# Patient Record
Sex: FEMALE | Race: WHITE | NOT HISPANIC OR LATINO | Employment: OTHER | URBAN - METROPOLITAN AREA
[De-identification: names, ages, dates, MRNs, and addresses within clinical notes are randomized per-mention and may not be internally consistent; named-entity substitution may affect disease eponyms.]

---

## 2017-01-09 ENCOUNTER — OFFICE VISIT (OUTPATIENT)
Dept: FAMILY MEDICINE | Facility: CLINIC | Age: 77
End: 2017-01-09
Payer: MEDICARE

## 2017-01-09 VITALS
RESPIRATION RATE: 16 BRPM | DIASTOLIC BLOOD PRESSURE: 60 MMHG | SYSTOLIC BLOOD PRESSURE: 118 MMHG | HEART RATE: 64 BPM | BODY MASS INDEX: 20.48 KG/M2 | WEIGHT: 112 LBS

## 2017-01-09 DIAGNOSIS — I10 ESSENTIAL HYPERTENSION: Primary | ICD-10-CM

## 2017-01-09 DIAGNOSIS — J43.9 PULMONARY EMPHYSEMA, UNSPECIFIED EMPHYSEMA TYPE: ICD-10-CM

## 2017-01-09 DIAGNOSIS — K59.1 DIARRHEA, FUNCTIONAL: ICD-10-CM

## 2017-01-09 DIAGNOSIS — F51.01 PRIMARY INSOMNIA: ICD-10-CM

## 2017-01-09 DIAGNOSIS — F41.1 GAD (GENERALIZED ANXIETY DISORDER): ICD-10-CM

## 2017-01-09 DIAGNOSIS — E78.5 HYPERLIPIDEMIA, UNSPECIFIED HYPERLIPIDEMIA TYPE: ICD-10-CM

## 2017-01-09 PROCEDURE — 99212 OFFICE O/P EST SF 10 MIN: CPT | Mod: PBBFAC | Performed by: FAMILY MEDICINE

## 2017-01-09 PROCEDURE — 99213 OFFICE O/P EST LOW 20 MIN: CPT | Mod: S$PBB,,, | Performed by: FAMILY MEDICINE

## 2017-01-09 PROCEDURE — 99999 PR PBB SHADOW E&M-EST. PATIENT-LVL II: CPT | Mod: PBBFAC,,, | Performed by: FAMILY MEDICINE

## 2017-01-09 RX ORDER — DIPHENOXYLATE HYDROCHLORIDE AND ATROPINE SULFATE 2.5; .025 MG/1; MG/1
1 TABLET ORAL 4 TIMES DAILY PRN
Qty: 90 TABLET | Refills: 2 | Status: SHIPPED | OUTPATIENT
Start: 2017-01-09 | End: 2017-01-19

## 2017-01-09 RX ORDER — ALPRAZOLAM 0.25 MG/1
TABLET ORAL
Qty: 90 TABLET | Refills: 1 | Status: SHIPPED | OUTPATIENT
Start: 2017-01-09 | End: 2017-01-09 | Stop reason: SDUPTHER

## 2017-01-09 RX ORDER — ALPRAZOLAM 0.25 MG/1
TABLET ORAL
Qty: 90 TABLET | Refills: 1 | Status: SHIPPED | OUTPATIENT
Start: 2017-01-09 | End: 2017-03-16 | Stop reason: SDUPTHER

## 2017-01-09 NOTE — MR AVS SNAPSHOT
[facility]  [address]  [facility] [address]  Phone: [phone]  Fax: [phone]                  Marva Perez   2017 12:45 PM   Office Visit    Description:  Female : 1940   Provider:  Kevin Clements MD   Department:  [facility]           Reason for Visit     Follow-up           Diagnoses this Visit        Comments    Essential hypertension    -  Primary     Primary insomnia         Hyperlipidemia, unspecified hyperlipidemia type         Pulmonary emphysema, unspecified emphysema type         Diarrhea, functional         KATHIE (generalized anxiety disorder)                To Do List           Future Appointments        Provider Department Dept Phone    3/9/2017 10:15 AM Kevin Clements MD [facility] [phone]      Goals (5 Years of Data)     None      Follow-Up and Disposition     Return in about 2 months (around 3/9/2017).    Follow-up and Disposition History       These Medications        Disp Refills Start End    diphenoxylate-atropine 2.5-0.025 mg (LOMOTIL) 2.5-0.025 mg per tablet 90 tablet 2 2017    Take 1 tablet by mouth 4 (four) times daily as needed for Diarrhea. - Oral    Pharmacy: [facility]/pharmacy #[id number] - [address] Ph #: [phone]       alprazolam (XANAX) 0.25 MG tablet 90 tablet 1 2017     1 po q hs    Pharmacy: [facility] - [address] Ph #: [phone]         [facility] On Call     [facility] On Call Nurse Care Line -  Assistance  Registered nurses in the [facility] On Call Center provide clinical advisement, health education, appointment booking, and other advisory services.  Call for this free service at [phone].             Medications           Message regarding Medications     Verify the changes and/or additions to your medication regime listed below are the same as discussed with your clinician today.  If any of these  changes or additions are incorrect, please notify your healthcare provider.        START taking these NEW medications        Refills    diphenoxylate-atropine 2.5-0.025 mg (LOMOTIL) 2.5-0.025 mg per tablet 2    Sig: Take 1 tablet by mouth 4 (four) times daily as needed for Diarrhea.    Class: Normal    Route: Oral           Verify that the below list of medications is an accurate representation of the medications you are currently taking.  If none reported, the list may be blank. If incorrect, please contact your healthcare provider. Carry this list with you in case of emergency.           Current Medications     alprazolam (XANAX) 0.25 MG tablet 1 po q hs    amlodipine (NORVASC) 2.5 MG tablet Take 1 tablet (2.5 mg total) by mouth once daily.    aspirin (ECOTRIN) 81 MG EC tablet Take 81 mg by mouth once daily.    BENICAR 40 mg tablet TAKE 1 TABLET DAILY    colesevelam (WELCHOL) 625 mg tablet TAKE 3 TABLETS (1,875 MG) TWICE DAILY WITH MEALS    ergocalciferol (VITAMIN D2) 50,000 unit Cap TAKE 1 CAPSULE EVERY 7 DAYS    escitalopram oxalate (LEXAPRO) 10 MG tablet Take 1 tablet (10 mg total) by mouth once daily.    fluticasone-salmeterol 250-50 mcg/dose (ADVAIR DISKUS) 250-50 mcg/dose diskus inhaler USE 1 INHALATION TWICE A DAY    levothyroxine 13 mcg Cap 1 Po q am ---No Generic    methscopolamine (PAMINE) 2.5 mg Tab Take 1 tablet (2.5 mg total) by mouth 3 (three) times daily.    nebivolol (BYSTOLIC) 10 MG Tab Take 1 tablet (10 mg total) by mouth once daily.    rifAXIMin (XIFAXAN) 550 mg Tab Take 1 tablet (550 mg total) by mouth 3 (three) times daily.    SPIRIVA WITH HANDIHALER 18 mcg inhalation capsule INHALE THE CONTENTS OF 1 CAPSULE ONCE DAILY    diphenoxylate-atropine 2.5-0.025 mg (LOMOTIL) 2.5-0.025 mg per tablet Take 1 tablet by mouth 4 (four) times daily as needed for Diarrhea.           Clinical Reference Information           Vital Signs - Last Recorded  Most recent update: 1/9/2017 12:56 PM by Ravindra MATSON  THANH Headley    BP Pulse Resp Wt BMI    118/60 (BP Location: Left arm, Patient Position: Sitting, BP Method: Manual) 64 16 50.8 kg (111 lb 15.9 oz) 20.48 kg/m2      Blood Pressure          Most Recent Value    BP  118/60      Allergies as of 1/9/2017     Pcn [Penicillins]    Ilosone    Iodine And Iodide Containing Products    Sulfa (Sulfonamide Antibiotics)      Immunizations Administered on Date of Encounter - 1/9/2017     None

## 2017-01-09 NOTE — PROGRESS NOTES
Subjective:       Patient ID: Marva Perez is a 76 y.o. female.    Chief Complaint: Follow-up    HPI Comments: Pt is a 76 y.o. female who presents for check up for Essential hypertension  (primary encounter diagnosis)  Primary insomnia is up to date.    Review of Systems   Constitutional: Negative for appetite change, chills and fever.   HENT: Negative for rhinorrhea, sinus pressure, sore throat and trouble swallowing.    Respiratory: Negative for cough, chest tightness, shortness of breath and wheezing.    Cardiovascular: Negative for chest pain and palpitations.   Gastrointestinal: Negative for abdominal pain, blood in stool, diarrhea, nausea and vomiting.   Genitourinary: Negative for dysuria, flank pain, hematuria, pelvic pain, urgency, vaginal bleeding, vaginal discharge and vaginal pain.   Musculoskeletal: Negative for back pain, joint swelling and neck stiffness.   Skin: Negative for rash.   Neurological: Negative for dizziness, weakness, light-headedness, numbness and headaches.   Hematological: Does not bruise/bleed easily.   Psychiatric/Behavioral: Negative for agitation. The patient is not nervous/anxious.        Objective:      Physical Exam   Constitutional: She is oriented to person, place, and time. She appears well-developed and well-nourished.   HENT:   Head: Normocephalic.   Eyes: Pupils are equal, round, and reactive to light.   Neck: Normal range of motion. Neck supple. No thyromegaly present.   Cardiovascular: Normal rate and regular rhythm.    Pulmonary/Chest: No respiratory distress. She has no wheezes. She has no rales. She exhibits no tenderness.   Abdominal: She exhibits no distension. There is no tenderness. There is no rebound and no guarding.   Musculoskeletal: Normal range of motion. She exhibits no edema or tenderness.   Lymphadenopathy:     She has no cervical adenopathy.   Neurological: She is alert and oriented to person, place, and time. She has normal reflexes. She displays  normal reflexes. No cranial nerve deficit. She exhibits normal muscle tone. Coordination normal.   Skin: Skin is warm and dry. No rash noted. No pallor.   Psychiatric: She has a normal mood and affect. Judgment and thought content normal.       Assessment:       1. Essential hypertension    2. Primary insomnia    3. Hyperlipidemia, unspecified hyperlipidemia type    4. Pulmonary emphysema, unspecified emphysema type    5. Diarrhea, functional        Plan:   Marva was seen today for follow-up.    Diagnoses and all orders for this visit:    Essential hypertension    Primary insomnia    Hyperlipidemia, unspecified hyperlipidemia type    Pulmonary emphysema, unspecified emphysema type    Diarrhea, functional    Other orders  -     diphenoxylate-atropine 2.5-0.025 mg (LOMOTIL) 2.5-0.025 mg per tablet; Take 1 tablet by mouth 4 (four) times daily as needed for Diarrhea.    Lomotil tid for functional diarrhea

## 2017-01-18 ENCOUNTER — TELEPHONE (OUTPATIENT)
Dept: FAMILY MEDICINE | Facility: CLINIC | Age: 77
End: 2017-01-18

## 2017-01-18 NOTE — TELEPHONE ENCOUNTER
----- Message from Lennox Son sent at 2017 12:28 PM CST -----  Contact: Patient  Marva Peerz  MRN: [id number]  : 1940  PCP: Kvein Clements  Home Phone      [phone]  Work Phone      Not on file.  Mobile          [phone]      MESSAGE: told to inform Dr Clements if medication did not work -- she states it is not helping & would like to speak with Dr Clements    Call [phone]    PCP: Tyree

## 2017-01-30 RX ORDER — PANTOPRAZOLE SODIUM 40 MG/1
TABLET, DELAYED RELEASE ORAL
Qty: 90 TABLET | Refills: 0 | Status: SHIPPED | OUTPATIENT
Start: 2017-01-30 | End: 2017-04-28 | Stop reason: SDUPTHER

## 2017-02-01 ENCOUNTER — HOSPITAL ENCOUNTER (OUTPATIENT)
Dept: RADIOLOGY | Facility: HOSPITAL | Age: 77
Discharge: HOME | End: 2017-02-01
Attending: OBSTETRICS & GYNECOLOGY
Payer: MEDICARE

## 2017-02-01 ENCOUNTER — TELEPHONE (OUTPATIENT)
Dept: OBSTETRICS AND GYNECOLOGY | Facility: CLINIC | Age: 77
End: 2017-02-01

## 2017-02-01 DIAGNOSIS — Z12.31 ENCOUNTER FOR SCREENING MAMMOGRAM FOR BREAST CANCER: ICD-10-CM

## 2017-02-01 DIAGNOSIS — Z12.31 ENCOUNTER FOR SCREENING MAMMOGRAM FOR BREAST CANCER: Primary | ICD-10-CM

## 2017-02-01 PROCEDURE — 77063 BREAST TOMOSYNTHESIS BI: CPT | Mod: 26,,, | Performed by: RADIOLOGY

## 2017-02-01 PROCEDURE — 77067 SCR MAMMO BI INCL CAD: CPT | Mod: 26,,, | Performed by: RADIOLOGY

## 2017-02-01 PROCEDURE — 77067 SCR MAMMO BI INCL CAD: CPT | Mod: TC

## 2017-02-01 NOTE — TELEPHONE ENCOUNTER
----- Message from Christine Bauer MA sent at 2017 10:36 AM CST -----  Contact: Self  Marva Perez  MRN: [id number]  : 1940  PCP: Kevin Clements  Home Phone      [phone]  Work Phone      Not on file.  Mobile          [phone]      MESSAGE:   Please link order to mammogram appointment today @ 210  Thanks

## 2017-03-05 RX ORDER — ERGOCALCIFEROL 1.25 MG/1
CAPSULE ORAL
Qty: 24 CAPSULE | Refills: 0 | Status: SHIPPED | OUTPATIENT
Start: 2017-03-05 | End: 2017-06-01 | Stop reason: SDUPTHER

## 2017-03-16 ENCOUNTER — OFFICE VISIT (OUTPATIENT)
Dept: FAMILY MEDICINE | Facility: CLINIC | Age: 77
End: 2017-03-16
Payer: MEDICARE

## 2017-03-16 VITALS
BODY MASS INDEX: 20.29 KG/M2 | WEIGHT: 110.25 LBS | SYSTOLIC BLOOD PRESSURE: 116 MMHG | HEIGHT: 62 IN | RESPIRATION RATE: 16 BRPM | DIASTOLIC BLOOD PRESSURE: 74 MMHG | HEART RATE: 68 BPM

## 2017-03-16 DIAGNOSIS — Z23 IMMUNIZATION DUE: ICD-10-CM

## 2017-03-16 DIAGNOSIS — I25.10 ASCVD (ARTERIOSCLEROTIC CARDIOVASCULAR DISEASE): ICD-10-CM

## 2017-03-16 DIAGNOSIS — F41.1 GAD (GENERALIZED ANXIETY DISORDER): ICD-10-CM

## 2017-03-16 DIAGNOSIS — R19.7 DIARRHEA, UNSPECIFIED TYPE: ICD-10-CM

## 2017-03-16 DIAGNOSIS — I10 ESSENTIAL HYPERTENSION: ICD-10-CM

## 2017-03-16 DIAGNOSIS — J43.9 PULMONARY EMPHYSEMA, UNSPECIFIED EMPHYSEMA TYPE: Primary | ICD-10-CM

## 2017-03-16 DIAGNOSIS — E78.5 HYPERLIPIDEMIA, UNSPECIFIED HYPERLIPIDEMIA TYPE: ICD-10-CM

## 2017-03-16 LAB
ALBUMIN SERPL BCP-MCNC: 3.9 G/DL
ALP SERPL-CCNC: 85 U/L
ALT SERPL W/O P-5'-P-CCNC: 16 U/L
ANION GAP SERPL CALC-SCNC: 8 MMOL/L
AST SERPL-CCNC: 18 U/L
BASOPHILS # BLD AUTO: 0.06 K/UL
BASOPHILS NFR BLD: 0.6 %
BILIRUB SERPL-MCNC: 1.1 MG/DL
BUN SERPL-MCNC: 23 MG/DL
CALCIUM SERPL-MCNC: 9.6 MG/DL
CHLORIDE SERPL-SCNC: 107 MMOL/L
CHOLEST/HDLC SERPL: 4.5 {RATIO}
CK SERPL-CCNC: 66 U/L
CO2 SERPL-SCNC: 28 MMOL/L
CREAT SERPL-MCNC: 1 MG/DL
DIFFERENTIAL METHOD: NORMAL
EOSINOPHIL # BLD AUTO: 0.2 K/UL
EOSINOPHIL NFR BLD: 1.8 %
ERYTHROCYTE [DISTWIDTH] IN BLOOD BY AUTOMATED COUNT: 13.3 %
EST. GFR  (AFRICAN AMERICAN): >60 ML/MIN/1.73 M^2
EST. GFR  (NON AFRICAN AMERICAN): 55 ML/MIN/1.73 M^2
GLUCOSE SERPL-MCNC: 96 MG/DL
HCT VFR BLD AUTO: 44.4 %
HDL/CHOLESTEROL RATIO: 22.4 %
HDLC SERPL-MCNC: 237 MG/DL
HDLC SERPL-MCNC: 53 MG/DL
HGB BLD-MCNC: 14.4 G/DL
LDLC SERPL CALC-MCNC: 155.8 MG/DL
LYMPHOCYTES # BLD AUTO: 2.2 K/UL
LYMPHOCYTES NFR BLD: 22.8 %
MCH RBC QN AUTO: 29.4 PG
MCHC RBC AUTO-ENTMCNC: 32.4 %
MCV RBC AUTO: 91 FL
MONOCYTES # BLD AUTO: 1 K/UL
MONOCYTES NFR BLD: 10.3 %
NEUTROPHILS # BLD AUTO: 6.1 K/UL
NEUTROPHILS NFR BLD: 64.5 %
NONHDLC SERPL-MCNC: 184 MG/DL
PLATELET # BLD AUTO: 195 K/UL
PMV BLD AUTO: 10.7 FL
POTASSIUM SERPL-SCNC: 5.3 MMOL/L
PROT SERPL-MCNC: 7.3 G/DL
RBC # BLD AUTO: 4.9 M/UL
SODIUM SERPL-SCNC: 143 MMOL/L
TRIGL SERPL-MCNC: 141 MG/DL
TSH SERPL DL<=0.005 MIU/L-ACNC: 2.35 UIU/ML
WBC # BLD AUTO: 9.51 K/UL

## 2017-03-16 PROCEDURE — 1159F MED LIST DOCD IN RCRD: CPT | Performed by: FAMILY MEDICINE

## 2017-03-16 PROCEDURE — 90670 PCV13 VACCINE IM: CPT | Mod: PBBFAC | Performed by: FAMILY MEDICINE

## 2017-03-16 PROCEDURE — 1126F AMNT PAIN NOTED NONE PRSNT: CPT | Performed by: FAMILY MEDICINE

## 2017-03-16 PROCEDURE — 82550 ASSAY OF CK (CPK): CPT

## 2017-03-16 PROCEDURE — 85025 COMPLETE CBC W/AUTO DIFF WBC: CPT

## 2017-03-16 PROCEDURE — 3074F SYST BP LT 130 MM HG: CPT | Performed by: FAMILY MEDICINE

## 2017-03-16 PROCEDURE — 80061 LIPID PANEL: CPT

## 2017-03-16 PROCEDURE — 99999 PR PBB SHADOW E&M-EST. PATIENT-LVL III: CPT | Mod: PBBFAC,,, | Performed by: FAMILY MEDICINE

## 2017-03-16 PROCEDURE — 1157F ADVNC CARE PLAN IN RCRD: CPT | Performed by: FAMILY MEDICINE

## 2017-03-16 PROCEDURE — 99214 OFFICE O/P EST MOD 30 MIN: CPT | Mod: S$PBB | Performed by: FAMILY MEDICINE

## 2017-03-16 PROCEDURE — 3078F DIAST BP <80 MM HG: CPT | Performed by: FAMILY MEDICINE

## 2017-03-16 PROCEDURE — 84443 ASSAY THYROID STIM HORMONE: CPT

## 2017-03-16 PROCEDURE — 1160F RVW MEDS BY RX/DR IN RCRD: CPT | Performed by: FAMILY MEDICINE

## 2017-03-16 PROCEDURE — 36415 COLL VENOUS BLD VENIPUNCTURE: CPT | Mod: PBBFAC

## 2017-03-16 PROCEDURE — 80053 COMPREHEN METABOLIC PANEL: CPT

## 2017-03-16 PROCEDURE — 99213 OFFICE O/P EST LOW 20 MIN: CPT | Mod: PBBFAC | Performed by: FAMILY MEDICINE

## 2017-03-16 RX ORDER — DIPHENOXYLATE HYDROCHLORIDE AND ATROPINE SULFATE 2.5; .025 MG/1; MG/1
1 TABLET ORAL 2 TIMES DAILY PRN
Qty: 180 TABLET | Refills: 1 | Status: SHIPPED | OUTPATIENT
Start: 2017-03-16 | End: 2017-06-08

## 2017-03-16 RX ORDER — ATORVASTATIN CALCIUM 10 MG/1
10 TABLET, FILM COATED ORAL DAILY
COMMUNITY
End: 2017-06-08

## 2017-03-16 RX ORDER — LOPERAMIDE HYDROCHLORIDE 2 MG/1
2 CAPSULE ORAL DAILY
COMMUNITY
End: 2017-06-08

## 2017-03-16 RX ORDER — DIPHENOXYLATE HYDROCHLORIDE AND ATROPINE SULFATE 2.5; .025 MG/1; MG/1
1 TABLET ORAL 4 TIMES DAILY PRN
COMMUNITY
End: 2017-03-16 | Stop reason: SDUPTHER

## 2017-03-16 RX ORDER — ALPRAZOLAM 0.25 MG/1
TABLET ORAL
Qty: 90 TABLET | Refills: 1 | Status: SHIPPED | OUTPATIENT
Start: 2017-03-16 | End: 2017-06-08 | Stop reason: SDUPTHER

## 2017-03-16 RX ORDER — METHSCOPOLAMINE BROMIDE 2.5 MG/1
2.5 TABLET ORAL 2 TIMES DAILY
Qty: 180 TABLET | Refills: 3 | Status: SHIPPED | OUTPATIENT
Start: 2017-03-16 | End: 2017-07-21 | Stop reason: ALTCHOICE

## 2017-03-16 RX ORDER — LEVOTHYROXINE SODIUM 50 UG/1
13 TABLET ORAL DAILY
COMMUNITY
End: 2017-08-24

## 2017-03-16 NOTE — MR AVS SNAPSHOT
[place] [address] [facility] [address]  Phone: [phone]  Fax: [phone]                  Marva Perez   3/16/2017 8:45 AM   Office Visit    Description:  Female : 1940   Provider:  Kevin Clements MD   Department:  [facility]           Reason for Visit     Med Checkup           Diagnoses this Visit        Comments    Pulmonary emphysema, unspecified emphysema type    -  Primary     Hyperlipidemia, unspecified hyperlipidemia type         ASCVD (arteriosclerotic cardiovascular disease)         Essential hypertension         Diarrhea, unspecified type         KATHIE (generalized anxiety disorder)         Immunization due                To Do List           Future Appointments        Provider Department Dept Phone    9/15/2017 8:30 AM Kevin Clements MD [facility] [phone]      Goals (5 Years of Data)     None      Follow-Up and Disposition     Return in about 6 months (around 2017).       These Medications        Disp Refills Start End    methscopolamine (PAMINE) 2.5 mg Tab 180 tablet 3 3/16/2017     Take 1 tablet (2.5 mg total) by mouth 2 (two) times daily. - Oral    Pharmacy: [facility] - [address] Ph #: [phone]       diphenoxylate-atropine 2.5-0.025 mg (LOMOTIL) 2.5-0.025 mg per tablet 180 tablet 1 3/16/2017     Take 1 tablet by mouth 2 (two) times daily as needed for Diarrhea. - Oral    Pharmacy: [facility] - [address] Ph #: [phone]       alprazolam (XANAX) 0.25 MG tablet 90 tablet 1 3/16/2017     1 po q hs    Pharmacy: [facility] - [address] Ph #: [phone]         [facility] On Call     [facility] On Call Nurse Care Line -  Assistance  Registered nurses in the [facility] On Call Center provide clinical advisement, health education, appointment booking, and other  advisory services.  Call for this free service at [phone].             Medications           Message regarding Medications     Verify the changes and/or additions to your medication regime listed below are the same as discussed with your clinician today.  If any of these changes or additions are incorrect, please notify your healthcare provider.        START taking these NEW medications        Refills    diphenoxylate-atropine 2.5-0.025 mg (LOMOTIL) 2.5-0.025 mg per tablet 1    Sig: Take 1 tablet by mouth 2 (two) times daily as needed for Diarrhea.    Class: Normal    Route: Oral      CHANGE how you are taking these medications     Start Taking Instead of    methscopolamine (PAMINE) 2.5 mg Tab methscopolamine (PAMINE) 2.5 mg Tab    Dosage:  Take 1 tablet (2.5 mg total) by mouth 2 (two) times daily. Dosage:  Take 1 tablet (2.5 mg total) by mouth 3 (three) times daily.    Reason for Change:  Reorder       STOP taking these medications     levothyroxine 13 mcg Cap 1 Po q am ---No Generic    rifAXIMin (XIFAXAN) 550 mg Tab Take 1 tablet (550 mg total) by mouth 3 (three) times daily.           Verify that the below list of medications is an accurate representation of the medications you are currently taking.  If none reported, the list may be blank. If incorrect, please contact your healthcare provider. Carry this list with you in case of emergency.           Current Medications     alprazolam (XANAX) 0.25 MG tablet 1 po q hs    amlodipine (NORVASC) 2.5 MG tablet Take 1 tablet (2.5 mg total) by mouth once daily.    aspirin (ECOTRIN) 81 MG EC tablet Take 81 mg by mouth once daily.    atorvastatin (LIPITOR) 10 MG tablet Take 10 mg by mouth once daily.    BENICAR 40 mg tablet TAKE 1 TABLET DAILY    coenzyme Q10 100 mg Chew Take 1 tablet by mouth once daily.    colesevelam (WELCHOL) 625 mg tablet TAKE 3 TABLETS (1,875 MG) TWICE DAILY WITH MEALS    diphenoxylate-atropine 2.5-0.025 mg (LOMOTIL) 2.5-0.025 mg per tablet  "Take 1 tablet by mouth 2 (two) times daily as needed for Diarrhea.    escitalopram oxalate (LEXAPRO) 10 MG tablet Take 1 tablet (10 mg total) by mouth once daily.    fluticasone-salmeterol 250-50 mcg/dose (ADVAIR DISKUS) 250-50 mcg/dose diskus inhaler USE 1 INHALATION TWICE A DAY    levothyroxine (SYNTHROID) 50 MCG tablet Take 50 mcg by mouth once daily.    loperamide (IMODIUM) 2 mg capsule Take 2 mg by mouth once daily.    nebivolol (BYSTOLIC) 10 MG Tab Take 1 tablet (10 mg total) by mouth once daily.    pantoprazole (PROTONIX) 40 MG tablet TAKE 1 TABLET DAILY    SPIRIVA WITH HANDIHALER 18 mcg inhalation capsule INHALE THE CONTENTS OF 1 CAPSULE ONCE DAILY    VITAMIN D2 50,000 unit capsule TAKE 1 CAPSULE EVERY 7 DAYS    methscopolamine (PAMINE) 2.5 mg Tab Take 1 tablet (2.5 mg total) by mouth 2 (two) times daily.           Clinical Reference Information           Your Vitals Were     BP Pulse Resp Height Weight BMI    116/74 (BP Location: Right arm, Patient Position: Sitting, BP Method: Manual) 68 16 5' 2" (1.575 m) 50 kg (110 lb 3.7 oz) 20.16 kg/m2      Blood Pressure          Most Recent Value    BP  116/74      Allergies as of 3/16/2017     Pcn [Penicillins]    Bactrim [Sulfamethoxazole-trimethoprim]    Ilosone    Iodine And Iodide Containing Products    Sulfa (Sulfonamide Antibiotics)      Immunizations Administered on Date of Encounter - 3/16/2017     Name Date Dose VIS Date Route    Pneumococcal Conjugate - 13 Valent  Incomplete 0.5 mL 11/5/2015 Intramuscular      Orders Placed During Today's Visit      Normal Orders This Visit    Pneumococcal Conjugate Vaccine (13 Valent) (IM)     Future Labs/Procedures Expected by Expires    CBC auto differential  3/16/2017 5/15/2018    CK  3/16/2017 5/15/2018    Comprehensive metabolic panel  3/16/2017 3/16/2018    Lipid panel  3/16/2017 5/15/2018    TSH  3/16/2017 3/16/2018      Language Assistance Services     ATTENTION: Language assistance services are available, free of " charge. Please call [phone].      ATENCIÓN: Si habla español, tiene a kim disposición servicios gratuitos de asistencia lingüística. Llame al [phone].     CHÚ Ý: N?u b?n nói Ti?ng Vi?t, có các d?ch v? h? tr? ngôn ng? mi?n phí dành cho b?n. G?i s? [phone].         [facility] complies with applicable Federal civil rights laws and does not discriminate on the basis of race, color, national origin, age, disability, or sex.

## 2017-03-16 NOTE — PROGRESS NOTES
Subjective:       Patient ID: Marva Perez is a 76 y.o. female.    Chief Complaint: Med Checkup    HPI Comments: Pt is a 76 y.o. female who presents for check up for Pulmonary emphysema, unspecified emphysema type  (primary encounter diagnosis)  Hyperlipidemia, unspecified hyperlipidemia type  Ascvd (arteriosclerotic cardiovascular disease)  Essential hypertension. Doing well on current meds. Denies any side effects. Prevention is up to date.    Review of Systems   Constitutional: Negative for appetite change, chills and fever.   HENT: Negative for rhinorrhea, sinus pressure, sore throat and trouble swallowing.    Respiratory: Negative for cough, chest tightness, shortness of breath and wheezing.    Cardiovascular: Negative for chest pain and palpitations.   Gastrointestinal: Negative for abdominal pain, blood in stool, diarrhea, nausea and vomiting.   Genitourinary: Negative for dysuria, flank pain, hematuria, pelvic pain, urgency, vaginal bleeding, vaginal discharge and vaginal pain.   Musculoskeletal: Negative for back pain, joint swelling and neck stiffness.   Skin: Negative for rash.   Neurological: Negative for dizziness, weakness, light-headedness, numbness and headaches.   Hematological: Does not bruise/bleed easily.   Psychiatric/Behavioral: Negative for agitation. The patient is not nervous/anxious.        Objective:      Physical Exam   Constitutional: She is oriented to person, place, and time. She appears well-developed and well-nourished.   HENT:   Head: Normocephalic.   Eyes: Pupils are equal, round, and reactive to light.   Neck: Normal range of motion. Neck supple. No thyromegaly present.   Cardiovascular: Normal rate and regular rhythm.    Pulmonary/Chest: No respiratory distress. She has no wheezes. She has no rales. She exhibits no tenderness.   Abdominal: She exhibits no distension. There is no tenderness. There is no rebound and no guarding.   Musculoskeletal: Normal range of motion. She  exhibits no edema or tenderness.   Lymphadenopathy:     She has no cervical adenopathy.   Neurological: She is alert and oriented to person, place, and time. She has normal reflexes. She displays normal reflexes. No cranial nerve deficit. She exhibits normal muscle tone. Coordination normal.   Skin: Skin is warm and dry. No rash noted. No pallor.   Psychiatric: She has a normal mood and affect. Judgment and thought content normal.       Assessment:       1. Pulmonary emphysema, unspecified emphysema type    2. Hyperlipidemia, unspecified hyperlipidemia type    3. ASCVD (arteriosclerotic cardiovascular disease)    4. Essential hypertension    5. Diarrhea, unspecified type    6. KATHIE (generalized anxiety disorder)    7. Immunization due        Plan:   Marva was seen today for med checkup.    Diagnoses and all orders for this visit:    Pulmonary emphysema, unspecified emphysema type    Hyperlipidemia, unspecified hyperlipidemia type  -     CBC auto differential; Future  -     Comprehensive metabolic panel; Future  -     Lipid panel; Future  -     TSH; Future  -     CK; Future    ASCVD (arteriosclerotic cardiovascular disease)    Essential hypertension    Diarrhea, unspecified type  -     methscopolamine (PAMINE) 2.5 mg Tab; Take 1 tablet (2.5 mg total) by mouth 2 (two) times daily.  -     diphenoxylate-atropine 2.5-0.025 mg (LOMOTIL) 2.5-0.025 mg per tablet; Take 1 tablet by mouth 2 (two) times daily as needed for Diarrhea.    KATHIE (generalized anxiety disorder)  -     alprazolam (XANAX) 0.25 MG tablet; 1 po q hs    Immunization due

## 2017-03-20 ENCOUNTER — OFFICE VISIT (OUTPATIENT)
Dept: FAMILY MEDICINE | Facility: CLINIC | Age: 77
End: 2017-03-20
Payer: MEDICARE

## 2017-03-20 VITALS
WEIGHT: 111.75 LBS | DIASTOLIC BLOOD PRESSURE: 60 MMHG | HEART RATE: 80 BPM | BODY MASS INDEX: 20.56 KG/M2 | HEIGHT: 62 IN | SYSTOLIC BLOOD PRESSURE: 104 MMHG | RESPIRATION RATE: 18 BRPM

## 2017-03-20 DIAGNOSIS — M62.830 BACK SPASM: Primary | ICD-10-CM
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
## 2017-03-20 PROCEDURE — 1157F ADVNC CARE PLAN IN RCRD: CPT | Performed by: FAMILY MEDICINE

## 2017-03-20 PROCEDURE — 3074F SYST BP LT 130 MM HG: CPT | Performed by: FAMILY MEDICINE

## 2017-03-20 PROCEDURE — 99999 PR PBB SHADOW E&M-EST. PATIENT-LVL II: CPT | Mod: PBBFAC,,, | Performed by: FAMILY MEDICINE

## 2017-03-20 PROCEDURE — 99212 OFFICE O/P EST SF 10 MIN: CPT | Mod: PBBFAC | Performed by: FAMILY MEDICINE

## 2017-03-20 PROCEDURE — 1160F RVW MEDS BY RX/DR IN RCRD: CPT | Performed by: FAMILY MEDICINE

## 2017-03-20 PROCEDURE — 1125F AMNT PAIN NOTED PAIN PRSNT: CPT | Performed by: FAMILY MEDICINE

## 2017-03-20 PROCEDURE — 3078F DIAST BP <80 MM HG: CPT | Performed by: FAMILY MEDICINE

## 2017-03-20 PROCEDURE — 1159F MED LIST DOCD IN RCRD: CPT | Performed by: FAMILY MEDICINE

## 2017-03-20 PROCEDURE — 99214 OFFICE O/P EST MOD 30 MIN: CPT | Mod: S$PBB | Performed by: FAMILY MEDICINE

## 2017-03-20 RX ORDER — NAPROXEN 375 MG/1
375 TABLET ORAL 2 TIMES DAILY WITH MEALS
Qty: 60 TABLET | Refills: 3 | Status: SHIPPED | OUTPATIENT
Start: 2017-03-20 | End: 2017-04-20

## 2017-03-20 RX ORDER — CYCLOBENZAPRINE HCL 5 MG
5 TABLET ORAL 2 TIMES DAILY
Qty: 60 TABLET | Refills: 2 | Status: SHIPPED | OUTPATIENT
Start: 2017-03-20 | End: 2017-03-30
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
## 2017-04-06 ENCOUNTER — OFFICE VISIT (OUTPATIENT)
Dept: FAMILY MEDICINE | Facility: CLINIC | Age: 77
End: 2017-04-06
Payer: MEDICARE

## 2017-04-06 VITALS
WEIGHT: 110.88 LBS | DIASTOLIC BLOOD PRESSURE: 70 MMHG | SYSTOLIC BLOOD PRESSURE: 128 MMHG | TEMPERATURE: 97 F | HEIGHT: 62 IN | BODY MASS INDEX: 20.4 KG/M2 | RESPIRATION RATE: 18 BRPM | HEART RATE: 72 BPM

## 2017-04-06 DIAGNOSIS — I10 ESSENTIAL HYPERTENSION: ICD-10-CM

## 2017-04-06 DIAGNOSIS — I25.10 ASCVD (ARTERIOSCLEROTIC CARDIOVASCULAR DISEASE): ICD-10-CM

## 2017-04-06 DIAGNOSIS — R10.9 ABDOMINAL PAIN, UNSPECIFIED LOCATION: ICD-10-CM

## 2017-04-06 DIAGNOSIS — F41.1 GAD (GENERALIZED ANXIETY DISORDER): ICD-10-CM

## 2017-04-06 DIAGNOSIS — R19.7 DIARRHEA OF PRESUMED INFECTIOUS ORIGIN: Primary | ICD-10-CM

## 2017-04-06 DIAGNOSIS — J43.9 PULMONARY EMPHYSEMA, UNSPECIFIED EMPHYSEMA TYPE: ICD-10-CM
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
## 2017-04-06 DIAGNOSIS — E78.5 HYPERLIPIDEMIA, UNSPECIFIED HYPERLIPIDEMIA TYPE: ICD-10-CM

## 2017-04-06 PROCEDURE — 1157F ADVNC CARE PLAN IN RCRD: CPT | Performed by: FAMILY MEDICINE

## 2017-04-06 PROCEDURE — 3078F DIAST BP <80 MM HG: CPT | Performed by: FAMILY MEDICINE

## 2017-04-06 PROCEDURE — 99999 PR PBB SHADOW E&M-EST. PATIENT-LVL III: CPT | Mod: PBBFAC,,, | Performed by: FAMILY MEDICINE

## 2017-04-06 PROCEDURE — 1160F RVW MEDS BY RX/DR IN RCRD: CPT | Performed by: FAMILY MEDICINE

## 2017-04-06 PROCEDURE — 99213 OFFICE O/P EST LOW 20 MIN: CPT | Mod: PBBFAC | Performed by: FAMILY MEDICINE

## 2017-04-06 PROCEDURE — 1125F AMNT PAIN NOTED PAIN PRSNT: CPT | Performed by: FAMILY MEDICINE

## 2017-04-06 PROCEDURE — 3074F SYST BP LT 130 MM HG: CPT | Performed by: FAMILY MEDICINE

## 2017-04-06 PROCEDURE — 99214 OFFICE O/P EST MOD 30 MIN: CPT | Mod: S$PBB | Performed by: FAMILY MEDICINE

## 2017-04-06 PROCEDURE — 1159F MED LIST DOCD IN RCRD: CPT | Performed by: FAMILY MEDICINE
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
## 2017-04-07 ENCOUNTER — LAB VISIT (OUTPATIENT)
Dept: LAB | Facility: HOSPITAL | Age: 77
End: 2017-04-07
Attending: FAMILY MEDICINE
Payer: MEDICARE

## 2017-04-07 DIAGNOSIS — R19.7 DIARRHEA OF PRESUMED INFECTIOUS ORIGIN: ICD-10-CM

## 2017-04-07 PROCEDURE — 87046 STOOL CULTR AEROBIC BACT EA: CPT | Mod: 59

## 2017-04-07 PROCEDURE — 87045 FECES CULTURE AEROBIC BACT: CPT

## 2017-04-07 PROCEDURE — 87427 SHIGA-LIKE TOXIN AG IA: CPT

## 2017-04-07 PROCEDURE — 87449 NOS EACH ORGANISM AG IA: CPT

## 2017-04-08 LAB
C DIFF GDH STL QL: NEGATIVE
C DIFF TOX A+B STL QL IA: NEGATIVE

## 2017-04-09 LAB
E COLI SXT1 STL QL IA: NEGATIVE
E COLI SXT2 STL QL IA: NEGATIVE

## 2017-04-10 ENCOUNTER — TELEPHONE (OUTPATIENT)
Dept: FAMILY MEDICINE | Facility: CLINIC | Age: 77
End: 2017-04-10

## 2017-04-10 LAB — BACTERIA STL CULT: NORMAL
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
## 2017-04-10 NOTE — TELEPHONE ENCOUNTER
Explained to pt that she does not have to prep before CT only fast 4 hours prior to CT. Verbalized understanding

## 2017-04-11 ENCOUNTER — HOSPITAL ENCOUNTER (OUTPATIENT)
Dept: RADIOLOGY | Facility: HOSPITAL | Age: 77
Discharge: HOME | End: 2017-04-11
Attending: FAMILY MEDICINE
Payer: MEDICARE

## 2017-04-11 DIAGNOSIS — R19.7 DIARRHEA OF PRESUMED INFECTIOUS ORIGIN: ICD-10-CM

## 2017-04-11 DIAGNOSIS — R10.9 ABDOMINAL PAIN, UNSPECIFIED LOCATION: ICD-10-CM

## 2017-04-11 PROCEDURE — 74176 CT ABD & PELVIS W/O CONTRAST: CPT | Mod: 26,,, | Performed by: RADIOLOGY

## 2017-04-11 PROCEDURE — 74176 CT ABD & PELVIS W/O CONTRAST: CPT | Mod: TC

## 2017-04-11 NOTE — PROGRESS NOTES
Please inform patient that the blood work labs were normal range.Ms Durham: All unremarkable, other then COPD of the lungs and  other issue was mild colon wall thickening of the L colon area(non-specific)

## 2017-04-12 ENCOUNTER — TELEPHONE (OUTPATIENT)
Dept: FAMILY MEDICINE | Facility: CLINIC | Age: 77
End: 2017-04-12

## 2017-04-12 NOTE — TELEPHONE ENCOUNTER
----- Message from Kaelyn Aguero sent at 2017  9:35 AM CDT -----  Contact: self  Marva Perez  MRN: [id number]  : 1940  PCP: Kevin Clements  Home Phone      [phone]  Work Phone      Not on file.  Mobile          [phone]      MESSAGE:   Would like to know if Dr Clements can prescribe her Librax.  Stated Dr Clements had given her that in the past.  Patient advised Dr Clements out of office today.    Phone:  [phone]  Pharmacy:  [facility]

## 2017-04-12 NOTE — TELEPHONE ENCOUNTER
Spoke with pt, states spoke with Dr Clements about prescribing Librax, is requesting rx, thank you

## 2017-04-19 DIAGNOSIS — F41.1 GAD (GENERALIZED ANXIETY DISORDER): ICD-10-CM

## 2017-04-20 ENCOUNTER — OFFICE VISIT (OUTPATIENT)
Dept: FAMILY MEDICINE | Facility: CLINIC | Age: 77
End: 2017-04-20
Payer: MEDICARE

## 2017-04-20 VITALS
SYSTOLIC BLOOD PRESSURE: 104 MMHG | HEART RATE: 80 BPM | WEIGHT: 109.81 LBS | HEIGHT: 62 IN | BODY MASS INDEX: 20.21 KG/M2 | RESPIRATION RATE: 18 BRPM | DIASTOLIC BLOOD PRESSURE: 60 MMHG

## 2017-04-20 DIAGNOSIS — F51.01 PRIMARY INSOMNIA: Primary | ICD-10-CM

## 2017-04-20 DIAGNOSIS — K58.9 IRRITABLE BOWEL SYNDROME, UNSPECIFIED TYPE: ICD-10-CM

## 2017-04-20 PROCEDURE — 99213 OFFICE O/P EST LOW 20 MIN: CPT | Mod: S$PBB | Performed by: FAMILY MEDICINE

## 2017-04-20 PROCEDURE — 1160F RVW MEDS BY RX/DR IN RCRD: CPT | Performed by: FAMILY MEDICINE

## 2017-04-20 PROCEDURE — 1159F MED LIST DOCD IN RCRD: CPT | Performed by: FAMILY MEDICINE

## 2017-04-20 PROCEDURE — 1126F AMNT PAIN NOTED NONE PRSNT: CPT | Performed by: FAMILY MEDICINE

## 2017-04-20 PROCEDURE — 99999 PR PBB SHADOW E&M-EST. PATIENT-LVL II: CPT | Mod: PBBFAC,,, | Performed by: FAMILY MEDICINE

## 2017-04-20 PROCEDURE — 3078F DIAST BP <80 MM HG: CPT | Performed by: FAMILY MEDICINE

## 2017-04-20 PROCEDURE — 1157F ADVNC CARE PLAN IN RCRD: CPT | Mod: 8P | Performed by: FAMILY MEDICINE

## 2017-04-20 PROCEDURE — 3074F SYST BP LT 130 MM HG: CPT | Performed by: FAMILY MEDICINE

## 2017-04-20 PROCEDURE — 99212 OFFICE O/P EST SF 10 MIN: CPT | Mod: PBBFAC | Performed by: FAMILY MEDICINE

## 2017-04-20 RX ORDER — ESCITALOPRAM OXALATE 10 MG/1
TABLET ORAL
Qty: 90 TABLET | Refills: 0 | OUTPATIENT
Start: 2017-04-20

## 2017-04-20 NOTE — MR AVS SNAPSHOT
[facility]  [address] [address]  Phone: [phone]  Fax: [phone]                  Marva Perez   2017 10:45 AM   Office Visit    Description:  Female : 1940   Provider:  Kevin Clements MD   Department:  [facility]           Reason for Visit     Follow-up           Diagnoses this Visit        Comments    Primary insomnia    -  Primary     Irritable bowel syndrome, unspecified type                To Do List           Future Appointments        Provider Department Dept Phone    2017 9:45 AM Kevin Clements MD [facility] [phone]    9/15/2017 8:30 AM Kevin Clements MD [facility] [phone]      Goals (5 Years of Data)     None      Follow-Up and Disposition     Return in about 6 weeks (around 2017).    Follow-up and Disposition History       These Medications        Disp Refills Start End    eluxadoline (VIBERZI) 75 mg Tab 60 tablet 2 2017     Take 75 mg by mouth 2 (two) times daily. - Oral    Pharmacy: [facility]/pharmacy #[id number] - [place] - [address]  #: [phone]         [facility] On Call     [facility] On Call Nurse Care Line -  Assistance  Unless otherwise directed by your provider, please contact [facility], our nurse care line that is available for  assistance.     Registered nurses in the [facility] provide: appointment scheduling, clinical advisement, health education, and other advisory services.  Call: [phone] (toll free)               Medications           Message regarding Medications     Verify the changes and/or additions to your medication regime listed below are the same as discussed with your clinician today.  If any of these changes or additions are incorrect, please notify your healthcare provider.        START taking these NEW medications        Refills    eluxadoline (VIBERZI) 75 mg Tab 2    Sig: Take 75 mg by mouth 2  (two) times daily.    Class: Normal    Route: Oral           Verify that the below list of medications is an accurate representation of the medications you are currently taking.  If none reported, the list may be blank. If incorrect, please contact your healthcare provider. Carry this list with you in case of emergency.           Current Medications     alprazolam (XANAX) 0.25 MG tablet 1 po q hs    amlodipine (NORVASC) 2.5 MG tablet Take 1 tablet (2.5 mg total) by mouth once daily.    aspirin (ECOTRIN) 81 MG EC tablet Take 81 mg by mouth once daily.    atorvastatin (LIPITOR) 10 MG tablet Take 10 mg by mouth once daily.    BENICAR 40 mg tablet TAKE 1 TABLET DAILY    coenzyme Q10 100 mg Chew Take 1 tablet by mouth once daily.    colesevelam (WELCHOL) 625 mg tablet TAKE 3 TABLETS (1,875 MG) TWICE DAILY WITH MEALS    diphenoxylate-atropine 2.5-0.025 mg (LOMOTIL) 2.5-0.025 mg per tablet Take 1 tablet by mouth 2 (two) times daily as needed for Diarrhea.    escitalopram oxalate (LEXAPRO) 10 MG tablet Take 1 tablet (10 mg total) by mouth once daily.    fluticasone-salmeterol 250-50 mcg/dose (ADVAIR DISKUS) 250-50 mcg/dose diskus inhaler USE 1 INHALATION TWICE A DAY    levothyroxine (SYNTHROID) 50 MCG tablet Take 50 mcg by mouth once daily.    loperamide (IMODIUM) 2 mg capsule Take 2 mg by mouth once daily.    naproxen (NAPROSYN) 375 MG tablet Take 1 tablet (375 mg total) by mouth 2 (two) times daily with meals.    nebivolol (BYSTOLIC) 10 MG Tab Take 1 tablet (10 mg total) by mouth once daily.    pantoprazole (PROTONIX) 40 MG tablet TAKE 1 TABLET DAILY    SPIRIVA WITH HANDIHALER 18 mcg inhalation capsule INHALE THE CONTENTS OF 1 CAPSULE ONCE DAILY    VITAMIN D2 50,000 unit capsule TAKE 1 CAPSULE EVERY 7 DAYS    eluxadoline (VIBERZI) 75 mg Tab Take 75 mg by mouth 2 (two) times daily.    methscopolamine (PAMINE) 2.5 mg Tab Take 1 tablet (2.5 mg total) by mouth 2 (two) times daily.           Clinical Reference Information     "       Your Vitals Were     BP Pulse Resp Height Weight BMI    104/60 (BP Location: Left arm, Patient Position: Sitting, BP Method: Manual) 80 18 5' 2" (1.575 m) 49.8 kg (109 lb 12.8 oz) 20.08 kg/m2      Blood Pressure          Most Recent Value    BP  104/60      Allergies as of 4/20/2017     Pcn [Penicillins]    Bactrim [Sulfamethoxazole-trimethoprim]    Ilosone    Iodine And Iodide Containing Products    Sulfa (Sulfonamide Antibiotics)      Immunizations Administered on Date of Encounter - 4/20/2017     None      Language Assistance Services     ATTENTION: Language assistance services are available, free of charge. Please call [phone].      ATENCIÓN: Si kennala alyx, tiene a kim disposición servicios gratuitos de asistencia lingüística. Llame al [phone].     YANI Ý: N?u b?n nói Ti?ng Vi?t, có các d?ch v? h? tr? ngôn ng? mi?n phí dành cho b?n. G?i s? [phone].         [facility] complies with applicable Federal civil rights laws and does not discriminate on the basis of race, color, national origin, age, disability, or sex.        "

## 2017-04-20 NOTE — PROGRESS NOTES
Subjective:       Patient ID: Marva Perez is a 76 y.o. female.    Chief Complaint: Follow-up    HPI Comments: Pt is a 76 y.o. female who presents for check up for IBS w diarrhea. Doing well on current meds. Denies any side effects.    Review of Systems   Constitutional: Negative for appetite change, chills and fever.   HENT: Negative for rhinorrhea, sinus pressure, sore throat and trouble swallowing.    Respiratory: Negative for cough, chest tightness, shortness of breath and wheezing.    Cardiovascular: Negative for chest pain and palpitations.   Gastrointestinal: Positive for diarrhea. Negative for abdominal pain, blood in stool, nausea and vomiting.        Sometimes she has no warning of the diarrhea   Genitourinary: Negative for dysuria, flank pain, hematuria, pelvic pain, urgency, vaginal bleeding, vaginal discharge and vaginal pain.   Musculoskeletal: Negative for back pain, joint swelling and neck stiffness.   Skin: Negative for rash.   Neurological: Negative for dizziness, weakness, light-headedness, numbness and headaches.   Hematological: Does not bruise/bleed easily.   Psychiatric/Behavioral: Negative for agitation. The patient is not nervous/anxious.        Objective:      Physical Exam   Constitutional: She is oriented to person, place, and time. She appears well-developed and well-nourished.   HENT:   Head: Normocephalic.   Eyes: Pupils are equal, round, and reactive to light.   Neck: Normal range of motion. Neck supple. No thyromegaly present.   Cardiovascular: Normal rate and regular rhythm.    Pulmonary/Chest: No respiratory distress. She has no wheezes. She has no rales. She exhibits no tenderness.   Abdominal: She exhibits no distension. There is no tenderness. There is no rebound and no guarding.   Musculoskeletal: Normal range of motion. She exhibits no edema or tenderness.   Lymphadenopathy:     She has no cervical adenopathy.   Neurological: She is alert and oriented to person, place, and  time. She has normal reflexes. She displays normal reflexes. No cranial nerve deficit. She exhibits normal muscle tone. Coordination normal.   Skin: Skin is warm and dry. No rash noted. No pallor.   Psychiatric: She has a normal mood and affect. Judgment and thought content normal.       Assessment:       No diagnosis found.    Plan:   There are no diagnoses linked to this encounter.

## 2017-04-30 RX ORDER — PANTOPRAZOLE SODIUM 40 MG/1
TABLET, DELAYED RELEASE ORAL
Qty: 90 TABLET | Refills: 3 | Status: SHIPPED | OUTPATIENT
Start: 2017-04-30 | End: 2018-12-06 | Stop reason: SDUPTHER

## 2017-05-18 ENCOUNTER — PATIENT OUTREACH (OUTPATIENT)
Dept: ADMINISTRATIVE | Facility: HOSPITAL | Age: 77
End: 2017-05-18

## 2017-05-18 NOTE — LETTER
May 18, 2017    Marva Perez  [address] [facility] [address]             [facility]  [address]  [facility] [address]  Phone: [phone] Dear Ms. Perez:    [facility] is committed to your overall health.  To help you get the most out of each of your visits, we will review your information to make sure you are up to date on all of your recommended tests and/or procedures.      Dr. Clements has found that you may be due for a pap smear and a DEXA scan to check for osteoporosis.     If you have had any of the above done at another facility, please bring the records or information with you so that your record at [facility] will be complete.  If you would like to schedule any of these, please contact me.    If you are currently taking medication, please bring it with you to your appointment for review.    Also, if you have any type of Advanced Directives, please bring them with you to your office visit so we may scan them into your chart.        If you have any questions or concerns, please don't hesitate to call.    Sincerely,        Sabiha Gonzalez LPN Clinical Care Coordinator  [facility]  [phone]

## 2017-06-01 RX ORDER — ERGOCALCIFEROL 1.25 MG/1
CAPSULE ORAL
Qty: 24 CAPSULE | Refills: 0 | Status: SHIPPED | OUTPATIENT
Start: 2017-06-01 | End: 2017-06-08 | Stop reason: SDUPTHER

## 2017-06-07 ENCOUNTER — TELEPHONE (OUTPATIENT)
Dept: FAMILY MEDICINE | Facility: CLINIC | Age: 77
End: 2017-06-07

## 2017-06-07 NOTE — TELEPHONE ENCOUNTER
----- Message from Nolan Lacey sent at 2017 10:04 AM CDT -----  Contact: SELF  Marva Perez  MRN: [id number]  : 1940  PCP: Kevin Josue  Home Phone      [phone]  Work Phone      Not on file.  Mobile          [phone]      MESSAGE: COMING SEE DR JOSUE TOMORROW FOR 6 WK CHECKUP AND ALSO WANTS TO HAVE THYROID LEVELS AND POTASSIUM CHECKED. NEEDS ORDERS

## 2017-06-08 ENCOUNTER — OFFICE VISIT (OUTPATIENT)
Dept: FAMILY MEDICINE | Facility: CLINIC | Age: 77
End: 2017-06-08
Payer: MEDICARE

## 2017-06-08 VITALS
HEIGHT: 62 IN | WEIGHT: 111.31 LBS | RESPIRATION RATE: 16 BRPM | DIASTOLIC BLOOD PRESSURE: 74 MMHG | BODY MASS INDEX: 20.48 KG/M2 | SYSTOLIC BLOOD PRESSURE: 122 MMHG | HEART RATE: 66 BPM

## 2017-06-08 DIAGNOSIS — E78.5 HYPERLIPIDEMIA, UNSPECIFIED HYPERLIPIDEMIA TYPE: ICD-10-CM

## 2017-06-08 DIAGNOSIS — F41.1 GAD (GENERALIZED ANXIETY DISORDER): ICD-10-CM

## 2017-06-08 DIAGNOSIS — E03.9 HYPOTHYROIDISM, UNSPECIFIED TYPE: ICD-10-CM

## 2017-06-08 DIAGNOSIS — I10 ESSENTIAL HYPERTENSION: ICD-10-CM

## 2017-06-08 DIAGNOSIS — K58.9 IRRITABLE BOWEL SYNDROME, UNSPECIFIED TYPE: ICD-10-CM

## 2017-06-08 DIAGNOSIS — L02.11 ABSCESS OF SKIN OF NECK: ICD-10-CM

## 2017-06-08 DIAGNOSIS — I25.10 ASCVD (ARTERIOSCLEROTIC CARDIOVASCULAR DISEASE): Primary | ICD-10-CM

## 2017-06-08 PROCEDURE — 1126F AMNT PAIN NOTED NONE PRSNT: CPT | Performed by: FAMILY MEDICINE

## 2017-06-08 PROCEDURE — 1159F MED LIST DOCD IN RCRD: CPT | Performed by: FAMILY MEDICINE

## 2017-06-08 PROCEDURE — 99213 OFFICE O/P EST LOW 20 MIN: CPT | Mod: PBBFAC | Performed by: FAMILY MEDICINE

## 2017-06-08 PROCEDURE — 99213 OFFICE O/P EST LOW 20 MIN: CPT | Mod: S$PBB | Performed by: FAMILY MEDICINE

## 2017-06-08 PROCEDURE — 99999 PR PBB SHADOW E&M-EST. PATIENT-LVL III: CPT | Mod: PBBFAC,,, | Performed by: FAMILY MEDICINE

## 2017-06-08 RX ORDER — ALPRAZOLAM 0.25 MG/1
TABLET ORAL
Qty: 90 TABLET | Refills: 1 | Status: SHIPPED | OUTPATIENT
Start: 2017-06-08 | End: 2017-12-11 | Stop reason: SDUPTHER

## 2017-06-08 RX ORDER — MUPIROCIN 20 MG/G
OINTMENT TOPICAL 2 TIMES DAILY
Qty: 22 G | Refills: 2 | Status: SHIPPED | OUTPATIENT
Start: 2017-06-08 | End: 2017-06-18

## 2017-06-08 RX ORDER — COLESEVELAM 180 1/1
TABLET ORAL
Qty: 540 TABLET | Refills: 3 | Status: SHIPPED | OUTPATIENT
Start: 2017-06-08 | End: 2017-09-07

## 2017-06-08 RX ORDER — NEBIVOLOL 10 MG/1
10 TABLET ORAL DAILY
Qty: 90 TABLET | Refills: 3 | Status: SHIPPED | OUTPATIENT
Start: 2017-06-08 | End: 2018-07-09 | Stop reason: SDUPTHER

## 2017-06-08 RX ORDER — ERGOCALCIFEROL 1.25 MG/1
CAPSULE ORAL
Qty: 24 CAPSULE | Refills: 0 | Status: SHIPPED | OUTPATIENT
Start: 2017-06-08 | End: 2017-08-30 | Stop reason: SDUPTHER

## 2017-06-08 RX ORDER — AMLODIPINE BESYLATE 2.5 MG/1
2.5 TABLET ORAL DAILY
Qty: 90 TABLET | Refills: 3 | Status: SHIPPED | OUTPATIENT
Start: 2017-06-08 | End: 2017-11-15 | Stop reason: SDUPTHER

## 2017-06-08 NOTE — PROGRESS NOTES
Subjective:       Patient ID: Marva Perez is a 76 y.o. female.    Chief Complaint: Follow-up ( 6 month )    Pt is a 76 y.o. female who presents for check up for Gabriele (generalized anxiety disorder)  Essential hypertension  Hyperlipidemia, unspecified hyperlipidemia type  Irritable bowel syndrome, unspecified type. Doing well on current meds. Denies any side effects. Prevention is up to date.      Review of Systems   Constitutional: Negative for appetite change, chills and fever.   HENT: Negative for rhinorrhea, sinus pressure, sore throat and trouble swallowing.    Respiratory: Negative for cough, chest tightness, shortness of breath and wheezing.    Cardiovascular: Negative for chest pain and palpitations.   Gastrointestinal: Negative for abdominal pain, blood in stool, diarrhea, nausea and vomiting.        Having a formed, controlled BM once/day   Genitourinary: Negative for dysuria, flank pain, hematuria, pelvic pain, urgency, vaginal bleeding, vaginal discharge and vaginal pain.   Musculoskeletal: Negative for back pain, joint swelling and neck stiffness.   Skin: Negative for rash.   Neurological: Negative for dizziness, weakness, light-headedness, numbness and headaches.   Hematological: Does not bruise/bleed easily.   Psychiatric/Behavioral: Negative for agitation. The patient is not nervous/anxious.        Objective:      Physical Exam   Constitutional: She is oriented to person, place, and time. She appears well-developed and well-nourished.   HENT:   Head: Normocephalic.   Eyes: Pupils are equal, round, and reactive to light.   Neck: Normal range of motion. Neck supple. No thyromegaly present.   Cardiovascular: Normal rate and regular rhythm.    Pulmonary/Chest: No respiratory distress. She has no wheezes. She has no rales. She exhibits no tenderness.   Abdominal: She exhibits no distension. There is no tenderness. There is no rebound and no guarding.   Musculoskeletal: Normal range of motion. She  exhibits no edema or tenderness.   Lymphadenopathy:     She has no cervical adenopathy.   Neurological: She is alert and oriented to person, place, and time. She has normal reflexes. She displays normal reflexes. No cranial nerve deficit. She exhibits normal muscle tone. Coordination normal.   Skin: Skin is warm and dry. No rash noted. No pallor.   Psychiatric: She has a normal mood and affect. Judgment and thought content normal.       Assessment:       1. KATHIE (generalized anxiety disorder)    2. Essential hypertension    3. Hyperlipidemia, unspecified hyperlipidemia type    4. Irritable bowel syndrome, unspecified type        Plan:   Marva was seen today for follow-up.    Diagnoses and all orders for this visit:    KATHIE (generalized anxiety disorder)  -     alprazolam (XANAX) 0.25 MG tablet; 1 po q hs    Essential hypertension  -     amlodipine (NORVASC) 2.5 MG tablet; Take 1 tablet (2.5 mg total) by mouth once daily.  -     nebivolol (BYSTOLIC) 10 MG Tab; Take 1 tablet (10 mg total) by mouth once daily.    Hyperlipidemia, unspecified hyperlipidemia type  -     colesevelam (WELCHOL) 625 mg tablet; TAKE 3 TABLETS (1,875 MG) TWICE DAILY WITH MEALS    Irritable bowel syndrome, unspecified type  -     eluxadoline (VIBERZI) 75 mg Tab; Take 75 mg by mouth 2 (two) times daily.    Other orders  -     ergocalciferol (VITAMIN D2) 50,000 unit Cap;

## 2017-06-13 ENCOUNTER — CLINICAL SUPPORT (OUTPATIENT)
Dept: FAMILY MEDICINE | Facility: CLINIC | Age: 77
End: 2017-06-13
Payer: MEDICARE

## 2017-06-13 DIAGNOSIS — E78.5 HYPERLIPIDEMIA, UNSPECIFIED HYPERLIPIDEMIA TYPE: Primary | ICD-10-CM

## 2017-06-13 DIAGNOSIS — F41.1 GAD (GENERALIZED ANXIETY DISORDER): ICD-10-CM

## 2017-06-13 LAB
ANION GAP SERPL CALC-SCNC: 6 MMOL/L
BUN SERPL-MCNC: 17 MG/DL
CALCIUM SERPL-MCNC: 9.2 MG/DL
CHLORIDE SERPL-SCNC: 108 MMOL/L
CHOLEST/HDLC SERPL: 5.4 {RATIO}
CO2 SERPL-SCNC: 30 MMOL/L
CREAT SERPL-MCNC: 1 MG/DL
EST. GFR  (AFRICAN AMERICAN): >60 ML/MIN/1.73 M^2
EST. GFR  (NON AFRICAN AMERICAN): 55 ML/MIN/1.73 M^2
GLUCOSE SERPL-MCNC: 98 MG/DL
HDL/CHOLESTEROL RATIO: 18.7 %
HDLC SERPL-MCNC: 225 MG/DL
HDLC SERPL-MCNC: 42 MG/DL
LDLC SERPL CALC-MCNC: 160.8 MG/DL
NONHDLC SERPL-MCNC: 183 MG/DL
POTASSIUM SERPL-SCNC: 4.8 MMOL/L
SODIUM SERPL-SCNC: 144 MMOL/L
TRIGL SERPL-MCNC: 111 MG/DL
TSH SERPL DL<=0.005 MIU/L-ACNC: 0.5 UIU/ML

## 2017-06-13 PROCEDURE — 80048 BASIC METABOLIC PNL TOTAL CA: CPT

## 2017-06-13 PROCEDURE — 99024 POSTOP FOLLOW-UP VISIT: CPT | Performed by: FAMILY MEDICINE

## 2017-06-13 PROCEDURE — 80061 LIPID PANEL: CPT

## 2017-06-13 PROCEDURE — 84443 ASSAY THYROID STIM HORMONE: CPT

## 2017-06-14 ENCOUNTER — TELEPHONE (OUTPATIENT)
Dept: FAMILY MEDICINE | Facility: CLINIC | Age: 77
End: 2017-06-14

## 2017-06-14 DIAGNOSIS — E78.5 HYPERLIPIDEMIA, UNSPECIFIED HYPERLIPIDEMIA TYPE: Primary | ICD-10-CM

## 2017-06-14 RX ORDER — ROSUVASTATIN CALCIUM 5 MG/1
5 TABLET, COATED ORAL DAILY
Qty: 30 TABLET | Refills: 2 | Status: SHIPPED | OUTPATIENT
Start: 2017-06-14 | End: 2017-08-24

## 2017-06-14 NOTE — TELEPHONE ENCOUNTER
Doctor Tyree, Ms Bailon states she will try statin-but only send 1 mth supply to [facility]. Thanks

## 2017-06-14 NOTE — TELEPHONE ENCOUNTER
----- Message from Kevin Clements MD sent at 6/13/2017  7:11 PM CDT -----  Lipids elevated & maybe a statin med required at this time; thyroid is good and kidney function  Is much improved Ms Rios   Limit the cholesterol in your diet to less than 300 mg per day.   Fats should contribute no more than 20 to 35% of your daily calories.   Less than 7 to 10% of your calories should come from saturated fat.   Avoid saturated fat products e.g., Butter, some oils, meat, and poultry fat contain a lot of saturated fat.   Check food labels for fat and cholesterol content. Choose the foods with less fat per serving.  Limit the amount of butter and margarine you eat.   Use salad dressings and margarine made with polyunsaturated and monounsaturated fats.   Use egg whites or egg substitutes rather than whole eggs.   Replace whole-milk dairy products with nonfat or low-fat milk, cheese, spreads, and yogurt.  Eat skinless chicken, turkey, fish, and meatless entrees more often than red meat.   Choose lean cuts of meat and trim off all visible fat. Keep portion sizes moderate.   Avoid fatty desserts such as ice cream, cream-filled cakes, and cheesecakes.   Choose fresh fruits, nonfat frozen yogurt, Popsicles, etc.   Reduce the amount of fried foods, vending machine food, and fast food you eat.  Eat fruits and vegetables (especially fresh fruits and leafy vegetables), beans, and whole grains daily. The fiber in these foods helps lower cholesterol.   Look for low-fat or nonfat varieties of the foods you like to eat, or look for substitutes.   You may need to exercise 60 minutes a day to prevent weight gain and 90 minutes a day to lose weight.

## 2017-06-15 NOTE — PROGRESS NOTES
Subjective:       Patient ID: Marva Perez is a 76 y.o. female.    Chief Complaint: No chief complaint on file.    Pt is a 76 y.o. female who presents for labs      Review of Systems   Constitutional:        ..       Objective:      Physical Exam   Constitutional:   ..       Assessment:       1. Hyperlipidemia, unspecified hyperlipidemia type    2. KATHIE (generalized anxiety disorder)        Plan:   Diagnoses and all orders for this visit:    Hyperlipidemia, unspecified hyperlipidemia type  -     Lipid panel; Future  -     Lipid panel    KATHIE (generalized anxiety disorder)  -     Basic metabolic panel  -     TSH

## 2017-06-26 RX ORDER — OLMESARTAN MEDOXOMIL 40 MG/1
TABLET ORAL
Qty: 90 TABLET | Refills: 2 | Status: SHIPPED | OUTPATIENT
Start: 2017-06-26 | End: 2017-08-24 | Stop reason: ALTCHOICE

## 2017-07-21 ENCOUNTER — TELEPHONE (OUTPATIENT)
Dept: FAMILY MEDICINE | Facility: CLINIC | Age: 77
End: 2017-07-21

## 2017-07-21 DIAGNOSIS — K58.0 IRRITABLE BOWEL SYNDROME WITH DIARRHEA: Primary | ICD-10-CM

## 2017-07-21 RX ORDER — METHSCOPOLAMINE BROMIDE 5 MG/1
5 TABLET ORAL 2 TIMES DAILY
Qty: 60 TABLET | Refills: 5 | Status: SHIPPED | OUTPATIENT
Start: 2017-07-21 | End: 2017-08-24

## 2017-07-21 NOTE — TELEPHONE ENCOUNTER
----- Message from Elsy Plunkett sent at 2017 11:38 AM CDT -----  Contact: SELF  Marva Perez  MRN: [id number]  : 1940  PCP: Kevin Clements  Home Phone      [phone]  Work Phone      Not on file.  Mobile          [phone]      MESSAGE:    RECENT MEDICATION FOR IBS STATES YOU SHOULD NOT TAKE IF YOU DO NOT HAVE A GALL BLADDER AND SHE DOESN'T WANTS TO MAKE SURE DR CLEMENTS IS AWARE OF THIS    PHONE:  [phone]

## 2017-07-21 NOTE — TELEPHONE ENCOUNTER
New medications for IBS (Viberzi) states do not use if had gallbladder removed. Is pt ok to take?

## 2017-08-16 ENCOUNTER — TELEPHONE (OUTPATIENT)
Dept: FAMILY MEDICINE | Facility: CLINIC | Age: 77
End: 2017-08-16

## 2017-08-16 NOTE — TELEPHONE ENCOUNTER
Has been having diarrhea ,wieght loss in the last 3 years Says Benicar is causing all her problems Wants to stop taking it and put on a different BP meds  She called the company they told her stop and check with her PCP.They told her to change to Diovan for 30 days . Is taking Welchol from Dr Wilde

## 2017-08-24 ENCOUNTER — OFFICE VISIT (OUTPATIENT)
Dept: FAMILY MEDICINE | Facility: CLINIC | Age: 77
End: 2017-08-24
Payer: MEDICARE

## 2017-08-24 VITALS
RESPIRATION RATE: 16 BRPM | BODY MASS INDEX: 20.24 KG/M2 | WEIGHT: 110 LBS | SYSTOLIC BLOOD PRESSURE: 126 MMHG | HEIGHT: 62 IN | DIASTOLIC BLOOD PRESSURE: 64 MMHG | HEART RATE: 76 BPM

## 2017-08-24 DIAGNOSIS — I10 ESSENTIAL HYPERTENSION: ICD-10-CM

## 2017-08-24 DIAGNOSIS — F41.1 GAD (GENERALIZED ANXIETY DISORDER): Primary | ICD-10-CM

## 2017-08-24 DIAGNOSIS — J44.1 CHRONIC OBSTRUCTIVE PULMONARY DISEASE WITH ACUTE EXACERBATION: ICD-10-CM

## 2017-08-24 DIAGNOSIS — I25.10 ASCVD (ARTERIOSCLEROTIC CARDIOVASCULAR DISEASE): ICD-10-CM

## 2017-08-24 DIAGNOSIS — F51.01 PRIMARY INSOMNIA: ICD-10-CM

## 2017-08-24 DIAGNOSIS — J43.9 PULMONARY EMPHYSEMA, UNSPECIFIED EMPHYSEMA TYPE: ICD-10-CM

## 2017-08-24 PROCEDURE — 96372 THER/PROPH/DIAG INJ SC/IM: CPT | Mod: PBBFAC

## 2017-08-24 PROCEDURE — 99999 PR PBB SHADOW E&M-EST. PATIENT-LVL III: CPT | Mod: PBBFAC,,, | Performed by: FAMILY MEDICINE

## 2017-08-24 PROCEDURE — 99999 PR STA SHADOW: CPT | Mod: PBBFAC,,, | Performed by: FAMILY MEDICINE

## 2017-08-24 PROCEDURE — 99213 OFFICE O/P EST LOW 20 MIN: CPT | Mod: S$PBB | Performed by: FAMILY MEDICINE

## 2017-08-24 PROCEDURE — 99213 OFFICE O/P EST LOW 20 MIN: CPT | Mod: PBBFAC | Performed by: FAMILY MEDICINE

## 2017-08-24 PROCEDURE — 99999 PR STA SHADOW: CPT | Mod: PBBFAC,,,

## 2017-08-24 RX ORDER — AZITHROMYCIN 250 MG/1
TABLET, FILM COATED ORAL
Qty: 6 TABLET | Refills: 0 | Status: SHIPPED | OUTPATIENT
Start: 2017-08-24 | End: 2017-08-31 | Stop reason: ALTCHOICE

## 2017-08-24 RX ORDER — TIOTROPIUM BROMIDE 18 UG/1
CAPSULE ORAL; RESPIRATORY (INHALATION)
Qty: 90 CAPSULE | Refills: 3 | Status: SHIPPED | OUTPATIENT
Start: 2017-08-24 | End: 2018-09-19 | Stop reason: SDUPTHER

## 2017-08-24 RX ORDER — METHYLPREDNISOLONE ACETATE 40 MG/ML
40 INJECTION, SUSPENSION INTRA-ARTICULAR; INTRALESIONAL; INTRAMUSCULAR; SOFT TISSUE
Status: COMPLETED | OUTPATIENT
Start: 2017-08-24 | End: 2017-08-24

## 2017-08-24 RX ORDER — LINCOMYCIN HYDROCHLORIDE 300 MG/ML
600 INJECTION, SOLUTION INTRAMUSCULAR; INTRAVENOUS; SUBCONJUNCTIVAL
Status: COMPLETED | OUTPATIENT
Start: 2017-08-24 | End: 2017-08-24

## 2017-08-24 RX ORDER — TIOTROPIUM BROMIDE 18 UG/1
CAPSULE ORAL; RESPIRATORY (INHALATION)
Qty: 30 CAPSULE | Refills: 11 | Status: SHIPPED | OUTPATIENT
Start: 2017-08-24 | End: 2017-08-24 | Stop reason: SDUPTHER

## 2017-08-24 RX ORDER — VALSARTAN 160 MG/1
160 TABLET ORAL DAILY
Qty: 30 TABLET | Refills: 11 | Status: SHIPPED | OUTPATIENT
Start: 2017-08-24 | End: 2018-04-17

## 2017-08-24 RX ORDER — PREDNISONE 5 MG/1
TABLET ORAL
Qty: 35 TABLET | Refills: 0 | Status: SHIPPED | OUTPATIENT
Start: 2017-08-24 | End: 2017-11-21

## 2017-08-24 RX ADMIN — LINCOMYCIN HYDROCHLORIDE 600 MG: 300 INJECTION, SOLUTION INTRAMUSCULAR; INTRAVENOUS; SUBCONJUNCTIVAL at 11:08

## 2017-08-24 RX ADMIN — METHYLPREDNISOLONE ACETATE 40 MG: 40 INJECTION, SUSPENSION INTRA-ARTICULAR; INTRALESIONAL; INTRAMUSCULAR; SOFT TISSUE at 11:08

## 2017-08-24 NOTE — PROGRESS NOTES
Subjective:       Patient ID: Marva Perez is a 76 y.o. female.    Chief Complaint: Nasal Congestion    Pt is a 76 y.o. female who presents for check up for Gabriele & HTN & dairrhea(generalized anxiety disorder)  (primary encounter diagnosis)  Primary insomnia  Ascvd (arteriosclerotic cardiovascular disease)  Pulmonary emphysema, unspecified emphysema type. Doing well on current meds. Denies any side effects. Prevention is up to date.      Review of Systems   Constitutional: Negative for appetite change, chills and fever.   HENT: Negative for rhinorrhea, sinus pressure, sore throat and trouble swallowing.    Respiratory: Negative for cough, chest tightness, shortness of breath and wheezing.    Cardiovascular: Negative for chest pain and palpitations.   Gastrointestinal: Positive for diarrhea. Negative for abdominal pain, blood in stool, nausea and vomiting.        Stomach is much calmer with WelChol   Genitourinary: Positive for menstrual problem. Negative for dysuria, flank pain, hematuria, pelvic pain, urgency, vaginal bleeding, vaginal discharge and vaginal pain.   Musculoskeletal: Negative for back pain, joint swelling and neck stiffness.   Skin: Negative for rash.   Neurological: Negative for dizziness, weakness, light-headedness, numbness and headaches.   Hematological: Does not bruise/bleed easily.   Psychiatric/Behavioral: Negative for agitation. The patient is not nervous/anxious.        Objective:      Physical Exam   Constitutional: She is oriented to person, place, and time. She appears well-developed and well-nourished.   HENT:   Head: Normocephalic.   Eyes: Pupils are equal, round, and reactive to light.   Neck: Normal range of motion. Neck supple. No thyromegaly present.   Cardiovascular: Normal rate and regular rhythm.    Pulmonary/Chest: No respiratory distress. She has no wheezes. She has no rales. She exhibits no tenderness.   Abdominal: She exhibits no distension. There is no tenderness. There is  no rebound and no guarding.   Musculoskeletal: Normal range of motion. She exhibits no edema or tenderness.   Lymphadenopathy:     She has no cervical adenopathy.   Neurological: She is alert and oriented to person, place, and time. She has normal reflexes. She displays normal reflexes. No cranial nerve deficit. She exhibits normal muscle tone. Coordination normal.   Skin: Skin is warm and dry. No rash noted. No pallor.   Psychiatric: She has a normal mood and affect. Judgment and thought content normal.       Assessment:       1. KATHIE (generalized anxiety disorder)    2. Primary insomnia    3. ASCVD (arteriosclerotic cardiovascular disease)    4. Pulmonary emphysema, unspecified emphysema type        Plan:   Marva was seen today for nasal congestion.    Diagnoses and all orders for this visit:    KATHIE (generalized anxiety disorder)    Primary insomnia    ASCVD (arteriosclerotic cardiovascular disease)    Pulmonary emphysema, unspecified emphysema type

## 2017-08-31 ENCOUNTER — OFFICE VISIT (OUTPATIENT)
Dept: FAMILY MEDICINE | Facility: CLINIC | Age: 77
End: 2017-08-31
Payer: MEDICARE

## 2017-08-31 VITALS
HEART RATE: 76 BPM | HEIGHT: 62 IN | WEIGHT: 112 LBS | DIASTOLIC BLOOD PRESSURE: 72 MMHG | BODY MASS INDEX: 20.61 KG/M2 | RESPIRATION RATE: 18 BRPM | SYSTOLIC BLOOD PRESSURE: 132 MMHG

## 2017-08-31 DIAGNOSIS — J40 BRONCHITIS: Primary | ICD-10-CM

## 2017-08-31 PROCEDURE — 99212 OFFICE O/P EST SF 10 MIN: CPT | Mod: PBBFAC,25 | Performed by: FAMILY MEDICINE

## 2017-08-31 PROCEDURE — 99999 PR STA SHADOW: CPT | Mod: PBBFAC,,,

## 2017-08-31 PROCEDURE — 96372 THER/PROPH/DIAG INJ SC/IM: CPT | Mod: PBBFAC

## 2017-08-31 PROCEDURE — 99999 PR PBB SHADOW E&M-EST. PATIENT-LVL II: CPT | Mod: PBBFAC,,, | Performed by: FAMILY MEDICINE

## 2017-08-31 PROCEDURE — 99999 PR STA SHADOW: CPT | Mod: PBBFAC,,, | Performed by: FAMILY MEDICINE

## 2017-08-31 PROCEDURE — 99213 OFFICE O/P EST LOW 20 MIN: CPT | Mod: S$PBB | Performed by: FAMILY MEDICINE

## 2017-08-31 RX ORDER — ERGOCALCIFEROL 1.25 MG/1
CAPSULE ORAL
Qty: 24 CAPSULE | Refills: 0 | Status: SHIPPED | OUTPATIENT
Start: 2017-08-31 | End: 2017-11-29 | Stop reason: SDUPTHER

## 2017-08-31 RX ORDER — DOXYCYCLINE HYCLATE 100 MG
100 TABLET ORAL 2 TIMES DAILY
Qty: 20 TABLET | Refills: 0 | Status: SHIPPED | OUTPATIENT
Start: 2017-08-31 | End: 2017-09-10

## 2017-08-31 RX ORDER — GUAIFENESIN AND DEXTROMETHORPHAN HYDROBROMIDE 60; 1200 MG/1; MG/1
1 TABLET, EXTENDED RELEASE ORAL 2 TIMES DAILY
Qty: 30 TABLET | Refills: 5 | Status: SHIPPED | OUTPATIENT
Start: 2017-08-31 | End: 2017-09-10

## 2017-08-31 RX ORDER — LINCOMYCIN HYDROCHLORIDE 300 MG/ML
600 INJECTION, SOLUTION INTRAMUSCULAR; INTRAVENOUS; SUBCONJUNCTIVAL
Status: COMPLETED | OUTPATIENT
Start: 2017-08-31 | End: 2017-08-31

## 2017-08-31 RX ADMIN — LINCOMYCIN HYDROCHLORIDE 600 MG: 300 INJECTION, SOLUTION INTRAMUSCULAR; INTRAVENOUS; SUBCONJUNCTIVAL at 02:08

## 2017-08-31 NOTE — PROGRESS NOTES
Subjective:       Patient ID: Marva Perez is a 76 y.o. female.    Chief Complaint: Follow-up    Pt is a 76 y.o. female who presents for check up for pulmonary congestion . Doing well on current meds. Denies any side effects. Prevention is up to date.      Review of Systems   Constitutional: Negative for appetite change, chills and fever.   HENT: Negative for rhinorrhea, sinus pressure, sore throat and trouble swallowing.    Respiratory: Negative for cough, chest tightness, shortness of breath and wheezing.    Cardiovascular: Negative for chest pain and palpitations.   Gastrointestinal: Positive for diarrhea. Negative for abdominal pain, blood in stool, nausea and vomiting.   Genitourinary: Negative for dysuria, flank pain, hematuria, pelvic pain, urgency, vaginal bleeding, vaginal discharge and vaginal pain.   Musculoskeletal: Negative for back pain, joint swelling and neck stiffness.   Skin: Negative for rash.   Neurological: Negative for dizziness, weakness, light-headedness, numbness and headaches.   Hematological: Does not bruise/bleed easily.   Psychiatric/Behavioral: Negative for agitation. The patient is not nervous/anxious.        Objective:      Physical Exam   Constitutional: She is oriented to person, place, and time. She appears well-developed and well-nourished.   HENT:   Head: Normocephalic.   Eyes: Pupils are equal, round, and reactive to light.   Neck: Normal range of motion. Neck supple. No thyromegaly present.   Cardiovascular: Normal rate and regular rhythm.    Pulmonary/Chest: She is in respiratory distress. She has wheezes. She has no rales. She exhibits no tenderness.   Abdominal: She exhibits no distension. There is no tenderness. There is no rebound and no guarding.   Musculoskeletal: Normal range of motion. She exhibits no edema or tenderness.   Lymphadenopathy:     She has no cervical adenopathy.   Neurological: She is alert and oriented to person, place, and time. She has normal  reflexes. She displays normal reflexes. No cranial nerve deficit. She exhibits normal muscle tone. Coordination normal.   Skin: Skin is warm and dry. No rash noted. No pallor.   Psychiatric: She has a normal mood and affect. Judgment and thought content normal.       Assessment:       No diagnosis found.    Plan:   There are no diagnoses linked to this encounter.

## 2017-09-07 ENCOUNTER — HOSPITAL ENCOUNTER (EMERGENCY)
Facility: HOSPITAL | Age: 77
Discharge: HOME | End: 2017-09-08
Attending: FAMILY MEDICINE
Payer: MEDICARE

## 2017-09-07 DIAGNOSIS — K21.9 GASTROESOPHAGEAL REFLUX DISEASE, ESOPHAGITIS PRESENCE NOT SPECIFIED: Primary | ICD-10-CM

## 2017-09-07 DIAGNOSIS — R07.9 CHEST PAIN: ICD-10-CM

## 2017-09-07 LAB
ALBUMIN SERPL BCP-MCNC: 3.2 G/DL
ALP SERPL-CCNC: 135 U/L
ALT SERPL W/O P-5'-P-CCNC: 17 U/L
ANION GAP SERPL CALC-SCNC: 8 MMOL/L
AST SERPL-CCNC: 11 U/L
BASOPHILS # BLD AUTO: 0.05 K/UL
BASOPHILS NFR BLD: 0.4 %
BILIRUB SERPL-MCNC: 0.7 MG/DL
BNP SERPL-MCNC: 67 PG/ML
BUN SERPL-MCNC: 31 MG/DL
CALCIUM SERPL-MCNC: 9.3 MG/DL
CHLORIDE SERPL-SCNC: 108 MMOL/L
CO2 SERPL-SCNC: 26 MMOL/L
CREAT SERPL-MCNC: 0.9 MG/DL
DIFFERENTIAL METHOD: ABNORMAL
EOSINOPHIL # BLD AUTO: 0.2 K/UL
EOSINOPHIL NFR BLD: 1.2 %
ERYTHROCYTE [DISTWIDTH] IN BLOOD BY AUTOMATED COUNT: 14.4 %
EST. GFR  (AFRICAN AMERICAN): >60 ML/MIN/1.73 M^2
EST. GFR  (NON AFRICAN AMERICAN): >60 ML/MIN/1.73 M^2
GLUCOSE SERPL-MCNC: 120 MG/DL
HCT VFR BLD AUTO: 42.1 %
HGB BLD-MCNC: 13.5 G/DL
LYMPHOCYTES # BLD AUTO: 2.7 K/UL
LYMPHOCYTES NFR BLD: 19.1 %
MCH RBC QN AUTO: 28.7 PG
MCHC RBC AUTO-ENTMCNC: 32.1 G/DL
MCV RBC AUTO: 90 FL
MONOCYTES # BLD AUTO: 1.6 K/UL
MONOCYTES NFR BLD: 10.9 %
NEUTROPHILS # BLD AUTO: 9.8 K/UL
NEUTROPHILS NFR BLD: 68.4 %
PLATELET # BLD AUTO: 211 K/UL
PMV BLD AUTO: 9.7 FL
POTASSIUM SERPL-SCNC: 4.3 MMOL/L
PROT SERPL-MCNC: 6.6 G/DL
RBC # BLD AUTO: 4.7 M/UL
SODIUM SERPL-SCNC: 142 MMOL/L
TROPONIN I SERPL DL<=0.01 NG/ML-MCNC: 0.01 NG/ML
WBC # BLD AUTO: 14.26 K/UL

## 2017-09-07 PROCEDURE — 80053 COMPREHEN METABOLIC PANEL: CPT

## 2017-09-07 PROCEDURE — 83880 ASSAY OF NATRIURETIC PEPTIDE: CPT

## 2017-09-07 PROCEDURE — 93005 ELECTROCARDIOGRAM TRACING: CPT

## 2017-09-07 PROCEDURE — 99284 EMERGENCY DEPT VISIT MOD MDM: CPT

## 2017-09-07 PROCEDURE — 25000003 PHARM REV CODE 250: Performed by: FAMILY MEDICINE

## 2017-09-07 PROCEDURE — 36415 COLL VENOUS BLD VENIPUNCTURE: CPT

## 2017-09-07 PROCEDURE — 85025 COMPLETE CBC W/AUTO DIFF WBC: CPT

## 2017-09-07 PROCEDURE — 84484 ASSAY OF TROPONIN QUANT: CPT

## 2017-09-07 RX ORDER — MAG HYDROX/ALUMINUM HYD/SIMETH 200-200-20
30 SUSPENSION, ORAL (FINAL DOSE FORM) ORAL
Status: COMPLETED | OUTPATIENT
Start: 2017-09-07 | End: 2017-09-07

## 2017-09-07 RX ADMIN — ALUMINUM HYDROXIDE, MAGNESIUM HYDROXIDE, AND SIMETHICONE 30 ML: 200; 200; 20 SUSPENSION ORAL at 10:09

## 2017-09-08 VITALS
HEIGHT: 62 IN | HEART RATE: 78 BPM | DIASTOLIC BLOOD PRESSURE: 74 MMHG | BODY MASS INDEX: 20.43 KG/M2 | SYSTOLIC BLOOD PRESSURE: 156 MMHG | WEIGHT: 111 LBS | RESPIRATION RATE: 16 BRPM | TEMPERATURE: 98 F

## 2017-09-08 PROCEDURE — 25000003 PHARM REV CODE 250: Performed by: FAMILY MEDICINE

## 2017-09-08 RX ORDER — DICYCLOMINE HYDROCHLORIDE 10 MG/1
20 CAPSULE ORAL
Status: COMPLETED | OUTPATIENT
Start: 2017-09-08 | End: 2017-09-08

## 2017-09-08 RX ORDER — DICYCLOMINE HYDROCHLORIDE 20 MG/1
20 TABLET ORAL 4 TIMES DAILY PRN
Qty: 15 TABLET | Refills: 0 | Status: SHIPPED | OUTPATIENT
Start: 2017-09-08 | End: 2017-10-08

## 2017-09-08 RX ADMIN — DICYCLOMINE HYDROCHLORIDE 20 MG: 10 CAPSULE ORAL at 12:09

## 2017-09-08 NOTE — PROVIDER PROGRESS NOTES - EMERGENCY DEPT.
Encounter Date: 9/7/2017    ED Physician Progress Notes           Lab results discussed with the patient.  Also a chest x-ray and EKG.  She continues to have some belching and seems anxious.

## 2017-09-08 NOTE — ED NOTES
Care assumed of pt, agree with assessment of previous nurse. Continuous visual contact continues as before. Pt remains calm and cooperative. Instructed to call for needs and voiced understanding. Denies any pain. Does not need to use the bathroom at this time.

## 2017-09-08 NOTE — ED PROVIDER NOTES
Encounter Date: 9/7/2017       History     Chief Complaint   Patient presents with    Abdominal Pain     on abx and steroids for a URI and now has reflux, sour taste in her mouth     The history is provided by the patient. No  was used.   Abdominal Pain   The current episode started several hours ago. The onset of the illness was gradual. The problem has not changed since onset.The abdominal pain is located in the epigastric region. The abdominal pain does not radiate. The abdominal pain is relieved by nothing. The other symptoms of the illness do not include fever, fatigue, jaundice, melena, nausea, vomiting, diarrhea, dysuria, hematemesis or hematochezia.   The patient has not had a change in bowel habit. Additional symptoms associated with the illness include heartburn. Symptoms associated with the illness do not include chills, anorexia, diaphoresis, constipation, urgency, hematuria, frequency or back pain.     Patient had onset this evening with a burning epigastric discomfort.  She also had some hot and cold feelings.  She simply felt funny.  She has been belching more today.  She's just finished a ten-day course of steroids.  She was also finishing antibiotics.  No nausea vomiting or diarrhea.  No prior similar difficulty.  She noted some increase in symptoms after eating 6 chocolate covered doughnuts with milk.  She does not smoke cigarettes.  She does have irritable bowel syndrome.  Patient has a history of colon rupture with colonoscopy and surgical repair.    Review of patient's allergies indicates:   Allergen Reactions    Pcn [penicillins] Hives and Itching    Bactrim [sulfamethoxazole-trimethoprim] Rash    Ilosone Rash    Iodine and iodide containing products Rash    Sulfa (sulfonamide antibiotics) Hives and Rash     Past Medical History:   Diagnosis Date    Abnormal Pap smear 7/17/13    LGSIL    COPD (chronic obstructive pulmonary disease)     Depression     GERD  (gastroesophageal reflux disease)     Hyperlipidemia     Hypertension      Past Surgical History:   Procedure Laterality Date    APPENDECTOMY      CERVICAL BIOPSY  W/ LOOP ELECTRODE EXCISION      CHOLECYSTECTOMY      COLLATERAL LIGAMENT REPAIR, KNEE      left knee    COLONOSCOPY      DILATION AND CURETTAGE OF UTERUS      SINUS SURGERY       Family History   Problem Relation Age of Onset    Breast cancer Mother     Colon cancer Neg Hx     Ovarian cancer Neg Hx      Social History   Substance Use Topics    Smoking status: Former Smoker     Packs/day: 1.00     Years: 30.00     Types: Cigarettes     Quit date: 10/30/2006    Smokeless tobacco: Never Used    Alcohol use No      Comment: No     Review of Systems   Constitutional: Negative for chills, diaphoresis, fatigue and fever.   Gastrointestinal: Positive for abdominal pain and heartburn. Negative for anorexia, constipation, diarrhea, hematemesis, hematochezia, jaundice, melena, nausea and vomiting.   Genitourinary: Negative for dysuria, frequency, hematuria and urgency.   Musculoskeletal: Negative for back pain.       Physical Exam     Initial Vitals [09/07/17 2218]   BP Pulse Resp Temp SpO2   (!) 179/81 81 -- 96.6 °F (35.9 °C) --      MAP       113.67         Physical Exam    Nursing note and vitals reviewed.  Constitutional: She appears well-developed and well-nourished.   Very anxious appearing female in no acute distress.   HENT:   Head: Normocephalic and atraumatic.   Right Ear: External ear normal.   Left Ear: External ear normal.   Nose: Nose normal.   Mouth/Throat: Oropharynx is clear and moist.   Eyes: Conjunctivae and EOM are normal. Pupils are equal, round, and reactive to light.   Neck: Normal range of motion. Neck supple.   Cardiovascular: Normal rate, regular rhythm and normal heart sounds.   Pulmonary/Chest: Breath sounds normal.   Abdominal: Soft. Bowel sounds are normal. She exhibits no distension. There is no tenderness. There is no  rebound and no guarding.   Musculoskeletal: Normal range of motion.   Neurological: She is alert and oriented to person, place, and time. She has normal strength.   Skin: Skin is warm and dry.   Psychiatric: She has a normal mood and affect.         ED Course   Procedures  Labs Reviewed   CBC W/ AUTO DIFFERENTIAL   COMPREHENSIVE METABOLIC PANEL   TROPONIN I   B-TYPE NATRIURETIC PEPTIDE     EKG Readings: (Independently Interpreted)   Rhythm: Normal Sinus Rhythm. Ectopy: No Ectopy. Conduction: Normal. ST Segments: Normal ST Segments. T Waves: Normal. Clinical Impression: Normal Sinus Rhythm       X-Rays:   Independently Interpreted Readings:   Chest X-Ray: Normal heart size.  No infiltrates.  No acute abnormalities.                       ED Course      Clinical Impression:   The primary encounter diagnosis was Gastroesophageal reflux disease, esophagitis presence not specified. A diagnosis of Chest pain was also pertinent to this visit.                           Mychal Lombardo MD  09/08/17 0005       Mychal Lombardo MD  09/08/17 0006

## 2017-09-11 ENCOUNTER — TELEPHONE (OUTPATIENT)
Dept: FAMILY MEDICINE | Facility: CLINIC | Age: 77
End: 2017-09-11

## 2017-09-11 NOTE — TELEPHONE ENCOUNTER
NEEDS TO DISCUSS RECENT ER VISIT WITH DR JOSUE. NEEDS TO DISCUSS ANTIBIOTICS MESSING UP HER STOMACH AND IF SHE NEEDS TO COME IN FOR 2 WK F/U. Would you please give her a call--has many questions that I can't answer, thanks

## 2017-09-11 NOTE — TELEPHONE ENCOUNTER
----- Message from Nolan Lacey sent at 2017  2:52 PM CDT -----  Contact: SELF  Marva Perez  MRN: [id number]  : 1940  PCP: Kevin Josue  Home Phone      [phone]  Work Phone      Not on file.  Mobile          [phone]      MESSAGE: NEEDS TO DISCUSS RECENT ER VISIT WITH DR JOSUE. NEEDS TO DISCUSS ANTIBIOTICS MESSING UP HER STOMACH AND IF SHE NEEDS TO COME IN FOR 2 WK F/U. (NEEDS TO BE RESCHEDULED, IF SO.) PLEASE CALL     PHONE: [phone]

## 2017-11-15 DIAGNOSIS — I10 ESSENTIAL HYPERTENSION: ICD-10-CM

## 2017-11-15 RX ORDER — AMLODIPINE BESYLATE 2.5 MG/1
TABLET ORAL
Qty: 90 TABLET | Refills: 3 | Status: SHIPPED | OUTPATIENT
Start: 2017-11-15 | End: 2018-11-05 | Stop reason: SDUPTHER

## 2017-11-21 ENCOUNTER — OFFICE VISIT (OUTPATIENT)
Dept: FAMILY MEDICINE | Facility: CLINIC | Age: 77
End: 2017-11-21
Payer: MEDICARE

## 2017-11-21 ENCOUNTER — TELEPHONE (OUTPATIENT)
Dept: FAMILY MEDICINE | Facility: CLINIC | Age: 77
End: 2017-11-21

## 2017-11-21 VITALS
WEIGHT: 112.63 LBS | BODY MASS INDEX: 20.6 KG/M2 | RESPIRATION RATE: 12 BRPM | HEART RATE: 72 BPM | SYSTOLIC BLOOD PRESSURE: 124 MMHG | DIASTOLIC BLOOD PRESSURE: 62 MMHG

## 2017-11-21 DIAGNOSIS — I10 ESSENTIAL HYPERTENSION: ICD-10-CM

## 2017-11-21 DIAGNOSIS — K59.1 DIARRHEA, FUNCTIONAL: ICD-10-CM

## 2017-11-21 DIAGNOSIS — F51.01 PRIMARY INSOMNIA: Primary | ICD-10-CM

## 2017-11-21 DIAGNOSIS — H61.23 EXCESSIVE EAR WAX, BILATERAL: ICD-10-CM

## 2017-11-21 PROCEDURE — 99999 PR STA SHADOW: CPT | Mod: PBBFAC,,, | Performed by: FAMILY MEDICINE

## 2017-11-21 PROCEDURE — 99999 PR PBB SHADOW E&M-EST. PATIENT-LVL III: CPT | Mod: PBBFAC,,, | Performed by: FAMILY MEDICINE

## 2017-11-21 PROCEDURE — 99213 OFFICE O/P EST LOW 20 MIN: CPT | Mod: PBBFAC,25 | Performed by: FAMILY MEDICINE

## 2017-11-21 PROCEDURE — 99999 PR STA SHADOW: CPT | Mod: PBBFAC,,,

## 2017-11-21 PROCEDURE — 69210 REMOVE IMPACTED EAR WAX UNI: CPT | Mod: PBBFAC | Performed by: FAMILY MEDICINE

## 2017-11-21 PROCEDURE — 99213 OFFICE O/P EST LOW 20 MIN: CPT | Mod: S$PBB | Performed by: FAMILY MEDICINE

## 2017-11-21 PROCEDURE — 96372 THER/PROPH/DIAG INJ SC/IM: CPT | Mod: PBBFAC

## 2017-11-21 RX ORDER — VALSARTAN 160 MG/1
TABLET ORAL
Qty: 30 TABLET | Refills: 5 | Status: SHIPPED | OUTPATIENT
Start: 2017-11-21 | End: 2018-04-17

## 2017-11-21 RX ORDER — AZITHROMYCIN 250 MG/1
TABLET, FILM COATED ORAL
Qty: 6 TABLET | Refills: 0 | Status: SHIPPED | OUTPATIENT
Start: 2017-11-21 | End: 2018-04-17

## 2017-11-21 RX ORDER — LINCOMYCIN HYDROCHLORIDE 300 MG/ML
600 INJECTION, SOLUTION INTRAMUSCULAR; INTRAVENOUS; SUBCONJUNCTIVAL
Status: COMPLETED | OUTPATIENT
Start: 2017-11-21 | End: 2017-11-21

## 2017-11-21 RX ORDER — METHYLPREDNISOLONE ACETATE 40 MG/ML
40 INJECTION, SUSPENSION INTRA-ARTICULAR; INTRALESIONAL; INTRAMUSCULAR; SOFT TISSUE
Status: COMPLETED | OUTPATIENT
Start: 2017-11-21 | End: 2017-11-21

## 2017-11-21 RX ADMIN — METHYLPREDNISOLONE ACETATE 40 MG: 40 INJECTION, SUSPENSION INTRA-ARTICULAR; INTRALESIONAL; INTRAMUSCULAR; SOFT TISSUE at 04:11

## 2017-11-21 RX ADMIN — LINCOMYCIN HYDROCHLORIDE 600 MG: 300 INJECTION, SOLUTION INTRAMUSCULAR; INTRAVENOUS; SUBCONJUNCTIVAL at 04:11

## 2017-11-21 NOTE — PROGRESS NOTES
Subjective:       Patient ID: Marva Perez is a 77 y.o. female.    Chief Complaint: Cough (approx 2 days); Diarrhea; and Nasal Congestion    Pt is a 77 y.o. female who presents for check up for cough. Doing well on current meds. Denies any side effects. Prevention is up to date.      Review of Systems   Constitutional: Positive for fatigue. Negative for appetite change, chills and fever.   HENT: Positive for congestion and postnasal drip. Negative for ear pain, rhinorrhea, sinus pressure, sore throat and trouble swallowing.    Eyes: Negative for pain, discharge, itching and visual disturbance.   Respiratory: Positive for cough and shortness of breath. Negative for choking, chest tightness and wheezing.    Cardiovascular: Negative for chest pain, palpitations and leg swelling.   Gastrointestinal: Negative for abdominal pain, blood in stool, diarrhea, nausea and vomiting.   Genitourinary: Negative for dysuria, flank pain, hematuria, pelvic pain, urgency, vaginal bleeding, vaginal discharge and vaginal pain.   Musculoskeletal: Negative for arthralgias, back pain, joint swelling, myalgias and neck stiffness.   Skin: Negative for rash and wound.   Neurological: Negative for dizziness, weakness, light-headedness, numbness and headaches.   Hematological: Does not bruise/bleed easily.   Psychiatric/Behavioral: Negative for agitation. The patient is not nervous/anxious.        Objective:      Physical Exam   Constitutional: She is oriented to person, place, and time. She appears well-developed and well-nourished.   HENT:   Head: Normocephalic.   R TM non viz with excess wax and L TM partially reomved   Eyes: Pupils are equal, round, and reactive to light.   Neck: Normal range of motion. Neck supple. No thyromegaly present.   Cardiovascular: Normal rate and regular rhythm.    Pulmonary/Chest: No respiratory distress. She has no wheezes. She has no rales. She exhibits no tenderness.   Abdominal: She exhibits no distension.  There is no tenderness. There is no rebound and no guarding.   Musculoskeletal: Normal range of motion. She exhibits no edema or tenderness.   Lymphadenopathy:     She has no cervical adenopathy.   Neurological: She is alert and oriented to person, place, and time. She has normal reflexes. She displays normal reflexes. No cranial nerve deficit. She exhibits normal muscle tone. Coordination normal.   Skin: Skin is warm and dry. No rash noted. No pallor.   Psychiatric: She has a normal mood and affect. Judgment and thought content normal.       Assessment:       1. Primary insomnia    2. Essential hypertension    3. Diarrhea, functional        Plan:   Marva was seen today for cough, diarrhea and nasal congestion.    Diagnoses and all orders for this visit:    Primary insomnia    Essential hypertension    Diarrhea, functional    Other orders  -     methylPREDNISolone acetate injection 40 mg; Inject 1 mL (40 mg total) into the muscle one time.  -     lincomycin injection 600 mg; Inject 2 mLs (600 mg total) into the muscle one time.  -     azithromycin (Z-SOFIA) 250 MG tablet; Take 2 orally day #1, then 1 po qd for  5 days total  -     valsartan (DIOVAN) 160 MG tablet; No generic

## 2017-11-21 NOTE — TELEPHONE ENCOUNTER
----- Message from Donna Chris sent at 2017  8:38 AM CST -----  Contact: Self  Marva Perez  MRN: [id number]  : 1940  PCP: Kevin Clements  Home Phone      [phone]  Work Phone      Not on file.  Mobile          [phone]    MESSAGE:   Requesting an appointment for a cough that started a few days ago.  Would also like to speak to doctor about medications at she is taking.  Please call.    Phone:  [phone]

## 2017-11-30 RX ORDER — ERGOCALCIFEROL 1.25 MG/1
CAPSULE ORAL
Qty: 24 CAPSULE | Refills: 0 | Status: SHIPPED | OUTPATIENT
Start: 2017-11-30 | End: 2018-02-26 | Stop reason: SDUPTHER

## 2017-12-11 DIAGNOSIS — F41.1 GAD (GENERALIZED ANXIETY DISORDER): ICD-10-CM

## 2017-12-11 RX ORDER — ALPRAZOLAM 0.25 MG/1
TABLET ORAL
Qty: 90 TABLET | Refills: 1 | Status: SHIPPED | OUTPATIENT
Start: 2017-12-11 | End: 2018-02-14 | Stop reason: SDUPTHER

## 2017-12-11 NOTE — TELEPHONE ENCOUNTER
----- Message from Lennox Son sent at 2017  9:36 AM CST -----  Contact: Patient  Marva Perez  MRN: [id number]  : 1940  PCP: Kevin Clements  Home Phone      [phone]  Work Phone      Not on file.  Mobile          [phone]      MESSAGE: requesting refill on Xanax -- send to [facility]     Call [phone]    PCP: Tyree

## 2017-12-26 ENCOUNTER — TELEPHONE (OUTPATIENT)
Dept: FAMILY MEDICINE | Facility: CLINIC | Age: 77
End: 2017-12-26

## 2017-12-26 NOTE — TELEPHONE ENCOUNTER
----- Message from Donna Chris sent at 2017  2:11 PM CST -----  Contact: Self  Marva Perez  MRN: [id number]  : 1940  PCP: Kevin Clements  Home Phone      [phone]  Work Phone      Not on file.  Mobile          [phone]    MESSAGE:   Requesting appointment today for twisted or broken ankle.  This happened a few days ago. Please call if we can fit her into schedule.    Phone:  [phone]

## 2017-12-26 NOTE — TELEPHONE ENCOUNTER
Contacted pt states that she twisted her ankle Dianne garcia. Pt can not put weight on it, very painful. Slight edema. Has a knot on the top of foot. Advised pt to go to the ER because we have no xray until next week. Pt verbalized understanding. Will be going to the Er or Urgent Care shortly.

## 2018-01-17 ENCOUNTER — TELEPHONE (OUTPATIENT)
Dept: ADMINISTRATIVE | Facility: HOSPITAL | Age: 78
End: 2018-01-17

## 2018-01-18 ENCOUNTER — TELEPHONE (OUTPATIENT)
Dept: FAMILY MEDICINE | Facility: CLINIC | Age: 78
End: 2018-01-18

## 2018-01-18 RX ORDER — ONDANSETRON HYDROCHLORIDE 8 MG/1
8 TABLET, FILM COATED ORAL EVERY 8 HOURS PRN
Qty: 10 TABLET | Refills: 2 | Status: SHIPPED | OUTPATIENT
Start: 2018-01-18 | End: 2018-04-17

## 2018-01-18 RX ORDER — DIPHENOXYLATE HYDROCHLORIDE AND ATROPINE SULFATE 2.5; .025 MG/1; MG/1
1 TABLET ORAL 4 TIMES DAILY PRN
Qty: 30 TABLET | Refills: 0 | Status: SHIPPED | OUTPATIENT
Start: 2018-01-18 | End: 2018-01-28

## 2018-01-18 NOTE — TELEPHONE ENCOUNTER
----- Message from Nora Mac sent at 2018  1:26 PM CST -----  Contact: Daughter - Mckenna Perez  MRN: [id number]  : 1940  PCP: eKvin Clements  Home Phone      [phone]  Work Phone      Not on file.  Mobile          [phone]      MESSAGE: Mom has had nonstop vomiting and diarrhea - daughter states that she can't even get in to the clinic for a swab or other testing due to the non stop vomiting and diarrhea - requesting medication to be called in to [facility].  [phone] (daughter's cell)

## 2018-02-14 ENCOUNTER — OFFICE VISIT (OUTPATIENT)
Dept: FAMILY MEDICINE | Facility: CLINIC | Age: 78
End: 2018-02-14
Payer: MEDICARE

## 2018-02-14 VITALS
DIASTOLIC BLOOD PRESSURE: 72 MMHG | SYSTOLIC BLOOD PRESSURE: 118 MMHG | TEMPERATURE: 96 F | HEART RATE: 84 BPM | BODY MASS INDEX: 20.58 KG/M2 | WEIGHT: 111.81 LBS | HEIGHT: 62 IN

## 2018-02-14 DIAGNOSIS — G47.00 INSOMNIA, UNSPECIFIED TYPE: ICD-10-CM

## 2018-02-14 DIAGNOSIS — F41.1 GAD (GENERALIZED ANXIETY DISORDER): ICD-10-CM

## 2018-02-14 DIAGNOSIS — J44.9 CHRONIC OBSTRUCTIVE PULMONARY DISEASE, UNSPECIFIED COPD TYPE: ICD-10-CM

## 2018-02-14 DIAGNOSIS — R05.9 COUGH: ICD-10-CM

## 2018-02-14 DIAGNOSIS — J32.9 SINUSITIS, UNSPECIFIED CHRONICITY, UNSPECIFIED LOCATION: Primary | ICD-10-CM

## 2018-02-14 DIAGNOSIS — R50.9 FEVER, UNSPECIFIED FEVER CAUSE: ICD-10-CM

## 2018-02-14 LAB
CTP QC/QA: YES
FLUAV AG NPH QL: NEGATIVE
FLUBV AG NPH QL: NEGATIVE

## 2018-02-14 PROCEDURE — 96372 THER/PROPH/DIAG INJ SC/IM: CPT | Mod: PBBFAC

## 2018-02-14 PROCEDURE — 99214 OFFICE O/P EST MOD 30 MIN: CPT | Mod: S$PBB | Performed by: NURSE PRACTITIONER

## 2018-02-14 PROCEDURE — 87804 INFLUENZA ASSAY W/OPTIC: CPT | Mod: PBBFAC | Performed by: NURSE PRACTITIONER

## 2018-02-14 PROCEDURE — 99999 POCT INFLUENZA A/B: CPT | Mod: PBBFAC,,, | Performed by: NURSE PRACTITIONER

## 2018-02-14 PROCEDURE — 99213 OFFICE O/P EST LOW 20 MIN: CPT | Mod: PBBFAC | Performed by: NURSE PRACTITIONER

## 2018-02-14 PROCEDURE — 99999 PR STA SHADOW: CPT | Mod: PBBFAC,,,

## 2018-02-14 PROCEDURE — 99999 PR PBB SHADOW E&M-EST. PATIENT-LVL III: CPT | Mod: PBBFAC,,, | Performed by: NURSE PRACTITIONER

## 2018-02-14 PROCEDURE — 99999 PR STA SHADOW: CPT | Mod: PBBFAC,,, | Performed by: NURSE PRACTITIONER

## 2018-02-14 RX ORDER — LOSARTAN POTASSIUM 50 MG/1
TABLET ORAL
Refills: 6 | COMMUNITY
Start: 2017-12-22 | End: 2018-09-07

## 2018-02-14 RX ORDER — LINCOMYCIN HYDROCHLORIDE 300 MG/ML
600 INJECTION, SOLUTION INTRAMUSCULAR; INTRAVENOUS; SUBCONJUNCTIVAL
Status: COMPLETED | OUTPATIENT
Start: 2018-02-14 | End: 2018-02-14

## 2018-02-14 RX ORDER — METHSCOPOLAMINE BROMIDE 2.5 MG/1
TABLET ORAL
COMMUNITY
Start: 2018-02-05 | End: 2018-04-17

## 2018-02-14 RX ORDER — ALPRAZOLAM 0.25 MG/1
0.5 TABLET ORAL NIGHTLY PRN
Qty: 180 TABLET | Refills: 1 | Status: SHIPPED | OUTPATIENT
Start: 2018-02-14 | End: 2018-02-16 | Stop reason: SDUPTHER

## 2018-02-14 RX ORDER — ROSUVASTATIN CALCIUM 10 MG/1
TABLET, COATED ORAL
COMMUNITY
Start: 2017-12-19 | End: 2018-04-17

## 2018-02-14 RX ORDER — METHYLPREDNISOLONE ACETATE 40 MG/ML
40 INJECTION, SUSPENSION INTRA-ARTICULAR; INTRALESIONAL; INTRAMUSCULAR; SOFT TISSUE
Status: COMPLETED | OUTPATIENT
Start: 2018-02-14 | End: 2018-02-14

## 2018-02-14 RX ORDER — AZITHROMYCIN 250 MG/1
TABLET, FILM COATED ORAL
Qty: 6 TABLET | Refills: 0 | Status: SHIPPED | OUTPATIENT
Start: 2018-02-14 | End: 2018-02-19

## 2018-02-14 RX ORDER — PROMETHAZINE HYDROCHLORIDE AND CODEINE PHOSPHATE 6.25; 1 MG/5ML; MG/5ML
5 SOLUTION ORAL EVERY 4 HOURS PRN
Qty: 180 ML | Refills: 0 | Status: SHIPPED | OUTPATIENT
Start: 2018-02-14 | End: 2018-02-24

## 2018-02-14 RX ADMIN — METHYLPREDNISOLONE ACETATE 40 MG: 40 INJECTION, SUSPENSION INTRA-ARTICULAR; INTRALESIONAL; INTRAMUSCULAR; SOFT TISSUE at 04:02

## 2018-02-14 RX ADMIN — LINCOMYCIN HYDROCHLORIDE 600 MG: 300 INJECTION, SOLUTION INTRAMUSCULAR; INTRAVENOUS; SUBCONJUNCTIVAL at 04:02

## 2018-02-14 NOTE — PROGRESS NOTES
Subjective:       Patient ID: Marva Perez is a 77 y.o. female.    Chief Complaint: Cough and Nasal Congestion    Cough   The current episode started yesterday. Associated symptoms include chills, a fever, headaches (frontal), myalgias, nasal congestion, postnasal drip, rhinorrhea and a sore throat. Pertinent negatives include no ear pain, shortness of breath or wheezing. Associated symptoms comments: Leg cramps.     Review of Systems   Constitutional: Positive for chills and fever. Negative for appetite change and fatigue.   HENT: Positive for congestion, postnasal drip, rhinorrhea and sore throat. Negative for ear pain.    Eyes: Negative.  Negative for visual disturbance.   Respiratory: Positive for cough. Negative for shortness of breath and wheezing.    Cardiovascular: Negative.    Gastrointestinal: Negative.  Negative for abdominal pain, diarrhea, nausea and vomiting.   Endocrine: Negative.    Genitourinary: Negative.  Negative for difficulty urinating and urgency.   Musculoskeletal: Positive for myalgias. Negative for arthralgias.   Skin: Negative.  Negative for color change.   Allergic/Immunologic: Negative.    Neurological: Positive for headaches (frontal). Negative for dizziness.   Hematological: Negative.    Psychiatric/Behavioral: Negative.  Negative for sleep disturbance.   All other systems reviewed and are negative.      Objective:      Physical Exam   Constitutional: She appears well-developed and well-nourished.   HENT:   Head: Normocephalic and atraumatic.   Right Ear: Tympanic membrane and external ear normal.   Left Ear: Tympanic membrane and external ear normal.   Nose: Mucosal edema and rhinorrhea present.   Mouth/Throat: Uvula is midline.   Red anterior pharynx   Eyes: Conjunctivae are normal. Pupils are equal, round, and reactive to light.   Neck: Trachea normal and normal range of motion. Neck supple. No thyromegaly present.   Cardiovascular: Normal rate, regular rhythm, S1 normal, S2  normal and normal heart sounds.    No murmur heard.  Pulmonary/Chest: Effort normal and breath sounds normal. No respiratory distress.   Harsh cough in office   Abdominal: Soft. Normal appearance.   Musculoskeletal: Normal range of motion.   Lymphadenopathy:     She has no cervical adenopathy.   Neurological: She is alert.   Skin: Skin is warm, dry and intact.   Psychiatric: She has a normal mood and affect. Her speech is normal.   Nursing note and vitals reviewed.      Assessment:       1. Sinusitis, unspecified chronicity, unspecified location    2. KATHIE (generalized anxiety disorder)    3. Fever, unspecified fever cause    4. Insomnia, unspecified type    5. Chronic obstructive pulmonary disease, unspecified COPD type    6. Cough        Plan:   Marva was seen today for cough and nasal congestion.    Diagnoses and all orders for this visit:    Sinusitis, unspecified chronicity, unspecified location  -     lincomycin injection 600 mg; Inject 2 mLs (600 mg total) into the muscle one time.  -     methylPREDNISolone acetate injection 40 mg; Inject 1 mL (40 mg total) into the muscle one time.  -     azithromycin (Z-SOFIA) 250 MG tablet; Take 2 tablets by mouth on day 1; Take 1 tablet by mouth on days 2-5    KATHIE (generalized anxiety disorder)  -     ALPRAZolam (XANAX) 0.25 MG tablet; Take 2 tablets (0.5 mg total) by mouth nightly as needed for Insomnia or Anxiety. 1 po q hs    Fever, unspecified fever cause  -     POCT Influenza A/B - negative    Insomnia, unspecified type  -     ALPRAZolam (XANAX) 0.25 MG tablet; Take 2 tablets (0.5 mg total) by mouth nightly as needed for Insomnia or Anxiety. 1 po q hs    Chronic obstructive pulmonary disease, unspecified COPD type  -     lincomycin injection 600 mg; Inject 2 mLs (600 mg total) into the muscle one time.  -     methylPREDNISolone acetate injection 40 mg; Inject 1 mL (40 mg total) into the muscle one time.  -     azithromycin (Z-SOFIA) 250 MG tablet; Take 2 tablets by  mouth on day 1; Take 1 tablet by mouth on days 2-5    Cough  -     promethazine-codeine 6.25-10 mg/5 ml (PHENERGAN WITH CODEINE) 6.25-10 mg/5 mL syrup; Take 5 mLs by mouth every 4 (four) hours as needed for Cough.    RTC PRN

## 2018-02-16 ENCOUNTER — TELEPHONE (OUTPATIENT)
Dept: FAMILY MEDICINE | Facility: CLINIC | Age: 78
End: 2018-02-16

## 2018-02-16 DIAGNOSIS — G47.00 INSOMNIA, UNSPECIFIED TYPE: ICD-10-CM

## 2018-02-16 DIAGNOSIS — F41.1 GAD (GENERALIZED ANXIETY DISORDER): ICD-10-CM

## 2018-02-16 RX ORDER — ALPRAZOLAM 0.25 MG/1
TABLET ORAL
Qty: 180 TABLET | Refills: 1 | Status: SHIPPED | OUTPATIENT
Start: 2018-02-16 | End: 2018-07-24 | Stop reason: SDUPTHER

## 2018-02-21 ENCOUNTER — TELEPHONE (OUTPATIENT)
Dept: FAMILY MEDICINE | Facility: CLINIC | Age: 78
End: 2018-02-21

## 2018-02-21 NOTE — TELEPHONE ENCOUNTER
----- Message from Pema Mac MA sent at 2018 10:10 AM CST -----  Contact: Brock Perez  MRN: [id number]  : 1940  PCP: Kevin Clements  Home Phone      [phone]  Work Phone      Not on file.  Mobile          [phone]      MESSAGE: [facility] called patient and her that they need to speak with Ms Pastrana's office before they will fill her Xanax. Please call a [facility] because is only has four pills left.

## 2018-02-21 NOTE — TELEPHONE ENCOUNTER
Spoke with pharmacy and it was stated that Rx is being filled due to the clarification of the RX sent on 2/16/18 and that the Rx sent on the 14 th of February has been cancelled.

## 2018-02-27 RX ORDER — ERGOCALCIFEROL 1.25 MG/1
CAPSULE ORAL
Qty: 24 CAPSULE | Refills: 0 | Status: SHIPPED | OUTPATIENT
Start: 2018-02-27 | End: 2018-05-27 | Stop reason: SDUPTHER

## 2018-04-17 ENCOUNTER — TELEPHONE (OUTPATIENT)
Dept: FAMILY MEDICINE | Facility: CLINIC | Age: 78
End: 2018-04-17

## 2018-04-17 ENCOUNTER — OFFICE VISIT (OUTPATIENT)
Dept: FAMILY MEDICINE | Facility: CLINIC | Age: 78
End: 2018-04-17
Payer: MEDICARE

## 2018-04-17 VITALS
WEIGHT: 112.38 LBS | OXYGEN SATURATION: 96 % | HEIGHT: 62 IN | RESPIRATION RATE: 16 BRPM | BODY MASS INDEX: 20.68 KG/M2 | SYSTOLIC BLOOD PRESSURE: 100 MMHG | HEART RATE: 89 BPM | TEMPERATURE: 98 F | DIASTOLIC BLOOD PRESSURE: 60 MMHG

## 2018-04-17 DIAGNOSIS — J06.9 UPPER RESPIRATORY TRACT INFECTION, UNSPECIFIED TYPE: ICD-10-CM

## 2018-04-17 DIAGNOSIS — F43.23 ADJUSTMENT DISORDER WITH MIXED ANXIETY AND DEPRESSED MOOD: Primary | ICD-10-CM

## 2018-04-17 DIAGNOSIS — R50.9 FEVER, UNSPECIFIED FEVER CAUSE: ICD-10-CM

## 2018-04-17 LAB
CTP QC/QA: YES
FLUAV AG NPH QL: NEGATIVE
FLUBV AG NPH QL: NEGATIVE

## 2018-04-17 PROCEDURE — 99999 POCT INFLUENZA A/B: CPT | Mod: PBBFAC,,, | Performed by: FAMILY MEDICINE

## 2018-04-17 PROCEDURE — 99999 PR STA SHADOW: CPT | Mod: PBBFAC,,,

## 2018-04-17 PROCEDURE — 99214 OFFICE O/P EST MOD 30 MIN: CPT | Mod: S$PBB | Performed by: FAMILY MEDICINE

## 2018-04-17 PROCEDURE — 99999 PR PBB SHADOW E&M-EST. PATIENT-LVL III: CPT | Mod: PBBFAC,,, | Performed by: FAMILY MEDICINE

## 2018-04-17 PROCEDURE — 99999 PR STA SHADOW: CPT | Mod: PBBFAC,,, | Performed by: FAMILY MEDICINE

## 2018-04-17 PROCEDURE — 87804 INFLUENZA ASSAY W/OPTIC: CPT | Mod: 59,PBBFAC | Performed by: FAMILY MEDICINE

## 2018-04-17 PROCEDURE — 96372 THER/PROPH/DIAG INJ SC/IM: CPT | Mod: PBBFAC

## 2018-04-17 PROCEDURE — 99213 OFFICE O/P EST LOW 20 MIN: CPT | Mod: PBBFAC,25 | Performed by: FAMILY MEDICINE

## 2018-04-17 RX ORDER — DOXYCYCLINE HYCLATE 100 MG
100 TABLET ORAL 2 TIMES DAILY
Qty: 20 TABLET | Refills: 0 | Status: SHIPPED | OUTPATIENT
Start: 2018-04-17 | End: 2018-04-27

## 2018-04-17 RX ORDER — ESCITALOPRAM OXALATE 5 MG/1
5 TABLET ORAL DAILY
Qty: 30 TABLET | Refills: 0 | Status: SHIPPED | OUTPATIENT
Start: 2018-04-17 | End: 2018-06-09 | Stop reason: SDUPTHER

## 2018-04-17 RX ORDER — METHYLPREDNISOLONE ACETATE 40 MG/ML
40 INJECTION, SUSPENSION INTRA-ARTICULAR; INTRALESIONAL; INTRAMUSCULAR; SOFT TISSUE
Status: COMPLETED | OUTPATIENT
Start: 2018-04-17 | End: 2018-04-17

## 2018-04-17 RX ORDER — FLUOXETINE 10 MG/1
10 TABLET ORAL DAILY
Qty: 30 TABLET | Refills: 0 | Status: SHIPPED | OUTPATIENT
Start: 2018-04-17 | End: 2018-04-17

## 2018-04-17 RX ADMIN — METHYLPREDNISOLONE ACETATE 40 MG: 40 INJECTION, SUSPENSION INTRA-ARTICULAR; INTRALESIONAL; INTRAMUSCULAR; SOFT TISSUE at 02:04

## 2018-04-17 NOTE — TELEPHONE ENCOUNTER
----- Message from Pema Mac MA sent at 2018  5:10 PM CDT -----  Contact: Self  Marva Perez  MRN: [id number]  : 1940  PCP: Kevin Clements  Home Phone      [phone]  Work Phone      Not on file.  Mobile          [phone]      MESSAGE: Patient picked up the Prozac and she is wondering if she can get it switched to Lexapro. She has taken the Lexapro in the past and it worked well for her. Can you please call and let her know what Dr Adams decides.  Phone: [phone]  Pharmacy: [facility]

## 2018-04-17 NOTE — PROGRESS NOTES
Subjective:       Patient ID: Marva Perez is a 77 y.o. female.    Chief Complaint: Fever (Fever 100.5 started yesterday) and Orders (Mammogram and pap)    HPI  77 year odl jey comes in with c/o chills. She says that after lunch she had a temp up to 100.5. She has been weak and achy. She had a nuclear stress test yuesterday which was normal. Blood work still pending. She has had congestion, sore throat and ear aches. She had a celestone shot, zpack a month ago at the urgent care. A month prior, she had the same.    PMH, PSH, ALLERGIES, SH, FH reviewed in nurse's notes above  Medications reconciled in the nurse's notes      Review of Systems   Constitutional: Positive for fatigue. Negative for chills and fever.   HENT: Positive for congestion and sore throat. Negative for ear pain, postnasal drip, rhinorrhea and trouble swallowing.    Eyes: Negative for redness and itching.   Respiratory: Negative for cough, shortness of breath and wheezing.    Cardiovascular: Negative for chest pain and palpitations.   Gastrointestinal: Negative for abdominal pain, diarrhea, nausea and vomiting.   Genitourinary: Negative for dysuria and frequency.   Skin: Negative for rash.   Neurological: Positive for weakness. Negative for headaches.       Objective:      Physical Exam   Constitutional: She is oriented to person, place, and time. She appears well-developed. No distress.   HENT:   Head: Normocephalic and atraumatic.   Bilateral cerumen   Eyes: Conjunctivae are normal. Pupils are equal, round, and reactive to light.   Neck: Normal range of motion. Neck supple. No thyromegaly present.   Cardiovascular: Normal rate, regular rhythm, normal heart sounds and intact distal pulses.    Pulmonary/Chest: Effort normal. No respiratory distress. She has wheezes.   Abdominal: Soft. Bowel sounds are normal. There is no tenderness.   Musculoskeletal: Normal range of motion. She exhibits no edema.   Lymphadenopathy:     She has no cervical  adenopathy.   Neurological: She is alert and oriented to person, place, and time.   Skin: Skin is warm and dry. No rash noted.   Psychiatric: She has a normal mood and affect. Her behavior is normal.   Nursing note and vitals reviewed.       Assessment/Plan:       Problem List Items Addressed This Visit     None      Visit Diagnoses     Adjustment disorder with mixed anxiety and depressed mood    -  Primary    Relevant Medications    FLUoxetine 10 MG Tab    Fever, unspecified fever cause        Relevant Orders    POCT Influenza A/B (Completed)    Upper respiratory tract infection, unspecified type        Relevant Medications    methylPREDNISolone acetate injection 40 mg (Start on 4/17/2018  2:00 PM)      Doxy script PRINTED. Will not start unless fever persists, cough worsens or wheezing does not stop after 3 days.    RTC if condition acutely worsens or any other concerns, otherwise RTC as scheduled

## 2018-04-17 NOTE — TELEPHONE ENCOUNTER
Contacted pt states that she read that Prozac has a lot of side affects. States that she is not currently having anything, but is scared that she will. Please advise, thank you.

## 2018-04-17 NOTE — TELEPHONE ENCOUNTER
----- Message from Arianne Malone sent at 2018  3:38 PM CDT -----  Contact: Self  Marva Perez  MRN: [id number]  : 1940  PCP: Kevin Clements  Home Phone      [phone]  Work Phone      Not on file.  Mobile          [phone]      MESSAGE:   Patient would like to discuss medication alternatives. Please advis.    Marva [phone]

## 2018-04-18 RX ORDER — AZITHROMYCIN 500 MG/1
500 TABLET, FILM COATED ORAL DAILY
Qty: 3 TABLET | Refills: 0 | Status: SHIPPED | OUTPATIENT
Start: 2018-04-18 | End: 2018-04-28

## 2018-04-18 NOTE — TELEPHONE ENCOUNTER
----- Message from Lennox Son sent at 2018  9:06 AM CDT -----  Contact: Patient  Marva Perez  MRN: [id number]  : 1940  PCP: Kevin Clements  Home Phone      [phone]  Work Phone      Not on file.  Mobile          [phone]      MESSAGE: saw Dr Adams yesterday -- leg & back pain, unable to sleep last night -- requesting to come in today for urinalysis -- Please advise    Call [phone]    PCP: Angie

## 2018-04-18 NOTE — TELEPHONE ENCOUNTER
----- Message from Pema Mac MA sent at 2018 10:54 AM CDT -----  Contact: Self  Marva Perez  MRN: [id number]  : 1940  PCP: Kevin Clements  Home Phone      [phone]  Work Phone      Not on file.  Mobile          [phone]      MESSAGE:   Patient would like to get medical advice.  Symptoms (please be specific): fever and congestion  How long has patient had these symptoms:  Several days  [facility] and location[facility]  She is unable to take a certain medicine because of her kris and is causes diarrhea. Please call and advise. She is still very sick and wants an antibiotic.         Phone: [phone]

## 2018-04-18 NOTE — TELEPHONE ENCOUNTER
Contacted pt states she is congested and has a low grade temp. Has leg and back pain, states she can only take tylenol.    States she is afraid to take certain medication because of diarrhea. Wants an alternative for tetracycline. Please advise, thank you.    Pharmacy: [facility]

## 2018-04-19 ENCOUNTER — TELEPHONE (OUTPATIENT)
Dept: FAMILY MEDICINE | Facility: CLINIC | Age: 78
End: 2018-04-19

## 2018-04-19 DIAGNOSIS — R19.7 DIARRHEA, UNSPECIFIED TYPE: Primary | ICD-10-CM

## 2018-04-19 NOTE — TELEPHONE ENCOUNTER
----- Message from Lennox Son sent at 2018  4:08 PM CDT -----  Contact: Patient  Marva Perez  MRN: [id number]  : 1940  PCP: Kevin Clements  Home Phone      [phone]  Work Phone      Not on file.  Mobile          [phone]      MESSAGE: seen on 18 -- still with low grade fever - congestion -- requesting Rx for over all antibiotic -- states the one she got has not helped -- [facility] [place]    Call [phone]    PCP: Tyree

## 2018-04-26 NOTE — TELEPHONE ENCOUNTER
Spoke to pt, requesting referral to see a GI doctor. States she feels she needs to see a specialist to figure out a solution. Still havings problems with diarrhea, comes and goes. Preferred an [facility] physician or one in [place]. Please advise, thank you.

## 2018-04-26 NOTE — TELEPHONE ENCOUNTER
Referral and clinical notes faxed to [phone] for Dr. Avelino Tejada. Appt made for Monday, 4/30/18 at 9:15am. Pt notified, verbalized understanding.

## 2018-05-14 ENCOUNTER — TELEPHONE (OUTPATIENT)
Dept: FAMILY MEDICINE | Facility: CLINIC | Age: 78
End: 2018-05-14

## 2018-05-14 DIAGNOSIS — Z12.31 ENCOUNTER FOR SCREENING MAMMOGRAM FOR HIGH-RISK PATIENT: Primary | ICD-10-CM

## 2018-05-14 NOTE — TELEPHONE ENCOUNTER
----- Message from Donna Chris sent at 2018  2:36 PM CDT -----  Contact: Self  Marva Perez  MRN: [id number]  : 1940  PCP: Kevin Clements  Home Phone      [phone]  Work Phone      Not on file.  Mobile          [phone]    MESSAGE:   Would like to schedule her mammogram as soon as possible, any day but a Friday.  Please call with appt time.    Phone: [phone]

## 2018-05-16 ENCOUNTER — HOSPITAL ENCOUNTER (OUTPATIENT)
Dept: RADIOLOGY | Facility: HOSPITAL | Age: 78
Discharge: HOME | End: 2018-05-16
Attending: FAMILY MEDICINE
Payer: MEDICARE

## 2018-05-16 VITALS — WEIGHT: 112 LBS | HEIGHT: 62 IN | BODY MASS INDEX: 20.61 KG/M2

## 2018-05-16 DIAGNOSIS — Z12.31 ENCOUNTER FOR SCREENING MAMMOGRAM FOR HIGH-RISK PATIENT: ICD-10-CM

## 2018-05-16 PROCEDURE — 77067 SCR MAMMO BI INCL CAD: CPT | Mod: 26,,, | Performed by: RADIOLOGY

## 2018-05-16 PROCEDURE — 77067 SCR MAMMO BI INCL CAD: CPT | Mod: TC

## 2018-05-16 PROCEDURE — 77063 BREAST TOMOSYNTHESIS BI: CPT | Mod: 26,,, | Performed by: RADIOLOGY

## 2018-05-28 RX ORDER — ERGOCALCIFEROL 1.25 MG/1
CAPSULE ORAL
Qty: 24 CAPSULE | Refills: 0 | Status: SHIPPED | OUTPATIENT
Start: 2018-05-28 | End: 2018-08-26 | Stop reason: SDUPTHER

## 2018-06-09 DIAGNOSIS — F43.23 ADJUSTMENT DISORDER WITH MIXED ANXIETY AND DEPRESSED MOOD: ICD-10-CM

## 2018-06-14 RX ORDER — ESCITALOPRAM OXALATE 5 MG/1
5 TABLET ORAL DAILY
Qty: 30 TABLET | Refills: 0 | Status: SHIPPED | OUTPATIENT
Start: 2018-06-14 | End: 2018-09-07

## 2018-06-14 RX ORDER — FLUOXETINE 10 MG/1
10 TABLET ORAL DAILY
Qty: 30 TABLET | Refills: 0 | Status: SHIPPED | OUTPATIENT
Start: 2018-06-14 | End: 2019-03-21

## 2018-07-09 DIAGNOSIS — I10 ESSENTIAL HYPERTENSION: ICD-10-CM

## 2018-07-10 RX ORDER — NEBIVOLOL HYDROCHLORIDE 10 MG/1
TABLET ORAL
Qty: 90 TABLET | Refills: 2 | Status: SHIPPED | OUTPATIENT
Start: 2018-07-10 | End: 2019-03-08 | Stop reason: SDUPTHER

## 2018-07-24 DIAGNOSIS — F41.1 GAD (GENERALIZED ANXIETY DISORDER): ICD-10-CM

## 2018-07-24 DIAGNOSIS — G47.00 INSOMNIA, UNSPECIFIED TYPE: ICD-10-CM

## 2018-07-24 RX ORDER — ALPRAZOLAM 0.25 MG/1
TABLET ORAL
Qty: 180 TABLET | Refills: 1 | Status: SHIPPED | OUTPATIENT
Start: 2018-07-24 | End: 2019-01-07 | Stop reason: SDUPTHER

## 2018-07-24 NOTE — TELEPHONE ENCOUNTER
----- Message from Arianne Malone sent at 2018  8:08 AM CDT -----  Contact: Self  Marva Perez  MRN: [id number]  : 1940  PCP: Kevin Clements  Home Phone      [phone]  Work Phone      Not on file.  Mobile          [phone]      MESSAGE:   Pt requesting refill or new Rx.   Is this a refill or new RX:  refill  RX name and strength: ALPRAZolam (XANAX) 0.25 MG tablet  Last office visit:   Is this a 30-day or 90-day RX:    Pharmacy name and location:  [facility]  Comments:      Phone:  [phone]

## 2018-08-23 ENCOUNTER — PES CALL (OUTPATIENT)
Dept: ADMINISTRATIVE | Facility: CLINIC | Age: 78
End: 2018-08-23

## 2018-08-27 ENCOUNTER — OFFICE VISIT (OUTPATIENT)
Dept: FAMILY MEDICINE | Facility: CLINIC | Age: 78
End: 2018-08-27
Payer: MEDICARE

## 2018-08-27 ENCOUNTER — TELEPHONE (OUTPATIENT)
Dept: FAMILY MEDICINE | Facility: CLINIC | Age: 78
End: 2018-08-27

## 2018-08-27 VITALS
BODY MASS INDEX: 20.5 KG/M2 | DIASTOLIC BLOOD PRESSURE: 64 MMHG | RESPIRATION RATE: 20 BRPM | WEIGHT: 111.38 LBS | SYSTOLIC BLOOD PRESSURE: 120 MMHG | HEIGHT: 62 IN | HEART RATE: 80 BPM

## 2018-08-27 DIAGNOSIS — F41.1 GAD (GENERALIZED ANXIETY DISORDER): ICD-10-CM

## 2018-08-27 DIAGNOSIS — J43.9 PULMONARY EMPHYSEMA, UNSPECIFIED EMPHYSEMA TYPE: ICD-10-CM

## 2018-08-27 DIAGNOSIS — H61.21 IMPACTED CERUMEN OF RIGHT EAR: ICD-10-CM

## 2018-08-27 DIAGNOSIS — J02.9 SORE THROAT: Primary | ICD-10-CM

## 2018-08-27 DIAGNOSIS — L57.0 ACTINIC KERATOSIS: ICD-10-CM

## 2018-08-27 DIAGNOSIS — I25.10 ASCVD (ARTERIOSCLEROTIC CARDIOVASCULAR DISEASE): ICD-10-CM

## 2018-08-27 DIAGNOSIS — I10 ESSENTIAL HYPERTENSION: ICD-10-CM

## 2018-08-27 LAB
CTP QC/QA: YES
S PYO RRNA THROAT QL PROBE: NEGATIVE

## 2018-08-27 PROCEDURE — 99999 POCT RAPID STREP A: CPT | Mod: PBBFAC,,, | Performed by: FAMILY MEDICINE

## 2018-08-27 PROCEDURE — 99999 PR PBB SHADOW E&M-EST. PATIENT-LVL III: CPT | Mod: PBBFAC,,, | Performed by: FAMILY MEDICINE

## 2018-08-27 PROCEDURE — 96372 THER/PROPH/DIAG INJ SC/IM: CPT | Mod: PBBFAC

## 2018-08-27 PROCEDURE — 87880 STREP A ASSAY W/OPTIC: CPT | Mod: PBBFAC | Performed by: FAMILY MEDICINE

## 2018-08-27 PROCEDURE — 99999 PR STA SHADOW: CPT | Mod: PBBFAC,,,

## 2018-08-27 PROCEDURE — 99213 OFFICE O/P EST LOW 20 MIN: CPT | Mod: S$PBB | Performed by: FAMILY MEDICINE

## 2018-08-27 PROCEDURE — 99213 OFFICE O/P EST LOW 20 MIN: CPT | Mod: PBBFAC,25 | Performed by: FAMILY MEDICINE

## 2018-08-27 PROCEDURE — 69210 REMOVE IMPACTED EAR WAX UNI: CPT | Mod: PBBFAC | Performed by: FAMILY MEDICINE

## 2018-08-27 PROCEDURE — 99999 PR STA SHADOW: CPT | Mod: PBBFAC,,, | Performed by: FAMILY MEDICINE

## 2018-08-27 RX ORDER — METHYLPREDNISOLONE ACETATE 40 MG/ML
20 INJECTION, SUSPENSION INTRA-ARTICULAR; INTRALESIONAL; INTRAMUSCULAR; SOFT TISSUE
Status: DISCONTINUED | OUTPATIENT
Start: 2018-08-27 | End: 2018-08-27

## 2018-08-27 RX ORDER — ERGOCALCIFEROL 1.25 MG/1
CAPSULE ORAL
Qty: 24 CAPSULE | Refills: 0 | Status: SHIPPED | OUTPATIENT
Start: 2018-08-27 | End: 2018-11-23 | Stop reason: SDUPTHER

## 2018-08-27 RX ORDER — TRETINOIN 0.5 MG/G
CREAM TOPICAL NIGHTLY
Qty: 45 G | Refills: 2 | Status: ON HOLD | OUTPATIENT
Start: 2018-08-27 | End: 2019-08-05 | Stop reason: HOSPADM

## 2018-08-27 RX ADMIN — METHYLPREDNISOLONE SODIUM SUCCINATE 20 MG: 40 INJECTION, POWDER, FOR SOLUTION INTRAMUSCULAR; INTRAVENOUS at 02:08

## 2018-08-27 NOTE — PROGRESS NOTES
Subjective:       Patient ID: Marva Perez is a 77 y.o. female.    Chief Complaint: Sore Throat    Pt is a 77 y.o. female who presents for check up for Essential hypertension  (primary encounter diagnosis)  Pulmonary emphysema, unspecified emphysema type  Gabriele (generalized anxiety disorder)  Ascvd (arteriosclerotic cardiovascular disease)  Sore throat  Actinic keratosis. Doing well on current meds. Denies any side effects. Prevention is up to date.      Review of Systems   Constitutional: Negative for appetite change, chills and fever.   HENT: Positive for sore throat. Negative for rhinorrhea, sinus pressure and trouble swallowing.    Respiratory: Negative for cough, chest tightness, shortness of breath and wheezing.    Cardiovascular: Negative for chest pain and palpitations.   Gastrointestinal: Negative for abdominal pain, blood in stool, diarrhea, nausea and vomiting.   Genitourinary: Negative for dysuria, flank pain, hematuria, pelvic pain, urgency, vaginal bleeding, vaginal discharge and vaginal pain.   Musculoskeletal: Negative for back pain, joint swelling and neck stiffness.   Skin: Negative for rash.   Neurological: Negative for dizziness, weakness, light-headedness, numbness and headaches.   Hematological: Does not bruise/bleed easily.   Psychiatric/Behavioral: Negative for agitation. The patient is not nervous/anxious.        Objective:      Physical Exam   Constitutional: She is oriented to person, place, and time. She appears well-developed and well-nourished.   HENT:   Head: Normocephalic.   Red phx   Eyes: Pupils are equal, round, and reactive to light.   Neck: Normal range of motion. Neck supple. No thyromegaly present.   Cardiovascular: Normal rate and regular rhythm.   Pulmonary/Chest: No respiratory distress. She has no wheezes. She has no rales. She exhibits no tenderness.   Abdominal: She exhibits no distension. There is no tenderness. There is no rebound and no guarding.   Musculoskeletal:  Normal range of motion. She exhibits no edema or tenderness.   Lymphadenopathy:     She has no cervical adenopathy.   Neurological: She is alert and oriented to person, place, and time. She has normal reflexes. She displays normal reflexes. No cranial nerve deficit. She exhibits normal muscle tone. Coordination normal.   Skin: Skin is warm and dry. No rash noted. No pallor.   Psychiatric: She has a normal mood and affect. Judgment and thought content normal.       Assessment:       1. Essential hypertension    2. Pulmonary emphysema, unspecified emphysema type    3. KATHIE (generalized anxiety disorder)    4. ASCVD (arteriosclerotic cardiovascular disease)    5. Sore throat    6. Actinic keratosis    7. Impacted cerumen of right ear        Plan:   Marva was seen today for sore throat.    Diagnoses and all orders for this visit:    Essential hypertension    Pulmonary emphysema, unspecified emphysema type    KATHIE (generalized anxiety disorder)    ASCVD (arteriosclerotic cardiovascular disease)    Sore throat  -     POCT rapid strep A    Actinic keratosis  -     tretinoin (RETIN-A) 0.05 % cream; Apply topically every evening.    Impacted cerumen of right ear    R ear lavage

## 2018-08-27 NOTE — TELEPHONE ENCOUNTER
----- Message from Ayleen Horn sent at 2018  9:33 AM CDT -----  Contact: Self  Marva Perez  MRN: [id number]  : 1940  PCP: Kevin Clements  Home Phone      [phone]  Work Phone      Not on file.  Mobile          [phone]      MESSAGE:   Pt requests a sooner appointment than the  can schedule.  Does patient feel like they need to be seen today:  Yes  What is the nature of the appointment:  Sore throat  What visit type:  est  Did you check other providers/department schedules for availability:   Yes  Comments:   Patient states that she is COPD and would like to be seen today.    Phone:  [phone]

## 2018-08-28 ENCOUNTER — TELEPHONE (OUTPATIENT)
Dept: FAMILY MEDICINE | Facility: CLINIC | Age: 78
End: 2018-08-28

## 2018-08-28 NOTE — TELEPHONE ENCOUNTER
Received determination of approval for Tretinoin thru 08/28/2019 .Pharmacy notified and will notify patient.

## 2018-08-28 NOTE — TELEPHONE ENCOUNTER
PA for Tretinoin submitted to insurance company via [employer] web site. Key:[id number]  Awaiting insurance company response/ decision.

## 2018-08-30 ENCOUNTER — TELEPHONE (OUTPATIENT)
Dept: FAMILY MEDICINE | Facility: CLINIC | Age: 78
End: 2018-08-30

## 2018-08-30 RX ORDER — DOXYCYCLINE HYCLATE 100 MG
100 TABLET ORAL 2 TIMES DAILY
Qty: 20 TABLET | Refills: 0 | Status: SHIPPED | OUTPATIENT
Start: 2018-08-30 | End: 2018-09-04 | Stop reason: ALTCHOICE

## 2018-08-30 NOTE — TELEPHONE ENCOUNTER
----- Message from Arianne Malone sent at 2018  2:13 PM CDT -----  Contact: Self  Marva Perez  MRN: [id number]  : 1940  PCP: Kevin Clements  Home Phone      [phone]  Work Phone      Not on file.  Mobile          [phone]      MESSAGE:   Patient is requesting a call from Dr. Clements. She is requesting an antibiotic. Please advise.    Marav  [phone]

## 2018-08-31 NOTE — TELEPHONE ENCOUNTER
Attempted to call pt. Left detailed message informing pt that pcp sent in script [facility] Instructed to call clinic with any questions.

## 2018-09-04 ENCOUNTER — TELEPHONE (OUTPATIENT)
Dept: FAMILY MEDICINE | Facility: CLINIC | Age: 78
End: 2018-09-04

## 2018-09-04 ENCOUNTER — OFFICE VISIT (OUTPATIENT)
Dept: FAMILY MEDICINE | Facility: CLINIC | Age: 78
End: 2018-09-04
Payer: MEDICARE

## 2018-09-04 VITALS
HEART RATE: 86 BPM | TEMPERATURE: 96 F | WEIGHT: 109 LBS | HEIGHT: 62 IN | DIASTOLIC BLOOD PRESSURE: 60 MMHG | SYSTOLIC BLOOD PRESSURE: 110 MMHG | BODY MASS INDEX: 20.06 KG/M2 | RESPIRATION RATE: 18 BRPM

## 2018-09-04 DIAGNOSIS — J98.4 PNEUMONITIS: ICD-10-CM

## 2018-09-04 DIAGNOSIS — I10 ESSENTIAL HYPERTENSION: Primary | ICD-10-CM

## 2018-09-04 DIAGNOSIS — F41.1 GAD (GENERALIZED ANXIETY DISORDER): ICD-10-CM

## 2018-09-04 DIAGNOSIS — J43.9 PULMONARY EMPHYSEMA, UNSPECIFIED EMPHYSEMA TYPE: ICD-10-CM

## 2018-09-04 PROCEDURE — 96372 THER/PROPH/DIAG INJ SC/IM: CPT | Mod: PBBFAC

## 2018-09-04 PROCEDURE — 99999 PR PBB SHADOW E&M-EST. PATIENT-LVL III: CPT | Mod: PBBFAC,,, | Performed by: FAMILY MEDICINE

## 2018-09-04 PROCEDURE — 99999 PR STA SHADOW: CPT | Mod: PBBFAC,,,

## 2018-09-04 PROCEDURE — 99999 PR STA SHADOW: CPT | Mod: PBBFAC,,, | Performed by: FAMILY MEDICINE

## 2018-09-04 PROCEDURE — 99213 OFFICE O/P EST LOW 20 MIN: CPT | Mod: PBBFAC | Performed by: FAMILY MEDICINE

## 2018-09-04 PROCEDURE — 99213 OFFICE O/P EST LOW 20 MIN: CPT | Mod: S$PBB | Performed by: FAMILY MEDICINE

## 2018-09-04 RX ORDER — PREDNISONE 5 MG/1
TABLET ORAL
Qty: 35 TABLET | Refills: 0 | Status: SHIPPED | OUTPATIENT
Start: 2018-09-04 | End: 2019-04-30

## 2018-09-04 RX ORDER — LINCOMYCIN HYDROCHLORIDE 300 MG/ML
600 INJECTION, SOLUTION INTRAMUSCULAR; INTRAVENOUS; SUBCONJUNCTIVAL
Status: COMPLETED | OUTPATIENT
Start: 2018-09-04 | End: 2018-09-04

## 2018-09-04 RX ORDER — LEVOFLOXACIN 500 MG/1
500 TABLET, FILM COATED ORAL DAILY
Qty: 10 TABLET | Refills: 0 | Status: SHIPPED | OUTPATIENT
Start: 2018-09-04 | End: 2018-09-14

## 2018-09-04 RX ORDER — IPRATROPIUM BROMIDE 0.5 MG/2.5ML
500 SOLUTION RESPIRATORY (INHALATION) 4 TIMES DAILY
Qty: 30 VIAL | Refills: 5 | Status: SHIPPED | OUTPATIENT
Start: 2018-09-04 | End: 2018-09-07 | Stop reason: SDUPTHER

## 2018-09-04 RX ADMIN — LINCOMYCIN HYDROCHLORIDE 600 MG: 300 INJECTION, SOLUTION INTRAMUSCULAR; INTRAVENOUS; SUBCONJUNCTIVAL at 01:09

## 2018-09-04 NOTE — PROGRESS NOTES
Subjective:       Patient ID: Marva Perez is a 77 y.o. female.    Chief Complaint: Cough    Pt is a 77 y.o. female who presents for check up for pneumonitis, weak and poor appetite. Doing well on current meds. Denies any side effects. Prevention is up to date.      Review of Systems   Constitutional: Positive for fatigue. Negative for appetite change, chills and fever.   HENT: Negative for congestion, ear discharge, ear pain, rhinorrhea, sinus pressure, sore throat and trouble swallowing.    Respiratory: Positive for cough and shortness of breath. Negative for choking, chest tightness and wheezing.         Productive cough   Cardiovascular: Negative for chest pain and palpitations.   Gastrointestinal: Negative for abdominal pain, blood in stool, constipation, diarrhea, nausea and vomiting.   Genitourinary: Negative for dysuria, flank pain, hematuria, pelvic pain, urgency, vaginal bleeding, vaginal discharge and vaginal pain.   Musculoskeletal: Negative for back pain, joint swelling, myalgias and neck stiffness.   Skin: Negative for rash and wound.   Neurological: Negative for dizziness, syncope, weakness, light-headedness, numbness and headaches.   Hematological: Does not bruise/bleed easily.   Psychiatric/Behavioral: Negative for agitation. The patient is not nervous/anxious.        Objective:      Physical Exam   Constitutional: She is oriented to person, place, and time. She appears well-developed and well-nourished.   HENT:   Head: Normocephalic and atraumatic.   Left Ear: External ear normal.   Nose: Nose normal.   Mouth/Throat: Oropharynx is clear and moist.   Eyes: Pupils are equal, round, and reactive to light.   Neck: Normal range of motion. Neck supple. No thyromegaly present.   Cardiovascular: Normal rate, regular rhythm and normal heart sounds.   Pulmonary/Chest: Effort normal. No respiratory distress. She has no wheezes. She has rales. She exhibits no tenderness.   Coarse cough   Abdominal: Soft.  Bowel sounds are normal. She exhibits no distension. There is no tenderness. There is no rebound and no guarding.   Musculoskeletal: Normal range of motion. She exhibits no edema or tenderness.   Lymphadenopathy:     She has no cervical adenopathy.   Neurological: She is alert and oriented to person, place, and time. She has normal reflexes. She displays normal reflexes. No cranial nerve deficit. She exhibits normal muscle tone. Coordination normal.   Skin: Skin is warm and dry. No rash noted. No pallor.   Psychiatric: She has a normal mood and affect. Judgment and thought content normal.   Nursing note and vitals reviewed.      Assessment:       1. Essential hypertension    2. KATHIE (generalized anxiety disorder)    3. Pulmonary emphysema, unspecified emphysema type    4. Pneumonitis        Plan:   Marva was seen today for cough.    Diagnoses and all orders for this visit:    Essential hypertension    KATHIE (generalized anxiety disorder)    Pulmonary emphysema, unspecified emphysema type    Pneumonitis  -     levoFLOXacin (LEVAQUIN) 500 MG tablet; Take 1 tablet (500 mg total) by mouth once daily. for 10 days  -     ipratropium (ATROVENT) 0.02 % nebulizer solution; Take 2.5 mLs (500 mcg total) by nebulization 4 (four) times daily.

## 2018-09-04 NOTE — TELEPHONE ENCOUNTER
----- Message from Lennox Son sent at 2018  8:50 AM CDT -----  Contact: Patient  Marva Perez  MRN: [id number]  : 1940  PCP: Kevin Clements  Home Phone      [phone]  Work Phone      Not on file.  Mobile          [phone]      MESSAGE: Via Voicemail:   Has COPD -- Received shot @ visit last week & Rx for Doxycycline -- she is unable to take medication -- would like to speak with nurse Re: Mucinex & a prescription for something else    Call [phone]    PCP: Tyree

## 2018-09-07 ENCOUNTER — OFFICE VISIT (OUTPATIENT)
Dept: FAMILY MEDICINE | Facility: CLINIC | Age: 78
End: 2018-09-07
Payer: MEDICARE

## 2018-09-07 VITALS
SYSTOLIC BLOOD PRESSURE: 108 MMHG | BODY MASS INDEX: 20.43 KG/M2 | DIASTOLIC BLOOD PRESSURE: 72 MMHG | WEIGHT: 111 LBS | HEIGHT: 62 IN | HEART RATE: 80 BPM

## 2018-09-07 DIAGNOSIS — J98.4 PNEUMONITIS: ICD-10-CM

## 2018-09-07 PROCEDURE — 99999 PR STA SHADOW: CPT | Mod: PBBFAC,,, | Performed by: FAMILY MEDICINE

## 2018-09-07 PROCEDURE — 99213 OFFICE O/P EST LOW 20 MIN: CPT | Mod: S$PBB | Performed by: FAMILY MEDICINE

## 2018-09-07 PROCEDURE — 99212 OFFICE O/P EST SF 10 MIN: CPT | Mod: PBBFAC | Performed by: FAMILY MEDICINE

## 2018-09-07 PROCEDURE — 99999 PR PBB SHADOW E&M-EST. PATIENT-LVL II: CPT | Mod: PBBFAC,,, | Performed by: FAMILY MEDICINE

## 2018-09-07 RX ORDER — IPRATROPIUM BROMIDE 0.5 MG/2.5ML
500 SOLUTION RESPIRATORY (INHALATION) 2 TIMES DAILY PRN
Qty: 60 VIAL | Refills: 5 | Status: SHIPPED | OUTPATIENT
Start: 2018-09-07 | End: 2018-12-13 | Stop reason: SDUPTHER

## 2018-09-07 NOTE — PROGRESS NOTES
Subjective:       Patient ID: Marva Perez is a 77 y.o. female.    Chief Complaint: Follow-up (Cough)    Pt is a 77 y.o. female who presents for check up for Pneumonitis. Doing well on current meds. Denies any side effects. Prevention is up to date.      Review of Systems   Constitutional: Negative for appetite change, chills and fever.   HENT: Negative for rhinorrhea, sinus pressure, sore throat and trouble swallowing.    Respiratory: Positive for cough. Negative for chest tightness, shortness of breath and wheezing.         Thick mucous plug   Cardiovascular: Negative for chest pain and palpitations.   Gastrointestinal: Negative for abdominal pain, blood in stool, diarrhea, nausea and vomiting.   Genitourinary: Negative for dysuria, flank pain, hematuria, pelvic pain, urgency, vaginal bleeding, vaginal discharge and vaginal pain.   Musculoskeletal: Negative for back pain, joint swelling and neck stiffness.   Skin: Negative for rash.   Neurological: Negative for dizziness, weakness, light-headedness, numbness and headaches.   Hematological: Does not bruise/bleed easily.   Psychiatric/Behavioral: Negative for agitation. The patient is not nervous/anxious.        Objective:      Physical Exam   Constitutional: She is oriented to person, place, and time. She appears well-developed and well-nourished.   HENT:   Head: Normocephalic.   Eyes: Pupils are equal, round, and reactive to light.   Neck: Normal range of motion. Neck supple. No thyromegaly present.   Cardiovascular: Normal rate and regular rhythm.   Pulmonary/Chest: She is in respiratory distress. She has no wheezes. She has no rales. She exhibits no tenderness.   Doing better   Abdominal: She exhibits no distension. There is no tenderness. There is no rebound and no guarding.   Musculoskeletal: Normal range of motion. She exhibits no edema or tenderness.   Lymphadenopathy:     She has no cervical adenopathy.   Neurological: She is alert and oriented to  person, place, and time. She has normal reflexes. She displays normal reflexes. No cranial nerve deficit. She exhibits normal muscle tone. Coordination normal.   Skin: Skin is warm and dry. No rash noted. No pallor.   Psychiatric: She has a normal mood and affect. Judgment and thought content normal.       Assessment:       1. Pneumonitis        Plan:   Marva was seen today for follow-up.    Diagnoses and all orders for this visit:    Pneumonitis  -     ipratropium (ATROVENT) 0.02 % nebulizer solution; Take 2.5 mLs (500 mcg total) by nebulization 2 (two) times daily as needed for Wheezing.

## 2018-09-20 RX ORDER — TIOTROPIUM BROMIDE 18 UG/1
CAPSULE ORAL; RESPIRATORY (INHALATION)
Qty: 3 BOX | Refills: 3 | Status: SHIPPED | OUTPATIENT
Start: 2018-09-20 | End: 2019-11-29

## 2018-11-05 DIAGNOSIS — I10 ESSENTIAL HYPERTENSION: ICD-10-CM

## 2018-11-06 RX ORDER — AMLODIPINE BESYLATE 2.5 MG/1
TABLET ORAL
Qty: 90 TABLET | Refills: 0 | Status: SHIPPED | OUTPATIENT
Start: 2018-11-06 | End: 2019-02-04 | Stop reason: SDUPTHER

## 2018-11-23 ENCOUNTER — HOSPITAL ENCOUNTER (EMERGENCY)
Facility: HOSPITAL | Age: 78
Discharge: HOME | End: 2018-11-23
Attending: SURGERY
Payer: MEDICARE

## 2018-11-23 VITALS
SYSTOLIC BLOOD PRESSURE: 132 MMHG | DIASTOLIC BLOOD PRESSURE: 73 MMHG | OXYGEN SATURATION: 98 % | HEART RATE: 74 BPM | TEMPERATURE: 97 F | RESPIRATION RATE: 16 BRPM

## 2018-11-23 DIAGNOSIS — F41.9 ANXIETY: Primary | ICD-10-CM

## 2018-11-23 DIAGNOSIS — R11.10 EMESIS: ICD-10-CM

## 2018-11-23 DIAGNOSIS — K52.9 GASTROENTERITIS: ICD-10-CM

## 2018-11-23 LAB
ALBUMIN SERPL BCP-MCNC: 4.3 G/DL
ALP SERPL-CCNC: 116 U/L
ALT SERPL W/O P-5'-P-CCNC: 15 U/L
ANION GAP SERPL CALC-SCNC: 13 MMOL/L
AST SERPL-CCNC: 19 U/L
BASOPHILS # BLD AUTO: 0.02 K/UL
BASOPHILS NFR BLD: 0.2 %
BILIRUB SERPL-MCNC: 1.4 MG/DL
BUN SERPL-MCNC: 19 MG/DL
CALCIUM SERPL-MCNC: 9.4 MG/DL
CHLORIDE SERPL-SCNC: 109 MMOL/L
CK MB SERPL-MCNC: 2 NG/ML
CK MB SERPL-RTO: 2.6 %
CK SERPL-CCNC: 77 U/L
CK SERPL-CCNC: 77 U/L
CO2 SERPL-SCNC: 22 MMOL/L
CREAT SERPL-MCNC: 1 MG/DL
DIFFERENTIAL METHOD: ABNORMAL
EOSINOPHIL # BLD AUTO: 0.1 K/UL
EOSINOPHIL NFR BLD: 1 %
ERYTHROCYTE [DISTWIDTH] IN BLOOD BY AUTOMATED COUNT: 13.5 %
EST. GFR  (AFRICAN AMERICAN): >60 ML/MIN/1.73 M^2
EST. GFR  (NON AFRICAN AMERICAN): 54 ML/MIN/1.73 M^2
GLUCOSE SERPL-MCNC: 98 MG/DL
HCT VFR BLD AUTO: 45.6 %
HGB BLD-MCNC: 15 G/DL
LIPASE SERPL-CCNC: 16 U/L
LYMPHOCYTES # BLD AUTO: 2 K/UL
LYMPHOCYTES NFR BLD: 15.9 %
MCH RBC QN AUTO: 29.2 PG
MCHC RBC AUTO-ENTMCNC: 32.9 G/DL
MCV RBC AUTO: 89 FL
MONOCYTES # BLD AUTO: 0.8 K/UL
MONOCYTES NFR BLD: 6.3 %
NEUTROPHILS # BLD AUTO: 9.7 K/UL
NEUTROPHILS NFR BLD: 76.6 %
PLATELET # BLD AUTO: 226 K/UL
PMV BLD AUTO: 10.3 FL
POTASSIUM SERPL-SCNC: 3.9 MMOL/L
PROT SERPL-MCNC: 7.4 G/DL
RBC # BLD AUTO: 5.14 M/UL
SODIUM SERPL-SCNC: 144 MMOL/L
TROPONIN I SERPL DL<=0.01 NG/ML-MCNC: <0.006 NG/ML
WBC # BLD AUTO: 12.64 K/UL

## 2018-11-23 PROCEDURE — 96375 TX/PRO/DX INJ NEW DRUG ADDON: CPT | Mod: 59

## 2018-11-23 PROCEDURE — 63600175 PHARM REV CODE 636 W HCPCS: Performed by: SURGERY

## 2018-11-23 PROCEDURE — 96365 THER/PROPH/DIAG IV INF INIT: CPT

## 2018-11-23 PROCEDURE — 84484 ASSAY OF TROPONIN QUANT: CPT

## 2018-11-23 PROCEDURE — 85025 COMPLETE CBC W/AUTO DIFF WBC: CPT

## 2018-11-23 PROCEDURE — 80053 COMPREHEN METABOLIC PANEL: CPT

## 2018-11-23 PROCEDURE — 99285 EMERGENCY DEPT VISIT HI MDM: CPT | Mod: 25

## 2018-11-23 PROCEDURE — 93005 ELECTROCARDIOGRAM TRACING: CPT

## 2018-11-23 PROCEDURE — 82553 CREATINE MB FRACTION: CPT

## 2018-11-23 PROCEDURE — 83690 ASSAY OF LIPASE: CPT

## 2018-11-23 PROCEDURE — 36415 COLL VENOUS BLD VENIPUNCTURE: CPT

## 2018-11-23 PROCEDURE — 93010 ELECTROCARDIOGRAM REPORT: CPT | Mod: ,,, | Performed by: INTERNAL MEDICINE

## 2018-11-23 PROCEDURE — 25000003 PHARM REV CODE 250: Performed by: SURGERY

## 2018-11-23 PROCEDURE — 82550 ASSAY OF CK (CPK): CPT

## 2018-11-23 PROCEDURE — 96361 HYDRATE IV INFUSION ADD-ON: CPT

## 2018-11-23 RX ORDER — LORAZEPAM 2 MG/ML
0.5 INJECTION INTRAMUSCULAR
Status: COMPLETED | OUTPATIENT
Start: 2018-11-23 | End: 2018-11-23

## 2018-11-23 RX ORDER — SODIUM CHLORIDE 9 MG/ML
1000 INJECTION, SOLUTION INTRAVENOUS
Status: COMPLETED | OUTPATIENT
Start: 2018-11-23 | End: 2018-11-23

## 2018-11-23 RX ORDER — ONDANSETRON 2 MG/ML
4 INJECTION INTRAMUSCULAR; INTRAVENOUS
Status: COMPLETED | OUTPATIENT
Start: 2018-11-23 | End: 2018-11-23

## 2018-11-23 RX ADMIN — SODIUM CHLORIDE 1000 ML: 0.9 INJECTION, SOLUTION INTRAVENOUS at 07:11

## 2018-11-23 RX ADMIN — SODIUM CHLORIDE 1000 ML: 0.9 INJECTION, SOLUTION INTRAVENOUS at 05:11

## 2018-11-23 RX ADMIN — ONDANSETRON 4 MG: 2 INJECTION INTRAMUSCULAR; INTRAVENOUS at 06:11

## 2018-11-23 RX ADMIN — LORAZEPAM 0.5 MG: 2 INJECTION INTRAMUSCULAR; INTRAVENOUS at 06:11

## 2018-11-23 RX ADMIN — PROMETHAZINE HYDROCHLORIDE 25 MG: 25 INJECTION INTRAMUSCULAR; INTRAVENOUS at 07:11

## 2018-11-24 NOTE — ED PROVIDER NOTES
[facility] Emergency Room                                                 Chief Complaint  78 y.o. female with Diarrhea and Nausea    History of Present Illness  Marva Perez presents to the emergency room with nausea vomiting diarrhea  Patient was having anxiety issues today, panic attack prior to arrival per her son  Patient states that her son told her to take his Xanax, severe anxiety at home PTA  Patient has chronic diarrhea only made worse with her anxiety issues almost daily  Patient has a soft abdominal exam with no signs of peritonitis or rebound noted  Patient has no fever or weight loss, no blood in diarrhea, no Clostridium risk noted  Patient feels much better after IV Ativan, afebrile VSS on ER triage this afternoon    The history is provided by the patient   device was not used during this ER visit    Past Medical History   -- Abnormal Pap smear    -- COPD (chronic obstructive pulmonary disease)    -- Depression    -- GERD (gastroesophageal reflux disease)    -- Hyperlipidemia    -- Hypertension      Past Surgical History   -- APPENDECTOMY     -- CERVICAL BIOPSY  W/ LOOP ELECTRODE EXCISION     -- CHOLECYSTECTOMY     -- COLLATERAL LIGAMENT REPAIR, KNEE     -- COLONOSCOPY     -- DILATION AND CURETTAGE OF UTERUS     -- SINUS SURGERY        Review of patient's allergies   -- Pcn [penicillins]    -- Tetracyclines    -- Bactrim [sulfamethoxazole-trimethoprim]    -- Ilosone    -- Iodine and iodide containing products    -- Sulfa (sulfonamide antibiotics)       Review of Systems and Physical Exam      Review of Systems  -- Constitution - no fever, denies fatigue, no weakness, no chills  -- Eyes - no tearing or redness, no visual disturbance  -- Ear, Nose - no tinnitus or earache, no nasal congestion or discharge  -- Mouth,Throat - no sore throat, no toothache, normal voice, normal swallowing  -- Respiratory - denies cough and congestion, no shortness of breath, no TAVARES  --  Cardiovascular - denies chest pain, no palpitations, denies claudication  -- Gastrointestinal - abdominal cramps with nausea, vomiting, & diarrhea   -- Genitourinary - no dysuria, denies flank pain, no hematuria, no STD risk  -- Musculoskeletal - denies back pain, negative for myalgias and arthralgias   -- Neurological - no headache, denies weakness or seizure; no LOC  -- Skin - denies pallor, rash, or changes in skin. no hives or welts noted  -- Psychiatric - Denies SI or HI, no psychosis or fractured thought noted     Vital Signs  Temperature is 97 °F (36.1 °C).   Her blood pressure is 138/77 and her pulse is 88.   Her respiration is 18.     Physical Exam  -- Nursing note and vitals reviewed  -- Constitutional: Appears well-developed and well-nourished  -- Head: Atraumatic. Normocephalic. No obvious abnormality  -- Eyes: Pupils are equal and reactive to light. Normal conjunctiva and lids  -- Nose: Nose normal in appearance, nares grossly normal. No discharge  -- Throat: Mucous membranes moist, pharynx normal, normal tonsils. No lesions   -- Ears: External ears and TM normal bilaterally. Normal hearing and no drainage  -- Neck: Normal range of motion. Neck supple. No masses, trachea midline  -- Cardiac: Normal rate, regular rhythm and normal heart sounds  -- Pulmonary: Normal respiratory effort, breath sounds clear to auscultation  -- Abdominal: Soft, no tenderness. Normal bowel sounds. Normal liver edge  -- Musculoskeletal: Normal range of motion, no effusions. Joints stable   -- Neurological: No focal deficits. Showed good interaction with staff  -- Vascular: Posterior tibial, dorsalis pedis and radial pulses 2+ bilaterally      Emergency Room Course      Lab Results     K 3.9      CO2 22 (L)   BUN 19   CREATININE 1.0   GLU 98   ALKPHOS 116   AST 19   ALT 15   BILITOT 1.4 (H)   ALBUMIN 4.3   PROT 7.4   WBC 12.64   HGB 15.0   HCT 45.6      CPK 77   CPK 77   CPKMB 2.0   TROPONINI <0.006      EKG  -- The EKG findings today were without concerning findings from baseline    Radiology  -- Flat and erect abdominal x-ray performed in the ER today was within normal limits    Medications Given  0.9%  NaCl infusion (not administered)   0.9%  NaCl infusion (1,000 mLs Intravenous New Bag 11/23/18 1752)   lorazepam injection 0.5 mg (0.5 mg Intravenous Given 11/23/18 1829)   ondansetron injection 4 mg (4 mg Intravenous Given 11/23/18 1829)     Diagnosis  -- The primary encounter diagnosis was Anxiety.   -- Diagnoses of Emesis and Gastroenteritis were also pertinent to this visit.    Disposition and Plan  -- Disposition: home  -- Condition: stable  -- Follow-up: Patient to follow up with Kevin Clements MD in 1-2 days.  -- I advised the patient that we have found no life threatening condition today  -- At this time, I believe the patient is clinically stable for discharge.   -- The patient acknowledges that close follow up with a MD is required   -- Patient agrees to comply with all instruction and direction given in the ER    This note is dictated on M*Modal word recognition program.  There are word recognition mistakes that are occasionally missed on review.         Jerson Padgett MD  11/23/18 7221

## 2018-11-25 RX ORDER — ERGOCALCIFEROL 1.25 MG/1
CAPSULE ORAL
Qty: 24 CAPSULE | Refills: 0 | Status: SHIPPED | OUTPATIENT
Start: 2018-11-25 | End: 2019-02-22 | Stop reason: SDUPTHER

## 2018-12-06 RX ORDER — PANTOPRAZOLE SODIUM 40 MG/1
40 TABLET, DELAYED RELEASE ORAL DAILY
Qty: 90 TABLET | Refills: 3 | Status: SHIPPED | OUTPATIENT
Start: 2018-12-06 | End: 2019-02-26 | Stop reason: SDUPTHER

## 2018-12-06 NOTE — TELEPHONE ENCOUNTER
----- Message from Ayleen Horn sent at 2018  2:29 PM CST -----  Contact: Self  Marva Perez  MRN: [id number]  : 1940  PCP: Kevin Clements  Home Phone      [phone]  Work Phone      Not on file.  Mobile          [phone]      MESSAGE:   Pt requesting refill or new Rx.   Is this a refill or new RX:  Refil  RX name and strength: pantoprazole (PROTONIX) 40 MG tablet  Last office visit: 2018  Is this a 30-day or 90-day RX:  90-Day  Pharmacy name and location: [facility] - [address]  Comments:      Phone:  [phone]

## 2018-12-13 ENCOUNTER — TELEPHONE (OUTPATIENT)
Dept: FAMILY MEDICINE | Facility: CLINIC | Age: 78
End: 2018-12-13

## 2018-12-13 DIAGNOSIS — J44.9 CHRONIC OBSTRUCTIVE PULMONARY DISEASE, UNSPECIFIED COPD TYPE: ICD-10-CM

## 2018-12-13 DIAGNOSIS — J98.4 PNEUMONITIS: ICD-10-CM

## 2018-12-13 RX ORDER — IPRATROPIUM BROMIDE 0.5 MG/2.5ML
500 SOLUTION RESPIRATORY (INHALATION) 2 TIMES DAILY PRN
Qty: 60 VIAL | Refills: 5 | Status: SHIPPED | OUTPATIENT
Start: 2018-12-13 | End: 2020-03-04

## 2018-12-13 NOTE — TELEPHONE ENCOUNTER
----- Message from Pema Mac MA sent at 2018  4:10 PM CST -----  Contact: Brock Perez  MRN: [id number]  : 1940  PCP: Kevin Clements  Home Phone      [phone]  Work Phone      Not on file.  Mobile          [phone]      MESSAGE:   Pt requesting refill or new Rx.   Is this a refill or new RX:  refill  RX name and strength: Albuterol inhaler  Last office visit: 18  Is this a 30-day or 90-day RX:  90  Pharmacy name and location: [facility] - [place], [place] - [address] [place] [address]  Comments:      Phone:  [phone]

## 2019-01-07 DIAGNOSIS — F41.1 GAD (GENERALIZED ANXIETY DISORDER): ICD-10-CM

## 2019-01-07 DIAGNOSIS — G47.00 INSOMNIA, UNSPECIFIED TYPE: ICD-10-CM

## 2019-01-07 RX ORDER — ALPRAZOLAM 0.25 MG/1
TABLET ORAL
Qty: 180 TABLET | Refills: 1 | Status: SHIPPED | OUTPATIENT
Start: 2019-01-07 | End: 2019-01-23 | Stop reason: SDUPTHER

## 2019-01-07 NOTE — TELEPHONE ENCOUNTER
----- Message from Arianne Malone sent at 2019  4:03 PM CST -----  Contact: Self  Marva Perez  MRN: [id number]  : 1940  PCP: Kevin Clements  Home Phone      [phone]  Work Phone      Not on file.  Mobile          [phone]      MESSAGE:   Pt requesting refill or new Rx.   Is this a refill or new RX:  refill  RX name and strength: xanax  Last office visit: est  Is this a 30-day or 90-day RX:    Pharmacy name and location:  [facility]  Comments:      Phone:  [phone]

## 2019-01-23 DIAGNOSIS — F41.1 GAD (GENERALIZED ANXIETY DISORDER): ICD-10-CM

## 2019-01-23 DIAGNOSIS — R19.7 DIARRHEA, UNSPECIFIED TYPE: ICD-10-CM

## 2019-01-23 DIAGNOSIS — G47.00 INSOMNIA, UNSPECIFIED TYPE: ICD-10-CM

## 2019-01-23 NOTE — TELEPHONE ENCOUNTER
----- Message from Arianne Malone sent at 2019  3:24 PM CST -----  Contact: Self  Marva Perez  MRN: [id number]  : 1940  PCP: Kevin Clements  Home Phone      [phone]  Work Phone      Not on file.  Mobile          [phone]      MESSAGE:   Pt requesting refill or new Rx.   Is this a refill or new RX:  refill  RX name and strength: Xanax  Last office visit:   Is this a 30-day or 90-day RX:  90  Pharmacy name and location:  [facility] in [place]  Comments:  Patient requested 3 month supply from [facility] on . She hasn't received. Patient only has a few pills left. She is wondering if a 1 month supply [facility] into [facility] and then her regular 3 month supply be sent to [facility].    Phone:  [phone]

## 2019-01-23 NOTE — TELEPHONE ENCOUNTER
Patient requested 3 month supply of ALPRAZolam (XANAX) 0.25 MG tablet from [facility] on 1/7. She still hasn't received rx. Patient only has a few pills left. She is requesting a 1 month supply [facility] [place] in the meantime. Please advise.

## 2019-01-23 NOTE — TELEPHONE ENCOUNTER
Rx was sent to [facility] on 01/07/19, but cancelled due to pts insurance no longer using them as a pharmacy please send to [facility].

## 2019-01-24 RX ORDER — METHSCOPOLAMINE BROMIDE 2.5 MG/1
2.5 TABLET ORAL 2 TIMES DAILY
Qty: 180 TABLET | Refills: 3 | Status: SHIPPED | OUTPATIENT
Start: 2019-01-24 | End: 2019-04-30

## 2019-01-24 RX ORDER — ALPRAZOLAM 0.25 MG/1
TABLET ORAL
Qty: 180 TABLET | Refills: 1 | Status: SHIPPED | OUTPATIENT
Start: 2019-01-24 | End: 2019-04-30 | Stop reason: SDUPTHER

## 2019-02-04 DIAGNOSIS — I10 ESSENTIAL HYPERTENSION: ICD-10-CM

## 2019-02-04 RX ORDER — AMLODIPINE BESYLATE 2.5 MG/1
TABLET ORAL
Qty: 90 TABLET | Refills: 0 | Status: SHIPPED | OUTPATIENT
Start: 2019-02-04 | End: 2019-02-26 | Stop reason: SDUPTHER

## 2019-02-23 RX ORDER — ERGOCALCIFEROL 1.25 MG/1
CAPSULE ORAL
Qty: 24 CAPSULE | Refills: 0 | Status: SHIPPED | OUTPATIENT
Start: 2019-02-23 | End: 2019-03-12 | Stop reason: SDUPTHER

## 2019-02-26 ENCOUNTER — TELEPHONE (OUTPATIENT)
Dept: INTERNAL MEDICINE | Facility: CLINIC | Age: 79
End: 2019-02-26

## 2019-02-26 DIAGNOSIS — I10 ESSENTIAL HYPERTENSION: ICD-10-CM

## 2019-02-26 RX ORDER — AMLODIPINE BESYLATE 2.5 MG/1
2.5 TABLET ORAL DAILY
Qty: 90 TABLET | Refills: 0 | Status: SHIPPED | OUTPATIENT
Start: 2019-02-26 | End: 2019-03-04 | Stop reason: SDUPTHER

## 2019-02-26 RX ORDER — PANTOPRAZOLE SODIUM 40 MG/1
40 TABLET, DELAYED RELEASE ORAL DAILY
Qty: 90 TABLET | Refills: 3 | Status: SHIPPED | OUTPATIENT
Start: 2019-02-26 | End: 2020-03-17

## 2019-02-26 NOTE — TELEPHONE ENCOUNTER
----- Message from Ayleen Horn sent at 2019  2:27 PM CST -----  Contact: Self  Marva Perez  MRN: [id number]  : 1940  PCP: Kevin Clements  Home Phone      [phone]  Work Phone      Not on file.  Mobile          [phone]      MESSAGE:   Pt requesting refill or new Rx.   Is this a refill or new RX:  refill  RX name and strength:   amLODIPine (NORVASC) 2.5 MG tablet  pantoprazole (PROTONIX) 40 MG tablet  BYSTOLIC 10 mg Tab  Last office visit: 2019  Is this a 30-day or 90-day RX:  90-Day  [facility] [place]   Comments:  Patient would like to have 3 month supply of each    Phone:  [phone]

## 2019-03-04 DIAGNOSIS — I10 ESSENTIAL HYPERTENSION: ICD-10-CM

## 2019-03-08 DIAGNOSIS — I10 ESSENTIAL HYPERTENSION: ICD-10-CM

## 2019-03-08 RX ORDER — AMLODIPINE BESYLATE 2.5 MG/1
TABLET ORAL
Qty: 90 TABLET | Refills: 0 | Status: SHIPPED | OUTPATIENT
Start: 2019-03-08 | End: 2019-04-30

## 2019-03-08 NOTE — TELEPHONE ENCOUNTER
----- Message from Lennox Son sent at 3/8/2019  1:04 PM CST -----  Contact: Patient  Marva Perez  MRN: [id number]  : 1940  PCP: Kevin Clements  Home Phone      [phone]  Work Phone      Not on file.  [employer]          [phone]      MESSAGE: requesting refill on Bystolic 10 mg -- [facility] [place]    Call [phone]    PCP: Tyree

## 2019-03-11 RX ORDER — NEBIVOLOL 10 MG/1
10 TABLET ORAL DAILY
Qty: 90 TABLET | Refills: 0 | Status: SHIPPED | OUTPATIENT
Start: 2019-03-11 | End: 2019-05-30 | Stop reason: SDUPTHER

## 2019-03-12 RX ORDER — ERGOCALCIFEROL 1.25 MG/1
50000 CAPSULE ORAL
Qty: 12 CAPSULE | Refills: 1 | Status: SHIPPED | OUTPATIENT
Start: 2019-03-12 | End: 2019-06-12 | Stop reason: SDUPTHER

## 2019-03-12 NOTE — TELEPHONE ENCOUNTER
----- Message from Arianne Malone sent at 3/12/2019 10:30 AM CDT -----  Contact: self  Marva Perez  MRN: [id number]  : 1940  PCP: Kevin Clements  Home Phone      [phone]  Work Phone      Not on file.  Mobile          [phone]      MESSAGE:   Pt requesting refill or new Rx.   Is this a refill or new RX:  refill  RX name and strength: VITAMIN D2 50,000 unit capsule  Last office visit:   Is this a 30-day or 90-day RX:  90  [facility] [place]  Comments:      Phone:  [phone]

## 2019-03-20 ENCOUNTER — TELEPHONE (OUTPATIENT)
Dept: FAMILY MEDICINE | Facility: CLINIC | Age: 79
End: 2019-03-20

## 2019-03-20 NOTE — TELEPHONE ENCOUNTER
----- Message from Ayleen Horn sent at 3/20/2019  3:35 PM CDT -----  Contact: Self  Marva Perez  MRN: [id number]  : 1940  PCP: Kevin Clements  Home Phone      [phone]  Work Phone      Not on file.  Mobile          [phone]      MESSAGE:   Patient requesting orders  Name of test (labs, mammo, x-ray, etc.):  Labs  Where is test to be performed: [facility]  Date of test (if scheduled): No  Reason (be specific):  Cold hands and feet all of the time, patient states something isnt right  Current Insurance: medicare and aetne  Comments:      Phone: [phone]

## 2019-03-20 NOTE — TELEPHONE ENCOUNTER
----- Message from Arianne Malone sent at 3/20/2019  4:53 PM CDT -----  Contact: Self  Marva Perez  MRN: [id number]  : 1940  PCP: Kevin Clements  Home Phone      [phone]  Work Phone      Not on file.  Mobile          [phone]      MESSAGE:   Patient is returning a phone call.  Who left a message for the patient:  christine  Does patient know what this is regarding:  Returning a call  Comments:      Phone:  [phone]

## 2019-03-21 ENCOUNTER — OFFICE VISIT (OUTPATIENT)
Dept: FAMILY MEDICINE | Facility: CLINIC | Age: 79
End: 2019-03-21
Payer: MEDICARE

## 2019-03-21 VITALS
OXYGEN SATURATION: 94 % | BODY MASS INDEX: 20.06 KG/M2 | HEART RATE: 79 BPM | SYSTOLIC BLOOD PRESSURE: 140 MMHG | WEIGHT: 109 LBS | HEIGHT: 62 IN | DIASTOLIC BLOOD PRESSURE: 70 MMHG

## 2019-03-21 DIAGNOSIS — Z00.00 WELLNESS EXAMINATION: ICD-10-CM

## 2019-03-21 DIAGNOSIS — R53.83 FATIGUE, UNSPECIFIED TYPE: ICD-10-CM

## 2019-03-21 DIAGNOSIS — E55.9 VITAMIN D INSUFFICIENCY: ICD-10-CM

## 2019-03-21 DIAGNOSIS — E78.5 HYPERLIPIDEMIA, UNSPECIFIED HYPERLIPIDEMIA TYPE: Primary | ICD-10-CM

## 2019-03-21 DIAGNOSIS — F50.01 ANOREXIA NERVOSA, RESTRICTING TYPE: ICD-10-CM

## 2019-03-21 LAB
25(OH)D3+25(OH)D2 SERPL-MCNC: 63 NG/ML
ALBUMIN SERPL BCP-MCNC: 4 G/DL
ALP SERPL-CCNC: 115 U/L
ALT SERPL W/O P-5'-P-CCNC: 15 U/L
ANION GAP SERPL CALC-SCNC: 6 MMOL/L
AST SERPL-CCNC: 17 U/L
BASOPHILS # BLD AUTO: 0.03 K/UL
BASOPHILS NFR BLD: 0.4 %
BILIRUB SERPL-MCNC: 1.4 MG/DL
BUN SERPL-MCNC: 14 MG/DL
CALCIUM SERPL-MCNC: 9.8 MG/DL
CHLORIDE SERPL-SCNC: 106 MMOL/L
CO2 SERPL-SCNC: 29 MMOL/L
CREAT SERPL-MCNC: 0.8 MG/DL
DIFFERENTIAL METHOD: ABNORMAL
EOSINOPHIL # BLD AUTO: 0.3 K/UL
EOSINOPHIL NFR BLD: 3.2 %
ERYTHROCYTE [DISTWIDTH] IN BLOOD BY AUTOMATED COUNT: 13.4 %
EST. GFR  (AFRICAN AMERICAN): >60 ML/MIN/1.73 M^2
EST. GFR  (NON AFRICAN AMERICAN): >60 ML/MIN/1.73 M^2
GLUCOSE SERPL-MCNC: 99 MG/DL
HCT VFR BLD AUTO: 44.6 %
HGB BLD-MCNC: 14.1 G/DL
LYMPHOCYTES # BLD AUTO: 1.7 K/UL
LYMPHOCYTES NFR BLD: 21.6 %
MCH RBC QN AUTO: 28.2 PG
MCHC RBC AUTO-ENTMCNC: 31.6 G/DL
MCV RBC AUTO: 89 FL
MONOCYTES # BLD AUTO: 0.7 K/UL
MONOCYTES NFR BLD: 9.5 %
NEUTROPHILS # BLD AUTO: 5 K/UL
NEUTROPHILS NFR BLD: 65.3 %
PLATELET # BLD AUTO: 184 K/UL
PMV BLD AUTO: 10.8 FL
POTASSIUM SERPL-SCNC: 4.4 MMOL/L
PROT SERPL-MCNC: 7 G/DL
RBC # BLD AUTO: 5 M/UL
SODIUM SERPL-SCNC: 141 MMOL/L
T3FREE SERPL-MCNC: 2.9 PG/ML
T4 FREE SERPL-MCNC: 1.1 NG/DL
TSH SERPL DL<=0.005 MIU/L-ACNC: 2.77 UIU/ML
WBC # BLD AUTO: 7.72 K/UL

## 2019-03-21 PROCEDURE — 99214 OFFICE O/P EST MOD 30 MIN: CPT | Mod: S$PBB | Performed by: FAMILY MEDICINE

## 2019-03-21 PROCEDURE — 99999 PR PBB SHADOW E&M-EST. PATIENT-LVL III: ICD-10-PCS | Mod: PBBFAC,,, | Performed by: FAMILY MEDICINE

## 2019-03-21 PROCEDURE — 80061 LIPID PANEL: CPT

## 2019-03-21 PROCEDURE — 99999 PR STA SHADOW: CPT | Mod: PBBFAC,,, | Performed by: FAMILY MEDICINE

## 2019-03-21 PROCEDURE — 84443 ASSAY THYROID STIM HORMONE: CPT

## 2019-03-21 PROCEDURE — 80053 COMPREHEN METABOLIC PANEL: CPT

## 2019-03-21 PROCEDURE — 99999 PR PBB SHADOW E&M-EST. PATIENT-LVL III: CPT | Mod: PBBFAC,,, | Performed by: FAMILY MEDICINE

## 2019-03-21 PROCEDURE — 99213 OFFICE O/P EST LOW 20 MIN: CPT | Mod: PBBFAC | Performed by: FAMILY MEDICINE

## 2019-03-21 PROCEDURE — 85025 COMPLETE CBC W/AUTO DIFF WBC: CPT

## 2019-03-21 PROCEDURE — 36415 COLL VENOUS BLD VENIPUNCTURE: CPT | Mod: PBBFAC

## 2019-03-21 PROCEDURE — 82306 VITAMIN D 25 HYDROXY: CPT

## 2019-03-21 PROCEDURE — 84439 ASSAY OF FREE THYROXINE: CPT

## 2019-03-21 PROCEDURE — 84481 FREE ASSAY (FT-3): CPT

## 2019-03-21 RX ORDER — DRONABINOL 5 MG/1
5 CAPSULE ORAL
Qty: 60 CAPSULE | Refills: 5 | Status: SHIPPED | OUTPATIENT
Start: 2019-03-21 | End: 2019-04-30

## 2019-03-21 RX ORDER — ALBUTEROL SULFATE 90 UG/1
2 AEROSOL, METERED RESPIRATORY (INHALATION) EVERY 6 HOURS PRN
Qty: 1 INHALER | Refills: 11 | Status: SHIPPED | OUTPATIENT
Start: 2019-03-21

## 2019-03-21 RX ORDER — CYPROHEPTADINE HYDROCHLORIDE 4 MG/1
4 TABLET ORAL 3 TIMES DAILY PRN
Qty: 60 TABLET | Refills: 5 | Status: SHIPPED | OUTPATIENT
Start: 2019-03-21 | End: 2019-04-30

## 2019-03-21 NOTE — PROGRESS NOTES
Subjective:       Patient ID: Marva Perez is a 78 y.o. female.    Chief Complaint: Follow-up (cold feet, always cold)    Pt is a 78 y.o. female who presents for check up for fatigues and feels cold. Doing well on current meds. Denies any side effects. Prevention is up to date.      Review of Systems   Constitutional: Positive for fatigue and unexpected weight change. Negative for appetite change, chills and fever.        Feet are cold   HENT: Negative for rhinorrhea, sinus pressure, sore throat and trouble swallowing.    Respiratory: Negative for cough, chest tightness, shortness of breath and wheezing.    Cardiovascular: Negative for chest pain and palpitations.   Gastrointestinal: Negative for abdominal pain, blood in stool, diarrhea, nausea and vomiting.   Genitourinary: Negative for dysuria, flank pain, hematuria, pelvic pain, urgency, vaginal bleeding, vaginal discharge and vaginal pain.   Musculoskeletal: Negative for back pain, joint swelling and neck stiffness.   Skin: Negative for rash.   Neurological: Negative for dizziness, weakness, light-headedness, numbness and headaches.   Hematological: Does not bruise/bleed easily.   Psychiatric/Behavioral: Negative for agitation. The patient is nervous/anxious.        Objective:      Physical Exam   Constitutional: She is oriented to person, place, and time. She appears well-developed and well-nourished.   Very lean 77 y/o W F   HENT:   Head: Normocephalic.   Eyes: Pupils are equal, round, and reactive to light.   Neck: Normal range of motion. Neck supple. No thyromegaly present.   Cardiovascular: Normal rate and regular rhythm.   Pulmonary/Chest: No respiratory distress. She has no wheezes. She has no rales. She exhibits no tenderness.   Abdominal: She exhibits no distension. There is no tenderness. There is no rebound and no guarding.   Musculoskeletal: Normal range of motion. She exhibits no edema or tenderness.   Lymphadenopathy:     She has no cervical  adenopathy.   Neurological: She is alert and oriented to person, place, and time. She has normal reflexes. She displays normal reflexes. No cranial nerve deficit. She exhibits normal muscle tone. Coordination normal.   Skin: Skin is warm and dry. No rash noted. No pallor.   Psychiatric: She has a normal mood and affect. Judgment and thought content normal.       Assessment:       1. Fatigue, unspecified type    2. Vitamin D insufficiency    3. Anorexia nervosa, restricting type        Plan:   Marva was seen today for follow-up.    Diagnoses and all orders for this visit:    Fatigue, unspecified type  -     TSH; Future  -     T3, free; Future  -     T4, free; Future  -     CBC auto differential; Future  -     Comprehensive metabolic panel; Future    Vitamin D insufficiency  -     Vitamin D; Future    Anorexia nervosa, restricting type    Other orders  -     albuterol (PROVENTIL/VENTOLIN HFA) 90 mcg/actuation inhaler; Inhale 2 puffs into the lungs every 6 (six) hours as needed for Wheezing.

## 2019-03-22 LAB
CHOLEST SERPL-MCNC: 246 MG/DL
CHOLEST/HDLC SERPL: 4.9 {RATIO}
HDLC SERPL-MCNC: 50 MG/DL
HDLC SERPL: 20.3 %
LDLC SERPL CALC-MCNC: 170.2 MG/DL
NONHDLC SERPL-MCNC: 196 MG/DL
TRIGL SERPL-MCNC: 129 MG/DL

## 2019-03-26 ENCOUNTER — TELEPHONE (OUTPATIENT)
Dept: FAMILY MEDICINE | Facility: CLINIC | Age: 79
End: 2019-03-26

## 2019-03-26 NOTE — TELEPHONE ENCOUNTER
----- Message from Pema Carrasco sent at 3/26/2019 10:58 AM CDT -----  Contact: Dakotah nina  Marva Perez  MRN: [id number]  : 1940  PCP: Kevin Clements  Home Phone      [phone]  Work Phone      Not on file.  Mobile          [phone]      MESSAGE:     dronabinol (MARINOL) 5 MG capsule was called in today but the pt's insurance will not cover this medication.       [phone]

## 2019-03-28 RX ORDER — CYPROHEPTADINE HYDROCHLORIDE 4 MG/1
4 TABLET ORAL 3 TIMES DAILY PRN
Qty: 60 TABLET | Refills: 5 | Status: SHIPPED | OUTPATIENT
Start: 2019-03-28 | End: 2019-04-30

## 2019-04-08 ENCOUNTER — TELEPHONE (OUTPATIENT)
Dept: FAMILY MEDICINE | Facility: CLINIC | Age: 79
End: 2019-04-08

## 2019-04-08 NOTE — TELEPHONE ENCOUNTER
Key [id number]    Received mail from pt insurance stating they will no longer cover pts medication. PA done for a tier exception. Pending

## 2019-04-15 ENCOUNTER — TELEPHONE (OUTPATIENT)
Dept: FAMILY MEDICINE | Facility: CLINIC | Age: 79
End: 2019-04-15

## 2019-04-30 ENCOUNTER — OFFICE VISIT (OUTPATIENT)
Dept: FAMILY MEDICINE | Facility: CLINIC | Age: 79
End: 2019-04-30
Payer: MEDICARE

## 2019-04-30 VITALS
WEIGHT: 108.69 LBS | BODY MASS INDEX: 19.88 KG/M2 | DIASTOLIC BLOOD PRESSURE: 70 MMHG | SYSTOLIC BLOOD PRESSURE: 138 MMHG | RESPIRATION RATE: 16 BRPM | HEART RATE: 68 BPM

## 2019-04-30 DIAGNOSIS — F50.00 ANOREXIA NERVOSA: Primary | ICD-10-CM

## 2019-04-30 DIAGNOSIS — G47.00 INSOMNIA, UNSPECIFIED TYPE: ICD-10-CM

## 2019-04-30 DIAGNOSIS — F41.1 GAD (GENERALIZED ANXIETY DISORDER): ICD-10-CM

## 2019-04-30 DIAGNOSIS — I73.9 PVD (PERIPHERAL VASCULAR DISEASE) WITH CLAUDICATION: ICD-10-CM

## 2019-04-30 PROCEDURE — 99999 PR STA SHADOW: CPT | Mod: PBBFAC,,, | Performed by: FAMILY MEDICINE

## 2019-04-30 PROCEDURE — 99999 PR STA SHADOW: ICD-10-PCS | Mod: PBBFAC,,, | Performed by: FAMILY MEDICINE

## 2019-04-30 PROCEDURE — 99999 PR PBB SHADOW E&M-EST. PATIENT-LVL II: CPT | Mod: PBBFAC,,, | Performed by: FAMILY MEDICINE

## 2019-04-30 PROCEDURE — 99212 OFFICE O/P EST SF 10 MIN: CPT | Mod: PBBFAC | Performed by: FAMILY MEDICINE

## 2019-04-30 PROCEDURE — 99214 OFFICE O/P EST MOD 30 MIN: CPT | Mod: S$PBB | Performed by: FAMILY MEDICINE

## 2019-04-30 RX ORDER — DRONABINOL 5 MG/1
5 CAPSULE ORAL
Qty: 60 CAPSULE | Refills: 5 | Status: ON HOLD | OUTPATIENT
Start: 2019-04-30 | End: 2019-08-05 | Stop reason: HOSPADM

## 2019-04-30 RX ORDER — ALPRAZOLAM 0.25 MG/1
TABLET ORAL
Qty: 180 TABLET | Refills: 1 | Status: SHIPPED | OUTPATIENT
Start: 2019-04-30 | End: 2019-10-02 | Stop reason: SDUPTHER

## 2019-04-30 NOTE — PROGRESS NOTES
Subjective:       Patient ID: Marva Perez is a 78 y.o. female.    Chief Complaint: No chief complaint on file.    Pt is a 78 y.o. female who presents for check up for KATHIE. Doing well on current meds. Denies any side effects. Prevention is up to date.    Review of Systems   Constitutional: Negative for appetite change, chills and fever.   HENT: Negative for rhinorrhea, sinus pressure, sore throat and trouble swallowing.    Respiratory: Negative for cough, chest tightness, shortness of breath and wheezing.    Cardiovascular: Negative for chest pain and palpitations.   Gastrointestinal: Negative for abdominal pain, blood in stool, diarrhea, nausea and vomiting.        Poor appetite and can't gain weight   Genitourinary: Negative for dysuria, flank pain, hematuria, pelvic pain, urgency, vaginal bleeding, vaginal discharge and vaginal pain.   Musculoskeletal: Negative for back pain, joint swelling and neck stiffness.   Skin: Negative for rash.   Neurological: Negative for dizziness, weakness, light-headedness, numbness and headaches.   Hematological: Does not bruise/bleed easily.   Psychiatric/Behavioral: Negative for agitation. The patient is nervous/anxious.         Has had 2 close relatives in the last 7 months       Objective:      Physical Exam   Constitutional: She is oriented to person, place, and time. She appears well-developed and well-nourished.   HENT:   Head: Normocephalic.   Eyes: Pupils are equal, round, and reactive to light.   Neck: Normal range of motion. Neck supple. No thyromegaly present.   Cardiovascular: Normal rate and regular rhythm.   Pulmonary/Chest: No respiratory distress. She has no wheezes. She has no rales. She exhibits no tenderness.   Abdominal: She exhibits no distension. There is no tenderness. There is no rebound and no guarding.   Musculoskeletal: Normal range of motion. She exhibits no edema or tenderness.   Lymphadenopathy:     She has no cervical adenopathy.   Neurological:  She is alert and oriented to person, place, and time. She has normal reflexes. She displays normal reflexes. No cranial nerve deficit. She exhibits normal muscle tone. Coordination normal.   Skin: Skin is warm and dry. No rash noted. No pallor.   Cool feet and discolored toes   Psychiatric: She has a normal mood and affect. Judgment and thought content normal.       Assessment:       1. Anorexia nervosa    2. KATHIE (generalized anxiety disorder)    3. Insomnia, unspecified type    4. PVD (peripheral vascular disease) with claudication        Plan:   Diagnoses and all orders for this visit:    Anorexia nervosa  -     dronabinol (MARINOL) 5 MG capsule; Take 1 capsule (5 mg total) by mouth 2 (two) times daily before meals.    KATHIE (generalized anxiety disorder)  -     ALPRAZolam (XANAX) 0.25 MG tablet; Take 1 tablet during the day for anxiety as needed and 2 at night for sleep    Insomnia, unspecified type  -     ALPRAZolam (XANAX) 0.25 MG tablet; Take 1 tablet during the day for anxiety as needed and 2 at night for sleep    PVD (peripheral vascular disease) with claudication  -     VAS US Arterial Legs Bilateral; Future

## 2019-05-02 ENCOUNTER — TELEPHONE (OUTPATIENT)
Dept: FAMILY MEDICINE | Facility: CLINIC | Age: 79
End: 2019-05-02

## 2019-05-02 ENCOUNTER — HOSPITAL ENCOUNTER (OUTPATIENT)
Dept: RADIOLOGY | Facility: HOSPITAL | Age: 79
Discharge: HOME | End: 2019-05-02
Attending: FAMILY MEDICINE
Payer: MEDICARE

## 2019-05-02 DIAGNOSIS — I73.9 PVD (PERIPHERAL VASCULAR DISEASE) WITH CLAUDICATION: ICD-10-CM

## 2019-05-02 PROCEDURE — 93925 LOWER EXTREMITY STUDY: CPT | Mod: 26,,, | Performed by: RADIOLOGY

## 2019-05-02 PROCEDURE — 93925 LOWER EXTREMITY STUDY: CPT | Mod: TC

## 2019-05-02 PROCEDURE — 93925 US LOWER EXTREMITY ARTERIES BILATERAL: ICD-10-PCS | Mod: 26,,, | Performed by: RADIOLOGY

## 2019-05-02 NOTE — TELEPHONE ENCOUNTER
We received a PA request from [facility] in [place] for Ms. Perez's Dronabinol Rx.    KEY: [id number]    This was submitted to Maimai for review. We should receive a determination between 1 and 5 business days.  [facility] P: [phone]

## 2019-05-02 NOTE — TELEPHONE ENCOUNTER
----- Message from Pema Carrasco sent at 2019  1:10 PM CDT -----  Contact: pham Perez  MRN: [id number]  : 1940  PCP: Kevin Clements  Home Phone      [phone]  Work Phone      Not on file.  Mobile          [phone]      MESSAGE:     Asked to speak to a nurse about a PA & diagnosis for dronabinol (MARINOL) 5 MG capsule    [phone]  Ref # [id number]

## 2019-05-03 ENCOUNTER — TELEPHONE (OUTPATIENT)
Dept: FAMILY MEDICINE | Facility: CLINIC | Age: 79
End: 2019-05-03

## 2019-05-03 NOTE — PROGRESS NOTES
The following lab results were abnormal. The following recommendations were made:IO called her and am referring her to [facility] for a 64 slice

## 2019-05-03 NOTE — TELEPHONE ENCOUNTER
----- Message from Lennox Son sent at 5/3/2019 12:24 PM CDT -----  Contact: Patient  Marva Perez  MRN: [id number]  : 1940  PCP: Kevin Clements  Home Phone      [phone]  Work Phone      Not on file.  Mobile          [phone]      MESSAGE: would like results of ultrasound done yesterday    Call [phone]    PCP: Tyree

## 2019-05-06 ENCOUNTER — TELEPHONE (OUTPATIENT)
Dept: FAMILY MEDICINE | Facility: CLINIC | Age: 79
End: 2019-05-06

## 2019-05-06 DIAGNOSIS — I73.9 PVD (PERIPHERAL VASCULAR DISEASE): Primary | ICD-10-CM

## 2019-05-06 NOTE — TELEPHONE ENCOUNTER
Spoke with Sherri at Dr. Dotson office, and she stated they are just waiting on Dr. Wilde to order the CT. Once ordered they will contact pt.    Pt notified

## 2019-05-06 NOTE — TELEPHONE ENCOUNTER
----- Message from Pema Carrasco sent at 2019  2:56 PM CDT -----  Contact: pt  Marva Perez  MRN: [id number]  : 1940  PCP: Kevin Clements  Home Phone      [phone]  Work Phone      Not on file.  Mobile          [phone]      MESSAGE:     Pt stated that  he would like to do another test on her (unsure of what the name of it is). She has some concerns about it, would like to talk to  about this    [phone]

## 2019-05-06 NOTE — TELEPHONE ENCOUNTER
Spoke with Dr. Wilde's office and they stated the pt had an MARY ANN done today. She has an appt scheduled for tomorrow with Dr. Wilde so he can determine the next step pending her results from the test done today. Pt was advised to keep scheduled appt

## 2019-05-06 NOTE — TELEPHONE ENCOUNTER
----- Message from Lennox Son sent at 2019  9:17 AM CDT -----  Contact: Patient  Marva Perez  MRN: [id number]  : 1940  PCP: Kevin Clements  Home Phone      [phone]  Work Phone      Not on file.  Mobile          [phone]      MESSAGE: received call last week from Dr Clements stating she would need CT -- he was to speak with Dr Wilde -- please advise of status -- states Urgent    Call [phone]    PCP: Tyree

## 2019-05-08 NOTE — TELEPHONE ENCOUNTER
PA denied    Insurance will only cover medications if pt has the following conditions:    Aids with anorexia with weight loss  Nausea & Vomiting associated with cancer chemo  Post op nausea and vomiting  Cancer related loss of appetite/cachexia

## 2019-05-10 ENCOUNTER — HOSPITAL ENCOUNTER (OUTPATIENT)
Dept: RADIOLOGY | Facility: HOSPITAL | Age: 79
Discharge: HOME | End: 2019-05-10
Attending: INTERNAL MEDICINE
Payer: MEDICARE

## 2019-05-10 DIAGNOSIS — I70.213 ATHEROSCLER OF NATIVE ARTERY OF BOTH LEGS WITH INTERMIT CLAUDICATION: ICD-10-CM

## 2019-05-10 PROCEDURE — 25500020 PHARM REV CODE 255: Performed by: INTERNAL MEDICINE

## 2019-05-10 PROCEDURE — 75635 CT ANGIO ABDOMINAL ARTERIES: CPT | Mod: TC

## 2019-05-10 RX ADMIN — IOHEXOL 100 ML: 350 INJECTION, SOLUTION INTRAVENOUS at 01:05

## 2019-05-30 DIAGNOSIS — I10 ESSENTIAL HYPERTENSION: ICD-10-CM

## 2019-05-30 DIAGNOSIS — R19.7 DIARRHEA, UNSPECIFIED TYPE: ICD-10-CM

## 2019-05-30 RX ORDER — METHSCOPOLAMINE BROMIDE 2.5 MG/1
2.5 TABLET ORAL 2 TIMES DAILY
Qty: 180 TABLET | Refills: 3 | Status: SHIPPED | OUTPATIENT
Start: 2019-05-30 | End: 2019-11-29

## 2019-05-30 NOTE — TELEPHONE ENCOUNTER
----- Message from Arianne Malone sent at 5/30/2019  4:00 PM CDT -----  Contact: Pharmacy  Pharmacy requesting prescription for patient.    Is this a refill or new RX:  New  RX name and strength: methscopolamine brom 2.5 mg tab  Last office visit: 4/30/19  Last fill date:   Is this a 30-day or 90-day RX:  30  Pharmacy name and location:  [facility]

## 2019-05-31 RX ORDER — NEBIVOLOL HYDROCHLORIDE 10 MG/1
TABLET ORAL
Qty: 90 TABLET | Refills: 0 | Status: ON HOLD | OUTPATIENT
Start: 2019-05-31 | End: 2019-08-05 | Stop reason: HOSPADM

## 2019-06-12 NOTE — TELEPHONE ENCOUNTER
----- Message from Kalani Stevens sent at 2019  9:22 AM CDT -----  Contact: self  Marva Perez  MRN: [id number]  : 1940  PCP: Kevin Clements  Home Phone      [phone]  Work Phone      Not on file.  Mobile          [phone]      MESSAGE:   Pt requesting refill or new Rx.   Is this a refill or new RX:  refill  RX name and strength: ergocalciferol (VITAMIN D2) 50,000 unit Cap  Last office visit:   Is this a 30-day or 90-day RX:  30  Pharmacy name and location:  [facility] [place]  Comments:      Phone:  [phone]

## 2019-06-13 RX ORDER — ERGOCALCIFEROL 1.25 MG/1
50000 CAPSULE ORAL
Qty: 12 CAPSULE | Refills: 1 | Status: ON HOLD | OUTPATIENT
Start: 2019-06-13 | End: 2019-07-30

## 2019-07-07 ENCOUNTER — HOSPITAL ENCOUNTER (EMERGENCY)
Facility: HOSPITAL | Age: 79
Discharge: HOME | End: 2019-07-07
Attending: SURGERY
Payer: MEDICARE

## 2019-07-07 VITALS
BODY MASS INDEX: 19.75 KG/M2 | OXYGEN SATURATION: 97 % | HEART RATE: 75 BPM | TEMPERATURE: 98 F | WEIGHT: 108 LBS | RESPIRATION RATE: 18 BRPM | DIASTOLIC BLOOD PRESSURE: 72 MMHG | SYSTOLIC BLOOD PRESSURE: 164 MMHG

## 2019-07-07 DIAGNOSIS — R06.02 SOB (SHORTNESS OF BREATH): ICD-10-CM

## 2019-07-07 DIAGNOSIS — J44.9 CHRONIC OBSTRUCTIVE PULMONARY DISEASE, UNSPECIFIED COPD TYPE: Primary | ICD-10-CM

## 2019-07-07 DIAGNOSIS — F41.9 ANXIETY: ICD-10-CM

## 2019-07-07 LAB
ALBUMIN SERPL BCP-MCNC: 4 G/DL (ref 3.5–5.2)
ALP SERPL-CCNC: 116 U/L (ref 55–135)
ALT SERPL W/O P-5'-P-CCNC: 17 U/L (ref 10–44)
ANION GAP SERPL CALC-SCNC: 11 MMOL/L (ref 8–16)
APTT BLDCRRT: 27 SEC (ref 21–32)
AST SERPL-CCNC: 16 U/L (ref 10–40)
BASOPHILS # BLD AUTO: 0.07 K/UL (ref 0–0.2)
BASOPHILS NFR BLD: 0.8 % (ref 0–1.9)
BILIRUB SERPL-MCNC: 0.7 MG/DL (ref 0.1–1)
BNP SERPL-MCNC: 158 PG/ML (ref 0–99)
BUN SERPL-MCNC: 15 MG/DL (ref 8–23)
CALCIUM SERPL-MCNC: 9.2 MG/DL (ref 8.7–10.5)
CHLORIDE SERPL-SCNC: 108 MMOL/L (ref 95–110)
CK MB SERPL-MCNC: 2.2 NG/ML (ref 0.1–6.5)
CK MB SERPL-RTO: 3.3 % (ref 0–5)
CK SERPL-CCNC: 67 U/L (ref 20–180)
CK SERPL-CCNC: 67 U/L (ref 20–180)
CO2 SERPL-SCNC: 26 MMOL/L (ref 23–29)
CREAT SERPL-MCNC: 1.1 MG/DL (ref 0.5–1.4)
D DIMER PPP IA.FEU-MCNC: 0.38 MG/L FEU
DIFFERENTIAL METHOD: NORMAL
EOSINOPHIL # BLD AUTO: 0.5 K/UL (ref 0–0.5)
EOSINOPHIL NFR BLD: 5.9 % (ref 0–8)
ERYTHROCYTE [DISTWIDTH] IN BLOOD BY AUTOMATED COUNT: 12.9 % (ref 11.5–14.5)
EST. GFR  (AFRICAN AMERICAN): 56 ML/MIN/1.73 M^2
EST. GFR  (NON AFRICAN AMERICAN): 49 ML/MIN/1.73 M^2
GLUCOSE SERPL-MCNC: 106 MG/DL (ref 70–110)
HCT VFR BLD AUTO: 42.7 % (ref 37–48.5)
HGB BLD-MCNC: 13.9 G/DL (ref 12–16)
IMM GRANULOCYTES # BLD AUTO: 0.03 K/UL (ref 0–0.04)
IMM GRANULOCYTES NFR BLD AUTO: 0.3 % (ref 0–0.5)
INR PPP: 1 (ref 0.8–1.2)
LYMPHOCYTES # BLD AUTO: 2.5 K/UL (ref 1–4.8)
LYMPHOCYTES NFR BLD: 28.3 % (ref 18–48)
MCH RBC QN AUTO: 27.8 PG (ref 27–31)
MCHC RBC AUTO-ENTMCNC: 32.6 G/DL (ref 32–36)
MCV RBC AUTO: 85 FL (ref 82–98)
MONOCYTES # BLD AUTO: 0.8 K/UL (ref 0.3–1)
MONOCYTES NFR BLD: 9.2 % (ref 4–15)
NEUTROPHILS # BLD AUTO: 4.9 K/UL (ref 1.8–7.7)
NEUTROPHILS NFR BLD: 55.5 % (ref 38–73)
NRBC BLD-RTO: 0 /100 WBC
PLATELET # BLD AUTO: 212 K/UL (ref 150–350)
PMV BLD AUTO: 10.2 FL (ref 9.2–12.9)
POTASSIUM SERPL-SCNC: 4.2 MMOL/L (ref 3.5–5.1)
PROT SERPL-MCNC: 7.1 G/DL (ref 6–8.4)
PROTHROMBIN TIME: 10.3 SEC (ref 9–12.5)
RBC # BLD AUTO: 5 M/UL (ref 4–5.4)
SODIUM SERPL-SCNC: 145 MMOL/L (ref 136–145)
TROPONIN I SERPL DL<=0.01 NG/ML-MCNC: <0.006 NG/ML (ref 0–0.03)
WBC # BLD AUTO: 8.79 K/UL (ref 3.9–12.7)

## 2019-07-07 PROCEDURE — 99285 EMERGENCY DEPT VISIT HI MDM: CPT | Mod: 25

## 2019-07-07 PROCEDURE — 25000003 PHARM REV CODE 250: Performed by: SURGERY

## 2019-07-07 PROCEDURE — 96365 THER/PROPH/DIAG IV INF INIT: CPT

## 2019-07-07 PROCEDURE — 94640 AIRWAY INHALATION TREATMENT: CPT

## 2019-07-07 PROCEDURE — 99900031 HC PATIENT EDUCATION (STAT)

## 2019-07-07 PROCEDURE — 36415 COLL VENOUS BLD VENIPUNCTURE: CPT

## 2019-07-07 PROCEDURE — 96375 TX/PRO/DX INJ NEW DRUG ADDON: CPT

## 2019-07-07 PROCEDURE — 85730 THROMBOPLASTIN TIME PARTIAL: CPT

## 2019-07-07 PROCEDURE — 83880 ASSAY OF NATRIURETIC PEPTIDE: CPT

## 2019-07-07 PROCEDURE — 82550 ASSAY OF CK (CPK): CPT

## 2019-07-07 PROCEDURE — 93010 ELECTROCARDIOGRAM REPORT: CPT | Mod: ,,, | Performed by: INTERNAL MEDICINE

## 2019-07-07 PROCEDURE — 85610 PROTHROMBIN TIME: CPT

## 2019-07-07 PROCEDURE — 93010 EKG 12-LEAD: ICD-10-PCS | Mod: ,,, | Performed by: INTERNAL MEDICINE

## 2019-07-07 PROCEDURE — 25000242 PHARM REV CODE 250 ALT 637 W/ HCPCS: Performed by: SURGERY

## 2019-07-07 PROCEDURE — 82553 CREATINE MB FRACTION: CPT

## 2019-07-07 PROCEDURE — 93005 ELECTROCARDIOGRAM TRACING: CPT

## 2019-07-07 PROCEDURE — 27000221 HC OXYGEN, UP TO 24 HOURS

## 2019-07-07 PROCEDURE — 63600175 PHARM REV CODE 636 W HCPCS: Performed by: SURGERY

## 2019-07-07 PROCEDURE — 85379 FIBRIN DEGRADATION QUANT: CPT

## 2019-07-07 PROCEDURE — 84484 ASSAY OF TROPONIN QUANT: CPT

## 2019-07-07 PROCEDURE — 85025 COMPLETE CBC W/AUTO DIFF WBC: CPT

## 2019-07-07 PROCEDURE — 80053 COMPREHEN METABOLIC PANEL: CPT

## 2019-07-07 RX ORDER — ALBUTEROL SULFATE 0.63 MG/3ML
0.63 SOLUTION RESPIRATORY (INHALATION) EVERY 6 HOURS PRN
Qty: 20 VIAL | Refills: 0 | Status: SHIPPED | OUTPATIENT
Start: 2019-07-07 | End: 2019-11-29

## 2019-07-07 RX ORDER — LEVOFLOXACIN 500 MG/1
500 TABLET, FILM COATED ORAL DAILY
Qty: 7 TABLET | Refills: 0 | Status: SHIPPED | OUTPATIENT
Start: 2019-07-07 | End: 2019-07-14

## 2019-07-07 RX ORDER — LEVOFLOXACIN 5 MG/ML
500 INJECTION, SOLUTION INTRAVENOUS
Status: COMPLETED | OUTPATIENT
Start: 2019-07-07 | End: 2019-07-07

## 2019-07-07 RX ORDER — ASPIRIN 325 MG
325 TABLET ORAL
Status: COMPLETED | OUTPATIENT
Start: 2019-07-07 | End: 2019-07-07

## 2019-07-07 RX ORDER — METHYLPREDNISOLONE SOD SUCC 125 MG
125 VIAL (EA) INJECTION EVERY 8 HOURS
Status: DISCONTINUED | OUTPATIENT
Start: 2019-07-07 | End: 2019-07-08 | Stop reason: HOSPADM

## 2019-07-07 RX ORDER — METHYLPREDNISOLONE 4 MG/1
TABLET ORAL
Qty: 1 PACKAGE | Refills: 0 | Status: ON HOLD | OUTPATIENT
Start: 2019-07-07 | End: 2019-07-30

## 2019-07-07 RX ORDER — IPRATROPIUM BROMIDE AND ALBUTEROL SULFATE 2.5; .5 MG/3ML; MG/3ML
3 SOLUTION RESPIRATORY (INHALATION)
Status: COMPLETED | OUTPATIENT
Start: 2019-07-07 | End: 2019-07-07

## 2019-07-07 RX ADMIN — LEVOFLOXACIN 500 MG: 500 INJECTION, SOLUTION INTRAVENOUS at 09:07

## 2019-07-07 RX ADMIN — ASPIRIN 325 MG ORAL TABLET 325 MG: 325 PILL ORAL at 08:07

## 2019-07-07 RX ADMIN — LIDOCAINE HYDROCHLORIDE 50 ML: 20 SOLUTION ORAL; TOPICAL at 09:07

## 2019-07-07 RX ADMIN — IPRATROPIUM BROMIDE AND ALBUTEROL SULFATE 3 ML: .5; 3 SOLUTION RESPIRATORY (INHALATION) at 08:07

## 2019-07-07 RX ADMIN — METHYLPREDNISOLONE SODIUM SUCCINATE 125 MG: 125 INJECTION, POWDER, FOR SOLUTION INTRAMUSCULAR; INTRAVENOUS at 08:07

## 2019-07-08 ENCOUNTER — TELEPHONE (OUTPATIENT)
Dept: FAMILY MEDICINE | Facility: CLINIC | Age: 79
End: 2019-07-08

## 2019-07-08 NOTE — ED PROVIDER NOTES
[facility]                                                 Chief Complaint  78 y.o. female with Shortness of Breath    History of Present Illness  Marva Perez presents to the emergency room with shortness of breath tonight  Patient has shortness of breath and coughing episode prior to ER arrival tonight  Patient has longstanding issues with COPD, no active wheezing on arrival tonight  Patient states she is having anxiety attack, making the COPD issues much worse  Patient has a 97% oxygenation, clear lung sounds in all fields on ER evaluation now    The history is provided by the patient   device was not used during this ER visit     Past Medical History   -- Abnormal Pap smear     -- COPD (chronic obstructive pulmonary disease)     -- Depression     -- GERD (gastroesophageal reflux disease)     -- Hyperlipidemia     -- Hypertension        Past Surgical History   -- APPENDECTOMY       -- CERVICAL BIOPSY  W/ LOOP ELECTRODE EXCISION       -- CHOLECYSTECTOMY       -- COLLATERAL LIGAMENT REPAIR, KNEE       -- COLONOSCOPY       -- DILATION AND CURETTAGE OF UTERUS       -- SINUS SURGERY          Review of patient's allergies   -- Pcn [penicillins]     -- Tetracyclines     -- Bactrim [sulfamethoxazole-trimethoprim]     -- Ilosone     -- Iodine and iodide containing products     -- Sulfa (sulfonamide antibiotics)      I have reviewed all of this patient's past medical, surgical, family, and social   histories as well as active allergies and medications documented in the  electronic medical record    Review of Systems and Physical Exam      Review of Systems  -- Constitution - no fever, denies fatigue, no weakness, no chills  -- Eyes - no tearing or redness, no visual disturbance  -- Ear, Nose - no tinnitus or earache, no nasal congestion or discharge  -- Mouth,Throat - no sore throat, no toothache, normal voice, normal swallowing  -- Respiratory - cough and congestion, shortness  of breath, no TAVARES  -- Cardiovascular - denies chest pain, no palpitations, denies claudication  -- Gastrointestinal - denies abdominal pain, nausea, vomiting, or diarrhea  -- Genitourinary - no dysuria, denies flank pain, no hematuria, no STD risk  -- Musculoskeletal - denies back pain, negative for trauma or injury  -- Neurological - no headache, denies weakness or seizure; no LOC  -- Skin - denies pallor, rash, or changes in skin. no hives or welts noted  -- Psychiatric - anxiety, denies SI or HI, no psychosis or fractured thought noted     Vital Signs  Her tympanic temperature is 97.5 °F (36.4 °C).   Her blood pressure is 183/74 and her pulse is 80.   Her respiration is 31 and oxygen saturation is 95%.     Physical Exam  -- Nursing note and vitals reviewed  -- Constitutional: Appears well-developed and well-nourished  -- Head: Atraumatic. Normocephalic. No obvious abnormality  -- Eyes: Pupils are equal and reactive to light. Normal conjunctiva and lids  -- Cardiac: Normal rate, regular rhythm and normal heart sounds  -- Pulmonary: Normal respiratory effort, breath sounds clear to auscultation  -- Abdominal: Soft, no tenderness. Normal bowel sounds. Normal liver edge  -- Musculoskeletal: Normal range of motion, no effusions. Joints stable   -- Neurological: No focal deficits. Showed good interaction with staff  -- Vascular: Posterior tibial, dorsalis pedis and radial pulses 2+ bilaterally      Emergency Room Course      Lab Results     K 4.2      CO2 26   BUN 15   CREATININE 1.1      ALKPHOS 116   AST 16   ALT 17   BILITOT 0.7   ALBUMIN 4.0   PROT 7.1   WBC 8.79   HGB 13.9   HCT 42.7      CPK 67   CPK 67   CPKMB 2.2   TROPONINI <0.006   INR 1.0    (H)   DDIMER 0.38   TSH 2.773     EKG  -- The EKG findings today were without concerning findings from baseline     Radiology  -- Chest x-ray showed no infiltrate and showed no acute pathology     Medications Given  methylPREDNISolone  sodium succinate injection 125 mg (125 mg Intravenous Given 7/7/19 2045)   levoFLOXacin 500 mg/100 mL IVPB 500 mg (500 mg Intravenous New Bag 7/7/19 2148)   aspirin tablet 325 mg (325 mg Oral Given 7/7/19 2045)   albuterol-ipratropium 2.5 mg-0.5 mg/3 mL nebulizer solution 3 mL   GI cocktail (mylanta 30 mL, lidocaine 2 % viscous 10 mL, dicyclomine 10 mL) 50 mL     ED Physician Management  -- Diagnosis management comments: 78 y.o. female with anxiety attack tonight  -- patient had an anxiety attack, thought she was having COPD exacerbation  -- patient given steroids and breathing treatment, 99% saturation reassessment  -- patient feels much better and patient is not wheezing, patient is now asymptomatic  -- patient has been having anxiety issues for last year, worse the last couple months  -- patient now states he feels much better and wants breathing treatments for home use  -- patient counseled to return to the ER with any concerning signs or symptoms    Diagnosis  -- Chronic obstructive pulmonary disease  -- SOB (shortness of breath) and Anxiety were also pertinent to this visit.    Disposition and Plan  -- Disposition: home  -- Condition: stable  -- Follow-up: Patient to follow up with Kevin Clements MD in 1-2 days.  -- I advised the patient that we have found no life threatening condition today  -- At this time, I believe the patient is clinically stable for discharge.   -- The patient acknowledges that close follow up with a MD is required   -- Patient agrees to comply with all instruction and direction given in the ER    This note is dictated on M*Modal word recognition program.  There are word recognition mistakes that are occasionally missed on review.         Jerson Padgett MD  07/07/19 2240

## 2019-07-08 NOTE — TELEPHONE ENCOUNTER
----- Message from Pema Carrasco sent at 2019 11:02 AM CDT -----  Contact: pt  Marva Perez  MRN: [id number]  : 1940  PCP: Kevin Clements  Home Phone      [phone]  Work Phone      Not on file.  Mobile          [phone]      MESSAGE:   Pt requests a sooner appointment than the  can schedule.  Does patient feel like they need to be seen today:  Today or tomorrow   What is the nature of the appointment:  ER f/u, weak  What visit type:  ep  Did you check other providers/department schedules for availability:   Yes, pt stated she was told by the hospital to see  so she would like to see him.   Comments:     Phone:  [phone]

## 2019-07-08 NOTE — ED TRIAGE NOTES
Patient reports she is short of breath and is very anxious at this time. She reports history of COPD.

## 2019-07-09 ENCOUNTER — OFFICE VISIT (OUTPATIENT)
Dept: FAMILY MEDICINE | Facility: CLINIC | Age: 79
End: 2019-07-09
Payer: MEDICARE

## 2019-07-09 VITALS
SYSTOLIC BLOOD PRESSURE: 122 MMHG | HEIGHT: 62 IN | DIASTOLIC BLOOD PRESSURE: 72 MMHG | OXYGEN SATURATION: 98 % | WEIGHT: 108.19 LBS | BODY MASS INDEX: 19.91 KG/M2 | HEART RATE: 70 BPM | RESPIRATION RATE: 18 BRPM

## 2019-07-09 DIAGNOSIS — L98.8 ALLERGIC DERMATOSIS: ICD-10-CM

## 2019-07-09 DIAGNOSIS — I10 ESSENTIAL HYPERTENSION: ICD-10-CM

## 2019-07-09 DIAGNOSIS — L57.0 SUN-INDUCED SKIN CHANGES, KERATOSIS: ICD-10-CM

## 2019-07-09 DIAGNOSIS — J43.9 PULMONARY EMPHYSEMA, UNSPECIFIED EMPHYSEMA TYPE: Primary | ICD-10-CM

## 2019-07-09 DIAGNOSIS — F41.1 GAD (GENERALIZED ANXIETY DISORDER): ICD-10-CM

## 2019-07-09 PROCEDURE — 99213 PR OFFICE/OUTPT VISIT, EST, LEVL III, 20-29 MIN: ICD-10-PCS | Mod: S$GLB,,, | Performed by: FAMILY MEDICINE

## 2019-07-09 PROCEDURE — 99999 PR PBB SHADOW E&M-EST. PATIENT-LVL IV: ICD-10-PCS | Mod: PBBFAC,,, | Performed by: FAMILY MEDICINE

## 2019-07-09 PROCEDURE — 99999 PR PBB SHADOW E&M-EST. PATIENT-LVL IV: CPT | Mod: PBBFAC,,, | Performed by: FAMILY MEDICINE

## 2019-07-09 PROCEDURE — 99213 OFFICE O/P EST LOW 20 MIN: CPT | Mod: S$GLB,,, | Performed by: FAMILY MEDICINE

## 2019-07-09 RX ORDER — CILOSTAZOL 50 MG/1
TABLET ORAL
COMMUNITY
Start: 2019-07-03 | End: 2019-11-27

## 2019-07-09 RX ORDER — METHYLPREDNISOLONE 32 MG/1
TABLET ORAL
Refills: 0 | Status: ON HOLD | COMMUNITY
Start: 2019-05-09 | End: 2019-07-30

## 2019-07-09 RX ORDER — CLOBETASOL PROPIONATE 0.5 MG/G
CREAM TOPICAL 2 TIMES DAILY
Qty: 60 G | Refills: 1 | Status: SHIPPED | OUTPATIENT
Start: 2019-07-09 | End: 2019-12-09

## 2019-07-09 RX ORDER — HYDROXYZINE HYDROCHLORIDE 10 MG/1
25 TABLET, FILM COATED ORAL 3 TIMES DAILY PRN
Qty: 40 TABLET | Refills: 1 | Status: ON HOLD | OUTPATIENT
Start: 2019-07-09 | End: 2019-07-30

## 2019-07-09 RX ORDER — DIPHENHYDRAMINE HCL 50 MG/1
CAPSULE ORAL
Refills: 0 | Status: ON HOLD | COMMUNITY
Start: 2019-05-09 | End: 2019-07-30

## 2019-07-09 RX ORDER — TRETINOIN 1 MG/G
CREAM TOPICAL NIGHTLY
Qty: 45 G | Refills: 2 | Status: ON HOLD | OUTPATIENT
Start: 2019-07-09 | End: 2019-08-05 | Stop reason: HOSPADM

## 2019-07-09 RX ORDER — CIMETIDINE 300 MG/1
TABLET, FILM COATED ORAL
Refills: 0 | Status: ON HOLD | COMMUNITY
Start: 2019-05-09 | End: 2019-07-30

## 2019-07-09 NOTE — PROGRESS NOTES
Subjective:       Patient ID: Marva Perez is a 78 y.o. female.    Chief Complaint: Follow-up (ER check up) and Ear Fullness (Pressure/numbness x5 days)    Pt is a 78 y.o. female who presents for check up for COPD w AEB. Doing well on current meds. Denies any side effects. Prevention is up to date.    Review of Systems   Constitutional: Negative for appetite change, chills and fever.   HENT: Negative for rhinorrhea, sinus pressure, sore throat and trouble swallowing.    Respiratory: Positive for shortness of breath. Negative for cough, chest tightness and wheezing.         Hypoxia over the weekend   Cardiovascular: Negative for chest pain and palpitations.   Gastrointestinal: Negative for abdominal pain, blood in stool, diarrhea, nausea and vomiting.   Genitourinary: Negative for dysuria, flank pain, hematuria, pelvic pain, urgency, vaginal bleeding, vaginal discharge and vaginal pain.   Musculoskeletal: Negative for back pain, joint swelling and neck stiffness.   Skin: Negative for rash.   Neurological: Negative for dizziness, weakness, light-headedness, numbness and headaches.   Hematological: Does not bruise/bleed easily.   Psychiatric/Behavioral: Negative for agitation. The patient is not nervous/anxious.        Objective:      Physical Exam   Constitutional: She is oriented to person, place, and time. She appears well-developed and well-nourished.   HENT:   Head: Normocephalic.   Eyes: Pupils are equal, round, and reactive to light.   Neck: Normal range of motion. Neck supple. No thyromegaly present.   Cardiovascular: Normal rate and regular rhythm.   Pulmonary/Chest: No respiratory distress. She has no wheezes. She has no rales. She exhibits no tenderness.   Abdominal: She exhibits no distension. There is no tenderness. There is no rebound and no guarding.   Musculoskeletal: Normal range of motion. She exhibits no edema or tenderness.   Lymphadenopathy:     She has no cervical adenopathy.   Neurological:  She is alert and oriented to person, place, and time. She has normal reflexes. She displays normal reflexes. No cranial nerve deficit. She exhibits normal muscle tone. Coordination normal.   Skin: Skin is warm and dry. No rash noted. No pallor.   Upper back with excoriations   Psychiatric: She has a normal mood and affect. Judgment and thought content normal.       Assessment:       1. Pulmonary emphysema, unspecified emphysema type    2. KATHIE (generalized anxiety disorder)    3. Essential hypertension        Plan:   Marva was seen today for follow-up and ear fullness.    Diagnoses and all orders for this visit:    Pulmonary emphysema, unspecified emphysema type    KATHIE (generalized anxiety disorder)    Essential hypertension

## 2019-07-11 ENCOUNTER — TELEPHONE (OUTPATIENT)
Dept: FAMILY MEDICINE | Facility: CLINIC | Age: 79
End: 2019-07-11

## 2019-07-11 NOTE — TELEPHONE ENCOUNTER
Prior Authorization request received from [facility] pharmacy in [place] requesting PA on Tretinoin 0.1% cream. PA initiated via [employer]. KEY: [id number]. Awaiting insurance decision.

## 2019-07-16 ENCOUNTER — TELEPHONE (OUTPATIENT)
Dept: FAMILY MEDICINE | Facility: CLINIC | Age: 79
End: 2019-07-16

## 2019-07-16 ENCOUNTER — OFFICE VISIT (OUTPATIENT)
Dept: FAMILY MEDICINE | Facility: CLINIC | Age: 79
End: 2019-07-16
Payer: MEDICARE

## 2019-07-16 VITALS
WEIGHT: 106.19 LBS | SYSTOLIC BLOOD PRESSURE: 120 MMHG | BODY MASS INDEX: 19.54 KG/M2 | HEIGHT: 62 IN | HEART RATE: 78 BPM | DIASTOLIC BLOOD PRESSURE: 72 MMHG | RESPIRATION RATE: 16 BRPM

## 2019-07-16 DIAGNOSIS — F41.1 GAD (GENERALIZED ANXIETY DISORDER): ICD-10-CM

## 2019-07-16 DIAGNOSIS — J43.9 PULMONARY EMPHYSEMA, UNSPECIFIED EMPHYSEMA TYPE: Primary | ICD-10-CM

## 2019-07-16 DIAGNOSIS — I25.10 ASCVD (ARTERIOSCLEROTIC CARDIOVASCULAR DISEASE): ICD-10-CM

## 2019-07-16 PROCEDURE — 99999 PR PBB SHADOW E&M-EST. PATIENT-LVL III: CPT | Mod: PBBFAC,,, | Performed by: FAMILY MEDICINE

## 2019-07-16 PROCEDURE — 99213 PR OFFICE/OUTPT VISIT, EST, LEVL III, 20-29 MIN: ICD-10-PCS | Mod: S$GLB,,, | Performed by: FAMILY MEDICINE

## 2019-07-16 PROCEDURE — 99213 OFFICE O/P EST LOW 20 MIN: CPT | Mod: S$GLB,,, | Performed by: FAMILY MEDICINE

## 2019-07-16 PROCEDURE — 99999 PR PBB SHADOW E&M-EST. PATIENT-LVL III: ICD-10-PCS | Mod: PBBFAC,,, | Performed by: FAMILY MEDICINE

## 2019-07-16 RX ORDER — MIRTAZAPINE 15 MG/1
TABLET, FILM COATED ORAL
Qty: 30 TABLET | Refills: 1 | Status: SHIPPED | OUTPATIENT
Start: 2019-07-16 | End: 2019-11-29 | Stop reason: SDUPTHER

## 2019-07-16 RX ORDER — DULOXETIN HYDROCHLORIDE 30 MG/1
CAPSULE, DELAYED RELEASE ORAL
Qty: 30 CAPSULE | Refills: 1 | Status: SHIPPED | OUTPATIENT
Start: 2019-07-16 | End: 2019-11-29

## 2019-07-16 NOTE — PROGRESS NOTES
Subjective:       Patient ID: Marva Perez is a 78 y.o. female.    Chief Complaint: Follow-up (Check up (SOB) Not improving)    Pt is a 78 y.o. female who presents for check up for SOB and no coughing. Doing well on current meds. Denies any side effects. Prevention is up to date.    Review of Systems   Constitutional: Negative for appetite change, chills and fever.   HENT: Negative for rhinorrhea, sinus pressure, sore throat and trouble swallowing.    Respiratory: Positive for cough. Negative for chest tightness, shortness of breath and wheezing.         TAVARES after one block   Cardiovascular: Negative for chest pain and palpitations.   Gastrointestinal: Negative for abdominal pain, blood in stool, diarrhea, nausea and vomiting.   Genitourinary: Negative for dysuria, flank pain, hematuria, pelvic pain, urgency, vaginal bleeding, vaginal discharge and vaginal pain.   Musculoskeletal: Negative for back pain, joint swelling and neck stiffness.   Skin: Negative for rash.   Neurological: Negative for dizziness, weakness, light-headedness, numbness and headaches.   Hematological: Does not bruise/bleed easily.   Psychiatric/Behavioral: Negative for agitation. The patient is not nervous/anxious.        Objective:      Physical Exam   Constitutional: She is oriented to person, place, and time. She appears well-developed and well-nourished.   HENT:   Head: Normocephalic.   Eyes: Pupils are equal, round, and reactive to light.   Neck: Normal range of motion. Neck supple. No thyromegaly present.   Cardiovascular: Normal rate and regular rhythm.   Pulmonary/Chest: No respiratory distress. She has no wheezes. She has no rales. She exhibits no tenderness.   Abdominal: She exhibits no distension. There is no tenderness. There is no rebound and no guarding.   Musculoskeletal: Normal range of motion. She exhibits no edema or tenderness.   Lymphadenopathy:     She has no cervical adenopathy.   Neurological: She is alert and oriented  to person, place, and time. She has normal reflexes. She displays normal reflexes. No cranial nerve deficit. She exhibits normal muscle tone. Coordination normal.   Skin: Skin is warm and dry. No rash noted. No pallor.   Psychiatric: She has a normal mood and affect. Judgment and thought content normal.       Assessment:       1. Pulmonary emphysema, unspecified emphysema type    2. ASCVD (arteriosclerotic cardiovascular disease)    3. KATHIE (generalized anxiety disorder)        Plan:   Marva was seen today for follow-up.    Diagnoses and all orders for this visit:    Pulmonary emphysema, unspecified emphysema type    ASCVD (arteriosclerotic cardiovascular disease)    KATHIE (generalized anxiety disorder)    Other orders  -     DULoxetine (CYMBALTA) 30 MG capsule; One po q am for depression  -     mirtazapine (REMERON) 15 MG tablet; One po q hs for rest and depression

## 2019-07-16 NOTE — TELEPHONE ENCOUNTER
----- Message from Kalani Stevens sent at 2019  8:15 AM CDT -----  Contact: luis Perez  MRN: [id number]  : 1940  PCP: Kevin Clements  Home Phone      [phone]  Work Phone      Not on file.  Mobile          [phone]      MESSAGE:   Pt requests a sooner appointment than the  can schedule.  Does patient feel like they need to be seen today:  yes  What is the nature of the appointment:  Finished with antibiotic, weak when walking  What visit type:  est  Did you check other providers/department schedules for availability:   yes  Comments:     Phone:  [phone]

## 2019-07-22 DIAGNOSIS — J44.89 OBSTRUCTIVE CHRONIC BRONCHITIS WITHOUT EXACERBATION: Primary | ICD-10-CM

## 2019-07-30 ENCOUNTER — TELEPHONE (OUTPATIENT)
Dept: FAMILY MEDICINE | Facility: CLINIC | Age: 79
End: 2019-07-30

## 2019-07-30 ENCOUNTER — HOSPITAL ENCOUNTER (INPATIENT)
Facility: HOSPITAL | Age: 79
LOS: 5 days | Discharge: HOME | DRG: 190 | End: 2019-08-05
Attending: SURGERY | Admitting: INTERNAL MEDICINE
Payer: MEDICARE

## 2019-07-30 DIAGNOSIS — R07.9 CHEST PAIN: ICD-10-CM

## 2019-07-30 DIAGNOSIS — J44.1 COPD EXACERBATION: ICD-10-CM

## 2019-07-30 DIAGNOSIS — I26.99 PULMONARY EMBOLISM: ICD-10-CM

## 2019-07-30 DIAGNOSIS — R07.9 ACUTE CHEST PAIN: ICD-10-CM

## 2019-07-30 DIAGNOSIS — J43.2 CENTRILOBULAR EMPHYSEMA: ICD-10-CM

## 2019-07-30 DIAGNOSIS — R79.89 POSITIVE D-DIMER: ICD-10-CM

## 2019-07-30 DIAGNOSIS — J96.21 ACUTE ON CHRONIC RESPIRATORY FAILURE WITH HYPOXIA: ICD-10-CM

## 2019-07-30 DIAGNOSIS — R06.02 SOB (SHORTNESS OF BREATH): Primary | ICD-10-CM

## 2019-07-30 DIAGNOSIS — R06.9 RESPIRATION ABNORMAL: ICD-10-CM

## 2019-07-30 DIAGNOSIS — I25.10 CARDIOVASCULAR DISEASE: ICD-10-CM

## 2019-07-30 LAB
ALBUMIN SERPL BCP-MCNC: 3.7 G/DL (ref 3.5–5.2)
ALP SERPL-CCNC: 114 U/L (ref 55–135)
ALT SERPL W/O P-5'-P-CCNC: 12 U/L (ref 10–44)
ANION GAP SERPL CALC-SCNC: 11 MMOL/L (ref 8–16)
APTT BLDCRRT: 27.1 SEC (ref 21–32)
AST SERPL-CCNC: 14 U/L (ref 10–40)
BASOPHILS # BLD AUTO: 0.05 K/UL (ref 0–0.2)
BASOPHILS NFR BLD: 0.6 % (ref 0–1.9)
BILIRUB SERPL-MCNC: 0.7 MG/DL (ref 0.1–1)
BNP SERPL-MCNC: 128 PG/ML (ref 0–99)
BUN SERPL-MCNC: 15 MG/DL (ref 8–23)
CALCIUM SERPL-MCNC: 9.5 MG/DL (ref 8.7–10.5)
CHLORIDE SERPL-SCNC: 104 MMOL/L (ref 95–110)
CK MB SERPL-MCNC: 1.7 NG/ML (ref 0.1–6.5)
CK MB SERPL-RTO: 2.9 % (ref 0–5)
CK SERPL-CCNC: 58 U/L (ref 20–180)
CK SERPL-CCNC: 58 U/L (ref 20–180)
CO2 SERPL-SCNC: 30 MMOL/L (ref 23–29)
CREAT SERPL-MCNC: 1.1 MG/DL (ref 0.5–1.4)
D DIMER PPP IA.FEU-MCNC: 0.42 MG/L FEU
DIFFERENTIAL METHOD: ABNORMAL
EOSINOPHIL # BLD AUTO: 0.6 K/UL (ref 0–0.5)
EOSINOPHIL NFR BLD: 7.2 % (ref 0–8)
ERYTHROCYTE [DISTWIDTH] IN BLOOD BY AUTOMATED COUNT: 12.6 % (ref 11.5–14.5)
EST. GFR  (AFRICAN AMERICAN): 56 ML/MIN/1.73 M^2
EST. GFR  (NON AFRICAN AMERICAN): 49 ML/MIN/1.73 M^2
GLUCOSE SERPL-MCNC: 137 MG/DL (ref 70–110)
HCT VFR BLD AUTO: 42.1 % (ref 37–48.5)
HGB BLD-MCNC: 13.8 G/DL (ref 12–16)
IMM GRANULOCYTES # BLD AUTO: 0.02 K/UL (ref 0–0.04)
IMM GRANULOCYTES NFR BLD AUTO: 0.3 % (ref 0–0.5)
INR PPP: 1 (ref 0.8–1.2)
LYMPHOCYTES # BLD AUTO: 1.7 K/UL (ref 1–4.8)
LYMPHOCYTES NFR BLD: 21.9 % (ref 18–48)
MCH RBC QN AUTO: 28 PG (ref 27–31)
MCHC RBC AUTO-ENTMCNC: 32.8 G/DL (ref 32–36)
MCV RBC AUTO: 85 FL (ref 82–98)
MONOCYTES # BLD AUTO: 0.5 K/UL (ref 0.3–1)
MONOCYTES NFR BLD: 6.8 % (ref 4–15)
NEUTROPHILS # BLD AUTO: 4.9 K/UL (ref 1.8–7.7)
NEUTROPHILS NFR BLD: 63.2 % (ref 38–73)
NRBC BLD-RTO: 0 /100 WBC
PLATELET # BLD AUTO: 256 K/UL (ref 150–350)
PMV BLD AUTO: 9.6 FL (ref 9.2–12.9)
POTASSIUM SERPL-SCNC: 3.6 MMOL/L (ref 3.5–5.1)
PROT SERPL-MCNC: 7.1 G/DL (ref 6–8.4)
PROTHROMBIN TIME: 10.4 SEC (ref 9–12.5)
RBC # BLD AUTO: 4.93 M/UL (ref 4–5.4)
SODIUM SERPL-SCNC: 145 MMOL/L (ref 136–145)
TROPONIN I SERPL DL<=0.01 NG/ML-MCNC: <0.006 NG/ML (ref 0–0.03)
TROPONIN I SERPL DL<=0.01 NG/ML-MCNC: <0.006 NG/ML (ref 0–0.03)
WBC # BLD AUTO: 7.76 K/UL (ref 3.9–12.7)

## 2019-07-30 PROCEDURE — 93010 ELECTROCARDIOGRAM REPORT: CPT | Mod: ,,, | Performed by: INTERNAL MEDICINE

## 2019-07-30 PROCEDURE — 63600175 PHARM REV CODE 636 W HCPCS: Performed by: SURGERY

## 2019-07-30 PROCEDURE — 84484 ASSAY OF TROPONIN QUANT: CPT

## 2019-07-30 PROCEDURE — 85730 THROMBOPLASTIN TIME PARTIAL: CPT

## 2019-07-30 PROCEDURE — 96374 THER/PROPH/DIAG INJ IV PUSH: CPT

## 2019-07-30 PROCEDURE — 85610 PROTHROMBIN TIME: CPT

## 2019-07-30 PROCEDURE — 94640 AIRWAY INHALATION TREATMENT: CPT

## 2019-07-30 PROCEDURE — 25000242 PHARM REV CODE 250 ALT 637 W/ HCPCS: Performed by: SURGERY

## 2019-07-30 PROCEDURE — 25000003 PHARM REV CODE 250: Performed by: INTERNAL MEDICINE

## 2019-07-30 PROCEDURE — 36415 COLL VENOUS BLD VENIPUNCTURE: CPT

## 2019-07-30 PROCEDURE — 11000001 HC ACUTE MED/SURG PRIVATE ROOM

## 2019-07-30 PROCEDURE — 82553 CREATINE MB FRACTION: CPT

## 2019-07-30 PROCEDURE — 82550 ASSAY OF CK (CPK): CPT

## 2019-07-30 PROCEDURE — 93005 ELECTROCARDIOGRAM TRACING: CPT

## 2019-07-30 PROCEDURE — 93010 EKG 12-LEAD: ICD-10-PCS | Mod: ,,, | Performed by: INTERNAL MEDICINE

## 2019-07-30 PROCEDURE — 94760 N-INVAS EAR/PLS OXIMETRY 1: CPT

## 2019-07-30 PROCEDURE — 80053 COMPREHEN METABOLIC PANEL: CPT

## 2019-07-30 PROCEDURE — 85025 COMPLETE CBC W/AUTO DIFF WBC: CPT

## 2019-07-30 PROCEDURE — 83880 ASSAY OF NATRIURETIC PEPTIDE: CPT

## 2019-07-30 PROCEDURE — 85379 FIBRIN DEGRADATION QUANT: CPT

## 2019-07-30 PROCEDURE — 25000003 PHARM REV CODE 250: Performed by: SURGERY

## 2019-07-30 PROCEDURE — 94761 N-INVAS EAR/PLS OXIMETRY MLT: CPT

## 2019-07-30 PROCEDURE — 99285 EMERGENCY DEPT VISIT HI MDM: CPT | Mod: 25

## 2019-07-30 RX ORDER — ACETAMINOPHEN 325 MG/1
650 TABLET ORAL EVERY 8 HOURS PRN
Status: DISCONTINUED | OUTPATIENT
Start: 2019-07-30 | End: 2019-08-05 | Stop reason: HOSPADM

## 2019-07-30 RX ORDER — NEBIVOLOL 5 MG/1
10 TABLET ORAL DAILY
Status: DISCONTINUED | OUTPATIENT
Start: 2019-07-31 | End: 2019-08-03

## 2019-07-30 RX ORDER — METHYLPREDNISOLONE SOD SUCC 125 MG
80 VIAL (EA) INJECTION EVERY 8 HOURS
Status: DISCONTINUED | OUTPATIENT
Start: 2019-07-30 | End: 2019-08-03

## 2019-07-30 RX ORDER — SODIUM CHLORIDE 0.9 % (FLUSH) 0.9 %
10 SYRINGE (ML) INJECTION
Status: DISCONTINUED | OUTPATIENT
Start: 2019-07-30 | End: 2019-08-05 | Stop reason: HOSPADM

## 2019-07-30 RX ORDER — ALPRAZOLAM 0.5 MG/1
0.5 TABLET ORAL NIGHTLY PRN
Status: DISCONTINUED | OUTPATIENT
Start: 2019-07-30 | End: 2019-07-31

## 2019-07-30 RX ORDER — LOSARTAN POTASSIUM 50 MG/1
25 TABLET ORAL DAILY
COMMUNITY
End: 2019-11-27 | Stop reason: SDUPTHER

## 2019-07-30 RX ORDER — CLONIDINE HYDROCHLORIDE 0.1 MG/1
0.1 TABLET ORAL EVERY 6 HOURS PRN
Status: DISCONTINUED | OUTPATIENT
Start: 2019-07-30 | End: 2019-08-05 | Stop reason: HOSPADM

## 2019-07-30 RX ORDER — ASPIRIN 325 MG
325 TABLET ORAL
Status: COMPLETED | OUTPATIENT
Start: 2019-07-30 | End: 2019-07-30

## 2019-07-30 RX ORDER — ONDANSETRON 2 MG/ML
4 INJECTION INTRAMUSCULAR; INTRAVENOUS EVERY 8 HOURS PRN
Status: DISCONTINUED | OUTPATIENT
Start: 2019-07-30 | End: 2019-08-05 | Stop reason: HOSPADM

## 2019-07-30 RX ORDER — PANTOPRAZOLE SODIUM 40 MG/1
40 TABLET, DELAYED RELEASE ORAL DAILY
Status: DISCONTINUED | OUTPATIENT
Start: 2019-07-31 | End: 2019-08-05 | Stop reason: HOSPADM

## 2019-07-30 RX ORDER — IPRATROPIUM BROMIDE AND ALBUTEROL SULFATE 2.5; .5 MG/3ML; MG/3ML
3 SOLUTION RESPIRATORY (INHALATION)
Status: COMPLETED | OUTPATIENT
Start: 2019-07-30 | End: 2019-07-30

## 2019-07-30 RX ORDER — LOSARTAN POTASSIUM 50 MG/1
50 TABLET ORAL DAILY
Status: DISCONTINUED | OUTPATIENT
Start: 2019-07-31 | End: 2019-08-05 | Stop reason: HOSPADM

## 2019-07-30 RX ADMIN — ALPRAZOLAM 0.5 MG: 0.5 TABLET ORAL at 08:07

## 2019-07-30 RX ADMIN — ASPIRIN 325 MG ORAL TABLET 325 MG: 325 PILL ORAL at 06:07

## 2019-07-30 RX ADMIN — METHYLPREDNISOLONE SODIUM SUCCINATE 80 MG: 125 INJECTION, POWDER, FOR SOLUTION INTRAMUSCULAR; INTRAVENOUS at 06:07

## 2019-07-30 RX ADMIN — IPRATROPIUM BROMIDE AND ALBUTEROL SULFATE 3 ML: .5; 3 SOLUTION RESPIRATORY (INHALATION) at 06:07

## 2019-07-30 NOTE — ED PROVIDER NOTES
[facility]                                                 Chief Complaint  78 y.o. female with Shortness of Breath    History of Present Illness  Marva Perez presents to the emergency room with shortness of breath today  Patient has severe shortness of breath, patient has severe anxiety this afternoon  Pt went to the local [facility], sent to the ER immediately via EMS  Patient had obvious shortness of breath at that time, mildly diaphoretic on arrival  Patient having severe family issues with anxiety, also has severe COPD as well  Patient is not on oxygen at home, denies chest pain but does look uncomfortable    The history is provided by the patient   device was not used during this ER visit    Past Medical History   -- Abnormal Pap smear     -- COPD (chronic obstructive pulmonary disease)     -- Depression     -- GERD (gastroesophageal reflux disease)     -- Hyperlipidemia     -- Hypertension        Past Surgical History   -- APPENDECTOMY       -- CERVICAL BIOPSY  W/ LOOP ELECTRODE EXCISION       -- CHOLECYSTECTOMY       -- COLLATERAL LIGAMENT REPAIR, KNEE       -- COLONOSCOPY       -- DILATION AND CURETTAGE OF UTERUS       -- SINUS SURGERY          Review of patient's allergies   -- Pcn [penicillins]     -- Tetracyclines     -- Bactrim [sulfamethoxazole-trimethoprim]     -- Ilosone     -- Iodine and iodide containing products     -- Sulfa (sulfonamide antibiotics)        I have reviewed all of this patient's past medical, surgical, family, and social   histories as well as active allergies and medications documented in the  electronic medical record    Review of Systems and Physical Exam      Review of Systems  -- Constitution - no fever, denies fatigue, no weakness, no chills  -- Eyes - no tearing or redness, no visual disturbance  -- Ear, Nose - no tinnitus or earache, no nasal congestion or discharge  -- Mouth,Throat - no sore throat, no toothache,  normal voice, normal swallowing  -- Respiratory - shortness of breath, no TAVARES, no cough or congestion  -- Cardiovascular - denies chest pain, no palpitations, denies claudication  -- Gastrointestinal - denies abdominal pain, nausea, vomiting, or diarrhea  -- Genitourinary - no dysuria, denies flank pain, no hematuria, no STD risk  -- Musculoskeletal - denies back pain, negative for trauma or injury  -- Neurological - no headache, denies weakness or seizure; no LOC  -- Skin - denies pallor, rash, or changes in skin. no hives or welts noted  -- Psychiatric - anxiety, denies SI or HI, no psychosis or fractured thought noted     Vital Signs  Her oral temperature is 96.9 °F (36.1 °C).   Her blood pressure is 188/93 and her pulse is 88.   Her respiration is 20 and oxygen saturation is 96%.     Physical Exam  -- Nursing note and vitals reviewed  -- Constitutional: Appears well-developed and well-nourished  -- Head: Atraumatic. Normocephalic. No obvious abnormality  -- Eyes: Pupils are equal and reactive to light. Normal conjunctiva and lids  -- Cardiac: Normal rate, regular rhythm and normal heart sounds  -- Pulmonary: Normal respiratory effort, breath sounds clear to auscultation  -- Abdominal: Soft, no tenderness. Normal bowel sounds. Normal liver edge  -- Musculoskeletal: Normal range of motion, no effusions. Joints stable   -- Neurological: No focal deficits. Showed good interaction with staff  -- Vascular: Posterior tibial, dorsalis pedis and radial pulses 2+ bilaterally    Emergency Room Course      Lab Results     K 3.6      CO2 30 (H)   BUN 15   CREATININE 1.1    (H)   ALKPHOS 114   AST 14   ALT 12   BILITOT 0.7   ALBUMIN 3.7   PROT 7.1   WBC 7.76   HGB 13.8   HCT 42.1      CPK 58   CPK 58   CPKMB 1.7   TROPONINI <0.006   INR 1.0    (H)   DDIMER 0.42   TSH 2.773     EKG   -- The EKG findings today were without concerning findings from baseline     Chest x-ray  -- severe  emphysematous changes    Medications Given  aspirin tablet 325 mg (has no administration in time range)   albuterol-ipratropium 2.5 mg-0.5 mg/3 mL nebulizer solution 3 mL      MDM  Number of Diagnoses or Management Options  SOB (shortness of breath): new, needed workup     Amount and/or Complexity of Data Reviewed  Clinical lab tests: reviewed and ordered  Tests in the radiology section of CPT®: ordered and reviewed  Tests in the medicine section of CPT®: reviewed and ordered  Discuss the patient with other providers: yes    Risk of Complications, Morbidity, and/or Mortality  Presenting problems: high  Diagnostic procedures: high  Management options: high       ED Physician Management  -- Diagnosis management comments: 78 y.o. female with shortness of breath  -- patient with anxiety and severe COPD history, anxious on arrival today  -- patient has had issues in the past with anxiety and also COPD  -- patient given IV steroids and breathing treatments in the ER  -- patient will be observed overnight with serial cardiac enzymes as well  -- patient will get a pulmonology consult for COPD, cardiology consult also  -- patient denies any chest pain, showed up to local clinic anxious/SOB    Diagnosis  -- The encounter diagnosis was SOB (shortness of breath).    Disposition and Plan  -- Disposition: admit  -- Condition: stable    This note is dictated on M*Modal word recognition program.  There are word recognition mistakes that are occasionally missed on review.         Jerson Padgett MD  07/30/19 3042

## 2019-07-30 NOTE — ED TRIAGE NOTES
78 y.o. female presents to ER ED 05/ED 05   Chief Complaint   Patient presents with    Shortness of Breath   Pt brought to ER by EMS for low 02 sat at the [facility]. Pt reports SOB for 4-5 days. No acute distress noted.

## 2019-07-30 NOTE — TELEPHONE ENCOUNTER
----- Message from Pema Danilo sent at 2019  5:39 PM CDT -----  Contact: BRANDON message  Marva Perez  MRN: [id number]  : 1940  PCP: Kevin Clements  Home Phone      [phone]  Work Phone      Not on file.  Mobile          [phone]      MESSAGE:     Patient came into office (she did not have an appointment scheduled), seemed very anxious. I left her come through the door to the abdalla way, she mumbled that she could not breath. I immediately got her a chair to sit down & alerted  & Sandeep DOLL (Nurse) that the patient was having trouble breathing.  came to the patient while Sandeep got a pulse Ox & oxygen tank. I called 911 (per ) @ 5:10,ambulance arrived at the clinic at 5:15pm.     This is just for documentation.

## 2019-07-30 NOTE — TELEPHONE ENCOUNTER
Staff came in contact with the patient and  [facility] ambulance came and brung her to ER.    O2 was 73 when arrived and left on 2L of oxygen and O2 went up to 84.

## 2019-07-31 PROBLEM — J96.10 CHRONIC RESPIRATORY FAILURE: Status: ACTIVE | Noted: 2019-07-31

## 2019-07-31 PROBLEM — J44.1 COPD EXACERBATION: Status: ACTIVE | Noted: 2019-07-31

## 2019-07-31 PROBLEM — J96.01 ACUTE HYPOXEMIC RESPIRATORY FAILURE: Status: ACTIVE | Noted: 2019-07-31

## 2019-07-31 PROBLEM — R79.89 POSITIVE D DIMER: Status: ACTIVE | Noted: 2019-07-31

## 2019-07-31 LAB
ALBUMIN SERPL BCP-MCNC: 3.4 G/DL (ref 3.5–5.2)
ALP SERPL-CCNC: 106 U/L (ref 55–135)
ALT SERPL W/O P-5'-P-CCNC: 12 U/L (ref 10–44)
ANION GAP SERPL CALC-SCNC: 11 MMOL/L (ref 8–16)
AORTIC ROOT ANNULUS: 2.58 CM
AST SERPL-CCNC: 13 U/L (ref 10–40)
AV INDEX (PROSTH): 0.84
AV MEAN GRADIENT: 3 MMHG
AV PEAK GRADIENT: 6 MMHG
AV VALVE AREA: 2.1 CM2
AV VELOCITY RATIO: 0.8
BASOPHILS # BLD AUTO: 0.01 K/UL (ref 0–0.2)
BASOPHILS NFR BLD: 0.2 % (ref 0–1.9)
BILIRUB SERPL-MCNC: 0.7 MG/DL (ref 0.1–1)
BILIRUB UR QL STRIP: NEGATIVE
BSA FOR ECHO PROCEDURE: 1.46 M2
BUN SERPL-MCNC: 19 MG/DL (ref 8–23)
CALCIUM SERPL-MCNC: 9.2 MG/DL (ref 8.7–10.5)
CHLORIDE SERPL-SCNC: 105 MMOL/L (ref 95–110)
CLARITY UR: CLEAR
CO2 SERPL-SCNC: 24 MMOL/L (ref 23–29)
COLOR UR: YELLOW
CREAT SERPL-MCNC: 0.9 MG/DL (ref 0.5–1.4)
CV ECHO LV RWT: 0.44 CM
DIFFERENTIAL METHOD: ABNORMAL
DOP CALC AO PEAK VEL: 1.2 M/S
DOP CALC AO VTI: 27.61 CM
DOP CALC LVOT AREA: 2.5 CM2
DOP CALC LVOT DIAMETER: 1.79 CM
DOP CALC LVOT PEAK VEL: 0.96 M/S
DOP CALC LVOT STROKE VOLUME: 58 CM3
DOP CALCLVOT PEAK VEL VTI: 23.06 CM
E WAVE DECELERATION TIME: 255.79 MSEC
E/A RATIO: 0.73
ECHO LV POSTERIOR WALL: 0.84 CM (ref 0.6–1.1)
EOSINOPHIL # BLD AUTO: 0.1 K/UL (ref 0–0.5)
EOSINOPHIL NFR BLD: 1.8 % (ref 0–8)
ERYTHROCYTE [DISTWIDTH] IN BLOOD BY AUTOMATED COUNT: 12.5 % (ref 11.5–14.5)
EST. GFR  (AFRICAN AMERICAN): >60 ML/MIN/1.73 M^2
EST. GFR  (NON AFRICAN AMERICAN): >60 ML/MIN/1.73 M^2
FRACTIONAL SHORTENING: 39 % (ref 28–44)
GLUCOSE SERPL-MCNC: 136 MG/DL (ref 70–110)
GLUCOSE UR QL STRIP: NEGATIVE
HCT VFR BLD AUTO: 40.8 % (ref 37–48.5)
HGB BLD-MCNC: 13.4 G/DL (ref 12–16)
HGB UR QL STRIP: NEGATIVE
IMM GRANULOCYTES # BLD AUTO: 0.02 K/UL (ref 0–0.04)
IMM GRANULOCYTES NFR BLD AUTO: 0.4 % (ref 0–0.5)
INTERVENTRICULAR SEPTUM: 1.02 CM (ref 0.6–1.1)
IVRT: 0.13 MSEC
KETONES UR QL STRIP: NEGATIVE
LA MAJOR: 3.46 CM
LA MINOR: 4.2 CM
LA WIDTH: 2.74 CM
LEFT ATRIUM SIZE: 2.75 CM
LEFT ATRIUM VOLUME INDEX: 16.6 ML/M2
LEFT ATRIUM VOLUME: 24.3 CM3
LEFT INTERNAL DIMENSION IN SYSTOLE: 2.32 CM (ref 2.1–4)
LEFT VENTRICLE DIASTOLIC VOLUME INDEX: 42.67 ML/M2
LEFT VENTRICLE DIASTOLIC VOLUME: 62.53 ML
LEFT VENTRICLE MASS INDEX: 73 G/M2
LEFT VENTRICLE SYSTOLIC VOLUME INDEX: 12.6 ML/M2
LEFT VENTRICLE SYSTOLIC VOLUME: 18.5 ML
LEFT VENTRICULAR INTERNAL DIMENSION IN DIASTOLE: 3.81 CM (ref 3.5–6)
LEFT VENTRICULAR MASS: 106.25 G
LEUKOCYTE ESTERASE UR QL STRIP: NEGATIVE
LYMPHOCYTES # BLD AUTO: 0.6 K/UL (ref 1–4.8)
LYMPHOCYTES NFR BLD: 10.6 % (ref 18–48)
MCH RBC QN AUTO: 28.3 PG (ref 27–31)
MCHC RBC AUTO-ENTMCNC: 32.8 G/DL (ref 32–36)
MCV RBC AUTO: 86 FL (ref 82–98)
MONOCYTES # BLD AUTO: 0.1 K/UL (ref 0.3–1)
MONOCYTES NFR BLD: 0.9 % (ref 4–15)
MV PEAK A VEL: 0.96 M/S
MV PEAK E VEL: 0.7 M/S
NEUTROPHILS # BLD AUTO: 4.7 K/UL (ref 1.8–7.7)
NEUTROPHILS NFR BLD: 86.1 % (ref 38–73)
NITRITE UR QL STRIP: NEGATIVE
NRBC BLD-RTO: 0 /100 WBC
PH UR STRIP: 7 [PH] (ref 5–8)
PISA TR MAX VEL: 2.5 M/S
PLATELET # BLD AUTO: 254 K/UL (ref 150–350)
PMV BLD AUTO: 10.1 FL (ref 9.2–12.9)
POTASSIUM SERPL-SCNC: 4.7 MMOL/L (ref 3.5–5.1)
PROT SERPL-MCNC: 6.7 G/DL (ref 6–8.4)
PROT UR QL STRIP: NEGATIVE
PV PEAK VELOCITY: 0.87 CM/S
RA MAJOR: 3.35 CM
RBC # BLD AUTO: 4.73 M/UL (ref 4–5.4)
RIGHT VENTRICULAR END-DIASTOLIC DIMENSION: 2.43 CM
SODIUM SERPL-SCNC: 140 MMOL/L (ref 136–145)
SP GR UR STRIP: 1.01 (ref 1–1.03)
STJ: 2.65 CM
TR MAX PG: 25 MMHG
TROPONIN I SERPL DL<=0.01 NG/ML-MCNC: 0.02 NG/ML (ref 0–0.03)
TROPONIN I SERPL DL<=0.01 NG/ML-MCNC: <0.006 NG/ML (ref 0–0.03)
URN SPEC COLLECT METH UR: NORMAL
UROBILINOGEN UR STRIP-ACNC: NEGATIVE EU/DL
WBC # BLD AUTO: 5.48 K/UL (ref 3.9–12.7)

## 2019-07-31 PROCEDURE — 63600175 PHARM REV CODE 636 W HCPCS: Performed by: SURGERY

## 2019-07-31 PROCEDURE — 25000003 PHARM REV CODE 250: Performed by: FAMILY MEDICINE

## 2019-07-31 PROCEDURE — 99220 PR INITIAL OBSERVATION CARE,LEVL III: ICD-10-PCS | Mod: ,,, | Performed by: FAMILY MEDICINE

## 2019-07-31 PROCEDURE — 84484 ASSAY OF TROPONIN QUANT: CPT | Mod: 91

## 2019-07-31 PROCEDURE — G0378 HOSPITAL OBSERVATION PER HR: HCPCS

## 2019-07-31 PROCEDURE — 96376 TX/PRO/DX INJ SAME DRUG ADON: CPT

## 2019-07-31 PROCEDURE — 27000221 HC OXYGEN, UP TO 24 HOURS

## 2019-07-31 PROCEDURE — 80053 COMPREHEN METABOLIC PANEL: CPT

## 2019-07-31 PROCEDURE — 81003 URINALYSIS AUTO W/O SCOPE: CPT

## 2019-07-31 PROCEDURE — 25000003 PHARM REV CODE 250: Performed by: SURGERY

## 2019-07-31 PROCEDURE — 93010 EKG 12-LEAD: ICD-10-PCS | Mod: ,,, | Performed by: INTERNAL MEDICINE

## 2019-07-31 PROCEDURE — 93005 ELECTROCARDIOGRAM TRACING: CPT

## 2019-07-31 PROCEDURE — 93010 ELECTROCARDIOGRAM REPORT: CPT | Mod: ,,, | Performed by: INTERNAL MEDICINE

## 2019-07-31 PROCEDURE — 99900035 HC TECH TIME PER 15 MIN (STAT)

## 2019-07-31 PROCEDURE — 85025 COMPLETE CBC W/AUTO DIFF WBC: CPT

## 2019-07-31 PROCEDURE — 94761 N-INVAS EAR/PLS OXIMETRY MLT: CPT

## 2019-07-31 PROCEDURE — 84484 ASSAY OF TROPONIN QUANT: CPT

## 2019-07-31 PROCEDURE — 25000003 PHARM REV CODE 250: Performed by: INTERNAL MEDICINE

## 2019-07-31 PROCEDURE — 36415 COLL VENOUS BLD VENIPUNCTURE: CPT

## 2019-07-31 PROCEDURE — 63600175 PHARM REV CODE 636 W HCPCS: Performed by: INTERNAL MEDICINE

## 2019-07-31 PROCEDURE — 99220 PR INITIAL OBSERVATION CARE,LEVL III: CPT | Mod: ,,, | Performed by: FAMILY MEDICINE

## 2019-07-31 PROCEDURE — 25000003 PHARM REV CODE 250: Performed by: NURSE PRACTITIONER

## 2019-07-31 PROCEDURE — 96372 THER/PROPH/DIAG INJ SC/IM: CPT | Mod: 59

## 2019-07-31 RX ORDER — METHSCOPOLAMINE BROMIDE 2.5 MG/1
2.5 TABLET ORAL 2 TIMES DAILY
Status: DISCONTINUED | OUTPATIENT
Start: 2019-07-31 | End: 2019-08-05 | Stop reason: HOSPADM

## 2019-07-31 RX ORDER — LORAZEPAM 0.5 MG/1
0.5 TABLET ORAL ONCE
Status: COMPLETED | OUTPATIENT
Start: 2019-07-31 | End: 2019-07-31

## 2019-07-31 RX ORDER — DULOXETIN HYDROCHLORIDE 30 MG/1
30 CAPSULE, DELAYED RELEASE ORAL DAILY
Status: DISCONTINUED | OUTPATIENT
Start: 2019-07-31 | End: 2019-08-05 | Stop reason: HOSPADM

## 2019-07-31 RX ORDER — ENOXAPARIN SODIUM 100 MG/ML
50 INJECTION SUBCUTANEOUS EVERY 12 HOURS
Status: DISCONTINUED | OUTPATIENT
Start: 2019-07-31 | End: 2019-08-01

## 2019-07-31 RX ORDER — MIRTAZAPINE 7.5 MG/1
15 TABLET, FILM COATED ORAL NIGHTLY
Status: DISCONTINUED | OUTPATIENT
Start: 2019-07-31 | End: 2019-08-05 | Stop reason: HOSPADM

## 2019-07-31 RX ORDER — ALPRAZOLAM 0.25 MG/1
0.25 TABLET ORAL NIGHTLY PRN
Status: DISCONTINUED | OUTPATIENT
Start: 2019-07-31 | End: 2019-08-02

## 2019-07-31 RX ORDER — DULOXETIN HYDROCHLORIDE 30 MG/1
30 CAPSULE, DELAYED RELEASE ORAL DAILY
Status: DISCONTINUED | OUTPATIENT
Start: 2019-07-31 | End: 2019-07-31

## 2019-07-31 RX ADMIN — ENOXAPARIN SODIUM 50 MG: 100 INJECTION SUBCUTANEOUS at 06:07

## 2019-07-31 RX ADMIN — LORAZEPAM 0.5 MG: 0.5 TABLET ORAL at 04:07

## 2019-07-31 RX ADMIN — METHYLPREDNISOLONE SODIUM SUCCINATE 80 MG: 125 INJECTION, POWDER, FOR SOLUTION INTRAMUSCULAR; INTRAVENOUS at 10:07

## 2019-07-31 RX ADMIN — METHYLPREDNISOLONE SODIUM SUCCINATE 80 MG: 125 INJECTION, POWDER, FOR SOLUTION INTRAMUSCULAR; INTRAVENOUS at 02:07

## 2019-07-31 RX ADMIN — LOSARTAN POTASSIUM 50 MG: 50 TABLET, FILM COATED ORAL at 09:07

## 2019-07-31 RX ADMIN — METHSCOPOLAMINE BROMIDE 2.5 MG: 2.5 TABLET ORAL at 08:07

## 2019-07-31 RX ADMIN — MIRTAZAPINE 15 MG: 7.5 TABLET ORAL at 08:07

## 2019-07-31 RX ADMIN — DULOXETINE 30 MG: 30 CAPSULE, DELAYED RELEASE ORAL at 12:07

## 2019-07-31 RX ADMIN — ALPRAZOLAM 0.25 MG: 0.25 TABLET ORAL at 08:07

## 2019-07-31 RX ADMIN — PANTOPRAZOLE SODIUM 40 MG: 40 TABLET, DELAYED RELEASE ORAL at 09:07

## 2019-07-31 RX ADMIN — CLONIDINE HYDROCHLORIDE 0.1 MG: 0.1 TABLET ORAL at 05:07

## 2019-07-31 RX ADMIN — NEBIVOLOL HYDROCHLORIDE 10 MG: 5 TABLET ORAL at 09:07

## 2019-07-31 RX ADMIN — CLONIDINE HYDROCHLORIDE 0.1 MG: 0.1 TABLET ORAL at 06:07

## 2019-07-31 RX ADMIN — METHYLPREDNISOLONE SODIUM SUCCINATE 80 MG: 125 INJECTION, POWDER, FOR SOLUTION INTRAMUSCULAR; INTRAVENOUS at 05:07

## 2019-07-31 NOTE — ASSESSMENT & PLAN NOTE
"Severe  mutliple family issues  Prescribed Remeron 15mg q Pm and Cybalta 30mg po daily  Pt states she never started bc she was "scared to take anti- depressants  Takes 0.5mg xanax nightly- discussed risks and encouraged to wean  Taper xanax- decrease to 0.25mg nightly prn  Will start cymbalta and remeron here     "

## 2019-07-31 NOTE — HPI
Marva Perez is a 78 y.o. female  With PMHX of HTN, HLD, GERD, Depression, anxiety, and COPD/Emphysema  presented to ER yesterday with shortness of breath today with associated anxiety. She walked into family clinic with c/o acute onset of SOB and O2 sat registered at 78%; sent to the ER immediately via EMS  Patient is not on oxygen at home, denied chest pain on admit  Lab Results   Component Value Date    DDIMER 0.42 07/30/2019   + iodine allergy - VQ scan this am pending. Dr. Matson following.   Patient having severe family issues with anxiety, also has severe COPD as well

## 2019-07-31 NOTE — HPI
78 y.o. year old female with history of PAF, HTN, COPD, chronic respiratory failure, dyslipidemia, anxiety, presents with a chief complaint of SOB and Wheezing for 14 days.Patient had called yesterday sob I had ordered ACTA Chest . Pt went to regular Md where sat was 78%.Admitted through ER. Elevated D-dimer noted

## 2019-07-31 NOTE — HOSPITAL COURSE
"7/31/19 H&P continued O2 sats 92% on R/A this am    8/1/19 VQ scan showing intermediate probability of pulmonary embolism due to the patient's extensive underlying emphysematous change. She has had prior CT abd with runoff in May 2019; will pre-medicate for CT scan to rule out PE ; refused benadryl - pepcid ordered   O2 sat 98-99% on 2LNC. Afebrile, continue steroids. Monitor with overnight pulse ox tonight with oxygen. Ordered per Dr. Matson   /70 , HR 61  Started on Cymbalta and remeron yesterday; pt notes she slept really well last PM   Had 6 minute walk yesterday - o2 sat 87% with activity. Pt requesting small portable tanks for home- concentrator  PE study neg.  8/2/19 O2 sat stable ovenight. Refused BIPAP. Do not think she will tolerate BIPAP with her severe anxiety.   Coughed up some blood this am and panicked. O2 2LNC,  O2 sat 98%, Notes breathing is better.   Ct negative for PE Severe emphysematous changes within the lung fields bilaterally greater on the right than left.  Prior granulomatous disease.  No acute infiltrate or effusion.  States she is feeling "very nervous" this morning, Xanax was being weaned.     8/3/19 overnight patient developed a-fib with RVR. She had episode of a-fib after her colon surgery 4 years ago. Did follow with Dr. Wilde at that time and tells me was "weaned off all medications" She was not on blood thinners, h/o PUD. No issues with active bleeding for several years per patient. Her breathing is much better, no wheezing.     8/4: feeling great today. Had brief episode of SVT overnight with elevated HR, resolved without intervention. She reports breathing back to baseline, wearing her O2. Dr. Matson wants to bronch tomorrow as having scant hemoptysis.     8/5 had bronch this am and had bleeding noted on PRATIBHA. Eliquis d/kathryn. Discussed with cards as well for her aprox a fib. Will be d/kathryn on asa. Has home O2 set up. Breathing is at baseline on home O2, ready for d/c today with pulm " and cards follow up outpatient.

## 2019-07-31 NOTE — PLAN OF CARE
07/31/19 1313   Discharge Assessment   Assessment Type Discharge Planning Reassessment     Ms Perez will remain here today for COPD care. She qualifies for home oxygen and CM will secure for her before she goes home. CM contact information and discharge brochure given. Brochure checklist reviewed with patient and all questions answered. Discharge information sheet for COPD given including signs and symptoms indicating return to ED or call to PCP. Compliance and understanding voiced.

## 2019-07-31 NOTE — PLAN OF CARE
Problem: Adult Inpatient Plan of Care  Goal: Plan of Care Review  Outcome: Ongoing (interventions implemented as appropriate)  Patient very anxious throughout shift. Some complaints of SOB on exertion. CXR shows worsening emphysema. Patient may also benefit from speaking with psych while admitted. Family is concerned of patients wellbeing at this time and would like to explore all avenues of appropriate care. Patient has had several negative impact events over the past several years and has had increasing anxiety. Some HTN noted and sats fluctuating on exertion, otherwise VS stable. A&Ox4. Plan of care reviewed and agreed upon with patient and family.

## 2019-07-31 NOTE — ASSESSMENT & PLAN NOTE
Known severe emphysema  Dr. Matson following   Solumedrol 80 q 8  She should qualify for home O2. Will get overnight pulse ox AND walk her in the am to see if we can get her some portable O2   She really doesn't have much wheezing at all. Just has a lot of trapped air and poor air exchange. Needs home O2

## 2019-07-31 NOTE — PLAN OF CARE
07/31/19 1305   Discharge Assessment   Assessment Type Discharge Planning Assessment   Confirmed/corrected address and phone number on facesheet? Yes   Assessment information obtained from? Patient   Prior to hospitilization cognitive status: Alert/Oriented   Prior to hospitalization functional status: Independent   Current cognitive status: Alert/Oriented   Current Functional Status: Independent   Facility Arrived From: [facility]   Lives With alone   Able to Return to Prior Arrangements yes   Is patient able to care for self after discharge? Yes   Who are your caregiver(s) and their phone number(s)? Mckenna Larson (Daughter) [phone]   Patient's perception of discharge disposition home or selfcare   Readmission Within the Last 30 Days no previous admission in last 30 days   Patient currently being followed by outpatient case management? No   Patient currently receives any other outside agency services? No   Equipment Currently Used at Home none   Do you have any problems affording any of your prescribed medications? No   Does the patient have transportation home? Yes   Transportation Anticipated family or friend will provide   Discharge Plan A Home   Discharge Plan B Home with family   Patient/Family in Agreement with Plan yes       Patient may be in need of oxygen for at home use. SW to continue to monitor needs throughout hospital stay.     Shaneka Carrasco, LUIS

## 2019-07-31 NOTE — H&P
[facility] Medicine  History & Physical    Patient Name: Marva Perez  MRN: [id number]  Admission Date: 7/30/2019  Attending Physician: Carissa Green MD   Primary Care Provider: Kevin Clements MD         Patient information was obtained from patient and ER records.     Subjective:     Principal Problem:COPD exacerbation    Chief Complaint:   Chief Complaint   Patient presents with    Shortness of Breath        HPI:   Marva Perez is a 78 y.o. female  With PMHX of HTN, HLD, GERD, Depression, anxiety, and COPD/Emphysema  presented to ER yesterday with shortness of breath today with associated anxiety. [facility] with c/o acute onset of SOB and O2 sat registered at 78%; sent to the ER immediately via EMS  Patient is not on oxygen at home, denied chest pain on admit  Lab Results   Component Value Date    DDIMER 0.42 07/30/2019   + iodine allergy - VQ scan this am pending. Dr. Matson following.   Patient having severe family issues with anxiety, also has severe COPD as well      Past Medical History:   Diagnosis Date    Abnormal Pap smear 7/17/13    LGSIL    COPD (chronic obstructive pulmonary disease)     Depression     GERD (gastroesophageal reflux disease)     Hyperlipidemia     Hypertension        Past Surgical History:   Procedure Laterality Date    APPENDECTOMY      CERVICAL BIOPSY  W/ LOOP ELECTRODE EXCISION      CHOLECYSTECTOMY      COLLATERAL LIGAMENT REPAIR, KNEE      left knee    COLONOSCOPY      DILATION AND CURETTAGE OF UTERUS      SINUS SURGERY         Review of patient's allergies indicates:   Allergen Reactions    Pcn [penicillins] Hives and Itching    Tetracyclines     Bactrim [sulfamethoxazole-trimethoprim] Rash    Ilosone Rash    Iodine and iodide containing products Rash    Sulfa (sulfonamide antibiotics) Hives and Rash       No current facility-administered medications on file prior to encounter.      Current Outpatient  Medications on File Prior to Encounter   Medication Sig    albuterol (ACCUNEB) 0.63 mg/3 mL Nebu Take 3 mLs (0.63 mg total) by nebulization every 6 (six) hours as needed.    ALPRAZolam (XANAX) 0.25 MG tablet Take 1 tablet during the day for anxiety as needed and 2 at night for sleep    BYSTOLIC 10 mg Tab TAKE 1 TABLET BY MOUTH EVERY DAY    fluticasone-salmeterol 250-50 mcg/dose (ADVAIR DISKUS) 250-50 mcg/dose diskus inhaler USE 1 INHALATION TWICE A DAY    ipratropium (ATROVENT) 0.02 % nebulizer solution Take 2.5 mLs (500 mcg total) by nebulization 2 (two) times daily as needed for Wheezing.    losartan (COZAAR) 50 MG tablet Take 50 mg by mouth once daily.    methscopolamine (PAMINE) 2.5 mg Tab Take 1 tablet (2.5 mg total) by mouth 2 (two) times daily.    tiotropium (SPIRIVA WITH HANDIHALER) 18 mcg inhalation capsule INHALE THE CONTENTS OF 1 CAPSULE DAILY    albuterol (PROVENTIL/VENTOLIN HFA) 90 mcg/actuation inhaler Inhale 2 puffs into the lungs every 6 (six) hours as needed for Wheezing.    cilostazol (PLETAL) 50 MG Tab     clobetasol (TEMOVATE) 0.05 % cream Apply topically 2 (two) times daily. for 10 days    dronabinol (MARINOL) 5 MG capsule Take 1 capsule (5 mg total) by mouth 2 (two) times daily before meals.    DULoxetine (CYMBALTA) 30 MG capsule One po q am for depression    mirtazapine (REMERON) 15 MG tablet One po q hs for rest and depression    pantoprazole (PROTONIX) 40 MG tablet Take 1 tablet (40 mg total) by mouth once daily.    tretinoin (RETIN-A) 0.05 % cream Apply topically every evening.    tretinoin (RETIN-A) 0.1 % cream Apply topically nightly.     Family History     Problem Relation (Age of Onset)    Breast cancer Mother        Tobacco Use    Smoking status: Former Smoker     Years: 30.00     Types: Cigarettes     Last attempt to quit: 10/30/2006     Years since quittin.7    Smokeless tobacco: Never Used   Substance and Sexual Activity    Alcohol use: No     Comment: No     Drug use: No    Sexual activity: Not Currently     Birth control/protection: Post-menopausal     Comment:      Review of Systems   Constitutional: Negative for chills, fatigue and fever.   HENT: Negative for congestion, ear pain, postnasal drip, sinus pressure, sneezing and sore throat.    Eyes: Negative for discharge.   Respiratory: Positive for shortness of breath ( better this am  but notes TAVARES ). Negative for cough and chest tightness.    Cardiovascular: Negative.    Gastrointestinal: Negative for abdominal pain, constipation, diarrhea, nausea and vomiting.   Genitourinary: Negative for difficulty urinating, flank pain and hematuria.   Musculoskeletal: Negative.  Negative for arthralgias and joint swelling.   Skin: Negative.    Neurological: Negative for dizziness and headaches.   Psychiatric/Behavioral: Negative for behavioral problems and confusion. The patient is nervous/anxious.      Objective:     Vital Signs (Most Recent):  Temp: 96.2 °F (35.7 °C) (07/31/19 0755)  Pulse: 77 (07/31/19 0808)  Resp: 17 (07/31/19 0755)  BP: 136/80 (07/31/19 0755)  SpO2: 95 % (07/31/19 0755) Vital Signs (24h Range):  Temp:  [96 °F (35.6 °C)-96.9 °F (36.1 °C)] 96.2 °F (35.7 °C)  Pulse:  [71-92] 77  Resp:  [16-29] 17  SpO2:  [90 %-96 %] 95 %  BP: (136-195)/(68-93) 136/80     Weight: 48.5 kg (107 lb)  Body mass index is 19.57 kg/m².    Physical Exam   Constitutional: She is oriented to person, place, and time. She appears well-developed and well-nourished.   HENT:   Head: Normocephalic and atraumatic.   Right Ear: External ear normal.   Left Ear: External ear normal.   Nose: Nose normal.   Mouth/Throat: Oropharynx is clear and moist.   Eyes: Pupils are equal, round, and reactive to light. EOM are normal.   Neck: Normal range of motion. Neck supple. No JVD present. No tracheal deviation present. No thyromegaly present.   Cardiovascular: Normal rate, normal heart sounds and intact distal pulses.   No murmur  heard.  Pulmonary/Chest: Effort normal and breath sounds normal. No respiratory distress. She has no wheezes. She has no rales. She exhibits no tenderness.   Increased AP diameter of chest. Very poor air exchange    Abdominal: Soft. Bowel sounds are normal. She exhibits no distension and no mass. There is no tenderness. There is no rebound and no guarding.   Musculoskeletal: Normal range of motion. She exhibits no edema or tenderness.   Lymphadenopathy:     She has no cervical adenopathy.   Neurological: She is alert and oriented to person, place, and time. She has normal reflexes. She displays normal reflexes. No cranial nerve deficit. She exhibits normal muscle tone. Coordination normal.   Skin: Skin is warm and dry. No rash noted. No erythema. No pallor.   Psychiatric: Her behavior is normal. Judgment and thought content normal.   Tearful  Anxious          CRANIAL NERVES     CN III, IV, VI   Pupils are equal, round, and reactive to light.  Extraocular motions are normal.        Significant Labs:   A1C: No results for input(s): HGBA1C in the last 4320 hours.  Blood Culture: No results for input(s): LABBLOO in the last 48 hours.  CBC:   Recent Labs   Lab 07/30/19 1742 07/31/19  0716   WBC 7.76 5.48   HGB 13.8 13.4   HCT 42.1 40.8    254     CMP:   Recent Labs   Lab 07/30/19 1742 07/31/19  0716    140   K 3.6 4.7    105   CO2 30* 24   * 136*   BUN 15 19   CREATININE 1.1 0.9   CALCIUM 9.5 9.2   PROT 7.1 6.7   ALBUMIN 3.7 3.4*   BILITOT 0.7 0.7   ALKPHOS 114 106   AST 14 13   ALT 12 12   ANIONGAP 11 11   EGFRNONAA 49* >60     Cardiac Markers:   Recent Labs   Lab 07/30/19  1742   *     Lactic Acid: No results for input(s): LACTATE in the last 48 hours.  Prealbumin: No results for input(s): PREALBUMIN in the last 48 hours.  Troponin:   Recent Labs   Lab 07/30/19  1910 07/31/19  0052 07/31/19  0716   TROPONINI <0.006 0.017 <0.006     TSH:   Recent Labs   Lab 03/21/19  1049   TSH 2.773  "    Urine Culture: No results for input(s): LABURIN in the last 48 hours.  Urine Studies:   Recent Labs   Lab 07/31/19  0732   COLORU Yellow   APPEARANCEUA Clear   PHUR 7.0   SPECGRAV 1.015   PROTEINUA Negative   GLUCUA Negative   KETONESU Negative   BILIRUBINUA Negative   OCCULTUA Negative   NITRITE Negative   UROBILINOGEN Negative   LEUKOCYTESUR Negative       Significant Imaging: I have reviewed and interpreted all pertinent imaging results/findings within the past 24 hours.     7/30/19 CXR- No acute process seen.  Severe emphysematous changes.    Assessment/Plan:     * COPD exacerbation  Supplemental O2 via nasal canula; titrate O2 saturation to >92%.   Continue  Solumedrol  IV q 8 hrs.   Pulmonary consultation.   Continue beta 2 agonist bronchodilator treatments.   Continue IV antibiotics.  Check sputum GS and Cx.   Continue routine medications as before.             Positive D dimer  + iodine allergy-   Will get VQ scan     Chronic respiratory failure        Acute hypoxemic respiratory failure  o2 sat 77% on arrival  Better this am  Check 6 minute walk       Centrilobular emphysema        Emphysema/COPD  Known severe emphysema  Dr. Matson following   Solumedrol 80 q 8  She should qualify for home O2. Will get overnight pulse ox AND walk her in the am to see if we can get her some portable O2   She really doesn't have much wheezing at all. Just has a lot of trapped air and poor air exchange. Needs home O2       Hyperlipidemia    Not on statin at home    HTN (hypertension)  Continue losartan, nebivolol       KATHIE (generalized anxiety disorder)  Severe  mutliple family issues  Prescribed Remeron 15mg q Pm and Cybalta 30mg po daily  Pt states she never started bc she was "scared to take anti- depressants  Takes 0.5mg xanax nightly- discussed risks and encouraged to wean  Taper xanax- decrease to 0.25mg nightly prn  Will start cymbalta and remeron here         VTE Risk Mitigation (From admission, onward)        " Ordered     IP VTE HIGH RISK PATIENT  Once      07/30/19 1858     Place sequential compression device  Until discontinued      07/30/19 1858             Arik Burleson MD  Department of Hospital Medicine   [facility]

## 2019-07-31 NOTE — PLAN OF CARE
07/31/19 1332   BURTON Message   Medicare Outpatient and Observation Notification regarding financial responsibility Given to patient/caregiver;Explained to patient/caregiver;Signed/date by patient/caregiver   Date BURTON was signed 07/31/19   Time BURTON was signed [id number]

## 2019-07-31 NOTE — PROGRESS NOTES
Home Oxygen Evaluation    Date Performed: 2019    1) Patient's O2 Sat on room air, while at rest: 92%        If O2 sats on room air at rest are 88% or below, patient qualifies. No additional testing needed. Document N/A in steps 2 and 3. If 89% or above, complete steps 2.      2) Patient's O2 Sat on room air while exercisin%        If O2 sats on room air while exercising remain 89% or above patient does not qualify, no further testing needed Document N/A in step 3. If O2 sats on room air while exercising are 88% or below, continue to step 3.      3) Patient's O2 Sat while exercising on O2: 93% at 2 LPM         (Must show improvement from #2 for patients to qualify)    If O2 sats improve on oxygen, patient qualifies for portable oxygen. If not, the patient does not qualify.

## 2019-07-31 NOTE — PROGRESS NOTES
Staff Handoff  Bedside report received from VIVIAN Cerna. VS stable. Afebrile. No distress noted. Assessment complete per flowsheet. Call bell in reach. Instructed to call for any needs. Will continue to monitor for any change in status.    Resident Handoff

## 2019-07-31 NOTE — ASSESSMENT & PLAN NOTE
Supplemental O2 via nasal canula; titrate O2 saturation to >92%.   Continue  Solumedrol  IV q 8 hrs.   Pulmonary consultation.   Continue beta 2 agonist bronchodilator treatments.   Continue IV antibiotics.  Check sputum GS and Cx.   Continue routine medications as before.

## 2019-07-31 NOTE — PLAN OF CARE
Problem: Adult Inpatient Plan of Care  Goal: Plan of Care Review  Outcome: Ongoing (interventions implemented as appropriate)  Patient aware of plan of care. IV steroids continued. Six minute walk with respiratory done today, pt qualifies for oxygen at home. Reece foster applied. lovenox and U/S BLE ordered per Dr. Matson. 1x dose ativan given with good relief. Free from falls/injuries. No questions or concerns at this time. Agrees with plan of care.

## 2019-07-31 NOTE — SUBJECTIVE & OBJECTIVE
Past Medical History:   Diagnosis Date    Abnormal Pap smear 7/17/13    LGSIL    COPD (chronic obstructive pulmonary disease)     Depression     GERD (gastroesophageal reflux disease)     Hyperlipidemia     Hypertension        Past Surgical History:   Procedure Laterality Date    APPENDECTOMY      CERVICAL BIOPSY  W/ LOOP ELECTRODE EXCISION      CHOLECYSTECTOMY      COLLATERAL LIGAMENT REPAIR, KNEE      left knee    COLONOSCOPY      DILATION AND CURETTAGE OF UTERUS      SINUS SURGERY         Review of patient's allergies indicates:   Allergen Reactions    Pcn [penicillins] Hives and Itching    Tetracyclines     Bactrim [sulfamethoxazole-trimethoprim] Rash    Ilosone Rash    Iodine and iodide containing products Rash    Sulfa (sulfonamide antibiotics) Hives and Rash       No current facility-administered medications on file prior to encounter.      Current Outpatient Medications on File Prior to Encounter   Medication Sig    albuterol (ACCUNEB) 0.63 mg/3 mL Nebu Take 3 mLs (0.63 mg total) by nebulization every 6 (six) hours as needed.    ALPRAZolam (XANAX) 0.25 MG tablet Take 1 tablet during the day for anxiety as needed and 2 at night for sleep    BYSTOLIC 10 mg Tab TAKE 1 TABLET BY MOUTH EVERY DAY    fluticasone-salmeterol 250-50 mcg/dose (ADVAIR DISKUS) 250-50 mcg/dose diskus inhaler USE 1 INHALATION TWICE A DAY    ipratropium (ATROVENT) 0.02 % nebulizer solution Take 2.5 mLs (500 mcg total) by nebulization 2 (two) times daily as needed for Wheezing.    losartan (COZAAR) 50 MG tablet Take 50 mg by mouth once daily.    methscopolamine (PAMINE) 2.5 mg Tab Take 1 tablet (2.5 mg total) by mouth 2 (two) times daily.    tiotropium (SPIRIVA WITH HANDIHALER) 18 mcg inhalation capsule INHALE THE CONTENTS OF 1 CAPSULE DAILY    albuterol (PROVENTIL/VENTOLIN HFA) 90 mcg/actuation inhaler Inhale 2 puffs into the lungs every 6 (six) hours as needed for Wheezing.    cilostazol (PLETAL) 50 MG Tab      clobetasol (TEMOVATE) 0.05 % cream Apply topically 2 (two) times daily. for 10 days    dronabinol (MARINOL) 5 MG capsule Take 1 capsule (5 mg total) by mouth 2 (two) times daily before meals.    DULoxetine (CYMBALTA) 30 MG capsule One po q am for depression    mirtazapine (REMERON) 15 MG tablet One po q hs for rest and depression    pantoprazole (PROTONIX) 40 MG tablet Take 1 tablet (40 mg total) by mouth once daily.    tretinoin (RETIN-A) 0.05 % cream Apply topically every evening.    tretinoin (RETIN-A) 0.1 % cream Apply topically nightly.     Family History     Problem Relation (Age of Onset)    Breast cancer Mother        Tobacco Use    Smoking status: Former Smoker     Years: 30.00     Types: Cigarettes     Last attempt to quit: 10/30/2006     Years since quittin.7    Smokeless tobacco: Never Used   Substance and Sexual Activity    Alcohol use: No     Comment: No    Drug use: No    Sexual activity: Not Currently     Birth control/protection: Post-menopausal     Comment:      Review of Systems   Constitution: Negative.   HENT: Negative.    Eyes: Negative.    Cardiovascular: Positive for leg swelling and paroxysmal nocturnal dyspnea. Negative for chest pain, claudication, cyanosis, dyspnea on exertion, irregular heartbeat, near-syncope, orthopnea and palpitations.   Respiratory: Positive for cough, shortness of breath, sleep disturbances due to breathing, sputum production and wheezing. Negative for hemoptysis and snoring.    Endocrine: Negative.    Hematologic/Lymphatic: Negative.    Skin: Negative.    Musculoskeletal: Positive for myalgias.   Gastrointestinal: Negative.    Genitourinary: Negative.    Neurological: Negative.    Psychiatric/Behavioral: The patient has insomnia.    Allergic/Immunologic: Negative.      Objective:     Vital Signs (Most Recent):  Temp: 96.2 °F (35.7 °C) (19)  Pulse: 80 (19)  Resp: 17 (19)  BP: 136/80 (19)  SpO2: 95 %  (07/31/19 0755) Vital Signs (24h Range):  Temp:  [96 °F (35.6 °C)-96.9 °F (36.1 °C)] 96.2 °F (35.7 °C)  Pulse:  [71-92] 80  Resp:  [16-29] 17  SpO2:  [90 %-96 %] 95 %  BP: (136-195)/(68-93) 136/80     Weight: 48.5 kg (107 lb)  Body mass index is 19.57 kg/m².    SpO2: 95 %  O2 Device (Oxygen Therapy): room air    No intake or output data in the 24 hours ending 07/31/19 0844    Lines/Drains/Airways     Peripheral Intravenous Line                 Peripheral IV - Single Lumen 07/30/19 1750 20 G Right Forearm less than 1 day                Physical Exam   Constitutional: She is oriented to person, place, and time. She appears well-developed and well-nourished. No distress.   HENT:   Head: Normocephalic and atraumatic.   Neck: Normal range of motion. Neck supple. No JVD present. No tracheal deviation present. No thyromegaly present.   Cardiovascular: Normal rate, regular rhythm and intact distal pulses. Exam reveals no gallop and no friction rub.   Murmur (2/6 ANTONELLA) heard.  Pulmonary/Chest: No stridor. No respiratory distress. She has wheezes. She has rales. She exhibits no tenderness.   Abdominal: Soft. Bowel sounds are normal. She exhibits no distension and no mass. There is no tenderness. There is no rebound and no guarding.   Musculoskeletal: Normal range of motion. She exhibits edema. She exhibits no tenderness or deformity.   Lymphadenopathy:     She has no cervical adenopathy.   Neurological: She is alert and oriented to person, place, and time.   Skin: Skin is warm and dry. No rash noted. She is not diaphoretic. No erythema. No pallor.   Psychiatric: Her behavior is normal. Judgment and thought content normal.       Significant Labs:   ABG: No results for input(s): PH, PCO2, HCO3, POCSATURATED, BE in the last 48 hours., Blood Culture: No results for input(s): LABBLOO in the last 48 hours., BMP:   Recent Labs   Lab 07/30/19  1742 07/31/19  0716   * 136*    140   K 3.6 4.7    105   CO2 30* 24   BUN  15 19   CREATININE 1.1 0.9   CALCIUM 9.5 9.2   , CMP   Recent Labs   Lab 07/30/19  1742 07/31/19 0716    140   K 3.6 4.7    105   CO2 30* 24   * 136*   BUN 15 19   CREATININE 1.1 0.9   CALCIUM 9.5 9.2   PROT 7.1 6.7   ALBUMIN 3.7 3.4*   BILITOT 0.7 0.7   ALKPHOS 114 106   AST 14 13   ALT 12 12   ANIONGAP 11 11   ESTGFRAFRICA 56* >60   EGFRNONAA 49* >60   , CBC   Recent Labs   Lab 07/30/19 1742 07/31/19 0716   WBC 7.76 5.48   HGB 13.8 13.4   HCT 42.1 40.8    254   , INR   Recent Labs   Lab 07/30/19 1742   INR 1.0   , Lipid Panel No results for input(s): CHOL, HDL, LDLCALC, TRIG, CHOLHDL in the last 48 hours.,   Pathology Results  (Last 10 years)    None      , Troponin   Recent Labs   Lab 07/30/19 1910 07/31/19 0052 07/31/19 0716   TROPONINI <0.006 0.017 <0.006   , All pertinent lab results from the last 24 hours have been reviewed. and   Recent Lab Results       07/31/19  0732   07/31/19  0716   07/31/19 0052   07/30/19 1910 07/30/19 1742        Albumin   3.4     3.7     Alkaline Phosphatase   106     114     ALT   12     12     Anion Gap   11     11     Appearance, UA Clear             aPTT         27.1  Comment:  aPTT therapeutic range = 39-69 seconds     AST   13     14     Baso #   0.01     0.05     Basophil%   0.2     0.6     Bilirubin (UA) Negative             BILIRUBIN TOTAL   0.7  Comment:  For infants and newborns, interpretation of results should be based  on gestational age, weight and in agreement with clinical  observations.  Premature Infant recommended reference ranges:  Up to 24 hours.............<8.0 mg/dL  Up to 48 hours............<12.0 mg/dL  3-5 days..................<15.0 mg/dL  6-29 days.................<15.0 mg/dL       0.7  Comment:  For infants and newborns, interpretation of results should be based  on gestational age, weight and in agreement with clinical  observations.  Premature Infant recommended reference ranges:  Up to 24 hours.............<8.0  mg/dL  Up to 48 hours............<12.0 mg/dL  3-5 days..................<15.0 mg/dL  6-29 days.................<15.0 mg/dL       BNP         128  Comment:  Values of less than 100 pg/ml are consistent with non-CHF populations.     BUN, Bld   19     15     Calcium   9.2     9.5     Chloride   105     104     CO2   24     30     Color, UA Yellow             CPK         58              58     CPK MB         1.7     Creatinine   0.9     1.1     D-Dimer         0.42  Comment:  The quantitative D-dimer assay should be used as an aid in   the diagnosis of deep vein thrombosis and pulmonary embolism  in patients with the appropriate presentation and clinical  history. The upper limit of the reference interval and the clinical   cut off   point are identical. Causes of a positive (>0.50 mg/L FEU) D-Dimer   test  include, but are not limited to: DVT, PE, DIC, thrombolytic   therapy, anticoagulant therapy, recent surgery, trauma, or   pregnancy, disseminated malignancy, aortic aneurysm, cirrhosis,  and severe infection. False negative results may occur in   patients with distal DVT.       Differential Method   Automated     Automated     eGFR if    >60     56     eGFR if non    >60  Comment:  Calculation used to obtain the estimated glomerular filtration  rate (eGFR) is the CKD-EPI equation.        49  Comment:  Calculation used to obtain the estimated glomerular filtration  rate (eGFR) is the CKD-EPI equation.        Eos #   0.1     0.6     Eosinophil%   1.8     7.2     Glucose   136     137     Glucose, UA Negative             Gran # (ANC)   4.7     4.9     Gran%   86.1     63.2     Hematocrit   40.8     42.1     Hemoglobin   13.4     13.8     Immature Grans (Abs)   0.02  Comment:  Mild elevation in immature granulocytes is non specific and   can be seen in a variety of conditions including stress response,   acute inflammation, trauma and pregnancy. Correlation with other   laboratory and  "clinical findings is essential.       0.02  Comment:  Mild elevation in immature granulocytes is non specific and   can be seen in a variety of conditions including stress response,   acute inflammation, trauma and pregnancy. Correlation with other   laboratory and clinical findings is essential.       Immature Granulocytes   0.4     0.3     Coumadin Monitoring INR         1.0  Comment:  Coumadin Therapy:  2.0 - 3.0 for INR for all indicators except mechanical heart valves  and antiphospholipid syndromes which should use 2.5 - 3.5.       Ketones, UA Negative             Leukocytes, UA Negative             Lymph #   0.6     1.7     Lymph%   10.6     21.9     MB%         2.9  Comment:  To be positive, the MB% must be greater than 5% AND the CK-MB  greater than 6.5 ng/mL. Values not in the reference interval,   but not qualifying as positive, should be considered "trace".       MCH   28.3     28.0     MCHC   32.8     32.8     MCV   86     85     Mono #   0.1     0.5     Mono%   0.9     6.8     MPV   10.1     9.6     NITRITE UA Negative             nRBC   0     0     Occult Blood UA Negative             pH, UA 7.0             Platelets   254     256     Potassium   4.7     3.6     PROTEIN TOTAL   6.7     7.1     Protein, UA Negative  Comment:  Recommend a 24 hour urine protein or a urine   protein/creatinine ratio if globulin induced proteinuria is  clinically suspected.               Protime         10.4     RBC   4.73     4.93     RDW   12.5     12.6     Sodium   140     145     Specific [place], UA 1.015             Specimen UA Urine, Clean Catch             Troponin I   <0.006  Comment:  The reference interval for Troponin I represents the 99th percentile   cutoff   for our facility and is consistent with 3rd generation assay   performance.   0.017  Comment:  The reference interval for Troponin I represents the 99th percentile   cutoff   for our facility and is consistent with 3rd generation assay   performance.   " <0.006  Comment:  The reference interval for Troponin I represents the 99th percentile   cutoff   for our facility and is consistent with 3rd generation assay   performance.   <0.006  Comment:  The reference interval for Troponin I represents the 99th percentile   cutoff   for our facility and is consistent with 3rd generation assay   performance.       UROBILINOGEN UA Negative             WBC   5.48     7.76                          Significant Imaging: CT scan: CT ABDOMEN PELVIS WITH CONTRAST: No results found for this visit on 07/30/19. and CT ABDOMEN PELVIS WITHOUT CONTRAST: No results found for this visit on 07/30/19., Echocardiogram: 2D echo with color flow doppler: No results found for this or any previous visit. and Transthoracic echo (TTE) complete (Cupid Only): No results found for this or any previous visit. and X-Ray: CXR: X-Ray Chest 1 View (CXR):   Results for orders placed or performed during the hospital encounter of 07/30/19   X-Ray Chest 1 View    Narrative    EXAMINATION:  XR CHEST 1 VIEW    CLINICAL HISTORY:  CP;    FINDINGS:  Single view of the chest.  Aorta demonstrates atherosclerotic disease.    Cardiac silhouette is normal.  Severe emphysematous changes are seen throughout the right lung..  No evidence of pleural effusion or pneumothorax.  Bones appear intact.      Impression    No acute process seen.  Severe emphysematous changes.      Electronically signed by: Chevy Farrell MD  Date:    07/30/2019  Time:    18:09    and X-Ray Chest PA and Lateral (CXR): No results found for this visit on 07/30/19. and KUB: X-Ray Abdomen AP 1 View (KUB): No results found for this visit on 07/30/19.

## 2019-07-31 NOTE — CONSULTS
[facility]  Pulmonology  Consult Note    Patient Name: Marva Perez  MRN: [id number]  Admission Date: 7/30/2019  Hospital Length of Stay: 1 days  Code Status: Full Code  Attending Physician: Carissa Green MD  Primary Care Provider: Kevin Clements MD   Principal Problem: <principal problem not specified>    Consults  Subjective:     HPI:  Marva Perez is a 78 y.o. year old female that's presents with a chief complaint of SOB and Wheezing for 14 days.Patient had called yesterday sob I had ordered ACTA Chest . Pt went to regular Md where sat was 78%.Admitted through ER. Consulted to evaluate Respiratory status.    Past Medical History:   Diagnosis Date    Abnormal Pap smear 7/17/13    LGSIL    COPD (chronic obstructive pulmonary disease)     Depression     GERD (gastroesophageal reflux disease)     Hyperlipidemia     Hypertension         Past Surgical History:   Procedure Laterality Date    APPENDECTOMY      CERVICAL BIOPSY  W/ LOOP ELECTRODE EXCISION      CHOLECYSTECTOMY      COLLATERAL LIGAMENT REPAIR, KNEE      left knee    COLONOSCOPY      DILATION AND CURETTAGE OF UTERUS      SINUS SURGERY         Prior to Admission medications    Medication Sig Start Date End Date Taking? Authorizing Provider   albuterol (ACCUNEB) 0.63 mg/3 mL Nebu Take 3 mLs (0.63 mg total) by nebulization every 6 (six) hours as needed. 7/7/19 7/6/20 Yes Jerson Padgett MD   ALPRAZolam (XANAX) 0.25 MG tablet Take 1 tablet during the day for anxiety as needed and 2 at night for sleep 4/30/19  Yes Kevin Clements MD   BYSTOLIC 10 mg Tab TAKE 1 TABLET BY MOUTH EVERY DAY 5/31/19  Yes Stewart Lucia MD   fluticasone-salmeterol 250-50 mcg/dose (ADVAIR DISKUS) 250-50 mcg/dose diskus inhaler USE 1 INHALATION TWICE A DAY 10/2/15  Yes Donna Harris NP   ipratropium (ATROVENT) 0.02 % nebulizer solution Take 2.5 mLs (500 mcg total) by nebulization 2 (two) times daily as needed for Wheezing.  12/13/18 12/13/19 Yes Kevin Clements MD   losartan (COZAAR) 50 MG tablet Take 50 mg by mouth once daily.   Yes Historical Provider, MD   methscopolamine (PAMINE) 2.5 mg Tab Take 1 tablet (2.5 mg total) by mouth 2 (two) times daily. 5/30/19  Yes Kevin Clements MD   tiotropium (SPIRIVA WITH HANDIHALER) 18 mcg inhalation capsule INHALE THE CONTENTS OF 1 CAPSULE DAILY 9/20/18  Yes Kevin Clements MD   albuterol (PROVENTIL/VENTOLIN HFA) 90 mcg/actuation inhaler Inhale 2 puffs into the lungs every 6 (six) hours as needed for Wheezing. 3/21/19   Kevin Clements MD   cilostazol (PLETAL) 50 MG Tab  7/3/19   Historical Provider, MD   clobetasol (TEMOVATE) 0.05 % cream Apply topically 2 (two) times daily. for 10 days 7/9/19 7/19/19  Kevin Clements MD   dronabinol (MARINOL) 5 MG capsule Take 1 capsule (5 mg total) by mouth 2 (two) times daily before meals. 4/30/19   Kevin Clements MD   DULoxetine (CYMBALTA) 30 MG capsule One po q am for depression 7/16/19   Kevin Clements MD   mirtazapine (REMERON) 15 MG tablet One po q hs for rest and depression 7/16/19   Kevin Clements MD   pantoprazole (PROTONIX) 40 MG tablet Take 1 tablet (40 mg total) by mouth once daily. 2/26/19   Kevin Clements MD   tretinoin (RETIN-A) 0.05 % cream Apply topically every evening. 8/27/18   Kevin Clements MD   tretinoin (RETIN-A) 0.1 % cream Apply topically nightly. 7/9/19   Kevin Clements MD       Social History     Socioeconomic History    Marital status:      Spouse name: Not on file    Number of children: Not on file    Years of education: Not on file    Highest education level: Not on file   Occupational History    Not on file   Social Needs    Financial resource strain: Not on file    Food insecurity:     Worry: Not on file     Inability: Not on file    Transportation needs:     Medical: Not on file     Non-medical: Not on file   Tobacco Use    Smoking status: Former  Smoker     Years: 30.00     Types: Cigarettes     Last attempt to quit: 10/30/2006     Years since quittin.7    Smokeless tobacco: Never Used   Substance and Sexual Activity    Alcohol use: No     Comment: No    Drug use: No    Sexual activity: Not Currently     Birth control/protection: Post-menopausal     Comment:    Lifestyle    Physical activity:     Days per week: Not on file     Minutes per session: Not on file    Stress: Not on file   Relationships    Social connections:     Talks on phone: Not on file     Gets together: Not on file     Attends Scientologist service: Not on file     Active member of club or organization: Not on file     Attends meetings of clubs or organizations: Not on file     Relationship status: Not on file   Other Topics Concern    Not on file   Social History Narrative    Not on file       Family History   Problem Relation Age of Onset    Breast cancer Mother     Colon cancer Neg Hx     Ovarian cancer Neg Hx        Review of patient's allergies indicates:   Allergen Reactions    Pcn [penicillins] Hives and Itching    Tetracyclines     Bactrim [sulfamethoxazole-trimethoprim] Rash    Ilosone Rash    Iodine and iodide containing products Rash    Sulfa (sulfonamide antibiotics) Hives and Rash    Allergies have been reviewed.     ROS: Review of Systems   Constitutional: Negative for chills, fever and weight loss.   HENT: Negative for congestion and sore throat.    Eyes: Negative for double vision and photophobia.   Respiratory: Positive for cough, sputum production and shortness of breath. Negative for hemoptysis.    Cardiovascular: Positive for leg swelling (mild) and PND (occasional). Negative for palpitations.   Gastrointestinal: Negative for abdominal pain and diarrhea.   Genitourinary: Negative for dysuria and frequency.   Musculoskeletal: Positive for myalgias (generalized). Negative for back pain and neck pain.   Skin: Negative.    Neurological: Negative for  dizziness, weakness and headaches.   Endo/Heme/Allergies: Does not bruise/bleed easily.   Psychiatric/Behavioral: Negative for memory loss. The patient has insomnia (intermittant ).        PE:   Vitals:    07/31/19 0600 07/31/19 0644 07/31/19 0750 07/31/19 0755   BP:  (!) 152/77  136/80   BP Location:    Right arm   Patient Position:    Sitting   Pulse: 83 75  80   Resp:    17   Temp:    96.2 °F (35.7 °C)   TempSrc:    Oral   SpO2:   95% 95%   Weight:       Height:        Physical Exam    Alert and orientated X 3   HEENT: Head: Normocephalic no trauma                Ears : Normal Pinna No Drainage no Battles sign                Eyes: Vision Unchanged, No conjunctivitis,No drainage                Neck: Supple, No JVD,No Abnormal Carotid Pulsations                Throat: No Erythema, No pus,No Swelling,Mallampati score= 3    Chest: course BS poor air entry +wheezing and rhonchi  Cardiac: RRR S1+ S2 with a -S3: +M = 2/6, No R/H/G  Abdomen: Bowel Sounds are Normal.Soft Abdomen. No organomegaly of Liver,Spleen,or Kidneys   CNS: Non focal and intact. Cranial nerves 2, 346,8,9,10 and 12 are normal.Norrmal gait.Normal posture.  Extremities: No Clubbing,No Cyanosis with oxygen,Positive mild edema of lower extremities Bilateral  Skin: No Rash, No Ulcerative sores,and No cellulitis of the IV site.    Lab Results   Component Value Date    WBC 5.48 07/31/2019    HGB 13.4 07/31/2019    HCT 40.8 07/31/2019     07/31/2019    CHOL 246 (H) 03/21/2019    TRIG 129 03/21/2019    HDL 50 03/21/2019    ALT 12 07/30/2019    AST 14 07/30/2019     07/30/2019    K 3.6 07/30/2019     07/30/2019    CREATININE 1.1 07/30/2019    BUN 15 07/30/2019    CO2 30 (H) 07/30/2019    TSH 2.773 03/21/2019    INR 1.0 07/30/2019               Assessment/Plan:     Chronic respiratory failure  Over the last 2 weeks sob elevated Ddimer may have PE    SOB (shortness of breath)  anxiety    Centrilobular emphysema  78% sat Copd secondary to  smoking     Emphysema/COPD  Severe copd ?if this is sole reason of desat          Thank you for your consult. I will follow-up with patient. Please contact us if you have any additional questions.     Howard Matson MD  Pulmonology  [facility]

## 2019-07-31 NOTE — CONSULTS
[facility]  Cardiology  Consult Note    Patient Name: Marva Perez  MRN: [id number]  Admission Date: 7/30/2019  Hospital Length of Stay: 1 days  Code Status: Full Code   Attending Provider: Carissa Green MD   Consulting Provider: Newton London NP  Primary Care Physician: Kevin Clements MD  Principal Problem:<principal problem not specified>    Patient information was obtained from patient, past medical records and ER records.     Consults  Subjective:     Chief Complaint:  SOB    HPI:   78 y.o. year old female  with history of PAF, HTN, COPD, chronic respiratory failure, dyslipidemia, anxiety, presents with a chief complaint of SOB and Wheezing for 14 days.Patient had called yesterday sob I had ordered ACTA Chest . Pt went to regular Md where sat was 78%.Admitted through ER. Elevated D-dimer noted       Past Medical History:   Diagnosis Date    Abnormal Pap smear 7/17/13    LGSIL    COPD (chronic obstructive pulmonary disease)     Depression     GERD (gastroesophageal reflux disease)     Hyperlipidemia     Hypertension        Past Surgical History:   Procedure Laterality Date    APPENDECTOMY      CERVICAL BIOPSY  W/ LOOP ELECTRODE EXCISION      CHOLECYSTECTOMY      COLLATERAL LIGAMENT REPAIR, KNEE      left knee    COLONOSCOPY      DILATION AND CURETTAGE OF UTERUS      SINUS SURGERY         Review of patient's allergies indicates:   Allergen Reactions    Pcn [penicillins] Hives and Itching    Tetracyclines     Bactrim [sulfamethoxazole-trimethoprim] Rash    Ilosone Rash    Iodine and iodide containing products Rash    Sulfa (sulfonamide antibiotics) Hives and Rash       No current facility-administered medications on file prior to encounter.      Current Outpatient Medications on File Prior to Encounter   Medication Sig    albuterol (ACCUNEB) 0.63 mg/3 mL Nebu Take 3 mLs (0.63 mg total) by nebulization every 6 (six) hours as needed.    ALPRAZolam (XANAX) 0.25 MG  tablet Take 1 tablet during the day for anxiety as needed and 2 at night for sleep    BYSTOLIC 10 mg Tab TAKE 1 TABLET BY MOUTH EVERY DAY    fluticasone-salmeterol 250-50 mcg/dose (ADVAIR DISKUS) 250-50 mcg/dose diskus inhaler USE 1 INHALATION TWICE A DAY    ipratropium (ATROVENT) 0.02 % nebulizer solution Take 2.5 mLs (500 mcg total) by nebulization 2 (two) times daily as needed for Wheezing.    losartan (COZAAR) 50 MG tablet Take 50 mg by mouth once daily.    methscopolamine (PAMINE) 2.5 mg Tab Take 1 tablet (2.5 mg total) by mouth 2 (two) times daily.    tiotropium (SPIRIVA WITH HANDIHALER) 18 mcg inhalation capsule INHALE THE CONTENTS OF 1 CAPSULE DAILY    albuterol (PROVENTIL/VENTOLIN HFA) 90 mcg/actuation inhaler Inhale 2 puffs into the lungs every 6 (six) hours as needed for Wheezing.    cilostazol (PLETAL) 50 MG Tab     clobetasol (TEMOVATE) 0.05 % cream Apply topically 2 (two) times daily. for 10 days    dronabinol (MARINOL) 5 MG capsule Take 1 capsule (5 mg total) by mouth 2 (two) times daily before meals.    DULoxetine (CYMBALTA) 30 MG capsule One po q am for depression    mirtazapine (REMERON) 15 MG tablet One po q hs for rest and depression    pantoprazole (PROTONIX) 40 MG tablet Take 1 tablet (40 mg total) by mouth once daily.    tretinoin (RETIN-A) 0.05 % cream Apply topically every evening.    tretinoin (RETIN-A) 0.1 % cream Apply topically nightly.     Family History     Problem Relation (Age of Onset)    Breast cancer Mother        Tobacco Use    Smoking status: Former Smoker     Years: 30.00     Types: Cigarettes     Last attempt to quit: 10/30/2006     Years since quittin.7    Smokeless tobacco: Never Used   Substance and Sexual Activity    Alcohol use: No     Comment: No    Drug use: No    Sexual activity: Not Currently     Birth control/protection: Post-menopausal     Comment:      Review of Systems   Constitution: Negative.   HENT: Negative.    Eyes: Negative.     Cardiovascular: Positive for leg swelling and paroxysmal nocturnal dyspnea. Negative for chest pain, claudication, cyanosis, dyspnea on exertion, irregular heartbeat, near-syncope, orthopnea and palpitations.   Respiratory: Positive for cough, shortness of breath, sleep disturbances due to breathing, sputum production and wheezing. Negative for hemoptysis and snoring.    Endocrine: Negative.    Hematologic/Lymphatic: Negative.    Skin: Negative.    Musculoskeletal: Positive for myalgias.   Gastrointestinal: Negative.    Genitourinary: Negative.    Neurological: Negative.    Psychiatric/Behavioral: The patient has insomnia.    Allergic/Immunologic: Negative.      Objective:     Vital Signs (Most Recent):  Temp: 96.2 °F (35.7 °C) (07/31/19 0755)  Pulse: 80 (07/31/19 0755)  Resp: 17 (07/31/19 0755)  BP: 136/80 (07/31/19 0755)  SpO2: 95 % (07/31/19 0755) Vital Signs (24h Range):  Temp:  [96 °F (35.6 °C)-96.9 °F (36.1 °C)] 96.2 °F (35.7 °C)  Pulse:  [71-92] 80  Resp:  [16-29] 17  SpO2:  [90 %-96 %] 95 %  BP: (136-195)/(68-93) 136/80     Weight: 48.5 kg (107 lb)  Body mass index is 19.57 kg/m².    SpO2: 95 %  O2 Device (Oxygen Therapy): room air    No intake or output data in the 24 hours ending 07/31/19 0844    Lines/Drains/Airways     Peripheral Intravenous Line                 Peripheral IV - Single Lumen 07/30/19 1750 20 G Right Forearm less than 1 day                Physical Exam   Constitutional: She is oriented to person, place, and time. She appears well-developed and well-nourished. No distress.   HENT:   Head: Normocephalic and atraumatic.   Neck: Normal range of motion. Neck supple. No JVD present. No tracheal deviation present. No thyromegaly present.   Cardiovascular: Normal rate, regular rhythm and intact distal pulses. Exam reveals no gallop and no friction rub.   Murmur (2/6 ANTONELLA) heard.  Pulmonary/Chest: No stridor. No respiratory distress. She has wheezes. She has rales. She exhibits no tenderness.    Abdominal: Soft. Bowel sounds are normal. She exhibits no distension and no mass. There is no tenderness. There is no rebound and no guarding.   Musculoskeletal: Normal range of motion. She exhibits edema. She exhibits no tenderness or deformity.   Lymphadenopathy:     She has no cervical adenopathy.   Neurological: She is alert and oriented to person, place, and time.   Skin: Skin is warm and dry. No rash noted. She is not diaphoretic. No erythema. No pallor.   Psychiatric: Her behavior is normal. Judgment and thought content normal.       Significant Labs:   ABG: No results for input(s): PH, PCO2, HCO3, POCSATURATED, BE in the last 48 hours., Blood Culture: No results for input(s): LABBLOO in the last 48 hours., BMP:   Recent Labs   Lab 07/30/19 1742 07/31/19 0716   * 136*    140   K 3.6 4.7    105   CO2 30* 24   BUN 15 19   CREATININE 1.1 0.9   CALCIUM 9.5 9.2   , CMP   Recent Labs   Lab 07/30/19 1742 07/31/19  0716    140   K 3.6 4.7    105   CO2 30* 24   * 136*   BUN 15 19   CREATININE 1.1 0.9   CALCIUM 9.5 9.2   PROT 7.1 6.7   ALBUMIN 3.7 3.4*   BILITOT 0.7 0.7   ALKPHOS 114 106   AST 14 13   ALT 12 12   ANIONGAP 11 11   ESTGFRAFRICA 56* >60   EGFRNONAA 49* >60   , CBC   Recent Labs   Lab 07/30/19 1742 07/31/19 0716   WBC 7.76 5.48   HGB 13.8 13.4   HCT 42.1 40.8    254   , INR   Recent Labs   Lab 07/30/19 1742   INR 1.0   , Lipid Panel No results for input(s): CHOL, HDL, LDLCALC, TRIG, CHOLHDL in the last 48 hours.,   Pathology Results  (Last 10 years)    None      , Troponin   Recent Labs   Lab 07/30/19  1910 07/31/19  0052 07/31/19  0716   TROPONINI <0.006 0.017 <0.006   , All pertinent lab results from the last 24 hours have been reviewed. and   Recent Lab Results       07/31/19  0732   07/31/19  0716   07/31/19  0052   07/30/19  1910   07/30/19  1742        Albumin   3.4     3.7     Alkaline Phosphatase   106     114     ALT   12     12     Anion Gap    11     11     Appearance, UA Clear             aPTT         27.1  Comment:  aPTT therapeutic range = 39-69 seconds     AST   13     14     Baso #   0.01     0.05     Basophil%   0.2     0.6     Bilirubin (UA) Negative             BILIRUBIN TOTAL   0.7  Comment:  For infants and newborns, interpretation of results should be based  on gestational age, weight and in agreement with clinical  observations.  Premature Infant recommended reference ranges:  Up to 24 hours.............<8.0 mg/dL  Up to 48 hours............<12.0 mg/dL  3-5 days..................<15.0 mg/dL  6-29 days.................<15.0 mg/dL       0.7  Comment:  For infants and newborns, interpretation of results should be based  on gestational age, weight and in agreement with clinical  observations.  Premature Infant recommended reference ranges:  Up to 24 hours.............<8.0 mg/dL  Up to 48 hours............<12.0 mg/dL  3-5 days..................<15.0 mg/dL  6-29 days.................<15.0 mg/dL       BNP         128  Comment:  Values of less than 100 pg/ml are consistent with non-CHF populations.     BUN, Bld   19     15     Calcium   9.2     9.5     Chloride   105     104     CO2   24     30     Color, UA Yellow             CPK         58              58     CPK MB         1.7     Creatinine   0.9     1.1     D-Dimer         0.42  Comment:  The quantitative D-dimer assay should be used as an aid in   the diagnosis of deep vein thrombosis and pulmonary embolism  in patients with the appropriate presentation and clinical  history. The upper limit of the reference interval and the clinical   cut off   point are identical. Causes of a positive (>0.50 mg/L FEU) D-Dimer   test  include, but are not limited to: DVT, PE, DIC, thrombolytic   therapy, anticoagulant therapy, recent surgery, trauma, or   pregnancy, disseminated malignancy, aortic aneurysm, cirrhosis,  and severe infection. False negative results may occur in   patients with distal DVT.        "Differential Method   Automated     Automated     eGFR if    >60     56     eGFR if non    >60  Comment:  Calculation used to obtain the estimated glomerular filtration  rate (eGFR) is the CKD-EPI equation.        49  Comment:  Calculation used to obtain the estimated glomerular filtration  rate (eGFR) is the CKD-EPI equation.        Eos #   0.1     0.6     Eosinophil%   1.8     7.2     Glucose   136     137     Glucose, UA Negative             Gran # (ANC)   4.7     4.9     Gran%   86.1     63.2     Hematocrit   40.8     42.1     Hemoglobin   13.4     13.8     Immature Grans (Abs)   0.02  Comment:  Mild elevation in immature granulocytes is non specific and   can be seen in a variety of conditions including stress response,   acute inflammation, trauma and pregnancy. Correlation with other   laboratory and clinical findings is essential.       0.02  Comment:  Mild elevation in immature granulocytes is non specific and   can be seen in a variety of conditions including stress response,   acute inflammation, trauma and pregnancy. Correlation with other   laboratory and clinical findings is essential.       Immature Granulocytes   0.4     0.3     Coumadin Monitoring INR         1.0  Comment:  Coumadin Therapy:  2.0 - 3.0 for INR for all indicators except mechanical heart valves  and antiphospholipid syndromes which should use 2.5 - 3.5.       Ketones, UA Negative             Leukocytes, UA Negative             Lymph #   0.6     1.7     Lymph%   10.6     21.9     MB%         2.9  Comment:  To be positive, the MB% must be greater than 5% AND the CK-MB  greater than 6.5 ng/mL. Values not in the reference interval,   but not qualifying as positive, should be considered "trace".       MCH   28.3     28.0     MCHC   32.8     32.8     MCV   86     85     Mono #   0.1     0.5     Mono%   0.9     6.8     MPV   10.1     9.6     NITRITE UA Negative             nRBC   0     0     Occult Blood UA " Negative             pH, UA 7.0             Platelets   254     256     Potassium   4.7     3.6     PROTEIN TOTAL   6.7     7.1     Protein, UA Negative  Comment:  Recommend a 24 hour urine protein or a urine   protein/creatinine ratio if globulin induced proteinuria is  clinically suspected.               Protime         10.4     RBC   4.73     4.93     RDW   12.5     12.6     Sodium   140     145     Specific [place], UA 1.015             Specimen UA Urine, Clean Catch             Troponin I   <0.006  Comment:  The reference interval for Troponin I represents the 99th percentile   cutoff   for our facility and is consistent with 3rd generation assay   performance.   0.017  Comment:  The reference interval for Troponin I represents the 99th percentile   cutoff   for our facility and is consistent with 3rd generation assay   performance.   <0.006  Comment:  The reference interval for Troponin I represents the 99th percentile   cutoff   for our facility and is consistent with 3rd generation assay   performance.   <0.006  Comment:  The reference interval for Troponin I represents the 99th percentile   cutoff   for our facility and is consistent with 3rd generation assay   performance.       UROBILINOGEN UA Negative             WBC   5.48     7.76                          Significant Imaging: CT scan: CT ABDOMEN PELVIS WITH CONTRAST: No results found for this visit on 07/30/19. and CT ABDOMEN PELVIS WITHOUT CONTRAST: No results found for this visit on 07/30/19., Echocardiogram: 2D echo with color flow doppler: No results found for this or any previous visit. and Transthoracic echo (TTE) complete (Cupid Only): No results found for this or any previous visit. and X-Ray: CXR: X-Ray Chest 1 View (CXR):   Results for orders placed or performed during the hospital encounter of 07/30/19   X-Ray Chest 1 View    Narrative    EXAMINATION:  XR CHEST 1 VIEW    CLINICAL HISTORY:  CP;    FINDINGS:  Single view of the chest.  Aorta  demonstrates atherosclerotic disease.    Cardiac silhouette is normal.  Severe emphysematous changes are seen throughout the right lung..  No evidence of pleural effusion or pneumothorax.  Bones appear intact.      Impression    No acute process seen.  Severe emphysematous changes.      Electronically signed by: Chevy Farrell MD  Date:    07/30/2019  Time:    18:09    and X-Ray Chest PA and Lateral (CXR): No results found for this visit on 07/30/19. and KUB: X-Ray Abdomen AP 1 View (KUB): No results found for this visit on 07/30/19.    Assessment and Plan:     No notes have been filed under this hospital service.  Service: Cardiology      VTE Risk Mitigation (From admission, onward)        Ordered     IP VTE HIGH RISK PATIENT  Once      07/30/19 1858     Place sequential compression device  Until discontinued      07/30/19 1858        Current Facility-Administered Medications   Medication    acetaminophen tablet 650 mg    ALPRAZolam tablet 0.5 mg    cloNIDine tablet 0.1 mg    losartan tablet 50 mg    methylPREDNISolone sodium succinate injection 80 mg    nebivolol tablet 10 mg    ondansetron injection 4 mg    pantoprazole EC tablet 40 mg    promethazine (PHENERGAN) 12.5 mg in dextrose 5 % 50 mL IVPB    sodium chloride 0.9% flush 10 mL     Echo 4/2019: LVEF 60%, LV diastolic function normal, mild MA, PA systolic pressure 7 mmHg    MPI 4/2018: equivocal with no evidence of ischemia. Small fixed perfusion abnormality of moderate intensity in the mid anteroseptal segment. Small fixed perfusion abnormality of moderate intensity in the mid inferoseptal segment.    Carotid US 10/2018: Less than 40% stenosis in the left and right internal carotid arteries. Antegrade left and right vertebral artery flow.    DX: SOB, very anxious since losing son in law few months ago  Elevated D-dimer  Chronic respiratory failure  PAF  HTN  COPD  Dyslipidemia  Carotid artery disease  No CP; equivocal stress test with no ischemia  4/18  LVEF 60% 4/19      Plan: CT of chest rule out PE    Thank you for your consult. I will follow-up with patient. Please contact us if you have any additional questions.      Transcribed by  Newton London NP for Dr GEMA Wilde  Cardiology   [facility]  I attest that I have personally seen and examined this patient. I have reviewed and discussed the management in detail as outlined above.

## 2019-07-31 NOTE — SUBJECTIVE & OBJECTIVE
Past Medical History:   Diagnosis Date    Abnormal Pap smear 7/17/13    LGSIL    COPD (chronic obstructive pulmonary disease)     Depression     GERD (gastroesophageal reflux disease)     Hyperlipidemia     Hypertension        Past Surgical History:   Procedure Laterality Date    APPENDECTOMY      CERVICAL BIOPSY  W/ LOOP ELECTRODE EXCISION      CHOLECYSTECTOMY      COLLATERAL LIGAMENT REPAIR, KNEE      left knee    COLONOSCOPY      DILATION AND CURETTAGE OF UTERUS      SINUS SURGERY         Review of patient's allergies indicates:   Allergen Reactions    Pcn [penicillins] Hives and Itching    Tetracyclines     Bactrim [sulfamethoxazole-trimethoprim] Rash    Ilosone Rash    Iodine and iodide containing products Rash    Sulfa (sulfonamide antibiotics) Hives and Rash       No current facility-administered medications on file prior to encounter.      Current Outpatient Medications on File Prior to Encounter   Medication Sig    albuterol (ACCUNEB) 0.63 mg/3 mL Nebu Take 3 mLs (0.63 mg total) by nebulization every 6 (six) hours as needed.    ALPRAZolam (XANAX) 0.25 MG tablet Take 1 tablet during the day for anxiety as needed and 2 at night for sleep    BYSTOLIC 10 mg Tab TAKE 1 TABLET BY MOUTH EVERY DAY    fluticasone-salmeterol 250-50 mcg/dose (ADVAIR DISKUS) 250-50 mcg/dose diskus inhaler USE 1 INHALATION TWICE A DAY    ipratropium (ATROVENT) 0.02 % nebulizer solution Take 2.5 mLs (500 mcg total) by nebulization 2 (two) times daily as needed for Wheezing.    losartan (COZAAR) 50 MG tablet Take 50 mg by mouth once daily.    methscopolamine (PAMINE) 2.5 mg Tab Take 1 tablet (2.5 mg total) by mouth 2 (two) times daily.    tiotropium (SPIRIVA WITH HANDIHALER) 18 mcg inhalation capsule INHALE THE CONTENTS OF 1 CAPSULE DAILY    albuterol (PROVENTIL/VENTOLIN HFA) 90 mcg/actuation inhaler Inhale 2 puffs into the lungs every 6 (six) hours as needed for Wheezing.    cilostazol (PLETAL) 50 MG Tab      clobetasol (TEMOVATE) 0.05 % cream Apply topically 2 (two) times daily. for 10 days    dronabinol (MARINOL) 5 MG capsule Take 1 capsule (5 mg total) by mouth 2 (two) times daily before meals.    DULoxetine (CYMBALTA) 30 MG capsule One po q am for depression    mirtazapine (REMERON) 15 MG tablet One po q hs for rest and depression    pantoprazole (PROTONIX) 40 MG tablet Take 1 tablet (40 mg total) by mouth once daily.    tretinoin (RETIN-A) 0.05 % cream Apply topically every evening.    tretinoin (RETIN-A) 0.1 % cream Apply topically nightly.     Family History     Problem Relation (Age of Onset)    Breast cancer Mother        Tobacco Use    Smoking status: Former Smoker     Years: 30.00     Types: Cigarettes     Last attempt to quit: 10/30/2006     Years since quittin.7    Smokeless tobacco: Never Used   Substance and Sexual Activity    Alcohol use: No     Comment: No    Drug use: No    Sexual activity: Not Currently     Birth control/protection: Post-menopausal     Comment:      Review of Systems   Constitutional: Negative for chills, fatigue and fever.   HENT: Negative for congestion, ear pain, postnasal drip, sinus pressure, sneezing and sore throat.    Eyes: Negative for discharge.   Respiratory: Positive for shortness of breath ( better this am  but notes TAVARES ). Negative for cough and chest tightness.    Cardiovascular: Negative.    Gastrointestinal: Negative for abdominal pain, constipation, diarrhea, nausea and vomiting.   Genitourinary: Negative for difficulty urinating, flank pain and hematuria.   Musculoskeletal: Negative.  Negative for arthralgias and joint swelling.   Skin: Negative.    Neurological: Negative for dizziness and headaches.   Psychiatric/Behavioral: Negative for behavioral problems and confusion. The patient is nervous/anxious.      Objective:     Vital Signs (Most Recent):  Temp: 96.2 °F (35.7 °C) (19 0755)  Pulse: 77 (19 0808)  Resp: 17 (19  0755)  BP: 136/80 (07/31/19 0755)  SpO2: 95 % (07/31/19 0755) Vital Signs (24h Range):  Temp:  [96 °F (35.6 °C)-96.9 °F (36.1 °C)] 96.2 °F (35.7 °C)  Pulse:  [71-92] 77  Resp:  [16-29] 17  SpO2:  [90 %-96 %] 95 %  BP: (136-195)/(68-93) 136/80     Weight: 48.5 kg (107 lb)  Body mass index is 19.57 kg/m².    Physical Exam   Constitutional: She is oriented to person, place, and time. She appears well-developed and well-nourished.   HENT:   Head: Normocephalic and atraumatic.   Right Ear: External ear normal.   Left Ear: External ear normal.   Nose: Nose normal.   Mouth/Throat: Oropharynx is clear and moist.   Eyes: Pupils are equal, round, and reactive to light. EOM are normal.   Neck: Normal range of motion. Neck supple. No JVD present. No tracheal deviation present. No thyromegaly present.   Cardiovascular: Normal rate, normal heart sounds and intact distal pulses.   No murmur heard.  Pulmonary/Chest: Effort normal and breath sounds normal. No respiratory distress. She has no wheezes. She has no rales. She exhibits no tenderness.   Increased AP diameter of chest. Very poor air exchange    Abdominal: Soft. Bowel sounds are normal. She exhibits no distension and no mass. There is no tenderness. There is no rebound and no guarding.   Musculoskeletal: Normal range of motion. She exhibits no edema or tenderness.   Lymphadenopathy:     She has no cervical adenopathy.   Neurological: She is alert and oriented to person, place, and time. She has normal reflexes. She displays normal reflexes. No cranial nerve deficit. She exhibits normal muscle tone. Coordination normal.   Skin: Skin is warm and dry. No rash noted. No erythema. No pallor.   Psychiatric: Her behavior is normal. Judgment and thought content normal.   Tearful  Anxious          CRANIAL NERVES     CN III, IV, VI   Pupils are equal, round, and reactive to light.  Extraocular motions are normal.        Significant Labs:   A1C: No results for input(s): HGBA1C in the  last 4320 hours.  Blood Culture: No results for input(s): LABBLOO in the last 48 hours.  CBC:   Recent Labs   Lab 07/30/19  1742 07/31/19  0716   WBC 7.76 5.48   HGB 13.8 13.4   HCT 42.1 40.8    254     CMP:   Recent Labs   Lab 07/30/19  1742 07/31/19  0716    140   K 3.6 4.7    105   CO2 30* 24   * 136*   BUN 15 19   CREATININE 1.1 0.9   CALCIUM 9.5 9.2   PROT 7.1 6.7   ALBUMIN 3.7 3.4*   BILITOT 0.7 0.7   ALKPHOS 114 106   AST 14 13   ALT 12 12   ANIONGAP 11 11   EGFRNONAA 49* >60     Cardiac Markers:   Recent Labs   Lab 07/30/19  1742   *     Lactic Acid: No results for input(s): LACTATE in the last 48 hours.  Prealbumin: No results for input(s): PREALBUMIN in the last 48 hours.  Troponin:   Recent Labs   Lab 07/30/19  1910 07/31/19  0052 07/31/19  0716   TROPONINI <0.006 0.017 <0.006     TSH:   Recent Labs   Lab 03/21/19  1049   TSH 2.773     Urine Culture: No results for input(s): LABURIN in the last 48 hours.  Urine Studies:   Recent Labs   Lab 07/31/19  0732   COLORU Yellow   APPEARANCEUA Clear   PHUR 7.0   SPECGRAV 1.015   PROTEINUA Negative   GLUCUA Negative   KETONESU Negative   BILIRUBINUA Negative   OCCULTUA Negative   NITRITE Negative   UROBILINOGEN Negative   LEUKOCYTESUR Negative       Significant Imaging: I have reviewed and interpreted all pertinent imaging results/findings within the past 24 hours.     7/30/19 CXR- No acute process seen.  Severe emphysematous changes.

## 2019-08-01 PROBLEM — I26.09 PULMONARY EMBOLISM WITH ACUTE COR PULMONALE: Status: RESOLVED | Noted: 2019-08-01 | Resolved: 2019-08-01

## 2019-08-01 PROBLEM — I26.09 PULMONARY EMBOLISM WITH ACUTE COR PULMONALE: Status: ACTIVE | Noted: 2019-08-01

## 2019-08-01 PROCEDURE — 63600175 PHARM REV CODE 636 W HCPCS: Performed by: SURGERY

## 2019-08-01 PROCEDURE — 96375 TX/PRO/DX INJ NEW DRUG ADDON: CPT

## 2019-08-01 PROCEDURE — 63600175 PHARM REV CODE 636 W HCPCS: Performed by: INTERNAL MEDICINE

## 2019-08-01 PROCEDURE — 25000003 PHARM REV CODE 250: Performed by: INTERNAL MEDICINE

## 2019-08-01 PROCEDURE — 25000003 PHARM REV CODE 250: Performed by: NURSE PRACTITIONER

## 2019-08-01 PROCEDURE — 96376 TX/PRO/DX INJ SAME DRUG ADON: CPT

## 2019-08-01 PROCEDURE — 94761 N-INVAS EAR/PLS OXIMETRY MLT: CPT

## 2019-08-01 PROCEDURE — S0028 INJECTION, FAMOTIDINE, 20 MG: HCPCS | Performed by: NURSE PRACTITIONER

## 2019-08-01 PROCEDURE — 27000221 HC OXYGEN, UP TO 24 HOURS

## 2019-08-01 PROCEDURE — 11000001 HC ACUTE MED/SURG PRIVATE ROOM

## 2019-08-01 PROCEDURE — 99232 PR SUBSEQUENT HOSPITAL CARE,LEVL II: ICD-10-PCS | Mod: ,,, | Performed by: FAMILY MEDICINE

## 2019-08-01 PROCEDURE — 25500020 PHARM REV CODE 255: Performed by: INTERNAL MEDICINE

## 2019-08-01 PROCEDURE — 99232 SBSQ HOSP IP/OBS MODERATE 35: CPT | Mod: ,,, | Performed by: FAMILY MEDICINE

## 2019-08-01 PROCEDURE — 96372 THER/PROPH/DIAG INJ SC/IM: CPT | Mod: 59

## 2019-08-01 PROCEDURE — 99900035 HC TECH TIME PER 15 MIN (STAT)

## 2019-08-01 PROCEDURE — 25000003 PHARM REV CODE 250: Performed by: SURGERY

## 2019-08-01 RX ORDER — DIPHENHYDRAMINE HYDROCHLORIDE 50 MG/ML
25 INJECTION INTRAMUSCULAR; INTRAVENOUS ONCE
Status: DISCONTINUED | OUTPATIENT
Start: 2019-08-01 | End: 2019-08-01

## 2019-08-01 RX ORDER — ENOXAPARIN SODIUM 100 MG/ML
40 INJECTION SUBCUTANEOUS
Status: DISCONTINUED | OUTPATIENT
Start: 2019-08-02 | End: 2019-08-03

## 2019-08-01 RX ORDER — ACETAMINOPHEN 325 MG/1
650 TABLET ORAL ONCE
Status: COMPLETED | OUTPATIENT
Start: 2019-08-01 | End: 2019-08-01

## 2019-08-01 RX ORDER — FAMOTIDINE 10 MG/ML
20 INJECTION INTRAVENOUS ONCE
Status: COMPLETED | OUTPATIENT
Start: 2019-08-01 | End: 2019-08-01

## 2019-08-01 RX ADMIN — LOSARTAN POTASSIUM 50 MG: 50 TABLET, FILM COATED ORAL at 08:08

## 2019-08-01 RX ADMIN — DULOXETINE 30 MG: 30 CAPSULE, DELAYED RELEASE ORAL at 08:08

## 2019-08-01 RX ADMIN — ACETAMINOPHEN 650 MG: 325 TABLET ORAL at 10:08

## 2019-08-01 RX ADMIN — FAMOTIDINE 20 MG: 10 INJECTION, SOLUTION INTRAVENOUS at 10:08

## 2019-08-01 RX ADMIN — ENOXAPARIN SODIUM 50 MG: 100 INJECTION SUBCUTANEOUS at 05:08

## 2019-08-01 RX ADMIN — CLONIDINE HYDROCHLORIDE 0.1 MG: 0.1 TABLET ORAL at 03:08

## 2019-08-01 RX ADMIN — METHYLPREDNISOLONE SODIUM SUCCINATE 80 MG: 125 INJECTION, POWDER, FOR SOLUTION INTRAMUSCULAR; INTRAVENOUS at 01:08

## 2019-08-01 RX ADMIN — METHSCOPOLAMINE BROMIDE 2.5 MG: 2.5 TABLET ORAL at 08:08

## 2019-08-01 RX ADMIN — METHYLPREDNISOLONE SODIUM SUCCINATE 80 MG: 125 INJECTION, POWDER, FOR SOLUTION INTRAMUSCULAR; INTRAVENOUS at 05:08

## 2019-08-01 RX ADMIN — NEBIVOLOL HYDROCHLORIDE 10 MG: 5 TABLET ORAL at 08:08

## 2019-08-01 RX ADMIN — PANTOPRAZOLE SODIUM 40 MG: 40 TABLET, DELAYED RELEASE ORAL at 08:08

## 2019-08-01 RX ADMIN — ALPRAZOLAM 0.25 MG: 0.25 TABLET ORAL at 08:08

## 2019-08-01 RX ADMIN — MIRTAZAPINE 15 MG: 7.5 TABLET ORAL at 08:08

## 2019-08-01 RX ADMIN — METHYLPREDNISOLONE SODIUM SUCCINATE 80 MG: 125 INJECTION, POWDER, FOR SOLUTION INTRAMUSCULAR; INTRAVENOUS at 10:08

## 2019-08-01 RX ADMIN — IOHEXOL 75 ML: 350 INJECTION, SOLUTION INTRAVENOUS at 10:08

## 2019-08-01 NOTE — ASSESSMENT & PLAN NOTE
Known severe emphysema  Dr. Matson following   Solumedrol 80 q 8  She should qualify for home O2. Will get overnight pulse ox AND walk her in the am to see if we can get her some portable O2   She really doesn't have much wheezing at all but does have terrible air flow. Just has a lot of trapped air and poor air exchange. Needs home O2   O2 ordered. CT negative.  Consider theophylline vs daliresp (she politely declined this 'if it would cause diarrhea)

## 2019-08-01 NOTE — PROGRESS NOTES
[facility] Medicine  Progress Note    Patient Name: Marva Perez  MRN: [id number]  Patient Class: OP- Observation   Admission Date: 7/30/2019  Length of Stay: 1 days  Attending Physician: Carissa Green MD  Primary Care Provider: Kevin Clements MD        Subjective:     Principal Problem:COPD exacerbation      HPI:  Marva Perez is a 78 y.o. female  With PMHX of HTN, HLD, GERD, Depression, anxiety, and COPD/Emphysema  presented to ER yesterday with shortness of breath today with associated anxiety. [facility] c/o acute onset of SOB and O2 sat registered at 78%; sent to the ER immediately via EMS  Patient is not on oxygen at home, denied chest pain on admit  Lab Results   Component Value Date    DDIMER 0.42 07/30/2019   + iodine allergy - VQ scan this am pending. Dr. Matson following.   Patient having severe family issues with anxiety, also has severe COPD as well      Overview/Hospital Course:  7/31/19 H&P continued O2 sats 92% on R/A this am    8/1/19 VQ scan showing intermediate probability of pulmonary embolism due to the patient's extensive underlying emphysematous change. She has had prior CT abd with runoff in May 2019; will pre-medicate for CT scan to rule out PE ; refused benadryl - pepcid ordered   O2 sat 98-99% on 2LNC. Afebrile, continue steroids. Monitor with overnight pulse ox tonight with oxygen. Ordered per Dr. Matson   /70 , HR 61  Started on Cymbalta and remeron yesterday; pt notes she slept really well last PM   Had 6 minute walk yesterday - o2 sat 87% with activity. Pt requesting small portable tanks for home- concentrator  PE study neg.    Review of Systems   Constitutional: Negative for chills, fatigue and fever.   HENT: Negative for congestion, ear pain, postnasal drip, sinus pressure, sneezing and sore throat.    Eyes: Negative for discharge.   Respiratory: Positive for shortness of breath ( better this am  but notes TAVARES ).  Negative for cough and chest tightness.    Cardiovascular: Negative.    Gastrointestinal: Negative for abdominal pain, constipation, diarrhea, nausea and vomiting.   Genitourinary: Negative for difficulty urinating, flank pain and hematuria.   Musculoskeletal: Negative.  Negative for arthralgias and joint swelling.   Skin: Negative.    Neurological: Negative for dizziness and headaches.   Psychiatric/Behavioral: Negative for behavioral problems and confusion. The patient is nervous/anxious.      Objective:     Vital Signs (Most Recent):  Temp: 97.2 °F (36.2 °C) (08/01/19 0718)  Pulse: 61 (08/01/19 1021)  Resp: 16 (08/01/19 0718)  BP: (!) 151/70 (08/01/19 0718)  SpO2: 99 % (08/01/19 0720) Vital Signs (24h Range):  Temp:  [96.3 °F (35.7 °C)-97.2 °F (36.2 °C)] 97.2 °F (36.2 °C)  Pulse:  [60-90] 61  Resp:  [16-20] 16  SpO2:  [92 %-99 %] 99 %  BP: (130-187)/(63-83) 151/70     Weight: 48.5 kg (107 lb)  Body mass index is 19.57 kg/m².    Physical Exam   Constitutional: She is oriented to person, place, and time. She appears well-developed. No distress.   HENT:   Head: Normocephalic and atraumatic.   Eyes: Pupils are equal, round, and reactive to light. Conjunctivae are normal.   Neck: Normal range of motion. Neck supple. No thyromegaly present.   Cardiovascular: Normal rate, regular rhythm, normal heart sounds and intact distal pulses.   Pulmonary/Chest: Effort normal. No respiratory distress.   Some increased WOB with conversation.  2 L NC in place.    Very poor airflow. Some wheeze at apices but bases with poor tidal volume   Abdominal: Soft. Bowel sounds are normal. There is no tenderness.   Musculoskeletal: Normal range of motion. She exhibits no edema.   Lymphadenopathy:     She has no cervical adenopathy.   Neurological: She is alert and oriented to person, place, and time.   Skin: Skin is warm and dry. No rash noted.   Psychiatric: She has a normal mood and affect. Her behavior is normal.   Nursing note and vitals  reviewed.        CRANIAL NERVES     CN III, IV, VI   Pupils are equal, round, and reactive to light.       Significant Labs:   A1C: No results for input(s): HGBA1C in the last 4320 hours.  Blood Culture: No results for input(s): LABBLOO in the last 48 hours.  CBC:   Recent Labs   Lab 07/30/19  1742 07/31/19  0716   WBC 7.76 5.48   HGB 13.8 13.4   HCT 42.1 40.8    254     CMP:   Recent Labs   Lab 07/30/19  1742 07/31/19  0716    140   K 3.6 4.7    105   CO2 30* 24   * 136*   BUN 15 19   CREATININE 1.1 0.9   CALCIUM 9.5 9.2   PROT 7.1 6.7   ALBUMIN 3.7 3.4*   BILITOT 0.7 0.7   ALKPHOS 114 106   AST 14 13   ALT 12 12   ANIONGAP 11 11   EGFRNONAA 49* >60     Cardiac Markers:   Recent Labs   Lab 07/30/19  1742   *     Lactic Acid: No results for input(s): LACTATE in the last 48 hours.  Prealbumin: No results for input(s): PREALBUMIN in the last 48 hours.  Troponin:   Recent Labs   Lab 07/31/19  0716 07/31/19  1259 07/31/19  1905   TROPONINI <0.006 <0.006 <0.006     TSH:   Recent Labs   Lab 03/21/19  1049   TSH 2.773     Urine Culture: No results for input(s): LABURIN in the last 48 hours.  Urine Studies:   Recent Labs   Lab 07/31/19  0732   COLORU Yellow   APPEARANCEUA Clear   PHUR 7.0   SPECGRAV 1.015   PROTEINUA Negative   GLUCUA Negative   KETONESU Negative   BILIRUBINUA Negative   OCCULTUA Negative   NITRITE Negative   UROBILINOGEN Negative   LEUKOCYTESUR Negative       Significant Imaging: I have reviewed and interpreted all pertinent imaging results/findings within the past 24 hours.     7/30/19 CXR- No acute process seen.  Severe emphysematous changes.    7/31  1. No CT evidence of pulmonary embolus.  2. Severe emphysematous changes within the lung fields bilaterally greater on the right than left.  Prior granulomatous disease.  No acute infiltrate or effusion.      Assessment/Plan:      * COPD exacerbation  Supplemental O2 via nasal canula; titrate O2 saturation to >92%.   Wean  "solumedrol  Pulmonary consultation.   Continue beta 2 agonist bronchodilator treatments.   Continue IV antibiotics.  Check sputum GS and Cx.   Continue routine medications as before.             Positive D dimer  + iodine allergy-   Will get VQ scan - indeterminate probability   Iodine allergy - rash- did have CT in May - will pre- medicate     CT normal.    Chronic respiratory failure    o2 sat 87% on RA     Acute hypoxemic respiratory failure  o2 sat 77% on arrival  Better this am  Check 6 minute walk -  Patient's O2 Sat on room air while exercisin%      SOB (shortness of breath)        Centrilobular emphysema  Root cause of her dyspnea      Emphysema/COPD  Known severe emphysema  Dr. Matson following   Solumedrol 80 q 8  She should qualify for home O2. Will get overnight pulse ox AND walk her in the am to see if we can get her some portable O2   She really doesn't have much wheezing at all but does have terrible air flow. Just has a lot of trapped air and poor air exchange. Needs home O2   O2 ordered. CT negative.  Consider theophylline vs daliresp (she politely declined this 'if it would cause diarrhea)      Hyperlipidemia    Not on statin at home    HTN (hypertension)  Continue losartan, nebivolol       KATHIE (generalized anxiety disorder)  Severe  mutliple family issues  Prescribed Remeron 15mg q Pm and Cybalta 30mg po daily  Pt states she never started bc she was "scared to take anti- depressants  Takes 0.5mg xanax nightly- discussed risks and encouraged to wean  Taper xanax- decrease to 0.25mg nightly prn  Will start cymbalta and remeron here         VTE Risk Mitigation (From admission, onward)        Ordered     enoxaparin injection 50 mg  Every 12 hours      19 1711     Place CARLYN hose  Until discontinued      19 1711     IP VTE HIGH RISK PATIENT  Once      19 1858     Place sequential compression device  Until discontinued      19 185                Pema Adams MD  Department " [facility] Medicine   [facility]

## 2019-08-01 NOTE — ASSESSMENT & PLAN NOTE
+ iodine allergy-   Will get VQ scan - indeterminate probability   Iodine allergy - rash- did have CT in May - will pre- medicate     CT normal.

## 2019-08-01 NOTE — ASSESSMENT & PLAN NOTE
Supplemental O2 via nasal canula; titrate O2 saturation to >92%.   Wean solumedrol  Pulmonary consultation.   Continue beta 2 agonist bronchodilator treatments.   Continue IV antibiotics.  Check sputum GS and Cx.   Continue routine medications as before.

## 2019-08-01 NOTE — PLAN OF CARE
Problem: Adult Inpatient Plan of Care  Goal: Plan of Care Review  Outcome: Ongoing (interventions implemented as appropriate)  Patient with no complaints at this time. Much calmer this shift than previous. Still with many questions at times though. B/P high at times. Was given PRN clonidine during day shift which seemed to help. VS stable otherwise. A&O. No acute changes noted. Sinus on tele. Plan of care reviewed and agreed upon with patient.

## 2019-08-01 NOTE — SUBJECTIVE & OBJECTIVE
Interval History: Looks better very anxious on o2 2 Liters     Objective:     Vital Signs (Most Recent):  Temp: 97.2 °F (36.2 °C) (08/01/19 0718)  Pulse: 67 (08/01/19 0718)  Resp: 16 (08/01/19 0718)  BP: (!) 151/70 (08/01/19 0718)  SpO2: 99 % (08/01/19 0720) Vital Signs (24h Range):  Temp:  [96.3 °F (35.7 °C)-97.2 °F (36.2 °C)] 97.2 °F (36.2 °C)  Pulse:  [60-90] 67  Resp:  [16-20] 16  SpO2:  [92 %-99 %] 99 %  BP: (130-187)/(63-83) 151/70     Weight: 48.5 kg (107 lb)  Body mass index is 19.57 kg/m².      Intake/Output Summary (Last 24 hours) at 8/1/2019 0904  Last data filed at 7/31/2019 1233  Gross per 24 hour   Intake 120 ml   Output --   Net 120 ml       Physical Exam   Constitutional: She is oriented to person, place, and time. She appears well-developed and well-nourished. She is cooperative.  Non-toxic appearance. She does not appear ill. No distress.   HENT:   Head: Normocephalic and atraumatic.   Right Ear: Hearing, tympanic membrane, external ear and ear canal normal.   Left Ear: Hearing, tympanic membrane, external ear and ear canal normal.   Nose: Nose normal. No mucosal edema, rhinorrhea or nasal deformity. No epistaxis. Right sinus exhibits no maxillary sinus tenderness and no frontal sinus tenderness. Left sinus exhibits no maxillary sinus tenderness and no frontal sinus tenderness.   Mouth/Throat: Uvula is midline, oropharynx is clear and moist and mucous membranes are normal. No trismus in the jaw. Normal dentition. No uvula swelling. No posterior oropharyngeal erythema.   Eyes: Conjunctivae and lids are normal. No scleral icterus.   Sclera clear bilat   Neck: Trachea normal, full passive range of motion without pain and phonation normal. Neck supple.   Cardiovascular: Normal rate, regular rhythm, normal heart sounds, intact distal pulses and normal pulses.   Pulmonary/Chest: She is in respiratory distress (mild to moderate). She has decreased breath sounds in the right middle field, the right lower  UTR x1 message left.   field, the left middle field and the left lower field. She has wheezes in the right lower field and the left lower field. She has rhonchi in the right lower field and the left lower field.   Abdominal: Soft. Normal appearance and bowel sounds are normal. She exhibits no distension. There is no tenderness.   Musculoskeletal: Normal range of motion. She exhibits no edema or deformity.   Neurological: She is alert and oriented to person, place, and time. She exhibits normal muscle tone. Coordination normal.   Skin: Skin is warm, dry and intact. She is not diaphoretic. No pallor.   Psychiatric: She has a normal mood and affect. Her speech is normal and behavior is normal. Judgment and thought content normal. Cognition and memory are normal.   Nursing note and vitals reviewed.      Vents:       Lines/Drains/Airways     Peripheral Intravenous Line                 Peripheral IV - Single Lumen 07/30/19 1750 20 G Right Forearm 1 day                Significant Labs:    CBC/Anemia Profile:  Recent Labs   Lab 07/30/19  1742 07/31/19  0716   WBC 7.76 5.48   HGB 13.8 13.4   HCT 42.1 40.8    254   MCV 85 86   RDW 12.6 12.5        Chemistries:  Recent Labs   Lab 07/30/19  1742 07/31/19  0716    140   K 3.6 4.7    105   CO2 30* 24   BUN 15 19   CREATININE 1.1 0.9   CALCIUM 9.5 9.2   ALBUMIN 3.7 3.4*   PROT 7.1 6.7   BILITOT 0.7 0.7   ALKPHOS 114 106   ALT 12 12   AST 14 13       All pertinent labs within the past 24 hours have been reviewed.    Significant Imaging:  I have reviewed all pertinent imaging results/findings within the past 24 hours.

## 2019-08-01 NOTE — PLAN OF CARE
08/01/19 1235   Discharge Assessment   Assessment Type Discharge Planning Reassessment     Ms Perez will remain here today for acute care of her COPD. She will require home oxygen when discharged.

## 2019-08-01 NOTE — PROGRESS NOTES
[facility]  Cardiology  Progress Note    Patient Name: Marva Perez  MRN: [id number]  Admission Date: 7/30/2019  Hospital Length of Stay: 1 days  Code Status: Full Code   Attending Physician: Carissa Green MD   Primary Care Physician: Kevin Clements MD  Expected Discharge Date:   Principal Problem:COPD exacerbation    Subjective:     Chief Complaint:  SOB     HPI:   78 y.o. year old female  with history of PAF, HTN, COPD, chronic respiratory failure, dyslipidemia, anxiety, presents with a chief complaint of SOB and Wheezing for 14 days.Patient had called yesterday sob I had ordered ACTA Chest . Pt went to regular Md where sat was 78%.Admitted through ER. Elevated D-dimer noted     ROS   Constitution: Negative.   HENT: Negative.    Eyes: Negative.    Cardiovascular: Positive for leg swelling and paroxysmal nocturnal dyspnea. Negative for chest pain, claudication, cyanosis, dyspnea on exertion, irregular heartbeat, near-syncope, orthopnea and palpitations.   Respiratory: Positive for cough, shortness of breath, sleep disturbances due to breathing, sputum production and wheezing. Negative for hemoptysis and snoring.    Endocrine: Negative.    Hematologic/Lymphatic: Negative.    Skin: Negative.    Musculoskeletal: Positive for myalgias.   Gastrointestinal: Negative.    Genitourinary: Negative.    Neurological: Negative.    Psychiatric/Behavioral: The patient has insomnia.    Allergic/Immunologic: Negative.     Objective:     Vital Signs (Most Recent):  Temp: 97.2 °F (36.2 °C) (08/01/19 0718)  Pulse: 67 (08/01/19 0718)  Resp: 16 (08/01/19 0718)  BP: (!) 151/70 (08/01/19 0718)  SpO2: 99 % (08/01/19 0720) Vital Signs (24h Range):  Temp:  [96.3 °F (35.7 °C)-97.2 °F (36.2 °C)] 97.2 °F (36.2 °C)  Pulse:  [60-90] 67  Resp:  [16-20] 16  SpO2:  [92 %-99 %] 99 %  BP: (130-187)/(63-83) 151/70     Weight: 48.5 kg (107 lb)  Body mass index is 19.57 kg/m².    SpO2: 99 %  O2 Device (Oxygen Therapy): nasal  cannula      Intake/Output Summary (Last 24 hours) at 8/1/2019 0820  Last data filed at 7/31/2019 1233  Gross per 24 hour   Intake 240 ml   Output --   Net 240 ml       Lines/Drains/Airways     Peripheral Intravenous Line                 Peripheral IV - Single Lumen 07/30/19 1750 20 G Right Forearm 1 day                Physical Exam     Constitutional: She is oriented to person, place, and time. She appears well-developed and well-nourished. No distress.   HENT:   Head: Normocephalic and atraumatic.   Neck: Normal range of motion. Neck supple. No JVD present. No tracheal deviation present. No thyromegaly present.   Cardiovascular: Normal rate, regular rhythm and intact distal pulses. Exam reveals no gallop and no friction rub.   Murmur (2/6 ANTONELLA) heard.  Pulmonary/Chest: No stridor. No respiratory distress. She has wheezes. She has rales. She exhibits no tenderness.   Abdominal: Soft. Bowel sounds are normal. She exhibits no distension and no mass. There is no tenderness. There is no rebound and no guarding.   Musculoskeletal: Normal range of motion. She exhibits edema. She exhibits no tenderness or deformity.   Lymphadenopathy:     She has no cervical adenopathy.   Neurological: She is alert and oriented to person, place, and time.   Skin: Skin is warm and dry. No rash noted. She is not diaphoretic. No erythema. No pallor.   Psychiatric: Her behavior is normal. Judgment and thought content normal.         Significant Labs:   ABG: No results for input(s): PH, PCO2, HCO3, POCSATURATED, BE in the last 48 hours., Blood Culture: No results for input(s): LABBLOO in the last 48 hours., BMP:   Recent Labs   Lab 07/30/19  1742 07/31/19  0716   * 136*    140   K 3.6 4.7    105   CO2 30* 24   BUN 15 19   CREATININE 1.1 0.9   CALCIUM 9.5 9.2   , CMP   Recent Labs   Lab 07/30/19  1742 07/31/19  0716    140   K 3.6 4.7    105   CO2 30* 24   * 136*   BUN 15 19   CREATININE 1.1 0.9   CALCIUM  9.5 9.2   PROT 7.1 6.7   ALBUMIN 3.7 3.4*   BILITOT 0.7 0.7   ALKPHOS 114 106   AST 14 13   ALT 12 12   ANIONGAP 11 11   ESTGFRAFRICA 56* >60   EGFRNONAA 49* >60   , CBC   Recent Labs   Lab 07/30/19  1742 07/31/19  0716   WBC 7.76 5.48   HGB 13.8 13.4   HCT 42.1 40.8    254   , INR   Recent Labs   Lab 07/30/19  1742   INR 1.0   , Lipid Panel No results for input(s): CHOL, HDL, LDLCALC, TRIG, CHOLHDL in the last 48 hours.,   Pathology Results  (Last 10 years)    None      , Troponin   Recent Labs   Lab 07/31/19  0716 07/31/19  1259 07/31/19  1905   TROPONINI <0.006 <0.006 <0.006   , All pertinent lab results from the last 24 hours have been reviewed. and   Recent Lab Results       07/31/19  1905   07/31/19  1259   07/31/19  0912        Ao root annulus     2.58     Ao peak vini     1.20     Ao VTI     27.61     AV valve area     2.10     AV mean gradient     3     AV index (prosthetic)     0.84     AV peak gradient     6     AV Velocity Ratio     0.80     BSA     1.46     Left Ventricle Relative Wall Thickness     0.44     E/A ratio     0.73     E wave decelartion time     255.79     FS     39     IVRT     0.13     IVS     1.02     LA WIDTH     2.74     Left Atrium Major Axis     3.46     Left Atrium Minor Axis     4.20     LA size     2.75     LA volume     24.30     LA Volume Index     16.6     LVOT area     2.5     LV Diastolic Volume     62.53     LV Diastolic Volume Index     42.67     LVIDD     3.81     LVIDS     2.32     LV mass     106.25     LV Mass Index     73     LVOT diameter     1.79     LVOT peak vini     0.96     LVOT stroke volume     58.00     LVOT peak VTI     23.06     LV Systolic Volume     18.50     LV Systolic Volume Index     12.6     MV Peak A Vini     0.96     MV Peak E Vini     0.70     PV PEAK VELOCITY     0.87     PW     0.84     RA Major Axis     3.35     RVDD     2.43     STJ     2.65     Triscuspid Valve Regurgitation Peak Gradient     25     TR Max Vini     2.50     Troponin I  <0.006  Comment:  The reference interval for Troponin I represents the 99th percentile   cutoff   for our facility and is consistent with 3rd generation assay   performance.   <0.006  Comment:  The reference interval for Troponin I represents the 99th percentile   cutoff   for our facility and is consistent with 3rd generation assay   performance.               Significant Imaging: CT scan: CT ABDOMEN PELVIS WITH CONTRAST: No results found for this visit on 07/30/19. and CT ABDOMEN PELVIS WITHOUT CONTRAST: No results found for this visit on 07/30/19., Echocardiogram:   2D echo with color flow doppler: No results found for this or any previous visit. and Transthoracic echo (TTE) complete (Cupid Only):   Results for orders placed or performed during the hospital encounter of 07/30/19   Transthoracic echo (TTE) 2D with Color Flow   Result Value Ref Range    BSA 1.46 m2    LA WIDTH 2.74 cm    PV PEAK VELOCITY 0.87 cm/s    LVIDD 3.81 3.5 - 6.0 cm    IVS 1.02 0.6 - 1.1 cm    PW 0.84 0.6 - 1.1 cm    Ao root annulus 2.58 cm    LVIDS 2.32 2.1 - 4.0 cm    FS 39 28 - 44 %    LA volume 24.30 cm3    STJ 2.65 cm    LV mass 106.25 g    LA size 2.75 cm    RVDD 2.43 cm    Left Ventricle Relative Wall Thickness 0.44 cm    AV mean gradient 3 mmHg    AV valve area 2.10 cm2    AV Velocity Ratio 0.80     AV index (prosthetic) 0.84     E/A ratio 0.73     E wave decelartion time 255.79 msec    IVRT 0.13 msec    LVOT diameter 1.79 cm    LVOT area 2.5 cm2    LVOT peak vini 0.96 m/s    LVOT peak VTI 23.06 cm    Ao peak ivni 1.20 m/s    Ao VTI 27.61 cm    LVOT stroke volume 58.00 cm3    AV peak gradient 6 mmHg    MV Peak E Vini 0.70 m/s    TR Max Vini 2.50 m/s    MV Peak A Vini 0.96 m/s    LV Systolic Volume 18.50 mL    LV Systolic Volume Index 12.6 mL/m2    LV Diastolic Volume 62.53 mL    LV Diastolic Volume Index 42.67 mL/m2    LA Volume Index 16.6 mL/m2    LV Mass Index 73 g/m2    RA Major Axis 3.35 cm    Left Atrium Minor Axis 4.20 cm    Left  Atrium Major Axis 3.46 cm    Triscuspid Valve Regurgitation Peak Gradient 25 mmHg    Narrative    · Normal left ventricular systolic function. The estimated ejection   fraction is 65%  · Normal LV diastolic function.  · Normal right ventricular systolic function.  · No pulmonary hypertension present.       and X-Ray: CXR: X-Ray Chest 1 View (CXR):   Results for orders placed or performed during the hospital encounter of 07/30/19   X-Ray Chest 1 View    Narrative    EXAMINATION:  XR CHEST 1 VIEW    CLINICAL HISTORY:  CP;    FINDINGS:  Single view of the chest.  Aorta demonstrates atherosclerotic disease.    Cardiac silhouette is normal.  Severe emphysematous changes are seen throughout the right lung..  No evidence of pleural effusion or pneumothorax.  Bones appear intact.      Impression    No acute process seen.  Severe emphysematous changes.      Electronically signed by: Chevy Farrell MD  Date:    07/30/2019  Time:    18:09    and X-Ray Chest PA and Lateral (CXR): No results found for this visit on 07/30/19. and KUB: X-Ray Abdomen AP 1 View (KUB): No results found for this visit on 07/30/19.  Assessment and Plan:         Active Diagnoses:    Diagnosis Date Noted POA    PRINCIPAL PROBLEM:  COPD exacerbation [J44.1] 07/31/2019 Yes    Acute hypoxemic respiratory failure [J96.01] 07/31/2019 Yes    Chronic respiratory failure [J96.10] 07/31/2019 Yes    Positive D dimer [R79.89] 07/31/2019 Yes    SOB (shortness of breath) [R06.02] 07/30/2019 Yes    Centrilobular emphysema [J43.2] 06/06/2016 Yes    Emphysema/COPD [J43.9] 12/26/2013 Yes    HTN (hypertension) [I10] 10/30/2012 Yes    Hyperlipidemia [E78.5] 10/30/2012 Yes    KATHIE (generalized anxiety disorder) [F41.1] 10/30/2012 Yes      Problems Resolved During this Admission:       VTE Risk Mitigation (From admission, onward)        Ordered     enoxaparin injection 50 mg  Every 12 hours      07/31/19 1711     Place CARLYN hose  Until discontinued      07/31/19 1711      IP VTE HIGH RISK PATIENT  Once      07/30/19 1858     Place sequential compression device  Until discontinued      07/30/19 1858        Current Facility-Administered Medications   Medication    acetaminophen tablet 650 mg    acetaminophen tablet 650 mg    ALPRAZolam tablet 0.25 mg    cloNIDine tablet 0.1 mg    diphenhydrAMINE injection 25 mg    DULoxetine DR capsule 30 mg    enoxaparin injection 50 mg    losartan tablet 50 mg    methscopolamine tablet 2.5 mg    methylPREDNISolone sodium succinate injection 80 mg    mirtazapine tablet 15 mg    nebivolol tablet 10 mg    ondansetron injection 4 mg    pantoprazole EC tablet 40 mg    promethazine (PHENERGAN) 12.5 mg in dextrose 5 % 50 mL IVPB    sodium chloride 0.9% flush 10 mL     Echo 4/2019: LVEF 60%, LV diastolic function normal, mild CA, PA systolic pressure 7 mmHg     MPI 4/2018: equivocal with no evidence of ischemia. Small fixed perfusion abnormality of moderate intensity in the mid anteroseptal segment. Small fixed perfusion abnormality of moderate intensity in the mid inferoseptal segment.     Carotid US 10/2018: Less than 40% stenosis in the left and right internal carotid arteries. Antegrade left and right vertebral artery flow.     DX: SOB, very anxious since losing son in law few months ago  Elevated D-dimer  Chronic respiratory failure  PAF  HTN  COPD  Dyslipidemia  Carotid artery disease  No CP; equivocal stress test with no ischemia 4/18  LVEF 60% 4/19 remains same 7/19        Plan: CT of chest rule out PE  Premedicate for dye allergy(pt had a CTA 5/19 without any problem)  If CT OK; may DC home    Transcribed by  Newton London NP for Dr GEMA Wilde  Cardiology  [facility]  I attest that I have personally seen and examined this patient. I have reviewed and discussed the management in detail as outlined above.

## 2019-08-01 NOTE — PROGRESS NOTES
Staff Handoff  Bedside report received from VIVIAN Cerna. VS stable. Afebrile. No distress noted. Assessment complete per flowsheet. 2L O2 per NC, tolerating well. Call bell in reach. Instructed to call for any needs. Will continue to monitor for any change in status.    Resident Handoff

## 2019-08-01 NOTE — ASSESSMENT & PLAN NOTE
o2 sat 77% on arrival  Better this am  Check 6 minute walk -  Patient's O2 Sat on room air while exercisin%

## 2019-08-01 NOTE — PLAN OF CARE
Problem: Adult Inpatient Plan of Care  Goal: Plan of Care Review  Outcome: Ongoing (interventions implemented as appropriate)  Patient aware of plan of care. IV steroids continued. CTA chest done this am, patient notified of result. U/S BLE completed this afternoon, awaiting results. 2L O2 per NC in use, tolerating well. Free from falls/injuries. No questions or concerns at this time. Agrees with plan of care.

## 2019-08-01 NOTE — ASSESSMENT & PLAN NOTE
Over the last 3 months sob, and was depressed over family events and not as active  elevated Ddimer may have PE

## 2019-08-01 NOTE — PROGRESS NOTES
[facility]  Pulmonology  Progress Note    Patient Name: Marva Perez  MRN: [id number]  Admission Date: 7/30/2019  Hospital Length of Stay: 1 days  Code Status: Full Code  Attending Provider: Carissa Green MD  Primary Care Provider: Kevin Clements MD   Principal Problem: COPD exacerbation    Subjective:     Interval History: Looks better very anxious on o2 2 Liters     Objective:     Vital Signs (Most Recent):  Temp: 97.2 °F (36.2 °C) (08/01/19 0718)  Pulse: 67 (08/01/19 0718)  Resp: 16 (08/01/19 0718)  BP: (!) 151/70 (08/01/19 0718)  SpO2: 99 % (08/01/19 0720) Vital Signs (24h Range):  Temp:  [96.3 °F (35.7 °C)-97.2 °F (36.2 °C)] 97.2 °F (36.2 °C)  Pulse:  [60-90] 67  Resp:  [16-20] 16  SpO2:  [92 %-99 %] 99 %  BP: (130-187)/(63-83) 151/70     Weight: 48.5 kg (107 lb)  Body mass index is 19.57 kg/m².      Intake/Output Summary (Last 24 hours) at 8/1/2019 0904  Last data filed at 7/31/2019 1233  Gross per 24 hour   Intake 120 ml   Output --   Net 120 ml       Physical Exam   Constitutional: She is oriented to person, place, and time. She appears well-developed and well-nourished. She is cooperative.  Non-toxic appearance. She does not appear ill. No distress.   HENT:   Head: Normocephalic and atraumatic.   Right Ear: Hearing, tympanic membrane, external ear and ear canal normal.   Left Ear: Hearing, tympanic membrane, external ear and ear canal normal.   Nose: Nose normal. No mucosal edema, rhinorrhea or nasal deformity. No epistaxis. Right sinus exhibits no maxillary sinus tenderness and no frontal sinus tenderness. Left sinus exhibits no maxillary sinus tenderness and no frontal sinus tenderness.   Mouth/Throat: Uvula is midline, oropharynx is clear and moist and mucous membranes are normal. No trismus in the jaw. Normal dentition. No uvula swelling. No posterior oropharyngeal erythema.   Eyes: Conjunctivae and lids are normal. No scleral icterus.   Sclera clear bilat   Neck: Trachea  normal, full passive range of motion without pain and phonation normal. Neck supple.   Cardiovascular: Normal rate, regular rhythm, normal heart sounds, intact distal pulses and normal pulses.   Pulmonary/Chest: She is in respiratory distress (mild to moderate). She has decreased breath sounds in the right middle field, the right lower field, the left middle field and the left lower field. She has wheezes in the right lower field and the left lower field. She has rhonchi in the right lower field and the left lower field.   Abdominal: Soft. Normal appearance and bowel sounds are normal. She exhibits no distension. There is no tenderness.   Musculoskeletal: Normal range of motion. She exhibits no edema or deformity.   Neurological: She is alert and oriented to person, place, and time. She exhibits normal muscle tone. Coordination normal.   Skin: Skin is warm, dry and intact. She is not diaphoretic. No pallor.   Psychiatric: She has a normal mood and affect. Her speech is normal and behavior is normal. Judgment and thought content normal. Cognition and memory are normal.   Nursing note and vitals reviewed.      Vents:       Lines/Drains/Airways     Peripheral Intravenous Line                 Peripheral IV - Single Lumen 07/30/19 1750 20 G Right Forearm 1 day                Significant Labs:    CBC/Anemia Profile:  Recent Labs   Lab 07/30/19  1742 07/31/19  0716   WBC 7.76 5.48   HGB 13.8 13.4   HCT 42.1 40.8    254   MCV 85 86   RDW 12.6 12.5        Chemistries:  Recent Labs   Lab 07/30/19  1742 07/31/19  0716    140   K 3.6 4.7    105   CO2 30* 24   BUN 15 19   CREATININE 1.1 0.9   CALCIUM 9.5 9.2   ALBUMIN 3.7 3.4*   PROT 7.1 6.7   BILITOT 0.7 0.7   ALKPHOS 114 106   ALT 12 12   AST 14 13       All pertinent labs within the past 24 hours have been reviewed.    Significant Imaging:  I have reviewed all pertinent imaging results/findings within the past 24 hours.    Assessment/Plan:     * COPD  exacerbation  Severe copd    Pulmonary embolism with acute cor pulmonale  pe likely by wells score     Positive D dimer  ++ d dimer     Chronic respiratory failure  Over the last 3 months sob, and was depressed over family events and not as active  elevated Ddimer may have PE    Acute hypoxemic respiratory failure  Probable pulmonary heart disease/cor pulmonale due to nocturnal and daytime hypoxia     SOB (shortness of breath)  anxiety    Centrilobular emphysema  78% sat Copd secondary to smoking     Emphysema/COPD  Severe copd ?if this is sole reason of desat           Howard Matson MD  Pulmonology  [facility]

## 2019-08-01 NOTE — SUBJECTIVE & OBJECTIVE
Review of Systems   Constitutional: Negative for chills, fatigue and fever.   HENT: Negative for congestion, ear pain, postnasal drip, sinus pressure, sneezing and sore throat.    Eyes: Negative for discharge.   Respiratory: Positive for shortness of breath ( better this am  but notes TAVARES ). Negative for cough and chest tightness.    Cardiovascular: Negative.    Gastrointestinal: Negative for abdominal pain, constipation, diarrhea, nausea and vomiting.   Genitourinary: Negative for difficulty urinating, flank pain and hematuria.   Musculoskeletal: Negative.  Negative for arthralgias and joint swelling.   Skin: Negative.    Neurological: Negative for dizziness and headaches.   Psychiatric/Behavioral: Negative for behavioral problems and confusion. The patient is nervous/anxious.      Objective:     Vital Signs (Most Recent):  Temp: 97.2 °F (36.2 °C) (08/01/19 0718)  Pulse: 61 (08/01/19 1021)  Resp: 16 (08/01/19 0718)  BP: (!) 151/70 (08/01/19 0718)  SpO2: 99 % (08/01/19 0720) Vital Signs (24h Range):  Temp:  [96.3 °F (35.7 °C)-97.2 °F (36.2 °C)] 97.2 °F (36.2 °C)  Pulse:  [60-90] 61  Resp:  [16-20] 16  SpO2:  [92 %-99 %] 99 %  BP: (130-187)/(63-83) 151/70     Weight: 48.5 kg (107 lb)  Body mass index is 19.57 kg/m².    Physical Exam   Constitutional: She is oriented to person, place, and time. She appears well-developed. No distress.   HENT:   Head: Normocephalic and atraumatic.   Eyes: Pupils are equal, round, and reactive to light. Conjunctivae are normal.   Neck: Normal range of motion. Neck supple. No thyromegaly present.   Cardiovascular: Normal rate, regular rhythm, normal heart sounds and intact distal pulses.   Pulmonary/Chest: Effort normal. No respiratory distress.   Some increased WOB with conversation.  2 L NC in place.    Very poor airflow. Some wheeze at apices but bases with poor tidal volume   Abdominal: Soft. Bowel sounds are normal. There is no tenderness.   Musculoskeletal: Normal range of motion.  She exhibits no edema.   Lymphadenopathy:     She has no cervical adenopathy.   Neurological: She is alert and oriented to person, place, and time.   Skin: Skin is warm and dry. No rash noted.   Psychiatric: She has a normal mood and affect. Her behavior is normal.   Nursing note and vitals reviewed.        CRANIAL NERVES     CN III, IV, VI   Pupils are equal, round, and reactive to light.       Significant Labs:   A1C: No results for input(s): HGBA1C in the last 4320 hours.  Blood Culture: No results for input(s): LABBLOO in the last 48 hours.  CBC:   Recent Labs   Lab 07/30/19 1742 07/31/19  0716   WBC 7.76 5.48   HGB 13.8 13.4   HCT 42.1 40.8    254     CMP:   Recent Labs   Lab 07/30/19 1742 07/31/19  0716    140   K 3.6 4.7    105   CO2 30* 24   * 136*   BUN 15 19   CREATININE 1.1 0.9   CALCIUM 9.5 9.2   PROT 7.1 6.7   ALBUMIN 3.7 3.4*   BILITOT 0.7 0.7   ALKPHOS 114 106   AST 14 13   ALT 12 12   ANIONGAP 11 11   EGFRNONAA 49* >60     Cardiac Markers:   Recent Labs   Lab 07/30/19  1742   *     Lactic Acid: No results for input(s): LACTATE in the last 48 hours.  Prealbumin: No results for input(s): PREALBUMIN in the last 48 hours.  Troponin:   Recent Labs   Lab 07/31/19  0716 07/31/19  1259 07/31/19  1905   TROPONINI <0.006 <0.006 <0.006     TSH:   Recent Labs   Lab 03/21/19  1049   TSH 2.773     Urine Culture: No results for input(s): LABURIN in the last 48 hours.  Urine Studies:   Recent Labs   Lab 07/31/19  0732   COLORU Yellow   APPEARANCEUA Clear   PHUR 7.0   SPECGRAV 1.015   PROTEINUA Negative   GLUCUA Negative   KETONESU Negative   BILIRUBINUA Negative   OCCULTUA Negative   NITRITE Negative   UROBILINOGEN Negative   LEUKOCYTESUR Negative       Significant Imaging: I have reviewed and interpreted all pertinent imaging results/findings within the past 24 hours.     7/30/19 CXR- No acute process seen.  Severe emphysematous changes.    7/31  1. No CT evidence of pulmonary  embolus.  2. Severe emphysematous changes within the lung fields bilaterally greater on the right than left.  Prior granulomatous disease.  No acute infiltrate or effusion.

## 2019-08-02 PROBLEM — J96.21 ACUTE ON CHRONIC RESPIRATORY FAILURE WITH HYPOXIA: Status: ACTIVE | Noted: 2019-07-31

## 2019-08-02 LAB
ALBUMIN SERPL BCP-MCNC: 3.5 G/DL (ref 3.5–5.2)
ALP SERPL-CCNC: 95 U/L (ref 55–135)
ALT SERPL W/O P-5'-P-CCNC: 66 U/L (ref 10–44)
ANION GAP SERPL CALC-SCNC: 9 MMOL/L (ref 8–16)
AST SERPL-CCNC: 31 U/L (ref 10–40)
BASOPHILS # BLD AUTO: 0.01 K/UL (ref 0–0.2)
BASOPHILS NFR BLD: 0.1 % (ref 0–1.9)
BILIRUB SERPL-MCNC: 0.5 MG/DL (ref 0.1–1)
BUN SERPL-MCNC: 25 MG/DL (ref 8–23)
CALCIUM SERPL-MCNC: 9 MG/DL (ref 8.7–10.5)
CHLORIDE SERPL-SCNC: 104 MMOL/L (ref 95–110)
CO2 SERPL-SCNC: 28 MMOL/L (ref 23–29)
CREAT SERPL-MCNC: 1 MG/DL (ref 0.5–1.4)
DIFFERENTIAL METHOD: ABNORMAL
EOSINOPHIL # BLD AUTO: 0 K/UL (ref 0–0.5)
EOSINOPHIL NFR BLD: 0 % (ref 0–8)
ERYTHROCYTE [DISTWIDTH] IN BLOOD BY AUTOMATED COUNT: 12.7 % (ref 11.5–14.5)
EST. GFR  (AFRICAN AMERICAN): >60 ML/MIN/1.73 M^2
EST. GFR  (NON AFRICAN AMERICAN): 54 ML/MIN/1.73 M^2
GLUCOSE SERPL-MCNC: 202 MG/DL (ref 70–110)
HCT VFR BLD AUTO: 42.1 % (ref 37–48.5)
HGB BLD-MCNC: 13.1 G/DL (ref 12–16)
IMM GRANULOCYTES # BLD AUTO: 0.19 K/UL (ref 0–0.04)
IMM GRANULOCYTES NFR BLD AUTO: 1.7 % (ref 0–0.5)
LYMPHOCYTES # BLD AUTO: 0.7 K/UL (ref 1–4.8)
LYMPHOCYTES NFR BLD: 6.5 % (ref 18–48)
MCH RBC QN AUTO: 28 PG (ref 27–31)
MCHC RBC AUTO-ENTMCNC: 31.1 G/DL (ref 32–36)
MCV RBC AUTO: 90 FL (ref 82–98)
MONOCYTES # BLD AUTO: 0.1 K/UL (ref 0.3–1)
MONOCYTES NFR BLD: 1.1 % (ref 4–15)
NEUTROPHILS # BLD AUTO: 9.9 K/UL (ref 1.8–7.7)
NEUTROPHILS NFR BLD: 90.6 % (ref 38–73)
NRBC BLD-RTO: 0 /100 WBC
PLATELET # BLD AUTO: 250 K/UL (ref 150–350)
PLATELET BLD QL SMEAR: ABNORMAL
PMV BLD AUTO: 10.4 FL (ref 9.2–12.9)
POTASSIUM SERPL-SCNC: 5.2 MMOL/L (ref 3.5–5.1)
PROT SERPL-MCNC: 6.6 G/DL (ref 6–8.4)
RBC # BLD AUTO: 4.68 M/UL (ref 4–5.4)
SODIUM SERPL-SCNC: 141 MMOL/L (ref 136–145)
WBC # BLD AUTO: 10.96 K/UL (ref 3.9–12.7)

## 2019-08-02 PROCEDURE — 94640 AIRWAY INHALATION TREATMENT: CPT

## 2019-08-02 PROCEDURE — 25000003 PHARM REV CODE 250: Performed by: INTERNAL MEDICINE

## 2019-08-02 PROCEDURE — 99232 PR SUBSEQUENT HOSPITAL CARE,LEVL II: ICD-10-PCS | Mod: ,,, | Performed by: INTERNAL MEDICINE

## 2019-08-02 PROCEDURE — A4216 STERILE WATER/SALINE, 10 ML: HCPCS | Performed by: SURGERY

## 2019-08-02 PROCEDURE — 94761 N-INVAS EAR/PLS OXIMETRY MLT: CPT

## 2019-08-02 PROCEDURE — 36415 COLL VENOUS BLD VENIPUNCTURE: CPT

## 2019-08-02 PROCEDURE — 11000001 HC ACUTE MED/SURG PRIVATE ROOM

## 2019-08-02 PROCEDURE — 25000003 PHARM REV CODE 250: Performed by: SURGERY

## 2019-08-02 PROCEDURE — 25000003 PHARM REV CODE 250: Performed by: NURSE PRACTITIONER

## 2019-08-02 PROCEDURE — 63600175 PHARM REV CODE 636 W HCPCS: Performed by: SURGERY

## 2019-08-02 PROCEDURE — 80053 COMPREHEN METABOLIC PANEL: CPT

## 2019-08-02 PROCEDURE — 25000242 PHARM REV CODE 250 ALT 637 W/ HCPCS: Performed by: NURSE PRACTITIONER

## 2019-08-02 PROCEDURE — 85025 COMPLETE CBC W/AUTO DIFF WBC: CPT

## 2019-08-02 PROCEDURE — 27000221 HC OXYGEN, UP TO 24 HOURS

## 2019-08-02 PROCEDURE — 99232 SBSQ HOSP IP/OBS MODERATE 35: CPT | Mod: ,,, | Performed by: INTERNAL MEDICINE

## 2019-08-02 PROCEDURE — 99900035 HC TECH TIME PER 15 MIN (STAT)

## 2019-08-02 RX ORDER — ALPRAZOLAM 0.25 MG/1
0.25 TABLET ORAL 2 TIMES DAILY PRN
Status: DISCONTINUED | OUTPATIENT
Start: 2019-08-02 | End: 2019-08-05 | Stop reason: HOSPADM

## 2019-08-02 RX ORDER — IPRATROPIUM BROMIDE AND ALBUTEROL SULFATE 2.5; .5 MG/3ML; MG/3ML
3 SOLUTION RESPIRATORY (INHALATION) EVERY 6 HOURS
Status: DISCONTINUED | OUTPATIENT
Start: 2019-08-02 | End: 2019-08-03

## 2019-08-02 RX ADMIN — IPRATROPIUM BROMIDE AND ALBUTEROL SULFATE 3 ML: .5; 3 SOLUTION RESPIRATORY (INHALATION) at 07:08

## 2019-08-02 RX ADMIN — METHYLPREDNISOLONE SODIUM SUCCINATE 80 MG: 125 INJECTION, POWDER, FOR SOLUTION INTRAMUSCULAR; INTRAVENOUS at 10:08

## 2019-08-02 RX ADMIN — PANTOPRAZOLE SODIUM 40 MG: 40 TABLET, DELAYED RELEASE ORAL at 08:08

## 2019-08-02 RX ADMIN — ALPRAZOLAM 0.25 MG: 0.25 TABLET ORAL at 08:08

## 2019-08-02 RX ADMIN — LOSARTAN POTASSIUM 50 MG: 50 TABLET, FILM COATED ORAL at 08:08

## 2019-08-02 RX ADMIN — MIRTAZAPINE 15 MG: 7.5 TABLET ORAL at 08:08

## 2019-08-02 RX ADMIN — METHYLPREDNISOLONE SODIUM SUCCINATE 80 MG: 125 INJECTION, POWDER, FOR SOLUTION INTRAMUSCULAR; INTRAVENOUS at 03:08

## 2019-08-02 RX ADMIN — IPRATROPIUM BROMIDE AND ALBUTEROL SULFATE 3 ML: .5; 3 SOLUTION RESPIRATORY (INHALATION) at 01:08

## 2019-08-02 RX ADMIN — METHYLPREDNISOLONE SODIUM SUCCINATE 80 MG: 125 INJECTION, POWDER, FOR SOLUTION INTRAMUSCULAR; INTRAVENOUS at 06:08

## 2019-08-02 RX ADMIN — DULOXETINE 30 MG: 30 CAPSULE, DELAYED RELEASE ORAL at 08:08

## 2019-08-02 RX ADMIN — METHSCOPOLAMINE BROMIDE 2.5 MG: 2.5 TABLET ORAL at 08:08

## 2019-08-02 RX ADMIN — Medication 10 ML: at 10:08

## 2019-08-02 RX ADMIN — NEBIVOLOL HYDROCHLORIDE 10 MG: 5 TABLET ORAL at 08:08

## 2019-08-02 NOTE — ASSESSMENT & PLAN NOTE
"Known severe emphysema  Dr. Matson following   Solumedrol 80 q 8  She should qualify for home O2. Will get overnight pulse ox AND walk her in the am to see if we can get her some portable O2   She really doesn't have much wheezing at all but does have terrible air flow. Just has a lot of trapped air and poor air exchange. Needs home O2   O2 ordered. CT negative.  Consider theophylline vs daliresp (she politely declined this 'if it would cause diarrhea); Notes she has chronic diarrhea since prior GI sx    8/2: qualifies for home O2. She is breathing much better. Ambulate today. She feels she need "one more day". Does still having some tightness on exam but no more wheezing. Hopefully home in AM  She did have some blood in sputum today. Scant. Most likely upper airway. CT without PE. No masses on imaging. Will monitor today as well for worsening        "

## 2019-08-02 NOTE — PLAN OF CARE
Home Oxygen Evaluation    Date Performed: 8/2/2019    1) Patient's Home O2 Sat on room air, while at rest: 87%        If O2 sats on room air at rest are 88% or below, patient qualifies. No additional testing needed. Document N/A in steps 2 and 3. If 89% or above, complete steps 2.      2) Patient's O2 Sat on room air while exercising: n/a        If O2 sats on room air while exercising remain 89% or above patient does not qualify, no further testing needed Document N/A in step 3. If O2 sats on room air while exercising are 88% or below, continue to step 3.      3) Patient's O2 Sat while exercising on O2: n/a at n/a LPM         (Must show improvement from #2 for patients to qualify)    If O2 sats improve on oxygen, patient qualifies for portable oxygen. If not, the patient does not qualify.

## 2019-08-02 NOTE — CONSULTS
October 02, 2019    Howard Matson M.D.  [address] [address]    RE:  SELVIN PEREZ  [facility] No:  [id number]    Dear Dr. Matson:    I had the pleasure of seeing Selvin Perez earlier today.  As you know, she is   a 78-year-old female who has recently had some pulmonary problems for which she   was admitted to the hospital.  Since being in the hospital, she has had several   occasions where she has spit up, which she described as relatively large   volumes of bright red blood.  She has not had any bleeding from her nose or any   oral problems.  Selvin has chronic obstructive pulmonary disease, but has not   smoked in many years.  Your request was for me to determine if her bleeding   might be coming from the nasopharynx or hypopharyngeal regions.  The patient's   nose was shrunken with oxymetazoline and after waiting for the shrinkage to take   place, the nose was carefully examined.  The patient's nose was free of any   discernible pathology.  There was no blood in the nose.  The septum was   relatively straight and the nasal passages were patent.  The patient's oral   cavity was then examined and found to be normal in appearance.  A mirror was   then used to visualize her hypopharynx and larynx.  All of her hypopharyngeal   structures were normal in appearance and her vocal cords were sharp, freely   movable and without any surface changes that would normally be seen with heavy   smoking.  An attempt was made to use a mirror to visualize the nasopharynx,   which was only partially successful.  However, there was no blood noted on any   of these areas.    It is my impression that the bleeding is probably coming from a subglottic   source.  It could conceivably be coming from the esophageal area, but that is   probably not very likely.    As always, Howard, it was a pleasure to see your patients.    Sincerely yours,      /IN  dd: 08/02/2019 13:01:42 (CDT)  td: 08/02/2019 16:48:17 (CDT)  Doc ID    #[id number]  Job ID #[id number]    CC:

## 2019-08-02 NOTE — SUBJECTIVE & OBJECTIVE
"Review of Systems   Constitutional: Negative for chills, fatigue and fever.   HENT: Negative for congestion, ear pain, postnasal drip, sinus pressure, sneezing and sore throat.    Eyes: Negative for discharge.   Respiratory: Positive for shortness of breath ( better this am  but feeling anxious and "I need one more night"). Negative for cough and chest tightness.         Bloody sputum this morning   Cardiovascular: Negative.    Gastrointestinal: Negative for abdominal pain, constipation, diarrhea, nausea and vomiting.   Genitourinary: Negative for difficulty urinating, flank pain and hematuria.   Musculoskeletal: Negative.  Negative for arthralgias and joint swelling.   Skin: Negative.    Neurological: Negative for dizziness and headaches.   Psychiatric/Behavioral: Negative for behavioral problems and confusion. The patient is nervous/anxious.      Objective:     Vital Signs (Most Recent):  Temp: 97.4 °F (36.3 °C) (08/02/19 0720)  Pulse: 65 (08/02/19 1000)  Resp: 16 (08/02/19 0736)  BP: (!) 146/65 (08/02/19 0720)  SpO2: 96 % (08/02/19 0736) Vital Signs (24h Range):  Temp:  [96.3 °F (35.7 °C)-98 °F (36.7 °C)] 97.4 °F (36.3 °C)  Pulse:  [57-84] 65  Resp:  [15-24] 16  SpO2:  [96 %-100 %] 96 %  BP: (133-173)/(63-74) 146/65     Weight: 48.5 kg (107 lb)  Body mass index is 19.57 kg/m².    Physical Exam   Constitutional: She is oriented to person, place, and time. She appears well-developed. No distress.   HENT:   Head: Normocephalic and atraumatic.   Right Ear: External ear normal.   Left Ear: External ear normal.   Eyes: Pupils are equal, round, and reactive to light. Conjunctivae and EOM are normal.   Neck: Normal range of motion. Neck supple. No tracheal deviation present.   Cardiovascular: Normal rate, regular rhythm and normal heart sounds.   Pulmonary/Chest: Effort normal. No respiratory distress. She has no wheezes. She has no rales.   No wheezing today  Moving more air   Abdominal: Soft. Bowel sounds are normal. " There is no tenderness.   Musculoskeletal: Normal range of motion. She exhibits no edema.   Neurological: She is alert and oriented to person, place, and time.   Skin: Skin is warm and dry. No rash noted.   Psychiatric: She has a normal mood and affect. Her behavior is normal.   Nursing note and vitals reviewed.        CRANIAL NERVES     CN III, IV, VI   Pupils are equal, round, and reactive to light.  Extraocular motions are normal.        Significant Labs:   A1C: No results for input(s): HGBA1C in the last 4320 hours.  Blood Culture: No results for input(s): LABBLOO in the last 48 hours.  CBC:   Recent Labs   Lab 08/02/19  0855   WBC 10.96   HGB 13.1   HCT 42.1        CMP:   Recent Labs   Lab 08/02/19  0855      K 5.2*      CO2 28   *   BUN 25*   CREATININE 1.0   CALCIUM 9.0   PROT 6.6   ALBUMIN 3.5   BILITOT 0.5   ALKPHOS 95   AST 31   ALT 66*   ANIONGAP 9   EGFRNONAA 54*     Cardiac Markers:   No results for input(s): CKMB, MYOGLOBIN, BNP, TROPISTAT in the last 48 hours.  Lactic Acid: No results for input(s): LACTATE in the last 48 hours.  Prealbumin: No results for input(s): PREALBUMIN in the last 48 hours.  Troponin:   Recent Labs   Lab 07/31/19  1259 07/31/19  1905   TROPONINI <0.006 <0.006     TSH:   Recent Labs   Lab 03/21/19  1049   TSH 2.773     Urine Culture: No results for input(s): LABURIN in the last 48 hours.  Urine Studies:   No results for input(s): COLORU, APPEARANCEUA, PHUR, SPECGRAV, PROTEINUA, GLUCUA, KETONESU, BILIRUBINUA, OCCULTUA, NITRITE, UROBILINOGEN, LEUKOCYTESUR, RBCUA, WBCUA, BACTERIA, SQUAMEPITHEL, HYALINECASTS in the last 48 hours.    Invalid input(s): WRIGHTSUR    Significant Imaging: I have reviewed and interpreted all pertinent imaging results/findings within the past 24 hours.     7/30/19 CXR- No acute process seen.  Severe emphysematous changes.    7/31  1. No CT evidence of pulmonary embolus.  2. Severe emphysematous changes within the lung fields  bilaterally greater on the right than left.  Prior granulomatous disease.  No acute infiltrate or effusion.

## 2019-08-02 NOTE — PLAN OF CARE
08/02/19 1446   Medicare Message   Important Message from Medicare regarding Discharge Appeal Rights Given to patient/caregiver;Explained to patient/caregiver;Signed/date by patient/caregiver   Date IMM was signed 08/02/19   Time IMM was signed 1440     Signed for discharge.

## 2019-08-02 NOTE — PLAN OF CARE
08/02/19 1150   Discharge Assessment   Assessment Type Discharge Planning Reassessment     Ms Perez will remain here today. She does require home oxygen at discharge.

## 2019-08-02 NOTE — ASSESSMENT & PLAN NOTE
+ iodine allergy-   Will get VQ scan - indeterminate probability   Iodine allergy - rash- did have CT in May - will pre- medicate     CT normal./LE normal

## 2019-08-02 NOTE — SUBJECTIVE & OBJECTIVE
Interval History: o2 sat study reveals no desats on oxygen will need to be followed as an outpatient   Nose bleed on Lovenox will follow will get ENT to see     Objective:     Vital Signs (Most Recent):  Temp: 97.4 °F (36.3 °C) (08/02/19 0720)  Pulse: 65 (08/02/19 1000)  Resp: 16 (08/02/19 0736)  BP: (!) 146/65 (08/02/19 0720)  SpO2: 96 % (08/02/19 0736) Vital Signs (24h Range):  Temp:  [96.3 °F (35.7 °C)-98 °F (36.7 °C)] 97.4 °F (36.3 °C)  Pulse:  [57-84] 65  Resp:  [15-24] 16  SpO2:  [96 %-100 %] 96 %  BP: (133-173)/(63-74) 146/65     Weight: 48.5 kg (107 lb)  Body mass index is 19.57 kg/m².      Intake/Output Summary (Last 24 hours) at 8/2/2019 1054  Last data filed at 8/2/2019 0830  Gross per 24 hour   Intake 480 ml   Output --   Net 480 ml       Physical Exam   Constitutional: She is oriented to person, place, and time. She appears well-developed and well-nourished. She is cooperative.  Non-toxic appearance. She does not appear ill. No distress.   HENT:   Head: Normocephalic and atraumatic.   Right Ear: Hearing, tympanic membrane, external ear and ear canal normal.   Left Ear: Hearing, tympanic membrane, external ear and ear canal normal.   Nose: No mucosal edema, rhinorrhea or nasal deformity. Epistaxis (??? blood mid nasal from front on the right ) is observed. Right sinus exhibits frontal sinus tenderness. Right sinus exhibits no maxillary sinus tenderness. Left sinus exhibits no maxillary sinus tenderness and no frontal sinus tenderness.   Mouth/Throat: Uvula is midline, oropharynx is clear and moist and mucous membranes are normal. No trismus in the jaw. Normal dentition. No uvula swelling. No posterior oropharyngeal erythema.   Eyes: Conjunctivae and lids are normal. No scleral icterus.   Sclera clear bilat   Neck: Trachea normal, full passive range of motion without pain and phonation normal. Neck supple.   Cardiovascular: Normal rate, regular rhythm, normal heart sounds, intact distal pulses and normal  pulses.   Pulmonary/Chest: She is in respiratory distress (mild to moderate). She has decreased breath sounds in the right upper field, the right middle field, the right lower field, the left upper field, the left middle field and the left lower field. She has wheezes in the right lower field and the left lower field. She has rhonchi in the right middle field, the right lower field, the left middle field and the left lower field.   Abdominal: Soft. Normal appearance and bowel sounds are normal. She exhibits no distension. There is no tenderness.   Musculoskeletal: Normal range of motion. She exhibits no edema or deformity.   Neurological: She is alert and oriented to person, place, and time. She exhibits normal muscle tone. Coordination normal.   Skin: Skin is warm, dry and intact. Capillary refill takes less than 2 seconds. She is not diaphoretic. No pallor.   Psychiatric: She has a normal mood and affect. Her speech is normal and behavior is normal. Judgment and thought content normal. Cognition and memory are normal.   Nursing note and vitals reviewed.      Vents:  Oxygen Concentration (%): 28 (08/01/19 1924)    Lines/Drains/Airways     Peripheral Intravenous Line                 Peripheral IV - Single Lumen 07/30/19 1750 20 G Right Forearm 2 days                Significant Labs:    CBC/Anemia Profile:  Recent Labs   Lab 08/02/19  0855   WBC 10.96   HGB 13.1   HCT 42.1      MCV 90   RDW 12.7        Chemistries:  Recent Labs   Lab 08/02/19  0855      K 5.2*      CO2 28   BUN 25*   CREATININE 1.0   CALCIUM 9.0   ALBUMIN 3.5   PROT 6.6   BILITOT 0.5   ALKPHOS 95   ALT 66*   AST 31       All pertinent labs within the past 24 hours have been reviewed.    Significant Imaging:  I have reviewed all pertinent imaging results/findings within the past 24 hours.

## 2019-08-02 NOTE — PLAN OF CARE
08/02/19 1532   Post-Acute Status   Post-Acute Authorization E   E Status Set-up Complete     Patient will receive home O2 with [facility]. Arash Jiang contacted and will coordinate O2 delivery for patient and knows that patient will be discharged tomorrow. VIVIAN Leyva informed for the weekend.     Shaneka Carrasco LMSW

## 2019-08-02 NOTE — PLAN OF CARE
Problem: Adult Inpatient Plan of Care  Goal: Plan of Care Review  Outcome: Ongoing (interventions implemented as appropriate)  Patient aware of plan of care. IV steroids continued. CTA chest done this am   patient notified of result. U/S BLE completed this afternoon, awaiting results. 2L O2 per NC in use, tolerating well. Free from falls/injuries. No questions or concerns at this time. Agrees with plan of care.

## 2019-08-02 NOTE — PLAN OF CARE
Problem: Adult Inpatient Plan of Care  Goal: Plan of Care Review  Outcome: Ongoing (interventions implemented as appropriate)  Pt admitted with COPD exacerbation. sats mid to upper 90s on 2L via nasal cannula. TAVARES remains. Scheduled nebs every 6 hours. Solumedrol every 8 hours. Pulmonology consulted. Occasional productive cough.  Ambulating with stand by assist. Vitals stable. Sinus rhythm on telemetry. Call bell within reach. Bed in low, locked position. Reviewed plan of care with pt; states agreement.

## 2019-08-02 NOTE — PROGRESS NOTES
[facility] Medicine  Progress Note    Patient Name: Marva Perez  MRN: [id number]  Patient Class: IP- Inpatient   Admission Date: 7/30/2019  Length of Stay: 2 days  Attending Physician: Carissa Green MD  Primary Care Provider: Kevin Clements MD        Subjective:     Principal Problem:COPD exacerbation      HPI:  Marva Perez is a 78 y.o. female  With PMHX of HTN, HLD, GERD, Depression, anxiety, and COPD/Emphysema  presented to ER yesterday with shortness of breath today with associated anxiety. [facility] c/o acute onset of SOB and O2 sat registered at 78%; sent to the ER immediately via EMS  Patient is not on oxygen at home, denied chest pain on admit  Lab Results   Component Value Date    DDIMER 0.42 07/30/2019   + iodine allergy - VQ scan this am pending. Dr. Matson following.   Patient having severe family issues with anxiety, also has severe COPD as well      Overview/Hospital Course:  7/31/19 H&P continued O2 sats 92% on R/A this am    8/1/19 VQ scan showing intermediate probability of pulmonary embolism due to the patient's extensive underlying emphysematous change. She has had prior CT abd with runoff in May 2019; will pre-medicate for CT scan to rule out PE ; refused benadryl - pepcid ordered   O2 sat 98-99% on 2LNC. Afebrile, continue steroids. Monitor with overnight pulse ox tonight with oxygen. Ordered per Dr. Matson   /70 , HR 61  Started on Cymbalta and remeron yesterday; pt notes she slept really well last PM   Had 6 minute walk yesterday - o2 sat 87% with activity. Pt requesting small portable tanks for home- concentrator  PE study neg.  8/2/19 O2 sat stable ovenight. Refused BIPAP. Do not think she will tolerate BIPAP with her severe anxiety.   Coughed up some blood this am and panicked. O2 2LNC,  O2 sat 98%, Notes breathing is better.   Ct negative for PE Severe emphysematous changes within the lung fields bilaterally greater on  "the right than left.  Prior granulomatous disease.  No acute infiltrate or effusion.  States she is feeling "very nervous" this morning, Xanax was being weaned.     Review of Systems   Constitutional: Negative for chills, fatigue and fever.   HENT: Negative for congestion, ear pain, postnasal drip, sinus pressure, sneezing and sore throat.    Eyes: Negative for discharge.   Respiratory: Positive for shortness of breath ( better this am  but feeling anxious and "I need one more night"). Negative for cough and chest tightness.         Bloody sputum this morning   Cardiovascular: Negative.    Gastrointestinal: Negative for abdominal pain, constipation, diarrhea, nausea and vomiting.   Genitourinary: Negative for difficulty urinating, flank pain and hematuria.   Musculoskeletal: Negative.  Negative for arthralgias and joint swelling.   Skin: Negative.    Neurological: Negative for dizziness and headaches.   Psychiatric/Behavioral: Negative for behavioral problems and confusion. The patient is nervous/anxious.      Objective:     Vital Signs (Most Recent):  Temp: 97.4 °F (36.3 °C) (08/02/19 0720)  Pulse: 65 (08/02/19 1000)  Resp: 16 (08/02/19 0736)  BP: (!) 146/65 (08/02/19 0720)  SpO2: 96 % (08/02/19 0736) Vital Signs (24h Range):  Temp:  [96.3 °F (35.7 °C)-98 °F (36.7 °C)] 97.4 °F (36.3 °C)  Pulse:  [57-84] 65  Resp:  [15-24] 16  SpO2:  [96 %-100 %] 96 %  BP: (133-173)/(63-74) 146/65     Weight: 48.5 kg (107 lb)  Body mass index is 19.57 kg/m².    Physical Exam   Constitutional: She is oriented to person, place, and time. She appears well-developed. No distress.   HENT:   Head: Normocephalic and atraumatic.   Right Ear: External ear normal.   Left Ear: External ear normal.   Eyes: Pupils are equal, round, and reactive to light. Conjunctivae and EOM are normal.   Neck: Normal range of motion. Neck supple. No tracheal deviation present.   Cardiovascular: Normal rate, regular rhythm and normal heart sounds. "   Pulmonary/Chest: Effort normal. No respiratory distress. She has no wheezes. She has no rales.   No wheezing today  Moving more air   Abdominal: Soft. Bowel sounds are normal. There is no tenderness.   Musculoskeletal: Normal range of motion. She exhibits no edema.   Neurological: She is alert and oriented to person, place, and time.   Skin: Skin is warm and dry. No rash noted.   Psychiatric: She has a normal mood and affect. Her behavior is normal.   Nursing note and vitals reviewed.        CRANIAL NERVES     CN III, IV, VI   Pupils are equal, round, and reactive to light.  Extraocular motions are normal.        Significant Labs:   A1C: No results for input(s): HGBA1C in the last 4320 hours.  Blood Culture: No results for input(s): LABBLOO in the last 48 hours.  CBC:   Recent Labs   Lab 08/02/19  0855   WBC 10.96   HGB 13.1   HCT 42.1        CMP:   Recent Labs   Lab 08/02/19  0855      K 5.2*      CO2 28   *   BUN 25*   CREATININE 1.0   CALCIUM 9.0   PROT 6.6   ALBUMIN 3.5   BILITOT 0.5   ALKPHOS 95   AST 31   ALT 66*   ANIONGAP 9   EGFRNONAA 54*     Cardiac Markers:   No results for input(s): CKMB, MYOGLOBIN, BNP, TROPISTAT in the last 48 hours.  Lactic Acid: No results for input(s): LACTATE in the last 48 hours.  Prealbumin: No results for input(s): PREALBUMIN in the last 48 hours.  Troponin:   Recent Labs   Lab 07/31/19  1259 07/31/19  1905   TROPONINI <0.006 <0.006     TSH:   Recent Labs   Lab 03/21/19  1049   TSH 2.773     Urine Culture: No results for input(s): LABURIN in the last 48 hours.  Urine Studies:   No results for input(s): COLORU, APPEARANCEUA, PHUR, SPECGRAV, PROTEINUA, GLUCUA, KETONESU, BILIRUBINUA, OCCULTUA, NITRITE, UROBILINOGEN, LEUKOCYTESUR, RBCUA, WBCUA, BACTERIA, SQUAMEPITHEL, HYALINECASTS in the last 48 hours.    Invalid input(s): WRIGHTSUR    Significant Imaging: I have reviewed and interpreted all pertinent imaging results/findings within the past 24 hours.  "    19 CXR- No acute process seen.  Severe emphysematous changes.      1. No CT evidence of pulmonary embolus.  2. Severe emphysematous changes within the lung fields bilaterally greater on the right than left.  Prior granulomatous disease.  No acute infiltrate or effusion.      Assessment/Plan:      * COPD exacerbation  Known severe emphysema  Dr. Matson following   Solumedrol 80 q 8  She should qualify for home O2. Will get overnight pulse ox AND walk her in the am to see if we can get her some portable O2   She really doesn't have much wheezing at all but does have terrible air flow. Just has a lot of trapped air and poor air exchange. Needs home O2   O2 ordered. CT negative.  Consider theophylline vs daliresp (she politely declined this 'if it would cause diarrhea); Notes she has chronic diarrhea since prior GI sx    : qualifies for home O2. She is breathing much better. Ambulate today. She feels she need "one more day". Does still having some tightness on exam but no more wheezing. Hopefully home in AM  She did have some blood in sputum today. Scant. Most likely upper airway. CT without PE. No masses on imaging. Will monitor today as well for worsening          Positive D dimer  + iodine allergy-   Will get VQ scan - indeterminate probability   Iodine allergy - rash- did have CT in May - will pre- medicate     CT normal./LE normal     Chronic respiratory failure    o2 sat 87% on RA     Acute on chronic respiratory failure with hypoxia  o2 sat 77% on arrival  Better this am  Check 6 minute walk -  Patient's O2 Sat on room air while exercisin%--home with O2      SOB (shortness of breath)  Stable with oxygen  + anxiety component       Centrilobular emphysema  Root cause of her dyspnea      Emphysema/COPD  Known severe emphysema  Dr. Matson following   Solumedrol 80 q 8  She should qualify for home O2. Will get overnight pulse ox AND walk her in the am to see if we can get her some portable O2   She " "really doesn't have much wheezing at all but does have terrible air flow. Just has a lot of trapped air and poor air exchange. Needs home O2   O2 ordered. CT negative.  Consider theophylline vs daliresp (she politely declined this 'if it would cause diarrhea); Notes she has chronic diarrhea since prior GI sx    8/2: qualifies for home O2. She is breathing much better. Ambulate today. She feels she need "one more day". Does still having some tightness on exam but no more wheezing. Hopefully home in AM  She did have some blood in sputum today. Scant. Most likely upper airway. CT without PE. No masses on imaging. Will monitor today as well for worsening      Hyperlipidemia    Not on statin at home    HTN (hypertension)  Continue losartan, nebivolol       KATHIE (generalized anxiety disorder)  Severe  mutliple family issues  Prescribed Remeron 15mg q Pm and Cybalta 30mg po daily  Pt states she never started bc she was "scared to take anti- depressants  Takes 0.5mg xanax nightly- discussed risks and encouraged to wean  Taper xanax- decrease to 0.25mg nightly prn  Will start cymbalta and remeron here   8/2/19 day 3 of Cymbalta and remeron, slept well but having severe anxiety this morning after coughing up blood; reassured pt that CBC and CT are stable; will continue xanax bid prn at this time and can try again to taper as OP per PCP after on cymbalta and remeron for some time         VTE Risk Mitigation (From admission, onward)        Ordered     enoxaparin injection 40 mg  Every 24 hours (non-standard times)      08/01/19 1326     Place CARLYN hose  Until discontinued      07/31/19 1711     IP VTE HIGH RISK PATIENT  Once      07/30/19 1858     Place sequential compression device  Until discontinued      07/30/19 1858                Maria Del Carmen Taylor MD  Department of Hospital Medicine   [facility]  "

## 2019-08-02 NOTE — PROGRESS NOTES
[facility]  Pulmonology  Progress Note    Patient Name: Marva Perez  MRN: [id number]  Admission Date: 7/30/2019  Hospital Length of Stay: 2 days  Code Status: Full Code  Attending Provider: Carissa Green MD  Primary Care Provider: Kevin Clements MD   Principal Problem: COPD exacerbation    Subjective:     Interval History: o2 sat study reveals no desats on oxygen will need to be followed as an outpatient   Nose bleed on Lovenox will follow will get ENT to see     Objective:     Vital Signs (Most Recent):  Temp: 97.4 °F (36.3 °C) (08/02/19 0720)  Pulse: 65 (08/02/19 1000)  Resp: 16 (08/02/19 0736)  BP: (!) 146/65 (08/02/19 0720)  SpO2: 96 % (08/02/19 0736) Vital Signs (24h Range):  Temp:  [96.3 °F (35.7 °C)-98 °F (36.7 °C)] 97.4 °F (36.3 °C)  Pulse:  [57-84] 65  Resp:  [15-24] 16  SpO2:  [96 %-100 %] 96 %  BP: (133-173)/(63-74) 146/65     Weight: 48.5 kg (107 lb)  Body mass index is 19.57 kg/m².      Intake/Output Summary (Last 24 hours) at 8/2/2019 1054  Last data filed at 8/2/2019 0830  Gross per 24 hour   Intake 480 ml   Output --   Net 480 ml       Physical Exam   Constitutional: She is oriented to person, place, and time. She appears well-developed and well-nourished. She is cooperative.  Non-toxic appearance. She does not appear ill. No distress.   HENT:   Head: Normocephalic and atraumatic.   Right Ear: Hearing, tympanic membrane, external ear and ear canal normal.   Left Ear: Hearing, tympanic membrane, external ear and ear canal normal.   Nose: No mucosal edema, rhinorrhea or nasal deformity. Epistaxis (??? blood mid nasal from front on the right ) is observed. Right sinus exhibits frontal sinus tenderness. Right sinus exhibits no maxillary sinus tenderness. Left sinus exhibits no maxillary sinus tenderness and no frontal sinus tenderness.   Mouth/Throat: Uvula is midline, oropharynx is clear and moist and mucous membranes are normal. No trismus in the jaw. Normal dentition. No  uvula swelling. No posterior oropharyngeal erythema.   Eyes: Conjunctivae and lids are normal. No scleral icterus.   Sclera clear bilat   Neck: Trachea normal, full passive range of motion without pain and phonation normal. Neck supple.   Cardiovascular: Normal rate, regular rhythm, normal heart sounds, intact distal pulses and normal pulses.   Pulmonary/Chest: She is in respiratory distress (mild to moderate). She has decreased breath sounds in the right upper field, the right middle field, the right lower field, the left upper field, the left middle field and the left lower field. She has wheezes in the right lower field and the left lower field. She has rhonchi in the right middle field, the right lower field, the left middle field and the left lower field.   Abdominal: Soft. Normal appearance and bowel sounds are normal. She exhibits no distension. There is no tenderness.   Musculoskeletal: Normal range of motion. She exhibits no edema or deformity.   Neurological: She is alert and oriented to person, place, and time. She exhibits normal muscle tone. Coordination normal.   Skin: Skin is warm, dry and intact. Capillary refill takes less than 2 seconds. She is not diaphoretic. No pallor.   Psychiatric: She has a normal mood and affect. Her speech is normal and behavior is normal. Judgment and thought content normal. Cognition and memory are normal.   Nursing note and vitals reviewed.      Vents:  Oxygen Concentration (%): 28 (08/01/19 1924)    Lines/Drains/Airways     Peripheral Intravenous Line                 Peripheral IV - Single Lumen 07/30/19 1750 20 G Right Forearm 2 days                Significant Labs:    CBC/Anemia Profile:  Recent Labs   Lab 08/02/19  0855   WBC 10.96   HGB 13.1   HCT 42.1      MCV 90   RDW 12.7        Chemistries:  Recent Labs   Lab 08/02/19  0855      K 5.2*      CO2 28   BUN 25*   CREATININE 1.0   CALCIUM 9.0   ALBUMIN 3.5   PROT 6.6   BILITOT 0.5   ALKPHOS 95    ALT 66*   AST 31       All pertinent labs within the past 24 hours have been reviewed.    Significant Imaging:  I have reviewed all pertinent imaging results/findings within the past 24 hours.    Assessment/Plan:     * COPD exacerbation  Severe copd    Positive D dimer  ++ d dimer     Chronic respiratory failure  Over the last 3 months sob, and was depressed over family events and not as active  elevated Ddimer may have PE    Acute hypoxemic respiratory failure  Probable pulmonary heart disease/cor pulmonale due to nocturnal and daytime hypoxia     SOB (shortness of breath)  Intermediate CTA with an elevated D-Dimer   anxiety    Centrilobular emphysema  o2 sat study on 2 liters did not show desat  No home ventilator is necessary   78% sat Copd secondary to smoking     Emphysema/COPD  Severe copd ?if this is sole reason of desat    Pulmonary embolism with acute cor pulmonale-resolved as of 8/1/2019  pe likely by wells score            Howard Matson MD  Pulmonology  [facility]

## 2019-08-02 NOTE — ASSESSMENT & PLAN NOTE
o2 sat 77% on arrival  Better this am  Check 6 minute walk -  Patient's O2 Sat on room air while exercisin%--home with O2

## 2019-08-02 NOTE — ASSESSMENT & PLAN NOTE
o2 sat study on 2 liters did not show desat  No home ventilator is necessary   78% sat Copd secondary to smoking

## 2019-08-02 NOTE — ASSESSMENT & PLAN NOTE
"Severe  mutliple family issues  Prescribed Remeron 15mg q Pm and Cybalta 30mg po daily  Pt states she never started bc she was "scared to take anti- depressants  Takes 0.5mg xanax nightly- discussed risks and encouraged to wean  Taper xanax- decrease to 0.25mg nightly prn  Will start cymbalta and remeron here   8/2/19 day 3 of Cymbalta and remeron, slept well but having severe anxiety this morning after coughing up blood; reassured pt that CBC and CT are stable; will continue xanax bid prn at this time and can try again to taper as OP per PCP after on cymbalta and remeron for some time     "

## 2019-08-03 PROBLEM — I48.91 ATRIAL FIBRILLATION WITH RVR: Status: ACTIVE | Noted: 2019-08-03

## 2019-08-03 LAB
ANION GAP SERPL CALC-SCNC: 7 MMOL/L (ref 8–16)
BASOPHILS # BLD AUTO: 0 K/UL (ref 0–0.2)
BASOPHILS NFR BLD: 0 % (ref 0–1.9)
BUN SERPL-MCNC: 23 MG/DL (ref 8–23)
CALCIUM SERPL-MCNC: 8.7 MG/DL (ref 8.7–10.5)
CHLORIDE SERPL-SCNC: 106 MMOL/L (ref 95–110)
CO2 SERPL-SCNC: 30 MMOL/L (ref 23–29)
CREAT SERPL-MCNC: 0.8 MG/DL (ref 0.5–1.4)
DIFFERENTIAL METHOD: ABNORMAL
EOSINOPHIL # BLD AUTO: 0 K/UL (ref 0–0.5)
EOSINOPHIL NFR BLD: 0 % (ref 0–8)
ERYTHROCYTE [DISTWIDTH] IN BLOOD BY AUTOMATED COUNT: 12.9 % (ref 11.5–14.5)
EST. GFR  (AFRICAN AMERICAN): >60 ML/MIN/1.73 M^2
EST. GFR  (NON AFRICAN AMERICAN): >60 ML/MIN/1.73 M^2
GLUCOSE SERPL-MCNC: 128 MG/DL (ref 70–110)
HCT VFR BLD AUTO: 41.1 % (ref 37–48.5)
HGB BLD-MCNC: 12.9 G/DL (ref 12–16)
IMM GRANULOCYTES # BLD AUTO: 0.11 K/UL (ref 0–0.04)
IMM GRANULOCYTES NFR BLD AUTO: 1.7 % (ref 0–0.5)
LYMPHOCYTES # BLD AUTO: 0.4 K/UL (ref 1–4.8)
LYMPHOCYTES NFR BLD: 6.7 % (ref 18–48)
MCH RBC QN AUTO: 27.9 PG (ref 27–31)
MCHC RBC AUTO-ENTMCNC: 31.4 G/DL (ref 32–36)
MCV RBC AUTO: 89 FL (ref 82–98)
MONOCYTES # BLD AUTO: 0.2 K/UL (ref 0.3–1)
MONOCYTES NFR BLD: 3.7 % (ref 4–15)
NEUTROPHILS # BLD AUTO: 5.6 K/UL (ref 1.8–7.7)
NEUTROPHILS NFR BLD: 87.9 % (ref 38–73)
NRBC BLD-RTO: 0 /100 WBC
PLATELET # BLD AUTO: 236 K/UL (ref 150–350)
PMV BLD AUTO: 10.5 FL (ref 9.2–12.9)
POTASSIUM SERPL-SCNC: 4.6 MMOL/L (ref 3.5–5.1)
RBC # BLD AUTO: 4.62 M/UL (ref 4–5.4)
SODIUM SERPL-SCNC: 143 MMOL/L (ref 136–145)
TROPONIN I SERPL DL<=0.01 NG/ML-MCNC: <0.006 NG/ML (ref 0–0.03)
WBC # BLD AUTO: 6.42 K/UL (ref 3.9–12.7)

## 2019-08-03 PROCEDURE — 25000003 PHARM REV CODE 250: Performed by: INTERNAL MEDICINE

## 2019-08-03 PROCEDURE — 87205 SMEAR GRAM STAIN: CPT

## 2019-08-03 PROCEDURE — 25000003 PHARM REV CODE 250

## 2019-08-03 PROCEDURE — 99900035 HC TECH TIME PER 15 MIN (STAT)

## 2019-08-03 PROCEDURE — 87070 CULTURE OTHR SPECIMN AEROBIC: CPT

## 2019-08-03 PROCEDURE — 99233 PR SUBSEQUENT HOSPITAL CARE,LEVL III: ICD-10-PCS | Mod: ,,, | Performed by: INTERNAL MEDICINE

## 2019-08-03 PROCEDURE — 36415 COLL VENOUS BLD VENIPUNCTURE: CPT

## 2019-08-03 PROCEDURE — 27000221 HC OXYGEN, UP TO 24 HOURS

## 2019-08-03 PROCEDURE — 25000003 PHARM REV CODE 250: Performed by: NURSE PRACTITIONER

## 2019-08-03 PROCEDURE — 93005 ELECTROCARDIOGRAM TRACING: CPT

## 2019-08-03 PROCEDURE — 93010 ELECTROCARDIOGRAM REPORT: CPT | Mod: ,,, | Performed by: INTERNAL MEDICINE

## 2019-08-03 PROCEDURE — 25000242 PHARM REV CODE 250 ALT 637 W/ HCPCS: Performed by: NURSE PRACTITIONER

## 2019-08-03 PROCEDURE — 99233 SBSQ HOSP IP/OBS HIGH 50: CPT | Mod: ,,, | Performed by: INTERNAL MEDICINE

## 2019-08-03 PROCEDURE — 80048 BASIC METABOLIC PNL TOTAL CA: CPT

## 2019-08-03 PROCEDURE — 94640 AIRWAY INHALATION TREATMENT: CPT

## 2019-08-03 PROCEDURE — 63600175 PHARM REV CODE 636 W HCPCS: Performed by: SURGERY

## 2019-08-03 PROCEDURE — 93010 EKG 12-LEAD: ICD-10-PCS | Mod: ,,, | Performed by: INTERNAL MEDICINE

## 2019-08-03 PROCEDURE — 25000242 PHARM REV CODE 250 ALT 637 W/ HCPCS: Performed by: INTERNAL MEDICINE

## 2019-08-03 PROCEDURE — 84484 ASSAY OF TROPONIN QUANT: CPT

## 2019-08-03 PROCEDURE — 63600175 PHARM REV CODE 636 W HCPCS: Performed by: INTERNAL MEDICINE

## 2019-08-03 PROCEDURE — 11000001 HC ACUTE MED/SURG PRIVATE ROOM

## 2019-08-03 PROCEDURE — 85025 COMPLETE CBC W/AUTO DIFF WBC: CPT

## 2019-08-03 PROCEDURE — 25000003 PHARM REV CODE 250: Performed by: SURGERY

## 2019-08-03 PROCEDURE — 94761 N-INVAS EAR/PLS OXIMETRY MLT: CPT

## 2019-08-03 RX ORDER — LEVALBUTEROL 1.25 MG/.5ML
1.25 SOLUTION, CONCENTRATE RESPIRATORY (INHALATION) EVERY 6 HOURS
Status: DISCONTINUED | OUTPATIENT
Start: 2019-08-03 | End: 2019-08-05 | Stop reason: HOSPADM

## 2019-08-03 RX ORDER — METOPROLOL TARTRATE 1 MG/ML
INJECTION, SOLUTION INTRAVENOUS
Status: COMPLETED
Start: 2019-08-03 | End: 2019-08-03

## 2019-08-03 RX ORDER — METOPROLOL TARTRATE 25 MG/1
25 TABLET, FILM COATED ORAL 2 TIMES DAILY
Status: DISCONTINUED | OUTPATIENT
Start: 2019-08-03 | End: 2019-08-05 | Stop reason: HOSPADM

## 2019-08-03 RX ORDER — ALPRAZOLAM 0.25 MG/1
0.25 TABLET ORAL ONCE
Status: COMPLETED | OUTPATIENT
Start: 2019-08-03 | End: 2019-08-03

## 2019-08-03 RX ORDER — METOPROLOL TARTRATE 25 MG/1
25 TABLET, FILM COATED ORAL ONCE
Status: COMPLETED | OUTPATIENT
Start: 2019-08-03 | End: 2019-08-03

## 2019-08-03 RX ORDER — LEVALBUTEROL 1.25 MG/.5ML
1.25 SOLUTION, CONCENTRATE RESPIRATORY (INHALATION) EVERY 6 HOURS
Status: DISCONTINUED | OUTPATIENT
Start: 2019-08-03 | End: 2019-08-03

## 2019-08-03 RX ORDER — METOPROLOL TARTRATE 1 MG/ML
5 INJECTION, SOLUTION INTRAVENOUS ONCE
Status: COMPLETED | OUTPATIENT
Start: 2019-08-03 | End: 2019-08-03

## 2019-08-03 RX ORDER — METHYLPREDNISOLONE SOD SUCC 125 MG
40 VIAL (EA) INJECTION EVERY 12 HOURS
Status: DISCONTINUED | OUTPATIENT
Start: 2019-08-03 | End: 2019-08-05 | Stop reason: HOSPADM

## 2019-08-03 RX ADMIN — DULOXETINE 30 MG: 30 CAPSULE, DELAYED RELEASE ORAL at 08:08

## 2019-08-03 RX ADMIN — METOPROLOL TARTRATE 25 MG: 25 TABLET ORAL at 12:08

## 2019-08-03 RX ADMIN — LEVALBUTEROL 1.25 MG: 1.25 SOLUTION, CONCENTRATE RESPIRATORY (INHALATION) at 07:08

## 2019-08-03 RX ADMIN — IPRATROPIUM BROMIDE AND ALBUTEROL SULFATE 3 ML: .5; 3 SOLUTION RESPIRATORY (INHALATION) at 07:08

## 2019-08-03 RX ADMIN — PANTOPRAZOLE SODIUM 40 MG: 40 TABLET, DELAYED RELEASE ORAL at 08:08

## 2019-08-03 RX ADMIN — APIXABAN 5 MG: 5 TABLET, FILM COATED ORAL at 11:08

## 2019-08-03 RX ADMIN — METOPROLOL TARTRATE 25 MG: 25 TABLET ORAL at 08:08

## 2019-08-03 RX ADMIN — METOPROLOL TARTRATE 5 MG: 5 INJECTION, SOLUTION INTRAVENOUS at 12:08

## 2019-08-03 RX ADMIN — LOSARTAN POTASSIUM 50 MG: 50 TABLET, FILM COATED ORAL at 08:08

## 2019-08-03 RX ADMIN — METOPROLOL TARTRATE 5 MG: 1 INJECTION, SOLUTION INTRAVENOUS at 12:08

## 2019-08-03 RX ADMIN — ALPRAZOLAM 0.25 MG: 0.25 TABLET ORAL at 12:08

## 2019-08-03 RX ADMIN — NEBIVOLOL HYDROCHLORIDE 10 MG: 5 TABLET ORAL at 08:08

## 2019-08-03 RX ADMIN — ALPRAZOLAM 0.25 MG: 0.25 TABLET ORAL at 08:08

## 2019-08-03 RX ADMIN — APIXABAN 5 MG: 5 TABLET, FILM COATED ORAL at 08:08

## 2019-08-03 RX ADMIN — METHYLPREDNISOLONE SODIUM SUCCINATE 80 MG: 125 INJECTION, POWDER, FOR SOLUTION INTRAMUSCULAR; INTRAVENOUS at 06:08

## 2019-08-03 RX ADMIN — METHYLPREDNISOLONE SODIUM SUCCINATE 40 MG: 125 INJECTION, POWDER, FOR SOLUTION INTRAMUSCULAR; INTRAVENOUS at 10:08

## 2019-08-03 RX ADMIN — METHSCOPOLAMINE BROMIDE 2.5 MG: 2.5 TABLET ORAL at 08:08

## 2019-08-03 RX ADMIN — MIRTAZAPINE 15 MG: 7.5 TABLET ORAL at 08:08

## 2019-08-03 RX ADMIN — CLONIDINE HYDROCHLORIDE 0.1 MG: 0.1 TABLET ORAL at 01:08

## 2019-08-03 RX ADMIN — METOPROLOL TARTRATE 25 MG: 25 TABLET ORAL at 11:08

## 2019-08-03 RX ADMIN — IPRATROPIUM BROMIDE AND ALBUTEROL SULFATE 3 ML: .5; 3 SOLUTION RESPIRATORY (INHALATION) at 12:08

## 2019-08-03 RX ADMIN — LEVALBUTEROL 1.25 MG: 1.25 SOLUTION, CONCENTRATE RESPIRATORY (INHALATION) at 01:08

## 2019-08-03 NOTE — PROGRESS NOTES
[facility]  Pulmonology  Progress Note    Patient Name: Marva Perez  MRN: [id number]  Admission Date: 7/30/2019  Hospital Length of Stay: 3 days  Code Status: Full Code  Attending Provider: Carissa Green MD  Primary Care Provider: Kevin Clements MD   Principal Problem: Pulmonary embolism with acute cor pulmonale    Subjective:     Interval History: Feeling better today stamina is returning . Will start eliquis and dc lovanox . No hemoptysis    Objective:     Vital Signs (Most Recent):  Temp: 97.9 °F (36.6 °C) (08/03/19 0759)  Pulse: 69 (08/03/19 1000)  Resp: 18 (08/03/19 0759)  BP: (!) 174/74 (08/03/19 0759)  SpO2: 96 % (08/03/19 0759) Vital Signs (24h Range):  Temp:  [96.6 °F (35.9 °C)-97.9 °F (36.6 °C)] 97.9 °F (36.6 °C)  Pulse:  [] 69  Resp:  [16-22] 18  SpO2:  [87 %-99 %] 96 %  BP: (130-190)/(61-92) 174/74     Weight: 48.5 kg (107 lb)  Body mass index is 19.57 kg/m².      Intake/Output Summary (Last 24 hours) at 8/3/2019 1040  Last data filed at 8/2/2019 2250  Gross per 24 hour   Intake 550 ml   Output 1 ml   Net 549 ml       Physical Exam   Constitutional: She is oriented to person, place, and time. She appears well-developed and well-nourished. She is cooperative.  Non-toxic appearance. She does not appear ill. No distress.   HENT:   Head: Normocephalic and atraumatic.   Right Ear: Hearing, tympanic membrane, external ear and ear canal normal.   Left Ear: Hearing, tympanic membrane, external ear and ear canal normal.   Nose: Nose normal. No mucosal edema, rhinorrhea or nasal deformity. No epistaxis. Right sinus exhibits no maxillary sinus tenderness and no frontal sinus tenderness. Left sinus exhibits no maxillary sinus tenderness and no frontal sinus tenderness.   Mouth/Throat: Uvula is midline, oropharynx is clear and moist and mucous membranes are normal. No trismus in the jaw. Normal dentition. No uvula swelling. No posterior oropharyngeal erythema.   Eyes: Conjunctivae  and lids are normal. No scleral icterus.   Sclera clear bilat   Neck: Trachea normal, full passive range of motion without pain and phonation normal. Neck supple.   Cardiovascular: Normal rate, regular rhythm, normal heart sounds, intact distal pulses and normal pulses.   Pulmonary/Chest: She is in respiratory distress (mild to moderate). She has decreased breath sounds in the right middle field, the right lower field, the left middle field and the left lower field. She has no wheezes. She has no rhonchi.   Abdominal: Soft. Normal appearance and bowel sounds are normal. She exhibits no distension. There is no tenderness.   Musculoskeletal: Normal range of motion. She exhibits no edema or deformity.   Neurological: She is alert and oriented to person, place, and time. She exhibits normal muscle tone. Coordination normal.   Skin: Skin is warm, dry and intact. She is not diaphoretic. No pallor.   Psychiatric: She has a normal mood and affect. Her speech is normal and behavior is normal. Judgment and thought content normal. Cognition and memory are normal.   Nursing note and vitals reviewed.      Vents:  Oxygen Concentration (%): 28 (08/03/19 0006)    Lines/Drains/Airways     Peripheral Intravenous Line                 Peripheral IV - Single Lumen 07/30/19 20 G Right Antecubital 4 days                Significant Labs:    CBC/Anemia Profile:  Recent Labs   Lab 08/02/19  0855 08/03/19  0608   WBC 10.96 6.42   HGB 13.1 12.9   HCT 42.1 41.1    236   MCV 90 89   RDW 12.7 12.9        Chemistries:  Recent Labs   Lab 08/02/19  0855 08/03/19  0608    143   K 5.2* 4.6    106   CO2 28 30*   BUN 25* 23   CREATININE 1.0 0.8   CALCIUM 9.0 8.7   ALBUMIN 3.5  --    PROT 6.6  --    BILITOT 0.5  --    ALKPHOS 95  --    ALT 66*  --    AST 31  --        All pertinent labs within the past 24 hours have been reviewed.    Significant Imaging:  I have reviewed all pertinent imaging results/findings within the past 24  hours.    Assessment/Plan:     * Pulmonary embolism with acute cor pulmonale-resolved as of 8/1/2019  Pe likely by wells score     COPD exacerbation  Severe copd still with decreased air entry     Positive D dimer  ++ d dimer with intermediate CTA and Clinical history equals PE     Acute on chronic respiratory failure with hypoxia  Secondary to PE   Probable pulmonary heart disease/cor pulmonale due to nocturnal and daytime hypoxia     SOB (shortness of breath)  Secondary to PE  Intermediate CTA with an elevated D-Dimer   anxiety           Howard Matson MD  Pulmonology  [facility]

## 2019-08-03 NOTE — ASSESSMENT & PLAN NOTE
Secondary to PE   Probable pulmonary heart disease/cor pulmonale due to nocturnal and daytime hypoxia

## 2019-08-03 NOTE — SUBJECTIVE & OBJECTIVE
Review of Systems   Constitutional: Negative for chills, fatigue and fever.   HENT: Negative for congestion, ear pain, postnasal drip, sinus pressure, sneezing and sore throat.    Eyes: Negative for discharge.   Respiratory: Positive for shortness of breath (much improved). Negative for cough and chest tightness.    Cardiovascular: Negative.    Gastrointestinal: Negative for abdominal pain, constipation, diarrhea, nausea and vomiting.   Genitourinary: Negative for difficulty urinating, flank pain and hematuria.   Musculoskeletal: Negative.  Negative for arthralgias and joint swelling.   Skin: Negative.    Neurological: Negative for dizziness and headaches.   Psychiatric/Behavioral: Negative for behavioral problems and confusion. The patient is nervous/anxious.      Objective:     Vital Signs (Most Recent):  Temp: 97.9 °F (36.6 °C) (08/03/19 0759)  Pulse: 69 (08/03/19 1000)  Resp: 18 (08/03/19 0759)  BP: (!) 174/74 (08/03/19 0759)  SpO2: 96 % (08/03/19 0759) Vital Signs (24h Range):  Temp:  [96.6 °F (35.9 °C)-97.9 °F (36.6 °C)] 97.9 °F (36.6 °C)  Pulse:  [] 69  Resp:  [16-22] 18  SpO2:  [87 %-99 %] 96 %  BP: (130-190)/(61-92) 174/74     Weight: 48.5 kg (107 lb)  Body mass index is 19.57 kg/m².    Physical Exam   Constitutional: She is oriented to person, place, and time. She appears well-developed. No distress.   HENT:   Head: Normocephalic and atraumatic.   Right Ear: External ear normal.   Left Ear: External ear normal.   Eyes: Pupils are equal, round, and reactive to light. Conjunctivae and EOM are normal.   Neck: Normal range of motion. Neck supple. No tracheal deviation present.   Cardiovascular: Normal heart sounds.   Tachycardic, ~100 bpm  irregular   Pulmonary/Chest: Effort normal. No respiratory distress. She has no wheezes. She has no rales.   No wheezing today  Moving more air   Abdominal: Soft. Bowel sounds are normal. There is no tenderness.   Musculoskeletal: Normal range of motion. She exhibits  no edema.   Neurological: She is alert and oriented to person, place, and time.   Skin: Skin is warm and dry. No rash noted.   Psychiatric: She has a normal mood and affect. Her behavior is normal.   Nursing note and vitals reviewed.        CRANIAL NERVES     CN III, IV, VI   Pupils are equal, round, and reactive to light.  Extraocular motions are normal.        Significant Labs:   A1C: No results for input(s): HGBA1C in the last 4320 hours.  Blood Culture: No results for input(s): LABBLOO in the last 48 hours.  CBC:   Recent Labs   Lab 08/02/19  0855 08/03/19  0608   WBC 10.96 6.42   HGB 13.1 12.9   HCT 42.1 41.1    236     CMP:   Recent Labs   Lab 08/02/19  0855 08/03/19  0608    143   K 5.2* 4.6    106   CO2 28 30*   * 128*   BUN 25* 23   CREATININE 1.0 0.8   CALCIUM 9.0 8.7   PROT 6.6  --    ALBUMIN 3.5  --    BILITOT 0.5  --    ALKPHOS 95  --    AST 31  --    ALT 66*  --    ANIONGAP 9 7*   EGFRNONAA 54* >60     Cardiac Markers:   No results for input(s): CKMB, MYOGLOBIN, BNP, TROPISTAT in the last 48 hours.  Lactic Acid: No results for input(s): LACTATE in the last 48 hours.  Prealbumin: No results for input(s): PREALBUMIN in the last 48 hours.  Troponin:   Recent Labs   Lab 08/03/19  0051   TROPONINI <0.006     TSH:   Recent Labs   Lab 03/21/19  1049   TSH 2.773     Urine Culture: No results for input(s): LABURIN in the last 48 hours.  Urine Studies:   No results for input(s): COLORU, APPEARANCEUA, PHUR, SPECGRAV, PROTEINUA, GLUCUA, KETONESU, BILIRUBINUA, OCCULTUA, NITRITE, UROBILINOGEN, LEUKOCYTESUR, RBCUA, WBCUA, BACTERIA, SQUAMEPITHEL, HYALINECASTS in the last 48 hours.    Invalid input(s): WRIGHTSUR    Significant Imaging: I have reviewed and interpreted all pertinent imaging results/findings within the past 24 hours.     7/30/19 CXR- No acute process seen.  Severe emphysematous changes.    7/31  1. No CT evidence of pulmonary embolus.  2. Severe emphysematous changes within the  lung fields bilaterally greater on the right than left.  Prior granulomatous disease.  No acute infiltrate or effusion.

## 2019-08-03 NOTE — PLAN OF CARE
Problem: Adult Inpatient Plan of Care  Goal: Plan of Care Review  Outcome: Ongoing (interventions implemented as appropriate)     08/03/19 0555   Plan of Care Review   Plan of Care Reviewed With patient;son   Progress no change   Outcome Summary Received IV solumedrol, Resp tx, and medications as ordered overnight. Episode of Afib with RVR treated with Lopressor and patient now NSR.     Goal: Patient-Specific Goal (Individualization)  Outcome: Ongoing (interventions implemented as appropriate)     08/03/19 0555   OTHER   Anxieties, Fears or Concerns Anxious throughout the night when not asleep.   Individualized Care Needs Medications provided to assist with anxiety and patient checked on constantly.   Patient-Specific Goal (Individualization)   Patient-Specific Goals (Include Timeframe) Hourly rounds and sometime rounding more often to help keep patient calm

## 2019-08-03 NOTE — ASSESSMENT & PLAN NOTE
Remote h/o a-fib  Developed a-fib with RVR overnight  Rate better controlled this AM with BB but still ~100 bpm  Start metoprolol 25mg BID and titrate as needed for HR control  Start eliquis--h/o PUD but no bleeding several years per patient. Watch closely  Stop albuterol, start xopenex. Avoid albuterol in future  Reduce steroids    This is now what will keep patient in hospital. COPD still much better but need rate control before discharge

## 2019-08-03 NOTE — SUBJECTIVE & OBJECTIVE
Interval History: Feeling better today stamina is returning . Will start eliquis and dc lovanox . No hemoptysis    Objective:     Vital Signs (Most Recent):  Temp: 97.9 °F (36.6 °C) (08/03/19 0759)  Pulse: 69 (08/03/19 1000)  Resp: 18 (08/03/19 0759)  BP: (!) 174/74 (08/03/19 0759)  SpO2: 96 % (08/03/19 0759) Vital Signs (24h Range):  Temp:  [96.6 °F (35.9 °C)-97.9 °F (36.6 °C)] 97.9 °F (36.6 °C)  Pulse:  [] 69  Resp:  [16-22] 18  SpO2:  [87 %-99 %] 96 %  BP: (130-190)/(61-92) 174/74     Weight: 48.5 kg (107 lb)  Body mass index is 19.57 kg/m².      Intake/Output Summary (Last 24 hours) at 8/3/2019 1040  Last data filed at 8/2/2019 2250  Gross per 24 hour   Intake 550 ml   Output 1 ml   Net 549 ml       Physical Exam   Constitutional: She is oriented to person, place, and time. She appears well-developed and well-nourished. She is cooperative.  Non-toxic appearance. She does not appear ill. No distress.   HENT:   Head: Normocephalic and atraumatic.   Right Ear: Hearing, tympanic membrane, external ear and ear canal normal.   Left Ear: Hearing, tympanic membrane, external ear and ear canal normal.   Nose: Nose normal. No mucosal edema, rhinorrhea or nasal deformity. No epistaxis. Right sinus exhibits no maxillary sinus tenderness and no frontal sinus tenderness. Left sinus exhibits no maxillary sinus tenderness and no frontal sinus tenderness.   Mouth/Throat: Uvula is midline, oropharynx is clear and moist and mucous membranes are normal. No trismus in the jaw. Normal dentition. No uvula swelling. No posterior oropharyngeal erythema.   Eyes: Conjunctivae and lids are normal. No scleral icterus.   Sclera clear bilat   Neck: Trachea normal, full passive range of motion without pain and phonation normal. Neck supple.   Cardiovascular: Normal rate, regular rhythm, normal heart sounds, intact distal pulses and normal pulses.   Pulmonary/Chest: She is in respiratory distress (mild to moderate). She has decreased  breath sounds in the right middle field, the right lower field, the left middle field and the left lower field. She has no wheezes. She has no rhonchi.   Abdominal: Soft. Normal appearance and bowel sounds are normal. She exhibits no distension. There is no tenderness.   Musculoskeletal: Normal range of motion. She exhibits no edema or deformity.   Neurological: She is alert and oriented to person, place, and time. She exhibits normal muscle tone. Coordination normal.   Skin: Skin is warm, dry and intact. She is not diaphoretic. No pallor.   Psychiatric: She has a normal mood and affect. Her speech is normal and behavior is normal. Judgment and thought content normal. Cognition and memory are normal.   Nursing note and vitals reviewed.      Vents:  Oxygen Concentration (%): 28 (08/03/19 0006)    Lines/Drains/Airways     Peripheral Intravenous Line                 Peripheral IV - Single Lumen 07/30/19 20 G Right Antecubital 4 days                Significant Labs:    CBC/Anemia Profile:  Recent Labs   Lab 08/02/19  0855 08/03/19  0608   WBC 10.96 6.42   HGB 13.1 12.9   HCT 42.1 41.1    236   MCV 90 89   RDW 12.7 12.9        Chemistries:  Recent Labs   Lab 08/02/19  0855 08/03/19  0608    143   K 5.2* 4.6    106   CO2 28 30*   BUN 25* 23   CREATININE 1.0 0.8   CALCIUM 9.0 8.7   ALBUMIN 3.5  --    PROT 6.6  --    BILITOT 0.5  --    ALKPHOS 95  --    ALT 66*  --    AST 31  --        All pertinent labs within the past 24 hours have been reviewed.    Significant Imaging:  I have reviewed all pertinent imaging results/findings within the past 24 hours.

## 2019-08-03 NOTE — PROGRESS NOTES
[facility] Medicine  Progress Note    Patient Name: Marva Perez  MRN: [id number]  Patient Class: IP- Inpatient   Admission Date: 7/30/2019  Length of Stay: 3 days  Attending Physician: Carissa Green MD  Primary Care Provider: Kevin Clements MD        Subjective:     Principal Problem:Pulmonary embolism with acute cor pulmonale      HPI:  Marva Perez is a 78 y.o. female  With PMHX of HTN, HLD, GERD, Depression, anxiety, and COPD/Emphysema  presented to ER yesterday with shortness of breath today with associated anxiety. [facility] c/o acute onset of SOB and O2 sat registered at 78%; sent to the ER immediately via EMS  Patient is not on oxygen at home, denied chest pain on admit  Lab Results   Component Value Date    DDIMER 0.42 07/30/2019   + iodine allergy - VQ scan this am pending. Dr. Matson following.   Patient having severe family issues with anxiety, also has severe COPD as well      Overview/Hospital Course:  7/31/19 H&P continued O2 sats 92% on R/A this am    8/1/19 VQ scan showing intermediate probability of pulmonary embolism due to the patient's extensive underlying emphysematous change. She has had prior CT abd with runoff in May 2019; will pre-medicate for CT scan to rule out PE ; refused benadryl - pepcid ordered   O2 sat 98-99% on 2LNC. Afebrile, continue steroids. Monitor with overnight pulse ox tonight with oxygen. Ordered per Dr. Matson   /70 , HR 61  Started on Cymbalta and remeron yesterday; pt notes she slept really well last PM   Had 6 minute walk yesterday - o2 sat 87% with activity. Pt requesting small portable tanks for home- concentrator  PE study neg.  8/2/19 O2 sat stable ovenight. Refused BIPAP. Do not think she will tolerate BIPAP with her severe anxiety.   Coughed up some blood this am and panicked. O2 2LNC,  O2 sat 98%, Notes breathing is better.   Ct negative for PE Severe emphysematous changes within the lung  "fields bilaterally greater on the right than left.  Prior granulomatous disease.  No acute infiltrate or effusion.  States she is feeling "very nervous" this morning, Xanax was being weaned.     8/3/19 overnight patient developed a-fib with RVR. She had episode of a-fib after her colon surgery 4 years ago. Did follow with Dr. Wilde at that time and tells me was "weaned off all medications" She was not on blood thinners, h/o PUD. No issues with active bleeding for several years per patient. Her breathing is much better, no wheezing.     Review of Systems   Constitutional: Negative for chills, fatigue and fever.   HENT: Negative for congestion, ear pain, postnasal drip, sinus pressure, sneezing and sore throat.    Eyes: Negative for discharge.   Respiratory: Positive for shortness of breath (much improved). Negative for cough and chest tightness.    Cardiovascular: Negative.    Gastrointestinal: Negative for abdominal pain, constipation, diarrhea, nausea and vomiting.   Genitourinary: Negative for difficulty urinating, flank pain and hematuria.   Musculoskeletal: Negative.  Negative for arthralgias and joint swelling.   Skin: Negative.    Neurological: Negative for dizziness and headaches.   Psychiatric/Behavioral: Negative for behavioral problems and confusion. The patient is nervous/anxious.      Objective:     Vital Signs (Most Recent):  Temp: 97.9 °F (36.6 °C) (08/03/19 0759)  Pulse: 69 (08/03/19 1000)  Resp: 18 (08/03/19 0759)  BP: (!) 174/74 (08/03/19 0759)  SpO2: 96 % (08/03/19 0759) Vital Signs (24h Range):  Temp:  [96.6 °F (35.9 °C)-97.9 °F (36.6 °C)] 97.9 °F (36.6 °C)  Pulse:  [] 69  Resp:  [16-22] 18  SpO2:  [87 %-99 %] 96 %  BP: (130-190)/(61-92) 174/74     Weight: 48.5 kg (107 lb)  Body mass index is 19.57 kg/m².    Physical Exam   Constitutional: She is oriented to person, place, and time. She appears well-developed. No distress.   HENT:   Head: Normocephalic and atraumatic.   Right Ear: External " ear normal.   Left Ear: External ear normal.   Eyes: Pupils are equal, round, and reactive to light. Conjunctivae and EOM are normal.   Neck: Normal range of motion. Neck supple. No tracheal deviation present.   Cardiovascular: Normal heart sounds.   Tachycardic, ~100 bpm  irregular   Pulmonary/Chest: Effort normal. No respiratory distress. She has no wheezes. She has no rales.   No wheezing today  Moving more air   Abdominal: Soft. Bowel sounds are normal. There is no tenderness.   Musculoskeletal: Normal range of motion. She exhibits no edema.   Neurological: She is alert and oriented to person, place, and time.   Skin: Skin is warm and dry. No rash noted.   Psychiatric: She has a normal mood and affect. Her behavior is normal.   Nursing note and vitals reviewed.        CRANIAL NERVES     CN III, IV, VI   Pupils are equal, round, and reactive to light.  Extraocular motions are normal.        Significant Labs:   A1C: No results for input(s): HGBA1C in the last 4320 hours.  Blood Culture: No results for input(s): LABBLOO in the last 48 hours.  CBC:   Recent Labs   Lab 08/02/19  0855 08/03/19  0608   WBC 10.96 6.42   HGB 13.1 12.9   HCT 42.1 41.1    236     CMP:   Recent Labs   Lab 08/02/19  0855 08/03/19  0608    143   K 5.2* 4.6    106   CO2 28 30*   * 128*   BUN 25* 23   CREATININE 1.0 0.8   CALCIUM 9.0 8.7   PROT 6.6  --    ALBUMIN 3.5  --    BILITOT 0.5  --    ALKPHOS 95  --    AST 31  --    ALT 66*  --    ANIONGAP 9 7*   EGFRNONAA 54* >60     Cardiac Markers:   No results for input(s): CKMB, MYOGLOBIN, BNP, TROPISTAT in the last 48 hours.  Lactic Acid: No results for input(s): LACTATE in the last 48 hours.  Prealbumin: No results for input(s): PREALBUMIN in the last 48 hours.  Troponin:   Recent Labs   Lab 08/03/19  0051   TROPONINI <0.006     TSH:   Recent Labs   Lab 03/21/19  1049   TSH 2.773     Urine Culture: No results for input(s): LABURIN in the last 48 hours.  Urine Studies:    No results for input(s): COLORU, APPEARANCEUA, PHUR, SPECGRAV, PROTEINUA, GLUCUA, KETONESU, BILIRUBINUA, OCCULTUA, NITRITE, UROBILINOGEN, LEUKOCYTESUR, RBCUA, WBCUA, BACTERIA, SQUAMEPITHEL, HYALINECASTS in the last 48 hours.    Invalid input(s): WRIGHTSUR    Significant Imaging: I have reviewed and interpreted all pertinent imaging results/findings within the past 24 hours.     7/30/19 CXR- No acute process seen.  Severe emphysematous changes.    7/31  1. No CT evidence of pulmonary embolus.  2. Severe emphysematous changes within the lung fields bilaterally greater on the right than left.  Prior granulomatous disease.  No acute infiltrate or effusion.      Assessment/Plan:      Atrial fibrillation with RVR  Remote h/o a-fib  Developed a-fib with RVR overnight  Rate better controlled this AM with BB but still ~100 bpm  Start metoprolol 25mg BID and titrate as needed for HR control  Start eliquis--h/o PUD but no bleeding several years per patient. Watch closely  Stop albuterol, start xopenex. Avoid albuterol in future  Reduce steroids    This is now what will keep patient in hospital. COPD still much better but need rate control before discharge      COPD exacerbation  Known severe emphysema  Dr. Matson following   Solumedrol 80 q 8  She should qualify for home O2. Will get overnight pulse ox AND walk her in the am to see if we can get her some portable O2   She really doesn't have much wheezing at all but does have terrible air flow. Just has a lot of trapped air and poor air exchange. Needs home O2   O2 ordered. CT negative.  Consider theophylline vs daliresp (she politely declined this 'if it would cause diarrhea); Notes she has chronic diarrhea since prior GI sx    8/3: qualifies for home O2. She is breathing much better. Ambulate today. No further hemoptysis. Ready for discharge from COPD standpoint but now with a-fib/RVR        Positive D dimer  + iodine allergy-   Will get VQ scan - indeterminate probability  "  Iodine allergy - rash- did have CT in May - will pre- medicate     CT normal./LE normal     Chronic respiratory failure    o2 sat 87% on RA     Acute on chronic respiratory failure with hypoxia  o2 sat 77% on arrival  Better this am  Check 6 minute walk -  Patient's O2 Sat on room air while exercisin%--home with O2      SOB (shortness of breath)  Stable with oxygen  + anxiety component       Centrilobular emphysema  Root cause of her dyspnea      Emphysema/COPD  Known severe emphysema  Dr. Matson following   Solumedrol 80 q 8  She should qualify for home O2. Will get overnight pulse ox AND walk her in the am to see if we can get her some portable O2   She really doesn't have much wheezing at all but does have terrible air flow. Just has a lot of trapped air and poor air exchange. Needs home O2   O2 ordered. CT negative.  Consider theophylline vs daliresp (she politely declined this 'if it would cause diarrhea); Notes she has chronic diarrhea since prior GI sx    : qualifies for home O2. She is breathing much better. Ambulate today. She feels she need "one more day". Does still having some tightness on exam but no more wheezing. Hopefully home in AM  She did have some blood in sputum today. Scant. Most likely upper airway. CT without PE. No masses on imaging. Will monitor today as well for worsening      Hyperlipidemia    Not on statin at home    HTN (hypertension)  Continue losartan  Change nebivolol to metorpolol BID and titreate up to keep HR controlled      KATHIE (generalized anxiety disorder)  Severe  mutliple family issues  Prescribed Remeron 15mg q Pm and Cybalta 30mg po daily  Pt states she never started bc she was "scared to take anti- depressants  Takes 0.5mg xanax nightly- discussed risks and encouraged to wean  Taper xanax- decrease to 0.25mg nightly prn  Will start cymbalta and remeron here   19 day 3 of Cymbalta and remeron, slept well but having severe anxiety this morning after coughing up " blood; reassured pt that CBC and CT are stable; will continue xanax bid prn at this time and can try again to taper as OP per PCP after on cymbalta and remeron for some time   8/3: will continue the same until discharge      VTE Risk Mitigation (From admission, onward)        Ordered     apixaban tablet 5 mg  2 times daily      08/03/19 1040     Place CARLYN hose  Until discontinued      07/31/19 1711     IP VTE HIGH RISK PATIENT  Once      07/30/19 1858     Place sequential compression device  Until discontinued      07/30/19 1858                Maria Del Carmen Taylor MD  Department of Hospital Medicine   [facility]

## 2019-08-03 NOTE — ASSESSMENT & PLAN NOTE
"Severe  mutliple family issues  Prescribed Remeron 15mg q Pm and Cybalta 30mg po daily  Pt states she never started bc she was "scared to take anti- depressants  Takes 0.5mg xanax nightly- discussed risks and encouraged to wean  Taper xanax- decrease to 0.25mg nightly prn  Will start cymbalta and remeron here   8/2/19 day 3 of Cymbalta and remeron, slept well but having severe anxiety this morning after coughing up blood; reassured pt that CBC and CT are stable; will continue xanax bid prn at this time and can try again to taper as OP per PCP after on cymbalta and remeron for some time   8/3: will continue the same until discharge  "

## 2019-08-03 NOTE — ASSESSMENT & PLAN NOTE
Known severe emphysema  Dr. Matson following   Solumedrol 80 q 8  She should qualify for home O2. Will get overnight pulse ox AND walk her in the am to see if we can get her some portable O2   She really doesn't have much wheezing at all but does have terrible air flow. Just has a lot of trapped air and poor air exchange. Needs home O2   O2 ordered. CT negative.  Consider theophylline vs daliresp (she politely declined this 'if it would cause diarrhea); Notes she has chronic diarrhea since prior GI sx    8/3: qualifies for home O2. She is breathing much better. Ambulate today. No further hemoptysis. Ready for discharge from COPD standpoint but now with a-fib/RVR

## 2019-08-03 NOTE — PLAN OF CARE
Problem: Adult Inpatient Plan of Care  Goal: Plan of Care Review  Outcome: Ongoing (interventions implemented as appropriate)  Vitals remained stable, afebrile. Anxiety better today. Home O2 delivered. Metoprolol and eliquis added for afib rvr episode last night. HR responding well through day to metoprolol, HR trended down through day, maintaining 80s instead of low 100s. On room air at 93%. Ambulated well in room. Will revisit d/c if pt HR stays controled over night. Discussed plan of care with pt and son, stated understanding

## 2019-08-03 NOTE — PLAN OF CARE
Afib with RVR - Notified by monitor tech Otis of rhythm change on telemetry. Upon entering patient's room, she was asleep. When awake, informed her of elevated heart rate. Obtained vital signs, called Dr. Taylor. Orders given. Will monitor.

## 2019-08-03 NOTE — PROGRESS NOTES
Staff Handoff  Bedside handoff report received from Autumn RN. POC discussed and patient's input welcomed. RT in the room to provide respiratory treatment. No c/o distress at current time. Will monitor.      Resident Handoff     2

## 2019-08-03 NOTE — PLAN OF CARE
Pt had tachycardia with rhythm changes on monitor approximately 20 minutes after breathing treatment. Dr. Taylor was called by RN and aware of this. EKG done. Respiratory medication may need to be changed or frequency decreased. Will address with physician before next breathing treatment is due.

## 2019-08-03 NOTE — PLAN OF CARE
NSR - After receiving Lopressor, patient converted. PO clonidine given for elevated b/p. Will monitor.

## 2019-08-04 PROBLEM — R04.2 HEMOPTYSIS: Status: ACTIVE | Noted: 2019-08-04

## 2019-08-04 LAB
ANION GAP SERPL CALC-SCNC: 8 MMOL/L (ref 8–16)
BACTERIA SPEC AEROBE CULT: NORMAL
BASOPHILS # BLD AUTO: 0.01 K/UL (ref 0–0.2)
BASOPHILS NFR BLD: 0.1 % (ref 0–1.9)
BUN SERPL-MCNC: 26 MG/DL (ref 8–23)
CALCIUM SERPL-MCNC: 8.3 MG/DL (ref 8.7–10.5)
CHLORIDE SERPL-SCNC: 104 MMOL/L (ref 95–110)
CO2 SERPL-SCNC: 31 MMOL/L (ref 23–29)
CREAT SERPL-MCNC: 0.9 MG/DL (ref 0.5–1.4)
DIFFERENTIAL METHOD: ABNORMAL
EOSINOPHIL # BLD AUTO: 0 K/UL (ref 0–0.5)
EOSINOPHIL NFR BLD: 0 % (ref 0–8)
ERYTHROCYTE [DISTWIDTH] IN BLOOD BY AUTOMATED COUNT: 12.9 % (ref 11.5–14.5)
EST. GFR  (AFRICAN AMERICAN): >60 ML/MIN/1.73 M^2
EST. GFR  (NON AFRICAN AMERICAN): >60 ML/MIN/1.73 M^2
GLUCOSE SERPL-MCNC: 114 MG/DL (ref 70–110)
GRAM STN SPEC: NORMAL
GRAM STN SPEC: NORMAL
HCT VFR BLD AUTO: 40.3 % (ref 37–48.5)
HGB BLD-MCNC: 12.8 G/DL (ref 12–16)
IMM GRANULOCYTES # BLD AUTO: 0.1 K/UL (ref 0–0.04)
IMM GRANULOCYTES NFR BLD AUTO: 1.3 % (ref 0–0.5)
LYMPHOCYTES # BLD AUTO: 0.7 K/UL (ref 1–4.8)
LYMPHOCYTES NFR BLD: 8.8 % (ref 18–48)
MCH RBC QN AUTO: 28.3 PG (ref 27–31)
MCHC RBC AUTO-ENTMCNC: 31.8 G/DL (ref 32–36)
MCV RBC AUTO: 89 FL (ref 82–98)
MONOCYTES # BLD AUTO: 0.5 K/UL (ref 0.3–1)
MONOCYTES NFR BLD: 6.3 % (ref 4–15)
NEUTROPHILS # BLD AUTO: 6.7 K/UL (ref 1.8–7.7)
NEUTROPHILS NFR BLD: 83.5 % (ref 38–73)
NRBC BLD-RTO: 0 /100 WBC
PLATELET # BLD AUTO: 242 K/UL (ref 150–350)
PMV BLD AUTO: 10.2 FL (ref 9.2–12.9)
POTASSIUM SERPL-SCNC: 4.7 MMOL/L (ref 3.5–5.1)
RBC # BLD AUTO: 4.53 M/UL (ref 4–5.4)
SODIUM SERPL-SCNC: 143 MMOL/L (ref 136–145)
WBC # BLD AUTO: 8 K/UL (ref 3.9–12.7)

## 2019-08-04 PROCEDURE — 99900035 HC TECH TIME PER 15 MIN (STAT)

## 2019-08-04 PROCEDURE — 94640 AIRWAY INHALATION TREATMENT: CPT

## 2019-08-04 PROCEDURE — 27000221 HC OXYGEN, UP TO 24 HOURS

## 2019-08-04 PROCEDURE — 25000003 PHARM REV CODE 250: Performed by: INTERNAL MEDICINE

## 2019-08-04 PROCEDURE — 63600175 PHARM REV CODE 636 W HCPCS: Performed by: INTERNAL MEDICINE

## 2019-08-04 PROCEDURE — 25000003 PHARM REV CODE 250: Performed by: SURGERY

## 2019-08-04 PROCEDURE — 85025 COMPLETE CBC W/AUTO DIFF WBC: CPT

## 2019-08-04 PROCEDURE — 94761 N-INVAS EAR/PLS OXIMETRY MLT: CPT

## 2019-08-04 PROCEDURE — 80048 BASIC METABOLIC PNL TOTAL CA: CPT

## 2019-08-04 PROCEDURE — 99233 PR SUBSEQUENT HOSPITAL CARE,LEVL III: ICD-10-PCS | Mod: ,,, | Performed by: INTERNAL MEDICINE

## 2019-08-04 PROCEDURE — 25000242 PHARM REV CODE 250 ALT 637 W/ HCPCS: Performed by: INTERNAL MEDICINE

## 2019-08-04 PROCEDURE — 36415 COLL VENOUS BLD VENIPUNCTURE: CPT

## 2019-08-04 PROCEDURE — 11000001 HC ACUTE MED/SURG PRIVATE ROOM

## 2019-08-04 PROCEDURE — 99233 SBSQ HOSP IP/OBS HIGH 50: CPT | Mod: ,,, | Performed by: INTERNAL MEDICINE

## 2019-08-04 PROCEDURE — 25000003 PHARM REV CODE 250: Performed by: NURSE PRACTITIONER

## 2019-08-04 RX ORDER — METOPROLOL TARTRATE 1 MG/ML
5 INJECTION, SOLUTION INTRAVENOUS ONCE
Status: DISCONTINUED | OUTPATIENT
Start: 2019-08-04 | End: 2019-08-05 | Stop reason: HOSPADM

## 2019-08-04 RX ORDER — METOPROLOL TARTRATE 1 MG/ML
INJECTION, SOLUTION INTRAVENOUS
Status: DISPENSED
Start: 2019-08-04 | End: 2019-08-04

## 2019-08-04 RX ADMIN — METHYLPREDNISOLONE SODIUM SUCCINATE 40 MG: 125 INJECTION, POWDER, FOR SOLUTION INTRAMUSCULAR; INTRAVENOUS at 08:08

## 2019-08-04 RX ADMIN — METHSCOPOLAMINE BROMIDE 2.5 MG: 2.5 TABLET ORAL at 08:08

## 2019-08-04 RX ADMIN — LEVALBUTEROL 1.25 MG: 1.25 SOLUTION, CONCENTRATE RESPIRATORY (INHALATION) at 12:08

## 2019-08-04 RX ADMIN — ALPRAZOLAM 0.25 MG: 0.25 TABLET ORAL at 08:08

## 2019-08-04 RX ADMIN — LOSARTAN POTASSIUM 50 MG: 50 TABLET, FILM COATED ORAL at 08:08

## 2019-08-04 RX ADMIN — PANTOPRAZOLE SODIUM 40 MG: 40 TABLET, DELAYED RELEASE ORAL at 08:08

## 2019-08-04 RX ADMIN — APIXABAN 5 MG: 5 TABLET, FILM COATED ORAL at 08:08

## 2019-08-04 RX ADMIN — LEVALBUTEROL 1.25 MG: 1.25 SOLUTION, CONCENTRATE RESPIRATORY (INHALATION) at 07:08

## 2019-08-04 RX ADMIN — CLONIDINE HYDROCHLORIDE 0.1 MG: 0.1 TABLET ORAL at 12:08

## 2019-08-04 RX ADMIN — MIRTAZAPINE 15 MG: 7.5 TABLET ORAL at 08:08

## 2019-08-04 RX ADMIN — METOPROLOL TARTRATE 25 MG: 25 TABLET ORAL at 08:08

## 2019-08-04 RX ADMIN — DULOXETINE 30 MG: 30 CAPSULE, DELAYED RELEASE ORAL at 08:08

## 2019-08-04 NOTE — PLAN OF CARE
Patient's BP trended downward throughout the night after clonidine administration. Patient remained in Normal sinus rhythm for the rest of the shift. Afebrile. Patient 97-98% O2sats on 2 liters nasal cannula. Patient stated that she is not anxious and she feels fine. No signs of distress noted. Call light in reach. Bed locked and low. Plan of care reviewed with patient. Patient verbalized understanding.

## 2019-08-04 NOTE — SUBJECTIVE & OBJECTIVE
Review of Systems   Constitutional: Negative for chills, fatigue and fever.   HENT: Negative for congestion, ear pain, postnasal drip, sinus pressure, sneezing and sore throat.    Eyes: Negative for discharge.   Respiratory: Negative for cough, chest tightness and shortness of breath.         Trace blood in sputum   Cardiovascular: Negative.    Gastrointestinal: Negative for abdominal pain, constipation, diarrhea, nausea and vomiting.   Genitourinary: Negative for difficulty urinating, flank pain and hematuria.   Musculoskeletal: Negative.  Negative for arthralgias and joint swelling.   Skin: Negative.    Neurological: Negative for dizziness and headaches.   Psychiatric/Behavioral: Negative for behavioral problems and confusion. The patient is nervous/anxious.      Objective:     Vital Signs (Most Recent):  Temp: 97.1 °F (36.2 °C) (08/04/19 1129)  Pulse: 65 (08/04/19 1129)  Resp: 18 (08/04/19 1129)  BP: (!) 174/72 (08/04/19 1129)  SpO2: (!) 92 % (08/04/19 1129) Vital Signs (24h Range):  Temp:  [96.6 °F (35.9 °C)-98 °F (36.7 °C)] 97.1 °F (36.2 °C)  Pulse:  [] 65  Resp:  [16-20] 18  SpO2:  [91 %-98 %] 92 %  BP: (122-184)/(57-80) 174/72     Weight: 48.5 kg (107 lb)  Body mass index is 19.57 kg/m².    Physical Exam   Constitutional: She is oriented to person, place, and time. She appears well-developed. No distress.   HENT:   Head: Normocephalic and atraumatic.   Right Ear: External ear normal.   Left Ear: External ear normal.   Eyes: Pupils are equal, round, and reactive to light. Conjunctivae and EOM are normal.   Neck: Normal range of motion. Neck supple. No tracheal deviation present.   Cardiovascular: Normal heart sounds.   Tachycardic, ~100 bpm  irregular   Pulmonary/Chest: Effort normal. No respiratory distress. She has no wheezes. She has no rales.   No wheezing today  Moving more air   Abdominal: Soft. Bowel sounds are normal. There is no tenderness.   Musculoskeletal: Normal range of motion. She exhibits  no edema.   Neurological: She is alert and oriented to person, place, and time.   Skin: Skin is warm and dry. No rash noted.   Psychiatric: She has a normal mood and affect. Her behavior is normal.   Nursing note and vitals reviewed.        CRANIAL NERVES     CN III, IV, VI   Pupils are equal, round, and reactive to light.  Extraocular motions are normal.        Significant Labs:   A1C: No results for input(s): HGBA1C in the last 4320 hours.  Blood Culture: No results for input(s): LABBLOO in the last 48 hours.  CBC:   Recent Labs   Lab 08/03/19 0608 08/04/19 0627   WBC 6.42 8.00   HGB 12.9 12.8   HCT 41.1 40.3    242     CMP:   Recent Labs   Lab 08/03/19  0608 08/04/19 0627    143   K 4.6 4.7    104   CO2 30* 31*   * 114*   BUN 23 26*   CREATININE 0.8 0.9   CALCIUM 8.7 8.3*   ANIONGAP 7* 8   EGFRNONAA >60 >60     Cardiac Markers:   No results for input(s): CKMB, MYOGLOBIN, BNP, TROPISTAT in the last 48 hours.  Lactic Acid: No results for input(s): LACTATE in the last 48 hours.  Prealbumin: No results for input(s): PREALBUMIN in the last 48 hours.  Troponin:   Recent Labs   Lab 08/03/19  0051   TROPONINI <0.006     TSH:   Recent Labs   Lab 03/21/19  1049   TSH 2.773     Urine Culture: No results for input(s): LABURIN in the last 48 hours.  Urine Studies:   No results for input(s): COLORU, APPEARANCEUA, PHUR, SPECGRAV, PROTEINUA, GLUCUA, KETONESU, BILIRUBINUA, OCCULTUA, NITRITE, UROBILINOGEN, LEUKOCYTESUR, RBCUA, WBCUA, BACTERIA, SQUAMEPITHEL, HYALINECASTS in the last 48 hours.    Invalid input(s): WRIGHTSUR    Significant Imaging: I have reviewed and interpreted all pertinent imaging results/findings within the past 24 hours.     7/30/19 CXR- No acute process seen.  Severe emphysematous changes.    7/31  1. No CT evidence of pulmonary embolus.  2. Severe emphysematous changes within the lung fields bilaterally greater on the right than left.  Prior granulomatous disease.  No acute  infiltrate or effusion.

## 2019-08-04 NOTE — SUBJECTIVE & OBJECTIVE
Interval History: coughing snorting up blood will scope in am 7 am or 1 pm     Objective:     Vital Signs (Most Recent):  Temp: 97 °F (36.1 °C) (08/04/19 0718)  Pulse: 72 (08/04/19 0800)  Resp: 20 (08/04/19 0718)  BP: (!) 184/77 (08/04/19 0718)  SpO2: 98 % (08/04/19 0718) Vital Signs (24h Range):  Temp:  [96.6 °F (35.9 °C)-98.9 °F (37.2 °C)] 97 °F (36.1 °C)  Pulse:  [] 72  Resp:  [16-20] 20  SpO2:  [91 %-98 %] 98 %  BP: (122-184)/(57-80) 184/77     Weight: 48.5 kg (107 lb)  Body mass index is 19.57 kg/m².      Intake/Output Summary (Last 24 hours) at 8/4/2019 0925  Last data filed at 8/3/2019 1700  Gross per 24 hour   Intake 240 ml   Output --   Net 240 ml       Physical Exam   HENT:   Right Ear: Tympanic membrane is not injected and not erythematous.   Left Ear: Tympanic membrane is not injected and not erythematous.   Cardiovascular: S1 normal, S2 normal and normal heart sounds. PMI is not displaced. Exam reveals no S3 and no S4.   No murmur heard.  Pulmonary/Chest: No accessory muscle usage. No respiratory distress. She has decreased breath sounds in the right lower field and the left lower field. She has wheezes in the right lower field and the left lower field. She has rhonchi in the right lower field and the left lower field. She has no rales.   Abdominal: Soft. Bowel sounds are normal. There is no hepatosplenomegaly. There is no rigidity, no rebound, no guarding, no CVA tenderness, no tenderness at McBurney's point and negative Duckworth's sign.       Vents:  Oxygen Concentration (%): 28 (08/03/19 1942)    Lines/Drains/Airways     Peripheral Intravenous Line                 Peripheral IV - Single Lumen 07/30/19 20 G Right Antecubital 5 days                Significant Labs:    CBC/Anemia Profile:  Recent Labs   Lab 08/03/19  0608 08/04/19  0627   WBC 6.42 8.00   HGB 12.9 12.8   HCT 41.1 40.3    242   MCV 89 89   RDW 12.9 12.9        Chemistries:  Recent Labs   Lab 08/03/19  0608 08/04/19  0627   NA  143 143   K 4.6 4.7    104   CO2 30* 31*   BUN 23 26*   CREATININE 0.8 0.9   CALCIUM 8.7 8.3*       All pertinent labs within the past 24 hours have been reviewed.    Significant Imaging:  I have reviewed all pertinent imaging results/findings within the past 24 hours.

## 2019-08-04 NOTE — PROGRESS NOTES
[facility]  Pulmonology  Progress Note    Patient Name: Marva Perez  MRN: [id number]  Admission Date: 7/30/2019  Hospital Length of Stay: 4 days  Code Status: Full Code  Attending Provider: Carissa Green MD  Primary Care Provider: Kevin Clements MD   Principal Problem: <principal problem not specified>    Subjective:     Interval History: coughing snorting up blood will scope in am 7 am or 1 pm     Objective:     Vital Signs (Most Recent):  Temp: 97 °F (36.1 °C) (08/04/19 0718)  Pulse: 72 (08/04/19 0800)  Resp: 20 (08/04/19 0718)  BP: (!) 184/77 (08/04/19 0718)  SpO2: 98 % (08/04/19 0718) Vital Signs (24h Range):  Temp:  [96.6 °F (35.9 °C)-98.9 °F (37.2 °C)] 97 °F (36.1 °C)  Pulse:  [] 72  Resp:  [16-20] 20  SpO2:  [91 %-98 %] 98 %  BP: (122-184)/(57-80) 184/77     Weight: 48.5 kg (107 lb)  Body mass index is 19.57 kg/m².      Intake/Output Summary (Last 24 hours) at 8/4/2019 0925  Last data filed at 8/3/2019 1700  Gross per 24 hour   Intake 240 ml   Output --   Net 240 ml       Physical Exam   HENT:   Right Ear: Tympanic membrane is not injected and not erythematous.   Left Ear: Tympanic membrane is not injected and not erythematous.   Cardiovascular: S1 normal, S2 normal and normal heart sounds. PMI is not displaced. Exam reveals no S3 and no S4.   No murmur heard.  Pulmonary/Chest: No accessory muscle usage. No respiratory distress. She has decreased breath sounds in the right lower field and the left lower field. She has wheezes in the right lower field and the left lower field. She has rhonchi in the right lower field and the left lower field. She has no rales.   Abdominal: Soft. Bowel sounds are normal. There is no hepatosplenomegaly. There is no rigidity, no rebound, no guarding, no CVA tenderness, no tenderness at McBurney's point and negative Duckworth's sign.       Vents:  Oxygen Concentration (%): 28 (08/03/19 1942)    Lines/Drains/Airways     Peripheral Intravenous Line                  Peripheral IV - Single Lumen 07/30/19 20 G Right Antecubital 5 days                Significant Labs:    CBC/Anemia Profile:  Recent Labs   Lab 08/03/19  0608 08/04/19  0627   WBC 6.42 8.00   HGB 12.9 12.8   HCT 41.1 40.3    242   MCV 89 89   RDW 12.9 12.9        Chemistries:  Recent Labs   Lab 08/03/19  0608 08/04/19  0627    143   K 4.6 4.7    104   CO2 30* 31*   BUN 23 26*   CREATININE 0.8 0.9   CALCIUM 8.7 8.3*       All pertinent labs within the past 24 hours have been reviewed.    Significant Imaging:  I have reviewed all pertinent imaging results/findings within the past 24 hours.    Assessment/Plan:     Hemoptysis  Only specs of blood will do bronchoscopy in am to ensure bleeding is not below     Atrial fibrillation with RVR  Yesterday no episodes now and NSR    Pulmonary embolism with acute cor pulmonale  Started Eliquise 5mg q12 will bronch in am   Pe likely by wells score     COPD exacerbation  Severe copd still with decreased air entry     Acute on chronic respiratory failure with hypoxia  Secondary to PE   Probable pulmonary heart disease/cor pulmonale due to nocturnal and daytime hypoxia       Bronch in am      Howard Matson MD  Pulmonology  [facility]

## 2019-08-04 NOTE — NURSING
Patient's HR in the 70-80s and then suddenly became tachycardic between 120 to low 150s with PACs just before 12am. Manual BP was 180/68. Dr. Taylor notified. She ordered metoprolol 5mg/5ml injection to be given once. If heart rate is still above 110 after 10 minutes, administer dose once again. If Heart rate dose NOT go down after the second dose, notify her again. I went to patient's room to keep her company. She stated that she did NOT feel bad or anxious, but her legs were restless and she wanted something for that. O2 sats at 96-97% on 2 liters nasal cannula. I sat and visited with patient for a few minutes and House Supervisor Matilde came in and stated that the metoprolol injection is no longer needed at this time, because patient's heart rate is now back down in the 70s. However, her BP was still elevated when Marv SOTO came to check her 12am vitals. Clonidine 0.1mg given at 1234am. Patient went to sleep. Will check BP again at 145am.

## 2019-08-04 NOTE — ASSESSMENT & PLAN NOTE
Known severe emphysema  Dr. Matson following   Solumedrol 80 q 8  She should qualify for home O2. Will get overnight pulse ox AND walk her in the am to see if we can get her some portable O2   She really doesn't have much wheezing at all but does have terrible air flow. Just has a lot of trapped air and poor air exchange. Needs home O2   O2 ordered. CT negative.  Consider theophylline vs daliresp (she politely declined this 'if it would cause diarrhea); Notes she has chronic diarrhea since prior GI sx    8/4: qualifies for home O2. She is breathing much better. Ambulate today.. Ready for discharge from COPD standpoint but now Dr. Matson wants to bronch in the AM

## 2019-08-04 NOTE — PLAN OF CARE
Problem: Adult Inpatient Plan of Care  Goal: Plan of Care Review  Outcome: Ongoing (interventions implemented as appropriate)  Vitals remained stable, afebrile. No complaints of pain. Eating well. In and out of afib. HR controled. Dr herbert will broch pt tomorrow at 7AM so pt needs to be NPO after midnight. Will hold night anticoagulant. Ambulating well. O2 L sats wnl. Discussed plan of care, stated understanding

## 2019-08-04 NOTE — ASSESSMENT & PLAN NOTE
Continue losartan  Change nebivolol to metorpolol BID and titreate up to keep HR controlled. Doing great on 25mg BID

## 2019-08-04 NOTE — ASSESSMENT & PLAN NOTE
Remote h/o a-fib  Developed a-fib with RVR overnight  Rate better controlled this AM with BB but still ~100 bpm  Start metoprolol 25mg BID and titrate as needed for HR control  Start eliquis--h/o PUD but no bleeding several years per patient. Watch closely  Stop albuterol, start xopenex. Avoid albuterol in future  Reduce steroids    8/4: short episode SVT overnight. HR controlled today. cnot metoprolol. Hold eliquis for procedure in AM

## 2019-08-04 NOTE — PROGRESS NOTES
[facility] Hospital Medicine  Progress Note    Patient Name: Marva Perez  MRN: [id number]  Patient Class: IP- Inpatient   Admission Date: 7/30/2019  Length of Stay: 4 days  Attending Physician: Carissa Green MD  Primary Care Provider: Kevin Clements MD        Subjective:     Principal Problem:COPD exacerbation      HPI:  Marva Perez is a 78 y.o. female  With PMHX of HTN, HLD, GERD, Depression, anxiety, and COPD/Emphysema  presented to ER yesterday with shortness of breath today with associated anxiety. [facility] with c/o acute onset of SOB and O2 sat registered at 78%; sent to the ER immediately via EMS  Patient is not on oxygen at home, denied chest pain on admit  Lab Results   Component Value Date    DDIMER 0.42 07/30/2019   + iodine allergy - VQ scan this am pending. Dr. Matson following.   Patient having severe family issues with anxiety, also has severe COPD as well      Overview/Hospital Course:  7/31/19 H&P continued O2 sats 92% on R/A this am    8/1/19 VQ scan showing intermediate probability of pulmonary embolism due to the patient's extensive underlying emphysematous change. She has had prior CT abd with runoff in May 2019; will pre-medicate for CT scan to rule out PE ; refused benadryl - pepcid ordered   O2 sat 98-99% on 2LNC. Afebrile, continue steroids. Monitor with overnight pulse ox tonight with oxygen. Ordered per Dr. Matson   /70 , HR 61  Started on Cymbalta and remeron yesterday; pt notes she slept really well last PM   Had 6 minute walk yesterday - o2 sat 87% with activity. Pt requesting small portable tanks for home- concentrator  PE study neg.  8/2/19 O2 sat stable ovenight. Refused BIPAP. Do not think she will tolerate BIPAP with her severe anxiety.   Coughed up some blood this am and panicked. O2 2LNC,  O2 sat 98%, Notes breathing is better.   Ct negative for PE Severe emphysematous changes within the lung fields bilaterally greater on  "the right than left.  Prior granulomatous disease.  No acute infiltrate or effusion.  States she is feeling "very nervous" this morning, Xanax was being weaned.     8/3/19 overnight patient developed a-fib with RVR. She had episode of a-fib after her colon surgery 4 years ago. Did follow with Dr. Wilde at that time and tells me was "weaned off all medications" She was not on blood thinners, h/o PUD. No issues with active bleeding for several years per patient. Her breathing is much better, no wheezing.     8/4: feeling great today. Had brief episode of SVT overnight with elevated HR, resolved without intervention. She reports breathing back to baseline, wearing her O2. Dr. Matson wants to bronch tomorrow as having scant hemoptysis.     Review of Systems   Constitutional: Negative for chills, fatigue and fever.   HENT: Negative for congestion, ear pain, postnasal drip, sinus pressure, sneezing and sore throat.    Eyes: Negative for discharge.   Respiratory: Negative for cough, chest tightness and shortness of breath.         Trace blood in sputum   Cardiovascular: Negative.    Gastrointestinal: Negative for abdominal pain, constipation, diarrhea, nausea and vomiting.   Genitourinary: Negative for difficulty urinating, flank pain and hematuria.   Musculoskeletal: Negative.  Negative for arthralgias and joint swelling.   Skin: Negative.    Neurological: Negative for dizziness and headaches.   Psychiatric/Behavioral: Negative for behavioral problems and confusion. The patient is nervous/anxious.      Objective:     Vital Signs (Most Recent):  Temp: 97.1 °F (36.2 °C) (08/04/19 1129)  Pulse: 65 (08/04/19 1129)  Resp: 18 (08/04/19 1129)  BP: (!) 174/72 (08/04/19 1129)  SpO2: (!) 92 % (08/04/19 1129) Vital Signs (24h Range):  Temp:  [96.6 °F (35.9 °C)-98 °F (36.7 °C)] 97.1 °F (36.2 °C)  Pulse:  [] 65  Resp:  [16-20] 18  SpO2:  [91 %-98 %] 92 %  BP: (122-184)/(57-80) 174/72     Weight: 48.5 kg (107 lb)  Body mass index is " 19.57 kg/m².    Physical Exam   Constitutional: She is oriented to person, place, and time. She appears well-developed. No distress.   HENT:   Head: Normocephalic and atraumatic.   Right Ear: External ear normal.   Left Ear: External ear normal.   Eyes: Pupils are equal, round, and reactive to light. Conjunctivae and EOM are normal.   Neck: Normal range of motion. Neck supple. No tracheal deviation present.   Cardiovascular: Normal heart sounds.   Tachycardic, ~100 bpm  irregular   Pulmonary/Chest: Effort normal. No respiratory distress. She has no wheezes. She has no rales.   No wheezing today  Moving more air   Abdominal: Soft. Bowel sounds are normal. There is no tenderness.   Musculoskeletal: Normal range of motion. She exhibits no edema.   Neurological: She is alert and oriented to person, place, and time.   Skin: Skin is warm and dry. No rash noted.   Psychiatric: She has a normal mood and affect. Her behavior is normal.   Nursing note and vitals reviewed.        CRANIAL NERVES     CN III, IV, VI   Pupils are equal, round, and reactive to light.  Extraocular motions are normal.        Significant Labs:   A1C: No results for input(s): HGBA1C in the last 4320 hours.  Blood Culture: No results for input(s): LABBLOO in the last 48 hours.  CBC:   Recent Labs   Lab 08/03/19  0608 08/04/19  0627   WBC 6.42 8.00   HGB 12.9 12.8   HCT 41.1 40.3    242     CMP:   Recent Labs   Lab 08/03/19  0608 08/04/19  0627    143   K 4.6 4.7    104   CO2 30* 31*   * 114*   BUN 23 26*   CREATININE 0.8 0.9   CALCIUM 8.7 8.3*   ANIONGAP 7* 8   EGFRNONAA >60 >60     Cardiac Markers:   No results for input(s): CKMB, MYOGLOBIN, BNP, TROPISTAT in the last 48 hours.  Lactic Acid: No results for input(s): LACTATE in the last 48 hours.  Prealbumin: No results for input(s): PREALBUMIN in the last 48 hours.  Troponin:   Recent Labs   Lab 08/03/19  0051   TROPONINI <0.006     TSH:   Recent Labs   Lab 03/21/19  1049    TSH 2.773     Urine Culture: No results for input(s): LABURIN in the last 48 hours.  Urine Studies:   No results for input(s): COLORU, APPEARANCEUA, PHUR, SPECGRAV, PROTEINUA, GLUCUA, KETONESU, BILIRUBINUA, OCCULTUA, NITRITE, UROBILINOGEN, LEUKOCYTESUR, RBCUA, WBCUA, BACTERIA, SQUAMEPITHEL, HYALINECASTS in the last 48 hours.    Invalid input(s): WRIGHTSUR    Significant Imaging: I have reviewed and interpreted all pertinent imaging results/findings within the past 24 hours.     7/30/19 CXR- No acute process seen.  Severe emphysematous changes.    7/31  1. No CT evidence of pulmonary embolus.  2. Severe emphysematous changes within the lung fields bilaterally greater on the right than left.  Prior granulomatous disease.  No acute infiltrate or effusion.      Assessment/Plan:      * COPD exacerbation  Known severe emphysema  Dr. Matson following   Solumedrol 80 q 8  She should qualify for home O2. Will get overnight pulse ox AND walk her in the am to see if we can get her some portable O2   She really doesn't have much wheezing at all but does have terrible air flow. Just has a lot of trapped air and poor air exchange. Needs home O2   O2 ordered. CT negative.  Consider theophylline vs daliresp (she politely declined this 'if it would cause diarrhea); Notes she has chronic diarrhea since prior GI sx    8/4: qualifies for home O2. She is breathing much better. Ambulate today.. Ready for discharge from COPD standpoint but now Dr. Matson wants to bronch in the AM        Hemoptysis  Scant blood tinged sputum  Most likely upper airway  Patient requested sent for cx--showed only oral belén  Dr. Matson will bronch tomorrow      Atrial fibrillation with RVR  Remote h/o a-fib  Developed a-fib with RVR overnight  Rate better controlled this AM with BB but still ~100 bpm  Start metoprolol 25mg BID and titrate as needed for HR control  Start eliquis--h/o PUD but no bleeding several years per patient. Watch closely  Stop albuterol,  "start xopenex. Avoid albuterol in future  Reduce steroids    : short episode SVT overnight. HR controlled today. cnot metoprolol. Hold eliquis for procedure in AM      Pulmonary embolism with acute cor pulmonale        Positive D dimer  + iodine allergy-   Will get VQ scan - indeterminate probability   Iodine allergy - rash- did have CT in May - will pre- medicate     CT normal./LE normal     Chronic respiratory failure    o2 sat 87% on RA     Acute on chronic respiratory failure with hypoxia  o2 sat 77% on arrival  Better this am  Check 6 minute walk -  Patient's O2 Sat on room air while exercisin%--home with O2      SOB (shortness of breath)  Stable with oxygen  + anxiety component       Centrilobular emphysema  Root cause of her dyspnea      Emphysema/COPD  Known severe emphysema  Dr. Matson following   Solumedrol 80 q 8  She should qualify for home O2. Will get overnight pulse ox AND walk her in the am to see if we can get her some portable O2   She really doesn't have much wheezing at all but does have terrible air flow. Just has a lot of trapped air and poor air exchange. Needs home O2   O2 ordered. CT negative.  Consider theophylline vs daliresp (she politely declined this 'if it would cause diarrhea); Notes she has chronic diarrhea since prior GI sx    : qualifies for home O2. She is breathing much better. Ambulate today. She feels she need "one more day". Does still having some tightness on exam but no more wheezing. Hopefully home in AM  She did have some blood in sputum today. Scant. Most likely upper airway. CT without PE. No masses on imaging. Will monitor today as well for worsening      Hyperlipidemia    Not on statin at home    HTN (hypertension)  Continue losartan  Change nebivolol to metorpolol BID and titreate up to keep HR controlled. Doing great on 25mg BID      KATHIE (generalized anxiety disorder)  Severe  mutliple family issues  Prescribed Remeron 15mg q Pm and Cybalta 30mg po daily  Pt " "states she never started bc she was "scared to take anti- depressants  Takes 0.5mg xanax nightly- discussed risks and encouraged to wean  Taper xanax- decrease to 0.25mg nightly prn  Will start cymbalta and remeron here   8/2/19 day 3 of Cymbalta and remeron, slept well but having severe anxiety this morning after coughing up blood; reassured pt that CBC and CT are stable; will continue xanax bid prn at this time and can try again to taper as OP per PCP after on cymbalta and remeron for some time   8/4: will continue the same until discharge    VTE Risk Mitigation (From admission, onward)        Ordered     apixaban tablet 5 mg  2 times daily      08/03/19 1040     Place CARLYN hose  Until discontinued      07/31/19 1711     IP VTE HIGH RISK PATIENT  Once      07/30/19 1858     Place sequential compression device  Until discontinued      07/30/19 1858                Maria Del Carmen Taylor MD  Department of Hospital Medicine   [facility]  "

## 2019-08-04 NOTE — ASSESSMENT & PLAN NOTE
"Severe  mutliple family issues  Prescribed Remeron 15mg q Pm and Cybalta 30mg po daily  Pt states she never started bc she was "scared to take anti- depressants  Takes 0.5mg xanax nightly- discussed risks and encouraged to wean  Taper xanax- decrease to 0.25mg nightly prn  Will start cymbalta and remeron here   8/2/19 day 3 of Cymbalta and remeron, slept well but having severe anxiety this morning after coughing up blood; reassured pt that CBC and CT are stable; will continue xanax bid prn at this time and can try again to taper as OP per PCP after on cymbalta and remeron for some time   8/4: will continue the same until discharge  "

## 2019-08-04 NOTE — ASSESSMENT & PLAN NOTE
Scant blood tinged sputum  Most likely upper airway  Patient requested sent for cx--showed only oral belén  Dr. Matson will bronch tomorrow

## 2019-08-04 NOTE — PLAN OF CARE
Pt does not want breathing treatments more than 2 times a day, one in am and one in evening. VIVIAN Sequeira notified. Will leave Dr. Taylor a note to see about changing the order. For now patient is refusing the 1 am treatment.

## 2019-08-05 VITALS
WEIGHT: 107 LBS | RESPIRATION RATE: 21 BRPM | HEIGHT: 62 IN | SYSTOLIC BLOOD PRESSURE: 164 MMHG | BODY MASS INDEX: 19.69 KG/M2 | TEMPERATURE: 96 F | HEART RATE: 64 BPM | DIASTOLIC BLOOD PRESSURE: 68 MMHG | OXYGEN SATURATION: 96 %

## 2019-08-05 LAB
ACID FAST MOD KINY STN SPEC: NORMAL
ANION GAP SERPL CALC-SCNC: 10 MMOL/L (ref 8–16)
BASOPHILS # BLD AUTO: 0.01 K/UL (ref 0–0.2)
BASOPHILS NFR BLD: 0.1 % (ref 0–1.9)
BUN SERPL-MCNC: 22 MG/DL (ref 8–23)
CALCIUM SERPL-MCNC: 8.4 MG/DL (ref 8.7–10.5)
CHLORIDE SERPL-SCNC: 101 MMOL/L (ref 95–110)
CO2 SERPL-SCNC: 33 MMOL/L (ref 23–29)
CREAT SERPL-MCNC: 0.8 MG/DL (ref 0.5–1.4)
DIFFERENTIAL METHOD: ABNORMAL
EOSINOPHIL # BLD AUTO: 0 K/UL (ref 0–0.5)
EOSINOPHIL NFR BLD: 0 % (ref 0–8)
ERYTHROCYTE [DISTWIDTH] IN BLOOD BY AUTOMATED COUNT: 12.8 % (ref 11.5–14.5)
EST. GFR  (AFRICAN AMERICAN): >60 ML/MIN/1.73 M^2
EST. GFR  (NON AFRICAN AMERICAN): >60 ML/MIN/1.73 M^2
GLUCOSE SERPL-MCNC: 113 MG/DL (ref 70–110)
HCT VFR BLD AUTO: 41.3 % (ref 37–48.5)
HGB BLD-MCNC: 13.1 G/DL (ref 12–16)
IMM GRANULOCYTES # BLD AUTO: 0.21 K/UL (ref 0–0.04)
IMM GRANULOCYTES NFR BLD AUTO: 2.8 % (ref 0–0.5)
KOH PREP SPEC: NORMAL
LYMPHOCYTES # BLD AUTO: 0.9 K/UL (ref 1–4.8)
LYMPHOCYTES NFR BLD: 11.9 % (ref 18–48)
MCH RBC QN AUTO: 28.1 PG (ref 27–31)
MCHC RBC AUTO-ENTMCNC: 31.7 G/DL (ref 32–36)
MCV RBC AUTO: 89 FL (ref 82–98)
MONOCYTES # BLD AUTO: 0.7 K/UL (ref 0.3–1)
MONOCYTES NFR BLD: 8.5 % (ref 4–15)
NEUTROPHILS # BLD AUTO: 5.9 K/UL (ref 1.8–7.7)
NEUTROPHILS NFR BLD: 76.7 % (ref 38–73)
NRBC BLD-RTO: 0 /100 WBC
PLATELET # BLD AUTO: 249 K/UL (ref 150–350)
PMV BLD AUTO: 10.2 FL (ref 9.2–12.9)
POTASSIUM SERPL-SCNC: 5.5 MMOL/L (ref 3.5–5.1)
RBC # BLD AUTO: 4.66 M/UL (ref 4–5.4)
SODIUM SERPL-SCNC: 144 MMOL/L (ref 136–145)
WBC # BLD AUTO: 7.63 K/UL (ref 3.9–12.7)

## 2019-08-05 PROCEDURE — 87118 MYCOBACTERIC IDENTIFICATION: CPT | Mod: 59

## 2019-08-05 PROCEDURE — 87102 FUNGUS ISOLATION CULTURE: CPT

## 2019-08-05 PROCEDURE — 80048 BASIC METABOLIC PNL TOTAL CA: CPT

## 2019-08-05 PROCEDURE — 87210 SMEAR WET MOUNT SALINE/INK: CPT

## 2019-08-05 PROCEDURE — 87186 SC STD MICRODIL/AGAR DIL: CPT

## 2019-08-05 PROCEDURE — 94760 N-INVAS EAR/PLS OXIMETRY 1: CPT

## 2019-08-05 PROCEDURE — 63600175 PHARM REV CODE 636 W HCPCS: Performed by: INTERNAL MEDICINE

## 2019-08-05 PROCEDURE — 87116 MYCOBACTERIA CULTURE: CPT

## 2019-08-05 PROCEDURE — 88112 CYTOPATH CELL ENHANCE TECH: CPT | Mod: 26,,, | Performed by: PATHOLOGY

## 2019-08-05 PROCEDURE — 31623 DX BRONCHOSCOPE/BRUSH: CPT | Mod: LT,,, | Performed by: INTERNAL MEDICINE

## 2019-08-05 PROCEDURE — 87070 CULTURE OTHR SPECIMN AEROBIC: CPT

## 2019-08-05 PROCEDURE — 25000003 PHARM REV CODE 250: Performed by: INTERNAL MEDICINE

## 2019-08-05 PROCEDURE — 88305 TISSUE EXAM BY PATHOLOGIST: CPT | Performed by: PATHOLOGY

## 2019-08-05 PROCEDURE — 87118 MYCOBACTERIC IDENTIFICATION: CPT

## 2019-08-05 PROCEDURE — 88305 CYTOLOGY SPECIMEN- MEDICAL CYTOLOGY (FLUID/WASH/BRUSH): ICD-10-PCS | Mod: 26,,, | Performed by: PATHOLOGY

## 2019-08-05 PROCEDURE — 87015 SPECIMEN INFECT AGNT CONCNTJ: CPT

## 2019-08-05 PROCEDURE — 88112 CYTOLOGY SPECIMEN- MEDICAL CYTOLOGY (FLUID/WASH/BRUSH): ICD-10-PCS | Mod: 26,,, | Performed by: PATHOLOGY

## 2019-08-05 PROCEDURE — 88305 TISSUE EXAM BY PATHOLOGIST: CPT | Mod: 26,,, | Performed by: PATHOLOGY

## 2019-08-05 PROCEDURE — 99232 PR SUBSEQUENT HOSPITAL CARE,LEVL II: ICD-10-PCS | Mod: ,,, | Performed by: INTERNAL MEDICINE

## 2019-08-05 PROCEDURE — 27000404 HC TUBE ASPIRATING LUKEN 3-1/4IN: Performed by: INTERNAL MEDICINE

## 2019-08-05 PROCEDURE — 85025 COMPLETE CBC W/AUTO DIFF WBC: CPT

## 2019-08-05 PROCEDURE — 87206 SMEAR FLUORESCENT/ACID STAI: CPT

## 2019-08-05 PROCEDURE — 87206 SMEAR FLUORESCENT/ACID STAI: CPT | Mod: 91

## 2019-08-05 PROCEDURE — 27200945 HC CYTOLOGY BRUSH: Performed by: INTERNAL MEDICINE

## 2019-08-05 PROCEDURE — 25000242 PHARM REV CODE 250 ALT 637 W/ HCPCS: Performed by: INTERNAL MEDICINE

## 2019-08-05 PROCEDURE — 27000221 HC OXYGEN, UP TO 24 HOURS

## 2019-08-05 PROCEDURE — 87107 FUNGI IDENTIFICATION MOLD: CPT

## 2019-08-05 PROCEDURE — 25000003 PHARM REV CODE 250: Performed by: SURGERY

## 2019-08-05 PROCEDURE — 87205 SMEAR GRAM STAIN: CPT

## 2019-08-05 PROCEDURE — 36415 COLL VENOUS BLD VENIPUNCTURE: CPT

## 2019-08-05 PROCEDURE — 94640 AIRWAY INHALATION TREATMENT: CPT

## 2019-08-05 PROCEDURE — 25000003 PHARM REV CODE 250: Performed by: NURSE PRACTITIONER

## 2019-08-05 PROCEDURE — 31623 PR BRONCHOSCOPY,DIAGNOSTIC W BRUSH: ICD-10-PCS | Mod: LT,,, | Performed by: INTERNAL MEDICINE

## 2019-08-05 PROCEDURE — 87106 FUNGI IDENTIFICATION YEAST: CPT

## 2019-08-05 PROCEDURE — 31623 DX BRONCHOSCOPE/BRUSH: CPT | Performed by: INTERNAL MEDICINE

## 2019-08-05 PROCEDURE — 99232 SBSQ HOSP IP/OBS MODERATE 35: CPT | Mod: ,,, | Performed by: INTERNAL MEDICINE

## 2019-08-05 RX ORDER — LIDOCAINE HYDROCHLORIDE 40 MG/ML
4 SOLUTION TOPICAL ONCE
Status: DISCONTINUED | OUTPATIENT
Start: 2019-08-05 | End: 2019-08-05 | Stop reason: HOSPADM

## 2019-08-05 RX ORDER — LIDOCAINE HYDROCHLORIDE 20 MG/ML
2 JELLY TOPICAL
Status: DISCONTINUED | OUTPATIENT
Start: 2019-08-05 | End: 2019-08-05 | Stop reason: HOSPADM

## 2019-08-05 RX ORDER — SODIUM CHLORIDE 9 MG/ML
INJECTION, SOLUTION INTRAVENOUS CONTINUOUS
Status: DISCONTINUED | OUTPATIENT
Start: 2019-08-05 | End: 2019-08-05 | Stop reason: HOSPADM

## 2019-08-05 RX ORDER — LIDOCAINE HYDROCHLORIDE 40 MG/ML
4 INJECTION, SOLUTION RETROBULBAR ONCE
Status: COMPLETED | OUTPATIENT
Start: 2019-08-05 | End: 2019-08-05

## 2019-08-05 RX ORDER — LIDOCAINE HYDROCHLORIDE 20 MG/ML
2 JELLY TOPICAL ONCE
Status: DISCONTINUED | OUTPATIENT
Start: 2019-08-05 | End: 2019-08-05 | Stop reason: HOSPADM

## 2019-08-05 RX ORDER — MIDAZOLAM HYDROCHLORIDE 1 MG/ML
2 INJECTION INTRAMUSCULAR; INTRAVENOUS
Status: DISCONTINUED | OUTPATIENT
Start: 2019-08-05 | End: 2019-08-05 | Stop reason: HOSPADM

## 2019-08-05 RX ORDER — LIDOCAINE HYDROCHLORIDE 20 MG/ML
JELLY TOPICAL
Status: DISCONTINUED | OUTPATIENT
Start: 2019-08-05 | End: 2019-08-05 | Stop reason: HOSPADM

## 2019-08-05 RX ORDER — FENTANYL CITRATE 50 UG/ML
INJECTION, SOLUTION INTRAMUSCULAR; INTRAVENOUS
Status: DISCONTINUED | OUTPATIENT
Start: 2019-08-05 | End: 2019-08-05 | Stop reason: HOSPADM

## 2019-08-05 RX ORDER — METOPROLOL TARTRATE 25 MG/1
25 TABLET, FILM COATED ORAL 2 TIMES DAILY
Qty: 60 TABLET | Refills: 11 | Status: SHIPPED | OUTPATIENT
Start: 2019-08-05 | End: 2019-11-29

## 2019-08-05 RX ORDER — ASPIRIN 81 MG/1
81 TABLET ORAL DAILY
Status: DISCONTINUED | OUTPATIENT
Start: 2019-08-05 | End: 2019-08-05 | Stop reason: HOSPADM

## 2019-08-05 RX ORDER — FENTANYL CITRATE 50 UG/ML
25 INJECTION, SOLUTION INTRAMUSCULAR; INTRAVENOUS
Status: DISCONTINUED | OUTPATIENT
Start: 2019-08-05 | End: 2019-08-05 | Stop reason: HOSPADM

## 2019-08-05 RX ORDER — ASPIRIN 325 MG
325 TABLET, DELAYED RELEASE (ENTERIC COATED) ORAL DAILY
Refills: 0 | COMMUNITY
Start: 2019-08-05 | End: 2019-11-27

## 2019-08-05 RX ORDER — MIDAZOLAM HYDROCHLORIDE 1 MG/ML
INJECTION INTRAMUSCULAR; INTRAVENOUS
Status: DISCONTINUED | OUTPATIENT
Start: 2019-08-05 | End: 2019-08-05 | Stop reason: HOSPADM

## 2019-08-05 RX ORDER — LIDOCAINE HYDROCHLORIDE 10 MG/ML
INJECTION, SOLUTION EPIDURAL; INFILTRATION; INTRACAUDAL; PERINEURAL
Status: DISCONTINUED | OUTPATIENT
Start: 2019-08-05 | End: 2019-08-05 | Stop reason: HOSPADM

## 2019-08-05 RX ADMIN — ALPRAZOLAM 0.25 MG: 0.25 TABLET ORAL at 10:08

## 2019-08-05 RX ADMIN — METOPROLOL TARTRATE 25 MG: 25 TABLET ORAL at 10:08

## 2019-08-05 RX ADMIN — PANTOPRAZOLE SODIUM 40 MG: 40 TABLET, DELAYED RELEASE ORAL at 10:08

## 2019-08-05 RX ADMIN — DULOXETINE 30 MG: 30 CAPSULE, DELAYED RELEASE ORAL at 10:08

## 2019-08-05 RX ADMIN — LIDOCAINE HYDROCHLORIDE 4 ML: 40 INJECTION, SOLUTION RETROBULBAR; TOPICAL at 06:08

## 2019-08-05 RX ADMIN — METHSCOPOLAMINE BROMIDE 2.5 MG: 2.5 TABLET ORAL at 10:08

## 2019-08-05 RX ADMIN — LOSARTAN POTASSIUM 50 MG: 50 TABLET, FILM COATED ORAL at 10:08

## 2019-08-05 RX ADMIN — METHYLPREDNISOLONE SODIUM SUCCINATE 40 MG: 125 INJECTION, POWDER, FOR SOLUTION INTRAMUSCULAR; INTRAVENOUS at 10:08

## 2019-08-05 RX ADMIN — LEVALBUTEROL 1.25 MG: 1.25 SOLUTION, CONCENTRATE RESPIRATORY (INHALATION) at 08:08

## 2019-08-05 NOTE — PLAN OF CARE
08/05/19 1513   Final Note   Assessment Type Final Discharge Note   Anticipated Discharge Disposition Home   What phone number can be called within the next 1-3 days to see how you are doing after discharge? [phone]   Hospital Follow Up  Appt(s) scheduled? Yes   Discharge plans and expectations educations in teach back method with documentation complete? Yes     Patient discharged today home with family. Patient going home with at-home oxygen. Arash with [employer] informed of patient discharge and provided with an address for patient's daughter for delivery of home concentrator.    Shaneka Carrasco, LUIS

## 2019-08-05 NOTE — PLAN OF CARE
Problem: Adult Inpatient Plan of Care  Goal: Plan of Care Review  Outcome: Ongoing (interventions implemented as appropriate)     08/05/19 0600   Plan of Care Review   Plan of Care Reviewed With patient;daughter   Progress no change   Outcome Summary Patient received IV solumedrol, resp tx, sleeping and anxiety meds. No acute episodes last p.m. rested well without complaints. Is currently NPO for bronchoscopy this a.m.

## 2019-08-05 NOTE — PROGRESS NOTES
[facility]  Pulmonology  Progress Note    Patient Name: Marva Perez  MRN: [id number]  Admission Date: 7/30/2019  Hospital Length of Stay: 5 days  Code Status: Full Code  Attending Provider: Carissa Green MD  Primary Care Provider: Kevin Clements MD   Principal Problem: COPD exacerbation    Subjective:     Interval History: stable now hemoptysis mild bronch today showed bleeding is below the cords will not be able to treat for possible pe or afib with blood thinners     Objective:     Vital Signs (Most Recent):  Temp: 97 °F (36.1 °C) (08/05/19 0702)  Pulse: 87 (08/05/19 0715)  Resp: 18 (08/05/19 0715)  BP: (!) 170/63 (08/05/19 0715)  SpO2: 95 % (08/05/19 0715) Vital Signs (24h Range):  Temp:  [97 °F (36.1 °C)-98.7 °F (37.1 °C)] 97 °F (36.1 °C)  Pulse:  [] 87  Resp:  [16-19] 18  SpO2:  [88 %-100 %] 95 %  BP: (122-203)/(60-82) 170/63     Weight: 48.5 kg (107 lb)  Body mass index is 19.57 kg/m².      Intake/Output Summary (Last 24 hours) at 8/5/2019 0724  Last data filed at 8/4/2019 0800  Gross per 24 hour   Intake 120 ml   Output --   Net 120 ml       Physical Exam   Constitutional: She is oriented to person, place, and time. She appears well-developed and well-nourished. She is cooperative.  Non-toxic appearance. She does not appear ill. No distress.   HENT:   Head: Normocephalic and atraumatic.   Right Ear: Hearing, tympanic membrane, external ear and ear canal normal.   Left Ear: Hearing, tympanic membrane, external ear and ear canal normal.   Nose: Nose normal. No mucosal edema, rhinorrhea or nasal deformity. No epistaxis. Right sinus exhibits no maxillary sinus tenderness and no frontal sinus tenderness. Left sinus exhibits no maxillary sinus tenderness and no frontal sinus tenderness.   Mouth/Throat: Uvula is midline, oropharynx is clear and moist and mucous membranes are normal. No trismus in the jaw. Normal dentition. No uvula swelling. No posterior oropharyngeal erythema.    Eyes: Conjunctivae and lids are normal. No scleral icterus.   Sclera clear bilat   Neck: Trachea normal, full passive range of motion without pain and phonation normal. Neck supple.   Cardiovascular: Normal rate, regular rhythm, normal heart sounds, intact distal pulses and normal pulses.   Pulmonary/Chest: Effort normal. No respiratory distress. She has decreased breath sounds in the right upper field, the right middle field, the right lower field, the left upper field, the left middle field and the left lower field. She has no wheezes. She has no rhonchi. She has no rales.   Abdominal: Soft. Normal appearance and bowel sounds are normal. She exhibits no distension. There is no tenderness.   Musculoskeletal: Normal range of motion. She exhibits no edema or deformity.   Neurological: She is alert and oriented to person, place, and time. She exhibits normal muscle tone. Coordination normal.   Skin: Skin is warm, dry and intact. She is not diaphoretic. No pallor.   Psychiatric: She has a normal mood and affect. Her speech is normal and behavior is normal. Judgment and thought content normal. Cognition and memory are normal.   Nursing note and vitals reviewed.      Vents:  Oxygen Concentration (%): 40 (08/05/19 0715)    Lines/Drains/Airways     Peripheral Intravenous Line                 Peripheral IV - Single Lumen 07/30/19 20 G Right Antecubital 6 days                Significant Labs:    CBC/Anemia Profile:  Recent Labs   Lab 08/04/19  0627 08/05/19  0601   WBC 8.00 7.63   HGB 12.8 13.1   HCT 40.3 41.3    249   MCV 89 89   RDW 12.9 12.8        Chemistries:  Recent Labs   Lab 08/04/19  0627 08/05/19  0601    144   K 4.7 5.5*    101   CO2 31* 33*   BUN 26* 22   CREATININE 0.9 0.8   CALCIUM 8.3* 8.4*       All pertinent labs within the past 24 hours have been reviewed.    Significant Imaging:  I have reviewed all pertinent imaging results/findings within the past 24 hours.    Assessment/Plan:     *  COPD exacerbation  Severe copd still with decreased air entry     Hemoptysis  Bleeding is below the cords coming from the PRATIBHA in view of that will stop blood thinners and at some point will start asprin and repepeat bronch to PRATIBHA with biopsies in 3 to 6 weeks   Only specs of blood will do bronchoscopy in am to ensure bleeding is not below     Atrial fibrillation with RVR  Yesterday no episodes now and NSR    Positive D dimer  Now that we have bleeding below the cords will have to stop blood thinners for now follow clinically consider NIV and follow   ++ d dimer  CTA neg but Clinical history equals PE     Acute on chronic respiratory failure with hypoxia  Will need o2 at minimum   Will follow clinically for niv  Secondary to PE   Probable pulmonary heart disease/cor pulmonale due to nocturnal and daytime hypoxia     Centrilobular emphysema  02 sat study on 2 liters did not show desat  No home ventilator is necessary   78% sat Copd secondary to smoking            Howard Matson MD  Pulmonology  [facility]

## 2019-08-05 NOTE — ASSESSMENT & PLAN NOTE
Will need o2 at minimum   Will follow clinically for niv  Secondary to PE   Probable pulmonary heart disease/cor pulmonale due to nocturnal and daytime hypoxia

## 2019-08-05 NOTE — PROGRESS NOTES
Staff Handoff  Bedside handoff report received from Jessika PARK. POC discussed and patient's input welcomed. Patient requested all meds be given as early as possible. C/o continuous anxiety. Will monitor.      Resident Handoff

## 2019-08-05 NOTE — ASSESSMENT & PLAN NOTE
02 sat study on 2 liters did not show desat  No home ventilator is necessary   78% sat Copd secondary to smoking

## 2019-08-05 NOTE — PROGRESS NOTES
Vitals remained stable, afebrile. No complaints of pain. Ambulating and eating well. Bronch done this AM, found small bleed. anticoagulant stopped and aspirin to be started. O2 on 2L sats WNL. Coarse lung sounds this am. Cough is productive, sometimes blood tinged. Discussed d/c instructions with pt, stated understanding. IV removed and site wrapped. AVS given to pt

## 2019-08-05 NOTE — PROGRESS NOTES
Pt found with O2 off. Resting in bed sats were 87%. Pt not in distress. O2 reapplied, sats quickly increased and WNL

## 2019-08-05 NOTE — ASSESSMENT & PLAN NOTE
o2 sat 77% on arrival  Better this am  Check 6 minute walk -  Patient's O2 Sat on room air while exercisin%--home with O2- already set up

## 2019-08-05 NOTE — ASSESSMENT & PLAN NOTE
+ iodine allergy-   Will get VQ scan - indeterminate probability   Iodine allergy - rash- did have CT in May - will pre- medicate     CTA without PE ./LE US normal

## 2019-08-05 NOTE — ASSESSMENT & PLAN NOTE
Known severe emphysema  Dr. Matson following   Solumedrol 80 q 8  She should qualify for home O2. Will get overnight pulse ox AND walk her in the am to see if we can get her some portable O2   She really doesn't have much wheezing at all but does have terrible air flow. Just has a lot of trapped air and poor air exchange. Needs home O2   O2 ordered. CT negative.  Consider theophylline vs daliresp (she politely declined this 'if it would cause diarrhea); Notes she has chronic diarrhea since prior GI sx    8/4: qualifies for home O2. She is breathing much better. Ambulate today.. Ready for discharge from COPD standpoint but now Dr. Matson wants to bronch in the AM    8/5 d/c today with close f/u Will plan for bronch 3-6 weeks

## 2019-08-05 NOTE — ASSESSMENT & PLAN NOTE
Now that we have bleeding below the cords will have to stop blood thinners for now follow clinically consider NIV and follow   ++ d dimer  CTA neg but Clinical history equals PE

## 2019-08-05 NOTE — DISCHARGE INSTRUCTIONS
Atrial Fibrillation    Atrial fibrillation is a condition in which the heart beats in an irregular pattern. It is caused by a problem in the heart's electrical pathways. It can be a sign of heart disease or other health problems that affect the heart.  Heart palpitations are the most common symptom of atrial fibrillation. This is the feeling that your heart is fluttering, beating fast, hard, or irregular. When the heart beats too fast, it doesn't pump blood very well. This can cause other symptoms like anxiety, fatigue, shortness of breath, chest pain, dizziness, or fainting. Atrial fibrillation may come and go. It can last from a few hours to a couple of days. Or, it may become chronic, lasting for months at a time or even become permanent.  Atrial fibrillation may be caused by heart disease or other conditions in the body that affect the heart:  · Coronary artery disease (atherosclerosis)  · High blood pressure  · Disease of the heart valves  · Enlarged heart  · Heart failure  Atrial fibrillation can also occur without heart disease because of:  · Overactive thyroid (hyperthyroid)  · Chronic lung disease (COPD, emphysema, bronchitis)  · Heavy alcohol use  · Cardiac stimulants like cocaine, amphetamines, diet pills, certain decongestant cold medicines, caffeine, or nicotine  · Infection  · Blood clot in the lung (pulmonary embolus)  · Diabetes  · Chronic kidney disease  · Obesity  · Extreme athletic conditioning  Treating or removing these causes will help your treatment for atrial fibrillation. It will also make it less likely for the atrial fibrillation to come back.  Atrial fibrillation can alternate back and forth with another abnormal rhythm called atrial flutter. Atrial flutter is a more regular heart rhythm and is also associated with an increased stroke risk. Proper treatment can lower your risk for stroke.  Home care  Follow these guidelines when caring for yourself at home:  · Go back to your usual  activities as soon as you are feeling back to normal.  · If you smoke, stop smoking. Contact your healthcare provider or a local stop-smoking program for help.  · Don't use stimulants like alcohol, cocaine, amphetamines, diet pills, certain decongestant cold medicines, caffeine, or nicotine.  · If your provider prescribed medicine to stop atrial fibrillation from coming back, take it exactly as directed. Some medicines must be taken every day, not just when you have symptoms. This will help them work as they should.  · If you were prescribed warfarin to lower your risk for stroke, have your blood tested on a regular basis as advised by your provider. This will make sure you are getting the dose that is right for you. It also lower your risk for side effects.  Follow-up care  Follow up with your healthcare provider, or as advised.  When to seek medical advice  Call your healthcare provider right away if any of these following occur:  · Shortness of breath or swelling in the legs gets worse  · Unexpected weight gain  · Chest pain or the sense that your heart is fluttering or beating fast or hard (palpitations)  · Any sign of bleeding if you are on a blood thinner   · Pain, redness, or swelling in one leg  Also call your provider right away if you have these signs of stroke:  · Weakness of an arm or leg or one side of the face  · Difficulty with speech or vision  · Extreme drowsiness, confusion, dizziness, or fainting  Date Last Reviewed: 5/1/2016  © 3270-6183 [employer]. [address] [place] [address]. All rights reserved. This information is not intended as a substitute for professional medical care. Always follow your healthcare professional's instructions.        Discharge Instructions: After Your Surgery  Youve just had surgery. During surgery, you were given medicine called anesthesia to keep you relaxed and free of pain. After surgery, you may have some pain or nausea. This is common.  Here are some tips for feeling better and getting well after surgery.     Stay on schedule with your medicine.   Going home  Your healthcare provider will show you how to take care of yourself when you go home. He or she will also answer your questions. Have an adult family member or friend drive you home. For the first 24 hours after your surgery:  · Do not drive or use heavy equipment.  · Do not make important decisions or sign legal papers.  · Do not drink alcohol.  · Have someone stay with you, if needed. He or she can watch for problems and help keep you safe.  Be sure to go to all follow-up visits with your healthcare provider. And rest after your surgery for as long as your healthcare provider tells you to.  Coping with pain  If you have pain after surgery, pain medicine will help you feel better. Take it as told, before pain becomes severe. Also, ask your healthcare provider or pharmacist about other ways to control pain. This might be with heat, ice, or relaxation. And follow any other instructions your surgeon or nurse gives you.  Tips for taking pain medicine  To get the best relief possible, remember these points:  · Pain medicines can upset your stomach. Taking them with a little food may help.  · Most pain relievers taken by mouth need at least 20 to 30 minutes to start to work.  · Taking medicine on a schedule can help you remember to take it. Try to time your medicine so that you can take it before starting an activity. This might be before you get dressed, go for a walk, or sit down for dinner.  · Constipation is a common side effect of pain medicines. Call your healthcare provider before taking any medicines such as laxatives or stool softeners to help ease constipation. Also ask if you should skip any foods. Drinking lots of fluids and eating foods such as fruits and vegetables that are high in fiber can also help. Remember, do not take laxatives unless your surgeon has prescribed  them.  · Drinking alcohol and taking pain medicine can cause dizziness and slow your breathing. It can even be deadly. Do not drink alcohol while taking pain medicine.  · Pain medicine can make you react more slowly to things. Do not drive or run machinery while taking pain medicine.  Your healthcare provider may tell you to take acetaminophen to help ease your pain. Ask him or her how much you are supposed to take each day. Acetaminophen or other pain relievers may interact with your prescription medicines or other over-the-counter (OTC) medicines. Some prescription medicines have acetaminophen and other ingredients. Using both prescription and OTC acetaminophen for pain can cause you to overdose. Read the labels on your OTC medicines with care. This will help you to clearly know the list of ingredients, how much to take, and any warnings. It may also help you not take too much acetaminophen. If you have questions or do not understand the information, ask your pharmacist or healthcare provider to explain it to you before you take the OTC medicine.  Managing nausea  Some people have an upset stomach after surgery. This is often because of anesthesia, pain, or pain medicine, or the stress of surgery. These tips will help you handle nausea and eat healthy foods as you get better. If you were on a special food plan before surgery, ask your healthcare provider if you should follow it while you get better. These tips may help:  · Do not push yourself to eat. Your body will tell you when to eat and how much.  · Start off with clear liquids and soup. They are easier to digest.  · Next try semi-solid foods, such as mashed potatoes, applesauce, and gelatin, as you feel ready.  · Slowly move to solid foods. Dont eat fatty, rich, or spicy foods at first.  · Do not force yourself to have 3 large meals a day. Instead eat smaller amounts more often.  · Take pain medicines with a small amount of solid food, such as crackers or  toast, to avoid nausea.     Call your surgeon if  · You still have pain an hour after taking medicine. The medicine may not be strong enough.  · You feel too sleepy, dizzy, or groggy. The medicine may be too strong.  · You have side effects like nausea, vomiting, or skin changes, such as rash, itching, or hives.       If you have obstructive sleep apnea  You were given anesthesia medicine during surgery to keep you comfortable and free of pain. After surgery, you may have more apnea spells because of this medicine and other medicines you were given. The spells may last longer than usual.   At home:  · Keep using the continuous positive airway pressure (CPAP) device when you sleep. Unless your healthcare provider tells you not to, use it when you sleep, day or night. CPAP is a common device used to treat obstructive sleep apnea.  · Talk with your provider before taking any pain medicine, muscle relaxants, or sedatives. Your provider will tell you about the possible dangers of taking these medicines.  Date Last Reviewed: 12/1/2016 © 2000-2017 [employer]. [address] [place] [address]. All rights reserved. This information is not intended as a substitute for professional medical care. Always follow your healthcare professional's instructions.        Discharge Instructions: COPD  You have been diagnosed with chronic obstructive pulmonary disease (COPD). This is a name given to a group of diseases that limit the flow of air in and out of your lungs. This makes it harder to breathe. With COPD, you are also more likely to get lung infections. COPD includes chronic bronchitis and emphysema. COPD is most often caused by heavy, long-term cigarette smoking.  Home care  Quit smoking  · If you smoke, quit. It is the best thing you can do for your COPD and your overall health.  · Join a stop-smoking program. There are even telephone, text message, and Internet programs to help you quit.  · Ask  your healthcare provider about medicines or other methods to help you quit.  · Ask family members to quit smoking as well.  · Don't allow people to smoke in your home, in your car, or when they are around you.  Protect yourself from infection  · Wash your hands often. Do your best to keep your hands away from your face. Most germs are spread from your hands to your mouth.  · Get a flu shot every year. Also ask your provider about pneumonia vaccines.  · Avoid crowds. It's especially important to do this in the winter when more people have colds and flu.  · To stay healthy, get enough sleep, exercise regularly, and eat a balanced diet. You should:  ¨ Get about 8 hours of sleep every night.  ¨ Try to exercise for at least 30 minutes on most days.  ¨ Have healthy foods including fruits and vegetables, 100% whole grains, lean meats and fish, and low-fat dairy products. Try to stay away from foods high in fats and sugar.  Take your medicines  Take your medicines exactly as directed. Don't skip doses.  Manage your stress  Stress can make COPD worse. Use this stress management technique:  · Find a quiet place and sit or lie in a comfortable position.  · Close your eyes and perform breathing exercises for several minutes. Ask your provider about the best way to breathe.  Pulmonary rehabilitation  · Pulmonary rehab can help you feel better. These programs include exercise, breathing techniques, information about COPD, counseling, and help for smokers.  · Ask your provider or your local hospital about programs in your area.  When to call your healthcare provider  Call your provider immediately if you have any of the following:  · Shortness of breath, wheezing, or coughing  · Increased mucus  · Yellow, green, bloody, or smelly mucus  · Fever or chills  · Tightness in your chest that does not go away with rest or medicine  · An irregular heartbeat or a feeling that your heart is beating very fast  · Swollen ankles   Date Last  Reviewed: 5/1/2016  © 2377-3656 [employer]. [address] [place] [address], [place] [address]. All rights reserved. This information is not intended as a substitute for professional medical care. Always follow your healthcare professional's instructions.

## 2019-08-05 NOTE — SUBJECTIVE & OBJECTIVE
Interval History: stable now hemoptysis mild bronch today showed bleeding is below the cords will not be able to treat for possible pe or afib with blood thinners     Objective:     Vital Signs (Most Recent):  Temp: 97 °F (36.1 °C) (08/05/19 0702)  Pulse: 87 (08/05/19 0715)  Resp: 18 (08/05/19 0715)  BP: (!) 170/63 (08/05/19 0715)  SpO2: 95 % (08/05/19 0715) Vital Signs (24h Range):  Temp:  [97 °F (36.1 °C)-98.7 °F (37.1 °C)] 97 °F (36.1 °C)  Pulse:  [] 87  Resp:  [16-19] 18  SpO2:  [88 %-100 %] 95 %  BP: (122-203)/(60-82) 170/63     Weight: 48.5 kg (107 lb)  Body mass index is 19.57 kg/m².      Intake/Output Summary (Last 24 hours) at 8/5/2019 0724  Last data filed at 8/4/2019 0800  Gross per 24 hour   Intake 120 ml   Output --   Net 120 ml       Physical Exam   Constitutional: She is oriented to person, place, and time. She appears well-developed and well-nourished. She is cooperative.  Non-toxic appearance. She does not appear ill. No distress.   HENT:   Head: Normocephalic and atraumatic.   Right Ear: Hearing, tympanic membrane, external ear and ear canal normal.   Left Ear: Hearing, tympanic membrane, external ear and ear canal normal.   Nose: Nose normal. No mucosal edema, rhinorrhea or nasal deformity. No epistaxis. Right sinus exhibits no maxillary sinus tenderness and no frontal sinus tenderness. Left sinus exhibits no maxillary sinus tenderness and no frontal sinus tenderness.   Mouth/Throat: Uvula is midline, oropharynx is clear and moist and mucous membranes are normal. No trismus in the jaw. Normal dentition. No uvula swelling. No posterior oropharyngeal erythema.   Eyes: Conjunctivae and lids are normal. No scleral icterus.   Sclera clear bilat   Neck: Trachea normal, full passive range of motion without pain and phonation normal. Neck supple.   Cardiovascular: Normal rate, regular rhythm, normal heart sounds, intact distal pulses and normal pulses.   Pulmonary/Chest: Effort normal. No  respiratory distress. She has decreased breath sounds in the right upper field, the right middle field, the right lower field, the left upper field, the left middle field and the left lower field. She has no wheezes. She has no rhonchi. She has no rales.   Abdominal: Soft. Normal appearance and bowel sounds are normal. She exhibits no distension. There is no tenderness.   Musculoskeletal: Normal range of motion. She exhibits no edema or deformity.   Neurological: She is alert and oriented to person, place, and time. She exhibits normal muscle tone. Coordination normal.   Skin: Skin is warm, dry and intact. She is not diaphoretic. No pallor.   Psychiatric: She has a normal mood and affect. Her speech is normal and behavior is normal. Judgment and thought content normal. Cognition and memory are normal.   Nursing note and vitals reviewed.      Vents:  Oxygen Concentration (%): 40 (08/05/19 0715)    Lines/Drains/Airways     Peripheral Intravenous Line                 Peripheral IV - Single Lumen 07/30/19 20 G Right Antecubital 6 days                Significant Labs:    CBC/Anemia Profile:  Recent Labs   Lab 08/04/19 0627 08/05/19  0601   WBC 8.00 7.63   HGB 12.8 13.1   HCT 40.3 41.3    249   MCV 89 89   RDW 12.9 12.8        Chemistries:  Recent Labs   Lab 08/04/19 0627 08/05/19  0601    144   K 4.7 5.5*    101   CO2 31* 33*   BUN 26* 22   CREATININE 0.9 0.8   CALCIUM 8.3* 8.4*       All pertinent labs within the past 24 hours have been reviewed.    Significant Imaging:  I have reviewed all pertinent imaging results/findings within the past 24 hours.

## 2019-08-05 NOTE — PLAN OF CARE
08/05/19 1515   Medicare Message   Important Message from Medicare regarding Discharge Appeal Rights Given to patient/caregiver;Explained to patient/caregiver;Signed/date by patient/caregiver   Date IMM was signed 08/05/19   Time IMM was signed [id number]

## 2019-08-05 NOTE — DISCHARGE SUMMARY
[facility] Medicine  Discharge Summary      Patient Name: Marva Perez  MRN: [id number]  Admission Date: 7/30/2019  Hospital Length of Stay: 5 days  Discharge Date and Time:  08/05/2019 11:46 AM  Attending Physician: Carissa Green MD   Discharging Provider: Nichelle Llanos NP  Primary Care Provider: Kevin Clements MD      HPI:   Marva Perez is a 78 y.o. female  With PMHX of HTN, HLD, GERD, Depression, anxiety, and COPD/Emphysema  presented to ER yesterday with shortness of breath today with associated anxiety. She walked into family clinic with c/o acute onset of SOB and O2 sat registered at 78%; sent to the ER immediately via EMS  Patient is not on oxygen at home, denied chest pain on admit  Lab Results   Component Value Date    DDIMER 0.42 07/30/2019   + iodine allergy - VQ scan this am pending. Dr. Matson following.   Patient having severe family issues with anxiety, also has severe COPD as well      Procedure(s) (LRB):  BRONCHOSCOPY (N/A)      Hospital Course:   7/31/19 H&P continued O2 sats 92% on R/A this am    8/1/19 VQ scan showing intermediate probability of pulmonary embolism due to the patient's extensive underlying emphysematous change. She has had prior CT abd with runoff in May 2019; will pre-medicate for CT scan to rule out PE ; refused benadryl - pepcid ordered   O2 sat 98-99% on 2LNC. Afebrile, continue steroids. Monitor with overnight pulse ox tonight with oxygen. Ordered per Dr. Matson   /70 , HR 61  Started on Cymbalta and remeron yesterday; pt notes she slept really well last PM   Had 6 minute walk yesterday - o2 sat 87% with activity. Pt requesting small portable tanks for home- concentrator  PE study neg.  8/2/19 O2 sat stable ovenight. Refused BIPAP. Do not think she will tolerate BIPAP with her severe anxiety.   Coughed up some blood this am and panicked. O2 2LNC,  O2 sat 98%, Notes breathing is better.   Ct negative for PE Severe emphysematous  "changes within the lung fields bilaterally greater on the right than left.  Prior granulomatous disease.  No acute infiltrate or effusion.  States she is feeling "very nervous" this morning, Xanax was being weaned.     8/3/19 overnight patient developed a-fib with RVR. She had episode of a-fib after her colon surgery 4 years ago. Did follow with Dr. Wilde at that time and tells me was "weaned off all medications" She was not on blood thinners, h/o PUD. No issues with active bleeding for several years per patient. Her breathing is much better, no wheezing.     8/4: feeling great today. Had brief episode of SVT overnight with elevated HR, resolved without intervention. She reports breathing back to baseline, wearing her O2. Dr. Matson wants to bronch tomorrow as having scant hemoptysis.     8/5 had bronch this am and had bleeding noted on PRATIBHA. Eliquis d/kathryn. Discussed with cards as well for her aprox a fib. Will be d/kathryn on asa. Has home O2 set up. Breathing is at baseline on home O2, ready for d/c today with pulm and cards follow up outpatient.      Consults:   Consults (From admission, onward)        Status Ordering Provider     Inpatient consult to Cardiology  Once     Provider:  Carlo Wilde MD    Acknowledged FERN SEAMAN     Inpatient consult to ENT  Once     Provider:  Kevin Amaya Sr., MD    Acknowledged RADHA MATSON     Inpatient consult to Pulmonology  Once     Provider:  Radha Matson MD    Acknowledged FERN SEAMAN.          * COPD exacerbation  Known severe emphysema  Dr. Matson following   Solumedrol 80 q 8  She should qualify for home O2. Will get overnight pulse ox AND walk her in the am to see if we can get her some portable O2   She really doesn't have much wheezing at all but does have terrible air flow. Just has a lot of trapped air and poor air exchange. Needs home O2   O2 ordered. CT negative.  Consider theophylline vs daliresp (she politely declined this 'if it would cause diarrhea); " "Notes she has chronic diarrhea since prior GI sx    : qualifies for home O2. She is breathing much better. Ambulate today.. Ready for discharge from COPD standpoint but now Dr. Matson wants to bronch in the AM     d/c today with close f/u Will plan for bronch 3-6 weeks      Hemoptysis  Scant blood tinged sputum  Most likely upper airway  Patient requested sent for cx--showed only oral belén  Dr. Matson will bronch tomorrow    Bleeding noted PRATIBHA  eliquis stopped will need repeat bronch 3-6 weeks    Atrial fibrillation with RVR  Remote h/o a-fib  Developed a-fib with RVR overnight  Rate better controlled this AM with BB but still ~100 bpm  Start metoprolol 25mg BID and titrate as needed for HR control  Start eliquis--h/o PUD but no bleeding several years per patient. Watch closely  Stop albuterol, start xopenex. Avoid albuterol in future  Reduce steroids    : short episode SVT overnight. HR controlled today. cnot metoprolol. Hold eliquis for procedure in AM     d/c eliquis due to bleeding asa therapy     Positive D dimer  + iodine allergy-   Will get VQ scan - indeterminate probability   Iodine allergy - rash- did have CT in May - will pre- medicate     CTA without PE ./LE US normal     Acute on chronic respiratory failure with hypoxia  o2 sat 77% on arrival  Better this am  Check 6 minute walk -  Patient's O2 Sat on room air while exercisin%--home with O2- already set up      Hyperlipidemia    Not on statin at home    HTN (hypertension)  Continue losartan  Change nebivolol to metorpolol BID and titreate up to keep HR controlled. Doing great on 25mg BID      KATHIE (generalized anxiety disorder)  Severe  mutliple family issues  Prescribed Remeron 15mg q Pm and Cybalta 30mg po daily  Pt states she never started bc she was "scared to take anti- depressants  Takes 0.5mg xanax nightly- discussed risks and encouraged to wean  Taper xanax- decrease to 0.25mg nightly prn  Will start cymbalta and remeron here " "  8/2/19 day 3 of Cymbalta and remeron, slept well but having severe anxiety this morning after coughing up blood; reassured pt that CBC and CT are stable; will continue xanax bid prn at this time and can try again to taper as OP per PCP after on cymbalta and remeron for some time   8/4: will continue the same until discharge      Final Active Diagnoses:    Diagnosis Date Noted POA    PRINCIPAL PROBLEM:  COPD exacerbation [J44.1] 07/31/2019 Yes    Hemoptysis [R04.2] 08/04/2019 Yes    Atrial fibrillation with RVR [I48.91] 08/03/2019 No    Acute on chronic respiratory failure with hypoxia [J96.21] 07/31/2019 Yes    Positive D dimer [R79.89] 07/31/2019 Yes    HTN (hypertension) [I10] 10/30/2012 Yes    Hyperlipidemia [E78.5] 10/30/2012 Yes    KATHIE (generalized anxiety disorder) [F41.1] 10/30/2012 Yes      Problems Resolved During this Admission:       Discharged Condition: good    Disposition: Home or Self Care    Follow Up:  Follow-up Information     Kevin Clements MD In 1 week.    Specialty:  Family Medicine  Contact information:  [address] [place] [address]  [phone]             Howard Matson MD In 2 weeks.    Specialties:  Pulmonary Disease, Internal Medicine  Contact information:  [address] [facility] [facility]  [place] [address]  [phone]             Carlo Wilde MD In 3 days.    Specialty:  Cardiology  Contact information:  [address] [place] [address]  [phone]                 Patient Instructions:      OXYGEN FOR HOME USE     Order Specific Question Answer Comments   Liter Flow 2    Duration Continuous    Qualifying SpO2: 87%    Testing done at: Rest    Route nasal cannula    Portable mode: continuous    Device home concentrator with portable unit    Length of need (in months): 99 mos    Patient condition with qualifying saturation COPD    Height: 5' 2" (1.575 m)    Weight: 48.5 kg (107 lb)    Does patient have medical equipment at home? none    Alternative treatment measures " have been tried or considered and deemed clinically ineffective. Yes      Ambulatory referral to Outpatient Case Management   Referral Priority: Routine Referral Type: Consultation   Referral Reason: Specialty Services Required   Number of Visits Requested: 1       Significant Diagnostic Studies:     CBC:        Recent Labs   Lab 08/04/19 0627 08/05/19  0601   WBC 8.00 7.63   HGB 12.8 13.1   HCT 40.3 41.3    249      CMP:        Recent Labs   Lab 08/04/19 0627 08/05/19  0601    144   K 4.7 5.5*    101   CO2 31* 33*   * 113*   BUN 26* 22   CREATININE 0.9 0.8   CALCIUM 8.3* 8.4*   ANIONGAP 8 10   EGFRNONAA >60 >60          Recent Labs   Lab 08/03/19  0051   TROPONINI <0.006      TSH:       Recent Labs   Lab 03/21/19  1049   TSH 2.773      resp culture   Respiratory Culture Specimen inadequate - culture not performed    Gram Stain (Respiratory) >10epis/lfp and <than many WBC's    Gram Stain (Respiratory) Predominance of oropharyngeal belén. Please recollect            Significant Imaging:      US lower leg   No sonographic evidence of deep venous thrombosis is identified.     7/30/19 CXR- No acute process seen.  Severe emphysematous changes.     8/1 CTA chest   1. No CT evidence of pulmonary embolus.  2. Severe emphysematous changes within the lung fields bilaterally greater on the right than left.  Prior granulomatous disease.  No acute infiltrate or effusion.     7/31 VQ scan This represents a intermediate/indeterminate probability of pulmonary embolism due to the patient's extensive underlying emphysematous change.     · TTE   · Normal left ventricular systolic function. The estimated ejection fraction is 65%  · Normal LV diastolic function.  · Normal right ventricular systolic function.     EKG Atrial fibrillation with rapid ventricular response  Marked ST abnormality, possible inferior subendocardial injury  Marked ST abnormality, possible anterolateral subendocardial injury  Abnormal  ECG  When compared with ECG of 31-JUL-2019 07:52,  Significant changes have occurred  Confirmed by VICENTE CERVANTES MD ([id number]) on 8/3/2019 10:44:02 AM  No pulmonary hypertension present.         Pending Diagnostic Studies:     Procedure Component Value Units Date/Time    Cytology Specimen-Medical Cytology (Fluid/Wash/Brush) [[id number]] Collected:  08/05/19 0707    Order Status:  Sent Lab Status:  No result     Specimen:  Bronchial Wash from Bronch wash     Cytology Specimen-Medical Cytology (Fluid/Wash/Brush) [[id number]] Collected:  08/05/19 0707    Order Status:  Sent Lab Status:  No result     Specimen:  Bronchial Wash from Bronch wash     FL Flouro Usage [[id number]]     Order Status:  Sent Lab Status:  No result     FL Less Than 1 Hour [[id number]]     Order Status:  Sent Lab Status:  No result          Medications:  Reconciled Home Medications:      Medication List      START taking these medications    aspirin 325 MG EC tablet  Commonly known as:  ECOTRIN  Take 1 tablet (325 mg total) by mouth once daily.     metoprolol tartrate 25 MG tablet  Commonly known as:  LOPRESSOR  Take 1 tablet (25 mg total) by mouth 2 (two) times daily.        CONTINUE taking these medications    * albuterol 90 mcg/actuation inhaler  Commonly known as:  PROVENTIL/VENTOLIN HFA  Inhale 2 puffs into the lungs every 6 (six) hours as needed for Wheezing.     * albuterol 0.63 mg/3 mL Nebu  Commonly known as:  ACCUNEB  Take 3 mLs (0.63 mg total) by nebulization every 6 (six) hours as needed.     ALPRAZolam 0.25 MG tablet  Commonly known as:  XANAX  Take 1 tablet during the day for anxiety as needed and 2 at night for sleep     cilostazol 50 MG Tab  Commonly known as:  PLETAL     clobetasol 0.05 % cream  Commonly known as:  TEMOVATE  Apply topically 2 (two) times daily. for 10 days     DULoxetine 30 MG capsule  Commonly known as:  CYMBALTA  One po q am for depression     fluticasone-salmeterol 250-50 mcg/dose 250-50 mcg/dose diskus  inhaler  Commonly known as:  ADVAIR DISKUS  USE 1 INHALATION TWICE A DAY     ipratropium 0.02 % nebulizer solution  Commonly known as:  ATROVENT  Take 2.5 mLs (500 mcg total) by nebulization 2 (two) times daily as needed for Wheezing.     losartan 50 MG tablet  Commonly known as:  COZAAR  Take 50 mg by mouth once daily.     methscopolamine 2.5 mg Tab  Commonly known as:  PAMINE  Take 1 tablet (2.5 mg total) by mouth 2 (two) times daily.     mirtazapine 15 MG tablet  Commonly known as:  REMERON  One po q hs for rest and depression     pantoprazole 40 MG tablet  Commonly known as:  PROTONIX  Take 1 tablet (40 mg total) by mouth once daily.     tiotropium 18 mcg inhalation capsule  Commonly known as:  SPIRIVA WITH HANDIHALER  INHALE THE CONTENTS OF 1 CAPSULE DAILY         * This list has 2 medication(s) that are the same as other medications prescribed for you. Read the directions carefully, and ask your doctor or other care provider to review them with you.            STOP taking these medications    BYSTOLIC 10 MG Tab  Generic drug:  nebivolol     dronabinol 5 MG capsule  Commonly known as:  MARINOL     tretinoin 0.05 % cream  Commonly known as:  RETIN-A     tretinoin 0.1 % cream  Commonly known as:  RETIN-A            Indwelling Lines/Drains at time of discharge:   Lines/Drains/Airways          None          Time spent on the discharge of patient: 20 minutes  Patient was seen and examined on the date of discharge and determined to be suitable for discharge.         Nichelle Llanos NP  Department of Hospital Medicine  [facility]

## 2019-08-05 NOTE — PLAN OF CARE
08/05/19 1335   Discharge Assessment   Assessment Type Discharge Planning Reassessment     Ms Perez will discharge home today with home oxygen therapy. She has no concerns for post acute care.

## 2019-08-05 NOTE — SUBJECTIVE & OBJECTIVE
Review of Systems   Constitutional: Negative for chills, fatigue and fever.   HENT: Negative for congestion, ear pain, postnasal drip, sinus pressure, sneezing and sore throat.    Eyes: Negative for discharge.   Respiratory: Negative for cough, chest tightness and shortness of breath.         Trace blood in sputum   Cardiovascular: Negative.    Gastrointestinal: Negative for abdominal pain, constipation, diarrhea, nausea and vomiting.   Genitourinary: Negative for difficulty urinating, flank pain and hematuria.   Musculoskeletal: Negative.  Negative for arthralgias and joint swelling.   Skin: Negative.    Neurological: Negative for dizziness and headaches.   Psychiatric/Behavioral: Negative for behavioral problems and confusion. The patient is nervous/anxious.      Objective:     Vital Signs (Most Recent):  Temp: 96.4 °F (35.8 °C) (08/05/19 0744)  Pulse: 65 (08/05/19 1027)  Resp: (!) 21 (08/05/19 0820)  BP: (!) 164/68 (08/05/19 0744)  SpO2: 96 % (08/05/19 0820) Vital Signs (24h Range):  Temp:  [96.4 °F (35.8 °C)-98.7 °F (37.1 °C)] 96.4 °F (35.8 °C)  Pulse:  [] 65  Resp:  [16-21] 21  SpO2:  [88 %-100 %] 96 %  BP: (122-203)/(60-91) 164/68     Weight: 48.5 kg (107 lb)  Body mass index is 19.57 kg/m².    Physical Exam   Constitutional: She is oriented to person, place, and time. She appears well-developed and well-nourished. No distress.   HENT:   Head: Normocephalic and atraumatic.   Right Ear: External ear normal.   Left Ear: External ear normal.   Eyes: Pupils are equal, round, and reactive to light. Conjunctivae and EOM are normal. Right eye exhibits no discharge. Left eye exhibits no discharge.   Neck: Normal range of motion. Neck supple. No tracheal deviation present.   Cardiovascular: Normal rate, regular rhythm and normal heart sounds.   Pulmonary/Chest: Effort normal. No respiratory distress. She has no wheezes. She has no rales.   No wheezing today  Moving more air   Abdominal: Soft. Bowel sounds are  normal. There is no tenderness.   Musculoskeletal: Normal range of motion. She exhibits no edema.   Neurological: She is alert and oriented to person, place, and time.   Skin: Skin is warm and dry. No rash noted.   Psychiatric: She has a normal mood and affect. Her behavior is normal.   Nursing note and vitals reviewed.        CRANIAL NERVES     CN III, IV, VI   Pupils are equal, round, and reactive to light.  Extraocular motions are normal.        Significant Labs:   CBC:   Recent Labs   Lab 08/04/19 0627 08/05/19  0601   WBC 8.00 7.63   HGB 12.8 13.1   HCT 40.3 41.3    249     CMP:   Recent Labs   Lab 08/04/19 0627 08/05/19  0601    144   K 4.7 5.5*    101   CO2 31* 33*   * 113*   BUN 26* 22   CREATININE 0.9 0.8   CALCIUM 8.3* 8.4*   ANIONGAP 8 10   EGFRNONAA >60 >60     Recent Labs   Lab 08/03/19  0051   TROPONINI <0.006     TSH:   Recent Labs   Lab 03/21/19  1049   TSH 2.773     resp culture   Respiratory Culture Specimen inadequate - culture not performed    Gram Stain (Respiratory) >10epis/lfp and <than many WBC's    Gram Stain (Respiratory) Predominance of oropharyngeal belén. Please recollect         Significant Imaging:     US lower leg   No sonographic evidence of deep venous thrombosis is identified.    7/30/19 CXR- No acute process seen.  Severe emphysematous changes.    8/1 CTA chest   1. No CT evidence of pulmonary embolus.  2. Severe emphysematous changes within the lung fields bilaterally greater on the right than left.  Prior granulomatous disease.  No acute infiltrate or effusion.    7/31 VQ scan This represents a intermediate/indeterminate probability of pulmonary embolism due to the patient's extensive underlying emphysematous change.    · TTE   · Normal left ventricular systolic function. The estimated ejection fraction is 65%  · Normal LV diastolic function.  · Normal right ventricular systolic function.    EKG Atrial fibrillation with rapid ventricular  response  Marked ST abnormality, possible inferior subendocardial injury  Marked ST abnormality, possible anterolateral subendocardial injury  Abnormal ECG  When compared with ECG of 31-JUL-2019 07:52,  Significant changes have occurred  Confirmed by VICENTE CERVANTES MD ([id number]) on 8/3/2019 10:44:02 AM  No pulmonary hypertension present.

## 2019-08-05 NOTE — PROGRESS NOTES
[facility] Medicine  Progress Note    Patient Name: Marva Perez  MRN: [id number]  Patient Class: IP- Inpatient   Admission Date: 7/30/2019  Length of Stay: 5 days  Attending Physician: Carissa Green MD  Primary Care Provider: Kevin Clements MD        Subjective:     Principal Problem:COPD exacerbation      HPI:  Marva Perez is a 78 y.o. female  With PMHX of HTN, HLD, GERD, Depression, anxiety, and COPD/Emphysema  presented to ER yesterday with shortness of breath today with associated anxiety. [facility] with c/o acute onset of SOB and O2 sat registered at 78%; sent to the ER immediately via EMS  Patient is not on oxygen at home, denied chest pain on admit  Lab Results   Component Value Date    DDIMER 0.42 07/30/2019   + iodine allergy - VQ scan this am pending. Dr. Matson following.   Patient having severe family issues with anxiety, also has severe COPD as well      Overview/Hospital Course:  7/31/19 H&P continued O2 sats 92% on R/A this am    8/1/19 VQ scan showing intermediate probability of pulmonary embolism due to the patient's extensive underlying emphysematous change. She has had prior CT abd with runoff in May 2019; will pre-medicate for CT scan to rule out PE ; refused benadryl - pepcid ordered   O2 sat 98-99% on 2LNC. Afebrile, continue steroids. Monitor with overnight pulse ox tonight with oxygen. Ordered per Dr. Matson   /70 , HR 61  Started on Cymbalta and remeron yesterday; pt notes she slept really well last PM   Had 6 minute walk yesterday - o2 sat 87% with activity. Pt requesting small portable tanks for home- concentrator  PE study neg.  8/2/19 O2 sat stable ovenight. Refused BIPAP. Do not think she will tolerate BIPAP with her severe anxiety.   Coughed up some blood this am and panicked. O2 2LNC,  O2 sat 98%, Notes breathing is better.   Ct negative for PE Severe emphysematous changes within the lung fields bilaterally greater on  "the right than left.  Prior granulomatous disease.  No acute infiltrate or effusion.  States she is feeling "very nervous" this morning, Xanax was being weaned.     8/3/19 overnight patient developed a-fib with RVR. She had episode of a-fib after her colon surgery 4 years ago. Did follow with Dr. Wilde at that time and tells me was "weaned off all medications" She was not on blood thinners, h/o PUD. No issues with active bleeding for several years per patient. Her breathing is much better, no wheezing.     8/4: feeling great today. Had brief episode of SVT overnight with elevated HR, resolved without intervention. She reports breathing back to baseline, wearing her O2. Dr. Matson wants to bronch tomorrow as having scant hemoptysis.     8/5 had bronch this am and had bleeding noted on PRATIBHA. Andrae d/kathryn. Discussed with cards as well for her aprox a fib. Will be d/kathryn on asa. Has home O2 set up. Breathing is at baseline on home O2, ready for d/c today with pulm and cards follow up outpatient.     Review of Systems   Constitutional: Negative for chills, fatigue and fever.   HENT: Negative for congestion, ear pain, postnasal drip, sinus pressure, sneezing and sore throat.    Eyes: Negative for discharge.   Respiratory: Negative for cough, chest tightness and shortness of breath.         Trace blood in sputum   Cardiovascular: Negative.    Gastrointestinal: Negative for abdominal pain, constipation, diarrhea, nausea and vomiting.   Genitourinary: Negative for difficulty urinating, flank pain and hematuria.   Musculoskeletal: Negative.  Negative for arthralgias and joint swelling.   Skin: Negative.    Neurological: Negative for dizziness and headaches.   Psychiatric/Behavioral: Negative for behavioral problems and confusion. The patient is nervous/anxious.      Objective:     Vital Signs (Most Recent):  Temp: 96.4 °F (35.8 °C) (08/05/19 0744)  Pulse: 65 (08/05/19 1027)  Resp: (!) 21 (08/05/19 0820)  BP: (!) 164/68 (08/05/19 " 0744)  SpO2: 96 % (08/05/19 0820) Vital Signs (24h Range):  Temp:  [96.4 °F (35.8 °C)-98.7 °F (37.1 °C)] 96.4 °F (35.8 °C)  Pulse:  [] 65  Resp:  [16-21] 21  SpO2:  [88 %-100 %] 96 %  BP: (122-203)/(60-91) 164/68     Weight: 48.5 kg (107 lb)  Body mass index is 19.57 kg/m².    Physical Exam   Constitutional: She is oriented to person, place, and time. She appears well-developed and well-nourished. No distress.   HENT:   Head: Normocephalic and atraumatic.   Right Ear: External ear normal.   Left Ear: External ear normal.   Eyes: Pupils are equal, round, and reactive to light. Conjunctivae and EOM are normal. Right eye exhibits no discharge. Left eye exhibits no discharge.   Neck: Normal range of motion. Neck supple. No tracheal deviation present.   Cardiovascular: Normal rate, regular rhythm and normal heart sounds.   Pulmonary/Chest: Effort normal. No respiratory distress. She has no wheezes. She has no rales.   No wheezing today  Moving more air   Abdominal: Soft. Bowel sounds are normal. There is no tenderness.   Musculoskeletal: Normal range of motion. She exhibits no edema.   Neurological: She is alert and oriented to person, place, and time.   Skin: Skin is warm and dry. No rash noted.   Psychiatric: She has a normal mood and affect. Her behavior is normal.   Nursing note and vitals reviewed.        CRANIAL NERVES     CN III, IV, VI   Pupils are equal, round, and reactive to light.  Extraocular motions are normal.        Significant Labs:   CBC:   Recent Labs   Lab 08/04/19  0627 08/05/19  0601   WBC 8.00 7.63   HGB 12.8 13.1   HCT 40.3 41.3    249     CMP:   Recent Labs   Lab 08/04/19  0627 08/05/19  0601    144   K 4.7 5.5*    101   CO2 31* 33*   * 113*   BUN 26* 22   CREATININE 0.9 0.8   CALCIUM 8.3* 8.4*   ANIONGAP 8 10   EGFRNONAA >60 >60     Recent Labs   Lab 08/03/19  0051   TROPONINI <0.006     TSH:   Recent Labs   Lab 03/21/19  1049   TSH 2.773     resp culture    Respiratory Culture Specimen inadequate - culture not performed    Gram Stain (Respiratory) >10epis/lfp and <than many WBC's    Gram Stain (Respiratory) Predominance of oropharyngeal belén. Please recollect         Significant Imaging:     US lower leg   No sonographic evidence of deep venous thrombosis is identified.    7/30/19 CXR- No acute process seen.  Severe emphysematous changes.    8/1 CTA chest   1. No CT evidence of pulmonary embolus.  2. Severe emphysematous changes within the lung fields bilaterally greater on the right than left.  Prior granulomatous disease.  No acute infiltrate or effusion.    7/31 VQ scan This represents a intermediate/indeterminate probability of pulmonary embolism due to the patient's extensive underlying emphysematous change.    · TTE   · Normal left ventricular systolic function. The estimated ejection fraction is 65%  · Normal LV diastolic function.  · Normal right ventricular systolic function.    EKG Atrial fibrillation with rapid ventricular response  Marked ST abnormality, possible inferior subendocardial injury  Marked ST abnormality, possible anterolateral subendocardial injury  Abnormal ECG  When compared with ECG of 31-JUL-2019 07:52,  Significant changes have occurred  Confirmed by BILLY MURRELL, VICENTE ([id number]) on 8/3/2019 10:44:02 AM  No pulmonary hypertension present.      Assessment/Plan:      * COPD exacerbation  Known severe emphysema  Dr. Matson following   Solumedrol 80 q 8  She should qualify for home O2. Will get overnight pulse ox AND walk her in the am to see if we can get her some portable O2   She really doesn't have much wheezing at all but does have terrible air flow. Just has a lot of trapped air and poor air exchange. Needs home O2   O2 ordered. CT negative.  Consider theophylline vs daliresp (she politely declined this 'if it would cause diarrhea); Notes she has chronic diarrhea since prior GI sx    8/4: qualifies for home O2. She is breathing much  better. Ambulate today.. Ready for discharge from COPD standpoint but now Dr. Matson wants to bronch in the AM     d/c today with close f/u Will plan for bronch 3-6 weeks      Hemoptysis  Scant blood tinged sputum  Most likely upper airway  Patient requested sent for cx--showed only oral belén  Dr. Matson will bronch tomorrow    Bleeding noted PRATIBHA  eliquis stopped will need repeat bronch 3-6 weeks    Atrial fibrillation with RVR  Remote h/o a-fib  Developed a-fib with RVR overnight  Rate better controlled this AM with BB but still ~100 bpm  Start metoprolol 25mg BID and titrate as needed for HR control  Start eliquis--h/o PUD but no bleeding several years per patient. Watch closely  Stop albuterol, start xopenex. Avoid albuterol in future  Reduce steroids    : short episode SVT overnight. HR controlled today. cnot metoprolol. Hold eliquis for procedure in AM     d/c eliquis due to bleeding asa therapy     Pulmonary embolism with acute cor pulmonale        Positive D dimer  + iodine allergy-   Will get VQ scan - indeterminate probability   Iodine allergy - rash- did have CT in May - will pre- medicate     CTA without PE ./LE US normal     Chronic respiratory failure    o2 sat 87% on RA     Acute on chronic respiratory failure with hypoxia  o2 sat 77% on arrival  Better this am  Check 6 minute walk -  Patient's O2 Sat on room air while exercisin%--home with O2- already set up      SOB (shortness of breath)  Stable with oxygen  + anxiety component       Centrilobular emphysema  Root cause of her dyspnea      Emphysema/COPD  Known severe emphysema  Dr. Matson following   Solumedrol 80 q 8  She should qualify for home O2. Will get overnight pulse ox AND walk her in the am to see if we can get her some portable O2   She really doesn't have much wheezing at all but does have terrible air flow. Just has a lot of trapped air and poor air exchange. Needs home O2   O2 ordered. CT negative.  Consider theophylline vs  "lacie (she politely declined this 'if it would cause diarrhea); Notes she has chronic diarrhea since prior GI sx    8/2: qualifies for home O2. She is breathing much better. Ambulate today. She feels she need "one more day". Does still having some tightness on exam but no more wheezing. Hopefully home in AM  She did have some blood in sputum today. Scant. Most likely upper airway. CT without PE. No masses on imaging. Will monitor today as well for worsening      Hyperlipidemia    Not on statin at home    HTN (hypertension)  Continue losartan  Change nebivolol to metorpolol BID and titreate up to keep HR controlled. Doing great on 25mg BID      KATHIE (generalized anxiety disorder)  Severe  mutliple family issues  Prescribed Remeron 15mg q Pm and Cybalta 30mg po daily  Pt states she never started bc she was "scared to take anti- depressants  Takes 0.5mg xanax nightly- discussed risks and encouraged to wean  Taper xanax- decrease to 0.25mg nightly prn  Will start cymbalta and remeron here   8/2/19 day 3 of Cymbalta and remeron, slept well but having severe anxiety this morning after coughing up blood; reassured pt that CBC and CT are stable; will continue xanax bid prn at this time and can try again to taper as OP per PCP after on cymbalta and remeron for some time   8/4: will continue the same until discharge      VTE Risk Mitigation (From admission, onward)        Ordered     Place CARLYN hose  Until discontinued      07/31/19 1711     IP VTE HIGH RISK PATIENT  Once      07/30/19 1858     Place sequential compression device  Until discontinued      07/30/19 1858                Maria Del Carmen Taylor MD  Department of Hospital Medicine   [facility]  "

## 2019-08-05 NOTE — ASSESSMENT & PLAN NOTE
Remote h/o a-fib  Developed a-fib with RVR overnight  Rate better controlled this AM with BB but still ~100 bpm  Start metoprolol 25mg BID and titrate as needed for HR control  Start eliquis--h/o PUD but no bleeding several years per patient. Watch closely  Stop albuterol, start xopenex. Avoid albuterol in future  Reduce steroids    8/4: short episode SVT overnight. HR controlled today. cnot metoprolol. Hold eliquis for procedure in AM    8/5 d/c eliquis due to bleeding asa therapy

## 2019-08-05 NOTE — PROGRESS NOTES
[facility]  Cardiology  Progress Note    Patient Name: Marva Perez  MRN: [id number]  Admission Date: 7/30/2019  Hospital Length of Stay: 5 days  Code Status: Full Code   Attending Physician: Carissa Green MD   Primary Care Physician: Kevin Clements MD  Expected Discharge Date:   Principal Problem:COPD exacerbation    Subjective:     Hospital Course: 78 y.o. year old female  with history of PAF, HTN, COPD, chronic respiratory failure, dyslipidemia, anxiety, presents with a chief complaint of SOB and Wheezing for 14 days.Patient had called yesterday sob I had ordered ACTA Chest . Pt went to regular Md where sat was 78%.Admitted through ER. Elevated D-dimer noted    Interval History: Bronch today secondary to mild hemoptysis, revealed bleeding below the cords and pulmonology states will not be able to treat for possible pe or afib with blood thinners. Plans for referral to EP for evaluation of possible ablation/watchman procedure.     ROS     Constitution: Negative.   HENT: Negative.    Eyes: Negative.    Cardiovascular: Positive for leg swelling and paroxysmal nocturnal dyspnea. Negative for chest pain, claudication, cyanosis, dyspnea on exertion, irregular heartbeat, near-syncope, orthopnea and palpitations.   Respiratory: Positive for cough, shortness of breath, sleep disturbances due to breathing, sputum production and wheezing. Negative for hemoptysis and snoring.    Endocrine: Negative.    Hematologic/Lymphatic: Negative.    Skin: Negative.    Musculoskeletal: Positive for myalgias.   Gastrointestinal: Negative.    Genitourinary: Negative.    Neurological: Negative.    Psychiatric/Behavioral: The patient has insomnia.    Allergic/Immunologic: Negative.     Objective:     Vital Signs (Most Recent):  Temp: 97 °F (36.1 °C) (08/05/19 0702)  Pulse: 86 (08/05/19 0820)  Resp: (!) 21 (08/05/19 0820)  BP: (!) 164/68 (08/05/19 0744)  SpO2: 96 % (08/05/19 0820) Vital Signs (24h Range):  Temp:   [97 °F (36.1 °C)-98.7 °F (37.1 °C)] 97 °F (36.1 °C)  Pulse:  [] 86  Resp:  [16-21] 21  SpO2:  [88 %-100 %] 96 %  BP: (122-203)/(60-91) 164/68     Weight: 48.5 kg (107 lb)  Body mass index is 19.57 kg/m².    SpO2: 96 %  O2 Device (Oxygen Therapy): nasal cannula      Intake/Output Summary (Last 24 hours) at 8/5/2019 0853  Last data filed at 8/5/2019 0727  Gross per 24 hour   Intake 140 ml   Output --   Net 140 ml       Lines/Drains/Airways     Peripheral Intravenous Line                 Peripheral IV - Single Lumen 07/30/19 20 G Right Antecubital 6 days                Physical Exam        Constitutional: She is oriented to person, place, and time. She appears well-developed and well-nourished. No distress.   HENT:   Head: Normocephalic and atraumatic.   Neck: Normal range of motion. Neck supple. No JVD present. No tracheal deviation present. No thyromegaly present.   Cardiovascular: Normal rate, regular rhythm and intact distal pulses. Exam reveals no gallop and no friction rub.   Murmur (2/6 ANTONELLA) heard.  Pulmonary/Chest: No stridor. No respiratory distress. She has wheezes. She has rales. She exhibits no tenderness.   Abdominal: Soft. Bowel sounds are normal. She exhibits no distension and no mass. There is no tenderness. There is no rebound and no guarding.   Musculoskeletal: Normal range of motion. She exhibits edema. She exhibits no tenderness or deformity.   Lymphadenopathy:     She has no cervical adenopathy.   Neurological: She is alert and oriented to person, place, and time.   Skin: Skin is warm and dry. No rash noted. She is not diaphoretic. No erythema. No pallor.   Psychiatric: Her behavior is normal. Judgment and thought content normal.     Significant Labs:   BMP:   Recent Labs   Lab 08/04/19  0627 08/05/19  0601   * 113*    144   K 4.7 5.5*    101   CO2 31* 33*   BUN 26* 22   CREATININE 0.9 0.8   CALCIUM 8.3* 8.4*   , CMP   Recent Labs   Lab 08/04/19  0627 08/05/19  0601   NA  143 144   K 4.7 5.5*    101   CO2 31* 33*   * 113*   BUN 26* 22   CREATININE 0.9 0.8   CALCIUM 8.3* 8.4*   ANIONGAP 8 10   ESTGFRAFRICA >60 >60   EGFRNONAA >60 >60   , CBC   Recent Labs   Lab 08/04/19  0627 08/05/19  0601   WBC 8.00 7.63   HGB 12.8 13.1   HCT 40.3 41.3    249    and Troponin No results for input(s): TROPONINI in the last 48 hours.      Assessment and Plan:       Active Diagnoses:    Diagnosis Date Noted POA    PRINCIPAL PROBLEM:  COPD exacerbation [J44.1] 07/31/2019 Yes    Hemoptysis [R04.2] 08/04/2019 Yes    Atrial fibrillation with RVR [I48.91] 08/03/2019 No    Acute on chronic respiratory failure with hypoxia [J96.21] 07/31/2019 Yes    Positive D dimer [R79.89] 07/31/2019 Yes    HTN (hypertension) [I10] 10/30/2012 Yes    Hyperlipidemia [E78.5] 10/30/2012 Yes    KATHIE (generalized anxiety disorder) [F41.1] 10/30/2012 Yes      Problems Resolved During this Admission:       VTE Risk Mitigation (From admission, onward)        Ordered     Place CARLYN hose  Until discontinued      07/31/19 1711     IP VTE HIGH RISK PATIENT  Once      07/30/19 1858     Place sequential compression device  Until discontinued      07/30/19 1858        Current Facility-Administered Medications   Medication    0.9%  NaCl infusion    acetaminophen tablet 650 mg    ALPRAZolam tablet 0.25 mg    cloNIDine tablet 0.1 mg    DULoxetine DR capsule 30 mg    fentaNYL injection 25 mcg    levalbuterol nebulizer solution 1.25 mg    lidocaine HCL 2% jelly 2 mL    lidocaine HCL 2% jelly 2 mL    lidocaine HCL 4% external solution 4 mL    losartan tablet 50 mg    methscopolamine tablet 2.5 mg    methylPREDNISolone sodium succinate injection 40 mg    metoprolol injection 5 mg    metoprolol tartrate (LOPRESSOR) tablet 25 mg    midazolam (VERSED) 1 mg/mL injection 2 mg    mirtazapine tablet 15 mg    ondansetron injection 4 mg    pantoprazole EC tablet 40 mg    promethazine (PHENERGAN) 12.5 mg in  dextrose 5 % 50 mL IVPB    sodium chloride 0.9% flush 10 mL     Echo 4/2019: LVEF 60%, LV diastolic function normal, mild NH, PA systolic pressure 7 mmHg     MPI 4/2018: equivocal with no evidence of ischemia. Small fixed perfusion abnormality of moderate intensity in the mid anteroseptal segment. Small fixed perfusion abnormality of moderate intensity in the mid inferoseptal segment.     Carotid US 10/2018: Less than 40% stenosis in the left and right internal carotid arteries. Antegrade left and right vertebral artery flow.     DX: SOB, very anxious since losing son in law few months ago  Elevated D-dimer/No PE by CT but severe COPD  Chronic respiratory failure  PAF  HTN  COPD  Dyslipidemia  Carotid artery disease  No CP; equivocal stress test with no ischemia 4/18  LVEF 60% 4/19 remains same 7/19  Bronch today reveals bleeding below the cords, will not be able to anticoagulate for PAF, plan for outpatient EP eval     Plan: 81 ecasa for now, no anticoagulation  Plan to refer for possible atrial appendage occlusion and possible RFA      Aleyda Whitney NP for Dr. Wilde  Cardiology  [facility]    I attest that I have personally seen and examined this patient. I have reviewed and discussed the management in detail as outlined above.

## 2019-08-05 NOTE — ASSESSMENT & PLAN NOTE
Bleeding is below the cords coming from the PRATIBHA in view of that will stop blood thinners and at some point will start asprin and repepeat bronch to PRATIBHA with biopsies in 3 to 6 weeks   Only specs of blood will do bronchoscopy in am to ensure bleeding is not below

## 2019-08-05 NOTE — ASSESSMENT & PLAN NOTE
Scant blood tinged sputum  Most likely upper airway  Patient requested sent for cx--showed only oral belén  Dr. Matson will bronch tomorrow    Bleeding noted PRATIBHA  eliquis stopped will need repeat bronch 3-6 weeks

## 2019-08-06 ENCOUNTER — TELEPHONE (OUTPATIENT)
Dept: FAMILY MEDICINE | Facility: CLINIC | Age: 79
End: 2019-08-06

## 2019-08-06 NOTE — TELEPHONE ENCOUNTER
Pt stated she was just discharged from the hospital yesterday, and now she has diarrhea. Pt stated she will take Imodium to see if that helps with her diarrhea. Pt advised if he diarrhea is not controlled with Imodium to contact office. Increase fluid intake, and to eat a clear liquid/bland diet    Pt informed to report any symptoms of dizziness, weakness, uncontrolled diarrhea, SOB or any other symptoms

## 2019-08-06 NOTE — TELEPHONE ENCOUNTER
----- Message from Kalani Stevens sent at 2019 12:08 PM CDT -----  Contact: self  Marva Perez  MRN: [id number]  : 1940  PCP: Kevin Clements  Home Phone      [phone]  Work Phone      Not on file.  Mobile          [phone]      MESSAGE:   Patient would like to get a call back from nurse to discuss a medication she was put on at the hospital, she said its making her feel very nervous.     [phone]

## 2019-08-07 ENCOUNTER — PATIENT OUTREACH (OUTPATIENT)
Dept: ADMINISTRATIVE | Facility: CLINIC | Age: 79
End: 2019-08-07

## 2019-08-07 ENCOUNTER — NURSE TRIAGE (OUTPATIENT)
Dept: ADMINISTRATIVE | Facility: CLINIC | Age: 79
End: 2019-08-07

## 2019-08-07 NOTE — TELEPHONE ENCOUNTER
Reason for Disposition   Patient sounds very sick or weak to the triager    Protocols used: DIARRHEA-A-OH    Pt called very distraught periods of crying during call. Pt stated she was very weak and she has been having diarrhea. Pt stated she called Md on yesterday and he put her on imodium. She said she had 2 stools that where very loose, yellow and she think blood but she's not sure if blood was in her stool. Pt said she vomited a large amount of blood on yesterday. Daughter came on the phone and stated it was a quarter size amount. She said pt is very scared and anxious. She said her mom has been going in and out of Afib and shouldn't have been discharged. Both seem very overwhelmed and pt is going to ER.

## 2019-08-07 NOTE — PATIENT INSTRUCTIONS
COPD Flare    You have had a flare-up of your COPD.  COPD, or chronic obstructive pulmonary disease, is a common lung disease. It causes your airways to become irritated and narrower. This makes it harder for you to breathe. Emphysema and chronic bronchitis are both types of COPD. This is a chronic condition, which means you always have it. Sometimes it gets worse. When this happens, it is called a flare-up.  Symptoms of COPD  People with COPD may have symptoms most of the time. In a flare-up, your symptoms get worse. These symptoms may mean you are having a flare-up:  · Shortness of breath, shallow or rapid breathing, or wheezing that gets worse  · Lung infection  · Cough that gets worse  · More mucus, thicker mucus or mucus of a different color  · Tiredness, decreased energy, or trouble doing your usual activities  · Fever  · Chest tightness  · Your symptoms dont get better even when you use your usual medicines, inhalers, and nebulizer  · Trouble talking  · You feel confused  Causes of flare-ups  Unfortunately, a flare-up can happen even though you did everything right, and you followed your doctors instructions. Some causes of flare-ups are:  · Smoking or secondhand smoke  · Colds, the flu, or respiratory infections  · Air pollution  · Sudden change in the weather  · Dust, irritating chemicals, or strong fumes  · Not taking your medicines as prescribed  Home care  Here are some things you can do at home to treat a flare-up:  · Try not to panic. This makes it harder to breathe, and keeps you from doing the right things.  · Dont smoke or be around others who are smoking.  · Try to drink more fluids than usual during a flare-up, unless your doctor has told you not to because of heart and kidney problems. More fluids can help loosen the mucus.  · Use your inhalers and nebulizer, if you have one, as you have been told to.  · If you were given antibiotics, take them until they are used up or your doctor tells you  to stop. Its important to finish the antibiotics, even though you feel better. This will make sure the infection has cleared.  · If you were given prednisone or another steroid, finish it even if you feel better.  Preventing a flare-up  Even though flare-ups happen, the best way to treat one is to prevent it before it starts. Here are some pointers:  · Dont smoke or be around others who are smoking.  · Take your medicines as you have been told.  · Talk with your doctor about getting a flu shot every year. Also find out if you need a pneumonia shot.  · If there is a weather advisory warning to stay indoors, try to stay inside when possible.  · Try to eat healthy and get plenty of sleep.  · Try to avoid things that usually set you off, like dust, chemical fumes, hairsprays, or strong perfumes.  Follow-up care  Follow up with your healthcare provider, or as advised.  If a culture was done, you will be told if your treatment needs to be changed. You can call as directed for the results.  If X-rays were done, you will be notified of any new findings that may affect your care.  Call 911  Call 911 if any of these occur:  · You have trouble breathing  · You feel confused or its difficult to wake you up  · You faint or lose consciousness  · You have a rapid heart rate  · You have new pain in your chest, arm, shoulder, neck or upper back  When to seek medical advice  Call your healthcare provider right away if any of these occur:  · Wheezing or shortness of breath gets worse  · You need to use your inhalers more often than usual without relief  · Fever of 100.4°F (38ºC) or higher, or as directed by your healthcare provider  · Coughing up lots of dark-colored or bloody mucus (sputum)  · Chest pain with each breath  · You do not start to get better within 24 hours  · Swelling of your ankles gets worse  · Dizziness or weakness  Date Last Reviewed: 9/1/2016  © 0826-8290 [employer]. [address] [facility],  [place] [address]. All rights reserved. This information is not intended as a substitute for professional medical care. Always follow your healthcare professional's instructions.

## 2019-08-07 NOTE — PROGRESS NOTES
C3 nurse attempted to contact patient. The following occurred:   C3 nurse attempted to contact Marva for a TCC post hospital discharge follow up call. The patient is unable to conduct the call @ this time. The patient requested a callback.    The patient has a scheduled HOSFU appointment with Kevin Clements MD on 08/13/19 @ 2350. Message sent to Physician staff.

## 2019-08-08 LAB
BACTERIA SPEC AEROBE CULT: NORMAL
BACTERIA SPEC AEROBE CULT: NORMAL
GRAM STN SPEC: NORMAL

## 2019-08-09 DIAGNOSIS — J45.909 ASTHMATIC BRONCHITIS: ICD-10-CM

## 2019-08-09 RX ORDER — LEVALBUTEROL INHALATION SOLUTION 0.63 MG/3ML
1 SOLUTION RESPIRATORY (INHALATION) EVERY 4 HOURS PRN
Qty: 60 ML | Refills: 5 | Status: SHIPPED | OUTPATIENT
Start: 2019-08-09 | End: 2020-05-28 | Stop reason: SDUPTHER

## 2019-08-09 RX ORDER — LEVALBUTEROL INHALATION SOLUTION 0.63 MG/3ML
1 SOLUTION RESPIRATORY (INHALATION) 4 TIMES DAILY PRN
Qty: 60 ML | Refills: 5 | Status: CANCELLED | OUTPATIENT
Start: 2019-08-09 | End: 2020-08-08

## 2019-08-09 NOTE — TELEPHONE ENCOUNTER
----- Message from Kalani Stevens sent at 2019  8:22 AM CDT -----  Contact: daughter-lulu Perez  MRN: [id number]  : 1940  PCP: Kevin Clements  Home Phone      [phone]  Work Phone      Not on file.  Mobile          [phone]      MESSAGE:   Patient requesting a refill on zofinac nebulizer.    [phone]  Lulu      LOV: 19

## 2019-08-12 ENCOUNTER — TELEPHONE (OUTPATIENT)
Dept: FAMILY MEDICINE | Facility: CLINIC | Age: 79
End: 2019-08-12

## 2019-08-12 NOTE — TELEPHONE ENCOUNTER
----- Message from Arianne Malone sent at 2019 10:33 AM CDT -----  Contact: Chauncey louise/ [facility]   Marva Perez  MRN: [id number]  : 1940  PCP: Kevin Clements  Home Phone      [phone]  Work Phone      Not on file.  Mobile          [phone]      MESSAGE:   Called to discuss-   Would like to see Dr Muñiz in [place]. Before Dr Muñiz will see patient, he needs all information concerning lung issues/ hospital stay sent over to his office.    Kelly  [phone]

## 2019-08-12 NOTE — TELEPHONE ENCOUNTER
BRANDON     Spoke with Giovanna with [facility] they are ordering OT for energy conservation.    Labs for Dr. Wilde being drawn on Wed 08/14/19:  LFT CMP, CBC, BNP     Hospital Chart notes faxed to Dr. Mena's office. Pt would like a second opinion.

## 2019-08-13 ENCOUNTER — OFFICE VISIT (OUTPATIENT)
Dept: FAMILY MEDICINE | Facility: CLINIC | Age: 79
End: 2019-08-13
Payer: MEDICARE

## 2019-08-13 ENCOUNTER — OUTPATIENT CASE MANAGEMENT (OUTPATIENT)
Dept: ADMINISTRATIVE | Facility: OTHER | Age: 79
End: 2019-08-13

## 2019-08-13 VITALS
HEIGHT: 62 IN | SYSTOLIC BLOOD PRESSURE: 130 MMHG | DIASTOLIC BLOOD PRESSURE: 70 MMHG | TEMPERATURE: 97 F | WEIGHT: 104.38 LBS | BODY MASS INDEX: 19.21 KG/M2 | HEART RATE: 86 BPM | RESPIRATION RATE: 20 BRPM

## 2019-08-13 DIAGNOSIS — I48.91 ATRIAL FIBRILLATION WITH RVR: Primary | ICD-10-CM

## 2019-08-13 DIAGNOSIS — I26.09 OTHER PULMONARY EMBOLISM WITH ACUTE COR PULMONALE, UNSPECIFIED CHRONICITY: ICD-10-CM

## 2019-08-13 PROCEDURE — 99213 OFFICE O/P EST LOW 20 MIN: CPT | Mod: S$GLB,,, | Performed by: FAMILY MEDICINE

## 2019-08-13 PROCEDURE — 99999 PR PBB SHADOW E&M-EST. PATIENT-LVL III: ICD-10-PCS | Mod: PBBFAC,,, | Performed by: FAMILY MEDICINE

## 2019-08-13 PROCEDURE — 99213 PR OFFICE/OUTPT VISIT, EST, LEVL III, 20-29 MIN: ICD-10-PCS | Mod: S$GLB,,, | Performed by: FAMILY MEDICINE

## 2019-08-13 PROCEDURE — 99999 PR PBB SHADOW E&M-EST. PATIENT-LVL III: CPT | Mod: PBBFAC,,, | Performed by: FAMILY MEDICINE

## 2019-08-13 NOTE — PHYSICIAN QUERY
PT Name: Marva Perez  MR #: [id number]    Physician Query Form - Pathology Findings Clarification     CDS/: Caitlyn Redd   RN CCDS            Contact information:[email or web address]  This form is a permanent document in the medical record.     Query Date: August 13, 2019      By submitting this query, we are merely seeking further clarification of documentation.  Please utilize your independent clinical judgment when addressing the question(s) below.      The medical record contains the following:     Findings Supporting Clinical Information Location in Medical Record         Negative for malignant cells. Bacteria present. Fungal organisms consistent with Candida species       Negative for malignant cells. Bacteria present. Fungal organisms consistent with Candida species       Bronchoscopy cytology report     Please document the clinical significance of the Pathologists findings of ____Negative for malignant cells. Bacteria present. Fungal organisms consistent with Candida species___________________.    [   ] I agree with the Pathology Findings   [   ] I do not agree with the Pathology Findings   [   ] Other/Clarification of Findings:   [   ] Clinically Insignificant   [  ] Clinically Undetermined       Please document in your progress notes daily for the duration of treatment until resolved and include in your discharge summary.         I do not understand this query. Please clarify your question. I would obviously agree with the Pathologist's interpretation on the cytology report as I am not a pathologist and in no position to disagree with his findings. However, I'm not sure that this is what you are asking. Thank you. Dr. Taylor

## 2019-08-13 NOTE — OP NOTE
Operative Note       Surgery Date: 8/5/2019     Surgeon(s) and Role:     * Howard Matson MD - Primary    Pre-op Diagnosis:  Hemoptysis [R04.2]    Post-op Diagnosis:  No Endobronchial Lesion     Procedure(s) (LRB):  BRONCHOSCOPY (N/A)    Anesthesia: RN IV Sedation    Procedure in Detail/Findings:  Marva Perez is a 78 y.o. female presents with Coughing up blood after being put on Eliquise . She was admitted for extreme SOB rapidly progressive and consistent with Pulmonary Embolism and she had saturation of 86%. She was told to go to ER where she had a positive D Dimer without any falls. Placed on the floor and on high dose Lovenox and a CTA was done subsequently and was stated to be negative. With her clinical scenario and + d dimer and she was also found to have Atrial Fibrillation on monitor she was started on Eliquise 5mg q 12  . A cough was then noted to have a small quanity of blood for which was felt to be from the J Carlos-Pharynx . ENT dr plunkett looked at naso-pharynx and no blood . A Bronchoscopy was performed that showed Blood below the Vocal Cords from the Left Upper Lobe etiology unclear. Eliquise was stopped . She will be placed on Asprin.   With fluoroscopy guidance. ASA = 2. Via Right nares  placed scope to the level of the vocal cords. Patient is an in - patient(in pt vs out pt).Patient airways: Mouth opening good/Head/neck /Extension good   Vocal move move normal   Subglottic area is clear   Trachea normal with significant secretions   Right Lung no endobronchial lesion.  Left Lung no  endobronchial lesion.  There is moderate  Bronchial secretions  There is no  mass in the any  Lobe/segment see picture but blood streaming from PRATIBHA .    100cc Blood loss.  Transbronchial Biopsy was not done.  Brush and Wash X's 1 of the PRATIBHA/apical  Lobe/Segment.  Patient tolerated the procedure well no complications.  Impression:  1) 1) No Endobronchial lesions   2) Hemoptysis   3) Left Upper Lobe Hemorrhage   4)  COPD  5) History of Smoking   6) Chronic Respiratory Failure   7) Emphysema       ICD-10-CM ICD-9-CM   1. SOB (shortness of breath) R06.02 786.05   2. Cardiovascular disease I25.10 429.2   3. Respiration abnormal J98.9 786.00   4. Pulmonary embolism I26.99 415.19   5. COPD exacerbation J44.1 491.21   6. Chest pain R07.9 786.50   7. Positive D-dimer R79.89 790.92   8. Acute on chronic respiratory failure with hypoxia J96.21 518.84     799.02   9. Centrilobular emphysema J43.2 492.8     Plan:  1) Await Pathology.  2) Continue Bronchodilators.  3) Await cultures.  4) Return to clinic next Monday at 10 am call [phone]           Specimens (From admission, onward)    Start     Ordered    08/05/19 0704  Cytology Specimen-Medical Cytology (Fluid/Wash/Brush)  Once,   Status:  Canceled     Start Status     08/05/19 0704 Canceled Order ID: [id number]       08/05/19 0703    08/05/19 0704  Cytology Specimen-Medical Cytology (Fluid/Wash/Brush)  Once     Question Answer Comment   Clinical Information: lung brush    Specific Site: bronchial       Start Status     08/05/19 0704 Final result Order ID: [id number]       08/05/19 0703 08/05/19 0704  Tissue Specimen To Pathology, Cardiology/Pulmonary  Once     Question:  Clinical Information:  Answer:  respiratory Biopsy   Start Status     08/05/19 0704 Needs to be Collected Order ID: [id number]       08/05/19 0703        Implants: * No implants in log *           Disposition: PACU - hemodynamically stable.    Attestation:  I performed the procedure.

## 2019-08-13 NOTE — PROGRESS NOTES
Subjective:       Patient ID: Marva Perez is a 78 y.o. female.    Chief Complaint: No chief complaint on file.    Pt is a 78 y.o. female who presents for check up for Atrial fibrillation with RVR  (primary encounter diagnosis). Doing well on current meds. Denies any side effects. Prevention is up to date.    Review of Systems   Constitutional: Negative for appetite change, chills and fever.   HENT: Negative for rhinorrhea, sinus pressure, sore throat and trouble swallowing.    Respiratory: Negative for cough, chest tightness, shortness of breath and wheezing.    Cardiovascular: Negative for chest pain and palpitations.        A Fib   Gastrointestinal: Negative for abdominal pain, blood in stool, diarrhea, nausea and vomiting.   Genitourinary: Negative for dysuria, flank pain, hematuria, pelvic pain, urgency, vaginal bleeding, vaginal discharge and vaginal pain.   Musculoskeletal: Negative for back pain, joint swelling and neck stiffness.   Skin: Negative for rash.   Neurological: Negative for dizziness, weakness, light-headedness, numbness and headaches.   Hematological: Does not bruise/bleed easily.   Psychiatric/Behavioral: Negative for agitation. The patient is not nervous/anxious.        Objective:      Physical Exam   Constitutional: She is oriented to person, place, and time. She appears well-developed and well-nourished.   HENT:   Head: Normocephalic.   Eyes: Pupils are equal, round, and reactive to light.   Neck: Normal range of motion. Neck supple. No thyromegaly present.   Cardiovascular: Normal rate.   Pulmonary/Chest: No respiratory distress. She has no wheezes. She has no rales. She exhibits no tenderness.   Abdominal: She exhibits no distension. There is no tenderness. There is no rebound and no guarding.   Musculoskeletal: Normal range of motion. She exhibits no edema or tenderness.   Lymphadenopathy:     She has no cervical adenopathy.   Neurological: She is alert and oriented to person, place,  and time. She has normal reflexes. She displays normal reflexes. No cranial nerve deficit. She exhibits normal muscle tone. Coordination normal.   Skin: Skin is warm and dry. No rash noted. No pallor.   Psychiatric: She has a normal mood and affect. Judgment and thought content normal.       Assessment:       1. Atrial fibrillation with RVR    2. Other pulmonary embolism with acute cor pulmonale, unspecified chronicity        Plan:   Diagnoses and all orders for this visit:    Atrial fibrillation with RVR    Other pulmonary embolism with acute cor pulmonale, unspecified chronicity

## 2019-08-13 NOTE — BRIEF OP NOTE
Brief Outpatient Discharge Note    Admit Date: 7/30/2019    Attending Physician: No att. providers found     Discharge Physician: No att. providers found    Allergies:  No Known Allergies       Final Diagnosis:  1) No Endobronchial lesions   2) Hemoptysis   3) Left Upper Lobe Hemorrhage   4) COPD  5) History of Smoking   6) Chronic Respiratory Failure   7) Emphysema     ICD-10-CM ICD-9-CM   1. SOB (shortness of breath) R06.02 786.05   2. Cardiovascular disease I25.10 429.2   3. Respiration abnormal J98.9 786.00   4. Pulmonary embolism I26.99 415.19   5. COPD exacerbation J44.1 491.21   6. Chest pain R07.9 786.50   7. Positive D-dimer R79.89 790.92   8. Acute on chronic respiratory failure with hypoxia J96.21 518.84     799.02   9. Centrilobular emphysema J43.2 492.8       Disposition: Home or Self Care    Patient Instructions:   Discharge Medication List as of 8/5/2019 12:47 PM      START taking these medications    Details   aspirin (ECOTRIN) 325 MG EC tablet Take 1 tablet (325 mg total) by mouth once daily., Starting Mon 8/5/2019, Until Tue 8/4/2020, OTC      metoprolol tartrate (LOPRESSOR) 25 MG tablet Take 1 tablet (25 mg total) by mouth 2 (two) times daily., Starting Mon 8/5/2019, Until Tue 8/4/2020, Normal         CONTINUE these medications which have NOT CHANGED    Details   albuterol (ACCUNEB) 0.63 mg/3 mL Nebu Take 3 mLs (0.63 mg total) by nebulization every 6 (six) hours as needed., Starting Sun 7/7/2019, Until Mon 7/6/2020, Normal      ALPRAZolam (XANAX) 0.25 MG tablet Take 1 tablet during the day for anxiety as needed and 2 at night for sleep, Normal      fluticasone-salmeterol 250-50 mcg/dose (ADVAIR DISKUS) 250-50 mcg/dose diskus inhaler USE 1 INHALATION TWICE A DAY, Normal      ipratropium (ATROVENT) 0.02 % nebulizer solution Take 2.5 mLs (500 mcg total) by nebulization 2 (two) times daily as needed for Wheezing., Starting u 12/13/2018, Until Fri 12/13/2019, Normal      losartan (COZAAR) 50 MG  "tablet Take 50 mg by mouth once daily., Historical Med      methscopolamine (PAMINE) 2.5 mg Tab Take 1 tablet (2.5 mg total) by mouth 2 (two) times daily., Starting Thu 5/30/2019, Normal      tiotropium (SPIRIVA WITH HANDIHALER) 18 mcg inhalation capsule INHALE THE CONTENTS OF 1 CAPSULE DAILY, Normal      albuterol (PROVENTIL/VENTOLIN HFA) 90 mcg/actuation inhaler Inhale 2 puffs into the lungs every 6 (six) hours as needed for Wheezing., Starting Thu 3/21/2019, Normal      cilostazol (PLETAL) 50 MG Tab Starting Wed 7/3/2019, Historical Med      clobetasol (TEMOVATE) 0.05 % cream Apply topically 2 (two) times daily. for 10 days, Starting Tue 7/9/2019, Until Fri 7/19/2019, Normal      DULoxetine (CYMBALTA) 30 MG capsule One po q am for depression, Normal      mirtazapine (REMERON) 15 MG tablet One po q hs for rest and depression, Normal      pantoprazole (PROTONIX) 40 MG tablet Take 1 tablet (40 mg total) by mouth once daily., Starting Tue 2/26/2019, Normal         STOP taking these medications       BYSTOLIC 10 mg Tab Comments:   Reason for Stopping:         dronabinol (MARINOL) 5 MG capsule Comments:   Reason for Stopping:         tretinoin (RETIN-A) 0.05 % cream Comments:   Reason for Stopping:         tretinoin (RETIN-A) 0.1 % cream Comments:   Reason for Stopping:               Follow Up:  1 week    Discharge Condition:  Stable    Discharge Procedure Orders (must include Diet, Follow-up, Activity)   Discharge Procedure Orders (must include Diet, Follow-up, Activity)   OXYGEN FOR HOME USE     Order Specific Question Answer Comments   Liter Flow 2    Duration Continuous    Qualifying SpO2: 87%    Testing done at: Rest    Route nasal cannula    Portable mode: continuous    Device home concentrator with portable unit    Length of need (in months): 99 mos    Patient condition with qualifying saturation COPD    Height: 5' 2" (1.575 m)    Weight: 48.5 kg (107 lb)    Does patient have medical equipment at home? none  " "  Alternative treatment measures have been tried or considered and deemed clinically ineffective. Yes      Ambulatory referral to Outpatient Case Management   Referral Priority: Routine Referral Type: Consultation   Referral Reason: Specialty Services Required   Number of Visits Requested: 1        Discharge Procedure Orders (must include Diet, Follow-up, Activity)   Discharge Procedure Orders (must include Diet, Follow-up, Activity)   OXYGEN FOR HOME USE     Order Specific Question Answer Comments   Liter Flow 2    Duration Continuous    Qualifying SpO2: 87%    Testing done at: [facility]    Route nasal cannula    Portable mode: continuous    Device home concentrator with portable unit    Length of need (in months): 99 mos    Patient condition with qualifying saturation COPD    Height: 5' 2" (1.575 m)    Weight: 48.5 kg (107 lb)    Does patient have medical equipment at home? none    Alternative treatment measures have been tried or considered and deemed clinically ineffective. Yes      Ambulatory referral to Outpatient Case Management   Referral Priority: Routine Referral Type: Consultation   Referral Reason: Specialty Services Required   Number of Visits Requested: 1        Discharge Date: 8/5/2019  2:25 PM  "

## 2019-08-14 ENCOUNTER — LAB VISIT (OUTPATIENT)
Dept: LAB | Facility: HOSPITAL | Age: 79
End: 2019-08-14
Attending: INTERNAL MEDICINE
Payer: MEDICARE

## 2019-08-14 ENCOUNTER — TELEPHONE (OUTPATIENT)
Dept: FAMILY MEDICINE | Facility: CLINIC | Age: 79
End: 2019-08-14

## 2019-08-14 DIAGNOSIS — E78.5 HYPERLIPIDEMIA: Primary | ICD-10-CM

## 2019-08-14 LAB
ALBUMIN SERPL BCP-MCNC: 3.1 G/DL (ref 3.5–5.2)
ALP SERPL-CCNC: 80 U/L (ref 55–135)
ALT SERPL W/O P-5'-P-CCNC: 15 U/L (ref 10–44)
ANION GAP SERPL CALC-SCNC: 9 MMOL/L (ref 8–16)
AST SERPL-CCNC: 13 U/L (ref 10–40)
BILIRUB DIRECT SERPL-MCNC: 0.3 MG/DL (ref 0.1–0.3)
BILIRUB SERPL-MCNC: 0.8 MG/DL (ref 0.1–1)
BUN SERPL-MCNC: 17 MG/DL (ref 8–23)
CALCIUM SERPL-MCNC: 9.3 MG/DL (ref 8.7–10.5)
CHLORIDE SERPL-SCNC: 105 MMOL/L (ref 95–110)
CHOLEST SERPL-MCNC: 273 MG/DL (ref 120–199)
CHOLEST/HDLC SERPL: 6.8 {RATIO} (ref 2–5)
CO2 SERPL-SCNC: 33 MMOL/L (ref 23–29)
CREAT SERPL-MCNC: 0.8 MG/DL (ref 0.5–1.4)
ERYTHROCYTE [DISTWIDTH] IN BLOOD BY AUTOMATED COUNT: 12.8 % (ref 11.5–14.5)
EST. GFR  (AFRICAN AMERICAN): >60 ML/MIN/1.73 M^2
EST. GFR  (NON AFRICAN AMERICAN): >60 ML/MIN/1.73 M^2
GLUCOSE SERPL-MCNC: 95 MG/DL (ref 70–110)
HCT VFR BLD AUTO: 42.9 % (ref 37–48.5)
HDLC SERPL-MCNC: 40 MG/DL (ref 40–75)
HDLC SERPL: 14.7 % (ref 20–50)
HGB BLD-MCNC: 12.9 G/DL (ref 12–16)
LDLC SERPL CALC-MCNC: 210.4 MG/DL (ref 63–159)
MCH RBC QN AUTO: 28 PG (ref 27–31)
MCHC RBC AUTO-ENTMCNC: 30.1 G/DL (ref 32–36)
MCV RBC AUTO: 93 FL (ref 82–98)
NONHDLC SERPL-MCNC: 233 MG/DL
PLATELET # BLD AUTO: 220 K/UL (ref 150–350)
PMV BLD AUTO: 10.4 FL (ref 9.2–12.9)
POTASSIUM SERPL-SCNC: 4.4 MMOL/L (ref 3.5–5.1)
PROT SERPL-MCNC: 6.7 G/DL (ref 6–8.4)
RBC # BLD AUTO: 4.61 M/UL (ref 4–5.4)
SODIUM SERPL-SCNC: 147 MMOL/L (ref 136–145)
TRIGL SERPL-MCNC: 113 MG/DL (ref 30–150)
WBC # BLD AUTO: 9.75 K/UL (ref 3.9–12.7)

## 2019-08-14 PROCEDURE — 80053 COMPREHEN METABOLIC PANEL: CPT

## 2019-08-14 PROCEDURE — 82248 BILIRUBIN DIRECT: CPT

## 2019-08-14 PROCEDURE — 85027 COMPLETE CBC AUTOMATED: CPT

## 2019-08-14 PROCEDURE — 80061 LIPID PANEL: CPT

## 2019-08-14 NOTE — TELEPHONE ENCOUNTER
Dr. Bowman office will contact pt to schedule appt. Informed them pt needs the soonest appt they have before 8/27/19    Referral and demographics faxed over

## 2019-08-15 ENCOUNTER — TELEPHONE (OUTPATIENT)
Dept: FAMILY MEDICINE | Facility: CLINIC | Age: 79
End: 2019-08-15

## 2019-08-15 NOTE — TELEPHONE ENCOUNTER
Pt is currently unsing Advair inhaler, Spiriva inhaler, and Xopenex neb solution.    She is wondering if she can use the Albuterol inhaler as a rescue inhaler? Please advise

## 2019-08-15 NOTE — TELEPHONE ENCOUNTER
----- Message from Pema Carrasco sent at 8/15/2019 10:14 AM CDT -----  Contact: pt  Marva Perez  MRN: [id number]  : 1940  PCP: Kevin Clements  Home Phone      [phone]  Work Phone      Not on file.  Mobile          [phone]      MESSAGE:  Pt is asking to speak to the nurse about getting her an inhaler with albuterol  to carry with her at all times for emergencies. (fast acting inhaler)  [facility]     [phone]

## 2019-08-20 ENCOUNTER — OUTPATIENT CASE MANAGEMENT (OUTPATIENT)
Dept: ADMINISTRATIVE | Facility: OTHER | Age: 79
End: 2019-08-20

## 2019-08-20 NOTE — PROGRESS NOTES
Summary: Pt not eligible for OPCM services due to currently insured with [employer]. Will have referred to [employer]       Interventions:na      Plan:na

## 2019-08-22 ENCOUNTER — SSC ENCOUNTER (OUTPATIENT)
Dept: ADMINISTRATIVE | Facility: OTHER | Age: 79
End: 2019-08-22

## 2019-08-22 NOTE — PROGRESS NOTES
Please note the following patient's information has been forwarded to [facility] for Case Management or .    Please see the media section of the patient's chart for additional information.    Please contact Outpatient Complex Care Mgmt at ext [phone] with questions.    Thank you,    Krystle Reyes, [facility]  Outpatient Case Mgmnt  [phone]

## 2019-08-28 ENCOUNTER — HOSPITAL ENCOUNTER (EMERGENCY)
Facility: HOSPITAL | Age: 79
Discharge: HOME | End: 2019-08-28
Attending: SURGERY
Payer: MEDICARE

## 2019-08-28 VITALS
HEART RATE: 71 BPM | TEMPERATURE: 97 F | WEIGHT: 104 LBS | SYSTOLIC BLOOD PRESSURE: 132 MMHG | RESPIRATION RATE: 16 BRPM | OXYGEN SATURATION: 100 % | BODY MASS INDEX: 19.02 KG/M2 | DIASTOLIC BLOOD PRESSURE: 78 MMHG

## 2019-08-28 DIAGNOSIS — I48.91 ATRIAL FIBRILLATION: ICD-10-CM

## 2019-08-28 DIAGNOSIS — J18.9 PNEUMONIA OF LEFT LOWER LOBE DUE TO INFECTIOUS ORGANISM: Primary | ICD-10-CM

## 2019-08-28 LAB
ALBUMIN SERPL BCP-MCNC: 3.6 G/DL (ref 3.5–5.2)
ALP SERPL-CCNC: 101 U/L (ref 55–135)
ALT SERPL W/O P-5'-P-CCNC: 24 U/L (ref 10–44)
ANION GAP SERPL CALC-SCNC: 10 MMOL/L (ref 8–16)
AST SERPL-CCNC: 18 U/L (ref 10–40)
BASOPHILS # BLD AUTO: 0.03 K/UL (ref 0–0.2)
BASOPHILS NFR BLD: 0.5 % (ref 0–1.9)
BILIRUB SERPL-MCNC: 0.8 MG/DL (ref 0.1–1)
BILIRUB UR QL STRIP: NEGATIVE
BNP SERPL-MCNC: 68 PG/ML (ref 0–99)
BUN SERPL-MCNC: 10 MG/DL (ref 8–23)
CALCIUM SERPL-MCNC: 9.4 MG/DL (ref 8.7–10.5)
CHLORIDE SERPL-SCNC: 104 MMOL/L (ref 95–110)
CK MB SERPL-MCNC: 1.4 NG/ML (ref 0.1–6.5)
CK MB SERPL-RTO: 3.1 % (ref 0–5)
CK SERPL-CCNC: 45 U/L (ref 20–180)
CLARITY UR: CLEAR
CO2 SERPL-SCNC: 29 MMOL/L (ref 23–29)
COLOR UR: YELLOW
CREAT SERPL-MCNC: 0.9 MG/DL (ref 0.5–1.4)
DIFFERENTIAL METHOD: ABNORMAL
EOSINOPHIL # BLD AUTO: 0.4 K/UL (ref 0–0.5)
EOSINOPHIL NFR BLD: 5.7 % (ref 0–8)
ERYTHROCYTE [DISTWIDTH] IN BLOOD BY AUTOMATED COUNT: 13.3 % (ref 11.5–14.5)
EST. GFR  (AFRICAN AMERICAN): >60 ML/MIN/1.73 M^2
EST. GFR  (NON AFRICAN AMERICAN): >60 ML/MIN/1.73 M^2
GLUCOSE SERPL-MCNC: 100 MG/DL (ref 70–110)
GLUCOSE UR QL STRIP: NEGATIVE
HCT VFR BLD AUTO: 37 % (ref 37–48.5)
HGB BLD-MCNC: 11.6 G/DL (ref 12–16)
HGB UR QL STRIP: NEGATIVE
IMM GRANULOCYTES # BLD AUTO: 0.02 K/UL (ref 0–0.04)
IMM GRANULOCYTES NFR BLD AUTO: 0.3 % (ref 0–0.5)
KETONES UR QL STRIP: NEGATIVE
LEUKOCYTE ESTERASE UR QL STRIP: NEGATIVE
LYMPHOCYTES # BLD AUTO: 1.3 K/UL (ref 1–4.8)
LYMPHOCYTES NFR BLD: 19.9 % (ref 18–48)
MCH RBC QN AUTO: 28.4 PG (ref 27–31)
MCHC RBC AUTO-ENTMCNC: 31.4 G/DL (ref 32–36)
MCV RBC AUTO: 91 FL (ref 82–98)
MONOCYTES # BLD AUTO: 0.7 K/UL (ref 0.3–1)
MONOCYTES NFR BLD: 11.2 % (ref 4–15)
NEUTROPHILS # BLD AUTO: 4.1 K/UL (ref 1.8–7.7)
NEUTROPHILS NFR BLD: 62.4 % (ref 38–73)
NITRITE UR QL STRIP: NEGATIVE
NRBC BLD-RTO: 0 /100 WBC
PH UR STRIP: 6 [PH] (ref 5–8)
PLATELET # BLD AUTO: 238 K/UL (ref 150–350)
PMV BLD AUTO: 9.2 FL (ref 9.2–12.9)
POTASSIUM SERPL-SCNC: 4 MMOL/L (ref 3.5–5.1)
PROT SERPL-MCNC: 6.6 G/DL (ref 6–8.4)
PROT UR QL STRIP: NEGATIVE
RBC # BLD AUTO: 4.08 M/UL (ref 4–5.4)
SODIUM SERPL-SCNC: 143 MMOL/L (ref 136–145)
SP GR UR STRIP: <=1.005 (ref 1–1.03)
TROPONIN I SERPL DL<=0.01 NG/ML-MCNC: 0.01 NG/ML (ref 0–0.03)
URN SPEC COLLECT METH UR: ABNORMAL
UROBILINOGEN UR STRIP-ACNC: NEGATIVE EU/DL
WBC # BLD AUTO: 6.52 K/UL (ref 3.9–12.7)

## 2019-08-28 PROCEDURE — 36415 COLL VENOUS BLD VENIPUNCTURE: CPT

## 2019-08-28 PROCEDURE — 85025 COMPLETE CBC W/AUTO DIFF WBC: CPT

## 2019-08-28 PROCEDURE — 84484 ASSAY OF TROPONIN QUANT: CPT

## 2019-08-28 PROCEDURE — 63600175 PHARM REV CODE 636 W HCPCS: Performed by: SURGERY

## 2019-08-28 PROCEDURE — 83880 ASSAY OF NATRIURETIC PEPTIDE: CPT

## 2019-08-28 PROCEDURE — 93010 EKG 12-LEAD: ICD-10-PCS | Mod: ,,, | Performed by: INTERNAL MEDICINE

## 2019-08-28 PROCEDURE — 82553 CREATINE MB FRACTION: CPT

## 2019-08-28 PROCEDURE — 82550 ASSAY OF CK (CPK): CPT

## 2019-08-28 PROCEDURE — 99285 EMERGENCY DEPT VISIT HI MDM: CPT | Mod: 25

## 2019-08-28 PROCEDURE — 96360 HYDRATION IV INFUSION INIT: CPT

## 2019-08-28 PROCEDURE — 93005 ELECTROCARDIOGRAM TRACING: CPT

## 2019-08-28 PROCEDURE — 80053 COMPREHEN METABOLIC PANEL: CPT

## 2019-08-28 PROCEDURE — 81003 URINALYSIS AUTO W/O SCOPE: CPT

## 2019-08-28 PROCEDURE — 93010 ELECTROCARDIOGRAM REPORT: CPT | Mod: ,,, | Performed by: INTERNAL MEDICINE

## 2019-08-28 RX ORDER — SODIUM CHLORIDE 9 MG/ML
1000 INJECTION, SOLUTION INTRAVENOUS
Status: COMPLETED | OUTPATIENT
Start: 2019-08-28 | End: 2019-08-28

## 2019-08-28 RX ORDER — LEVOFLOXACIN 500 MG/1
500 TABLET, FILM COATED ORAL DAILY
Qty: 7 TABLET | Refills: 0 | Status: SHIPPED | OUTPATIENT
Start: 2019-08-28 | End: 2019-09-04

## 2019-08-28 RX ADMIN — SODIUM CHLORIDE 1000 ML: 0.9 INJECTION, SOLUTION INTRAVENOUS at 02:08

## 2019-08-28 NOTE — ED TRIAGE NOTES
"Pt c/o "feeling jittery" that started last night. Pt reported home health nurse stated she was in Af[place].  "

## 2019-08-28 NOTE — ED PROVIDER NOTES
Encounter Date: 2019       History   No chief complaint on file.    Patient is 78-year-old white female with history of COPD, was noted by home health nursing to be possibly in atrial fibrillation and to be somewhat dehydrated.  She was sent in to have these things evaluated and managed as necessary.  She denies chest pain or shortness of breath.  She is in the process of being monitored with a continuous cardiac monitor for possible ablation procedure for her refractory AFib.  She denies coughing.        Review of patient's allergies indicates:   Allergen Reactions    Pcn [penicillins] Hives and Itching    Tetracyclines     Bactrim [sulfamethoxazole-trimethoprim] Rash    Ilosone Rash    Iodine and iodide containing products Rash    Sulfa (sulfonamide antibiotics) Hives and Rash     Past Medical History:   Diagnosis Date    Abnormal Pap smear 13    LGSIL    COPD (chronic obstructive pulmonary disease)     Depression     GERD (gastroesophageal reflux disease)     Hyperlipidemia     Hypertension      Past Surgical History:   Procedure Laterality Date    APPENDECTOMY      BRONCHOSCOPY N/A 2019    Performed by Howard Matson MD at [facility] OR    CERVICAL BIOPSY  W/ LOOP ELECTRODE EXCISION      CHOLECYSTECTOMY      COLLATERAL LIGAMENT REPAIR, KNEE      left knee    COLONOSCOPY      DILATION AND CURETTAGE OF UTERUS      SINUS SURGERY       Family History   Problem Relation Age of Onset    Breast cancer Mother     Colon cancer Neg Hx     Ovarian cancer Neg Hx      Social History     Tobacco Use    Smoking status: Former Smoker     Years: 30.00     Types: Cigarettes     Last attempt to quit: 10/30/2006     Years since quittin.8    Smokeless tobacco: Never Used   Substance Use Topics    Alcohol use: No     Comment: No    Drug use: No     Review of Systems   Constitutional: Negative for fever.   HENT: Negative for congestion, ear pain, rhinorrhea, sore throat and trouble swallowing.     Eyes: Negative for pain.   Respiratory: Negative for cough, shortness of breath and wheezing.    Cardiovascular: Positive for palpitations. Negative for chest pain and leg swelling.   Gastrointestinal: Negative for abdominal pain, constipation, diarrhea and nausea.   Genitourinary: Negative for difficulty urinating, dysuria, flank pain, frequency, hematuria and urgency.   Musculoskeletal: Negative for arthralgias, back pain, myalgias and neck pain.   Skin: Negative for rash and wound.   Neurological: Negative for speech difficulty, weakness and headaches.   Hematological: Does not bruise/bleed easily.       Physical Exam     Initial Vitals [08/28/19 1238]   BP Pulse Resp Temp SpO2   -- 88 16 -- --      MAP       --         Physical Exam    Nursing note and vitals reviewed.  Constitutional: She appears well-developed. No distress.   HENT:   Head: Normocephalic and atraumatic.   Nose: Nose normal.   Mouth/Throat: Oropharynx is clear and moist.   Eyes: Conjunctivae and EOM are normal. Pupils are equal, round, and reactive to light.   Neck: Normal range of motion. Neck supple.   Cardiovascular: Normal rate, regular rhythm, normal heart sounds and intact distal pulses.   No murmur heard.  Pulmonary/Chest: Breath sounds normal. No respiratory distress. She has no wheezes.   Abdominal: Soft. Bowel sounds are normal. She exhibits no distension. There is no tenderness.   Musculoskeletal: Normal range of motion.   Neurological: She is alert and oriented to person, place, and time. She has normal strength.   Skin: Skin is warm and dry.   Psychiatric: She has a normal mood and affect. Thought content normal.         ED Course   Procedures  Labs Reviewed - No data to display       Imaging Results    None                               Clinical Impression:       ICD-10-CM ICD-9-CM   1. Pneumonia of left lower lobe due to infectious organism J18.1 486   2. Atrial fibrillation I48.91 427.31         Disposition:   Disposition:  Discharged  Condition: Stable                        Markell Thomas Jr., MD  08/28/19 6382

## 2019-09-05 LAB
FUNGUS SPEC CULT: ABNORMAL
FUNGUS SPEC CULT: ABNORMAL

## 2019-09-05 RX ORDER — ERGOCALCIFEROL 1.25 MG/1
CAPSULE ORAL
Qty: 12 CAPSULE | Refills: 1 | Status: SHIPPED | OUTPATIENT
Start: 2019-09-05 | End: 2020-03-17

## 2019-09-20 LAB
ACID FAST MOD KINY STN SPEC: NORMAL
MYCOBACTERIUM SPEC QL CULT: NORMAL

## 2019-09-23 ENCOUNTER — HOSPITAL ENCOUNTER (OUTPATIENT)
Dept: RADIOLOGY | Facility: HOSPITAL | Age: 79
Discharge: HOME | End: 2019-09-23
Attending: INTERNAL MEDICINE
Payer: MEDICARE

## 2019-09-23 DIAGNOSIS — R94.2 ABNORMAL PFT: ICD-10-CM

## 2019-09-23 DIAGNOSIS — R94.2 ABNORMAL PFT: Primary | ICD-10-CM

## 2019-09-23 PROCEDURE — 71046 X-RAY EXAM CHEST 2 VIEWS: CPT | Mod: 26,,, | Performed by: RADIOLOGY

## 2019-09-23 PROCEDURE — 71046 X-RAY EXAM CHEST 2 VIEWS: CPT | Mod: TC

## 2019-09-23 PROCEDURE — 71046 XR CHEST PA AND LATERAL: ICD-10-PCS | Mod: 26,,, | Performed by: RADIOLOGY

## 2019-09-26 ENCOUNTER — HOSPITAL ENCOUNTER (OUTPATIENT)
Dept: PULMONOLOGY | Facility: HOSPITAL | Age: 79
Discharge: HOME | End: 2019-09-26
Attending: INTERNAL MEDICINE
Payer: MEDICARE

## 2019-09-26 DIAGNOSIS — R94.2 ABNORMAL PFT: ICD-10-CM

## 2019-09-26 LAB
ALLENS TEST: ABNORMAL
DELSYS: ABNORMAL
HCO3 UR-SCNC: 32.8 MMOL/L (ref 22–26)
PCO2 BLDA: 43 MMHG (ref 35–45)
PH SMN: 7.49 [PH] (ref 7.35–7.45)
PO2 BLDA: 74 MMHG (ref 75–100)
POC BE: 8.5 MMOL/L (ref -2–2)
POC COHB: 1.6 % (ref 0–3)
POC METHB: 1.1 % (ref 0–1.5)
POC O2HB ARTERIAL: 93.8 % (ref 94–100)
POC SATURATED O2: 96.5 % (ref 90–100)
POC TCO2: 34.1 MMOL/L
POC THB: 13.1 G/DL (ref 12–18)
SITE: ABNORMAL

## 2019-09-26 PROCEDURE — 99900031 HC PATIENT EDUCATION (STAT)

## 2019-09-26 PROCEDURE — 94060 EVALUATION OF WHEEZING: CPT

## 2019-09-26 PROCEDURE — 82803 BLOOD GASES ANY COMBINATION: CPT | Performed by: INTERNAL MEDICINE

## 2019-09-26 PROCEDURE — 94729 DIFFUSING CAPACITY: CPT

## 2019-09-26 PROCEDURE — 94727 GAS DIL/WSHOT DETER LNG VOL: CPT

## 2019-09-26 PROCEDURE — 36600 WITHDRAWAL OF ARTERIAL BLOOD: CPT

## 2019-09-27 LAB — M TB DNA SPEC QL NAA+PROBE: ABNORMAL

## 2019-10-01 LAB — ANTIMICROBIAL SUSCEPTIBILITY, NOCARIDA SPECIES: ABNORMAL

## 2019-10-02 DIAGNOSIS — G47.00 INSOMNIA, UNSPECIFIED TYPE: ICD-10-CM

## 2019-10-02 DIAGNOSIS — F41.1 GAD (GENERALIZED ANXIETY DISORDER): ICD-10-CM

## 2019-10-02 RX ORDER — ALPRAZOLAM 0.25 MG/1
TABLET ORAL
Qty: 30 TABLET | Refills: 0 | Status: SHIPPED | OUTPATIENT
Start: 2019-10-02 | End: 2019-11-27 | Stop reason: SDUPTHER

## 2019-10-02 NOTE — TELEPHONE ENCOUNTER
----- Message from Kalani Stevens sent at 10/2/2019 12:21 PM CDT -----  Contact: self  Marva Perez  MRN: [id number]  : 1940  PCP: Kevin Clements  Home Phone      [phone]  Work Phone      Not on file.  Mobile          [phone]      MESSAGE:   Pt requesting refill or new Rx.   Is this a refill or new RX:  refill  RX name and strength: ALPRAZolam (XANAX) 0.25 MG tablet [Pharmacy Med Name: ALPRAZOLAM 0.25 MG TABLET]  Last office visit: 19  Is this a 30-day or 90-day RX:   [facility] [place]  Comments:      Phone:  [phone]

## 2019-10-09 RX ORDER — ALPRAZOLAM 0.25 MG/1
TABLET ORAL
Qty: 180 TABLET | Refills: 1 | Status: SHIPPED | OUTPATIENT
Start: 2019-10-09 | End: 2020-05-04

## 2019-10-23 ENCOUNTER — HOSPITAL ENCOUNTER (EMERGENCY)
Facility: HOSPITAL | Age: 79
Discharge: LEFT BEFORE DISCHARGE | End: 2019-10-23
Attending: SURGERY
Payer: MEDICARE

## 2019-10-23 VITALS
SYSTOLIC BLOOD PRESSURE: 112 MMHG | TEMPERATURE: 98 F | RESPIRATION RATE: 16 BRPM | DIASTOLIC BLOOD PRESSURE: 72 MMHG | HEART RATE: 72 BPM | OXYGEN SATURATION: 98 %

## 2019-10-23 DIAGNOSIS — Z53.29 LEFT AGAINST MEDICAL ADVICE: Primary | ICD-10-CM

## 2019-10-23 DIAGNOSIS — R06.02 SOB (SHORTNESS OF BREATH): ICD-10-CM

## 2019-10-23 LAB
ALBUMIN SERPL BCP-MCNC: 3.1 G/DL (ref 3.5–5.2)
ALP SERPL-CCNC: 167 U/L (ref 55–135)
ALT SERPL W/O P-5'-P-CCNC: 28 U/L (ref 10–44)
ANION GAP SERPL CALC-SCNC: 10 MMOL/L (ref 8–16)
APTT BLDCRRT: 32.7 SEC (ref 21–32)
AST SERPL-CCNC: 28 U/L (ref 10–40)
BASOPHILS # BLD AUTO: 0.09 K/UL (ref 0–0.2)
BASOPHILS NFR BLD: 0.8 % (ref 0–1.9)
BILIRUB SERPL-MCNC: 0.4 MG/DL (ref 0.1–1)
BNP SERPL-MCNC: 43 PG/ML (ref 0–99)
BUN SERPL-MCNC: 25 MG/DL (ref 8–23)
CALCIUM SERPL-MCNC: 8.8 MG/DL (ref 8.7–10.5)
CHLORIDE SERPL-SCNC: 105 MMOL/L (ref 95–110)
CK MB SERPL-MCNC: 2 NG/ML (ref 0.1–6.5)
CK MB SERPL-RTO: 3.9 % (ref 0–5)
CK SERPL-CCNC: 51 U/L (ref 20–180)
CK SERPL-CCNC: 51 U/L (ref 20–180)
CO2 SERPL-SCNC: 27 MMOL/L (ref 23–29)
CREAT SERPL-MCNC: 0.9 MG/DL (ref 0.5–1.4)
D DIMER PPP IA.FEU-MCNC: <0.19 MG/L FEU
DIFFERENTIAL METHOD: ABNORMAL
EOSINOPHIL # BLD AUTO: 0.3 K/UL (ref 0–0.5)
EOSINOPHIL NFR BLD: 2.9 % (ref 0–8)
ERYTHROCYTE [DISTWIDTH] IN BLOOD BY AUTOMATED COUNT: 13.1 % (ref 11.5–14.5)
EST. GFR  (AFRICAN AMERICAN): >60 ML/MIN/1.73 M^2
EST. GFR  (NON AFRICAN AMERICAN): >60 ML/MIN/1.73 M^2
GLUCOSE SERPL-MCNC: 83 MG/DL (ref 70–110)
HCT VFR BLD AUTO: 40.8 % (ref 37–48.5)
HGB BLD-MCNC: 12.8 G/DL (ref 12–16)
IMM GRANULOCYTES # BLD AUTO: 0.03 K/UL (ref 0–0.04)
IMM GRANULOCYTES NFR BLD AUTO: 0.3 % (ref 0–0.5)
INR PPP: 1.1 (ref 0.8–1.2)
LYMPHOCYTES # BLD AUTO: 2.3 K/UL (ref 1–4.8)
LYMPHOCYTES NFR BLD: 20.7 % (ref 18–48)
MCH RBC QN AUTO: 28.3 PG (ref 27–31)
MCHC RBC AUTO-ENTMCNC: 31.4 G/DL (ref 32–36)
MCV RBC AUTO: 90 FL (ref 82–98)
MONOCYTES # BLD AUTO: 1.3 K/UL (ref 0.3–1)
MONOCYTES NFR BLD: 11.5 % (ref 4–15)
NEUTROPHILS # BLD AUTO: 7 K/UL (ref 1.8–7.7)
NEUTROPHILS NFR BLD: 63.8 % (ref 38–73)
NRBC BLD-RTO: 0 /100 WBC
PLATELET # BLD AUTO: 205 K/UL (ref 150–350)
PMV BLD AUTO: 11.1 FL (ref 9.2–12.9)
POTASSIUM SERPL-SCNC: 3.9 MMOL/L (ref 3.5–5.1)
PROT SERPL-MCNC: 6.1 G/DL (ref 6–8.4)
PROTHROMBIN TIME: 11.5 SEC (ref 9–12.5)
RBC # BLD AUTO: 4.53 M/UL (ref 4–5.4)
SODIUM SERPL-SCNC: 142 MMOL/L (ref 136–145)
TROPONIN I SERPL DL<=0.01 NG/ML-MCNC: <0.006 NG/ML (ref 0–0.03)
WBC # BLD AUTO: 10.93 K/UL (ref 3.9–12.7)

## 2019-10-23 PROCEDURE — 80053 COMPREHEN METABOLIC PANEL: CPT

## 2019-10-23 PROCEDURE — 93005 ELECTROCARDIOGRAM TRACING: CPT

## 2019-10-23 PROCEDURE — 93010 EKG 12-LEAD: ICD-10-PCS | Mod: ,,, | Performed by: INTERNAL MEDICINE

## 2019-10-23 PROCEDURE — 25000242 PHARM REV CODE 250 ALT 637 W/ HCPCS: Performed by: SURGERY

## 2019-10-23 PROCEDURE — 82550 ASSAY OF CK (CPK): CPT

## 2019-10-23 PROCEDURE — 85610 PROTHROMBIN TIME: CPT

## 2019-10-23 PROCEDURE — 82553 CREATINE MB FRACTION: CPT

## 2019-10-23 PROCEDURE — 85025 COMPLETE CBC W/AUTO DIFF WBC: CPT

## 2019-10-23 PROCEDURE — 93010 ELECTROCARDIOGRAM REPORT: CPT | Mod: ,,, | Performed by: INTERNAL MEDICINE

## 2019-10-23 PROCEDURE — 63600175 PHARM REV CODE 636 W HCPCS: Performed by: SURGERY

## 2019-10-23 PROCEDURE — 99284 EMERGENCY DEPT VISIT MOD MDM: CPT | Mod: 25

## 2019-10-23 PROCEDURE — 85379 FIBRIN DEGRADATION QUANT: CPT

## 2019-10-23 PROCEDURE — 36415 COLL VENOUS BLD VENIPUNCTURE: CPT

## 2019-10-23 PROCEDURE — 83880 ASSAY OF NATRIURETIC PEPTIDE: CPT

## 2019-10-23 PROCEDURE — 27000221 HC OXYGEN, UP TO 24 HOURS

## 2019-10-23 PROCEDURE — 84484 ASSAY OF TROPONIN QUANT: CPT

## 2019-10-23 PROCEDURE — 96374 THER/PROPH/DIAG INJ IV PUSH: CPT

## 2019-10-23 PROCEDURE — 94640 AIRWAY INHALATION TREATMENT: CPT

## 2019-10-23 PROCEDURE — 85730 THROMBOPLASTIN TIME PARTIAL: CPT

## 2019-10-23 RX ORDER — LEVALBUTEROL 1.25 MG/.5ML
1.25 SOLUTION, CONCENTRATE RESPIRATORY (INHALATION)
Status: COMPLETED | OUTPATIENT
Start: 2019-10-23 | End: 2019-10-23

## 2019-10-23 RX ORDER — METHYLPREDNISOLONE SOD SUCC 125 MG
125 VIAL (EA) INJECTION EVERY 8 HOURS
Status: DISCONTINUED | OUTPATIENT
Start: 2019-10-23 | End: 2019-10-23 | Stop reason: HOSPADM

## 2019-10-23 RX ADMIN — METHYLPREDNISOLONE SODIUM SUCCINATE 125 MG: 125 INJECTION, POWDER, FOR SOLUTION INTRAMUSCULAR; INTRAVENOUS at 05:10

## 2019-10-23 RX ADMIN — LEVALBUTEROL 1.25 MG: 1.25 SOLUTION, CONCENTRATE RESPIRATORY (INHALATION) at 05:10

## 2019-10-23 NOTE — ED PROVIDER NOTES
[facility]                                                 Chief Complaint  79 y.o. female with Shortness of Breath    History of Present Illness  Marva Perez presents to the emergency room with shortness of breath today  Patient with chronic atrial fibrillation had rate control of 67 beats per minute  Patient states she has chronic shortness of breath, on home oxygen now  Patient has severe anxiety issues, has a chronic lung infection per interview  Patient states she currently awaits a atrial ablation for atrial fibrillation ASAP  Patient states she suffers from anxiety on a daily basis, has had personal loss    The history is provided by the patient   device was not used during this ER visit    Past Medical History   -- Abnormal Pap smear     -- COPD (chronic obstructive pulmonary disease)     -- Depression     -- GERD (gastroesophageal reflux disease)     -- Hyperlipidemia     -- Hypertension        Past Surgical History   -- APPENDECTOMY       -- CERVICAL BIOPSY  W/ LOOP ELECTRODE EXCISION       -- CHOLECYSTECTOMY       -- COLLATERAL LIGAMENT REPAIR, KNEE       -- COLONOSCOPY       -- DILATION AND CURETTAGE OF UTERUS       -- SINUS SURGERY          Review of patient's allergies   -- Pcn [penicillins]     -- Tetracyclines     -- Bactrim [sulfamethoxazole-trimethoprim]     -- Ilosone     -- Iodine and iodide containing products     -- Sulfa (sulfonamide antibiotics)        I have reviewed all of this patient's past medical, surgical, family, and social   histories as well as active allergies and medications documented in the  electronic medical record    Review of Systems and Physical Exam      Review of Systems  -- Constitution - no fever, denies fatigue, no weakness, no chills  -- Eyes - no tearing or redness, no visual disturbance  -- Ear, Nose - no tinnitus or earache, no nasal congestion or discharge  -- Mouth,Throat - no sore throat, no toothache, normal voice,  normal swallowing  -- Respiratory - shortness of breath, chronic cough, no dyspnea on exertion  -- Cardiovascular - denies chest pain, no palpitations, denies claudication  -- Gastrointestinal - denies abdominal pain, nausea, vomiting, or diarrhea  -- Genitourinary - no dysuria, denies flank pain, no hematuria, no STD risk  -- Musculoskeletal - denies back pain, negative for trauma or injury  -- Neurological - no headache, denies weakness or seizure; no LOC  -- Skin - denies pallor, rash, or changes in skin. no hives or welts noted  -- Psychiatric - Denies SI or HI, no psychosis or fractured thought noted     Vital Signs  Blood pressure is 138/66 and her pulse is 67.   Her respiration is 22 and oxygen saturation is 98%.     Physical Exam  -- Nursing note and vitals reviewed  -- Constitutional: Appears well-developed and well-nourished  -- Head: Atraumatic. Normocephalic. No obvious abnormality  -- Eyes: Pupils are equal and reactive to light. Normal conjunctiva and lids  -- Cardiac: Normal rate, regular rhythm and normal heart sounds  -- Pulmonary: Normal respiratory effort, breath sounds clear to auscultation  -- Abdominal: Soft, no tenderness. Normal bowel sounds. Normal liver edge  -- Musculoskeletal: Normal range of motion, no effusions. Joints stable   -- Neurological: No focal deficits. Showed good interaction with staff  -- Vascular: Posterior tibial, dorsalis pedis and radial pulses 2+ bilaterally      Emergency Room Course      Lab Results     K 3.9      CO2 27   BUN 25 (H)   CREATININE 0.9   GLU 83   ALKPHOS 167 (H)   AST 28   ALT 28   BILITOT 0.4   ALBUMIN 3.1 (L)   PROT 6.1   WBC 10.93   HGB 12.8   HCT 40.8      CPK 51   CPK 51   CPKMB 2.0   TROPONINI <0.006   INR 1.1   BNP 43   DDIMER <0.19     EKG   -- The EKG findings today were without concerning findings from baseline     Radiology  Patient refuses chest x-ray    Medications Given  methylPREDNISolone sodium succinate injection 125  mg    levalbuterol nebulizer solution 1.25 mg      ED Physician Management  -- Diagnosis management comments: 79 y.o. female with shortness of breath today  -- patient was severe anxiety with a negative blood workup, negative D-dimer today  -- patient refuses chest x-ray, patient is here for shortness of breath, needs CXR  -- patient signs out against medical advice stating she is now asymptomatic  -- patient is counseled to return to the ER with any concerning symptoms on DC  -- severe anxiety issues, severe frustration with chronic atrial fibrillation  -- patient also has anxiety over chronic antibiotics for chronic lung infection  -- patient follow up with Cardiology and pulmonology, signs out AMA today    Diagnosis  -- The primary encounter diagnosis was Left against medical advice.   -- A diagnosis of SOB (shortness of breath) was also pertinent to this visit.    Disposition and Plan  -- Disposition: home  -- Condition: stable  -- Patient is of sound mind and capable of making rational decisions  -- Patient is fully aware of the diagnosis and the consequences of leaving AMA  -- Pt was told inpatient treatment needed but wants to leave against advice  -- Patient signed the AMA papers; all questions answered. Voiced understanding    This note is dictated on M*Modal word recognition program.  There are word recognition mistakes that are occasionally missed on review.         Jerson Padgett MD  10/23/19 6788

## 2019-11-06 ENCOUNTER — TELEPHONE (OUTPATIENT)
Dept: FAMILY MEDICINE | Facility: CLINIC | Age: 79
End: 2019-11-06

## 2019-11-06 DIAGNOSIS — Z12.31 BREAST CANCER SCREENING BY MAMMOGRAM: Primary | ICD-10-CM

## 2019-11-06 NOTE — TELEPHONE ENCOUNTER
----- Message from Kalani tSevens sent at 2019  4:34 PM CST -----  Contact: self  Marva Perez  MRN: [id number]  : 1940  PCP: Kevin Clements  Home Phone      [phone]  Work Phone      Not on file.  Mobile          [phone]      MESSAGE:   Patient requesting orders  Name of test (labs, mammo, x-ray, etc.):  mammo  Where is test to be performed: [place]  Date of test (if scheduled):   Reason (be specific):   Current Insurance:   Comments:      Phone: [phone]

## 2019-11-25 ENCOUNTER — HOSPITAL ENCOUNTER (OUTPATIENT)
Dept: RADIOLOGY | Facility: HOSPITAL | Age: 79
Discharge: HOME | End: 2019-11-25
Attending: FAMILY MEDICINE
Payer: MEDICARE

## 2019-11-25 VITALS — WEIGHT: 105 LBS | BODY MASS INDEX: 19.32 KG/M2 | HEIGHT: 62 IN

## 2019-11-25 DIAGNOSIS — Z12.31 BREAST CANCER SCREENING BY MAMMOGRAM: ICD-10-CM

## 2019-11-25 PROCEDURE — 77063 MAMMO DIGITAL SCREENING BILAT WITH TOMOSYNTHESIS_CAD: ICD-10-PCS | Mod: 26,,, | Performed by: RADIOLOGY

## 2019-11-25 PROCEDURE — 77067 MAMMO DIGITAL SCREENING BILAT WITH TOMOSYNTHESIS_CAD: ICD-10-PCS | Mod: 26,,, | Performed by: RADIOLOGY

## 2019-11-25 PROCEDURE — 77063 BREAST TOMOSYNTHESIS BI: CPT | Mod: TC

## 2019-11-25 PROCEDURE — 77063 BREAST TOMOSYNTHESIS BI: CPT | Mod: 26,,, | Performed by: RADIOLOGY

## 2019-11-25 PROCEDURE — 77067 SCR MAMMO BI INCL CAD: CPT | Mod: 26,,, | Performed by: RADIOLOGY

## 2019-11-26 ENCOUNTER — TELEPHONE (OUTPATIENT)
Dept: FAMILY MEDICINE | Facility: CLINIC | Age: 79
End: 2019-11-26

## 2019-11-26 NOTE — TELEPHONE ENCOUNTER
----- Message from Kalani Stevens sent at 2019  3:16 PM CST -----  Contact: self  Marva Perez  MRN: [id number]  : 1940  PCP: Kevin lCements  Home Phone      [phone]  Work Phone      Not on file.  Mobile          [phone]      MESSAGE:   Pt requests a sooner appointment than the  can schedule.  Does patient feel like they need to be seen today:  yes  What is the nature of the appointment:  Sore throat  What visit type:  est  Did you check other providers/department schedules for availability:   yes  Comments:     Phone:  [phone]

## 2019-11-27 ENCOUNTER — OFFICE VISIT (OUTPATIENT)
Dept: INTERNAL MEDICINE | Facility: CLINIC | Age: 79
End: 2019-11-27
Payer: MEDICARE

## 2019-11-27 VITALS
HEIGHT: 62 IN | TEMPERATURE: 98 F | DIASTOLIC BLOOD PRESSURE: 56 MMHG | BODY MASS INDEX: 19.39 KG/M2 | WEIGHT: 105.38 LBS | RESPIRATION RATE: 20 BRPM | HEART RATE: 75 BPM | OXYGEN SATURATION: 95 % | SYSTOLIC BLOOD PRESSURE: 96 MMHG

## 2019-11-27 DIAGNOSIS — R53.83 FATIGUE, UNSPECIFIED TYPE: ICD-10-CM

## 2019-11-27 DIAGNOSIS — J96.11 CHRONIC RESPIRATORY FAILURE WITH HYPOXIA: ICD-10-CM

## 2019-11-27 DIAGNOSIS — F41.1 GAD (GENERALIZED ANXIETY DISORDER): ICD-10-CM

## 2019-11-27 DIAGNOSIS — R53.1 WEAKNESS: ICD-10-CM

## 2019-11-27 DIAGNOSIS — I95.1 ORTHOSTATIC HYPOTENSION: ICD-10-CM

## 2019-11-27 DIAGNOSIS — K20.80 ESOPHAGITIS DUE TO DRUG: Primary | ICD-10-CM

## 2019-11-27 DIAGNOSIS — T36.4X5A ESOPHAGITIS DUE TO DRUG: Primary | ICD-10-CM

## 2019-11-27 LAB
ALBUMIN SERPL BCP-MCNC: 2.7 G/DL (ref 3.5–5.2)
ALP SERPL-CCNC: 161 U/L (ref 55–135)
ALT SERPL W/O P-5'-P-CCNC: 22 U/L (ref 10–44)
ANION GAP SERPL CALC-SCNC: 6 MMOL/L (ref 8–16)
AST SERPL-CCNC: 23 U/L (ref 10–40)
BASOPHILS # BLD AUTO: 0.09 K/UL (ref 0–0.2)
BASOPHILS NFR BLD: 0.8 % (ref 0–1.9)
BILIRUB SERPL-MCNC: 0.6 MG/DL (ref 0.1–1)
BUN SERPL-MCNC: 24 MG/DL (ref 8–23)
CALCIUM SERPL-MCNC: 8.5 MG/DL (ref 8.7–10.5)
CHLORIDE SERPL-SCNC: 103 MMOL/L (ref 95–110)
CO2 SERPL-SCNC: 33 MMOL/L (ref 23–29)
CREAT SERPL-MCNC: 1.2 MG/DL (ref 0.5–1.4)
CTP QC/QA: YES
DIFFERENTIAL METHOD: ABNORMAL
EOSINOPHIL # BLD AUTO: 0.2 K/UL (ref 0–0.5)
EOSINOPHIL NFR BLD: 1.5 % (ref 0–8)
ERYTHROCYTE [DISTWIDTH] IN BLOOD BY AUTOMATED COUNT: 14.5 % (ref 11.5–14.5)
EST. GFR  (AFRICAN AMERICAN): 50 ML/MIN/1.73 M^2
EST. GFR  (NON AFRICAN AMERICAN): 43 ML/MIN/1.73 M^2
FERRITIN SERPL-MCNC: 216 NG/ML (ref 20–300)
GLUCOSE SERPL-MCNC: 105 MG/DL (ref 70–110)
HCT VFR BLD AUTO: 41.2 % (ref 37–48.5)
HGB BLD-MCNC: 12.6 G/DL (ref 12–16)
IMM GRANULOCYTES # BLD AUTO: 0.03 K/UL (ref 0–0.04)
IMM GRANULOCYTES NFR BLD AUTO: 0.3 % (ref 0–0.5)
IRON SERPL-MCNC: 44 UG/DL (ref 30–160)
LYMPHOCYTES # BLD AUTO: 1.3 K/UL (ref 1–4.8)
LYMPHOCYTES NFR BLD: 12.2 % (ref 18–48)
MCH RBC QN AUTO: 27.8 PG (ref 27–31)
MCHC RBC AUTO-ENTMCNC: 30.6 G/DL (ref 32–36)
MCV RBC AUTO: 91 FL (ref 82–98)
MOLECULAR STREP A: NEGATIVE
MONOCYTES # BLD AUTO: 1 K/UL (ref 0.3–1)
MONOCYTES NFR BLD: 8.9 % (ref 4–15)
NEUTROPHILS # BLD AUTO: 8.2 K/UL (ref 1.8–7.7)
NEUTROPHILS NFR BLD: 76.3 % (ref 38–73)
NRBC BLD-RTO: 0 /100 WBC
PLATELET # BLD AUTO: 188 K/UL (ref 150–350)
PMV BLD AUTO: 11.4 FL (ref 9.2–12.9)
POTASSIUM SERPL-SCNC: 4 MMOL/L (ref 3.5–5.1)
PROT SERPL-MCNC: 5.9 G/DL (ref 6–8.4)
RBC # BLD AUTO: 4.53 M/UL (ref 4–5.4)
SATURATED IRON: 20 % (ref 20–50)
SODIUM SERPL-SCNC: 142 MMOL/L (ref 136–145)
TOTAL IRON BINDING CAPACITY: 225 UG/DL (ref 250–450)
TRANSFERRIN SERPL-MCNC: 152 MG/DL (ref 200–375)
VIT B12 SERPL-MCNC: 637 PG/ML (ref 210–950)
WBC # BLD AUTO: 10.79 K/UL (ref 3.9–12.7)

## 2019-11-27 PROCEDURE — 87651 POCT STREP A MOLECULAR: ICD-10-PCS | Mod: QW,S$GLB,, | Performed by: NURSE PRACTITIONER

## 2019-11-27 PROCEDURE — 82607 VITAMIN B-12: CPT

## 2019-11-27 PROCEDURE — 99214 PR OFFICE/OUTPT VISIT, EST, LEVL IV, 30-39 MIN: ICD-10-PCS | Mod: S$GLB,,, | Performed by: NURSE PRACTITIONER

## 2019-11-27 PROCEDURE — 83540 ASSAY OF IRON: CPT

## 2019-11-27 PROCEDURE — 1125F PR PAIN SEVERITY QUANTIFIED, PAIN PRESENT: ICD-10-PCS | Mod: S$GLB,,, | Performed by: NURSE PRACTITIONER

## 2019-11-27 PROCEDURE — 80053 COMPREHEN METABOLIC PANEL: CPT

## 2019-11-27 PROCEDURE — 1125F AMNT PAIN NOTED PAIN PRSNT: CPT | Mod: S$GLB,,, | Performed by: NURSE PRACTITIONER

## 2019-11-27 PROCEDURE — 1159F MED LIST DOCD IN RCRD: CPT | Mod: S$GLB,,, | Performed by: NURSE PRACTITIONER

## 2019-11-27 PROCEDURE — 82728 ASSAY OF FERRITIN: CPT

## 2019-11-27 PROCEDURE — 87651 STREP A DNA AMP PROBE: CPT | Mod: QW,S$GLB,, | Performed by: NURSE PRACTITIONER

## 2019-11-27 PROCEDURE — 99999 PR PBB SHADOW E&M-EST. PATIENT-LVL V: ICD-10-PCS | Mod: PBBFAC,,, | Performed by: NURSE PRACTITIONER

## 2019-11-27 PROCEDURE — 1159F PR MEDICATION LIST DOCUMENTED IN MEDICAL RECORD: ICD-10-PCS | Mod: S$GLB,,, | Performed by: NURSE PRACTITIONER

## 2019-11-27 PROCEDURE — 99999 PR PBB SHADOW E&M-EST. PATIENT-LVL V: CPT | Mod: PBBFAC,,, | Performed by: NURSE PRACTITIONER

## 2019-11-27 PROCEDURE — 85025 COMPLETE CBC W/AUTO DIFF WBC: CPT

## 2019-11-27 PROCEDURE — 99214 OFFICE O/P EST MOD 30 MIN: CPT | Mod: S$GLB,,, | Performed by: NURSE PRACTITIONER

## 2019-11-27 RX ORDER — FLUTICASONE FUROATE, UMECLIDINIUM BROMIDE AND VILANTEROL TRIFENATATE 100; 62.5; 25 UG/1; UG/1; UG/1
POWDER RESPIRATORY (INHALATION)
Refills: 6 | COMMUNITY
Start: 2019-11-01

## 2019-11-27 RX ORDER — MINOCYCLINE HYDROCHLORIDE 100 MG/1
100 CAPSULE ORAL EVERY 12 HOURS
Refills: 3 | COMMUNITY
Start: 2019-11-13 | End: 2019-11-27

## 2019-11-27 RX ORDER — EZETIMIBE 10 MG/1
10 TABLET ORAL DAILY
Refills: 11 | COMMUNITY
Start: 2019-11-13 | End: 2020-03-04

## 2019-11-27 RX ORDER — APIXABAN 2.5 MG/1
2.5 TABLET, FILM COATED ORAL 2 TIMES DAILY
Refills: 11 | COMMUNITY
Start: 2019-11-13 | End: 2022-09-26 | Stop reason: SDUPTHER

## 2019-11-27 RX ORDER — FLECAINIDE ACETATE 50 MG/1
50 TABLET ORAL 2 TIMES DAILY
Refills: 11 | Status: ON HOLD | COMMUNITY
Start: 2019-11-13 | End: 2023-04-07 | Stop reason: HOSPADM

## 2019-11-27 RX ORDER — LOSARTAN POTASSIUM 50 MG/1
25 TABLET ORAL DAILY
Qty: 30 TABLET
Start: 2019-11-27 | End: 2019-11-29

## 2019-11-27 RX ORDER — DILTIAZEM HYDROCHLORIDE 120 MG/1
120 TABLET, FILM COATED ORAL 2 TIMES DAILY
Refills: 11 | COMMUNITY
Start: 2019-11-07

## 2019-11-27 RX ORDER — SUCRALFATE 1 G/10ML
1 SUSPENSION ORAL 4 TIMES DAILY
Qty: 200 ML | Refills: 0 | Status: SHIPPED | OUTPATIENT
Start: 2019-11-27 | End: 2019-12-02

## 2019-11-27 NOTE — PROGRESS NOTES
[facility] Note    Chief Complaint      Chief Complaint   Patient presents with    Sore Throat    Cough    Fatigue    Headache    sinus pressure     History of Present Illness      Marva Perez is a 79 y.o. female who presents today, new to me; known to fellow providers; here with c/o sore throat; pt has multiple medical problems ; chronic resp failure on home oxygen; now with terrible sore throat.   STREP negative   Takes protonix   Has been taking minocycline bid for lung infection;   Discussed taking each dose with full glass of water and do not lay down right after taking   Antimicrobial Susceptibility, Nocardia species FINAL 10/01/2019 1159Abnormal     Comment: SOURCE: BRONCHIAL WASHING, Bronchial wash and Nocardia   species   SUSCEPTIBILITY, NOCARDIA SPECIES                       FINAL   NOCARDIA NOVA     ----------------------------------------------------------   Organism     NOCARDIA NOVA   Antibiotic    GINGER (mcg/mL)  Interpretation   ----------------------------------------------------------   Amox/Clav           >64/32       R   Cefepime               >32       R   Ceftriaxone             32       I   Imipenem               <=2       S   Ciprofloxacin           >4       R   Moxifloxacin             2       I   Clarithromycin      <=0.06       S   Amikacin               <=1       S   Tobramycin             >16       R   Doxycycline             16       R   Minocycline              4       I   TMP/SMX            0.5/9.5       S   Linezolid              <=1       S   ------------------------------------------------------------   Notes fatigue and weakness of late; BP drops with standing.   Denies urine symptoms  emotional and crying due to multiple chronic problems; offered anti-depression meds- defers   Wanting to switch provider to Dr. Green      Problem List Items Addressed This Visit        Psychiatric    KATHIE (generalized anxiety disorder)       Pulmonary     Chronic respiratory failure      Other Visit Diagnoses     Esophagitis due to drug    -  Primary    Relevant Medications    sucralfate (CARAFATE) 100 mg/mL suspension    Weakness        Relevant Orders    CBC auto differential (Completed)    Iron and TIBC    Ferritin    Vitamin B12    Comprehensive metabolic panel (Completed)    Fatigue, unspecified type        Relevant Orders    CBC auto differential (Completed)    Iron and TIBC    Ferritin    Vitamin B12    Comprehensive metabolic panel (Completed)    POCT Strep A, Molecular (Completed)    Orthostatic hypotension        Relevant Medications    losartan (COZAAR) 50 MG tablet          Health Maintenance   Topic Date Due    Pap Smear  2016    Pneumococcal Vaccine (65+ Low/Medium Risk) (2 of 2 - PPSV23) 2018    DEXA SCAN  2020    Lipid Panel  2020    Mammogram  2020    TETANUS VACCINE  2023    Colonoscopy  2024       Past Medical History:   Diagnosis Date    Abnormal Pap smear 13    LGSIL    COPD (chronic obstructive pulmonary disease)     Depression     GERD (gastroesophageal reflux disease)     Hyperlipidemia     Hypertension        Past Surgical History:   Procedure Laterality Date    APPENDECTOMY      BRONCHOSCOPY N/A 2019    Procedure: BRONCHOSCOPY;  Surgeon: Howard Matson MD;  Location: Replaced by [facility];  Service: Pulmonary;  Laterality: N/A;    CERVICAL BIOPSY  W/ LOOP ELECTRODE EXCISION      CHOLECYSTECTOMY      COLLATERAL LIGAMENT REPAIR, KNEE      left knee    COLONOSCOPY      DILATION AND CURETTAGE OF UTERUS      SINUS SURGERY         family history includes Breast cancer in her mother.    Social History     Tobacco Use    Smoking status: Former Smoker     Years: 30.00     Types: Cigarettes     Last attempt to quit: 10/30/2006     Years since quittin.0    Smokeless tobacco: Never Used   Substance Use Topics    Alcohol use: No     Comment: No    Drug use: No       HPI    Review of  Systems   Constitutional: Positive for fatigue. Negative for chills and fever.   HENT: Positive for sore throat. Negative for congestion, ear pain, postnasal drip, sinus pressure and sneezing.    Eyes: Negative for discharge.   Respiratory: Positive for shortness of breath (chronic - resp failure ). Negative for cough and chest tightness.    Cardiovascular: Negative.    Gastrointestinal: Negative for abdominal pain, constipation, diarrhea, nausea and vomiting.   Genitourinary: Negative for difficulty urinating, flank pain and hematuria.   Musculoskeletal: Negative.  Negative for arthralgias and joint swelling.   Skin: Negative.    Neurological: Positive for weakness. Negative for dizziness and headaches.   Psychiatric/Behavioral: Positive for dysphoric mood. Negative for behavioral problems and confusion.        Outpatient Encounter Medications as of 11/27/2019   Medication Sig Note Dispense Refill    albuterol (PROVENTIL/VENTOLIN HFA) 90 mcg/actuation inhaler Inhale 2 puffs into the lungs every 6 (six) hours as needed for Wheezing.  1 Inhaler 11    ALPRAZolam (XANAX) 0.25 MG tablet TAKE 1 TABLET DURING THE DAY FOR ANXIETY AS NEEDED AND 2 AT NIGHT FOR SLEEP  180 tablet 1    clobetasol (TEMOVATE) 0.05 % cream Apply topically 2 (two) times daily. for 10 days  60 g 1    diltiaZEM (CARDIZEM) 120 MG tablet Take 120 mg by mouth 2 (two) times daily.   11    ELIQUIS 2.5 mg Tab Take 2.5 mg by mouth 2 (two) times daily.   11    ergocalciferol (ERGOCALCIFEROL) 50,000 unit Cap TAKE ONE CAPSULE BY MOUTH ONE TIME PER WEEK  12 capsule 1    ezetimibe (ZETIA) 10 mg tablet Take 10 mg by mouth once daily.   11    flecainide (TAMBOCOR) 50 MG Tab Take 50 mg by mouth 2 (two) times daily.   11    fluticasone-salmeterol 250-50 mcg/dose (ADVAIR DISKUS) 250-50 mcg/dose diskus inhaler USE 1 INHALATION TWICE A DAY  3 each 1    levalbuterol (XOPENEX) 0.63 mg/3 mL nebulizer solution Take 3 mLs (0.63 mg total) by nebulization every 4  (four) hours as needed for Wheezing.  60 mL 5    losartan (COZAAR) 50 MG tablet Take 0.5 tablets (25 mg total) by mouth once daily.  30 tablet     pantoprazole (PROTONIX) 40 MG tablet Take 1 tablet (40 mg total) by mouth once daily.  90 tablet 3    TRELEGY ELLIPTA 100-62.5-25 mcg DsDv TAKE 1 PUFF BY MOUTH EVERY DAY   6    [DISCONTINUED] losartan (COZAAR) 50 MG tablet Take 25 mg by mouth once daily.       albuterol (ACCUNEB) 0.63 mg/3 mL Nebu Take 3 mLs (0.63 mg total) by nebulization every 6 (six) hours as needed. (Patient not taking: Reported on 11/27/2019)  20 vial 0    DULoxetine (CYMBALTA) 30 MG capsule One po q am for depression (Patient not taking: Reported on 11/27/2019) 7/30/2019: Pt filled it but hasnt started taking  30 capsule 1    ipratropium (ATROVENT) 0.02 % nebulizer solution Take 2.5 mLs (500 mcg total) by nebulization 2 (two) times daily as needed for Wheezing. (Patient not taking: Reported on 11/27/2019)  60 vial 5    methscopolamine (PAMINE) 2.5 mg Tab Take 1 tablet (2.5 mg total) by mouth 2 (two) times daily. (Patient not taking: Reported on 11/27/2019)  180 tablet 3    metoprolol tartrate (LOPRESSOR) 25 MG tablet Take 1 tablet (25 mg total) by mouth 2 (two) times daily. (Patient not taking: Reported on 11/27/2019)  60 tablet 11    mirtazapine (REMERON) 15 MG tablet One po q hs for rest and depression (Patient not taking: Reported on 11/27/2019) 7/30/2019: Pt not taking 30 tablet 1    sucralfate (CARAFATE) 100 mg/mL suspension Take 10 mLs (1 g total) by mouth 4 (four) times daily. for 5 days  200 mL 0    tiotropium (SPIRIVA WITH HANDIHALER) 18 mcg inhalation capsule INHALE THE CONTENTS OF 1 CAPSULE DAILY (Patient not taking: Reported on 11/27/2019)  3 Box 3    [DISCONTINUED] ALPRAZolam (XANAX) 0.25 MG tablet Take 1 tablet during the day for anxiety as needed and 2 at night for sleep (Patient not taking: Reported on 11/27/2019)  30 tablet 0    [DISCONTINUED] aspirin (ECOTRIN) 325 MG  "EC tablet Take 1 tablet (325 mg total) by mouth once daily. (Patient not taking: Reported on 11/27/2019)   0    [DISCONTINUED] cilostazol (PLETAL) 50 MG Tab  7/30/2019: Pt states Dr ray prescribed but not taking      [DISCONTINUED] minocycline (MINOCIN,DYNACIN) 100 MG capsule Take 100 mg by mouth every 12 (twelve) hours.   3     No facility-administered encounter medications on file as of 11/27/2019.         Review of patient's allergies indicates:   Allergen Reactions    Pcn [penicillins] Hives and Itching    Tetracyclines     Bactrim [sulfamethoxazole-trimethoprim] Rash    Ilosone Rash    Iodine and iodide containing products Rash    Sulfa (sulfonamide antibiotics) Hives and Rash       Physical Exam      Vital Signs  Temp: 98 °F (36.7 °C)  Temp src: Tympanic  Pulse: 75  Resp: 20  SpO2: 95 %  BP: (!) 96/56  BP Location: Left arm  Patient Position: Standing  Pain Score:   8  Pain Loc: Foot  Height and Weight  Height: 5' 2" (157.5 cm)  Weight: 47.8 kg (105 lb 6.1 oz)  BSA (Calculated - sq m): 1.45 sq meters  BMI (Calculated): 19.3  Weight in (lb) to have BMI = 25: 136.4]    @Physical Exam   Constitutional: She is oriented to person, place, and time. She appears well-developed and well-nourished.   HENT:   Head: Normocephalic and atraumatic.   Nose: Nose normal.   Mouth/Throat: Posterior oropharyngeal erythema present.   Eyes: Pupils are equal, round, and reactive to light. EOM are normal.   Neck: Normal range of motion. Neck supple.   Cardiovascular: Normal rate, regular rhythm and normal heart sounds.   No murmur heard.  Pulmonary/Chest: Effort normal and breath sounds normal.   Wearing nasal cannula-supplemental oxygen  Lung fields diminished    Abdominal: Soft. Bowel sounds are normal.   Musculoskeletal: Normal range of motion. She exhibits no edema.   Neurological: She is alert and oriented to person, place, and time.   Skin: Skin is warm and dry. Capillary refill takes less than 2 seconds. "   Psychiatric: Her behavior is normal. Judgment and thought content normal.         Laboratory:  CBC:  Recent Labs   Lab Result Units 08/31/19  1149 10/23/19  1700 11/27/19  0255   WBC K/uL 10.20 10.93 10.79   RBC M/uL 4.31 4.53 4.53   Hemoglobin g/dL 12.4 12.8 12.6   Hematocrit % 37.8 40.8 41.2   Platelets K/uL 244 205 188   Mean Corpuscular Volume fL 88 90 91   Mean Corpuscular Hemoglobin pg 28.8 28.3 27.8   Mean Corpuscular Hemoglobin Conc g/dL 32.9 31.4* 30.6*     CMP:  Recent Labs   Lab Result Units 08/31/19  1149 10/23/19  1700 11/27/19  0255   Glucose mg/dL 124* 83 105   Calcium mg/dL 9.2 8.8 8.5*   Albumin g/dL 4.2 3.1* 2.7*   Total Protein g/dL 7.2 6.1 5.9*   Sodium mmol/L 137 142 142   Potassium mmol/L 4.1 3.9 4.0   CO2 mmol/L 30* 27 33*   Chloride mmol/L 100 105 103   BUN, Bld mg/dL 12 25* 24*   Alkaline Phosphatase U/L 108 167* 161*   ALT U/L 37 28 22   AST U/L 36 28 23   Total Bilirubin mg/dL 1.4* 0.4 0.6     URINALYSIS:  No results for input(s): COLORU, CLARITYU, SPECGRAV, PHUR, PROTEINUA, GLUCOSEU, BILIRUBINCON, BLOODU, WBCU, RBCU, BACTERIA, MUCUS, NITRITE, LEUKOCYTESUR, UROBILINOGEN, HYALINECASTS in the last 2160 hours.   LIPIDS:  No results for input(s): TSH, HDL, CHOL, TRIG, LDLCALC, CHOLHDL, NONHDLCHOL, TOTALCHOLEST in the last 2160 hours.  TSH:  No results for input(s): TSH in the last 2160 hours.  A1C:  No results for input(s): HGBA1C in the last 2160 hours.    Radiology:      Assessment/Plan     Marva Perez is a 79 y.o.female with:    1. Esophagitis due to drug  - sucralfate (CARAFATE) 100 mg/mL suspension; Take 10 mLs (1 g total) by mouth 4 (four) times daily. for 5 days  Dispense: 200 mL; Refill: 0    2. Weakness  - CBC auto differential; Future  - Iron and TIBC; Future  - Ferritin; Future  - Vitamin B12; Future  - Comprehensive metabolic panel; Future  - Comprehensive metabolic panel  - Vitamin B12  - Ferritin  - Iron and TIBC  - CBC auto differential    3. Fatigue, unspecified type  -  CBC auto differential; Future  - Iron and TIBC; Future  - Ferritin; Future  - Vitamin B12; Future  - Comprehensive metabolic panel; Future  - POCT Strep A, Molecular  - Comprehensive metabolic panel  - Vitamin B12  - Ferritin  - Iron and TIBC  - CBC auto differential    4. Orthostatic hypotension  - losartan (COZAAR) 50 MG tablet; Take 0.5 tablets (25 mg total) by mouth once daily.  Dispense: 30 tablet    5. Chronic respiratory failure with hypoxia    6. KATHIE (generalized anxiety disorder)      -Continue current medications and maintain follow up with specialists.  Return to clinic 1-2 weeks         Consuelo Pitt MD  [facility]

## 2019-11-27 NOTE — PATIENT INSTRUCTIONS
Esophagitis     With esophagitis, the lining of the esophagus is inflamed.   Do you often have burning pain in your chest? You may have esophagitis. This is when the lining of the esophagus becomes red and swollen (inflamed). The esophagus is the tube that connects your throat to your stomach. This sheet tells you more about esophagitis. It also explains your treatment options.  Main types of esophagitis  Reflux esophagitis. This is the more common type. It is caused by GERD (gastroesophageal reflux disease). Stomach contents with stomach acid flow back up into the esophagus. This happens over and over. It leads to inflammation. Risk factors can include:  · Being overweight  · Asthma  · Smoking  · Pregnancy  · Frequent vomiting  · Certain medicines (such as aspirin and other anti-inflammatories)  · Hiatal hernia  Infectious esophagitis. This is caused by an infection. You are more at risk for this if you have a weakened immune system and poor nutrition. Antibiotic use can also be a factor. The infection is often due to the following:  · A type of fungus (typically candida)  · A virus, such as herpes simplex virus 1 (HSV-1) or cytomegalovirus (CMV)  Eosinophilic esophagitis. Foods or other things around you can give you an allergic reaction. This triggers an immune response and leads to esophagitis.  Pill-induced esophagitis. Certain types of medicines can cause inflammation and ulcers in the esophagus. These include doxycycline, aspirin, NSAIDs, alendronate, potassium, quinidine, iron.  Symptoms of esophagitis  The following symptoms can occur with esophagitis:  · Pain when swallowing, or trouble swallowing  · Pain behind your breastbone (heartburn)  · Acid regurgitation  · Chronic sore throat  · Gum Inflammation  · Cavities  · Bad breath  · Nausea  · Pain in your upper belly (abdomen)  · Bleeding (indicated by bright red vomit or black, tarry stool)  These symptoms occur more often with reflux  esophagitis:  · Coughing, wheezing, or asthma  · Hoarseness  Diagnosis of esophagitis  Your healthcare provider will ask about your health history and symptoms. Youll also be examined. Sometimes certain tests are needed. These may include:  · Upper endoscopy. A thin, flexible tube with a tiny light and camera is used. It is inserted through the mouth down into the esophagus. This lets the provider look for damage. A small sample of tissue (biopsy) may also be removed. The sample is sent to a lab for testing.  · Upper GI X-ray with barium. An X-ray is done after you drink a substance called barium. Barium may make problems in the esophagus easier to see on an x-ray.  · Esophageal pH. A soft, thin tube is passed into the esophagus through the nose or mouth for 24 hours. It measures the acid level in the esophagus.  · Esophageal manometry. A soft, thin tube is passed into the esophagus through the nose or mouth. It measures muscle contractions in the esophagus.  Treatment of esophagitis  Medicines. Different medicines can help treat esophagitis. The medicine used will depend on the type of esophagitis you have. Talk with your healthcare provider.  Lifestyle changes. Making the following changes can help reduce irritation and ease your symptoms:  · Avoid spicy foods (pepper, chili powder, naidu). Also avoid hard foods (nuts, crackers, raw vegetables) and acidic foods and drinks (tomatoes, citrus fruits and juices). Other problem foods include chocolate, peppermint, nutmeg, and foods high in fat.  · Until you can swallow without pain, follow a combined liquid and soft diet. Try foods such as cooked cereals, mashed potatoes, and soups.  · Take small bites and chew your food thoroughly.  · Avoid large meals and heavy evening meals. Don't lie down within 2 to 3 hours of eating.  · Get to or stay at a healthy weight.  · Avoid alcohol, caffeine, and smoking or tobacco products.  · Brush and floss your teeth  · Raise your  upper body by 4 to 6 inches when lying in bed. This can be done using a foam wedge. Or put blocks under the legs at the head of your bed.  Surgery. This may be needed for severe reflux esophagitis. Other noninvasive procedures to treat GERD and esophagitis are being studied. Your provider can tell you more.  Why treatment Is important  Without treatment, esophagitis can get worse. This is especially true with severe reflux esophagitis. For instance, continued symptoms can cause scarring of the esophagus. Over time, this can cause a narrowing the esophagus (stricture). This can make it hard to pass food down to the stomach. As symptoms go on they can also cause changes in the lining of the esophagus. These changes can put you at a slightly higher risk of cancer of the esophagus.   Date Last Reviewed: 7/1/2016  © 9566-3226 [employer]. [address] [place] [address], [place] [address]. All rights reserved. This information is not intended as a substitute for professional medical care. Always follow your healthcare professional's instructions.    Decrease Losartan to 1/2 dose- 25mg   Have home health check BP - sitting standing- orthostatics  Increase fluids

## 2019-11-29 ENCOUNTER — TELEPHONE (OUTPATIENT)
Dept: INTERNAL MEDICINE | Facility: CLINIC | Age: 79
End: 2019-11-29

## 2019-11-29 ENCOUNTER — OFFICE VISIT (OUTPATIENT)
Dept: INTERNAL MEDICINE | Facility: CLINIC | Age: 79
End: 2019-11-29
Payer: MEDICARE

## 2019-11-29 VITALS
BODY MASS INDEX: 18.83 KG/M2 | HEIGHT: 62 IN | RESPIRATION RATE: 16 BRPM | HEART RATE: 81 BPM | SYSTOLIC BLOOD PRESSURE: 94 MMHG | DIASTOLIC BLOOD PRESSURE: 54 MMHG | OXYGEN SATURATION: 98 % | WEIGHT: 102.31 LBS

## 2019-11-29 DIAGNOSIS — E88.09 HYPOALBUMINEMIA: ICD-10-CM

## 2019-11-29 DIAGNOSIS — J44.1 ACUTE EXACERBATION OF CHRONIC OBSTRUCTIVE PULMONARY DISEASE: Primary | ICD-10-CM

## 2019-11-29 DIAGNOSIS — J96.11 CHRONIC RESPIRATORY FAILURE WITH HYPOXIA: ICD-10-CM

## 2019-11-29 DIAGNOSIS — R05.9 COUGH: ICD-10-CM

## 2019-11-29 DIAGNOSIS — R74.8 ABNORMAL ALKALINE PHOSPHATASE TEST: ICD-10-CM

## 2019-11-29 DIAGNOSIS — F32.A DEPRESSIVE DISORDER: ICD-10-CM

## 2019-11-29 DIAGNOSIS — I95.1 ORTHOSTATIC HYPOTENSION: ICD-10-CM

## 2019-11-29 DIAGNOSIS — F41.1 GAD (GENERALIZED ANXIETY DISORDER): ICD-10-CM

## 2019-11-29 DIAGNOSIS — R53.83 FATIGUE, UNSPECIFIED TYPE: ICD-10-CM

## 2019-11-29 DIAGNOSIS — I48.0 PAF (PAROXYSMAL ATRIAL FIBRILLATION): ICD-10-CM

## 2019-11-29 DIAGNOSIS — R53.1 WEAKNESS: ICD-10-CM

## 2019-11-29 PROCEDURE — 99999 PR PBB SHADOW E&M-EST. PATIENT-LVL V: CPT | Mod: PBBFAC,,, | Performed by: NURSE PRACTITIONER

## 2019-11-29 PROCEDURE — 99215 OFFICE O/P EST HI 40 MIN: CPT | Mod: 25,S$GLB,, | Performed by: NURSE PRACTITIONER

## 2019-11-29 PROCEDURE — 96372 THER/PROPH/DIAG INJ SC/IM: CPT | Mod: S$GLB,,, | Performed by: NURSE PRACTITIONER

## 2019-11-29 PROCEDURE — 1159F PR MEDICATION LIST DOCUMENTED IN MEDICAL RECORD: ICD-10-PCS | Mod: S$GLB,,, | Performed by: NURSE PRACTITIONER

## 2019-11-29 PROCEDURE — 1126F PR PAIN SEVERITY QUANTIFIED, NO PAIN PRESENT: ICD-10-PCS | Mod: S$GLB,,, | Performed by: NURSE PRACTITIONER

## 2019-11-29 PROCEDURE — 1126F AMNT PAIN NOTED NONE PRSNT: CPT | Mod: S$GLB,,, | Performed by: NURSE PRACTITIONER

## 2019-11-29 PROCEDURE — 96372 PR INJECTION,THERAP/PROPH/DIAG2ST, IM OR SUBCUT: ICD-10-PCS | Mod: S$GLB,,, | Performed by: NURSE PRACTITIONER

## 2019-11-29 PROCEDURE — 1159F MED LIST DOCD IN RCRD: CPT | Mod: S$GLB,,, | Performed by: NURSE PRACTITIONER

## 2019-11-29 PROCEDURE — 99215 PR OFFICE/OUTPT VISIT, EST, LEVL V, 40-54 MIN: ICD-10-PCS | Mod: 25,S$GLB,, | Performed by: NURSE PRACTITIONER

## 2019-11-29 PROCEDURE — 99999 PR PBB SHADOW E&M-EST. PATIENT-LVL V: ICD-10-PCS | Mod: PBBFAC,,, | Performed by: NURSE PRACTITIONER

## 2019-11-29 RX ORDER — ESCITALOPRAM OXALATE 10 MG/1
10 TABLET ORAL DAILY
Qty: 30 TABLET | Refills: 0 | Status: SHIPPED | OUTPATIENT
Start: 2019-11-29 | End: 2020-01-21

## 2019-11-29 RX ORDER — AZITHROMYCIN 250 MG/1
TABLET, FILM COATED ORAL
Qty: 6 TABLET | Refills: 0 | Status: SHIPPED | OUTPATIENT
Start: 2019-11-29 | End: 2019-12-04

## 2019-11-29 RX ORDER — METHYLPREDNISOLONE ACETATE 40 MG/ML
40 INJECTION, SUSPENSION INTRA-ARTICULAR; INTRALESIONAL; INTRAMUSCULAR; SOFT TISSUE
Status: COMPLETED | OUTPATIENT
Start: 2019-11-29 | End: 2019-11-29

## 2019-11-29 RX ORDER — MIRTAZAPINE 15 MG/1
TABLET, FILM COATED ORAL
Qty: 30 TABLET | Refills: 0 | Status: SHIPPED | OUTPATIENT
Start: 2019-11-29 | End: 2020-03-04 | Stop reason: SDUPTHER

## 2019-11-29 RX ADMIN — METHYLPREDNISOLONE ACETATE 40 MG: 40 INJECTION, SUSPENSION INTRA-ARTICULAR; INTRALESIONAL; INTRAMUSCULAR; SOFT TISSUE at 02:11

## 2019-11-29 NOTE — TELEPHONE ENCOUNTER
"----- Message from Zuleima Mccollum sent at 2019  3:51 PM CST -----  Contact: Rebecca (jennifer)  Marva Perez  MRN: [id number]  : 1940  PCP: Carissa Green  Home Phone      [phone]  Work Phone      Not on file.  Mobile          [phone]    MESSAGE:   She request to speak to nurse regarding "pharmacy issues" with Rx from appointment today    Phone #  [phone]    Pharmacy - [id number] - [place] [facility]  "

## 2019-11-29 NOTE — TELEPHONE ENCOUNTER
----- Message from Zuleima Mccollum sent at 2019  2:31 PM CST -----  Contact: Zulay with [facility] Jerrod Perez  MRN: [id number]  : 1940  PCP: Carissa Green  Home Phone      [phone]  Work Phone      Not on file.  Mobile          [phone]    MESSAGE:   Rx e-scripted today - escitalopram oxalate (LEXAPRO) 10 MG tablet / azithromycin (Z-SOFIA) 250 MG tablet / possible medication interaction with another medication the patient is prescribed -  flecainide (TAMBOCOR) 50 MG Tab.  PCP - Dr. Green     Phone # [phone]    Pharmacy - [id number] - [address]

## 2019-11-29 NOTE — PATIENT INSTRUCTIONS
Treating Anxiety Disorders with Medicine  An anxiety disorder can make you feel nervous or apprehensive, even without a clear reason. In people age 65 and older, generalized anxiety disorder is one of the most commonly diagnosed anxiety disorders. Many times it occurs with depression. Certain anxiety disorders can cause intense feelings of fear or panic. You may even have physical symptoms such as a racing heartbeat, sweating, or dizziness. If you have these feelings, you dont have to suffer anymore. Treatment to help you overcome your fears will likely include therapy (also called counseling). Medicine may also be prescribed to help control your symptoms.    Medicines  Certain medicines may be prescribed to help control your symptoms. So you may feel less anxious. You may also feel able to move forward with therapy. At first, medicines and dosages may need to be adjusted to find what works best for you. Try to be patient. Tell your healthcare provider how a medicine makes you feel. This way, you can work together to find the treatment thats best for you. Keep in mind that medicines can have side effects. Talk with your provider about any side effects that are bothering you. Changing the dose or type of medicine may help. Dont stop taking medicine on your own. That can cause symptoms to come back.  · Anti-anxiety medicine. This medicine eases symptoms and helps you relax. Your healthcare provider will explain when and how to use it. It may be prescribed for use before situations that make you anxious. You may also be told to take medicine on a regular schedule. Anti-anxiety medicine may make you feel a little sleepy or out of it. Dont drive a car or operate machinery while on this medicine, until you know how it affects you.  Caution  Never use alcohol or other drugs with anti-anxiety medicines. This could result in loss of muscular control, sedation, coma, or death. Also, use only the amount of medicine  prescribed for you. If you think you may have taken too much, get emergency care right away.   · Antidepressant medicine. This kind of medicine is often used to treat anxiety, even if you arent depressed. An antidepressant helps balance out brain chemicals. This helps keep anxiety under control. This medicine is taken on a schedule. It takes a few weeks to start working. If you dont notice a change at first, you may just need more time. But if you dont notice results after the first few weeks, tell your provider.  Keep taking medicines as prescribed  Never change your dosage, share or use another person's medicine, or stop taking your medicines without talking to your healthcare provider first. Keep the following in mind:  · Some medicines must be taken on a schedule. Make this part of your daily routine. For instance, always take your pill before brushing your teeth. A pillbox can help you remember if youve taken your medicine each day.  · Medicines are often taken for 6 to 12 months. Your healthcare provider will then evaluate whether you need to stay on them. Many people who have also had therapy may no longer need medicine to manage anxiety.  · You may need to stop taking medicine slowly to give your body time to adjust. When its time to stop, your healthcare provider will tell you more. Remember: Never stop taking your medicine without talking to your provider first.  · If symptoms return, you may need to start taking medicines again. This isnt your fault. Its just the nature of your anxiety disorder.  Special concerns  · Side effects. Medicines may cause side effects. Ask your healthcare provider or pharmacist what you can expect. They may have ideas for avoiding some side effects.  · Sexual problems. Some antidepressants can affect your desire for sex or your ability to have an orgasm. A change in dosage or medicine often solves the problem. If you have a sexual side effect that concerns you, tell your  healthcare provider.  · Addiction. If youve never had a problem with drugs or alcohol, you may not have a problem with medicines used to treat anxiety disorders. But always discuss the medicines with your healthcare provider before taking them. If you have a history of addiction, you may not be able to use certain medicines used to treat anxiety disorders.  · Medicine interactions. Always check with your pharmacist before using any over-the-counter medicines, including herbal supplements.   Date Last Reviewed: 5/1/2017 © 2000-2017 [employer]. [address] [place] [address], [place] [address]. All rights reserved. This information is not intended as a substitute for professional medical care. Always follow your healthcare professional's instructions.      Increase fluids, try ensure Enlive   Stop Losartan  F/u next week-   Repeat EKG   Neb tx tid

## 2019-11-29 NOTE — PROGRESS NOTES
Subjective:           Patient ID: Marva Perez is a 79 y.o. female.    Chief Complaint: Follow-up (pt complaining that when she licks her lips, they taste really salty) and watery eyes    Marva Perez is a 78 y.o. female  With PMHX of HTN, HLD, GERD, Depression, anxiety, and COPD/Chronic resp failure requiring home Oxygen/ Emphysema    asmitted to [facility] in August for hypoxemia, she was sterted on mirtazapine and cymbalta for anxiety and depression during this August admission also.    + hx of remote AFb but developed AFIb - RVR during admission; converted to  Now sinus and rate controlled with metoprolol. Started on eliquis but had some scant hemoptysis. Dr. Matson did bronch and noted bleeding in PRATIBHA per his report.   No masses found; had bronchial washings and cultures;   No acid fast bacilli seen.  Cytology -- FINAL PATHOLOGIC DIAGNOSIS  Negative for malignant cells. Bacteria present. Fungal organisms consistent with Candida species.  3mo ago   Respiratory Culture Normal respiratory belén   Respiratory Culture No S aureus or Pseudomonas isolated.   Gram Stain (Respiratory) <10 epithelial cells per low power field.   Gram Stain (Respiratory) Few WBC's   Gram Stain (Respiratory) Few Gram positive rods    Gram Stain (Respiratory) Rare Gram positive cocci     3mo ago   Antimicrobial Susceptibility, Nocardia species FINAL 10/01/2019 1159Abnormal    Comment: SOURCE: BRONCHIAL WASHING, Bronchial wash and Nocardia   species   SUSCEPTIBILITY, NOCARDIA SPECIES                       FINAL   NOCARDIA NOVA     ----------------------------------------------------------   Organism     NOCARDIA NOVA   Antibiotic    GINGER (mcg/mL)  Interpretation   ----------------------------------------------------------   Amox/Clav           >64/32       R   Cefepime               >32       R   Ceftriaxone             32       I   Imipenem               <=2       S   Ciprofloxacin           >4       R   Moxifloxacin             2        I   Clarithromycin      <=0.06       S   Amikacin               <=1       S   Tobramycin             >16       R   Doxycycline             16       R   Minocycline              4       I   TMP/SMX            0.5/9.5       S   Linezolid              <=1       S     Pt reports that Dr. Matson consulted with Dr. Tobias and was recommended Minocyline Rx; 100mg bid for 3 months ; started on 9/22/19  Pt has had extensive work up for lungs with CT of chest negative for PE or mass  Prior CTA of abdomen with runoff negative for severe stenosis or mass.    Presented 3 days ago with c./o severe Sore throat with fatigue and weakness. Very emotional.   Strep swab negative. Noted to have borderline BP with mild orthostasis. Instructed to take half of losartan. Tx for esophagitis.   Subsequent labs ordered due to weakness and fatigue; however, after review of chart this seems to be a recurrent complaint associated with depression. She did not continue to take Cymbalta or Remeron since hospital D/C in August; this was specifically discussed per Dr. Burleson in the hospital; risk of benzo use only for anxiety; she does not remember this.   Pt here for f/u now with new cough and congestion; harsh cough, O2 sat stable, using xopenex low dose but still gets jittery per patient. Influenza negative   Labs reviewed with pt and . Rebecca Barber who helps with care.   Lab Results      Component                Value               Date                      WBC                      10.79               11/27/2019                HGB                      12.6                11/27/2019                HCT                      41.2                11/27/2019                MCV                      91                  11/27/2019                PLT                      188                 11/27/2019                CMP  Sodium       Date                     Value               Ref Range           Status                11/27/2019               142                  136 - 145 mmol*     Final            ----------  Potassium       Date                     Value               Ref Range           Status                11/27/2019               4.0                 3.5 - 5.1 mmol*     Final            ----------  Chloride       Date                     Value               Ref Range           Status                11/27/2019               103                 95 - 110 mmol/L     Final            ----------  CO2       Date                     Value               Ref Range           Status                11/27/2019               33 (H)              23 - 29 mmol/L      Final            ----------  Glucose       Date                     Value               Ref Range           Status                11/27/2019               105                 70 - 110 mg/dL      Final            ----------  BUN, Bld       Date                     Value               Ref Range           Status                11/27/2019               24 (H)              8 - 23 mg/dL        Final            ----------  Creatinine       Date                     Value               Ref Range           Status                11/27/2019               1.2                 0.5 - 1.4 mg/dL     Final            ----------  Calcium       Date                     Value               Ref Range           Status                11/27/2019               8.5 (L)             8.7 - 10.5 mg/*     Final            ----------  Total Protein       Date                     Value               Ref Range           Status                11/27/2019               5.9 (L)             6.0 - 8.4 g/dL      Final            ----------  Albumin       Date                     Value               Ref Range           Status                11/27/2019               2.7 (L)             3.5 - 5.2 g/dL      Final            ----------  Total Bilirubin       Date                     Value               Ref Range           Status                11/27/2019                0.6                 0.1 - 1.0 mg/dL     Final              Comment:    For infants and newborns, interpretation of results should be based  on gestational age, weight and in agreement with clinical  observations.  Premature Infant recommended reference ranges:  Up to 24 hours.............<8.0 mg/dL  Up to 48 hours............<12.0 mg/dL  3-5 days..................<15.0 mg/dL  6-29 days.................<15.0 mg/dL    ----------  Alkaline Phosphatase       Date                     Value               Ref Range           Status                11/27/2019               161 (H)             55 - 135 U/L        Final            ----------  AST       Date                     Value               Ref Range           Status                11/27/2019               23                  10 - 40 U/L         Final            ----------  ALT       Date                     Value               Ref Range           Status                11/27/2019               22                  10 - 44 U/L         Final            ----------  Anion Gap       Date                     Value               Ref Range           Status                11/27/2019               6 (L)               8 - 16 mmol/L       Final            ----------  eGFR if        Date                     Value               Ref Range           Status                11/27/2019               50 (A)              >60 mL/min/1.7*     Final            ----------  eGFR if non        Date                     Value               Ref Range           Status                11/27/2019               43 (A)              >60 mL/min/1.7*     Final              Comment:    Calculation used to obtain the estimated glomerular filtration  rate (eGFR) is the CKD-EPI equation.     ----------  Low protein, albumin- advised on Boost or Ensure Enlive- 2-3 times per day,   Noted slight ^ alk phos- LFTs are normal; believe this may be^  Due to osteopenia; long hx of  COPD, intermittent steroid use.   Lab Results       Component                Value               Date                       IRON                     44                  11/27/2019                 TIBC                     225 (L)             11/27/2019                 FERRITIN                 216                 11/27/2019                Has been following with Dr. Pritchard and Dr. Belle for Afib- she is discouraged bc she is not a candidate for any ablation due to chronic resp failure.   July 2019 Echo   · Normal left ventricular systolic function. The estimated ejection fraction is 65%  · Normal LV diastolic function.  · Normal right ventricular systolic function.  · No pulmonary hypertension present.      Review of Systems   Constitutional: Positive for fatigue. Negative for chills and fever.   HENT: Positive for sore throat. Negative for congestion, ear pain, postnasal drip, sinus pressure and sneezing.    Eyes: Negative for discharge.   Respiratory: Positive for shortness of breath (chronic - resp failure ). Negative for cough and chest tightness.    Cardiovascular: Negative.    Gastrointestinal: Negative for abdominal pain, constipation, diarrhea, nausea and vomiting.   Genitourinary: Negative for difficulty urinating, flank pain and hematuria.   Musculoskeletal: Negative.  Negative for arthralgias and joint swelling.   Skin: Negative.    Neurological: Positive for weakness. Negative for dizziness and headaches.   Psychiatric/Behavioral: Positive for dysphoric mood. Negative for behavioral problems and confusion.       Objective:      Physical Exam   Constitutional: She is oriented to person, place, and time. She appears well-developed and well-nourished.   HENT:   Head: Normocephalic and atraumatic.   Nose: Nose normal.   Mouth/Throat: Posterior oropharyngeal erythema present.   Eyes: Pupils are equal, round, and reactive to light. EOM are normal.   Neck: Normal range of motion. Neck supple.   Cardiovascular:  Normal rate, regular rhythm and normal heart sounds.   No murmur heard.  Pulmonary/Chest: Effort normal and breath sounds normal.   Wearing nasal cannula-supplemental oxygen  Lung fields diminished    Abdominal: Soft. Bowel sounds are normal.   Musculoskeletal: Normal range of motion. She exhibits no edema.   Neurological: She is alert and oriented to person, place, and time.   Skin: Skin is warm and dry. Capillary refill takes less than 2 seconds.   Psychiatric: Her behavior is normal. Judgment and thought content normal.   Tearful    Nursing note and vitals reviewed.      Assessment:       1. Acute exacerbation of chronic obstructive pulmonary disease    2. Orthostatic hypotension    3. Weakness    4. Fatigue, unspecified type    5. KATHIE (generalized anxiety disorder)    6. Chronic respiratory failure with hypoxia    7. Cough    8. PAF (paroxysmal atrial fibrillation)    9. Depressive disorder    10. Hypoalbuminemia    11. Abnormal alkaline phosphatase test        Plan:   Marva was seen today for follow-up and watery eyes.    Diagnoses and all orders for this visit:    Acute exacerbation of chronic obstructive pulmonary disease  -     POCT Influenza A/B Molecular  -     azithromycin (Z-SOFIA) 250 MG tablet; Take 2 tablets by mouth on day 1; Take 1 tablet by mouth on days 2-5  -     methylPREDNISolone acetate injection 40 mg    Orthostatic hypotension    Weakness  -     POCT Influenza A/B Molecular    Fatigue, unspecified type  -     POCT Influenza A/B Molecular    KATHIE (generalized anxiety disorder)  -     escitalopram oxalate (LEXAPRO) 10 MG tablet; Take 1 tablet (10 mg total) by mouth once daily.  -     mirtazapine (REMERON) 15 MG tablet; One po q hs for rest and depression    Chronic respiratory failure with hypoxia    Cough    PAF (paroxysmal atrial fibrillation)  -     IN OFFICE EKG 12-LEAD (to Muse)    Depressive disorder  -     mirtazapine (REMERON) 15 MG tablet; One po q hs for rest and  depression    Hypoalbuminemia    Abnormal alkaline phosphatase test  Comments:  normal LFTs, negative for mass on CT of lungs and abd; thing due to osteopenia       Problem List Items Addressed This Visit        Psychiatric    KATHIE (generalized anxiety disorder)    Relevant Medications    escitalopram oxalate (LEXAPRO) 10 MG tablet    mirtazapine (REMERON) 15 MG tablet       Pulmonary    Chronic respiratory failure      Other Visit Diagnoses     Acute exacerbation of chronic obstructive pulmonary disease    -  Primary    Relevant Medications    azithromycin (Z-SOFIA) 250 MG tablet    methylPREDNISolone acetate injection 40 mg (Completed)    Other Relevant Orders    POCT Influenza A/B Molecular    Orthostatic hypotension        Weakness        Relevant Orders    POCT Influenza A/B Molecular    Fatigue, unspecified type        Relevant Orders    POCT Influenza A/B Molecular    Cough        PAF (paroxysmal atrial fibrillation)        Relevant Orders    IN OFFICE EKG 12-LEAD (to Muse)    Depressive disorder        Relevant Medications    mirtazapine (REMERON) 15 MG tablet    Hypoalbuminemia        Abnormal alkaline phosphatase test        normal LFTs, negative for mass on CT of lungs and abd; thing due to osteopenia       F/U with King Ramos  Then Dr. Green next week  Repeat EKG on F/U   Continue neb tx tid  Start Lexapro and Remeron- long discussion regarding risk/benefit   zithromax discussed with Dr. William via telephone   Face to face discussion, counseling, reassurance >30mins

## 2019-11-29 NOTE — TELEPHONE ENCOUNTER
Spoke with Zulay at the pharmacy. Informed her that Consuelo Pitt NP approved for them to be filled. Informed summer and she will go pick them up for the patient.

## 2019-12-06 ENCOUNTER — TELEPHONE (OUTPATIENT)
Dept: FAMILY MEDICINE | Facility: CLINIC | Age: 79
End: 2019-12-06

## 2019-12-06 NOTE — TELEPHONE ENCOUNTER
Pt had a Sputum test done over the weekend at [facility]. She called multiply times and they are not giving her the results. Medical records stated that Tyree Taylor has to call to receive pts results then he can give them to the pt.     Would you be able to call them?  I called and stated that I can send over a medical release form but pt can not come and sign it due to her being very ill at this time.  They will not accept that.

## 2019-12-06 NOTE — TELEPHONE ENCOUNTER
----- Message from Lennox Son sent at 2019 12:10 PM CST -----  Contact: Patient  Marva Perez  MRN: [id number]  : 1940  PCP: Carissa Green  Home Phone      [phone]  Work Phone      Not on file.  Mobile          [phone]      MESSAGE: was taken to [facility] by ambulance on Sat - a sputum test was done -- she is trying to get results -- they told her they had to be requested by her PCP (Dr Clements) -- please obtain results of sputum test from [facility] & advise patient    Call [phone]    PCP: Tyree

## 2019-12-09 ENCOUNTER — OFFICE VISIT (OUTPATIENT)
Dept: INTERNAL MEDICINE | Facility: CLINIC | Age: 79
End: 2019-12-09
Payer: MEDICARE

## 2019-12-09 VITALS
HEART RATE: 92 BPM | WEIGHT: 108.25 LBS | OXYGEN SATURATION: 95 % | RESPIRATION RATE: 20 BRPM | HEIGHT: 62 IN | SYSTOLIC BLOOD PRESSURE: 130 MMHG | BODY MASS INDEX: 19.92 KG/M2 | DIASTOLIC BLOOD PRESSURE: 60 MMHG

## 2019-12-09 DIAGNOSIS — J44.1 COPD EXACERBATION: Primary | ICD-10-CM

## 2019-12-09 PROCEDURE — 1126F PR PAIN SEVERITY QUANTIFIED, NO PAIN PRESENT: ICD-10-PCS | Mod: S$GLB,,, | Performed by: INTERNAL MEDICINE

## 2019-12-09 PROCEDURE — 99213 PR OFFICE/OUTPT VISIT, EST, LEVL III, 20-29 MIN: ICD-10-PCS | Mod: S$GLB,,, | Performed by: INTERNAL MEDICINE

## 2019-12-09 PROCEDURE — 1159F PR MEDICATION LIST DOCUMENTED IN MEDICAL RECORD: ICD-10-PCS | Mod: S$GLB,,, | Performed by: INTERNAL MEDICINE

## 2019-12-09 PROCEDURE — 99213 OFFICE O/P EST LOW 20 MIN: CPT | Mod: S$GLB,,, | Performed by: INTERNAL MEDICINE

## 2019-12-09 PROCEDURE — 99999 PR PBB SHADOW E&M-EST. PATIENT-LVL III: ICD-10-PCS | Mod: PBBFAC,,, | Performed by: INTERNAL MEDICINE

## 2019-12-09 PROCEDURE — 99999 PR PBB SHADOW E&M-EST. PATIENT-LVL III: CPT | Mod: PBBFAC,,, | Performed by: INTERNAL MEDICINE

## 2019-12-09 PROCEDURE — 1159F MED LIST DOCD IN RCRD: CPT | Mod: S$GLB,,, | Performed by: INTERNAL MEDICINE

## 2019-12-09 PROCEDURE — 1126F AMNT PAIN NOTED NONE PRSNT: CPT | Mod: S$GLB,,, | Performed by: INTERNAL MEDICINE

## 2019-12-09 NOTE — PROGRESS NOTES
"Subjective:       Patient ID: Marva Perez is a 79 y.o. female.    Chief Complaint: Establish Care (DR Clements patient ; wants to establish at this clininc); COPD (hyperlipidemia); and Hypertension    Marva Perez is a 79 y.o. female here with need to establish as a new patient .  Dr Clements patient . COPD home o2 dependent .    She went to [facility]  About 10 days ago.  Sent home from ER on steroids and Z pack.  She reports " shaky on inside "  Tired and no energy "      Sh e reports coughing and breathing is OK   No fevers ; she reports she is back to her pulmonary status.  She sees Dr Pritchard and DR MATSON .      She reports sputum is still yellow /green.      Reviewed Consuelo note   :  Pt reports that Dr. Matson consulted with Dr. Tobias and was recommended Minocyline Rx; 100mg bid for 3 months ; started on 9/22/19  Pt has had extensive work up for lungs with CT of chest negative for PE or mass  Prior CTA of abdomen with runoff negative for severe stenosis or mass.      3mo ago            Antimicrobial Susceptibility, Nocardia species           FINAL 10/01/2019 1159Abnormal    Comment: SOURCE: BRONCHIAL WASHING, Bronchial wash and Nocardia   species   SUSCEPTIBILITY, NOCARDIA SPECIES                       FINAL   NOCARDIA NOVA     ----------------------------------------------------------   Organism     NOCARDIA NOVA   Antibiotic    GINGER (mcg/mL)  Interpretation   ----------------------------------------------------------   Amox/Clav           >64/32       R   Cefepime               >32       R   Ceftriaxone             32       I   Imipenem               <=2       S   Ciprofloxacin           >4       R   Moxifloxacin             2       I   Clarithromycin      <=0.06       S   Amikacin               <=1       S   Tobramycin             >16       R   Doxycycline             16       R   Minocycline              4       I   TMP/SMX            0.5/9.5       S   Linezolid              <=1       S        Review of " Systems   Constitutional: Positive for fatigue. Negative for appetite change, chills and fever.   HENT: Negative for congestion, ear discharge, ear pain, rhinorrhea, sinus pressure and sore throat.    Respiratory: Positive for cough and shortness of breath. Negative for choking and chest tightness.         With coughing and increased activity  She is on home Oxygen    Cardiovascular: Negative for chest pain and palpitations.   Gastrointestinal: Negative for constipation, diarrhea, nausea and vomiting.   Musculoskeletal: Negative for joint swelling and myalgias.   Skin: Negative for rash and wound.   Neurological: Negative for dizziness, syncope, weakness, light-headedness and numbness.       Objective:      Physical Exam   Constitutional: She is oriented to person, place, and time.   Wearing home oxygen    HENT:   Head: Normocephalic and atraumatic.   Nose: Nose normal.   Mouth/Throat: Posterior oropharyngeal erythema present.   Eyes: Pupils are equal, round, and reactive to light. EOM are normal.   Neck: Normal range of motion. Neck supple.   Cardiovascular: Normal rate, regular rhythm and normal heart sounds.   No murmur heard.  Pulmonary/Chest: Effort normal and breath sounds normal.   Wearing nasal cannula-supplemental oxygen  Lung fields diminished    Abdominal: Soft. Bowel sounds are normal.   Musculoskeletal: Normal range of motion. She exhibits no edema.   Neurological: She is alert and oriented to person, place, and time.   Skin: Skin is warm and dry. Capillary refill takes less than 2 seconds.   Psychiatric: Her behavior is normal. Judgment and thought content normal.   Nursing note and vitals reviewed.      Assessment:       1. COPD exacerbation        Plan:   Marva was seen today for establish care, copd and hypertension.    Diagnoses and all orders for this visit:    COPD exacerbation    we discussed about nocardia treatment : NOCARDIA.    She just finished her z pack   I indicated to her that she  should finish her 3 m course of minocycline   She already has finished 2 months of minocycline . So finish one more month.  She is bouncing between physicians .discussed about that   I told her to stay with one pulmonologist .     labs reviewed   Low albumin highlighted .  Ensure 2 cans daily       Problem List Items Addressed This Visit     None

## 2019-12-12 ENCOUNTER — TELEPHONE (OUTPATIENT)
Dept: INTERNAL MEDICINE | Facility: CLINIC | Age: 79
End: 2019-12-12

## 2019-12-12 NOTE — TELEPHONE ENCOUNTER
Contacted and informed pt of Dr. Green's advise of needing to finish the course of medication. Verbalized understanding.

## 2019-12-12 NOTE — TELEPHONE ENCOUNTER
Patient wanted to let you know that she still has a residual cough with thick, green mucous. No fever, chest pain, chest congestion or SOB. Thanks. [facility] pharmacy

## 2019-12-16 DIAGNOSIS — F41.1 GAD (GENERALIZED ANXIETY DISORDER): ICD-10-CM

## 2019-12-18 RX ORDER — ESCITALOPRAM OXALATE 10 MG/1
TABLET ORAL
Qty: 30 TABLET | Refills: 0 | OUTPATIENT
Start: 2019-12-18

## 2019-12-22 DIAGNOSIS — F41.1 GAD (GENERALIZED ANXIETY DISORDER): ICD-10-CM

## 2019-12-24 RX ORDER — ESCITALOPRAM OXALATE 10 MG/1
TABLET ORAL
Qty: 30 TABLET | Refills: 0 | OUTPATIENT
Start: 2019-12-24

## 2019-12-26 DIAGNOSIS — F41.1 GAD (GENERALIZED ANXIETY DISORDER): ICD-10-CM

## 2019-12-26 NOTE — TELEPHONE ENCOUNTER
Requested Prescriptions     Pending Prescriptions Disp Refills    escitalopram oxalate (LEXAPRO) 10 MG tablet 90 tablet 1     Sig: Take 1 tablet (10 mg total) by mouth once daily.   LOV: 12/9/19. [facility]

## 2019-12-30 RX ORDER — ESCITALOPRAM OXALATE 10 MG/1
10 TABLET ORAL DAILY
Qty: 90 TABLET | Refills: 1 | OUTPATIENT
Start: 2019-12-30 | End: 2020-06-27

## 2019-12-30 NOTE — TELEPHONE ENCOUNTER
Please advise on this refill request. It was filled per Consuelo on 11/29/19, but she has since then established with you as her PCP. Thanks.

## 2019-12-31 RX ORDER — ESCITALOPRAM OXALATE 10 MG/1
10 TABLET ORAL DAILY
Qty: 90 TABLET | Refills: 0 | OUTPATIENT
Start: 2019-12-31 | End: 2020-03-30

## 2020-01-18 DIAGNOSIS — F41.1 GAD (GENERALIZED ANXIETY DISORDER): ICD-10-CM

## 2020-01-21 RX ORDER — ESCITALOPRAM OXALATE 10 MG/1
TABLET ORAL
Qty: 30 TABLET | Refills: 2 | Status: SHIPPED | OUTPATIENT
Start: 2020-01-21 | End: 2020-03-04

## 2020-02-18 ENCOUNTER — TELEPHONE (OUTPATIENT)
Dept: FAMILY MEDICINE | Facility: CLINIC | Age: 80
End: 2020-02-18

## 2020-02-19 NOTE — TELEPHONE ENCOUNTER
----- Message from Kalani Stevens sent at 2020  4:56 PM CST -----  Contact: self  Marva Perez  MRN: [id number]  : 1940  PCP: Carissa Green  Home Phone      [phone]  Work Phone      Not on file.  Mobile          [phone]      MESSAGE:   Pt requests a sooner appointment than the  can schedule.  Does patient feel like they need to be seen today:  yes  What is the nature of the appointment: butt cheek are itching, thinks it shingles  What visit type:  est  Did you check other providers/department schedules for availability:   yes  Comments:     Phone:  [phone]

## 2020-03-04 ENCOUNTER — OFFICE VISIT (OUTPATIENT)
Dept: INTERNAL MEDICINE | Facility: CLINIC | Age: 80
End: 2020-03-04
Payer: MEDICARE

## 2020-03-04 VITALS
BODY MASS INDEX: 19.6 KG/M2 | DIASTOLIC BLOOD PRESSURE: 78 MMHG | WEIGHT: 106.5 LBS | RESPIRATION RATE: 96 BRPM | SYSTOLIC BLOOD PRESSURE: 132 MMHG | HEART RATE: 76 BPM | HEIGHT: 62 IN

## 2020-03-04 DIAGNOSIS — J96.11 CHRONIC RESPIRATORY FAILURE WITH HYPOXIA: ICD-10-CM

## 2020-03-04 DIAGNOSIS — B86 SCABIES: ICD-10-CM

## 2020-03-04 DIAGNOSIS — F41.1 GAD (GENERALIZED ANXIETY DISORDER): Primary | ICD-10-CM

## 2020-03-04 DIAGNOSIS — I48.0 PAF (PAROXYSMAL ATRIAL FIBRILLATION): ICD-10-CM

## 2020-03-04 DIAGNOSIS — F32.A DEPRESSIVE DISORDER: ICD-10-CM

## 2020-03-04 DIAGNOSIS — I87.2 VENOUS INSUFFICIENCY: ICD-10-CM

## 2020-03-04 DIAGNOSIS — H61.23 BILATERAL IMPACTED CERUMEN: ICD-10-CM

## 2020-03-04 DIAGNOSIS — Z79.01 CHRONIC ANTICOAGULATION: ICD-10-CM

## 2020-03-04 PROCEDURE — 69209 EAR CERUMEN REMOVAL: ICD-10-PCS | Mod: 50,S$GLB,, | Performed by: NURSE PRACTITIONER

## 2020-03-04 PROCEDURE — 99214 PR OFFICE/OUTPT VISIT, EST, LEVL IV, 30-39 MIN: ICD-10-PCS | Mod: 25,S$GLB,, | Performed by: NURSE PRACTITIONER

## 2020-03-04 PROCEDURE — 99999 PR PBB SHADOW E&M-EST. PATIENT-LVL III: ICD-10-PCS | Mod: PBBFAC,,, | Performed by: NURSE PRACTITIONER

## 2020-03-04 PROCEDURE — 69209 REMOVE IMPACTED EAR WAX UNI: CPT | Mod: 50,S$GLB,, | Performed by: NURSE PRACTITIONER

## 2020-03-04 PROCEDURE — 99214 OFFICE O/P EST MOD 30 MIN: CPT | Mod: 25,S$GLB,, | Performed by: NURSE PRACTITIONER

## 2020-03-04 PROCEDURE — 99999 PR PBB SHADOW E&M-EST. PATIENT-LVL III: CPT | Mod: PBBFAC,,, | Performed by: NURSE PRACTITIONER

## 2020-03-04 RX ORDER — CARVEDILOL 3.12 MG/1
3.12 TABLET ORAL 2 TIMES DAILY
Status: ON HOLD | COMMUNITY
Start: 2020-02-24 | End: 2022-08-26

## 2020-03-04 RX ORDER — MINOCYCLINE HYDROCHLORIDE 100 MG/1
CAPSULE ORAL
COMMUNITY
Start: 2020-02-11 | End: 2020-06-29

## 2020-03-04 RX ORDER — ROSUVASTATIN CALCIUM 20 MG/1
20 TABLET, COATED ORAL DAILY
COMMUNITY
Start: 2020-02-27 | End: 2020-12-21 | Stop reason: ALTCHOICE

## 2020-03-04 RX ORDER — MIRTAZAPINE 15 MG/1
TABLET, FILM COATED ORAL
Qty: 30 TABLET | Refills: 5 | Status: SHIPPED | OUTPATIENT
Start: 2020-03-04 | End: 2020-03-04 | Stop reason: SDUPTHER

## 2020-03-04 RX ORDER — PERMETHRIN 50 MG/G
CREAM TOPICAL
Qty: 60 G | Refills: 0 | Status: SHIPPED | OUTPATIENT
Start: 2020-03-04 | End: 2020-05-28

## 2020-03-04 RX ORDER — MIRTAZAPINE 15 MG/1
TABLET, FILM COATED ORAL
Qty: 90 TABLET | Refills: 1 | Status: SHIPPED | OUTPATIENT
Start: 2020-03-04 | End: 2020-05-28

## 2020-03-04 NOTE — PROGRESS NOTES
Subjective:           Patient ID: Marva Perez is a 79 y.o. female.    Chief Complaint: discuss medications and Herpes Zoster    Marva Perez is a 78 y.o. female  With PMHX of HTN, HLD, GERD, Depression, anxiety, and COPD/Chronic resp failure requiring home Oxygen/ Emphysema    admitted to [facility] in August for hypoxemia, she was sterted on mirtazapine and cymbalta for anxiety and depression during this August admission also.    Here with c/o new rash to bilateral buttocks that itches; lots of scratching with diffuse itching     + hx of remote AFb but developed AFIb - RVR during last hosptial admit in August follows with Cards; taking Cardizem, Tambacor, coreg and low dose eliquis   Started on eliquis last August but had some scant hemoptysis. Dr. Matson did bronch and noted bleeding in PRATIBHA per his report.   No masses found; had bronchial washings and cultures;   No acid fast bacilli seen.  Cytology -- FINAL PATHOLOGIC DIAGNOSIS  Negative for malignant cells. Bacteria present. Fungal organisms consistent with Candida species.  3mo ago            Respiratory Culture     Normal respiratory belén   Respiratory Culture     No S aureus or Pseudomonas isolated.   Gram Stain (Respiratory)        <10 epithelial cells per low power field.   Gram Stain (Respiratory)        Few WBC's   Gram Stain (Respiratory)        Few Gram positive rods    Gram Stain (Respiratory)        Rare Gram positive cocci      3mo ago            Antimicrobial Susceptibility, Nocardia species           FINAL 10/01/2019 [id number]    Comment: SOURCE: BRONCHIAL WASHING, Bronchial wash and Nocardia   species   SUSCEPTIBILITY, NOCARDIA SPECIES                       FINAL   NOCARDIA NOVA     ----------------------------------------------------------   Organism     NOCARDIA NOVA   Antibiotic    GINGER (mcg/mL)  Interpretation   ----------------------------------------------------------   Amox/Clav           >64/32       R   Cefepime                >32       R   Ceftriaxone             32       I   Imipenem               <=2       S   Ciprofloxacin           >4       R   Moxifloxacin             2       I   Clarithromycin      <=0.06       S   Amikacin               <=1       S   Tobramycin             >16       R   Doxycycline             16       R   Minocycline              4       I   TMP/SMX            0.5/9.5       S   Linezolid              <=1       S      Pt reports that Dr. Matson consulted with Dr. Tobias and was recommended Minocyline Rx; 100mg bid for 3 months ; started on 9/22/19  Still taking ABX- future bronchoscopy planned     Pt has had extensive work up for lungs with CT of chest negative for PE or mass  Prior CTA of abdomen with runoff negative for severe stenosis or mass.     Anxiety and depression; takes chronic Benzo prescribed per Dr. Clements; Dr. Burleson has long conversation with pt in hospital and tried to taper; she was sterted on mirtazapine and cymbalta for anxiety and depression during this August admission, later stopped. multiple times we have tried to prescribe anti- depressant meds which she stops;  Ran out of mirtazapine and did not have refills- never called- but felt better with Rx; appetite and weight improved- will resume     Recent labs reviewed  Pt with c/o cerumen impaction- bilaterally; used to get irrigation done at family doctor; tech attempted cerumen irrigation with little cerumen removal. Notes she tried debrox and it did not help.  Discussed follow up for irrigation in a few days     Review of Systems   Constitutional: Positive for fatigue. Negative for appetite change, chills and fever.   HENT: Negative for congestion, ear discharge, ear pain, rhinorrhea, sinus pressure and sore throat.         C/o cerumen to bilateral ears    Respiratory: Positive for cough and shortness of breath. Negative for choking and chest tightness.         With coughing and increased activity  She is on home Oxygen     Cardiovascular: Negative for chest pain and palpitations.   Gastrointestinal: Negative for constipation, diarrhea, nausea and vomiting.   Musculoskeletal: Negative for joint swelling and myalgias.   Skin: Positive for rash. Negative for wound.        C/o dry skin and hyper pigmentation to right ankle    Neurological: Negative for dizziness, syncope, weakness, light-headedness and numbness.       Objective:      Physical Exam   Constitutional: She is oriented to person, place, and time.   Wearing home oxygen    HENT:   Head: Normocephalic and atraumatic.   Nose: Nose normal.   Mouth/Throat: Posterior oropharyngeal erythema present.   Eyes: Pupils are equal, round, and reactive to light. EOM are normal.   Neck: Normal range of motion. Neck supple.   Cardiovascular: Normal rate, regular rhythm and normal heart sounds.   No murmur heard.  Pulmonary/Chest: Effort normal and breath sounds normal. No respiratory distress.   Wearing nasal cannula-supplemental oxygen  Lung fields diminished    Abdominal: Soft. Bowel sounds are normal.   Musculoskeletal: Normal range of motion. She exhibits no edema.   Neurological: She is alert and oriented to person, place, and time.   Skin: Skin is warm and dry. Capillary refill takes less than 2 seconds. Rash noted.   Bilateral buttocks with diffuse non specific mac pap, scratch marks   Dry skin  Large area of dark skin to right anterior ankle. Foot - appears c/w venous insuff- instructed on wearing compression sock or hose    Psychiatric: Her behavior is normal. Judgment and thought content normal.   Nursing note and vitals reviewed.      Assessment:       1. KATHIE (generalized anxiety disorder)    2. Chronic respiratory failure with hypoxia    3. PAF (paroxysmal atrial fibrillation)    4. Depressive disorder    5. Scabies    6. Venous insufficiency    7. Chronic anticoagulation        Plan:   Marva was seen today for discuss medications and herpes zoster.    Diagnoses and all orders  for this visit:    KATHIE (generalized anxiety disorder)  -     Discontinue: mirtazapine (REMERON) 15 MG tablet; One po q hs for rest and depression  -     mirtazapine (REMERON) 15 MG tablet; One po q hs for rest and depression    Chronic respiratory failure with hypoxia    PAF (paroxysmal atrial fibrillation)  -     CBC auto differential; Future  -     Comprehensive metabolic panel; Future    Depressive disorder  -     Discontinue: mirtazapine (REMERON) 15 MG tablet; One po q hs for rest and depression  -     mirtazapine (REMERON) 15 MG tablet; One po q hs for rest and depression    Scabies  -     permethrin (ELIMITE) 5 % cream; Apply from neck down to soles of feet - wash off after 12hours    Venous insufficiency    Chronic anticoagulation  -     CBC auto differential; Future  -     Comprehensive metabolic panel; Future      Problem List Items Addressed This Visit        Psychiatric    KATHIE (generalized anxiety disorder) - Primary    Relevant Medications    mirtazapine (REMERON) 15 MG tablet       Pulmonary    Chronic respiratory failure      Other Visit Diagnoses     PAF (paroxysmal atrial fibrillation)        Relevant Orders    CBC auto differential    Comprehensive metabolic panel    Depressive disorder        Relevant Medications    mirtazapine (REMERON) 15 MG tablet    Scabies        Relevant Medications    permethrin (ELIMITE) 5 % cream    Venous insufficiency        Chronic anticoagulation        Relevant Orders    CBC auto differential    Comprehensive metabolic panel

## 2020-03-04 NOTE — Clinical Note
Shawn Hopkins, Would you be able to get this pt in 3/5 for cerumen irrigation; staff here unable to remove and she says you usually do a good job with her. Kaetlynn

## 2020-03-05 NOTE — PROCEDURES
Ear Cerumen Removal  Date/Time: 3/4/2020 2:15 PM  Performed by: Consuelo Pitt NP  Authorized by: Consuelo Pitt NP     Consent Done?:  Yes (Verbal)  Location details:  Both ears  Procedure type: irrigation    Cerumen  Removal Results:  Unable to remove cerumen

## 2020-03-11 ENCOUNTER — TELEPHONE (OUTPATIENT)
Dept: FAMILY MEDICINE | Facility: CLINIC | Age: 80
End: 2020-03-11

## 2020-03-11 ENCOUNTER — OFFICE VISIT (OUTPATIENT)
Dept: FAMILY MEDICINE | Facility: CLINIC | Age: 80
End: 2020-03-11
Payer: MEDICARE

## 2020-03-11 VITALS
WEIGHT: 107.81 LBS | DIASTOLIC BLOOD PRESSURE: 60 MMHG | SYSTOLIC BLOOD PRESSURE: 102 MMHG | HEART RATE: 72 BPM | RESPIRATION RATE: 18 BRPM | BODY MASS INDEX: 19.84 KG/M2 | HEIGHT: 62 IN

## 2020-03-11 DIAGNOSIS — J43.2 CENTRILOBULAR EMPHYSEMA: ICD-10-CM

## 2020-03-11 DIAGNOSIS — J44.1 COPD EXACERBATION: ICD-10-CM

## 2020-03-11 DIAGNOSIS — I48.91 ATRIAL FIBRILLATION WITH RVR: ICD-10-CM

## 2020-03-11 DIAGNOSIS — I25.10 ASCVD (ARTERIOSCLEROTIC CARDIOVASCULAR DISEASE): ICD-10-CM

## 2020-03-11 DIAGNOSIS — R30.0 DYSURIA: Primary | ICD-10-CM

## 2020-03-11 DIAGNOSIS — B35.4 TINEA CORPORIS: ICD-10-CM

## 2020-03-11 LAB
BACTERIA SPEC CULT: NORMAL
BILIRUB SERPL-MCNC: NORMAL MG/DL
BLOOD URINE, POC: NORMAL
CASTS: NORMAL
COLOR, POC UA: NORMAL
CRYSTALS: NORMAL
GLUCOSE UR QL STRIP: NORMAL
KETONES UR QL STRIP: NORMAL
LEUKOCYTE ESTERASE URINE, POC: NORMAL
NITRITE, POC UA: NORMAL
PH, POC UA: 5
PROTEIN, POC: NORMAL
RBC CELLS COUNTED: NORMAL
SPECIFIC GRAVITY, POC UA: 1.02
UROBILINOGEN, POC UA: NORMAL
WHITE BLOOD CELLS: NORMAL

## 2020-03-11 PROCEDURE — 99213 OFFICE O/P EST LOW 20 MIN: CPT | Mod: 25,S$GLB,, | Performed by: FAMILY MEDICINE

## 2020-03-11 PROCEDURE — 99999 PR PBB SHADOW E&M-EST. PATIENT-LVL III: CPT | Mod: PBBFAC,,, | Performed by: FAMILY MEDICINE

## 2020-03-11 PROCEDURE — 99213 PR OFFICE/OUTPT VISIT, EST, LEVL III, 20-29 MIN: ICD-10-PCS | Mod: 25,S$GLB,, | Performed by: FAMILY MEDICINE

## 2020-03-11 PROCEDURE — 99999 PR PBB SHADOW E&M-EST. PATIENT-LVL III: ICD-10-PCS | Mod: PBBFAC,,, | Performed by: FAMILY MEDICINE

## 2020-03-11 PROCEDURE — 81000 POCT URINE SEDIMENT EXAM: ICD-10-PCS | Mod: S$GLB,,, | Performed by: FAMILY MEDICINE

## 2020-03-11 PROCEDURE — 81000 URINALYSIS NONAUTO W/SCOPE: CPT | Mod: S$GLB,,, | Performed by: FAMILY MEDICINE

## 2020-03-11 RX ORDER — CLOTRIMAZOLE AND BETAMETHASONE DIPROPIONATE 10; .64 MG/G; MG/G
CREAM TOPICAL
Qty: 45 G | Refills: 3 | Status: SHIPPED | OUTPATIENT
Start: 2020-03-11 | End: 2020-05-28

## 2020-03-11 RX ORDER — LOSARTAN POTASSIUM 50 MG/1
50 TABLET ORAL DAILY
COMMUNITY
End: 2021-07-01

## 2020-03-11 RX ORDER — TERBINAFINE HYDROCHLORIDE 250 MG/1
250 TABLET ORAL DAILY
Qty: 15 TABLET | Refills: 0 | Status: SHIPPED | OUTPATIENT
Start: 2020-03-11 | End: 2020-09-08

## 2020-03-11 NOTE — PROGRESS NOTES
Subjective:       Patient ID: Marva Perez is a 79 y.o. female.    Chief Complaint: Rash (located on buttocks )    Pt is a 79 y.o. female who presents for check up for Dysuria  (primary encounter diagnosis)  Ascvd (arteriosclerotic cardiovascular disease)  Atrial fibrillation with rvr  Centrilobular emphysema  Copd exacerbation. Doing well on current meds. Denies any side effects. Prevention is up to date.    Review of Systems   Constitutional: Negative for appetite change, chills and fever.   HENT: Negative for rhinorrhea, sinus pressure, sore throat and trouble swallowing.    Respiratory: Negative for cough, chest tightness, shortness of breath and wheezing.    Cardiovascular: Negative for chest pain and palpitations.   Gastrointestinal: Negative for abdominal pain, blood in stool, diarrhea, nausea and vomiting.   Genitourinary: Negative for dysuria, flank pain, hematuria, pelvic pain, urgency, vaginal bleeding, vaginal discharge and vaginal pain.   Musculoskeletal: Negative for back pain, joint swelling and neck stiffness.   Skin: Positive for rash.        Buttock's rash for several months   Neurological: Negative for dizziness, weakness, light-headedness, numbness and headaches.   Hematological: Does not bruise/bleed easily.   Psychiatric/Behavioral: Negative for agitation. The patient is not nervous/anxious.        Objective:      Physical Exam   Constitutional: She is oriented to person, place, and time. She appears well-developed and well-nourished.   HENT:   Head: Normocephalic.   Eyes: Pupils are equal, round, and reactive to light.   Neck: Normal range of motion. Neck supple. No thyromegaly present.   Cardiovascular: Normal rate and regular rhythm.   Pulmonary/Chest: No respiratory distress. She has no wheezes. She has no rales. She exhibits no tenderness.   Abdominal: She exhibits no distension. There is no tenderness. There is no rebound and no guarding.   Musculoskeletal: Normal range of motion. She  exhibits no edema or tenderness.   Lymphadenopathy:     She has no cervical adenopathy.   Neurological: She is alert and oriented to person, place, and time. She has normal reflexes. She displays normal reflexes. No cranial nerve deficit. She exhibits normal muscle tone. Coordination normal.   Skin: Skin is warm and dry. No rash noted. No pallor.   Psychiatric: She has a normal mood and affect. Judgment and thought content normal.       Assessment:       1. Dysuria    2. ASCVD (arteriosclerotic cardiovascular disease)    3. Atrial fibrillation with RVR    4. Centrilobular emphysema    5. COPD exacerbation        Plan:   Marva was seen today for rash.    Diagnoses and all orders for this visit:    Dysuria  -     POCT URINE DIPSTICK WITHOUT MICROSCOPE  -     POCT Urine Sediment Exam    ASCVD (arteriosclerotic cardiovascular disease)    Atrial fibrillation with RVR    Centrilobular emphysema    COPD exacerbation

## 2020-03-11 NOTE — TELEPHONE ENCOUNTER
----- Message from Kalani Stevens sent at 3/10/2020  5:25 PM CDT -----  Contact: self  Marva Perez  MRN: [id number]  : 1940  PCP: Carissa Green  Home Phone      [phone]  Work Phone      Not on file.  Mobile          [phone]      MESSAGE:   Pt requests a sooner appointment than the  can schedule.  Does patient feel like they need to be seen today:  yes  What is the nature of the appointment:  rash  What visit type:  est  Did you check other providers/department schedules for availability:   Only amanda posey  Comments:     Phone:  [phone]

## 2020-03-11 NOTE — TELEPHONE ENCOUNTER
----- Message from Kalani Stevens sent at 3/11/2020  8:46 AM CDT -----  Contact: self  Marva Perez  MRN: [id number]  : 1940  PCP: Carissa Green  Home Phone      [phone]  Work Phone      Not on file.  Mobile          [phone]      MESSAGE:   Patient is returning a phone call.  Who left a message for the patient:  JJ  Does patient know what this is regarding:  appointment  Comments:      Phone:  [phone]

## 2020-03-17 RX ORDER — PANTOPRAZOLE SODIUM 40 MG/1
TABLET, DELAYED RELEASE ORAL
Qty: 90 TABLET | Refills: 3 | Status: SHIPPED | OUTPATIENT
Start: 2020-03-17 | End: 2020-05-28

## 2020-03-17 RX ORDER — ERGOCALCIFEROL 1.25 MG/1
CAPSULE ORAL
Qty: 12 CAPSULE | Refills: 1 | Status: SHIPPED | OUTPATIENT
Start: 2020-03-17 | End: 2020-07-29

## 2020-05-04 DIAGNOSIS — F41.1 GAD (GENERALIZED ANXIETY DISORDER): ICD-10-CM

## 2020-05-04 DIAGNOSIS — G47.00 INSOMNIA, UNSPECIFIED TYPE: ICD-10-CM

## 2020-05-04 RX ORDER — ALPRAZOLAM 0.25 MG/1
TABLET ORAL
Qty: 180 TABLET | Refills: 1 | Status: SHIPPED | OUTPATIENT
Start: 2020-05-04 | End: 2020-07-28 | Stop reason: SDUPTHER

## 2020-05-21 ENCOUNTER — TELEPHONE (OUTPATIENT)
Dept: FAMILY MEDICINE | Facility: CLINIC | Age: 80
End: 2020-05-21

## 2020-05-21 NOTE — TELEPHONE ENCOUNTER
Patient asking to get back on Lexapro. States she did well on it in the past but this time wants to get on the lowest dose possible.  Would like this sent to [facility] in [place]. Notified she would have to wait until you returned before this could be sent in, and she is fine with this.

## 2020-05-21 NOTE — TELEPHONE ENCOUNTER
----- Message from Lennox Son sent at 2020 12:35 PM CDT -----  Contact: Patient  Marva Perez  MRN: [id number]  : 1940  PCP: Carissa Green  Home Phone      [phone]  Work Phone      Not on file.  Mobile          [phone]      MESSAGE: states she was previously prescribed Lexapro by Dr Clements - took it for approx 1 mo -- feeling like she needs it again -- requesting lowest dose-- uses  [facility] in [place] - asks that request go to [place]    Call [phone]    PCP: Tyree

## 2020-05-22 RX ORDER — ESCITALOPRAM OXALATE 10 MG/1
10 TABLET ORAL DAILY
Qty: 30 TABLET | Refills: 1 | Status: SHIPPED | OUTPATIENT
Start: 2020-05-22 | End: 2020-05-28

## 2020-05-26 ENCOUNTER — NURSE TRIAGE (OUTPATIENT)
Dept: ADMINISTRATIVE | Facility: CLINIC | Age: 80
End: 2020-05-26

## 2020-05-26 NOTE — TELEPHONE ENCOUNTER
Pt wanted help calling for her DME thinks O2 cord was having trouble if not able to fix call [phone]    Reason for Disposition   General information question, no triage required and triager able to answer question    Additional Information   Negative: [1] Caller is not with the adult (patient) AND [2] reporting urgent symptoms   Negative: Lab result questions   Negative: Medication questions   Negative: Caller can't be reached by phone   Negative: Caller has already spoken to PCP or another triager   Negative: RN needs further essential information from caller in order to complete triage   Negative: Requesting regular office appointment   Negative: [1] Caller requesting NON-URGENT health information AND [2] PCP's office is the best resource   Negative: Health Information question, no triage required and triager able to answer question    Protocols used: INFORMATION ONLY CALL-A-

## 2020-05-27 ENCOUNTER — TELEPHONE (OUTPATIENT)
Dept: FAMILY MEDICINE | Facility: CLINIC | Age: 80
End: 2020-05-27

## 2020-05-27 DIAGNOSIS — J44.1 COPD EXACERBATION: Primary | ICD-10-CM

## 2020-05-27 NOTE — TELEPHONE ENCOUNTER
----- Message from Lennox Son sent at 2020  9:12 AM CDT -----  Contact: Patient  Marva Perez  MRN: [id number]  : 1940  PCP: Carissa Green  Home Phone      [phone]  Work Phone      Not on file.  Mobile          [phone]      MESSAGE: requesting home health services - states she is on O2 & had a problem with a hose yesterday - [facility] is supposed to come out & check on her & they do not -- she is very dissatisfied with them -- says she lives alone & needs help -- please advise    Call [phone]    PCP Tyree

## 2020-05-28 ENCOUNTER — TELEPHONE (OUTPATIENT)
Dept: FAMILY MEDICINE | Facility: CLINIC | Age: 80
End: 2020-05-28

## 2020-05-28 ENCOUNTER — OFFICE VISIT (OUTPATIENT)
Dept: INTERNAL MEDICINE | Facility: CLINIC | Age: 80
End: 2020-05-28
Payer: MEDICARE

## 2020-05-28 ENCOUNTER — HOSPITAL ENCOUNTER (OUTPATIENT)
Dept: RADIOLOGY | Facility: HOSPITAL | Age: 80
Discharge: HOME | End: 2020-05-28
Attending: NURSE PRACTITIONER
Payer: MEDICARE

## 2020-05-28 VITALS
SYSTOLIC BLOOD PRESSURE: 126 MMHG | BODY MASS INDEX: 19.88 KG/M2 | RESPIRATION RATE: 16 BRPM | WEIGHT: 108 LBS | HEART RATE: 82 BPM | OXYGEN SATURATION: 97 % | DIASTOLIC BLOOD PRESSURE: 60 MMHG | TEMPERATURE: 96 F | HEIGHT: 62 IN

## 2020-05-28 DIAGNOSIS — F41.1 GAD (GENERALIZED ANXIETY DISORDER): ICD-10-CM

## 2020-05-28 DIAGNOSIS — J45.909 ASTHMATIC BRONCHITIS: ICD-10-CM

## 2020-05-28 DIAGNOSIS — J44.1 COPD EXACERBATION: Primary | ICD-10-CM

## 2020-05-28 DIAGNOSIS — J44.1 COPD EXACERBATION: ICD-10-CM

## 2020-05-28 PROCEDURE — 99214 OFFICE O/P EST MOD 30 MIN: CPT | Mod: 25,S$GLB,, | Performed by: NURSE PRACTITIONER

## 2020-05-28 PROCEDURE — 96372 THER/PROPH/DIAG INJ SC/IM: CPT | Mod: S$GLB,,, | Performed by: NURSE PRACTITIONER

## 2020-05-28 PROCEDURE — 71046 X-RAY EXAM CHEST 2 VIEWS: CPT | Mod: 26,,, | Performed by: RADIOLOGY

## 2020-05-28 PROCEDURE — 99999 PR PBB SHADOW E&M-EST. PATIENT-LVL III: ICD-10-PCS | Mod: PBBFAC,,, | Performed by: NURSE PRACTITIONER

## 2020-05-28 PROCEDURE — 99214 PR OFFICE/OUTPT VISIT, EST, LEVL IV, 30-39 MIN: ICD-10-PCS | Mod: 25,S$GLB,, | Performed by: NURSE PRACTITIONER

## 2020-05-28 PROCEDURE — G0180 PR HOME HEALTH MD CERTIFICATION: ICD-10-PCS | Mod: ,,, | Performed by: FAMILY MEDICINE

## 2020-05-28 PROCEDURE — 96372 PR INJECTION,THERAP/PROPH/DIAG2ST, IM OR SUBCUT: ICD-10-PCS | Mod: S$GLB,,, | Performed by: NURSE PRACTITIONER

## 2020-05-28 PROCEDURE — 71046 X-RAY EXAM CHEST 2 VIEWS: CPT | Mod: TC

## 2020-05-28 PROCEDURE — 99999 PR PBB SHADOW E&M-EST. PATIENT-LVL III: CPT | Mod: PBBFAC,,, | Performed by: NURSE PRACTITIONER

## 2020-05-28 PROCEDURE — 71046 XR CHEST PA AND LATERAL: ICD-10-PCS | Mod: 26,,, | Performed by: RADIOLOGY

## 2020-05-28 PROCEDURE — G0180 MD CERTIFICATION HHA PATIENT: HCPCS | Mod: ,,, | Performed by: FAMILY MEDICINE

## 2020-05-28 RX ORDER — METHYLPREDNISOLONE ACETATE 40 MG/ML
40 INJECTION, SUSPENSION INTRA-ARTICULAR; INTRALESIONAL; INTRAMUSCULAR; SOFT TISSUE
Status: COMPLETED | OUTPATIENT
Start: 2020-05-28 | End: 2020-05-28

## 2020-05-28 RX ORDER — LEVALBUTEROL INHALATION SOLUTION 0.63 MG/3ML
1 SOLUTION RESPIRATORY (INHALATION) EVERY 4 HOURS PRN
Qty: 60 ML | Refills: 5 | Status: ON HOLD | OUTPATIENT
Start: 2020-05-28 | End: 2023-04-07 | Stop reason: HOSPADM

## 2020-05-28 RX ORDER — PREDNISONE 20 MG/1
40 TABLET ORAL DAILY
Qty: 10 TABLET | Refills: 0 | Status: SHIPPED | OUTPATIENT
Start: 2020-05-29 | End: 2020-06-03

## 2020-05-28 RX ORDER — ESCITALOPRAM OXALATE 5 MG/1
5 TABLET ORAL DAILY
Qty: 90 TABLET | Refills: 1 | Status: SHIPPED | OUTPATIENT
Start: 2020-05-28 | End: 2020-07-28 | Stop reason: SDUPTHER

## 2020-05-28 RX ORDER — AZITHROMYCIN 250 MG/1
TABLET, FILM COATED ORAL
Qty: 6 TABLET | Refills: 0 | Status: SHIPPED | OUTPATIENT
Start: 2020-05-28 | End: 2020-06-29

## 2020-05-28 RX ADMIN — METHYLPREDNISOLONE ACETATE 40 MG: 40 INJECTION, SUSPENSION INTRA-ARTICULAR; INTRALESIONAL; INTRAMUSCULAR; SOFT TISSUE at 02:05

## 2020-05-28 NOTE — PROGRESS NOTES
"Subjective:       Patient ID: Marva Perez is a 79 y.o. female.    Chief Complaint: Wheezing; Shortness of Breath; Cough (slight); and Otalgia    HPI: Pt presents to clinic today new to me with c/o SOB and wheezing. She reports that she usually sees Dr Green. She also sees Dr herbert for pulm She is on chronic O2 at 2L. She has "stage 4 COPD". She also reports tat she was treated for MAC by ID and completed 6 month treatment abx. Using trelegy daily and yesterday used nebulizer duoneb, She feels like it dried her too much. She also saw home health today and they sent her here for wheezing. No fever. At times she is coughing and spit up a little green. Has not been on z pcak since 6 months  Review of Systems   Constitutional: Positive for fatigue. Negative for appetite change, chills and fever.   HENT: Negative for congestion, ear discharge, ear pain, rhinorrhea, sinus pressure and sore throat.    Respiratory: Positive for cough, shortness of breath and wheezing. Negative for choking and chest tightness.         With coughing and increased activity   Cardiovascular: Negative for chest pain and palpitations.   Gastrointestinal: Negative for constipation, diarrhea, nausea and vomiting.   Musculoskeletal: Negative for joint swelling and myalgias.   Skin: Negative for rash and wound.   Neurological: Negative for dizziness, syncope, weakness, light-headedness and numbness.       Objective:      Physical Exam   Constitutional: She is oriented to person, place, and time. She appears well-developed and well-nourished.   HENT:   Head: Normocephalic and atraumatic.   Right Ear: External ear normal.   Left Ear: External ear normal.   Nose: Nose normal.   Mouth/Throat: Oropharynx is clear and moist. No oropharyngeal exudate.   Eyes: Pupils are equal, round, and reactive to light. Conjunctivae and EOM are normal.   Neck: Normal range of motion. Neck supple. No thyromegaly present.   Cardiovascular: Normal rate, regular rhythm, " normal heart sounds and intact distal pulses.   Pulmonary/Chest: Effort normal. No respiratory distress. She has wheezes. She has rales.   Right sided decreased   Abdominal: Soft. Bowel sounds are normal.   Musculoskeletal: Normal range of motion.   Neurological: She is alert and oriented to person, place, and time. She has normal reflexes.   Skin: Skin is warm and dry.   Psychiatric: She has a normal mood and affect. Her behavior is normal. Judgment and thought content normal.   Nursing note and vitals reviewed.      Assessment:       1. COPD exacerbation    2. KATHIE (generalized anxiety disorder)    3. Asthmatic bronchitis        Plan:     Problem List Items Addressed This Visit     KATHIE (generalized anxiety disorder)    Relevant Medications    escitalopram oxalate (LEXAPRO) 5 MG Tab    COPD exacerbation - Primary    Relevant Medications    azithromycin (ZITHROMAX Z-SOFIA) 250 MG tablet    predniSONE (DELTASONE) 20 MG tablet (Start on 5/29/2020)    methylPREDNISolone acetate injection 40 mg (Start on 5/28/2020  2:00 PM)      Other Visit Diagnoses     Asthmatic bronchitis        Relevant Medications    levalbuterol (XOPENEX) 0.63 mg/3 mL nebulizer solution        Resume lexapro at low dose. She is very anxious. Crying a lot from scared about lungs, covid, etc.   Started zithromax today, prednisone starts tomorrow, start levoalbuterol today.   CXR today  medol today

## 2020-05-29 ENCOUNTER — TELEPHONE (OUTPATIENT)
Dept: INTERNAL MEDICINE | Facility: CLINIC | Age: 80
End: 2020-05-29

## 2020-05-29 NOTE — TELEPHONE ENCOUNTER
----- Message from Donna Chris sent at 2020  1:17 PM CDT -----  Contact: Self  Marva Perez  MRN: [id number]  : 1940  PCP: Carissa Green  Home Phone      [phone]  Work Phone      Not on file.  Mobile          [phone]    MESSAGE:   Would like to see if she can be tested to see if she has or has had COVID.  She was just in to see Nichelle yesterday. Please call to discuss and advise.    Phone: [phone]

## 2020-05-29 NOTE — TELEPHONE ENCOUNTER
Informed patient that the xray only showed bronchitis. Pt has no other symptoms. No need for testing

## 2020-06-03 ENCOUNTER — TELEPHONE (OUTPATIENT)
Dept: INTERNAL MEDICINE | Facility: CLINIC | Age: 80
End: 2020-06-03

## 2020-06-03 NOTE — TELEPHONE ENCOUNTER
----- Message from Donna Chris sent at 6/3/2020  1:13 PM CDT -----  Contact: Self  Marva Perez  MRN: [id number]  : 1940  PCP: Kevin Clements  Home Phone      [phone]  Work Phone      Not on file.  Mobile          [phone]    MESSAGE:     Calling to see if she absolutely has to come to appointment this afternoon.  Please call to advise.    Phone: [phone]

## 2020-06-03 NOTE — TELEPHONE ENCOUNTER
Advised the patient that she does not need to be seen as long as she is feeling good and has had no changes to her condition. She has an appt with Dr. Clements on 6/29/20

## 2020-06-08 ENCOUNTER — EXTERNAL HOME HEALTH (OUTPATIENT)
Dept: HOME HEALTH SERVICES | Facility: HOSPITAL | Age: 80
End: 2020-06-08
Payer: MEDICARE

## 2020-06-29 ENCOUNTER — HOSPITAL ENCOUNTER (OUTPATIENT)
Dept: RADIOLOGY | Facility: HOSPITAL | Age: 80
Discharge: HOME | End: 2020-06-29
Attending: INTERNAL MEDICINE
Payer: MEDICARE

## 2020-06-29 ENCOUNTER — OFFICE VISIT (OUTPATIENT)
Dept: INTERNAL MEDICINE | Facility: CLINIC | Age: 80
End: 2020-06-29
Payer: MEDICARE

## 2020-06-29 ENCOUNTER — TELEPHONE (OUTPATIENT)
Dept: INTERNAL MEDICINE | Facility: CLINIC | Age: 80
End: 2020-06-29

## 2020-06-29 VITALS
HEIGHT: 62 IN | OXYGEN SATURATION: 96 % | DIASTOLIC BLOOD PRESSURE: 50 MMHG | BODY MASS INDEX: 20.13 KG/M2 | HEART RATE: 56 BPM | TEMPERATURE: 97 F | SYSTOLIC BLOOD PRESSURE: 100 MMHG | WEIGHT: 109.38 LBS | RESPIRATION RATE: 22 BRPM

## 2020-06-29 DIAGNOSIS — Z20.822 EXPOSURE TO COVID-19 VIRUS: ICD-10-CM

## 2020-06-29 DIAGNOSIS — J44.1 COPD EXACERBATION: ICD-10-CM

## 2020-06-29 DIAGNOSIS — Z20.822 EXPOSURE TO COVID-19 VIRUS: Primary | ICD-10-CM

## 2020-06-29 DIAGNOSIS — R06.02 SHORTNESS OF BREATH: ICD-10-CM

## 2020-06-29 DIAGNOSIS — R05.9 COUGH: ICD-10-CM

## 2020-06-29 PROCEDURE — 71046 X-RAY EXAM CHEST 2 VIEWS: CPT | Mod: 26,,, | Performed by: RADIOLOGY

## 2020-06-29 PROCEDURE — 96372 THER/PROPH/DIAG INJ SC/IM: CPT | Mod: S$GLB,,, | Performed by: INTERNAL MEDICINE

## 2020-06-29 PROCEDURE — 96372 PR INJECTION,THERAP/PROPH/DIAG2ST, IM OR SUBCUT: ICD-10-PCS | Mod: S$GLB,,, | Performed by: INTERNAL MEDICINE

## 2020-06-29 PROCEDURE — 99999 PR PBB SHADOW E&M-EST. PATIENT-LVL IV: CPT | Mod: PBBFAC,,, | Performed by: INTERNAL MEDICINE

## 2020-06-29 PROCEDURE — 99213 OFFICE O/P EST LOW 20 MIN: CPT | Mod: 25,S$GLB,, | Performed by: INTERNAL MEDICINE

## 2020-06-29 PROCEDURE — 71046 XR CHEST PA AND LATERAL: ICD-10-PCS | Mod: 26,,, | Performed by: RADIOLOGY

## 2020-06-29 PROCEDURE — 71046 X-RAY EXAM CHEST 2 VIEWS: CPT | Mod: TC

## 2020-06-29 PROCEDURE — 99999 PR PBB SHADOW E&M-EST. PATIENT-LVL IV: ICD-10-PCS | Mod: PBBFAC,,, | Performed by: INTERNAL MEDICINE

## 2020-06-29 PROCEDURE — 99213 PR OFFICE/OUTPT VISIT, EST, LEVL III, 20-29 MIN: ICD-10-PCS | Mod: 25,S$GLB,, | Performed by: INTERNAL MEDICINE

## 2020-06-29 PROCEDURE — U0003 INFECTIOUS AGENT DETECTION BY NUCLEIC ACID (DNA OR RNA); SEVERE ACUTE RESPIRATORY SYNDROME CORONAVIRUS 2 (SARS-COV-2) (CORONAVIRUS DISEASE [COVID-19]), AMPLIFIED PROBE TECHNIQUE, MAKING USE OF HIGH THROUGHPUT TECHNOLOGIES AS DESCRIBED BY CMS-2020-01-R: HCPCS

## 2020-06-29 RX ORDER — METHYLPREDNISOLONE ACETATE 80 MG/ML
80 INJECTION, SUSPENSION INTRA-ARTICULAR; INTRALESIONAL; INTRAMUSCULAR; SOFT TISSUE ONCE
Status: COMPLETED | OUTPATIENT
Start: 2020-06-29 | End: 2020-06-29

## 2020-06-29 RX ORDER — LEVOFLOXACIN 750 MG/1
750 TABLET ORAL DAILY
Qty: 7 TABLET | Refills: 0 | Status: SHIPPED | OUTPATIENT
Start: 2020-06-29 | End: 2020-07-06 | Stop reason: SDUPTHER

## 2020-06-29 RX ADMIN — METHYLPREDNISOLONE ACETATE 80 MG: 80 INJECTION, SUSPENSION INTRA-ARTICULAR; INTRALESIONAL; INTRAMUSCULAR; SOFT TISSUE at 11:06

## 2020-06-29 NOTE — PROGRESS NOTES
Subjective:       Patient ID: Marva Perez is a 79 y.o. female.    Chief Complaint: Nasal Congestion, Hoarse, Cough, Fatigue, and Shortness of Breath (even with the use of her oxygen)      HPI:  Patient is new to me but known to clinic and presents with cough and SOB. She is feel generally weak. She has cough that is productive of green sputum and sometimes streaked with blood. She has h/o NIKHIL (off antibx for 3 months). She is on eliquis for a-fib. She is on trelegy. She follows with Dr. Matson. She is not running fever. She is using her home 2L O2, normal sats today in clinic. However, she feels more SOB just walking around her toño se. + wheezing. She denies any sick contacts but she does have visitors in her home (daughter a RN but is not sick).     Past Medical History:   Diagnosis Date    Abnormal Pap smear 13    LGSIL    Atrial fibrillation     COPD (chronic obstructive pulmonary disease)     Depression     GERD (gastroesophageal reflux disease)     Hyperlipidemia     Hypertension        Family History   Problem Relation Age of Onset    Breast cancer Mother     Colon cancer Neg Hx     Ovarian cancer Neg Hx        Social History     Socioeconomic History    Marital status:      Spouse name: Not on file    Number of children: Not on file    Years of education: Not on file    Highest education level: Not on file   Occupational History    Not on file   Social Needs    Financial resource strain: Not on file    Food insecurity     Worry: Not on file     Inability: Not on file    Transportation needs     Medical: Not on file     Non-medical: Not on file   Tobacco Use    Smoking status: Former Smoker     Years: 30.00     Types: Cigarettes     Quit date: 10/30/2006     Years since quittin.6    Smokeless tobacco: Never Used   Substance and Sexual Activity    Alcohol use: No     Comment: No    Drug use: No    Sexual activity: Not Currently     Birth control/protection:  Post-menopausal     Comment:    Lifestyle    Physical activity     Days per week: Not on file     Minutes per session: Not on file    Stress: To some extent   Relationships    Social connections     Talks on phone: Not on file     Gets together: Not on file     Attends Buddhism service: Not on file     Active member of club or organization: Not on file     Attends meetings of clubs or organizations: Not on file     Relationship status: Not on file   Other Topics Concern    Not on file   Social History Narrative    Not on file       Review of Systems   Constitutional: Positive for fatigue. Negative for activity change, fever and unexpected weight change.   HENT: Negative for congestion, ear pain, hearing loss, rhinorrhea and sore throat.    Eyes: Negative for pain, redness and visual disturbance.   Respiratory: Positive for cough, chest tightness and shortness of breath. Negative for wheezing.    Cardiovascular: Negative for chest pain, palpitations and leg swelling.   Gastrointestinal: Negative for abdominal pain, constipation, diarrhea, nausea and vomiting.   Genitourinary: Negative for dysuria, frequency and urgency.   Musculoskeletal: Negative for back pain, joint swelling and neck pain.   Skin: Negative for color change, rash and wound.   Neurological: Negative for dizziness, tremors, weakness, light-headedness and headaches.         Objective:      Physical Exam  Vitals signs reviewed.   Constitutional:       General: She is not in acute distress.     Appearance: She is well-developed.   HENT:      Head: Normocephalic and atraumatic.      Right Ear: External ear normal.      Left Ear: External ear normal.   Eyes:      General:         Right eye: No discharge.         Left eye: No discharge.      Conjunctiva/sclera: Conjunctivae normal.      Pupils: Pupils are equal, round, and reactive to light.   Neck:      Musculoskeletal: Neck supple.      Thyroid: No thyromegaly.   Cardiovascular:      Rate and  Rhythm: Normal rate and regular rhythm.      Heart sounds: No murmur. No friction rub. No gallop.    Pulmonary:      Effort: Pulmonary effort is normal. No respiratory distress.      Breath sounds: Normal breath sounds. No wheezing.      Comments: Very poor air exchange b/l but no active wheezing, no rhonchi  Abdominal:      General: Bowel sounds are normal. There is no distension.      Palpations: Abdomen is soft.      Tenderness: There is no abdominal tenderness.   Lymphadenopathy:      Cervical: No cervical adenopathy.   Skin:     General: Skin is warm and dry.   Neurological:      Mental Status: She is alert and oriented to person, place, and time.      Cranial Nerves: No cranial nerve deficit.   Psychiatric:         Behavior: Behavior normal.         Assessment:       1. Exposure to Covid-19 Virus    2. COPD exacerbation    3. Shortness of breath    4. Cough        Plan:       Marva was seen today for nasal congestion, hoarse, cough, fatigue and shortness of breath.    Diagnoses and all orders for this visit:    Exposure to Covid-19 Virus  -     X-Ray Chest PA And Lateral; Future  -     CBC auto differential; Future  -     Comprehensive metabolic panel; Future  -     D dimer, quantitative; Future  -     Lactate Dehydrogenase; Future  -     Ferritin; Future  -     Procalcitonin; Future    COPD exacerbation  -     X-Ray Chest PA And Lateral; Future  -     CBC auto differential; Future  -     Comprehensive metabolic panel; Future  -     D dimer, quantitative; Future  -     Lactate Dehydrogenase; Future  -     Ferritin; Future  -     Procalcitonin; Future  -     methylPREDNISolone acetate injection 80 mg    Shortness of breath  -     COVID-19 Routine Screening  -     methylPREDNISolone acetate injection 80 mg    Cough  -     COVID-19 Routine Screening  -     methylPREDNISolone acetate injection 80 mg      Will give steroid today for likely COPD exacerbation  covid swab today  Check CXR and labs as BP a bit  lower than her baseline and consider bacterial bronchitis/PNA in differential as well. Pending labs and imaging will send antibx this afternoon if needed  Discussed self quarantine, social distancing, hand hygiene today  Cont trelegy and nebs TID

## 2020-06-29 NOTE — TELEPHONE ENCOUNTER
----- Message from Zuleima Mccollum sent at 2020  9:19 AM CDT -----  Regarding: Same Day Appointment  Contact: luis Perez  MRN: [id number]  : 1940  PCP: Carissa Green  Home Phone      [phone]  Work Phone      Not on file.  Mobile          [phone]      MESSAGE:    Patient request same day appointment due to symptoms - c/o chest congestion, weak, with no fever noted x 3 days.     Phone # [phone]    Pharmacy - [id number] - [address]

## 2020-07-01 ENCOUNTER — TELEPHONE (OUTPATIENT)
Dept: INTERNAL MEDICINE | Facility: CLINIC | Age: 80
End: 2020-07-01

## 2020-07-01 NOTE — TELEPHONE ENCOUNTER
Spoke to pt. Informed her that the color of the mucus doesn't matter. Explained that coughing it up and out is good. Instructed to continue the antibiotics. Explained that we will give her a call with the covid results as soon as we get them back.

## 2020-07-01 NOTE — TELEPHONE ENCOUNTER
----- Message from Donna Chris sent at 2020  1:07 PM CDT -----  Contact: Self  Marva Perez  MRN: [id number]  : 1940  PCP: Carissa Green  Home Phone      [phone]  Work Phone      Not on file.  Mobile          [phone]    MESSAGE:     Started coughing up dark mucus and she would like to speak to nurse because she is concerned. Please call to advise.    Phone: [phone]

## 2020-07-02 LAB — SARS-COV-2 RNA RESP QL NAA+PROBE: NOT DETECTED

## 2020-07-06 ENCOUNTER — TELEPHONE (OUTPATIENT)
Dept: INTERNAL MEDICINE | Facility: CLINIC | Age: 80
End: 2020-07-06

## 2020-07-06 RX ORDER — LEVOFLOXACIN 750 MG/1
750 TABLET ORAL DAILY
Qty: 3 TABLET | Refills: 0 | Status: SHIPPED | OUTPATIENT
Start: 2020-07-06 | End: 2020-07-09

## 2020-07-06 NOTE — TELEPHONE ENCOUNTER
----- Message from Donna Chris sent at 2020  1:05 PM CDT -----  Contact: Self  Marva Perez  MRN: [id number]  : 1940  PCP: Carissa Green  Home Phone      [phone]  Work Phone      Not on file.  Mobile          [phone]    MESSAGE:   Finished antibiotics that she was on for pneumonia. She does not feel as though she is better and would like to find out if she needs to stay on antibiotics for longer. Please call to advise.    Phone: [phone]

## 2020-07-06 NOTE — TELEPHONE ENCOUNTER
Sent in 3 more days of antibx. 10 days is the maximum recommended treatment for pneumonia and she has already been treated 7 days. If she is not better she needs to be seen again by her PCP

## 2020-07-08 ENCOUNTER — OFFICE VISIT (OUTPATIENT)
Dept: INTERNAL MEDICINE | Facility: CLINIC | Age: 80
End: 2020-07-08
Payer: MEDICARE

## 2020-07-08 VITALS
RESPIRATION RATE: 20 BRPM | BODY MASS INDEX: 19.92 KG/M2 | HEART RATE: 73 BPM | SYSTOLIC BLOOD PRESSURE: 116 MMHG | HEIGHT: 62 IN | WEIGHT: 108.25 LBS | OXYGEN SATURATION: 94 % | TEMPERATURE: 98 F | DIASTOLIC BLOOD PRESSURE: 56 MMHG

## 2020-07-08 DIAGNOSIS — J43.2 CENTRILOBULAR EMPHYSEMA: ICD-10-CM

## 2020-07-08 DIAGNOSIS — J96.11 CHRONIC RESPIRATORY FAILURE WITH HYPOXIA: ICD-10-CM

## 2020-07-08 DIAGNOSIS — J13 PNEUMONIA OF LEFT LOWER LOBE DUE TO STREPTOCOCCUS PNEUMONIAE: Primary | ICD-10-CM

## 2020-07-08 PROCEDURE — 99213 OFFICE O/P EST LOW 20 MIN: CPT | Mod: S$GLB,,, | Performed by: INTERNAL MEDICINE

## 2020-07-08 PROCEDURE — 99213 PR OFFICE/OUTPT VISIT, EST, LEVL III, 20-29 MIN: ICD-10-PCS | Mod: S$GLB,,, | Performed by: INTERNAL MEDICINE

## 2020-07-08 PROCEDURE — 99999 PR PBB SHADOW E&M-EST. PATIENT-LVL IV: ICD-10-PCS | Mod: PBBFAC,,, | Performed by: INTERNAL MEDICINE

## 2020-07-08 PROCEDURE — 99999 PR PBB SHADOW E&M-EST. PATIENT-LVL IV: CPT | Mod: PBBFAC,,, | Performed by: INTERNAL MEDICINE

## 2020-07-08 NOTE — PROGRESS NOTES
"Subjective:       Patient ID: Marva Perez is a 79 y.o. female.    Chief Complaint: Leg Pain and Follow-up      HPI:  Patient is known to me from an acute visit and presents for follow up pneumonia. At last visit started levaquin. She has been treated for 10 days total. She is not running any fevers. She still feels SOB but thinks this is more due to the worsening humidity. She is using her nebs PRN but "make me feel jittery" sees Dr. Matson next Monday. She reports her home health nurse told her she "hears something in my lungs" still. Wants to get this checked.     Past Medical History:   Diagnosis Date    Abnormal Pap smear 13    LGSIL    Atrial fibrillation     COPD (chronic obstructive pulmonary disease)     Depression     GERD (gastroesophageal reflux disease)     Hyperlipidemia     Hypertension        Family History   Problem Relation Age of Onset    Breast cancer Mother     Colon cancer Neg Hx     Ovarian cancer Neg Hx        Social History     Socioeconomic History    Marital status:      Spouse name: Not on file    Number of children: Not on file    Years of education: Not on file    Highest education level: Not on file   Occupational History    Not on file   Social Needs    Financial resource strain: Not on file    Food insecurity     Worry: Not on file     Inability: Not on file    Transportation needs     Medical: Not on file     Non-medical: Not on file   Tobacco Use    Smoking status: Former Smoker     Years: 30.00     Types: Cigarettes     Quit date: 10/30/2006     Years since quittin.6    Smokeless tobacco: Never Used   Substance and Sexual Activity    Alcohol use: No     Comment: No    Drug use: No    Sexual activity: Not Currently     Birth control/protection: Post-menopausal     Comment:    Lifestyle    Physical activity     Days per week: Not on file     Minutes per session: Not on file    Stress: To some extent   Relationships    Social " connections     Talks on phone: Not on file     Gets together: Not on file     Attends Anabaptism service: Not on file     Active member of club or organization: Not on file     Attends meetings of clubs or organizations: Not on file     Relationship status: Not on file   Other Topics Concern    Not on file   Social History Narrative    Not on file       Review of Systems   Constitutional: Negative for activity change, fatigue, fever and unexpected weight change.   HENT: Negative for congestion, ear pain, hearing loss, rhinorrhea and sore throat.    Eyes: Negative for pain, redness and visual disturbance.   Respiratory: Positive for cough (much improved, nearly resolved) and shortness of breath (chronic, TAVARES, back to baseline). Negative for wheezing.    Cardiovascular: Negative for chest pain, palpitations and leg swelling.   Gastrointestinal: Negative for abdominal pain, constipation, diarrhea, nausea and vomiting.   Genitourinary: Negative for dysuria, frequency, pelvic pain and urgency.   Musculoskeletal: Negative for back pain, joint swelling and neck pain.   Skin: Negative for color change, rash and wound.   Neurological: Negative for dizziness, tremors, weakness, light-headedness and headaches.         Objective:      Physical Exam  Vitals signs reviewed.   Constitutional:       General: She is not in acute distress.     Appearance: She is well-developed.   HENT:      Head: Normocephalic and atraumatic.      Right Ear: External ear normal.      Left Ear: External ear normal.      Nose: Nose normal.   Eyes:      General:         Right eye: No discharge.         Left eye: No discharge.      Conjunctiva/sclera: Conjunctivae normal.      Pupils: Pupils are equal, round, and reactive to light.   Neck:      Musculoskeletal: Neck supple.      Thyroid: No thyromegaly.   Cardiovascular:      Rate and Rhythm: Normal rate and regular rhythm.      Heart sounds: No murmur. No friction rub. No gallop.    Pulmonary:       Effort: Pulmonary effort is normal. No respiratory distress.      Breath sounds: Wheezing (b/l bases; poor air flow b/l) present. No rales.   Abdominal:      General: Bowel sounds are normal. There is no distension.      Palpations: Abdomen is soft.      Tenderness: There is no abdominal tenderness.   Lymphadenopathy:      Cervical: No cervical adenopathy.   Skin:     General: Skin is warm and dry.   Neurological:      Mental Status: She is alert and oriented to person, place, and time.      Cranial Nerves: No cranial nerve deficit.   Psychiatric:         Behavior: Behavior normal.         Assessment:       1. Pneumonia of left lower lobe due to Streptococcus pneumoniae    2. Chronic respiratory failure with hypoxia    3. Centrilobular emphysema        Plan:       Marva was seen today for leg pain and follow-up.    Diagnoses and all orders for this visit:    Pneumonia of left lower lobe due to Streptococcus pneumoniae    Chronic respiratory failure with hypoxia    Centrilobular emphysema      She is on her home oxygen via [place]; sats good  I hear no rhonchi  Has some b/l wheezing but I am unsure her baseline lung exam. Has advanced COPD so likely always has some wheezing  Cont trelegy  Cont nebs PRN  No more antibx; completed 10 days yoel  Sees Dr. Matson Monday

## 2020-07-11 ENCOUNTER — HOSPITAL ENCOUNTER (EMERGENCY)
Facility: HOSPITAL | Age: 80
Discharge: HOME | End: 2020-07-11
Attending: SURGERY
Payer: MEDICARE

## 2020-07-11 VITALS
OXYGEN SATURATION: 100 % | RESPIRATION RATE: 20 BRPM | SYSTOLIC BLOOD PRESSURE: 189 MMHG | DIASTOLIC BLOOD PRESSURE: 79 MMHG | HEART RATE: 88 BPM | TEMPERATURE: 99 F

## 2020-07-11 DIAGNOSIS — M79.661 RIGHT CALF PAIN: ICD-10-CM

## 2020-07-11 LAB
ALBUMIN SERPL BCP-MCNC: 3.9 G/DL (ref 3.5–5.2)
ALP SERPL-CCNC: 96 U/L (ref 55–135)
ALT SERPL W/O P-5'-P-CCNC: 14 U/L (ref 10–44)
ANION GAP SERPL CALC-SCNC: 11 MMOL/L (ref 8–16)
APTT BLDCRRT: 27.5 SEC (ref 21–32)
AST SERPL-CCNC: 19 U/L (ref 10–40)
BASOPHILS # BLD AUTO: 0.1 K/UL (ref 0–0.2)
BASOPHILS NFR BLD: 0.8 % (ref 0–1.9)
BILIRUB SERPL-MCNC: 0.5 MG/DL (ref 0.1–1)
BNP SERPL-MCNC: 108 PG/ML (ref 0–99)
BUN SERPL-MCNC: 26 MG/DL (ref 8–23)
CALCIUM SERPL-MCNC: 9.2 MG/DL (ref 8.7–10.5)
CHLORIDE SERPL-SCNC: 103 MMOL/L (ref 95–110)
CK MB SERPL-MCNC: 1.3 NG/ML (ref 0.1–6.5)
CK MB SERPL-RTO: 2.7 % (ref 0–5)
CK SERPL-CCNC: 48 U/L (ref 20–180)
CK SERPL-CCNC: 48 U/L (ref 20–180)
CO2 SERPL-SCNC: 28 MMOL/L (ref 23–29)
CREAT SERPL-MCNC: 0.8 MG/DL (ref 0.5–1.4)
CRP SERPL-MCNC: 0.8 MG/L (ref 0–8.2)
D DIMER PPP IA.FEU-MCNC: 0.3 MG/L FEU
DIFFERENTIAL METHOD: ABNORMAL
EOSINOPHIL # BLD AUTO: 0.2 K/UL (ref 0–0.5)
EOSINOPHIL NFR BLD: 1.5 % (ref 0–8)
ERYTHROCYTE [DISTWIDTH] IN BLOOD BY AUTOMATED COUNT: 12.4 % (ref 11.5–14.5)
ERYTHROCYTE [SEDIMENTATION RATE] IN BLOOD BY WESTERGREN METHOD: 27 MM/HR (ref 0–20)
EST. GFR  (AFRICAN AMERICAN): >60 ML/MIN/1.73 M^2
EST. GFR  (NON AFRICAN AMERICAN): >60 ML/MIN/1.73 M^2
FERRITIN SERPL-MCNC: 139 NG/ML (ref 20–300)
GLUCOSE SERPL-MCNC: 123 MG/DL (ref 70–110)
HCT VFR BLD AUTO: 38.2 % (ref 37–48.5)
HGB BLD-MCNC: 12.4 G/DL (ref 12–16)
IMM GRANULOCYTES # BLD AUTO: 0.05 K/UL (ref 0–0.04)
IMM GRANULOCYTES NFR BLD AUTO: 0.4 % (ref 0–0.5)
INR PPP: 1 (ref 0.8–1.2)
LACTATE SERPL-SCNC: 1.2 MMOL/L (ref 0.5–2.2)
LDH SERPL L TO P-CCNC: 180 U/L (ref 110–260)
LYMPHOCYTES # BLD AUTO: 2.3 K/UL (ref 1–4.8)
LYMPHOCYTES NFR BLD: 18 % (ref 18–48)
MCH RBC QN AUTO: 30.2 PG (ref 27–31)
MCHC RBC AUTO-ENTMCNC: 32.5 G/DL (ref 32–36)
MCV RBC AUTO: 93 FL (ref 82–98)
MONOCYTES # BLD AUTO: 1.3 K/UL (ref 0.3–1)
MONOCYTES NFR BLD: 9.7 % (ref 4–15)
NEUTROPHILS # BLD AUTO: 9.1 K/UL (ref 1.8–7.7)
NEUTROPHILS NFR BLD: 69.6 % (ref 38–73)
NRBC BLD-RTO: 0 /100 WBC
PLATELET # BLD AUTO: 185 K/UL (ref 150–350)
PMV BLD AUTO: 10.1 FL (ref 9.2–12.9)
POTASSIUM SERPL-SCNC: 4.1 MMOL/L (ref 3.5–5.1)
PROCALCITONIN SERPL IA-MCNC: 0.04 NG/ML
PROT SERPL-MCNC: 6.9 G/DL (ref 6–8.4)
PROTHROMBIN TIME: 10.9 SEC (ref 9–12.5)
RBC # BLD AUTO: 4.11 M/UL (ref 4–5.4)
SARS-COV-2 RDRP RESP QL NAA+PROBE: NEGATIVE
SODIUM SERPL-SCNC: 142 MMOL/L (ref 136–145)
TROPONIN I SERPL DL<=0.01 NG/ML-MCNC: <0.006 NG/ML (ref 0–0.03)
WBC # BLD AUTO: 13.03 K/UL (ref 3.9–12.7)

## 2020-07-11 PROCEDURE — 93005 ELECTROCARDIOGRAM TRACING: CPT

## 2020-07-11 PROCEDURE — 83615 LACTATE (LD) (LDH) ENZYME: CPT

## 2020-07-11 PROCEDURE — 87040 BLOOD CULTURE FOR BACTERIA: CPT

## 2020-07-11 PROCEDURE — 86140 C-REACTIVE PROTEIN: CPT

## 2020-07-11 PROCEDURE — 85651 RBC SED RATE NONAUTOMATED: CPT

## 2020-07-11 PROCEDURE — 93010 ELECTROCARDIOGRAM REPORT: CPT | Mod: ,,, | Performed by: INTERNAL MEDICINE

## 2020-07-11 PROCEDURE — 82550 ASSAY OF CK (CPK): CPT

## 2020-07-11 PROCEDURE — 80053 COMPREHEN METABOLIC PANEL: CPT

## 2020-07-11 PROCEDURE — 84484 ASSAY OF TROPONIN QUANT: CPT

## 2020-07-11 PROCEDURE — 85730 THROMBOPLASTIN TIME PARTIAL: CPT

## 2020-07-11 PROCEDURE — 82728 ASSAY OF FERRITIN: CPT

## 2020-07-11 PROCEDURE — 85610 PROTHROMBIN TIME: CPT

## 2020-07-11 PROCEDURE — 85379 FIBRIN DEGRADATION QUANT: CPT

## 2020-07-11 PROCEDURE — 99285 EMERGENCY DEPT VISIT HI MDM: CPT | Mod: 25

## 2020-07-11 PROCEDURE — 85025 COMPLETE CBC W/AUTO DIFF WBC: CPT

## 2020-07-11 PROCEDURE — 93010 EKG 12-LEAD: ICD-10-PCS | Mod: ,,, | Performed by: INTERNAL MEDICINE

## 2020-07-11 PROCEDURE — U0002 COVID-19 LAB TEST NON-CDC: HCPCS

## 2020-07-11 PROCEDURE — 84145 PROCALCITONIN (PCT): CPT

## 2020-07-11 PROCEDURE — 83880 ASSAY OF NATRIURETIC PEPTIDE: CPT

## 2020-07-11 PROCEDURE — 82553 CREATINE MB FRACTION: CPT

## 2020-07-11 PROCEDURE — 83605 ASSAY OF LACTIC ACID: CPT

## 2020-07-11 RX ORDER — TRAMADOL HYDROCHLORIDE 50 MG/1
50 TABLET ORAL EVERY 6 HOURS PRN
Qty: 7 TABLET | Refills: 0 | Status: SHIPPED | OUTPATIENT
Start: 2020-07-11 | End: 2020-07-13

## 2020-07-11 NOTE — ED TRIAGE NOTES
PT presents with C/O SOB, she states she has a hx of COPD and just got discharged from the hospital for pneumonia

## 2020-07-12 NOTE — ED PROVIDER NOTES
[facility]                                                 Chief Complaint  79 y.o. female with Shortness of Breath      History of Present Illness  Marva Perez presents to the emergency room with right calf pain  Patient has significant anxiety and chronic shortness of breath issues  Patient states she has been worried about her right calf pain for days  Patient with to PCP but it was not addressed, severe anxiety afterwards  Patient with a negative Rustam sign with good distal pulses noted now  Pt states she would like a DVT ultrasound to address her concerns    The history is provided by the patient   device was not used during this ER visit    Past Medical History   -- Abnormal Pap smear     -- COPD (chronic obstructive pulmonary disease)     -- Depression     -- GERD (gastroesophageal reflux disease)     -- Hyperlipidemia     -- Hypertension        Past Surgical History   -- APPENDECTOMY       -- CERVICAL BIOPSY  W/ LOOP ELECTRODE EXCISION       -- CHOLECYSTECTOMY       -- COLLATERAL LIGAMENT REPAIR, KNEE       -- COLONOSCOPY       -- DILATION AND CURETTAGE OF UTERUS       -- SINUS SURGERY          Review of patient's allergies   -- Pcn [penicillins]     -- Tetracyclines     -- Bactrim [sulfamethoxazole-trimethoprim]     -- Ilosone     -- Iodine and iodide containing products     -- Sulfa (sulfonamide antibiotics)      I have reviewed all of this patient's past medical, surgical, family, and social   histories as well as active allergies and medications documented in the  electronic medical record    Review of Systems and Physical Exam      Review of Systems  -- Constitution - no fever, denies fatigue, no weakness, no chills  -- Eyes - no tearing or redness, no visual disturbance  -- Ear, Nose - no tinnitus or earache, no nasal congestion or discharge  -- Mouth,Throat - no sore throat, no toothache, normal voice, normal swallowing  -- Respiratory - denies cough and  congestion, no shortness of breath, no TAVARES  -- Cardiovascular - denies chest pain, no palpitations, denies claudication  -- Gastrointestinal - denies abdominal pain, nausea, vomiting, or diarrhea  -- Genitourinary - no dysuria, denies flank pain, no hematuria, no STD risk  -- Musculoskeletal - right calf pain for over a week  -- Neurological - no headache, denies weakness or seizure; no LOC  -- Psychiatric - anxiety, denies SI or HI, no psychosis or fractured thought noted     Vital Signs  Her blood pressure is 189/79 and her pulse is 88.   Her respiration is 20 and oxygen saturation is 100%.     Physical Exam  -- Nursing note and vitals reviewed  -- Constitutional: Appears well-developed and well-nourished  -- Head: Atraumatic. Normocephalic. No obvious abnormality  -- Eyes: Pupils are equal and reactive to light. Normal conjunctiva and lids  -- Cardiac: Normal rate, regular rhythm and normal heart sounds  -- Pulmonary: Normal respiratory effort, breath sounds clear to auscultation  -- Abdominal: Soft, no tenderness. Normal bowel sounds. Normal liver edge  -- Musculoskeletal: Normal range of motion, no effusions. Joints stable   -- Neurological: No focal deficits. Showed good interaction with staff  -- Vascular: Posterior tibial, dorsalis pedis and radial pulses 2+ bilaterally      Emergency Room Course      Lab Results     K 4.1      CO2 28   BUN 26 (H)   CREATININE 0.8    (H)   ALKPHOS 96   AST 19   ALT 14   BILITOT 0.5   ALBUMIN 3.9   PROT 6.9   WBC 13.03 (H)   HGB 12.4   HCT 38.2      CPK 48   CPK 48   CPKMB 1.3   TROPONINI <0.006   INR 1.0    (H)   DDIMER 0.30   LACTATE 1.2     EKG  -- The EKG findings today were without concerning findings from baseline     Radiology  -- Chest x-ray showed no infiltrate and showed no acute pathology  -- The US of the lower extremity performed in the ER today was negative for DVT      ED Physician Management  -- Diagnosis management comments:  79 y.o. female with right calf pain  -- severe anxiety with preoccupation with DVT possibility at home PTA  -- patient she is complaint is that she wants a DVT ultrasound in the ER  -- DVT ultrasound in the ER was negative, feels great relief at the news  -- patient with good distal pulses and capillary refill, no distress noted  -- patient follow-up with primary care physician until resolution     Diagnosis  [M79.661] Right calf pain    Disposition and Plan  -- Disposition: home  -- Condition: stable  -- Follow-up: Patient to follow up with Kevin Clements MD in 1-2 days.  -- I advised the patient that we have found no life threatening condition today  -- At this time, I believe the patient is clinically stable for discharge.   -- The patient acknowledges that close follow up with a MD is required   -- Patient agrees to comply with all instruction and direction given in the ER    This note is dictated on M*Modal word recognition program.  There are word recognition mistakes that are occasionally missed on review.         Jerson Padgett MD  07/11/20 [id number]

## 2020-07-14 ENCOUNTER — DOCUMENT SCAN (OUTPATIENT)
Dept: HOME HEALTH SERVICES | Facility: HOSPITAL | Age: 80
End: 2020-07-14
Payer: MEDICARE

## 2020-07-16 ENCOUNTER — DOCUMENT SCAN (OUTPATIENT)
Dept: HOME HEALTH SERVICES | Facility: HOSPITAL | Age: 80
End: 2020-07-16
Payer: MEDICARE

## 2020-07-17 LAB — BACTERIA BLD CULT: NORMAL

## 2020-07-18 ENCOUNTER — HOSPITAL ENCOUNTER (EMERGENCY)
Facility: HOSPITAL | Age: 80
Discharge: HOME | End: 2020-07-18
Attending: SURGERY
Payer: MEDICARE

## 2020-07-18 VITALS
OXYGEN SATURATION: 100 % | HEART RATE: 61 BPM | DIASTOLIC BLOOD PRESSURE: 74 MMHG | RESPIRATION RATE: 23 BRPM | SYSTOLIC BLOOD PRESSURE: 167 MMHG | TEMPERATURE: 98 F

## 2020-07-18 DIAGNOSIS — M79.661 RIGHT CALF PAIN: ICD-10-CM

## 2020-07-18 DIAGNOSIS — R53.83 FATIGUE: ICD-10-CM

## 2020-07-18 LAB
ALBUMIN SERPL BCP-MCNC: 3.8 G/DL (ref 3.5–5.2)
ALP SERPL-CCNC: 85 U/L (ref 55–135)
ALT SERPL W/O P-5'-P-CCNC: 18 U/L (ref 10–44)
ANION GAP SERPL CALC-SCNC: 11 MMOL/L (ref 8–16)
AST SERPL-CCNC: 19 U/L (ref 10–40)
BASOPHILS # BLD AUTO: 0.05 K/UL (ref 0–0.2)
BASOPHILS NFR BLD: 0.5 % (ref 0–1.9)
BILIRUB SERPL-MCNC: 0.6 MG/DL (ref 0.1–1)
BNP SERPL-MCNC: 176 PG/ML (ref 0–99)
BUN SERPL-MCNC: 20 MG/DL (ref 8–23)
CALCIUM SERPL-MCNC: 9 MG/DL (ref 8.7–10.5)
CHLORIDE SERPL-SCNC: 104 MMOL/L (ref 95–110)
CK MB SERPL-MCNC: 1.3 NG/ML (ref 0.1–6.5)
CK MB SERPL-RTO: 2.8 % (ref 0–5)
CK SERPL-CCNC: 47 U/L (ref 20–180)
CK SERPL-CCNC: 47 U/L (ref 20–180)
CO2 SERPL-SCNC: 30 MMOL/L (ref 23–29)
CREAT SERPL-MCNC: 1 MG/DL (ref 0.5–1.4)
DIFFERENTIAL METHOD: ABNORMAL
EOSINOPHIL # BLD AUTO: 0.2 K/UL (ref 0–0.5)
EOSINOPHIL NFR BLD: 2.2 % (ref 0–8)
ERYTHROCYTE [DISTWIDTH] IN BLOOD BY AUTOMATED COUNT: 12.4 % (ref 11.5–14.5)
EST. GFR  (AFRICAN AMERICAN): >60 ML/MIN/1.73 M^2
EST. GFR  (NON AFRICAN AMERICAN): 54 ML/MIN/1.73 M^2
GLUCOSE SERPL-MCNC: 121 MG/DL (ref 70–110)
HCT VFR BLD AUTO: 35.4 % (ref 37–48.5)
HGB BLD-MCNC: 11.3 G/DL (ref 12–16)
IMM GRANULOCYTES # BLD AUTO: 0.03 K/UL (ref 0–0.04)
IMM GRANULOCYTES NFR BLD AUTO: 0.3 % (ref 0–0.5)
LYMPHOCYTES # BLD AUTO: 1.6 K/UL (ref 1–4.8)
LYMPHOCYTES NFR BLD: 16.9 % (ref 18–48)
MCH RBC QN AUTO: 29.7 PG (ref 27–31)
MCHC RBC AUTO-ENTMCNC: 31.9 G/DL (ref 32–36)
MCV RBC AUTO: 93 FL (ref 82–98)
MONOCYTES # BLD AUTO: 0.9 K/UL (ref 0.3–1)
MONOCYTES NFR BLD: 9.3 % (ref 4–15)
NEUTROPHILS # BLD AUTO: 6.5 K/UL (ref 1.8–7.7)
NEUTROPHILS NFR BLD: 70.8 % (ref 38–73)
NRBC BLD-RTO: 0 /100 WBC
PLATELET # BLD AUTO: 172 K/UL (ref 150–350)
PMV BLD AUTO: 10.3 FL (ref 9.2–12.9)
POTASSIUM SERPL-SCNC: 4.2 MMOL/L (ref 3.5–5.1)
PROT SERPL-MCNC: 6.3 G/DL (ref 6–8.4)
RBC # BLD AUTO: 3.8 M/UL (ref 4–5.4)
SODIUM SERPL-SCNC: 145 MMOL/L (ref 136–145)
TROPONIN I SERPL DL<=0.01 NG/ML-MCNC: 0.01 NG/ML (ref 0–0.03)
WBC # BLD AUTO: 9.21 K/UL (ref 3.9–12.7)

## 2020-07-18 PROCEDURE — 93010 EKG 12-LEAD: ICD-10-PCS | Mod: ,,, | Performed by: INTERNAL MEDICINE

## 2020-07-18 PROCEDURE — 93010 ELECTROCARDIOGRAM REPORT: CPT | Mod: ,,, | Performed by: INTERNAL MEDICINE

## 2020-07-18 PROCEDURE — 82550 ASSAY OF CK (CPK): CPT

## 2020-07-18 PROCEDURE — 99285 EMERGENCY DEPT VISIT HI MDM: CPT | Mod: 25

## 2020-07-18 PROCEDURE — 25000003 PHARM REV CODE 250: Performed by: SURGERY

## 2020-07-18 PROCEDURE — 36415 COLL VENOUS BLD VENIPUNCTURE: CPT

## 2020-07-18 PROCEDURE — 84484 ASSAY OF TROPONIN QUANT: CPT

## 2020-07-18 PROCEDURE — 83880 ASSAY OF NATRIURETIC PEPTIDE: CPT

## 2020-07-18 PROCEDURE — 80053 COMPREHEN METABOLIC PANEL: CPT

## 2020-07-18 PROCEDURE — 85025 COMPLETE CBC W/AUTO DIFF WBC: CPT

## 2020-07-18 PROCEDURE — 93005 ELECTROCARDIOGRAM TRACING: CPT

## 2020-07-18 PROCEDURE — 82553 CREATINE MB FRACTION: CPT

## 2020-07-18 RX ORDER — CYCLOBENZAPRINE HCL 10 MG
10 TABLET ORAL 3 TIMES DAILY PRN
Qty: 10 TABLET | Refills: 0 | Status: SHIPPED | OUTPATIENT
Start: 2020-07-18 | End: 2020-07-23

## 2020-07-18 RX ORDER — TRAMADOL HYDROCHLORIDE 50 MG/1
50 TABLET ORAL
Status: COMPLETED | OUTPATIENT
Start: 2020-07-18 | End: 2020-07-18

## 2020-07-18 RX ORDER — TRAMADOL HYDROCHLORIDE 50 MG/1
50 TABLET ORAL EVERY 6 HOURS PRN
Qty: 7 TABLET | Refills: 0 | Status: SHIPPED | OUTPATIENT
Start: 2020-07-18 | End: 2020-07-20

## 2020-07-18 RX ADMIN — TRAMADOL HYDROCHLORIDE 50 MG: 50 TABLET, FILM COATED ORAL at 06:07

## 2020-07-18 NOTE — ED PROVIDER NOTES
[facility]                                                 Chief Complaint  79 y.o. female with Leg Pain (Right calf pain)      History of Present Illness  Marva Perez presents to the emergency room with right calf pain  Patient with right calf pain for last year, denies any trauma or fall today  Patient was seen in the ER on July 11th with a negative workup then  Patient has had several evaluation ultrasounds over last year per history  Patient on exam has minimal right calf tenderness, negative Rustam sign    The history is provided by the patient   device was not used during this ER visit    Past Medical History   -- Abnormal Pap smear     -- COPD (chronic obstructive pulmonary disease)     -- Depression     -- GERD (gastroesophageal reflux disease)     -- Hyperlipidemia     -- Hypertension        Past Surgical History   -- APPENDECTOMY       -- CERVICAL BIOPSY  W/ LOOP ELECTRODE EXCISION       -- CHOLECYSTECTOMY       -- COLLATERAL LIGAMENT REPAIR, KNEE       -- COLONOSCOPY       -- DILATION AND CURETTAGE OF UTERUS       -- SINUS SURGERY          Review of patient's allergies   -- Pcn [penicillins]     -- Tetracyclines     -- Bactrim [sulfamethoxazole-trimethoprim]     -- Ilosone     -- Iodine and iodide containing products     -- Sulfa (sulfonamide antibiotics)      I have reviewed all of this patient's past medical, surgical, family, and social   histories as well as active allergies and medications documented in the  electronic medical record    Review of Systems and Physical Exam      Review of Systems  -- Constitution - no fever, denies fatigue, no weakness, no chills  -- Eyes - no tearing or redness, no visual disturbance  -- Ear, Nose - no tinnitus or earache, no nasal congestion or discharge  -- Mouth,Throat - no sore throat, no toothache, normal voice, normal swallowing  -- Respiratory - denies cough and congestion, no shortness of breath, no TAVARES  --  Cardiovascular - denies chest pain, no palpitations, denies claudication  -- Gastrointestinal - denies abdominal pain, nausea, vomiting, or diarrhea  -- Genitourinary - no dysuria, denies flank pain, no hematuria, no STD risk  -- Musculoskeletal - right calf pain  -- Neurological - no headache, denies weakness or seizure; no LOC  -- Skin - denies pallor, rash, or changes in skin. no hives or welts noted    Vital Signs  Her blood pressure is 166/72 and her pulse is 64.   Her respiration is 20 and oxygen saturation is 94%     Physical Exam  -- Nursing note and vitals reviewed  -- Constitutional: Appears well-developed and well-nourished  -- Head: Atraumatic. Normocephalic. No obvious abnormality  -- Eyes: Pupils are equal and reactive to light. Normal conjunctiva and lids  -- Cardiac: Normal rate, regular rhythm and normal heart sounds  -- Pulmonary: Normal respiratory effort, breath sounds clear to auscultation  -- Abdominal: Soft, no tenderness. Normal bowel sounds. Normal liver edge  -- Musculoskeletal: Normal range of motion, no effusions. Joints stable   -- Neurological: No focal deficits. Showed good interaction with staff  -- Vascular: Posterior tibial, dorsalis pedis and radial pulses 2+ bilaterally      Emergency Room Course      Lab Results     K 4.2      CO2 30 (H)   BUN 20   CREATININE 1.0    (H)   ALKPHOS 85   AST 19   ALT 18   BILITOT 0.6   ALBUMIN 3.8   PROT 6.3   WBC 9.21   HGB 11.3 (L)   HCT 35.4 (L)      CPK 47   CPK 47   CPKMB 1.3   TROPONINI 0.008   INR 1.0    (H)     EKG  -- The EKG findings today were without concerning findings from baseline     Radiology  -- Chest x-ray showed no infiltrate and showed no acute pathology  -- right tib-fib shows no acute fracture    Medications Given  traMADoL tablet 50 mg (has no administration in time range)     ED Physician Management  -- Diagnosis management comments: 79 y.o. female with calf pain  -- patient with chronic calf  pain, denies any trauma or fall on interview  -- negative D-dimer, negative DVT ultrasound 7 days ago in the ER  -- patient with several negative workups denies any trauma or fall  -- negative x-rays in emergency room today, chronic right calf pain  -- patient we placed on Ultram and Flexeril, ortho recommended  -- weightbear as tolerated with specialist follow-up, daughter counseled  -- return to the ER with any concerning symptoms after discharge today    Diagnosis  [R53.83] Fatigue  [M79.661] Right calf pain    Disposition and Plan  -- Disposition: home  -- Condition: stable  -- Follow-up: Patient to follow up with Kevin Clements MD in 1-2 days.  -- I advised the patient that we have found no life threatening condition today  -- At this time, I believe the patient is clinically stable for discharge.   -- The patient acknowledges that close follow up with a MD is required   -- Patient agrees to comply with all instruction and direction given in the ER    This note is dictated on M*Modal word recognition program.  There are word recognition mistakes that are occasionally missed on review.         Jerson Padgett MD  07/18/20 [id number]

## 2020-07-18 NOTE — ED TRIAGE NOTES
79 y.o. female presents to ER ED 05/ED 05   Chief Complaint   Patient presents with    Leg Pain     Right calf pain   . No acute distress noted.   .

## 2020-07-18 NOTE — ED NOTES
Discharge instructions given to patient and family. Discussed medications prescribed and when next time due. Discussed follow up with family doctor. Understanding voiced.

## 2020-07-22 ENCOUNTER — DOCUMENT SCAN (OUTPATIENT)
Dept: HOME HEALTH SERVICES | Facility: HOSPITAL | Age: 80
End: 2020-07-22
Payer: MEDICARE

## 2020-07-23 ENCOUNTER — HOSPITAL ENCOUNTER (OUTPATIENT)
Dept: RADIOLOGY | Facility: HOSPITAL | Age: 80
Discharge: HOME | End: 2020-07-23
Attending: ORTHOPAEDIC SURGERY
Payer: MEDICARE

## 2020-07-23 DIAGNOSIS — M25.571 BILATERAL ANKLE PAIN: ICD-10-CM

## 2020-07-23 DIAGNOSIS — M25.572 BILATERAL ANKLE PAIN: ICD-10-CM

## 2020-07-23 PROCEDURE — 73610 X-RAY EXAM OF ANKLE: CPT | Mod: TC,50

## 2020-07-27 ENCOUNTER — DOCUMENT SCAN (OUTPATIENT)
Dept: HOME HEALTH SERVICES | Facility: HOSPITAL | Age: 80
End: 2020-07-27
Payer: MEDICARE

## 2020-07-27 PROCEDURE — G0179 MD RECERTIFICATION HHA PT: HCPCS | Mod: ,,, | Performed by: FAMILY MEDICINE

## 2020-07-27 PROCEDURE — G0179 PR HOME HEALTH MD RECERTIFICATION: ICD-10-PCS | Mod: ,,, | Performed by: FAMILY MEDICINE

## 2020-07-28 ENCOUNTER — OFFICE VISIT (OUTPATIENT)
Dept: FAMILY MEDICINE | Facility: CLINIC | Age: 80
End: 2020-07-28
Payer: MEDICARE

## 2020-07-28 VITALS
HEART RATE: 64 BPM | SYSTOLIC BLOOD PRESSURE: 138 MMHG | OXYGEN SATURATION: 96 % | HEIGHT: 62 IN | TEMPERATURE: 98 F | DIASTOLIC BLOOD PRESSURE: 78 MMHG | BODY MASS INDEX: 20.46 KG/M2 | WEIGHT: 111.19 LBS | RESPIRATION RATE: 17 BRPM

## 2020-07-28 DIAGNOSIS — F41.1 GAD (GENERALIZED ANXIETY DISORDER): ICD-10-CM

## 2020-07-28 DIAGNOSIS — G47.00 INSOMNIA, UNSPECIFIED TYPE: ICD-10-CM

## 2020-07-28 DIAGNOSIS — J44.9 CHRONIC OBSTRUCTIVE PULMONARY DISEASE, UNSPECIFIED COPD TYPE: ICD-10-CM

## 2020-07-28 DIAGNOSIS — I73.9 PVD (PERIPHERAL VASCULAR DISEASE): Primary | ICD-10-CM

## 2020-07-28 PROCEDURE — 99999 PR PBB SHADOW E&M-EST. PATIENT-LVL III: ICD-10-PCS | Mod: PBBFAC,,, | Performed by: FAMILY MEDICINE

## 2020-07-28 PROCEDURE — 99999 PR PBB SHADOW E&M-EST. PATIENT-LVL III: CPT | Mod: PBBFAC,,, | Performed by: FAMILY MEDICINE

## 2020-07-28 PROCEDURE — 99213 OFFICE O/P EST LOW 20 MIN: CPT | Mod: S$GLB,,, | Performed by: FAMILY MEDICINE

## 2020-07-28 PROCEDURE — 99213 PR OFFICE/OUTPT VISIT, EST, LEVL III, 20-29 MIN: ICD-10-PCS | Mod: S$GLB,,, | Performed by: FAMILY MEDICINE

## 2020-07-28 RX ORDER — ALPRAZOLAM 0.25 MG/1
TABLET ORAL
Qty: 270 TABLET | Refills: 1 | Status: SHIPPED | OUTPATIENT
Start: 2020-07-28 | End: 2021-03-02 | Stop reason: SDUPTHER

## 2020-07-28 RX ORDER — ESCITALOPRAM OXALATE 5 MG/1
5 TABLET ORAL DAILY
Qty: 90 TABLET | Refills: 1 | Status: SHIPPED | OUTPATIENT
Start: 2020-07-28 | End: 2020-12-07

## 2020-07-28 NOTE — PROGRESS NOTES
Subjective:       Patient ID: Marva Perez is a 79 y.o. female.    Chief Complaint: Follow-up (1 month)    Pt is a 79 y.o. female who presents for check up for greg ankle and skin irritation. Doing well on current meds. Denies any side effects. Prevention is up to date.    Review of Systems   Constitutional: Negative for appetite change, chills and fever.   HENT: Negative for rhinorrhea, sinus pressure, sore throat and trouble swallowing.    Respiratory: Negative for cough, chest tightness, shortness of breath and wheezing.    Cardiovascular: Negative for chest pain and palpitations.   Gastrointestinal: Negative for abdominal pain, blood in stool, diarrhea, nausea and vomiting.   Genitourinary: Negative for dysuria, flank pain, hematuria, pelvic pain, urgency, vaginal bleeding, vaginal discharge and vaginal pain.   Musculoskeletal: Negative for back pain, joint swelling and neck stiffness.   Skin: Negative for rash.   Neurological: Negative for dizziness, weakness, light-headedness, numbness and headaches.   Hematological: Does not bruise/bleed easily.   Psychiatric/Behavioral: Negative for agitation. The patient is not nervous/anxious.        Objective:      Physical Exam  Constitutional:       Appearance: She is well-developed.   HENT:      Head: Normocephalic.   Eyes:      Pupils: Pupils are equal, round, and reactive to light.   Neck:      Musculoskeletal: Normal range of motion and neck supple.      Thyroid: No thyromegaly.   Cardiovascular:      Rate and Rhythm: Normal rate and regular rhythm.   Pulmonary:      Effort: No respiratory distress.      Breath sounds: No wheezing or rales.   Chest:      Chest wall: No tenderness.   Abdominal:      General: There is no distension.      Tenderness: There is no abdominal tenderness. There is no guarding or rebound.   Musculoskeletal: Normal range of motion.         General: No tenderness.      Comments: R calf tender    Lymphadenopathy:      Cervical: No cervical  adenopathy.   Skin:     General: Skin is warm and dry.      Coloration: Skin is not pale.      Findings: No rash.      Comments: Ray ankle 0 swelling and skin discoloration   Neurological:      Mental Status: She is alert and oriented to person, place, and time.      Cranial Nerves: No cranial nerve deficit.      Motor: No abnormal muscle tone.      Coordination: Coordination normal.      Deep Tendon Reflexes: Reflexes are normal and symmetric. Reflexes normal.   Psychiatric:         Thought Content: Thought content normal.         Judgment: Judgment normal.         Assessment:       1. PVD (peripheral vascular disease)    2. KATHIE (generalized anxiety disorder)    3. Chronic obstructive pulmonary disease, unspecified COPD type        Plan:   Marva was seen today for follow-up.    Diagnoses and all orders for this visit:    PVD (peripheral vascular disease)    KATHIE (generalized anxiety disorder)    Chronic obstructive pulmonary disease, unspecified COPD type    [facility]

## 2020-08-03 ENCOUNTER — DOCUMENT SCAN (OUTPATIENT)
Dept: HOME HEALTH SERVICES | Facility: HOSPITAL | Age: 80
End: 2020-08-03
Payer: MEDICARE

## 2020-08-07 ENCOUNTER — APPOINTMENT (OUTPATIENT)
Dept: RADIOLOGY | Facility: CLINIC | Age: 80
End: 2020-08-07
Attending: FAMILY MEDICINE
Payer: MEDICARE

## 2020-08-07 ENCOUNTER — OFFICE VISIT (OUTPATIENT)
Dept: FAMILY MEDICINE | Facility: CLINIC | Age: 80
End: 2020-08-07
Payer: MEDICARE

## 2020-08-07 ENCOUNTER — DOCUMENT SCAN (OUTPATIENT)
Dept: HOME HEALTH SERVICES | Facility: HOSPITAL | Age: 80
End: 2020-08-07
Payer: MEDICARE

## 2020-08-07 VITALS
OXYGEN SATURATION: 85 % | SYSTOLIC BLOOD PRESSURE: 128 MMHG | BODY MASS INDEX: 20.16 KG/M2 | WEIGHT: 109.56 LBS | HEIGHT: 62 IN | DIASTOLIC BLOOD PRESSURE: 70 MMHG | HEART RATE: 84 BPM | RESPIRATION RATE: 14 BRPM

## 2020-08-07 DIAGNOSIS — M54.9 DORSALGIA, UNSPECIFIED: ICD-10-CM

## 2020-08-07 DIAGNOSIS — M53.3 SACROILIAC PAIN: Primary | ICD-10-CM

## 2020-08-07 PROCEDURE — 72100 X-RAY EXAM L-S SPINE 2/3 VWS: CPT | Mod: TC,PO

## 2020-08-07 PROCEDURE — 96372 THER/PROPH/DIAG INJ SC/IM: CPT | Mod: S$GLB,,, | Performed by: FAMILY MEDICINE

## 2020-08-07 PROCEDURE — 99213 OFFICE O/P EST LOW 20 MIN: CPT | Mod: 25,S$GLB,, | Performed by: FAMILY MEDICINE

## 2020-08-07 PROCEDURE — 99999 PR PBB SHADOW E&M-EST. PATIENT-LVL III: CPT | Mod: PBBFAC,,, | Performed by: FAMILY MEDICINE

## 2020-08-07 PROCEDURE — 99213 PR OFFICE/OUTPT VISIT, EST, LEVL III, 20-29 MIN: ICD-10-PCS | Mod: 25,S$GLB,, | Performed by: FAMILY MEDICINE

## 2020-08-07 PROCEDURE — 99999 PR PBB SHADOW E&M-EST. PATIENT-LVL III: ICD-10-PCS | Mod: PBBFAC,,, | Performed by: FAMILY MEDICINE

## 2020-08-07 PROCEDURE — 96372 PR INJECTION,THERAP/PROPH/DIAG2ST, IM OR SUBCUT: ICD-10-PCS | Mod: S$GLB,,, | Performed by: FAMILY MEDICINE

## 2020-08-07 RX ORDER — KETOROLAC TROMETHAMINE 30 MG/ML
30 INJECTION, SOLUTION INTRAMUSCULAR; INTRAVENOUS
Status: COMPLETED | OUTPATIENT
Start: 2020-08-07 | End: 2020-08-07

## 2020-08-07 RX ORDER — METHYLPREDNISOLONE ACETATE 40 MG/ML
40 INJECTION, SUSPENSION INTRA-ARTICULAR; INTRALESIONAL; INTRAMUSCULAR; SOFT TISSUE
Status: COMPLETED | OUTPATIENT
Start: 2020-08-07 | End: 2020-08-07

## 2020-08-07 RX ORDER — HYDROCODONE BITARTRATE AND ACETAMINOPHEN 5; 325 MG/1; MG/1
1 TABLET ORAL EVERY 12 HOURS PRN
Qty: 14 TABLET | Refills: 0 | Status: SHIPPED | OUTPATIENT
Start: 2020-08-07 | End: 2020-08-17

## 2020-08-07 RX ADMIN — METHYLPREDNISOLONE ACETATE 40 MG: 40 INJECTION, SUSPENSION INTRA-ARTICULAR; INTRALESIONAL; INTRAMUSCULAR; SOFT TISSUE at 05:08

## 2020-08-07 RX ADMIN — KETOROLAC TROMETHAMINE 30 MG: 30 INJECTION, SOLUTION INTRAMUSCULAR; INTRAVENOUS at 05:08

## 2020-08-07 NOTE — PROGRESS NOTES
Subjective:       Patient ID: Marva Perez is a 79 y.o. female.    Chief Complaint: Back Pain    Pt is a 79 y.o. female who presents for check up for acute onset of lumbar back pain. Doing well on current meds. Denies any side effects. Prevention is up to date.    Review of Systems   Musculoskeletal: Positive for back pain.        10/10 lumbar pain       Objective:      Physical Exam  Constitutional:       General: She is in acute distress.      Appearance: She is well-developed.   HENT:      Head: Normocephalic.   Eyes:      Pupils: Pupils are equal, round, and reactive to light.   Neck:      Musculoskeletal: Normal range of motion and neck supple.      Thyroid: No thyromegaly.   Cardiovascular:      Rate and Rhythm: Normal rate and regular rhythm.   Pulmonary:      Effort: No respiratory distress.      Breath sounds: No wheezing or rales.      Comments: On 02 support  Chest:      Chest wall: No tenderness.   Abdominal:      General: There is no distension.      Tenderness: There is no abdominal tenderness. There is no guarding or rebound.   Musculoskeletal: Normal range of motion.         General: No tenderness.      Comments: Posturing and lumbar 4++TTP   Lymphadenopathy:      Cervical: No cervical adenopathy.   Skin:     General: Skin is warm and dry.      Coloration: Skin is not pale.      Findings: No rash.   Neurological:      Mental Status: She is alert and oriented to person, place, and time.      Cranial Nerves: No cranial nerve deficit.      Motor: No abnormal muscle tone.      Coordination: Coordination normal.      Deep Tendon Reflexes: Reflexes are normal and symmetric. Reflexes normal.   Psychiatric:         Thought Content: Thought content normal.         Judgment: Judgment normal.         Assessment:       1. Sacroiliac pain    2. Dorsalgia, unspecified        Plan:   Marva was seen today for back pain.    Diagnoses and all orders for this visit:    Sacroiliac pain    Dorsalgia,  unspecified  -     X-Ray Lumbar Spine AP And Lateral; Future    Other orders  -     ketorolac injection 30 mg  -     methylPREDNISolone acetate injection 40 mg  -     HYDROcodone-acetaminophen (NORCO) 5-325 mg per tablet; Take 1 tablet by mouth every 12 (twelve) hours as needed for Pain.    May need a MRI if not better

## 2020-08-08 ENCOUNTER — NURSE TRIAGE (OUTPATIENT)
Dept: ADMINISTRATIVE | Facility: CLINIC | Age: 80
End: 2020-08-08

## 2020-08-08 NOTE — TELEPHONE ENCOUNTER
"Spoke with patient and daughter (Mckenna ([facility] nurse). Patient states that Dr. Clements called in Norco 5-325 mg for back pain on yesterday.  Patient states that her pain is a 10/10 when she is up and moving around.  Daughter states that she wants the on call provider to call in a muscle relaxer for her mother.  Mckenna states that she told her mother to take half a tablet although prescription was written for 1 tablet every 12 hours.  Patient denies feeling sleepy or drowsy.  She states that she feels relaxed.   Advised ER both declined.  Spoke with on call provider Dr. Veronika Adams whom states that patient should take medication as prescribed by Dr. Clements.  Dr. Adams states from a safety stand point patient can take another half of Norco tablet right now.  Patient and Mckenna given Dr. Adams's recommendation.  Mckenna began using profanity and stated that [facility] on call provider's only want to send patent's to the ER and it is a disservice to a patient like her mother that is elderly and trying to stay away from covid-19. Mckenna also states that she has robaxin and she thinks she will call the pharmacist to see if she can given her that.  Informed Mckenna that I cannot advise that she do such a thing.  Patient stated that she will take a bath a see how she feels later about taking another half of the norco tablet.  Patient and Mckenna advised to call back if needed Mckenna stated "what's the point your on call doctor's won't do anything anyway." Mckenna then hangs up the call.   Reason for Disposition   [1] SEVERE back pain (e.g., excruciating) AND [2] sudden onset AND [3] age > 60   Difficult caller responded to phone counseling    Additional Information   Negative: Passed out (i.e., lost consciousness, collapsed and was not responding)   Negative: Shock suspected (e.g., cold/pale/clammy skin, too weak to stand, low BP, rapid pulse)   Negative: Sounds like a life-threatening emergency to the " triager    Protocols used: BACK PAIN-A-AH, DIFFICULT CALLER-A-AH

## 2020-08-09 NOTE — TELEPHONE ENCOUNTER
Spoke with Mckenna she states that her mother was unclear if she could take another half a tablet of Norco.  Mckenna informed that per Dr. Adams patient could take another half a tablet safely.  Offered to call patient back Mckenna states that she is going to go tot her mother's house to administer the other half of pill.  She states that her mother is nervous about taking pain pills and going to the ER due to covid-19.  Mckenna has no further questions or concerns at this time.     Reason for Disposition   Health Information question, no triage required and triager able to answer question    Protocols used: INFORMATION ONLY CALL-A-AH

## 2020-08-10 ENCOUNTER — LAB VISIT (OUTPATIENT)
Dept: LAB | Facility: HOSPITAL | Age: 80
End: 2020-08-10
Attending: FAMILY MEDICINE
Payer: MEDICARE

## 2020-08-10 ENCOUNTER — TELEPHONE (OUTPATIENT)
Dept: FAMILY MEDICINE | Facility: CLINIC | Age: 80
End: 2020-08-10

## 2020-08-10 DIAGNOSIS — R30.0 DYSURIA: Primary | ICD-10-CM

## 2020-08-10 DIAGNOSIS — N39.0 URINARY TRACT INFECTION WITHOUT HEMATURIA, SITE UNSPECIFIED: Primary | ICD-10-CM

## 2020-08-10 LAB
BACTERIA #/AREA URNS HPF: ABNORMAL /HPF
BILIRUB UR QL STRIP: NEGATIVE
CLARITY UR: CLEAR
COLOR UR: YELLOW
GLUCOSE UR QL STRIP: NEGATIVE
HGB UR QL STRIP: ABNORMAL
HYALINE CASTS #/AREA URNS LPF: 1 /LPF
KETONES UR QL STRIP: NEGATIVE
LEUKOCYTE ESTERASE UR QL STRIP: ABNORMAL
MICROSCOPIC COMMENT: ABNORMAL
NITRITE UR QL STRIP: NEGATIVE
PH UR STRIP: 6 [PH] (ref 5–8)
PROT UR QL STRIP: NEGATIVE
RBC #/AREA URNS HPF: 3 /HPF (ref 0–4)
SP GR UR STRIP: >=1.03 (ref 1–1.03)
SQUAMOUS #/AREA URNS HPF: 3 /HPF
URN SPEC COLLECT METH UR: ABNORMAL
UROBILINOGEN UR STRIP-ACNC: NEGATIVE EU/DL
WBC #/AREA URNS HPF: 20 /HPF (ref 0–5)

## 2020-08-10 PROCEDURE — 81000 URINALYSIS NONAUTO W/SCOPE: CPT

## 2020-08-10 NOTE — TELEPHONE ENCOUNTER
Pt is still having back pain, when she sits there is no pain, when she stands she has low back. She would like to do a urine before she does the MRI to rule out a UTI

## 2020-08-10 NOTE — TELEPHONE ENCOUNTER
----- Message from Lennox Son sent at 8/10/2020 11:35 AM CDT -----  Contact: Patient  Marva Perez  MRN: [id number]  : 1940  PCP: Kevin Clements  Home Phone      [phone]  Work Phone      Not on file.  Mobile          [phone]      MESSAGE:  saw Dr Clements last week for low back pain -- states pain is still present when standing -- no pain when sitting or laying -- would like to have urine checked before any further testing -- has no ride to come for appt -- please advise    Call [phone]    PCP: Tyree

## 2020-08-11 ENCOUNTER — TELEPHONE (OUTPATIENT)
Dept: FAMILY MEDICINE | Facility: CLINIC | Age: 80
End: 2020-08-11

## 2020-08-11 NOTE — PROGRESS NOTES
The following lab results were abnormal. Please schedule her MRI of lumbar area Per our conversation

## 2020-08-11 NOTE — TELEPHONE ENCOUNTER
----- Message from Ayleen Horn sent at 2020  2:11 PM CDT -----  Regarding: UA  Contact: Self  Marva Perez  MRN: [id number]  : 1940  PCP: Kevin Clements  Home Phone      [phone]  Work Phone      Not on file.  Mobile          [phone]      MESSAGE:   Patient states she is in severe pain and would like her results and meds called in for her to [facility] in [place]    Phone:  [phone]

## 2020-08-12 ENCOUNTER — TELEPHONE (OUTPATIENT)
Dept: FAMILY MEDICINE | Facility: CLINIC | Age: 80
End: 2020-08-12

## 2020-08-12 ENCOUNTER — EXTERNAL HOME HEALTH (OUTPATIENT)
Dept: HOME HEALTH SERVICES | Facility: HOSPITAL | Age: 80
End: 2020-08-12
Payer: MEDICARE

## 2020-08-12 DIAGNOSIS — M47.817 SPONDYLOSIS WITHOUT MYELOPATHY OR RADICULOPATHY, LUMBOSACRAL REGION: ICD-10-CM

## 2020-08-12 DIAGNOSIS — G89.29 CHRONIC LOW BACK PAIN WITH SCIATICA, SCIATICA LATERALITY UNSPECIFIED, UNSPECIFIED BACK PAIN LATERALITY: Primary | ICD-10-CM

## 2020-08-12 DIAGNOSIS — M54.40 CHRONIC LOW BACK PAIN WITH SCIATICA, SCIATICA LATERALITY UNSPECIFIED, UNSPECIFIED BACK PAIN LATERALITY: Primary | ICD-10-CM

## 2020-08-12 NOTE — TELEPHONE ENCOUNTER
----- Message from Kevin Clements MD sent at 8/11/2020  6:22 PM CDT -----  The following lab results were abnormal. Please schedule her MRI of lumbar area Per our conversation

## 2020-08-14 ENCOUNTER — TELEPHONE (OUTPATIENT)
Dept: FAMILY MEDICINE | Facility: CLINIC | Age: 80
End: 2020-08-14

## 2020-08-14 NOTE — TELEPHONE ENCOUNTER
----- Message from Ayleen Horn sent at 2020 10:30 AM CDT -----  Regarding: MRI  Contact: Self  Marva Perez  MRN: [id number]  : 1940  PCP: Kevin Clements  Home Phone      [phone]  Work Phone      Not on file.  Mobile          [phone]      MESSAGE:   Patient would like to schedule and MRI soon, please advise    Phone:  [phone]

## 2020-08-31 ENCOUNTER — DOCUMENT SCAN (OUTPATIENT)
Dept: HOME HEALTH SERVICES | Facility: HOSPITAL | Age: 80
End: 2020-08-31
Payer: MEDICARE

## 2020-09-01 ENCOUNTER — DOCUMENT SCAN (OUTPATIENT)
Dept: HOME HEALTH SERVICES | Facility: HOSPITAL | Age: 80
End: 2020-09-01
Payer: MEDICARE

## 2020-09-09 ENCOUNTER — OFFICE VISIT (OUTPATIENT)
Dept: FAMILY MEDICINE | Facility: CLINIC | Age: 80
End: 2020-09-09
Payer: MEDICARE

## 2020-09-09 ENCOUNTER — APPOINTMENT (OUTPATIENT)
Dept: RADIOLOGY | Facility: CLINIC | Age: 80
End: 2020-09-09
Attending: NURSE PRACTITIONER
Payer: MEDICARE

## 2020-09-09 VITALS
HEIGHT: 62 IN | SYSTOLIC BLOOD PRESSURE: 110 MMHG | BODY MASS INDEX: 19.96 KG/M2 | RESPIRATION RATE: 22 BRPM | HEART RATE: 64 BPM | OXYGEN SATURATION: 94 % | WEIGHT: 108.44 LBS | DIASTOLIC BLOOD PRESSURE: 64 MMHG | TEMPERATURE: 99 F

## 2020-09-09 DIAGNOSIS — R07.89 OTHER CHEST PAIN: Primary | ICD-10-CM

## 2020-09-09 DIAGNOSIS — R07.89 OTHER CHEST PAIN: ICD-10-CM

## 2020-09-09 PROCEDURE — 99999 PR PBB SHADOW E&M-EST. PATIENT-LVL IV: ICD-10-PCS | Mod: PBBFAC,,, | Performed by: NURSE PRACTITIONER

## 2020-09-09 PROCEDURE — 93005 EKG 12-LEAD: ICD-10-PCS | Mod: S$GLB,,, | Performed by: NURSE PRACTITIONER

## 2020-09-09 PROCEDURE — 93005 ELECTROCARDIOGRAM TRACING: CPT | Mod: S$GLB,,, | Performed by: NURSE PRACTITIONER

## 2020-09-09 PROCEDURE — 99999 PR PBB SHADOW E&M-EST. PATIENT-LVL IV: CPT | Mod: PBBFAC,,, | Performed by: NURSE PRACTITIONER

## 2020-09-09 PROCEDURE — 99213 OFFICE O/P EST LOW 20 MIN: CPT | Mod: S$GLB,,, | Performed by: NURSE PRACTITIONER

## 2020-09-09 PROCEDURE — 99213 PR OFFICE/OUTPT VISIT, EST, LEVL III, 20-29 MIN: ICD-10-PCS | Mod: S$GLB,,, | Performed by: NURSE PRACTITIONER

## 2020-09-09 PROCEDURE — 93010 ELECTROCARDIOGRAM REPORT: CPT | Mod: S$GLB,,, | Performed by: INTERNAL MEDICINE

## 2020-09-09 PROCEDURE — 71046 X-RAY EXAM CHEST 2 VIEWS: CPT | Mod: TC,PO

## 2020-09-09 PROCEDURE — 93010 EKG 12-LEAD: ICD-10-PCS | Mod: S$GLB,,, | Performed by: INTERNAL MEDICINE

## 2020-09-09 NOTE — PROGRESS NOTES
Subjective:       Patient ID: Marva Perez is a 79 y.o. female.    Chief Complaint: Shortness of Breath (even with O2) and Cough (productive cough, green mucus)    Concerned about A-fib causing pain at the posterior mid left chest.    Chest Pain   This is a new problem. The onset quality is undetermined. The pain does not radiate. Associated symptoms include a cough (with green mucus). Pertinent negatives include no abdominal pain, dizziness, fever, headaches, nausea, shortness of breath or vomiting. The cough is productive. She has tried nothing (O2 dependent) for the symptoms.   Her past medical history is significant for COPD. Past medical history comments: A-fib     Review of Systems   Constitutional: Negative.  Negative for appetite change, fatigue and fever.   HENT: Negative.  Negative for congestion, ear pain and sore throat.    Eyes: Negative.  Negative for visual disturbance.   Respiratory: Positive for cough (with green mucus). Negative for shortness of breath and wheezing.         O2 dependent   Cardiovascular: Positive for chest pain.   Gastrointestinal: Negative.  Negative for abdominal pain, diarrhea, nausea and vomiting.   Endocrine: Negative.    Genitourinary: Negative.  Negative for difficulty urinating and urgency.   Musculoskeletal: Negative.  Negative for arthralgias and myalgias.   Skin: Negative.  Negative for color change.   Allergic/Immunologic: Negative.    Neurological: Negative.  Negative for dizziness and headaches.   Hematological: Negative.    Psychiatric/Behavioral: Negative.  Negative for sleep disturbance.   All other systems reviewed and are negative.      Objective:      Physical Exam  Vitals signs and nursing note reviewed.   Constitutional:       General: She is not in acute distress.     Appearance: Normal appearance. She is well-developed.   HENT:      Head: Normocephalic and atraumatic.   Eyes:      Pupils: Pupils are equal, round, and reactive to light.   Neck:       Musculoskeletal: Normal range of motion and neck supple.   Cardiovascular:      Rate and Rhythm: Normal rate and regular rhythm.      Pulses: Normal pulses.      Heart sounds: Normal heart sounds. No murmur.   Pulmonary:      Effort: Pulmonary effort is normal. No respiratory distress.      Breath sounds: Normal breath sounds.      Comments: O2 dependent  Musculoskeletal: Normal range of motion.   Skin:     General: Skin is warm and dry.   Neurological:      Mental Status: She is alert and oriented to person, place, and time.   Psychiatric:         Mood and Affect: Mood normal.         Assessment:       1. Other chest pain        Plan:   Marva was seen today for shortness of breath and cough.    Diagnoses and all orders for this visit:    Other chest pain  -     X-Ray Chest PA And Lateral; Future  -     EKG 12-lead - reviewed with Dr. Adams    Has appointment with cardiology tomorrow - CXR clear and EKG not acute

## 2020-12-03 ENCOUNTER — HOSPITAL ENCOUNTER (EMERGENCY)
Facility: HOSPITAL | Age: 80
Discharge: HOME | End: 2020-12-03
Attending: SURGERY
Payer: MEDICARE

## 2020-12-03 VITALS
HEART RATE: 58 BPM | SYSTOLIC BLOOD PRESSURE: 145 MMHG | WEIGHT: 110 LBS | TEMPERATURE: 97 F | OXYGEN SATURATION: 100 % | DIASTOLIC BLOOD PRESSURE: 62 MMHG | RESPIRATION RATE: 16 BRPM | BODY MASS INDEX: 20.12 KG/M2

## 2020-12-03 DIAGNOSIS — J44.1 COPD EXACERBATION: Primary | ICD-10-CM

## 2020-12-03 LAB
ALBUMIN SERPL BCP-MCNC: 3.9 G/DL (ref 3.5–5.2)
ALLENS TEST: ABNORMAL
ALP SERPL-CCNC: 86 U/L (ref 55–135)
ALT SERPL W/O P-5'-P-CCNC: 22 U/L (ref 10–44)
ANION GAP SERPL CALC-SCNC: 9 MMOL/L (ref 8–16)
APTT BLDCRRT: 28.1 SEC (ref 21–32)
AST SERPL-CCNC: 21 U/L (ref 10–40)
BASOPHILS # BLD AUTO: 0.07 K/UL (ref 0–0.2)
BASOPHILS NFR BLD: 1 % (ref 0–1.9)
BILIRUB SERPL-MCNC: 1.1 MG/DL (ref 0.1–1)
BNP SERPL-MCNC: 243 PG/ML (ref 0–99)
BUN SERPL-MCNC: 12 MG/DL (ref 8–23)
CALCIUM SERPL-MCNC: 8.7 MG/DL (ref 8.7–10.5)
CHLORIDE SERPL-SCNC: 105 MMOL/L (ref 95–110)
CK MB SERPL-MCNC: 1.7 NG/ML (ref 0.1–6.5)
CK MB SERPL-MCNC: 1.9 NG/ML (ref 0.1–6.5)
CK MB SERPL-RTO: 3.1 % (ref 0–5)
CK MB SERPL-RTO: 3.7 % (ref 0–5)
CK SERPL-CCNC: 52 U/L (ref 20–180)
CK SERPL-CCNC: 52 U/L (ref 20–180)
CK SERPL-CCNC: 54 U/L (ref 20–180)
CK SERPL-CCNC: 54 U/L (ref 20–180)
CO2 SERPL-SCNC: 30 MMOL/L (ref 23–29)
CREAT SERPL-MCNC: 0.8 MG/DL (ref 0.5–1.4)
CRP SERPL-MCNC: 0.7 MG/L (ref 0–8.2)
D DIMER PPP IA.FEU-MCNC: <0.19 MG/L FEU
DELSYS: ABNORMAL
DIFFERENTIAL METHOD: ABNORMAL
EOSINOPHIL # BLD AUTO: 0.2 K/UL (ref 0–0.5)
EOSINOPHIL NFR BLD: 3 % (ref 0–8)
ERYTHROCYTE [DISTWIDTH] IN BLOOD BY AUTOMATED COUNT: 12.2 % (ref 11.5–14.5)
ERYTHROCYTE [SEDIMENTATION RATE] IN BLOOD BY WESTERGREN METHOD: 19 MM/HR (ref 0–20)
EST. GFR  (AFRICAN AMERICAN): >60 ML/MIN/1.73 M^2
EST. GFR  (NON AFRICAN AMERICAN): >60 ML/MIN/1.73 M^2
FERRITIN SERPL-MCNC: 125 NG/ML (ref 20–300)
GLUCOSE SERPL-MCNC: 99 MG/DL (ref 70–110)
GROUP A STREP, MOLECULAR: NEGATIVE
HCO3 UR-SCNC: 34.7 MMOL/L (ref 22–26)
HCT VFR BLD AUTO: 39.8 % (ref 37–48.5)
HGB BLD-MCNC: 12.7 G/DL (ref 12–16)
IMM GRANULOCYTES # BLD AUTO: 0.02 K/UL (ref 0–0.04)
IMM GRANULOCYTES NFR BLD AUTO: 0.3 % (ref 0–0.5)
INFLUENZA A, MOLECULAR: NEGATIVE
INFLUENZA B, MOLECULAR: NEGATIVE
INR PPP: 1 (ref 0.8–1.2)
LACTATE SERPL-SCNC: 0.6 MMOL/L (ref 0.5–2.2)
LDH SERPL L TO P-CCNC: 209 U/L (ref 110–260)
LYMPHOCYTES # BLD AUTO: 1.7 K/UL (ref 1–4.8)
LYMPHOCYTES NFR BLD: 23.6 % (ref 18–48)
MCH RBC QN AUTO: 29.3 PG (ref 27–31)
MCHC RBC AUTO-ENTMCNC: 31.9 G/DL (ref 32–36)
MCV RBC AUTO: 92 FL (ref 82–98)
MONOCYTES # BLD AUTO: 0.6 K/UL (ref 0.3–1)
MONOCYTES NFR BLD: 9.1 % (ref 4–15)
NEUTROPHILS # BLD AUTO: 4.4 K/UL (ref 1.8–7.7)
NEUTROPHILS NFR BLD: 63 % (ref 38–73)
NRBC BLD-RTO: 0 /100 WBC
PCO2 BLDA: 56 MMHG (ref 35–45)
PH SMN: 7.4 [PH] (ref 7.35–7.45)
PLATELET # BLD AUTO: 167 K/UL (ref 150–350)
PMV BLD AUTO: 10.1 FL (ref 9.2–12.9)
PO2 BLDA: 149 MMHG (ref 75–100)
POC BE: 8.3 MMOL/L (ref -2–2)
POC COHB: 1.8 % (ref 0–3)
POC METHB: 1.1 % (ref 0–1.5)
POC O2HB ARTERIAL: 96.8 % (ref 94–100)
POC SATURATED O2: 99.6 % (ref 90–100)
POC TCO2: 36.4 MMOL/L
POC THB: 11.9 G/DL (ref 12–18)
POTASSIUM SERPL-SCNC: 4.1 MMOL/L (ref 3.5–5.1)
PROCALCITONIN SERPL IA-MCNC: 0.03 NG/ML
PROT SERPL-MCNC: 6.6 G/DL (ref 6–8.4)
PROTHROMBIN TIME: 10.8 SEC (ref 9–12.5)
RBC # BLD AUTO: 4.33 M/UL (ref 4–5.4)
SARS-COV-2 RDRP RESP QL NAA+PROBE: NEGATIVE
SITE: ABNORMAL
SODIUM SERPL-SCNC: 144 MMOL/L (ref 136–145)
SPECIMEN SOURCE: NORMAL
TROPONIN I SERPL DL<=0.01 NG/ML-MCNC: 0.01 NG/ML (ref 0–0.03)
TROPONIN I SERPL DL<=0.01 NG/ML-MCNC: 0.01 NG/ML (ref 0–0.03)
WBC # BLD AUTO: 7.02 K/UL (ref 3.9–12.7)

## 2020-12-03 PROCEDURE — 85025 COMPLETE CBC W/AUTO DIFF WBC: CPT

## 2020-12-03 PROCEDURE — 82550 ASSAY OF CK (CPK): CPT

## 2020-12-03 PROCEDURE — 82803 BLOOD GASES ANY COMBINATION: CPT | Performed by: SURGERY

## 2020-12-03 PROCEDURE — 96374 THER/PROPH/DIAG INJ IV PUSH: CPT

## 2020-12-03 PROCEDURE — 84145 PROCALCITONIN (PCT): CPT

## 2020-12-03 PROCEDURE — 93010 ELECTROCARDIOGRAM REPORT: CPT | Mod: ,,, | Performed by: INTERNAL MEDICINE

## 2020-12-03 PROCEDURE — 63600175 PHARM REV CODE 636 W HCPCS: Performed by: SURGERY

## 2020-12-03 PROCEDURE — 85651 RBC SED RATE NONAUTOMATED: CPT

## 2020-12-03 PROCEDURE — 80053 COMPREHEN METABOLIC PANEL: CPT

## 2020-12-03 PROCEDURE — 85379 FIBRIN DEGRADATION QUANT: CPT

## 2020-12-03 PROCEDURE — 36415 COLL VENOUS BLD VENIPUNCTURE: CPT

## 2020-12-03 PROCEDURE — 27000221 HC OXYGEN, UP TO 24 HOURS

## 2020-12-03 PROCEDURE — 99285 EMERGENCY DEPT VISIT HI MDM: CPT | Mod: 25

## 2020-12-03 PROCEDURE — 85730 THROMBOPLASTIN TIME PARTIAL: CPT

## 2020-12-03 PROCEDURE — 87651 STREP A DNA AMP PROBE: CPT

## 2020-12-03 PROCEDURE — U0002 COVID-19 LAB TEST NON-CDC: HCPCS

## 2020-12-03 PROCEDURE — 83615 LACTATE (LD) (LDH) ENZYME: CPT

## 2020-12-03 PROCEDURE — 84484 ASSAY OF TROPONIN QUANT: CPT

## 2020-12-03 PROCEDURE — 93010 EKG 12-LEAD: ICD-10-PCS | Mod: ,,, | Performed by: INTERNAL MEDICINE

## 2020-12-03 PROCEDURE — 85610 PROTHROMBIN TIME: CPT

## 2020-12-03 PROCEDURE — 83605 ASSAY OF LACTIC ACID: CPT

## 2020-12-03 PROCEDURE — 87502 INFLUENZA DNA AMP PROBE: CPT

## 2020-12-03 PROCEDURE — 87040 BLOOD CULTURE FOR BACTERIA: CPT | Mod: 59

## 2020-12-03 PROCEDURE — 82553 CREATINE MB FRACTION: CPT | Mod: 91

## 2020-12-03 PROCEDURE — 96375 TX/PRO/DX INJ NEW DRUG ADDON: CPT

## 2020-12-03 PROCEDURE — 86140 C-REACTIVE PROTEIN: CPT

## 2020-12-03 PROCEDURE — 25000242 PHARM REV CODE 250 ALT 637 W/ HCPCS: Performed by: SURGERY

## 2020-12-03 PROCEDURE — 36600 WITHDRAWAL OF ARTERIAL BLOOD: CPT

## 2020-12-03 PROCEDURE — 93005 ELECTROCARDIOGRAM TRACING: CPT

## 2020-12-03 PROCEDURE — 83880 ASSAY OF NATRIURETIC PEPTIDE: CPT

## 2020-12-03 PROCEDURE — 82728 ASSAY OF FERRITIN: CPT

## 2020-12-03 PROCEDURE — 99900035 HC TECH TIME PER 15 MIN (STAT)

## 2020-12-03 PROCEDURE — 94640 AIRWAY INHALATION TREATMENT: CPT

## 2020-12-03 RX ORDER — METHYLPREDNISOLONE 4 MG/1
TABLET ORAL
Qty: 1 PACKAGE | Refills: 0 | Status: SHIPPED | OUTPATIENT
Start: 2020-12-03 | End: 2020-12-21 | Stop reason: ALTCHOICE

## 2020-12-03 RX ORDER — LEVOFLOXACIN 500 MG/1
500 TABLET, FILM COATED ORAL DAILY
Qty: 7 TABLET | Refills: 0 | Status: SHIPPED | OUTPATIENT
Start: 2020-12-03 | End: 2020-12-10

## 2020-12-03 RX ORDER — PROMETHAZINE HYDROCHLORIDE AND DEXTROMETHORPHAN HYDROBROMIDE 6.25; 15 MG/5ML; MG/5ML
5 SYRUP ORAL EVERY 6 HOURS PRN
Qty: 118 ML | Refills: 0 | Status: SHIPPED | OUTPATIENT
Start: 2020-12-03 | End: 2020-12-13

## 2020-12-03 RX ORDER — LEVALBUTEROL 1.25 MG/.5ML
1.25 SOLUTION, CONCENTRATE RESPIRATORY (INHALATION)
Status: COMPLETED | OUTPATIENT
Start: 2020-12-03 | End: 2020-12-03

## 2020-12-03 RX ORDER — LEVOFLOXACIN 5 MG/ML
750 INJECTION, SOLUTION INTRAVENOUS
Status: DISCONTINUED | OUTPATIENT
Start: 2020-12-03 | End: 2020-12-03 | Stop reason: HOSPADM

## 2020-12-03 RX ORDER — METHYLPREDNISOLONE SOD SUCC 125 MG
125 VIAL (EA) INJECTION EVERY 8 HOURS
Status: DISCONTINUED | OUTPATIENT
Start: 2020-12-03 | End: 2020-12-03 | Stop reason: HOSPADM

## 2020-12-03 RX ADMIN — METHYLPREDNISOLONE SODIUM SUCCINATE 125 MG: 125 INJECTION, POWDER, FOR SOLUTION INTRAMUSCULAR; INTRAVENOUS at 08:12

## 2020-12-03 RX ADMIN — LEVALBUTEROL 1.25 MG: 1.25 SOLUTION, CONCENTRATE RESPIRATORY (INHALATION) at 08:12

## 2020-12-03 RX ADMIN — LEVOFLOXACIN 750 MG: 5 INJECTION, SOLUTION INTRAVENOUS at 10:12

## 2020-12-03 NOTE — ED PROVIDER NOTES
[facility]                                                 Chief Complaint  80 y.o. female with Shortness of Breath      History of Present Illness  Marva Perez presents to the emergency room with shortness of breath  The patient has shortness of breath with COPD exacerbation this morning  Patient has no active wheezing on presentation, high anxiety issues noted  Patient denies any chest pain with shortness of breath, on oxygen at home  Patient denies any distress at this time, 94% oxygenation on ER triage    The history is provided by the patient   device was not used during this ER visit    Past Medical History   -- Abnormal Pap smear     -- COPD (chronic obstructive pulmonary disease)     -- Depression     -- GERD (gastroesophageal reflux disease)     -- Hyperlipidemia     -- Hypertension        Past Surgical History   -- APPENDECTOMY       -- CERVICAL BIOPSY  W/ LOOP ELECTRODE EXCISION       -- CHOLECYSTECTOMY       -- COLLATERAL LIGAMENT REPAIR, KNEE       -- COLONOSCOPY       -- DILATION AND CURETTAGE OF UTERUS       -- SINUS SURGERY          Review of patient's allergies   -- Pcn [penicillins]     -- Tetracyclines     -- Bactrim [sulfamethoxazole-trimethoprim]     -- Ilosone     -- Iodine and iodide containing products     -- Sulfa (sulfonamide antibiotics)      I have reviewed all of this patient's past medical, surgical, family, and social   histories as well as active allergies and medications documented in the  electronic medical record    Review of Systems and Physical Exam      Review of Systems  -- Constitution - no fever, denies fatigue, no weakness, no chills  -- Eyes - no tearing or redness, no visual disturbance  -- Ear, Nose - no tinnitus or earache, no nasal congestion or discharge  -- Mouth,Throat - no sore throat, no toothache, normal voice, normal swallowing  -- Respiratory - cough and congestion, shortness of breath, no TAVARES  -- Cardiovascular -  denies chest pain, no palpitations, denies claudication  -- Gastrointestinal - denies abdominal pain, nausea, vomiting, or diarrhea  -- Genitourinary - no dysuria, denies flank pain, no hematuria, no STD risk  -- Musculoskeletal - denies back pain, negative for trauma or injury  -- Neurological - no headache, denies weakness or seizure; no LOC  -- Skin - denies pallor, rash, or changes in skin. no hives or welts noted  -- Psychiatric - Denies SI or HI, no psychosis or fractured thought noted     Vital Signs  Her oral temperature is 97.3 °F (36.3 °C).   Her blood pressure is 145/62 and her pulse is 58   Her respiration is 16 and oxygen saturation is 100%.     Physical Exam  -- Nursing note and vitals reviewed  -- Constitutional: Appears well-developed and well-nourished  -- Head: Atraumatic. Normocephalic. No obvious abnormality  -- Eyes: Pupils are equal and reactive to light. Normal conjunctiva and lids  -- Nose: Nose normal in appearance, nares grossly normal. No discharge  -- Throat: Mucous membranes moist, pharynx normal, normal tonsils. No lesions   -- Ears: External ears and TM normal bilaterally. Normal hearing and no drainage  -- Neck: Normal range of motion. Neck supple. No masses, trachea midline  -- Cardiac: Normal rate, regular rhythm and normal heart sounds  -- Pulmonary: faint rhonchi at the bilateral bases with no active wheezing   -- Abdominal: Soft, no tenderness. Normal bowel sounds. Normal liver edge  -- Musculoskeletal: Normal range of motion, no effusions. Joints stable   -- Neurological: No focal deficits. Showed good interaction with staff  -- Vascular: Posterior tibial, dorsalis pedis and radial pulses 2+ bilaterally      Emergency Room Course      Lab Results     K 4.1      CO2 30 (H)   BUN 12   CREATININE 0.8   GLU 99   ALKPHOS 86   AST 21   ALT 22   BILITOT 1.1 (H)   ALBUMIN 3.9   PROT 6.6   WBC 7.02   HGB 12.7   HCT 39.8      CPK 52   CPK 52   CPKMB 1.9   TROPONINI  0.006   INR 1.0    (H)   DDIMER <0.19   LACTATE 0.6     EKG  -- The EKG findings today were without concerning findings from baseline   -- The troponin drawn in the ER today was within normal limits  -- The 2nd troponin drawn in the ER today was within normal limits      Radiology  -- Chest x-ray showed no infiltrate and showed no acute pathology     Medications Given  methylPREDNISolone sodium succinate injection 125 mg (125 mg Intravenous Given 12/3/20 0814)   levoFLOXacin 750 mg/150 mL IVPB 750 mg (750 mg Intravenous New Bag 12/3/20 1006)   levalbuterol nebulizer solution 1.25 mg (1.25 mg Nebulization Given 12/3/20 0847)     ED Physician Management  -- Diagnosis management comments: 80 y.o. female with COPD  -- patient with anxiety and COPD with a clear chest x-ray today  -- IV steroids given in breathing treatment, much improved in the ER  -- stable EKG with 2 sets of negative troponins today in the ER now  -- patient states she is having a lot of anxiety prior to ER arrival today  -- patient will follow-up with her PCP, discussed with patient and son  -- return to the ER with any concerning symptoms after discharge today    Diagnosis  [J44.1] COPD exacerbation (Primary)    Disposition and Plan  -- Disposition: home  -- Condition: stable  -- Follow-up: Patient to follow up with Kevin Clements MD in 1-2 days.  -- I advised the patient that we have found no life threatening condition today  -- At this time, I believe the patient is clinically stable for discharge.   -- The patient acknowledges that close follow up with a MD is required   -- Patient agrees to comply with all instruction and direction given in the ER    This note is dictated on M*Modal word recognition program.  There are word recognition mistakes that are occasionally missed on review.         Jerson Padgett MD  12/03/20 1108

## 2020-12-06 LAB
BACTERIA BLD CULT: ABNORMAL

## 2020-12-08 LAB — BACTERIA BLD CULT: NORMAL

## 2020-12-11 ENCOUNTER — TELEPHONE (OUTPATIENT)
Dept: FAMILY MEDICINE | Facility: CLINIC | Age: 80
End: 2020-12-11

## 2020-12-11 NOTE — TELEPHONE ENCOUNTER
----- Message from Yehuda Aceves sent at 12/11/2020  2:01 PM CST -----  Regarding: covid-19  Contact: self  Pt requesting call back in regards to being exposed to covid-19 from  on December 4, 2020.    Pt would like to know what to do next.       Call back number is [phone].      Thanks

## 2020-12-11 NOTE — TELEPHONE ENCOUNTER
----- Message from Radha Pope MA sent at 12/11/2020  3:00 PM CST -----  Regarding: appt access  Pt was diagnosed with bronchitis. Pt would like to f/u with Dr. Clements. Next appt is not until Friday, but pt requests to be seen sooner.         [phone]

## 2020-12-21 ENCOUNTER — OFFICE VISIT (OUTPATIENT)
Dept: FAMILY MEDICINE | Facility: CLINIC | Age: 80
End: 2020-12-21
Payer: MEDICARE

## 2020-12-21 VITALS
SYSTOLIC BLOOD PRESSURE: 136 MMHG | BODY MASS INDEX: 20.61 KG/M2 | DIASTOLIC BLOOD PRESSURE: 78 MMHG | TEMPERATURE: 98 F | WEIGHT: 112 LBS | OXYGEN SATURATION: 97 % | HEIGHT: 62 IN | HEART RATE: 65 BPM | RESPIRATION RATE: 16 BRPM

## 2020-12-21 DIAGNOSIS — B37.0 THRUSH: ICD-10-CM

## 2020-12-21 DIAGNOSIS — R19.7 DIARRHEA, UNSPECIFIED TYPE: Primary | ICD-10-CM

## 2020-12-21 DIAGNOSIS — Z87.19 HISTORY OF IBS: ICD-10-CM

## 2020-12-21 DIAGNOSIS — J44.1 COPD EXACERBATION: ICD-10-CM

## 2020-12-21 DIAGNOSIS — H61.23 BILATERAL IMPACTED CERUMEN: ICD-10-CM

## 2020-12-21 DIAGNOSIS — F41.1 GAD (GENERALIZED ANXIETY DISORDER): ICD-10-CM

## 2020-12-21 PROCEDURE — 99213 PR OFFICE/OUTPT VISIT, EST, LEVL III, 20-29 MIN: ICD-10-PCS | Mod: S$GLB,,, | Performed by: FAMILY MEDICINE

## 2020-12-21 PROCEDURE — 99999 PR PBB SHADOW E&M-EST. PATIENT-LVL III: CPT | Mod: PBBFAC,,, | Performed by: FAMILY MEDICINE

## 2020-12-21 PROCEDURE — 99213 OFFICE O/P EST LOW 20 MIN: CPT | Mod: S$GLB,,, | Performed by: FAMILY MEDICINE

## 2020-12-21 PROCEDURE — 99999 PR PBB SHADOW E&M-EST. PATIENT-LVL III: ICD-10-PCS | Mod: PBBFAC,,, | Performed by: FAMILY MEDICINE

## 2020-12-21 RX ORDER — CLOTRIMAZOLE 10 MG/1
10 LOZENGE ORAL; TOPICAL
Qty: 21 TABLET | Refills: 0 | Status: SHIPPED | OUTPATIENT
Start: 2020-12-21 | End: 2020-12-31

## 2020-12-21 RX ORDER — ROSUVASTATIN CALCIUM 40 MG/1
40 TABLET, COATED ORAL NIGHTLY
COMMUNITY
Start: 2020-10-20 | End: 2022-09-26

## 2020-12-21 NOTE — PROGRESS NOTES
Subjective:       Patient ID: Marva Perez is a 80 y.o. female.    Chief Complaint: Follow-up (went to ER and was diagnosed was bronchitis )    Pt is a 80 y.o. female who presents for check up for Diarrhea, unspecified type  (primary encounter diagnosis)  History of ibs. Doing well on current meds. Denies any side effects. Prevention is up to date.    Review of Systems   Constitutional: Negative for appetite change, chills and fever.   HENT: Negative for rhinorrhea, sinus pressure, sore throat and trouble swallowing.    Respiratory: Positive for cough. Negative for chest tightness, shortness of breath and wheezing.    Cardiovascular: Negative for chest pain and palpitations.   Gastrointestinal: Positive for diarrhea. Negative for abdominal pain, blood in stool, nausea and vomiting.   Genitourinary: Negative for dysuria, flank pain, hematuria, pelvic pain, urgency, vaginal bleeding, vaginal discharge and vaginal pain.   Musculoskeletal: Negative for back pain, joint swelling and neck stiffness.   Skin: Negative for rash.   Neurological: Negative for dizziness, weakness, light-headedness, numbness and headaches.   Hematological: Does not bruise/bleed easily.   Psychiatric/Behavioral: Negative for agitation. The patient is not nervous/anxious.        Objective:      Physical Exam  Constitutional:       Appearance: She is well-developed.   HENT:      Head: Normocephalic.   Eyes:      Pupils: Pupils are equal, round, and reactive to light.   Neck:      Musculoskeletal: Normal range of motion and neck supple.      Thyroid: No thyromegaly.   Cardiovascular:      Rate and Rhythm: Normal rate and regular rhythm.   Pulmonary:      Effort: No respiratory distress.      Breath sounds: No wheezing or rales.   Chest:      Chest wall: No tenderness.   Abdominal:      General: There is no distension.      Tenderness: There is no abdominal tenderness. There is no guarding or rebound.   Musculoskeletal: Normal range of motion.          General: No tenderness.   Lymphadenopathy:      Cervical: No cervical adenopathy.   Skin:     General: Skin is warm and dry.      Coloration: Skin is not pale.      Findings: No rash.   Neurological:      Mental Status: She is alert and oriented to person, place, and time.      Cranial Nerves: No cranial nerve deficit.      Motor: No abnormal muscle tone.      Coordination: Coordination normal.      Deep Tendon Reflexes: Reflexes are normal and symmetric. Reflexes normal.   Psychiatric:         Thought Content: Thought content normal.         Judgment: Judgment normal.         Assessment:       1. Diarrhea, unspecified type    2. History of IBS        Plan:   Marva was seen today for follow-up.    Diagnoses and all orders for this visit:    Diarrhea, unspecified type    History of IBS

## 2021-02-22 ENCOUNTER — TELEPHONE (OUTPATIENT)
Dept: FAMILY MEDICINE | Facility: CLINIC | Age: 81
End: 2021-02-22

## 2021-02-24 ENCOUNTER — IMMUNIZATION (OUTPATIENT)
Dept: FAMILY MEDICINE | Facility: CLINIC | Age: 81
End: 2021-02-24
Payer: MEDICARE

## 2021-02-24 DIAGNOSIS — Z23 NEED FOR VACCINATION: Primary | ICD-10-CM

## 2021-02-24 PROCEDURE — 91300 COVID-19, MRNA, LNP-S, PF, 30 MCG/0.3 ML DOSE VACCINE: CPT | Mod: PBBFAC | Performed by: FAMILY MEDICINE

## 2021-03-01 ENCOUNTER — OFFICE VISIT (OUTPATIENT)
Dept: FAMILY MEDICINE | Facility: CLINIC | Age: 81
End: 2021-03-01
Payer: MEDICARE

## 2021-03-01 VITALS
DIASTOLIC BLOOD PRESSURE: 72 MMHG | SYSTOLIC BLOOD PRESSURE: 164 MMHG | HEIGHT: 62 IN | WEIGHT: 114.06 LBS | RESPIRATION RATE: 18 BRPM | OXYGEN SATURATION: 97 % | HEART RATE: 62 BPM | BODY MASS INDEX: 20.99 KG/M2

## 2021-03-01 DIAGNOSIS — J44.1 COPD EXACERBATION: ICD-10-CM

## 2021-03-01 DIAGNOSIS — M54.40 CHRONIC LOW BACK PAIN WITH SCIATICA, SCIATICA LATERALITY UNSPECIFIED, UNSPECIFIED BACK PAIN LATERALITY: ICD-10-CM

## 2021-03-01 DIAGNOSIS — G89.29 CHRONIC LOW BACK PAIN WITH SCIATICA, SCIATICA LATERALITY UNSPECIFIED, UNSPECIFIED BACK PAIN LATERALITY: ICD-10-CM

## 2021-03-01 DIAGNOSIS — F41.1 GAD (GENERALIZED ANXIETY DISORDER): Primary | ICD-10-CM

## 2021-03-01 DIAGNOSIS — G60.9 IDIOPATHIC PERIPHERAL NEUROPATHY: ICD-10-CM

## 2021-03-01 PROCEDURE — 99999 PR PBB SHADOW E&M-EST. PATIENT-LVL III: ICD-10-PCS | Mod: PBBFAC,,, | Performed by: FAMILY MEDICINE

## 2021-03-01 PROCEDURE — 99213 PR OFFICE/OUTPT VISIT, EST, LEVL III, 20-29 MIN: ICD-10-PCS | Mod: S$GLB,,, | Performed by: FAMILY MEDICINE

## 2021-03-01 PROCEDURE — 99999 PR PBB SHADOW E&M-EST. PATIENT-LVL III: CPT | Mod: PBBFAC,,, | Performed by: FAMILY MEDICINE

## 2021-03-01 PROCEDURE — 99213 OFFICE O/P EST LOW 20 MIN: CPT | Mod: S$GLB,,, | Performed by: FAMILY MEDICINE

## 2021-03-01 RX ORDER — GABAPENTIN 100 MG/1
100 CAPSULE ORAL NIGHTLY
Qty: 30 CAPSULE | Refills: 5 | Status: ON HOLD | OUTPATIENT
Start: 2021-03-01 | End: 2023-02-15

## 2021-03-02 DIAGNOSIS — F41.1 GAD (GENERALIZED ANXIETY DISORDER): ICD-10-CM

## 2021-03-02 DIAGNOSIS — Z12.31 ENCOUNTER FOR SCREENING MAMMOGRAM FOR BREAST CANCER: Primary | ICD-10-CM

## 2021-03-02 DIAGNOSIS — G47.00 INSOMNIA, UNSPECIFIED TYPE: ICD-10-CM

## 2021-03-02 RX ORDER — ALPRAZOLAM 0.25 MG/1
TABLET ORAL
Qty: 90 TABLET | Refills: 5 | Status: SHIPPED | OUTPATIENT
Start: 2021-03-02 | End: 2021-05-31 | Stop reason: SDUPTHER

## 2021-03-02 RX ORDER — ALPRAZOLAM 0.25 MG/1
TABLET ORAL
Qty: 270 TABLET | Refills: 1 | Status: CANCELLED | OUTPATIENT
Start: 2021-03-02

## 2021-03-17 ENCOUNTER — IMMUNIZATION (OUTPATIENT)
Dept: FAMILY MEDICINE | Facility: CLINIC | Age: 81
End: 2021-03-17
Payer: MEDICARE

## 2021-03-17 DIAGNOSIS — Z23 NEED FOR VACCINATION: Primary | ICD-10-CM

## 2021-03-17 PROCEDURE — 91300 COVID-19, MRNA, LNP-S, PF, 30 MCG/0.3 ML DOSE VACCINE: CPT | Mod: PBBFAC | Performed by: FAMILY MEDICINE

## 2021-03-17 PROCEDURE — 0002A COVID-19, MRNA, LNP-S, PF, 30 MCG/0.3 ML DOSE VACCINE: CPT | Mod: PBBFAC | Performed by: FAMILY MEDICINE

## 2021-05-26 ENCOUNTER — TELEPHONE (OUTPATIENT)
Dept: FAMILY MEDICINE | Facility: CLINIC | Age: 81
End: 2021-05-26

## 2021-05-31 ENCOUNTER — OFFICE VISIT (OUTPATIENT)
Dept: FAMILY MEDICINE | Facility: CLINIC | Age: 81
End: 2021-05-31
Payer: MEDICARE

## 2021-05-31 ENCOUNTER — CLINICAL SUPPORT (OUTPATIENT)
Dept: FAMILY MEDICINE | Facility: CLINIC | Age: 81
End: 2021-05-31
Payer: MEDICARE

## 2021-05-31 VITALS
RESPIRATION RATE: 20 BRPM | WEIGHT: 114.44 LBS | HEIGHT: 62 IN | SYSTOLIC BLOOD PRESSURE: 142 MMHG | DIASTOLIC BLOOD PRESSURE: 68 MMHG | BODY MASS INDEX: 21.06 KG/M2 | HEART RATE: 70 BPM | TEMPERATURE: 97 F | OXYGEN SATURATION: 92 %

## 2021-05-31 DIAGNOSIS — R19.7 DIARRHEA, UNSPECIFIED TYPE: ICD-10-CM

## 2021-05-31 DIAGNOSIS — R14.0 ABDOMINAL DISTENSION (GASEOUS): ICD-10-CM

## 2021-05-31 DIAGNOSIS — F41.1 GAD (GENERALIZED ANXIETY DISORDER): Primary | ICD-10-CM

## 2021-05-31 DIAGNOSIS — J44.1 COPD EXACERBATION: ICD-10-CM

## 2021-05-31 DIAGNOSIS — Z87.19 HISTORY OF IBS: ICD-10-CM

## 2021-05-31 DIAGNOSIS — H61.20 IMPACTED CERUMEN, UNSPECIFIED LATERALITY: ICD-10-CM

## 2021-05-31 DIAGNOSIS — J31.0 RHINITIS, UNSPECIFIED TYPE: ICD-10-CM

## 2021-05-31 DIAGNOSIS — G47.00 INSOMNIA, UNSPECIFIED TYPE: ICD-10-CM

## 2021-05-31 LAB
BASOPHILS # BLD AUTO: 0.09 K/UL (ref 0–0.2)
BASOPHILS NFR BLD: 1 % (ref 0–1.9)
DIFFERENTIAL METHOD: ABNORMAL
EOSINOPHIL # BLD AUTO: 0.2 K/UL (ref 0–0.5)
EOSINOPHIL NFR BLD: 2.2 % (ref 0–8)
ERYTHROCYTE [DISTWIDTH] IN BLOOD BY AUTOMATED COUNT: 12.5 % (ref 11.5–14.5)
HCT VFR BLD AUTO: 42 % (ref 37–48.5)
HGB BLD-MCNC: 13.1 G/DL (ref 12–16)
IMM GRANULOCYTES # BLD AUTO: 0.02 K/UL (ref 0–0.04)
IMM GRANULOCYTES NFR BLD AUTO: 0.2 % (ref 0–0.5)
LYMPHOCYTES # BLD AUTO: 1.7 K/UL (ref 1–4.8)
LYMPHOCYTES NFR BLD: 18.9 % (ref 18–48)
MCH RBC QN AUTO: 29.3 PG (ref 27–31)
MCHC RBC AUTO-ENTMCNC: 31.2 G/DL (ref 32–36)
MCV RBC AUTO: 94 FL (ref 82–98)
MONOCYTES # BLD AUTO: 1 K/UL (ref 0.3–1)
MONOCYTES NFR BLD: 10.3 % (ref 4–15)
NEUTROPHILS # BLD AUTO: 6.2 K/UL (ref 1.8–7.7)
NEUTROPHILS NFR BLD: 67.4 % (ref 38–73)
NRBC BLD-RTO: 0 /100 WBC
PLATELET # BLD AUTO: 190 K/UL (ref 150–450)
PMV BLD AUTO: 10.7 FL (ref 9.2–12.9)
RBC # BLD AUTO: 4.47 M/UL (ref 4–5.4)
WBC # BLD AUTO: 9.23 K/UL (ref 3.9–12.7)

## 2021-05-31 PROCEDURE — 99213 PR OFFICE/OUTPT VISIT, EST, LEVL III, 20-29 MIN: ICD-10-PCS | Mod: 25,S$GLB,, | Performed by: FAMILY MEDICINE

## 2021-05-31 PROCEDURE — 36415 COLL VENOUS BLD VENIPUNCTURE: CPT | Mod: S$GLB,,, | Performed by: FAMILY MEDICINE

## 2021-05-31 PROCEDURE — 96372 THER/PROPH/DIAG INJ SC/IM: CPT | Mod: S$GLB,,, | Performed by: FAMILY MEDICINE

## 2021-05-31 PROCEDURE — 99213 OFFICE O/P EST LOW 20 MIN: CPT | Mod: 25,S$GLB,, | Performed by: FAMILY MEDICINE

## 2021-05-31 PROCEDURE — 99999 PR PBB SHADOW E&M-EST. PATIENT-LVL IV: ICD-10-PCS | Mod: PBBFAC,,, | Performed by: FAMILY MEDICINE

## 2021-05-31 PROCEDURE — 99999 PR PBB SHADOW E&M-EST. PATIENT-LVL IV: CPT | Mod: PBBFAC,,, | Performed by: FAMILY MEDICINE

## 2021-05-31 PROCEDURE — 85025 COMPLETE CBC W/AUTO DIFF WBC: CPT | Performed by: FAMILY MEDICINE

## 2021-05-31 PROCEDURE — 36415 PR COLLECTION VENOUS BLOOD,VENIPUNCTURE: ICD-10-PCS | Mod: S$GLB,,, | Performed by: FAMILY MEDICINE

## 2021-05-31 PROCEDURE — 96372 PR INJECTION,THERAP/PROPH/DIAG2ST, IM OR SUBCUT: ICD-10-PCS | Mod: S$GLB,,, | Performed by: FAMILY MEDICINE

## 2021-05-31 RX ORDER — METHYLPREDNISOLONE ACETATE 40 MG/ML
40 INJECTION, SUSPENSION INTRA-ARTICULAR; INTRALESIONAL; INTRAMUSCULAR; SOFT TISSUE
Status: COMPLETED | OUTPATIENT
Start: 2021-05-31 | End: 2021-05-31

## 2021-05-31 RX ORDER — CLINDAMYCIN HYDROCHLORIDE 150 MG/1
150 CAPSULE ORAL 3 TIMES DAILY
Qty: 30 CAPSULE | Refills: 0 | Status: SHIPPED | OUTPATIENT
Start: 2021-05-31 | End: 2021-06-07

## 2021-05-31 RX ORDER — LOPERAMIDE HCL 2 MG
2 TABLET ORAL 4 TIMES DAILY PRN
Qty: 120 TABLET | Refills: 5 | Status: SHIPPED | OUTPATIENT
Start: 2021-05-31 | End: 2021-06-10

## 2021-05-31 RX ORDER — CLINDAMYCIN PHOSPHATE 150 MG/ML
600 INJECTION, SOLUTION INTRAVENOUS
Status: COMPLETED | OUTPATIENT
Start: 2021-05-31 | End: 2021-05-31

## 2021-05-31 RX ORDER — ALPRAZOLAM 0.25 MG/1
TABLET ORAL
Qty: 90 TABLET | Refills: 5 | Status: SHIPPED | OUTPATIENT
Start: 2021-05-31 | End: 2021-08-04 | Stop reason: SDUPTHER

## 2021-05-31 RX ORDER — PROMETHAZINE HYDROCHLORIDE AND DEXTROMETHORPHAN HYDROBROMIDE 6.25; 15 MG/5ML; MG/5ML
5 SYRUP ORAL 3 TIMES DAILY PRN
Qty: 118 ML | Refills: 3 | Status: SHIPPED | OUTPATIENT
Start: 2021-05-31 | End: 2021-06-10

## 2021-05-31 RX ADMIN — METHYLPREDNISOLONE ACETATE 40 MG: 40 INJECTION, SUSPENSION INTRA-ARTICULAR; INTRALESIONAL; INTRAMUSCULAR; SOFT TISSUE at 02:05

## 2021-05-31 RX ADMIN — CLINDAMYCIN PHOSPHATE 600 MG: 150 INJECTION, SOLUTION INTRAVENOUS at 02:05

## 2021-06-03 ENCOUNTER — TELEPHONE (OUTPATIENT)
Dept: FAMILY MEDICINE | Facility: CLINIC | Age: 81
End: 2021-06-03

## 2021-06-07 ENCOUNTER — HOSPITAL ENCOUNTER (OUTPATIENT)
Dept: RADIOLOGY | Facility: HOSPITAL | Age: 81
Discharge: HOME | End: 2021-06-07
Attending: FAMILY MEDICINE
Payer: MEDICARE

## 2021-06-07 DIAGNOSIS — N28.9 RENAL LESION: ICD-10-CM

## 2021-06-07 DIAGNOSIS — K59.1 FUNCTIONAL DIARRHEA: Primary | ICD-10-CM

## 2021-06-07 DIAGNOSIS — R14.0 ABDOMINAL DISTENSION (GASEOUS): ICD-10-CM

## 2021-06-07 PROCEDURE — 74176 CT ABDOMEN PELVIS WITHOUT CONTRAST: ICD-10-PCS | Mod: 26,,, | Performed by: RADIOLOGY

## 2021-06-07 PROCEDURE — 74176 CT ABD & PELVIS W/O CONTRAST: CPT | Mod: TC

## 2021-06-07 PROCEDURE — 74176 CT ABD & PELVIS W/O CONTRAST: CPT | Mod: 26,,, | Performed by: RADIOLOGY

## 2021-06-08 ENCOUNTER — TELEPHONE (OUTPATIENT)
Dept: FAMILY MEDICINE | Facility: CLINIC | Age: 81
End: 2021-06-08

## 2021-06-10 ENCOUNTER — TELEPHONE (OUTPATIENT)
Dept: FAMILY MEDICINE | Facility: CLINIC | Age: 81
End: 2021-06-10

## 2021-06-11 ENCOUNTER — HOSPITAL ENCOUNTER (OUTPATIENT)
Dept: RADIOLOGY | Facility: HOSPITAL | Age: 81
Discharge: HOME | End: 2021-06-11
Attending: FAMILY MEDICINE
Payer: MEDICARE

## 2021-06-11 DIAGNOSIS — N28.9 RENAL LESION: ICD-10-CM

## 2021-06-11 PROCEDURE — 76770 US RETROPERITONEAL COMPLETE: ICD-10-PCS | Mod: 26,,, | Performed by: RADIOLOGY

## 2021-06-11 PROCEDURE — 76770 US EXAM ABDO BACK WALL COMP: CPT | Mod: 26,,, | Performed by: RADIOLOGY

## 2021-06-11 PROCEDURE — 76770 US EXAM ABDO BACK WALL COMP: CPT | Mod: TC

## 2021-06-14 ENCOUNTER — TELEPHONE (OUTPATIENT)
Dept: FAMILY MEDICINE | Facility: CLINIC | Age: 81
End: 2021-06-14

## 2021-06-15 ENCOUNTER — TELEPHONE (OUTPATIENT)
Dept: FAMILY MEDICINE | Facility: CLINIC | Age: 81
End: 2021-06-15

## 2021-06-15 DIAGNOSIS — J44.1 CHRONIC OBSTRUCTIVE PULMONARY DISEASE WITH ACUTE EXACERBATION: Primary | ICD-10-CM

## 2021-06-15 DIAGNOSIS — J44.9 CHRONIC OBSTRUCTIVE PULMONARY DISEASE, UNSPECIFIED COPD TYPE: Primary | ICD-10-CM

## 2021-06-18 ENCOUNTER — TELEPHONE (OUTPATIENT)
Dept: FAMILY MEDICINE | Facility: CLINIC | Age: 81
End: 2021-06-18

## 2021-06-30 ENCOUNTER — TELEPHONE (OUTPATIENT)
Dept: FAMILY MEDICINE | Facility: CLINIC | Age: 81
End: 2021-06-30

## 2021-07-01 ENCOUNTER — OFFICE VISIT (OUTPATIENT)
Dept: FAMILY MEDICINE | Facility: CLINIC | Age: 81
End: 2021-07-01
Payer: MEDICARE

## 2021-07-01 VITALS
DIASTOLIC BLOOD PRESSURE: 68 MMHG | HEART RATE: 80 BPM | HEIGHT: 62 IN | SYSTOLIC BLOOD PRESSURE: 132 MMHG | WEIGHT: 114.75 LBS | RESPIRATION RATE: 18 BRPM | BODY MASS INDEX: 21.12 KG/M2

## 2021-07-01 DIAGNOSIS — R19.7 DIARRHEA, UNSPECIFIED TYPE: Primary | ICD-10-CM

## 2021-07-01 PROCEDURE — 99999 PR PBB SHADOW E&M-EST. PATIENT-LVL III: ICD-10-PCS | Mod: PBBFAC,,, | Performed by: FAMILY MEDICINE

## 2021-07-01 PROCEDURE — 99999 PR PBB SHADOW E&M-EST. PATIENT-LVL III: CPT | Mod: PBBFAC,,, | Performed by: FAMILY MEDICINE

## 2021-07-01 PROCEDURE — 99214 OFFICE O/P EST MOD 30 MIN: CPT | Mod: S$GLB,,, | Performed by: FAMILY MEDICINE

## 2021-07-01 PROCEDURE — 99214 PR OFFICE/OUTPT VISIT, EST, LEVL IV, 30-39 MIN: ICD-10-PCS | Mod: S$GLB,,, | Performed by: FAMILY MEDICINE

## 2021-07-01 RX ORDER — EVOLOCUMAB 140 MG/ML
140 INJECTION, SOLUTION SUBCUTANEOUS
Status: ON HOLD | COMMUNITY
Start: 2021-06-04 | End: 2023-04-03

## 2021-07-01 RX ORDER — VALSARTAN 160 MG/1
TABLET ORAL
Status: ON HOLD | COMMUNITY
Start: 2021-04-20 | End: 2023-02-15

## 2021-07-01 RX ORDER — EZETIMIBE 10 MG/1
TABLET ORAL
Status: ON HOLD | COMMUNITY
Start: 2021-04-20 | End: 2023-02-15

## 2021-07-02 ENCOUNTER — LAB VISIT (OUTPATIENT)
Dept: LAB | Facility: HOSPITAL | Age: 81
End: 2021-07-02
Attending: FAMILY MEDICINE
Payer: MEDICARE

## 2021-07-02 DIAGNOSIS — R19.7 DIARRHEA, UNSPECIFIED TYPE: ICD-10-CM

## 2021-07-02 PROCEDURE — 87427 SHIGA-LIKE TOXIN AG IA: CPT | Mod: 59 | Performed by: FAMILY MEDICINE

## 2021-07-02 PROCEDURE — 87046 STOOL CULTR AEROBIC BACT EA: CPT | Performed by: FAMILY MEDICINE

## 2021-07-02 PROCEDURE — 87209 SMEAR COMPLEX STAIN: CPT | Performed by: FAMILY MEDICINE

## 2021-07-02 PROCEDURE — 87045 FECES CULTURE AEROBIC BACT: CPT | Performed by: FAMILY MEDICINE

## 2021-07-03 LAB
E COLI SXT1 STL QL IA: NEGATIVE
E COLI SXT2 STL QL IA: NEGATIVE

## 2021-07-05 LAB — BACTERIA STL CULT: NORMAL

## 2021-07-06 ENCOUNTER — LAB VISIT (OUTPATIENT)
Dept: LAB | Facility: HOSPITAL | Age: 81
End: 2021-07-06
Attending: FAMILY MEDICINE
Payer: MEDICARE

## 2021-07-06 DIAGNOSIS — R19.7 DIARRHEA, UNSPECIFIED TYPE: ICD-10-CM

## 2021-07-06 LAB
C DIFF GDH STL QL: NEGATIVE
C DIFF TOX A+B STL QL IA: NEGATIVE
O+P STL MICRO: NORMAL

## 2021-07-06 PROCEDURE — 87324 CLOSTRIDIUM AG IA: CPT | Performed by: FAMILY MEDICINE

## 2021-07-06 PROCEDURE — 87449 NOS EACH ORGANISM AG IA: CPT | Performed by: FAMILY MEDICINE

## 2021-07-08 ENCOUNTER — TELEPHONE (OUTPATIENT)
Dept: FAMILY MEDICINE | Facility: CLINIC | Age: 81
End: 2021-07-08

## 2021-07-08 DIAGNOSIS — R19.7 DIARRHEA, UNSPECIFIED TYPE: Primary | ICD-10-CM

## 2021-07-08 RX ORDER — ERGOCALCIFEROL 1.25 MG/1
CAPSULE ORAL
Qty: 12 CAPSULE | Refills: 1 | Status: ON HOLD | OUTPATIENT
Start: 2021-07-08 | End: 2023-02-15

## 2021-08-25 ENCOUNTER — TELEPHONE (OUTPATIENT)
Dept: FAMILY MEDICINE | Facility: CLINIC | Age: 81
End: 2021-08-25

## 2021-08-25 DIAGNOSIS — J44.9 CHRONIC OBSTRUCTIVE PULMONARY DISEASE, UNSPECIFIED COPD TYPE: ICD-10-CM

## 2021-08-25 DIAGNOSIS — E55.9 VITAMIN D DEFICIENCY, UNSPECIFIED: ICD-10-CM

## 2021-08-25 DIAGNOSIS — E78.5 HYPERLIPIDEMIA, UNSPECIFIED HYPERLIPIDEMIA TYPE: ICD-10-CM

## 2021-08-25 DIAGNOSIS — E55.9 VITAMIN D INSUFFICIENCY: Primary | ICD-10-CM

## 2021-08-25 DIAGNOSIS — R53.83 FATIGUE, UNSPECIFIED TYPE: ICD-10-CM

## 2021-08-25 DIAGNOSIS — E03.9 HYPOTHYROIDISM, UNSPECIFIED TYPE: ICD-10-CM

## 2021-08-25 DIAGNOSIS — J44.1 COPD EXACERBATION: Primary | ICD-10-CM

## 2021-08-25 DIAGNOSIS — I10 ESSENTIAL HYPERTENSION: ICD-10-CM

## 2021-09-13 ENCOUNTER — PATIENT MESSAGE (OUTPATIENT)
Dept: FAMILY MEDICINE | Facility: CLINIC | Age: 81
End: 2021-09-13

## 2021-09-23 ENCOUNTER — TELEPHONE (OUTPATIENT)
Dept: FAMILY MEDICINE | Facility: CLINIC | Age: 81
End: 2021-09-23

## 2021-09-23 RX ORDER — PREDNISONE 10 MG/1
TABLET ORAL
Qty: 21 TABLET | Refills: 0 | Status: SHIPPED | OUTPATIENT
Start: 2021-09-23 | End: 2021-10-02

## 2021-09-27 ENCOUNTER — TELEPHONE (OUTPATIENT)
Dept: FAMILY MEDICINE | Facility: CLINIC | Age: 81
End: 2021-09-27

## 2021-10-21 ENCOUNTER — CLINICAL SUPPORT (OUTPATIENT)
Dept: FAMILY MEDICINE | Facility: CLINIC | Age: 81
End: 2021-10-21
Payer: MEDICARE

## 2021-10-21 DIAGNOSIS — J44.9 CHRONIC OBSTRUCTIVE PULMONARY DISEASE, UNSPECIFIED COPD TYPE: ICD-10-CM

## 2021-10-21 DIAGNOSIS — E78.5 HYPERLIPIDEMIA, UNSPECIFIED HYPERLIPIDEMIA TYPE: ICD-10-CM

## 2021-10-21 DIAGNOSIS — E55.9 VITAMIN D INSUFFICIENCY: ICD-10-CM

## 2021-10-21 DIAGNOSIS — E03.9 HYPOTHYROIDISM, UNSPECIFIED TYPE: ICD-10-CM

## 2021-10-21 LAB
ALBUMIN SERPL BCP-MCNC: 3.7 G/DL (ref 3.5–5.2)
ALP SERPL-CCNC: 80 U/L (ref 55–135)
ALT SERPL W/O P-5'-P-CCNC: 44 U/L (ref 10–44)
ANION GAP SERPL CALC-SCNC: 9 MMOL/L (ref 8–16)
AST SERPL-CCNC: 46 U/L (ref 10–40)
BASOPHILS # BLD AUTO: 0.08 K/UL (ref 0–0.2)
BASOPHILS NFR BLD: 0.9 % (ref 0–1.9)
BILIRUB SERPL-MCNC: 0.8 MG/DL (ref 0.1–1)
BUN SERPL-MCNC: 14 MG/DL (ref 8–23)
CALCIUM SERPL-MCNC: 9.8 MG/DL (ref 8.7–10.5)
CHLORIDE SERPL-SCNC: 103 MMOL/L (ref 95–110)
CHOLEST SERPL-MCNC: 158 MG/DL (ref 120–199)
CHOLEST/HDLC SERPL: 3.3 {RATIO} (ref 2–5)
CO2 SERPL-SCNC: 31 MMOL/L (ref 23–29)
CREAT SERPL-MCNC: 0.9 MG/DL (ref 0.5–1.4)
DIFFERENTIAL METHOD: ABNORMAL
EOSINOPHIL # BLD AUTO: 0.3 K/UL (ref 0–0.5)
EOSINOPHIL NFR BLD: 3.3 % (ref 0–8)
ERYTHROCYTE [DISTWIDTH] IN BLOOD BY AUTOMATED COUNT: 12.2 % (ref 11.5–14.5)
ERYTHROCYTE [SEDIMENTATION RATE] IN BLOOD BY WESTERGREN METHOD: 20 MM/HR (ref 0–20)
EST. GFR  (AFRICAN AMERICAN): >60 ML/MIN/1.73 M^2
EST. GFR  (NON AFRICAN AMERICAN): >60 ML/MIN/1.73 M^2
GLUCOSE SERPL-MCNC: 121 MG/DL (ref 70–110)
HCT VFR BLD AUTO: 42.1 % (ref 37–48.5)
HDLC SERPL-MCNC: 48 MG/DL (ref 40–75)
HDLC SERPL: 30.4 % (ref 20–50)
HGB BLD-MCNC: 13.4 G/DL (ref 12–16)
IMM GRANULOCYTES # BLD AUTO: 0.04 K/UL (ref 0–0.04)
IMM GRANULOCYTES NFR BLD AUTO: 0.4 % (ref 0–0.5)
LDLC SERPL CALC-MCNC: 93.2 MG/DL (ref 63–159)
LYMPHOCYTES # BLD AUTO: 1.8 K/UL (ref 1–4.8)
LYMPHOCYTES NFR BLD: 19.7 % (ref 18–48)
MCH RBC QN AUTO: 29.6 PG (ref 27–31)
MCHC RBC AUTO-ENTMCNC: 31.8 G/DL (ref 32–36)
MCV RBC AUTO: 93 FL (ref 82–98)
MONOCYTES # BLD AUTO: 0.9 K/UL (ref 0.3–1)
MONOCYTES NFR BLD: 9.4 % (ref 4–15)
NEUTROPHILS # BLD AUTO: 6 K/UL (ref 1.8–7.7)
NEUTROPHILS NFR BLD: 66.3 % (ref 38–73)
NONHDLC SERPL-MCNC: 110 MG/DL
NRBC BLD-RTO: 0 /100 WBC
PLATELET # BLD AUTO: 170 K/UL (ref 150–450)
PMV BLD AUTO: 10.7 FL (ref 9.2–12.9)
POTASSIUM SERPL-SCNC: 4.4 MMOL/L (ref 3.5–5.1)
PROT SERPL-MCNC: 6.7 G/DL (ref 6–8.4)
RBC # BLD AUTO: 4.52 M/UL (ref 4–5.4)
SODIUM SERPL-SCNC: 143 MMOL/L (ref 136–145)
TRIGL SERPL-MCNC: 84 MG/DL (ref 30–150)
TSH SERPL DL<=0.005 MIU/L-ACNC: 3.25 UIU/ML (ref 0.4–4)
WBC # BLD AUTO: 9.12 K/UL (ref 3.9–12.7)

## 2021-10-21 PROCEDURE — 36415 COLL VENOUS BLD VENIPUNCTURE: CPT | Mod: S$GLB,,, | Performed by: FAMILY MEDICINE

## 2021-10-21 PROCEDURE — 80061 LIPID PANEL: CPT | Performed by: FAMILY MEDICINE

## 2021-10-21 PROCEDURE — 85651 RBC SED RATE NONAUTOMATED: CPT | Performed by: FAMILY MEDICINE

## 2021-10-21 PROCEDURE — 80053 COMPREHEN METABOLIC PANEL: CPT | Performed by: FAMILY MEDICINE

## 2021-10-21 PROCEDURE — 84443 ASSAY THYROID STIM HORMONE: CPT | Performed by: FAMILY MEDICINE

## 2021-10-21 PROCEDURE — 36415 PR COLLECTION VENOUS BLOOD,VENIPUNCTURE: ICD-10-PCS | Mod: S$GLB,,, | Performed by: FAMILY MEDICINE

## 2021-10-21 PROCEDURE — 85025 COMPLETE CBC W/AUTO DIFF WBC: CPT | Performed by: FAMILY MEDICINE

## 2021-10-21 PROCEDURE — 82306 VITAMIN D 25 HYDROXY: CPT | Performed by: FAMILY MEDICINE

## 2021-10-22 LAB — 25(OH)D3+25(OH)D2 SERPL-MCNC: 55 NG/ML (ref 30–96)

## 2021-10-28 ENCOUNTER — TELEPHONE (OUTPATIENT)
Dept: FAMILY MEDICINE | Facility: CLINIC | Age: 81
End: 2021-10-28
Payer: MEDICARE

## 2021-11-09 ENCOUNTER — PATIENT MESSAGE (OUTPATIENT)
Dept: FAMILY MEDICINE | Facility: CLINIC | Age: 81
End: 2021-11-09
Payer: MEDICARE

## 2021-11-11 DIAGNOSIS — R10.9 ABDOMINAL PAIN, VOMITING, AND DIARRHEA: Primary | ICD-10-CM

## 2021-11-11 DIAGNOSIS — R11.10 ABDOMINAL PAIN, VOMITING, AND DIARRHEA: Primary | ICD-10-CM

## 2021-11-11 DIAGNOSIS — R19.7 ABDOMINAL PAIN, VOMITING, AND DIARRHEA: Primary | ICD-10-CM

## 2021-12-17 ENCOUNTER — HOSPITAL ENCOUNTER (OUTPATIENT)
Dept: RADIOLOGY | Facility: HOSPITAL | Age: 81
Discharge: HOME | End: 2021-12-17
Attending: FAMILY MEDICINE
Payer: MEDICARE

## 2021-12-17 VITALS — BODY MASS INDEX: 20.98 KG/M2 | HEIGHT: 62 IN | WEIGHT: 114 LBS

## 2021-12-17 DIAGNOSIS — Z12.31 ENCOUNTER FOR SCREENING MAMMOGRAM FOR BREAST CANCER: ICD-10-CM

## 2021-12-17 PROCEDURE — 77067 SCR MAMMO BI INCL CAD: CPT | Mod: TC

## 2021-12-17 PROCEDURE — 77067 MAMMO DIGITAL SCREENING BILAT WITH TOMO: ICD-10-PCS | Mod: 26,,, | Performed by: RADIOLOGY

## 2021-12-17 PROCEDURE — 77063 BREAST TOMOSYNTHESIS BI: CPT | Mod: 26,,, | Performed by: RADIOLOGY

## 2021-12-17 PROCEDURE — 77067 SCR MAMMO BI INCL CAD: CPT | Mod: 26,,, | Performed by: RADIOLOGY

## 2021-12-17 PROCEDURE — 77063 MAMMO DIGITAL SCREENING BILAT WITH TOMO: ICD-10-PCS | Mod: 26,,, | Performed by: RADIOLOGY

## 2022-03-21 ENCOUNTER — OFFICE VISIT (OUTPATIENT)
Dept: FAMILY MEDICINE | Facility: CLINIC | Age: 82
End: 2022-03-21
Payer: MEDICARE

## 2022-03-21 ENCOUNTER — TELEPHONE (OUTPATIENT)
Dept: FAMILY MEDICINE | Facility: CLINIC | Age: 82
End: 2022-03-21

## 2022-03-21 VITALS
RESPIRATION RATE: 20 BRPM | WEIGHT: 110.69 LBS | SYSTOLIC BLOOD PRESSURE: 130 MMHG | DIASTOLIC BLOOD PRESSURE: 56 MMHG | HEIGHT: 62 IN | HEART RATE: 72 BPM | BODY MASS INDEX: 20.37 KG/M2 | OXYGEN SATURATION: 100 %

## 2022-03-21 DIAGNOSIS — L03.115 CELLULITIS OF RIGHT LOWER EXTREMITY: Primary | ICD-10-CM

## 2022-03-21 PROCEDURE — 1160F PR REVIEW ALL MEDS BY PRESCRIBER/CLIN PHARMACIST DOCUMENTED: ICD-10-PCS | Mod: CPTII,S$GLB,, | Performed by: NURSE PRACTITIONER

## 2022-03-21 PROCEDURE — 99999 PR PBB SHADOW E&M-EST. PATIENT-LVL V: ICD-10-PCS | Mod: PBBFAC,,, | Performed by: NURSE PRACTITIONER

## 2022-03-21 PROCEDURE — 99999 PR PBB SHADOW E&M-EST. PATIENT-LVL V: CPT | Mod: PBBFAC,,, | Performed by: NURSE PRACTITIONER

## 2022-03-21 PROCEDURE — 3075F SYST BP GE 130 - 139MM HG: CPT | Mod: CPTII,S$GLB,, | Performed by: NURSE PRACTITIONER

## 2022-03-21 PROCEDURE — 99213 OFFICE O/P EST LOW 20 MIN: CPT | Mod: S$GLB,,, | Performed by: NURSE PRACTITIONER

## 2022-03-21 PROCEDURE — 1159F PR MEDICATION LIST DOCUMENTED IN MEDICAL RECORD: ICD-10-PCS | Mod: CPTII,S$GLB,, | Performed by: NURSE PRACTITIONER

## 2022-03-21 PROCEDURE — 1126F AMNT PAIN NOTED NONE PRSNT: CPT | Mod: CPTII,S$GLB,, | Performed by: NURSE PRACTITIONER

## 2022-03-21 PROCEDURE — 3078F DIAST BP <80 MM HG: CPT | Mod: CPTII,S$GLB,, | Performed by: NURSE PRACTITIONER

## 2022-03-21 PROCEDURE — 3288F PR FALLS RISK ASSESSMENT DOCUMENTED: ICD-10-PCS | Mod: CPTII,S$GLB,, | Performed by: NURSE PRACTITIONER

## 2022-03-21 PROCEDURE — 1160F RVW MEDS BY RX/DR IN RCRD: CPT | Mod: CPTII,S$GLB,, | Performed by: NURSE PRACTITIONER

## 2022-03-21 PROCEDURE — 1126F PR PAIN SEVERITY QUANTIFIED, NO PAIN PRESENT: ICD-10-PCS | Mod: CPTII,S$GLB,, | Performed by: NURSE PRACTITIONER

## 2022-03-21 PROCEDURE — 3078F PR MOST RECENT DIASTOLIC BLOOD PRESSURE < 80 MM HG: ICD-10-PCS | Mod: CPTII,S$GLB,, | Performed by: NURSE PRACTITIONER

## 2022-03-21 PROCEDURE — 99213 PR OFFICE/OUTPT VISIT, EST, LEVL III, 20-29 MIN: ICD-10-PCS | Mod: S$GLB,,, | Performed by: NURSE PRACTITIONER

## 2022-03-21 PROCEDURE — 1101F PR PT FALLS ASSESS DOC 0-1 FALLS W/OUT INJ PAST YR: ICD-10-PCS | Mod: CPTII,S$GLB,, | Performed by: NURSE PRACTITIONER

## 2022-03-21 PROCEDURE — 1159F MED LIST DOCD IN RCRD: CPT | Mod: CPTII,S$GLB,, | Performed by: NURSE PRACTITIONER

## 2022-03-21 PROCEDURE — 3288F FALL RISK ASSESSMENT DOCD: CPT | Mod: CPTII,S$GLB,, | Performed by: NURSE PRACTITIONER

## 2022-03-21 PROCEDURE — 1101F PT FALLS ASSESS-DOCD LE1/YR: CPT | Mod: CPTII,S$GLB,, | Performed by: NURSE PRACTITIONER

## 2022-03-21 PROCEDURE — 3075F PR MOST RECENT SYSTOLIC BLOOD PRESS GE 130-139MM HG: ICD-10-PCS | Mod: CPTII,S$GLB,, | Performed by: NURSE PRACTITIONER

## 2022-03-21 RX ORDER — IPRATROPIUM BROMIDE AND ALBUTEROL SULFATE 2.5; .5 MG/3ML; MG/3ML
SOLUTION RESPIRATORY (INHALATION)
Status: ON HOLD | COMMUNITY
Start: 2022-02-04 | End: 2023-02-17 | Stop reason: HOSPADM

## 2022-03-21 RX ORDER — CLOBETASOL PROPIONATE 0.5 MG/G
CREAM TOPICAL 2 TIMES DAILY
Qty: 45 G | Refills: 1 | Status: SHIPPED | OUTPATIENT
Start: 2022-03-21 | End: 2022-12-20

## 2022-03-21 RX ORDER — LEVOFLOXACIN 500 MG/1
500 TABLET, FILM COATED ORAL DAILY
Qty: 7 TABLET | Refills: 0 | Status: SHIPPED | OUTPATIENT
Start: 2022-03-21 | End: 2022-03-28

## 2022-03-21 NOTE — TELEPHONE ENCOUNTER
----- Message from López Catalan sent at 3/21/2022  5:20 PM CDT -----  Contact: [facility] pharmacy  Marva Perez  MRN: [id number]  : 1940  PCP: Kevin Clements  Home Phone      [phone]  Work Phone      Not on file.  Mobile          [phone]      MESSAGE:   Pharmacy is calling to clarify an RX.  RX name:   levoFLOXacin (LEVAQUIN) 500 MG tablet  What do they need to clarify:  possible drug interaction  Pharmacy name and location:  [facility]  Comments:       Phone:  [phone]

## 2022-03-21 NOTE — PROGRESS NOTES
Subjective:       Patient ID: Marva Perez is a 81 y.o. female.    Chief Complaint: Insect Bite (Patient has an insect bite with redness all around, states it happened yesterday morning)    Rash at the right lower leg started yesterday. Area at mid-shin.    Review of Systems   Constitutional: Negative.  Negative for appetite change, fatigue and fever.   HENT: Negative.  Negative for congestion, ear pain and sore throat.    Eyes: Negative.  Negative for visual disturbance.   Respiratory: Negative.  Negative for cough, shortness of breath and wheezing.         Oxygen dependent   Cardiovascular: Negative.    Gastrointestinal: Negative.  Negative for abdominal pain, diarrhea, nausea and vomiting.   Endocrine: Negative.    Genitourinary: Negative.  Negative for difficulty urinating and urgency.   Musculoskeletal: Negative.  Negative for arthralgias and myalgias.   Skin: Negative.  Negative for color change.   Allergic/Immunologic: Negative.    Neurological: Negative.  Negative for dizziness and headaches.   Hematological: Negative.    Psychiatric/Behavioral: Negative.  Negative for sleep disturbance.   All other systems reviewed and are negative.      Objective:      Physical Exam  Vitals and nursing note reviewed.   Constitutional:       General: She is not in acute distress.     Appearance: Normal appearance. She is well-developed.   HENT:      Head: Normocephalic and atraumatic.   Eyes:      Pupils: Pupils are equal, round, and reactive to light.   Cardiovascular:      Rate and Rhythm: Normal rate and regular rhythm.      Pulses: Normal pulses.      Heart sounds: Normal heart sounds. No murmur heard.  Pulmonary:      Effort: Pulmonary effort is normal. No respiratory distress.      Breath sounds: Normal breath sounds.      Comments: O2 dependent. BS decreased greg  Musculoskeletal:         General: Normal range of motion.      Cervical back: Normal range of motion and neck supple.   Skin:     General: Skin is warm  and dry.   Neurological:      Mental Status: She is alert and oriented to person, place, and time.   Psychiatric:         Mood and Affect: Mood normal.         Assessment:       1. Cellulitis of right lower extremity        Plan:   Marva was seen today for insect bite.    Diagnoses and all orders for this visit:    Cellulitis of right lower extremity  -     levoFLOXacin (LEVAQUIN) 500 MG tablet; Take 1 tablet (500 mg total) by mouth once daily. for 7 days  -     clobetasoL (TEMOVATE) 0.05 % cream; Apply topically 2 (two) times daily. To rash at the right lower leg    RTC 1 week for recheck

## 2022-03-28 ENCOUNTER — TELEPHONE (OUTPATIENT)
Dept: FAMILY MEDICINE | Facility: CLINIC | Age: 82
End: 2022-03-28
Payer: MEDICARE

## 2022-03-28 NOTE — TELEPHONE ENCOUNTER
----- Message from Lennox Son sent at 3/28/2022  1:21 PM CDT -----  Contact: Patient  Marva Perez  MRN: [id number]  : 1940  PCP: Kevin Clements  Home Phone      [phone]  Work Phone      Not on file.  Mobile          [phone]      MESSAGE: FYI:  Tell Zeina - sorry she missed appt -- leg looks good - was unable to take the medication -- if needed she will call for appt    PCP: Tyree (whit Pastrana)

## 2022-04-18 ENCOUNTER — TELEPHONE (OUTPATIENT)
Dept: FAMILY MEDICINE | Facility: CLINIC | Age: 82
End: 2022-04-18
Payer: MEDICARE

## 2022-04-18 NOTE — TELEPHONE ENCOUNTER
----- Message from Lennox Son sent at 2022  9:53 AM CDT -----  Contact: Patient  Marva Perez  MRN: [id number]  : 1940  PCP: Kevin Clements  Home Phone      [phone]  Work Phone      Not on file.  Mobile          [phone]      MESSAGE: coughing up green - on O2 - requesting appt today (Dr Clements if possible) -- grand daughter getting  Sat  - does not want to be sick    Call [phone]    PCP: Tyree

## 2022-04-19 ENCOUNTER — OFFICE VISIT (OUTPATIENT)
Dept: FAMILY MEDICINE | Facility: CLINIC | Age: 82
End: 2022-04-19
Payer: MEDICARE

## 2022-04-19 ENCOUNTER — PATIENT MESSAGE (OUTPATIENT)
Dept: FAMILY MEDICINE | Facility: CLINIC | Age: 82
End: 2022-04-19

## 2022-04-19 ENCOUNTER — APPOINTMENT (OUTPATIENT)
Dept: RADIOLOGY | Facility: CLINIC | Age: 82
End: 2022-04-19
Attending: FAMILY MEDICINE
Payer: MEDICARE

## 2022-04-19 VITALS
HEIGHT: 62 IN | DIASTOLIC BLOOD PRESSURE: 62 MMHG | HEART RATE: 94 BPM | BODY MASS INDEX: 20.24 KG/M2 | SYSTOLIC BLOOD PRESSURE: 136 MMHG | RESPIRATION RATE: 16 BRPM

## 2022-04-19 DIAGNOSIS — I10 ESSENTIAL HYPERTENSION: Primary | ICD-10-CM

## 2022-04-19 DIAGNOSIS — J44.9 CHRONIC OBSTRUCTIVE PULMONARY DISEASE, UNSPECIFIED COPD TYPE: ICD-10-CM

## 2022-04-19 PROCEDURE — 96372 THER/PROPH/DIAG INJ SC/IM: CPT | Mod: S$GLB,,, | Performed by: FAMILY MEDICINE

## 2022-04-19 PROCEDURE — 71046 XR CHEST PA AND LATERAL: ICD-10-PCS | Mod: 26,,, | Performed by: RADIOLOGY

## 2022-04-19 PROCEDURE — 99213 PR OFFICE/OUTPT VISIT, EST, LEVL III, 20-29 MIN: ICD-10-PCS | Mod: 25,S$GLB,, | Performed by: FAMILY MEDICINE

## 2022-04-19 PROCEDURE — 71046 X-RAY EXAM CHEST 2 VIEWS: CPT | Mod: 26,,, | Performed by: RADIOLOGY

## 2022-04-19 PROCEDURE — 71046 X-RAY EXAM CHEST 2 VIEWS: CPT | Mod: TC,PO

## 2022-04-19 PROCEDURE — 1126F PR PAIN SEVERITY QUANTIFIED, NO PAIN PRESENT: ICD-10-PCS | Mod: CPTII,S$GLB,, | Performed by: FAMILY MEDICINE

## 2022-04-19 PROCEDURE — 99213 OFFICE O/P EST LOW 20 MIN: CPT | Mod: 25,S$GLB,, | Performed by: FAMILY MEDICINE

## 2022-04-19 PROCEDURE — 96372 PR INJECTION,THERAP/PROPH/DIAG2ST, IM OR SUBCUT: ICD-10-PCS | Mod: S$GLB,,, | Performed by: FAMILY MEDICINE

## 2022-04-19 PROCEDURE — 1126F AMNT PAIN NOTED NONE PRSNT: CPT | Mod: CPTII,S$GLB,, | Performed by: FAMILY MEDICINE

## 2022-04-19 PROCEDURE — 99999 PR PBB SHADOW E&M-EST. PATIENT-LVL II: CPT | Mod: PBBFAC,,, | Performed by: FAMILY MEDICINE

## 2022-04-19 PROCEDURE — 99999 PR PBB SHADOW E&M-EST. PATIENT-LVL II: ICD-10-PCS | Mod: PBBFAC,,, | Performed by: FAMILY MEDICINE

## 2022-04-19 RX ORDER — TRIAMCINOLONE ACETONIDE 40 MG/ML
40 INJECTION, SUSPENSION INTRA-ARTICULAR; INTRAMUSCULAR
Status: COMPLETED | OUTPATIENT
Start: 2022-04-19 | End: 2022-04-19

## 2022-04-19 RX ORDER — CLINDAMYCIN PHOSPHATE 150 MG/ML
600 INJECTION, SOLUTION INTRAVENOUS
Status: COMPLETED | OUTPATIENT
Start: 2022-04-19 | End: 2022-04-19

## 2022-04-19 RX ORDER — CLINDAMYCIN HYDROCHLORIDE 150 MG/1
150 CAPSULE ORAL 3 TIMES DAILY
Qty: 30 CAPSULE | Refills: 0 | Status: SHIPPED | OUTPATIENT
Start: 2022-04-19 | End: 2022-04-29

## 2022-04-19 RX ADMIN — TRIAMCINOLONE ACETONIDE 40 MG: 40 INJECTION, SUSPENSION INTRA-ARTICULAR; INTRAMUSCULAR at 01:04

## 2022-04-19 RX ADMIN — CLINDAMYCIN PHOSPHATE 600 MG: 150 INJECTION, SOLUTION INTRAVENOUS at 01:04

## 2022-04-26 ENCOUNTER — OFFICE VISIT (OUTPATIENT)
Dept: FAMILY MEDICINE | Facility: CLINIC | Age: 82
End: 2022-04-26
Payer: MEDICARE

## 2022-04-26 ENCOUNTER — CLINICAL SUPPORT (OUTPATIENT)
Dept: FAMILY MEDICINE | Facility: CLINIC | Age: 82
End: 2022-04-26
Payer: MEDICARE

## 2022-04-26 VITALS
DIASTOLIC BLOOD PRESSURE: 72 MMHG | HEART RATE: 66 BPM | RESPIRATION RATE: 18 BRPM | WEIGHT: 110.69 LBS | SYSTOLIC BLOOD PRESSURE: 120 MMHG | OXYGEN SATURATION: 96 % | HEIGHT: 62 IN | BODY MASS INDEX: 20.37 KG/M2

## 2022-04-26 DIAGNOSIS — I25.10 ASCVD (ARTERIOSCLEROTIC CARDIOVASCULAR DISEASE): ICD-10-CM

## 2022-04-26 DIAGNOSIS — H61.20 IMPACTED CERUMEN, UNSPECIFIED LATERALITY: ICD-10-CM

## 2022-04-26 DIAGNOSIS — J44.1 COPD EXACERBATION: ICD-10-CM

## 2022-04-26 DIAGNOSIS — J43.2 CENTRILOBULAR EMPHYSEMA: ICD-10-CM

## 2022-04-26 DIAGNOSIS — J43.2 CENTRILOBULAR EMPHYSEMA: Primary | ICD-10-CM

## 2022-04-26 PROCEDURE — 36415 COLL VENOUS BLD VENIPUNCTURE: CPT | Mod: S$GLB,,, | Performed by: FAMILY MEDICINE

## 2022-04-26 PROCEDURE — 99999 PR PBB SHADOW E&M-EST. PATIENT-LVL IV: CPT | Mod: PBBFAC,,, | Performed by: FAMILY MEDICINE

## 2022-04-26 PROCEDURE — 99213 PR OFFICE/OUTPT VISIT, EST, LEVL III, 20-29 MIN: ICD-10-PCS | Mod: 25,S$GLB,, | Performed by: FAMILY MEDICINE

## 2022-04-26 PROCEDURE — 1126F AMNT PAIN NOTED NONE PRSNT: CPT | Mod: CPTII,S$GLB,, | Performed by: FAMILY MEDICINE

## 2022-04-26 PROCEDURE — 1101F PR PT FALLS ASSESS DOC 0-1 FALLS W/OUT INJ PAST YR: ICD-10-PCS | Mod: CPTII,S$GLB,, | Performed by: FAMILY MEDICINE

## 2022-04-26 PROCEDURE — 3288F FALL RISK ASSESSMENT DOCD: CPT | Mod: CPTII,S$GLB,, | Performed by: FAMILY MEDICINE

## 2022-04-26 PROCEDURE — 3074F PR MOST RECENT SYSTOLIC BLOOD PRESSURE < 130 MM HG: ICD-10-PCS | Mod: CPTII,S$GLB,, | Performed by: FAMILY MEDICINE

## 2022-04-26 PROCEDURE — 3078F PR MOST RECENT DIASTOLIC BLOOD PRESSURE < 80 MM HG: ICD-10-PCS | Mod: CPTII,S$GLB,, | Performed by: FAMILY MEDICINE

## 2022-04-26 PROCEDURE — 3078F DIAST BP <80 MM HG: CPT | Mod: CPTII,S$GLB,, | Performed by: FAMILY MEDICINE

## 2022-04-26 PROCEDURE — 83880 ASSAY OF NATRIURETIC PEPTIDE: CPT | Performed by: FAMILY MEDICINE

## 2022-04-26 PROCEDURE — 36415 PR COLLECTION VENOUS BLOOD,VENIPUNCTURE: ICD-10-PCS | Mod: S$GLB,,, | Performed by: FAMILY MEDICINE

## 2022-04-26 PROCEDURE — 69209 PR REMOVAL IMPACTED CERUMEN USING IRRIGATION/LAVAGE, UNILATERAL: ICD-10-PCS | Mod: 50,S$GLB,, | Performed by: FAMILY MEDICINE

## 2022-04-26 PROCEDURE — 1159F MED LIST DOCD IN RCRD: CPT | Mod: CPTII,S$GLB,, | Performed by: FAMILY MEDICINE

## 2022-04-26 PROCEDURE — 99213 OFFICE O/P EST LOW 20 MIN: CPT | Mod: 25,S$GLB,, | Performed by: FAMILY MEDICINE

## 2022-04-26 PROCEDURE — 1159F PR MEDICATION LIST DOCUMENTED IN MEDICAL RECORD: ICD-10-PCS | Mod: CPTII,S$GLB,, | Performed by: FAMILY MEDICINE

## 2022-04-26 PROCEDURE — 1101F PT FALLS ASSESS-DOCD LE1/YR: CPT | Mod: CPTII,S$GLB,, | Performed by: FAMILY MEDICINE

## 2022-04-26 PROCEDURE — 3074F SYST BP LT 130 MM HG: CPT | Mod: CPTII,S$GLB,, | Performed by: FAMILY MEDICINE

## 2022-04-26 PROCEDURE — 69209 REMOVE IMPACTED EAR WAX UNI: CPT | Mod: 50,S$GLB,, | Performed by: FAMILY MEDICINE

## 2022-04-26 PROCEDURE — 3288F PR FALLS RISK ASSESSMENT DOCUMENTED: ICD-10-PCS | Mod: CPTII,S$GLB,, | Performed by: FAMILY MEDICINE

## 2022-04-26 PROCEDURE — 99999 PR PBB SHADOW E&M-EST. PATIENT-LVL IV: ICD-10-PCS | Mod: PBBFAC,,, | Performed by: FAMILY MEDICINE

## 2022-04-26 PROCEDURE — 1126F PR PAIN SEVERITY QUANTIFIED, NO PAIN PRESENT: ICD-10-PCS | Mod: CPTII,S$GLB,, | Performed by: FAMILY MEDICINE

## 2022-04-26 RX ORDER — PREDNISONE 10 MG/1
10 TABLET ORAL DAILY
Status: ON HOLD | COMMUNITY
Start: 2022-04-18 | End: 2023-02-15

## 2022-04-26 RX ORDER — FUROSEMIDE 20 MG/1
20 TABLET ORAL DAILY
Status: ON HOLD | COMMUNITY
Start: 2022-03-10 | End: 2023-02-15

## 2022-04-26 RX ORDER — MONTELUKAST SODIUM 10 MG/1
10 TABLET ORAL DAILY
COMMUNITY
Start: 2022-04-18 | End: 2022-07-06 | Stop reason: SDUPTHER

## 2022-04-26 NOTE — PROGRESS NOTES
Subjective:       Patient ID: Marva Perez is a 81 y.o. female.    Chief Complaint: Follow-up (Patient here today for 1 week SOB follow up)    Pt is a 81 y.o. female who presents for check up for Centrilobular emphysema  (primary encounter diagnosis)  Copd exacerbation  Ascvd (arteriosclerotic cardiovascular disease). Doing well on current meds. Denies any side effects. Prevention is up to date.    Review of Systems   Constitutional: Negative for appetite change, chills and fever.   HENT: Negative for rhinorrhea, sinus pressure, sore throat and trouble swallowing.    Respiratory: Positive for cough and shortness of breath. Negative for chest tightness and wheezing.    Cardiovascular: Negative for chest pain and palpitations.   Gastrointestinal: Negative for abdominal pain, blood in stool, diarrhea, nausea and vomiting.   Genitourinary: Negative for dysuria, flank pain, hematuria, pelvic pain, urgency, vaginal bleeding, vaginal discharge and vaginal pain.   Musculoskeletal: Negative for back pain, joint swelling and neck stiffness.   Skin: Negative for rash.   Neurological: Negative for dizziness, weakness, light-headedness, numbness and headaches.   Hematological: Does not bruise/bleed easily.   Psychiatric/Behavioral: Negative for agitation. The patient is not nervous/anxious.        Objective:      Physical Exam  Constitutional:       Appearance: She is well-developed.   HENT:      Head: Normocephalic.      Right Ear: There is impacted cerumen.      Left Ear: There is impacted cerumen.   Eyes:      Pupils: Pupils are equal, round, and reactive to light.   Neck:      Thyroid: No thyromegaly.   Cardiovascular:      Rate and Rhythm: Normal rate and regular rhythm.   Pulmonary:      Effort: No respiratory distress.      Breath sounds: No wheezing or rales.   Chest:      Chest wall: No tenderness.   Abdominal:      General: There is no distension.      Tenderness: There is no abdominal tenderness. There is no  guarding or rebound.   Musculoskeletal:         General: No tenderness. Normal range of motion.      Cervical back: Normal range of motion and neck supple.   Lymphadenopathy:      Cervical: No cervical adenopathy.   Skin:     General: Skin is warm and dry.      Coloration: Skin is not pale.      Findings: No rash.   Neurological:      Mental Status: She is alert and oriented to person, place, and time.      Cranial Nerves: No cranial nerve deficit.      Motor: No abnormal muscle tone.      Coordination: Coordination normal.      Deep Tendon Reflexes: Reflexes are normal and symmetric. Reflexes normal.   Psychiatric:         Thought Content: Thought content normal.         Judgment: Judgment normal.         Assessment:       1. Centrilobular emphysema    2. COPD exacerbation    3. ASCVD (arteriosclerotic cardiovascular disease)        Plan:   Marva was seen today for follow-up.    Diagnoses and all orders for this visit:    Centrilobular emphysema  -     BNP; Future    COPD exacerbation  -     BNP; Future    ASCVD (arteriosclerotic cardiovascular disease)  -     BNP; Future  -     Ambulatory referral/consult to Cardiology; Future    Ear lavage of both ears

## 2022-04-27 LAB — BNP SERPL-MCNC: 168 PG/ML (ref 0–99)

## 2022-04-28 NOTE — PROGRESS NOTES
Please inform patient that the blood work labs were normal range. Keep up the good job...doc chris

## 2022-05-16 ENCOUNTER — TELEPHONE (OUTPATIENT)
Dept: FAMILY MEDICINE | Facility: CLINIC | Age: 82
End: 2022-05-16
Payer: MEDICARE

## 2022-05-16 NOTE — TELEPHONE ENCOUNTER
----- Message from Rhina Butts sent at 5/16/2022 11:55 AM CDT -----  Contact: self  Pt looking for appt sooner than the 30th.  Pts states she is finished with steroids and antibiotics and still spitting up green     Phone:  [phone]

## 2022-06-14 DIAGNOSIS — F41.1 GAD (GENERALIZED ANXIETY DISORDER): ICD-10-CM

## 2022-06-14 RX ORDER — ESCITALOPRAM OXALATE 5 MG/1
TABLET ORAL
Qty: 90 TABLET | Refills: 1 | Status: SHIPPED | OUTPATIENT
Start: 2022-06-14 | End: 2023-01-03 | Stop reason: SDUPTHER

## 2022-06-14 NOTE — TELEPHONE ENCOUNTER
Refill Routing Note   Medication(s) are not appropriate for processing by [facility] for the following reason(s):      - Drug-Drug Interaction (flecainide)    [facility] action(s):  Defer Medication-related problems identified: Drug-disease interaction        Medication reconciliation completed: No     Appointments  past 12m or future 3m with PCP    Date Provider   Last Visit   4/26/2022 Kevin Clements MD   Next Visit   6/20/2022 Kevin Clements MD   ED visits in past 90 days: 0        Note composed:12:41 PM 06/14/2022

## 2022-06-14 NOTE — TELEPHONE ENCOUNTER
No new care gaps identified.  [facility] Embedded Care Gaps. Reference number: [id number]. 6/14/2022   12:11:47 AM STERLINGT

## 2022-07-06 ENCOUNTER — OFFICE VISIT (OUTPATIENT)
Dept: PULMONOLOGY | Facility: CLINIC | Age: 82
End: 2022-07-06
Payer: MEDICARE

## 2022-07-06 ENCOUNTER — HOSPITAL ENCOUNTER (OUTPATIENT)
Dept: PULMONOLOGY | Facility: CLINIC | Age: 82
Discharge: HOME | End: 2022-07-06
Payer: MEDICARE

## 2022-07-06 VITALS
HEIGHT: 62 IN | BODY MASS INDEX: 20.61 KG/M2 | SYSTOLIC BLOOD PRESSURE: 120 MMHG | OXYGEN SATURATION: 98 % | WEIGHT: 112 LBS | HEART RATE: 53 BPM | DIASTOLIC BLOOD PRESSURE: 70 MMHG

## 2022-07-06 DIAGNOSIS — J96.12 CHRONIC HYPERCAPNIC RESPIRATORY FAILURE: ICD-10-CM

## 2022-07-06 DIAGNOSIS — J43.1 PANLOBULAR EMPHYSEMA: Primary | ICD-10-CM

## 2022-07-06 DIAGNOSIS — J32.9 SINUSITIS, UNSPECIFIED CHRONICITY, UNSPECIFIED LOCATION: ICD-10-CM

## 2022-07-06 DIAGNOSIS — J30.89 NON-SEASONAL ALLERGIC RHINITIS DUE TO OTHER ALLERGIC TRIGGER: ICD-10-CM

## 2022-07-06 DIAGNOSIS — J96.11 CHRONIC RESPIRATORY FAILURE WITH HYPOXIA: ICD-10-CM

## 2022-07-06 DIAGNOSIS — J44.9 CHRONIC OBSTRUCTIVE PULMONARY DISEASE, UNSPECIFIED COPD TYPE: ICD-10-CM

## 2022-07-06 LAB
ALLENS TEST: ABNORMAL
DELSYS: ABNORMAL
FIO2: 0.3
FLOW: 3
HCO3 UR-SCNC: 32.3 MMOL/L (ref 24–28)
MODE: ABNORMAL
PCO2 BLDA: 50.7 MMHG (ref 35–45)
PH SMN: 7.41 [PH] (ref 7.35–7.45)
PO2 BLDA: 150 MMHG (ref 80–100)
POC BE: 8 MMOL/L
POC SATURATED O2: 99 % (ref 95–100)
POC TCO2: 34 MMOL/L (ref 23–27)
SAMPLE: ABNORMAL
SITE: ABNORMAL

## 2022-07-06 PROCEDURE — 1160F PR REVIEW ALL MEDS BY PRESCRIBER/CLIN PHARMACIST DOCUMENTED: ICD-10-PCS | Mod: CPTII,S$GLB,, | Performed by: INTERNAL MEDICINE

## 2022-07-06 PROCEDURE — 3074F SYST BP LT 130 MM HG: CPT | Mod: CPTII,S$GLB,, | Performed by: INTERNAL MEDICINE

## 2022-07-06 PROCEDURE — 99999 PR PBB SHADOW E&M-EST. PATIENT-LVL V: ICD-10-PCS | Mod: PBBFAC,,, | Performed by: INTERNAL MEDICINE

## 2022-07-06 PROCEDURE — 82803 BLOOD GASES ANY COMBINATION: CPT | Mod: S$GLB,,, | Performed by: INTERNAL MEDICINE

## 2022-07-06 PROCEDURE — 1159F MED LIST DOCD IN RCRD: CPT | Mod: CPTII,S$GLB,, | Performed by: INTERNAL MEDICINE

## 2022-07-06 PROCEDURE — 99204 OFFICE O/P NEW MOD 45 MIN: CPT | Mod: S$GLB,,, | Performed by: INTERNAL MEDICINE

## 2022-07-06 PROCEDURE — 3288F PR FALLS RISK ASSESSMENT DOCUMENTED: ICD-10-PCS | Mod: CPTII,S$GLB,, | Performed by: INTERNAL MEDICINE

## 2022-07-06 PROCEDURE — 3074F PR MOST RECENT SYSTOLIC BLOOD PRESSURE < 130 MM HG: ICD-10-PCS | Mod: CPTII,S$GLB,, | Performed by: INTERNAL MEDICINE

## 2022-07-06 PROCEDURE — 1159F PR MEDICATION LIST DOCUMENTED IN MEDICAL RECORD: ICD-10-PCS | Mod: CPTII,S$GLB,, | Performed by: INTERNAL MEDICINE

## 2022-07-06 PROCEDURE — 36600 PR WITHDRAWAL OF ARTERIAL BLOOD: ICD-10-PCS | Mod: S$GLB,,, | Performed by: INTERNAL MEDICINE

## 2022-07-06 PROCEDURE — 1101F PR PT FALLS ASSESS DOC 0-1 FALLS W/OUT INJ PAST YR: ICD-10-PCS | Mod: CPTII,S$GLB,, | Performed by: INTERNAL MEDICINE

## 2022-07-06 PROCEDURE — 1126F AMNT PAIN NOTED NONE PRSNT: CPT | Mod: CPTII,S$GLB,, | Performed by: INTERNAL MEDICINE

## 2022-07-06 PROCEDURE — 1160F RVW MEDS BY RX/DR IN RCRD: CPT | Mod: CPTII,S$GLB,, | Performed by: INTERNAL MEDICINE

## 2022-07-06 PROCEDURE — 36600 WITHDRAWAL OF ARTERIAL BLOOD: CPT | Mod: S$GLB,,, | Performed by: INTERNAL MEDICINE

## 2022-07-06 PROCEDURE — 99999 PR PBB SHADOW E&M-EST. PATIENT-LVL V: CPT | Mod: PBBFAC,,, | Performed by: INTERNAL MEDICINE

## 2022-07-06 PROCEDURE — 82803 PR  BLOOD GASES: PH, PO2 & PCO2: ICD-10-PCS | Mod: S$GLB,,, | Performed by: INTERNAL MEDICINE

## 2022-07-06 PROCEDURE — 3078F PR MOST RECENT DIASTOLIC BLOOD PRESSURE < 80 MM HG: ICD-10-PCS | Mod: CPTII,S$GLB,, | Performed by: INTERNAL MEDICINE

## 2022-07-06 PROCEDURE — 3078F DIAST BP <80 MM HG: CPT | Mod: CPTII,S$GLB,, | Performed by: INTERNAL MEDICINE

## 2022-07-06 PROCEDURE — 99204 PR OFFICE/OUTPT VISIT, NEW, LEVL IV, 45-59 MIN: ICD-10-PCS | Mod: S$GLB,,, | Performed by: INTERNAL MEDICINE

## 2022-07-06 PROCEDURE — 3288F FALL RISK ASSESSMENT DOCD: CPT | Mod: CPTII,S$GLB,, | Performed by: INTERNAL MEDICINE

## 2022-07-06 PROCEDURE — 1101F PT FALLS ASSESS-DOCD LE1/YR: CPT | Mod: CPTII,S$GLB,, | Performed by: INTERNAL MEDICINE

## 2022-07-06 PROCEDURE — 1126F PR PAIN SEVERITY QUANTIFIED, NO PAIN PRESENT: ICD-10-PCS | Mod: CPTII,S$GLB,, | Performed by: INTERNAL MEDICINE

## 2022-07-06 RX ORDER — MONTELUKAST SODIUM 10 MG/1
10 TABLET ORAL DAILY
Qty: 90 TABLET | Refills: 3 | Status: ON HOLD | OUTPATIENT
Start: 2022-07-06 | End: 2023-02-15

## 2022-07-06 RX ORDER — ATENOLOL 25 MG/1
12.5 TABLET ORAL DAILY
Status: ON HOLD | COMMUNITY
Start: 2022-05-31 | End: 2023-02-15

## 2022-07-06 NOTE — PROGRESS NOTES
Subjective:       Patient ID: Marva Perez is a 81 y.o. female.    Chief Complaint: COPD    HPI:   Marva Perez is a 81 y.o. female new to  who presents for evaluation of end-stage COPD.    Patient presents with her daughter. Most records not available for review from Dr. Matson.    Follows with Cardiology. Has chronic afib.    Followed by outside pulmonologist. On triple therapy, LTRA, nebs. She is on home oxygen. Has never undergone pulmonary rehab. Reports a chronic cough and progressive TAVARES. Reports several exacerbations of COPD but no recent hospitalizations. Cough is minimally productive.    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    Weighs herself regularly for diuresis purposes. Stable. Denies orthopnea, PND, worsening LE edema.     Denies f/c/ns, weight loss, malaise, fatigue    Denies rashes, myalgias, arthralgias, hair/nail changes, dysphagia, eye changes, raynauds, mucosal ulcerations    Exposures:  Work- Hairdresser for 6 years.  worked as a  for 42 years.  Tobacco- 1/2 ppd for for 43 years. Also went to the [employer] fairly often.  Inhalational Agents- Denies  Mold- Denies  Pets- Denies  Birds- Denies    Childhood history of Lung Disease: Denies    Review of Systems   Constitutional: Positive for activity change. Negative for fever, chills, weight loss, appetite change, night sweats and weakness.   HENT: Negative for postnasal drip, sinus pressure, voice change and congestion.    Eyes: Negative for redness.   Respiratory: Positive for cough, sputum production, shortness of breath, dyspnea on extertion and use of rescue inhaler. Negative for wheezing, orthopnea, somnolence and Paroxysmal Nocturnal Dyspnea.    Cardiovascular: Negative for chest pain, palpitations and leg swelling.    Musculoskeletal: Negative for joint swelling and myalgias.   Skin: Negative for rash.   Gastrointestinal: Negative for acid reflux.   Neurological: Negative for syncope and weakness.   Psychiatric/Behavioral: Negative for sleep disturbance.         Social History     Tobacco Use    Smoking status: Former Smoker     Years: 30.00     Types: Cigarettes     Quit date: 10/30/2006     Years since quitting: 15.7    Smokeless tobacco: Never Used   Substance Use Topics    Alcohol use: No     Comment: No       Review of patient's allergies indicates:   Allergen Reactions    Pcn [penicillins] Hives and Itching    Tetracyclines     Bactrim [sulfamethoxazole-trimethoprim] Rash    Ilosone Rash    Iodine and iodide containing products Rash    Sulfa (sulfonamide antibiotics) Hives and Rash     Past Medical History:   Diagnosis Date    Abnormal Pap smear 13    LGSIL    Atrial fibrillation     COPD (chronic obstructive pulmonary disease)     Depression     GERD (gastroesophageal reflux disease)     Hyperlipidemia     Hypertension      Past Surgical History:   Procedure Laterality Date    APPENDECTOMY      BRONCHOSCOPY N/A 2019    Procedure: BRONCHOSCOPY;  Surgeon: Howard Matson MD;  Location: [facility];  Service: Pulmonary;  Laterality: N/A;    CERVICAL BIOPSY  W/ LOOP ELECTRODE EXCISION      CHOLECYSTECTOMY      COLLATERAL LIGAMENT REPAIR, KNEE      left knee    COLONOSCOPY      DILATION AND CURETTAGE OF UTERUS      SINUS SURGERY       Current Outpatient Medications on File Prior to Visit   Medication Sig    albuterol (PROVENTIL/VENTOLIN HFA) 90 mcg/actuation inhaler Inhale 2 puffs into the lungs every 6 (six) hours as needed for Wheezing.    albuterol-ipratropium (DUO-NEB) 2.5 mg-0.5 mg/3 mL nebulizer solution SMARTSI Vial(s) Via Nebulizer Every 12 Hours    ALPRAZolam (XANAX) 0.25 MG tablet TAKE 1 TABLET DURING THE DAY FOR ANXIETY AS NEEDED AND 2 AT NIGHT FOR SLEEP    atenoloL (TENORMIN) 25  "MG tablet Take 12.5 mg by mouth once daily.    carvediloL (COREG) 3.125 MG tablet Take 3.125 mg by mouth 2 (two) times daily.     clobetasoL (TEMOVATE) 0.05 % cream Apply topically 2 (two) times daily. To rash at the right lower leg    diltiaZEM (CARDIZEM) 120 MG tablet Take 120 mg by mouth 2 (two) times daily.    ELIQUIS 2.5 mg Tab Take 2.5 mg by mouth 2 (two) times daily.    ergocalciferol (ERGOCALCIFEROL) 50,000 unit Cap TAKE 1 CAPSULE BY MOUTH ONE TIME PER WEEK    ergocalciferol (ERGOCALCIFEROL) 50,000 unit Cap TAKE 1 CAPSULE BY MOUTH ONE TIME PER WEEK    EScitalopram oxalate (LEXAPRO) 5 MG Tab TAKE 1 TABLET BY MOUTH EVERY DAY    ezetimibe (ZETIA) 10 mg tablet     flecainide (TAMBOCOR) 50 MG Tab Take 50 mg by mouth 2 (two) times daily.    furosemide (LASIX) 20 MG tablet Take 20 mg by mouth once daily.    predniSONE (DELTASONE) 10 MG tablet Take 10 mg by mouth once daily.    REPATHA SURECLICK 140 mg/mL PnIj Inject 140 mg into the skin every 14 (fourteen) days.    rifAXIMin (XIFAXAN) 550 mg Tab Take 1 tablet (550 mg total) by mouth 3 (three) times daily.    rosuvastatin (CRESTOR) 40 MG Tab Take 40 mg by mouth every evening.    TRELEGY ELLIPTA 100-62.5-25 mcg DsDv TAKE 1 PUFF BY MOUTH EVERY DAY    valsartan (DIOVAN) 160 MG tablet     gabapentin (NEURONTIN) 100 MG capsule Take 1 capsule (100 mg total) by mouth every evening.    levalbuterol (XOPENEX) 0.63 mg/3 mL nebulizer solution Take 3 mLs (0.63 mg total) by nebulization every 4 (four) hours as needed for Wheezing.     No current facility-administered medications on file prior to visit.       Objective:      Vitals:    07/06/22 1339   BP: 120/70   BP Location: Left arm   Patient Position: Sitting   Pulse: (!) 53   SpO2: 98%  Comment: 3.0   Weight: 50.8 kg (111 lb 15.9 oz)   Height: 5' 2" (1.575 m)     Physical Exam   Constitutional: She is oriented to person, place, and time. She appears well-developed and well-nourished.   HENT:   Nose: No " mucosal edema.   Mouth/Throat: Oropharynx is clear and moist. Mallampati Score: I.   Neck: No tracheal deviation present.   Cardiovascular: Normal rate, regular rhythm and intact distal pulses.   Pulmonary/Chest: Normal expansion, symmetric chest wall expansion, effort normal and breath sounds normal. No respiratory distress. She has no wheezes. She has no rhonchi. She has no rales.   Abdominal: She exhibits no distension.   Musculoskeletal:         General: No edema.      Cervical back: Neck supple.   Lymphadenopathy: No supraclavicular adenopathy is present.     She has no cervical adenopathy.   Neurological: She is alert and oriented to person, place, and time.   Skin: Skin is warm and dry. No cyanosis or erythema. Nails show no clubbing.   Psychiatric: She has a normal mood and affect.   Nursing note and vitals reviewed.      Personal Diagnostic Review    Laboratory:  10/12/2021   Bicarb- 31  Eosinophils- No elevations on multiple CBCs      CT Chest 8/1/2019- Images personally reviewed. I agree w/ the radiologist who notes     1. No CT evidence of pulmonary embolus.  2. Severe emphysematous changes within the lung fields bilaterally greater on the right than left.  Prior granulomatous disease.  No acute infiltrate or effusion.     CXR 4/19/2022- Images personally reviewed and compared to priors. I agree w/ the radiologist who notes;  The lungs are hyperexpanded and demonstrate diffuse interstitial prominence compatible with COPD change.  More focal interstitial prominence in the left mid and lower lung zone could reflect asymmetric edema, aspiration or pneumonia.  The heart is normal in size.  Calcified atheromatous disease affects the aorta.  Age-appropriate degenerative changes affect the skeleton.     Impression:    TTE 7/31/2019  · Normal left ventricular systolic function. The estimated ejection fraction is 65%  · Normal LV diastolic function.  · Normal right ventricular systolic function.  · No pulmonary  hypertension present.         No flowsheet data found.        Assessment:     Problem List Items Addressed This Visit        ENT    Sinusitis    Relevant Medications    montelukast (SINGULAIR) 10 mg tablet    Rhinitis    Relevant Medications    montelukast (SINGULAIR) 10 mg tablet       Pulmonary    Emphysema/COPD - Primary    Relevant Orders    Ambulatory referral/consult to Pulmonary Rehab    Chronic respiratory failure    Relevant Orders    Ambulatory referral/consult to Pulmonary Rehab    POCT arterial blood gas    Chronic obstructive pulmonary disease     End stage requiring home oxygen.     - Cont triple therapy, nebs, prn BETHANIE and LTRA  - Cont home oxygen with goal sats 88-92%  - Check an ABG given her previous hypercapnea to eval for possible overnight NIV  - Order placed for pulmonary rehab  - Encouraged regular, progressive exercise           Relevant Orders    Ambulatory referral/consult to Pulmonary Rehab      Other Visit Diagnoses     Chronic hypercapnic respiratory failure        Relevant Orders    POCT arterial blood gas          48 minutes of total time spent on the encounter, which includes face to face time and non-face to face time preparing to see the patient (eg, review of tests), Obtaining and/or reviewing separately obtained history, Documenting clinical information in the electronic or other health record, Independently interpreting results (not separately reported) and communicating results to the patient/family/caregiver, or Care coordination (not separately reported).

## 2022-07-06 NOTE — PROGRESS NOTES
ABG results: PH 7.41:  PCO2: 51  PaO2: 150   BE 8  HCO3: 32  O2HB: 99  Fio2: 0.30    Results reported to Dr. Alonso

## 2022-07-21 NOTE — ASSESSMENT & PLAN NOTE
End stage requiring home oxygen.     - Cont triple therapy, nebs, prn BETHANIE and LTRA  - Cont home oxygen with goal sats 88-92%  - Check an ABG given her previous hypercapnea to eval for possible overnight NIV  - Order placed for pulmonary rehab  - Encouraged regular, progressive exercise

## 2022-08-22 ENCOUNTER — HOSPITAL ENCOUNTER (EMERGENCY)
Facility: HOSPITAL | Age: 82
Discharge: HOME | End: 2022-08-22
Attending: STUDENT IN AN ORGANIZED HEALTH CARE EDUCATION/TRAINING PROGRAM
Payer: MEDICARE

## 2022-08-22 VITALS
WEIGHT: 111 LBS | DIASTOLIC BLOOD PRESSURE: 63 MMHG | SYSTOLIC BLOOD PRESSURE: 142 MMHG | BODY MASS INDEX: 20.3 KG/M2 | HEART RATE: 66 BPM | OXYGEN SATURATION: 100 % | TEMPERATURE: 100 F | RESPIRATION RATE: 22 BRPM

## 2022-08-22 DIAGNOSIS — U07.1 COVID-19: Primary | ICD-10-CM

## 2022-08-22 DIAGNOSIS — J11.1 INFLUENZA: ICD-10-CM

## 2022-08-22 LAB
ALBUMIN SERPL BCP-MCNC: 3.4 G/DL (ref 3.5–5.2)
ALP SERPL-CCNC: 70 U/L (ref 55–135)
ALT SERPL W/O P-5'-P-CCNC: 60 U/L (ref 10–44)
ANION GAP SERPL CALC-SCNC: 9 MMOL/L (ref 8–16)
AST SERPL-CCNC: 104 U/L (ref 10–40)
BASOPHILS # BLD AUTO: 0.04 K/UL (ref 0–0.2)
BASOPHILS NFR BLD: 0.6 % (ref 0–1.9)
BILIRUB SERPL-MCNC: 0.9 MG/DL (ref 0.1–1)
BUN SERPL-MCNC: 15 MG/DL (ref 8–23)
CALCIUM SERPL-MCNC: 8.6 MG/DL (ref 8.7–10.5)
CHLORIDE SERPL-SCNC: 105 MMOL/L (ref 95–110)
CO2 SERPL-SCNC: 28 MMOL/L (ref 23–29)
CREAT SERPL-MCNC: 1 MG/DL (ref 0.5–1.4)
DIFFERENTIAL METHOD: ABNORMAL
EOSINOPHIL # BLD AUTO: 0 K/UL (ref 0–0.5)
EOSINOPHIL NFR BLD: 0.6 % (ref 0–8)
ERYTHROCYTE [DISTWIDTH] IN BLOOD BY AUTOMATED COUNT: 11.9 % (ref 11.5–14.5)
EST. GFR  (NO RACE VARIABLE): 57 ML/MIN/1.73 M^2
GLUCOSE SERPL-MCNC: 101 MG/DL (ref 70–110)
HCT VFR BLD AUTO: 36.8 % (ref 37–48.5)
HGB BLD-MCNC: 11.9 G/DL (ref 12–16)
IMM GRANULOCYTES # BLD AUTO: 0.04 K/UL (ref 0–0.04)
IMM GRANULOCYTES NFR BLD AUTO: 0.6 % (ref 0–0.5)
INFLUENZA A, MOLECULAR: NEGATIVE
INFLUENZA B, MOLECULAR: POSITIVE
LYMPHOCYTES # BLD AUTO: 0.4 K/UL (ref 1–4.8)
LYMPHOCYTES NFR BLD: 6.8 % (ref 18–48)
MCH RBC QN AUTO: 30.1 PG (ref 27–31)
MCHC RBC AUTO-ENTMCNC: 32.3 G/DL (ref 32–36)
MCV RBC AUTO: 93 FL (ref 82–98)
MONOCYTES # BLD AUTO: 1 K/UL (ref 0.3–1)
MONOCYTES NFR BLD: 15.9 % (ref 4–15)
NEUTROPHILS # BLD AUTO: 4.8 K/UL (ref 1.8–7.7)
NEUTROPHILS NFR BLD: 75.5 % (ref 38–73)
NRBC BLD-RTO: 0 /100 WBC
PLATELET # BLD AUTO: 104 K/UL (ref 150–450)
PMV BLD AUTO: 10.2 FL (ref 9.2–12.9)
POTASSIUM SERPL-SCNC: 4.7 MMOL/L (ref 3.5–5.1)
PROCALCITONIN SERPL IA-MCNC: 0.1 NG/ML
PROT SERPL-MCNC: 5.9 G/DL (ref 6–8.4)
RBC # BLD AUTO: 3.96 M/UL (ref 4–5.4)
SARS-COV-2 RDRP RESP QL NAA+PROBE: POSITIVE
SODIUM SERPL-SCNC: 142 MMOL/L (ref 136–145)
SPECIMEN SOURCE: ABNORMAL
WBC # BLD AUTO: 6.36 K/UL (ref 3.9–12.7)

## 2022-08-22 PROCEDURE — 99284 EMERGENCY DEPT VISIT MOD MDM: CPT | Mod: 25

## 2022-08-22 PROCEDURE — 87502 INFLUENZA DNA AMP PROBE: CPT | Performed by: STUDENT IN AN ORGANIZED HEALTH CARE EDUCATION/TRAINING PROGRAM

## 2022-08-22 PROCEDURE — 85025 COMPLETE CBC W/AUTO DIFF WBC: CPT | Performed by: STUDENT IN AN ORGANIZED HEALTH CARE EDUCATION/TRAINING PROGRAM

## 2022-08-22 PROCEDURE — 36415 COLL VENOUS BLD VENIPUNCTURE: CPT | Performed by: STUDENT IN AN ORGANIZED HEALTH CARE EDUCATION/TRAINING PROGRAM

## 2022-08-22 PROCEDURE — U0002 COVID-19 LAB TEST NON-CDC: HCPCS | Performed by: STUDENT IN AN ORGANIZED HEALTH CARE EDUCATION/TRAINING PROGRAM

## 2022-08-22 PROCEDURE — 80053 COMPREHEN METABOLIC PANEL: CPT | Performed by: STUDENT IN AN ORGANIZED HEALTH CARE EDUCATION/TRAINING PROGRAM

## 2022-08-22 PROCEDURE — 84145 PROCALCITONIN (PCT): CPT | Performed by: STUDENT IN AN ORGANIZED HEALTH CARE EDUCATION/TRAINING PROGRAM

## 2022-08-22 RX ORDER — OSELTAMIVIR PHOSPHATE 75 MG/1
75 CAPSULE ORAL 2 TIMES DAILY
Qty: 10 CAPSULE | Refills: 0 | Status: SHIPPED | OUTPATIENT
Start: 2022-08-22 | End: 2022-08-27

## 2022-08-22 NOTE — ED TRIAGE NOTES
Patient to ED per [facility] with c/o generalized weakness, cough, fever, and lower back pain that began today. Patient took Tylenol for fever and pain. Patient with positive home COVID swab.

## 2022-08-22 NOTE — DISCHARGE INSTRUCTIONS
If you have a COVID Test PENDING:  Please access [facility] to review the results of your test. Until the results of your COVID test return, you should isolate yourself so as not to potentially spread illness to others.   If your COVID test returns positive, you should isolate yourself so as not to spread illness to others. After five full days, if you are feeling better and you have not had fever for 24 hours, you can return to your typical daily activities, but you must wear a mask around others for an additional 5 days.   If your COVID test returns negative and you are either unvaccinated or more than six months out from your two-dose vaccine and are not yet boosted, you should still quarantine for 5 full days followed by strict mask use for an additional 5 full days.   If your COVID test returns negative and you have received your 2-dose initial vaccine as well as a booster, you should continue strict mask use for 10 full days after the exposure.  For all those exposed, best practice includes a test at day 5 after the exposure. This can be a home test or a test through one of the many testing centers throughout our community.   Masking is always advised to limit the spread of COVID. Cdc.gov is an excellent site to obtain the latest up to date recommendations regarding COVID and COVID testing.     After your evaluation today, we ruled out any emergent condition. However, if you develop shortness of breath, chest pain, or ANY OTHER CONCERNS please return to the emergency department for further care.      CDC Testing and Quarantine Guidelines for patients with exposure to a known-positive COVID-19 person:  A close exposure is defined as anyone who has had an exposure (masked or unmasked) to a known COVID -19 positive person within 6 feet of someone for a cumulative total of 15 minutes or more over a 24-hour period.   Vaccinated and/or if you recently had a positive covid test within 90 days do NOT need to  quarantine after contact with someone who had COVID-19 unless you develop symptoms.   Fully vaccinated people who have not had a positive test within 90 days, should get tested 3-5 days after their exposure, even if they don't have symptoms and wear a mask indoors in public for 14 days following exposure or until their test result is negative.      Unvaccinated and/or NOT had a positive test within 90 days and meet close exposure  You are required by CDC guidelines to quarantine for at least 5 days from time of exposure followed by 5 days of strict masking. It is recommended, but not required to test after 5 days, unless you develop symptoms, in which case you should test at that time.  If you get tested after 5 days and your test is positive, your 5 day period of isolation starts the day of the positive test.    If your exposure does not meet the above definition, you can return to your normal daily activities to include social distancing, wearing a mask and frequent handwashing.      Here is a link to guidance from the CDC:  https://www.cdc.gov/media/releases/2021/s1227-isolation-quarantine-guidance.html      [facility] Testing Sites:  https://ldh.la.gov/page/3934      [facility] website with testing locations and guidance:  [email or web address]

## 2022-08-22 NOTE — ED PROVIDER NOTES
Encounter Date: 8/22/2022    This document was partially completed using speech recognition software and may contain misspellings, grammatical errors, and/or unexpected word substitutions.       History     Chief Complaint   Patient presents with    COVID-19 Concerns     81 year old female with a PMHx of HTN, HLD, COPD on 3L O2, Afib on eliquis presents to the ED via EMS for generalized fatigue, dry cough, subjective fevers, body aches, back pain that started yesterday. Took tylenol with some improvement in symptoms. COVID19 vaccinated. No chest pain or shortness of breath. Home COVID19 test positive.        Review of patient's allergies indicates:   Allergen Reactions    Pcn [penicillins] Hives and Itching    Tetracyclines     Bactrim [sulfamethoxazole-trimethoprim] Rash    Ilosone Rash    Iodine and iodide containing products Rash    Sulfa (sulfonamide antibiotics) Hives and Rash     Past Medical History:   Diagnosis Date    Abnormal Pap smear 7/17/13    LGSIL    Atrial fibrillation     COPD (chronic obstructive pulmonary disease)     Depression     GERD (gastroesophageal reflux disease)     Hyperlipidemia     Hypertension      Past Surgical History:   Procedure Laterality Date    APPENDECTOMY      BRONCHOSCOPY N/A 8/5/2019    Procedure: BRONCHOSCOPY;  Surgeon: Howard Matson MD;  Location: [facility] OR;  Service: Pulmonary;  Laterality: N/A;    CERVICAL BIOPSY  W/ LOOP ELECTRODE EXCISION      CHOLECYSTECTOMY      COLLATERAL LIGAMENT REPAIR, KNEE      left knee    COLONOSCOPY      DILATION AND CURETTAGE OF UTERUS      SINUS SURGERY       Family History   Problem Relation Age of Onset    Breast cancer Mother     Colon cancer Neg Hx     Ovarian cancer Neg Hx      Social History     Tobacco Use    Smoking status: Former Smoker     Years: 30.00     Types: Cigarettes     Quit date: 10/30/2006     Years since quitting: 15.8    Smokeless tobacco: Never Used   Substance Use Topics    Alcohol use: No      Comment: No    Drug use: No     Review of Systems   Constitutional: Positive for fatigue and fever. Negative for chills.   HENT: Negative for congestion, rhinorrhea and sneezing.    Eyes: Negative for discharge and redness.   Respiratory: Positive for cough. Negative for shortness of breath.    Cardiovascular: Negative for chest pain and palpitations.   Gastrointestinal: Negative for abdominal pain, diarrhea, nausea and vomiting.   Genitourinary: Negative for dysuria, frequency, vaginal bleeding and vaginal discharge.   Musculoskeletal: Positive for myalgias. Negative for back pain and neck pain.   Skin: Negative for rash and wound.   Neurological: Negative for weakness, numbness and headaches.       Physical Exam     Initial Vitals [08/22/22 1558]   BP Pulse Resp Temp SpO2   (!) 147/70 92 20 99.6 °F (37.6 °C) 97 %      MAP       --         Physical Exam    Nursing note and vitals reviewed.  Constitutional: She appears well-developed. She is not diaphoretic. No distress.   HENT:   Head: Normocephalic and atraumatic.   Right Ear: External ear normal.   Left Ear: External ear normal.   Eyes: Right eye exhibits no discharge. Left eye exhibits no discharge. No scleral icterus.   Neck: Neck supple.   Cardiovascular: Normal rate and regular rhythm.   Pulmonary/Chest: Breath sounds normal. No stridor. No respiratory distress. She has no wheezes. She has no rhonchi. She has no rales.   Abdominal: Abdomen is soft. There is no abdominal tenderness. There is no guarding.   Musculoskeletal:         General: No edema.      Cervical back: Neck supple.     Neurological: She is alert and oriented to person, place, and time.   Skin: Skin is warm and dry. Capillary refill takes less than 2 seconds.   Psychiatric: She has a normal mood and affect.         ED Course   Procedures  Labs Reviewed   INFLUENZA A & B BY MOLECULAR - Abnormal; Notable for the following components:       Result Value    Influenza B, Molecular Positive (*)      All other components within normal limits   SARS-COV-2 RNA AMPLIFICATION, QUAL - Abnormal; Notable for the following components:    SARS-CoV-2 RNA, Amplification, Qual Positive (*)     All other components within normal limits   CBC W/ AUTO DIFFERENTIAL - Abnormal; Notable for the following components:    RBC 3.96 (*)     Hemoglobin 11.9 (*)     Hematocrit 36.8 (*)     Platelets 104 (*)     Immature Granulocytes 0.6 (*)     Lymph # 0.4 (*)     Gran % 75.5 (*)     Lymph % 6.8 (*)     Mono % 15.9 (*)     All other components within normal limits   COMPREHENSIVE METABOLIC PANEL - Abnormal; Notable for the following components:    Calcium 8.6 (*)     Total Protein 5.9 (*)     Albumin 3.4 (*)      (*)     ALT 60 (*)     eGFR 57 (*)     All other components within normal limits   PROCALCITONIN          Imaging Results          X-Ray Chest AP Portable (Final result)  Result time 08/22/22 16:25:38    Final result by Sandeep Trammell MD (08/22/22 16:25:38)                 Impression:      No radiographic evidence of active chest disease.      Electronically signed by: Sandeep Trammell MD  Date:    08/22/2022  Time:    16:25             Narrative:    EXAMINATION:  XR CHEST AP PORTABLE    CLINICAL HISTORY:  COVID-19    TECHNIQUE:  Single frontal view of the chest was performed.    COMPARISON:  04/19/2022    FINDINGS:  Heart size and pulmonary vessels are normal.  Chronic fibrotic changes are present at the left lung base.  Calcified granulomas are present in the mid left lung.  The lungs are otherwise clear.  No pleural effusion or pneumothorax are identified.  Skeletal structures are intact.  There has been no significant change.                                 Medications - No data to display  Medical Decision Making:   Differential Diagnosis:   DDx: PNA, COVID19, flu, bacterial superinfection, hypoxia  ED Management:  Based on the patient's evaluation - patient appears well for discharge home. COVID19 and flu  positive. Treating with tamilfu. Will refer to EUA infusion (not a candidate for paxlovid since she is on anticoagulation). No hypoxia, tolerating her baseline 3L O2. Will discharge home with PCP f/u. Return precautions given. Patient is in agreement with the plan.                      Clinical Impression:   Final diagnoses:  [U07.1] COVID-19 (Primary)  [J11.1] Influenza          ED Disposition Condition    Discharge Stable        ED Prescriptions     Medication Sig Dispense Start Date End Date Auth. Provider    oseltamivir (TAMIFLU) 75 MG capsule Take 1 capsule (75 mg total) by mouth 2 (two) times daily. for 5 days 10 capsule 8/22/2022 8/27/2022 Drew Estevez DO        Follow-up Information     Follow up With Specialties Details Why Contact Info    Kevin Clements MD Family Medicine Schedule an appointment as soon as possible for a visit in 1 week As needed [address] [id number]  [phone]             Drew Estevez DO  08/22/22 4063

## 2022-08-23 ENCOUNTER — NURSE TRIAGE (OUTPATIENT)
Dept: ADMINISTRATIVE | Facility: CLINIC | Age: 82
End: 2022-08-23
Payer: MEDICARE

## 2022-08-23 ENCOUNTER — HOSPITAL ENCOUNTER (EMERGENCY)
Facility: HOSPITAL | Age: 82
Discharge: HOME | End: 2022-08-23
Attending: STUDENT IN AN ORGANIZED HEALTH CARE EDUCATION/TRAINING PROGRAM
Payer: MEDICARE

## 2022-08-23 VITALS
DIASTOLIC BLOOD PRESSURE: 65 MMHG | HEART RATE: 71 BPM | WEIGHT: 114 LBS | SYSTOLIC BLOOD PRESSURE: 136 MMHG | BODY MASS INDEX: 20.85 KG/M2 | TEMPERATURE: 98 F | OXYGEN SATURATION: 98 % | RESPIRATION RATE: 20 BRPM

## 2022-08-23 DIAGNOSIS — U07.1 COVID-19: Primary | ICD-10-CM

## 2022-08-23 DIAGNOSIS — R11.2 NAUSEA VOMITING AND DIARRHEA: ICD-10-CM

## 2022-08-23 DIAGNOSIS — R19.7 NAUSEA VOMITING AND DIARRHEA: ICD-10-CM

## 2022-08-23 LAB
ABO + RH BLD: NORMAL
ALBUMIN SERPL BCP-MCNC: 3.9 G/DL (ref 3.5–5.2)
ALP SERPL-CCNC: 79 U/L (ref 55–135)
ALT SERPL W/O P-5'-P-CCNC: 116 U/L (ref 10–44)
AMORPH CRY URNS QL MICRO: ABNORMAL
ANION GAP SERPL CALC-SCNC: 16 MMOL/L (ref 8–16)
APTT BLDCRRT: 33 SEC (ref 21–32)
AST SERPL-CCNC: 195 U/L (ref 10–40)
BACTERIA #/AREA URNS HPF: ABNORMAL /HPF
BASOPHILS # BLD AUTO: 0.02 K/UL (ref 0–0.2)
BASOPHILS NFR BLD: 0.3 % (ref 0–1.9)
BILIRUB SERPL-MCNC: 1 MG/DL (ref 0.1–1)
BILIRUB UR QL STRIP: NEGATIVE
BLD GP AB SCN CELLS X3 SERPL QL: NORMAL
BUN SERPL-MCNC: 15 MG/DL (ref 8–23)
CALCIUM SERPL-MCNC: 9.2 MG/DL (ref 8.7–10.5)
CHLORIDE SERPL-SCNC: 99 MMOL/L (ref 95–110)
CLARITY UR: CLEAR
CO2 SERPL-SCNC: 24 MMOL/L (ref 23–29)
COLOR UR: YELLOW
CREAT SERPL-MCNC: 0.9 MG/DL (ref 0.5–1.4)
DIFFERENTIAL METHOD: ABNORMAL
EOSINOPHIL # BLD AUTO: 0 K/UL (ref 0–0.5)
EOSINOPHIL NFR BLD: 0.2 % (ref 0–8)
ERYTHROCYTE [DISTWIDTH] IN BLOOD BY AUTOMATED COUNT: 12.1 % (ref 11.5–14.5)
EST. GFR  (NO RACE VARIABLE): >60 ML/MIN/1.73 M^2
GLUCOSE SERPL-MCNC: 88 MG/DL (ref 70–110)
GLUCOSE UR QL STRIP: NEGATIVE
GRAN CASTS #/AREA URNS LPF: 1 /LPF
HCT VFR BLD AUTO: 41 % (ref 37–48.5)
HGB BLD-MCNC: 13.6 G/DL (ref 12–16)
HGB UR QL STRIP: ABNORMAL
IMM GRANULOCYTES # BLD AUTO: 0.04 K/UL (ref 0–0.04)
IMM GRANULOCYTES NFR BLD AUTO: 0.7 % (ref 0–0.5)
INR PPP: 1.2 (ref 0.8–1.2)
KETONES UR QL STRIP: ABNORMAL
LEUKOCYTE ESTERASE UR QL STRIP: NEGATIVE
LIPASE SERPL-CCNC: 21 U/L (ref 4–60)
LYMPHOCYTES # BLD AUTO: 0.9 K/UL (ref 1–4.8)
LYMPHOCYTES NFR BLD: 14.5 % (ref 18–48)
MCH RBC QN AUTO: 30 PG (ref 27–31)
MCHC RBC AUTO-ENTMCNC: 33.2 G/DL (ref 32–36)
MCV RBC AUTO: 91 FL (ref 82–98)
MICROSCOPIC COMMENT: ABNORMAL
MONOCYTES # BLD AUTO: 0.9 K/UL (ref 0.3–1)
MONOCYTES NFR BLD: 14.2 % (ref 4–15)
NEUTROPHILS # BLD AUTO: 4.2 K/UL (ref 1.8–7.7)
NEUTROPHILS NFR BLD: 70.1 % (ref 38–73)
NITRITE UR QL STRIP: NEGATIVE
NRBC BLD-RTO: 0 /100 WBC
OB PNL STL: POSITIVE
PH UR STRIP: 6 [PH] (ref 5–8)
PLATELET # BLD AUTO: 92 K/UL (ref 150–450)
PMV BLD AUTO: 10.5 FL (ref 9.2–12.9)
POTASSIUM SERPL-SCNC: 3.6 MMOL/L (ref 3.5–5.1)
PROT SERPL-MCNC: 6.9 G/DL (ref 6–8.4)
PROT UR QL STRIP: ABNORMAL
PROTHROMBIN TIME: 11.9 SEC (ref 9–12.5)
RBC # BLD AUTO: 4.53 M/UL (ref 4–5.4)
RBC #/AREA URNS HPF: 2 /HPF (ref 0–4)
SODIUM SERPL-SCNC: 139 MMOL/L (ref 136–145)
SP GR UR STRIP: >=1.03 (ref 1–1.03)
TROPONIN I SERPL DL<=0.01 NG/ML-MCNC: 0.02 NG/ML (ref 0–0.03)
URN SPEC COLLECT METH UR: ABNORMAL
UROBILINOGEN UR STRIP-ACNC: NEGATIVE EU/DL
WBC # BLD AUTO: 6 K/UL (ref 3.9–12.7)
WBC #/AREA URNS HPF: 1 /HPF (ref 0–5)

## 2022-08-23 PROCEDURE — 96375 TX/PRO/DX INJ NEW DRUG ADDON: CPT

## 2022-08-23 PROCEDURE — 99284 EMERGENCY DEPT VISIT MOD MDM: CPT

## 2022-08-23 PROCEDURE — 85025 COMPLETE CBC W/AUTO DIFF WBC: CPT | Performed by: STUDENT IN AN ORGANIZED HEALTH CARE EDUCATION/TRAINING PROGRAM

## 2022-08-23 PROCEDURE — 86901 BLOOD TYPING SEROLOGIC RH(D): CPT | Performed by: STUDENT IN AN ORGANIZED HEALTH CARE EDUCATION/TRAINING PROGRAM

## 2022-08-23 PROCEDURE — 85730 THROMBOPLASTIN TIME PARTIAL: CPT | Performed by: STUDENT IN AN ORGANIZED HEALTH CARE EDUCATION/TRAINING PROGRAM

## 2022-08-23 PROCEDURE — 93005 ELECTROCARDIOGRAM TRACING: CPT

## 2022-08-23 PROCEDURE — 83690 ASSAY OF LIPASE: CPT | Performed by: STUDENT IN AN ORGANIZED HEALTH CARE EDUCATION/TRAINING PROGRAM

## 2022-08-23 PROCEDURE — 82272 OCCULT BLD FECES 1-3 TESTS: CPT | Performed by: STUDENT IN AN ORGANIZED HEALTH CARE EDUCATION/TRAINING PROGRAM

## 2022-08-23 PROCEDURE — 85610 PROTHROMBIN TIME: CPT | Performed by: STUDENT IN AN ORGANIZED HEALTH CARE EDUCATION/TRAINING PROGRAM

## 2022-08-23 PROCEDURE — 96361 HYDRATE IV INFUSION ADD-ON: CPT

## 2022-08-23 PROCEDURE — 93010 EKG 12-LEAD: ICD-10-PCS | Mod: ,,, | Performed by: INTERNAL MEDICINE

## 2022-08-23 PROCEDURE — 81000 URINALYSIS NONAUTO W/SCOPE: CPT | Performed by: STUDENT IN AN ORGANIZED HEALTH CARE EDUCATION/TRAINING PROGRAM

## 2022-08-23 PROCEDURE — 25000003 PHARM REV CODE 250: Performed by: STUDENT IN AN ORGANIZED HEALTH CARE EDUCATION/TRAINING PROGRAM

## 2022-08-23 PROCEDURE — 93010 ELECTROCARDIOGRAM REPORT: CPT | Mod: ,,, | Performed by: INTERNAL MEDICINE

## 2022-08-23 PROCEDURE — 84484 ASSAY OF TROPONIN QUANT: CPT | Performed by: STUDENT IN AN ORGANIZED HEALTH CARE EDUCATION/TRAINING PROGRAM

## 2022-08-23 PROCEDURE — 63600175 PHARM REV CODE 636 W HCPCS: Performed by: STUDENT IN AN ORGANIZED HEALTH CARE EDUCATION/TRAINING PROGRAM

## 2022-08-23 PROCEDURE — 80053 COMPREHEN METABOLIC PANEL: CPT | Performed by: STUDENT IN AN ORGANIZED HEALTH CARE EDUCATION/TRAINING PROGRAM

## 2022-08-23 PROCEDURE — 96374 THER/PROPH/DIAG INJ IV PUSH: CPT

## 2022-08-23 RX ORDER — SODIUM CHLORIDE 0.9 % (FLUSH) 0.9 %
10 SYRINGE (ML) INJECTION
Status: DISCONTINUED | OUTPATIENT
Start: 2022-08-23 | End: 2022-08-23 | Stop reason: HOSPADM

## 2022-08-23 RX ORDER — ACETAMINOPHEN 500 MG
1000 TABLET ORAL
Status: COMPLETED | OUTPATIENT
Start: 2022-08-23 | End: 2022-08-23

## 2022-08-23 RX ORDER — ONDANSETRON 2 MG/ML
4 INJECTION INTRAMUSCULAR; INTRAVENOUS
Status: COMPLETED | OUTPATIENT
Start: 2022-08-23 | End: 2022-08-23

## 2022-08-23 RX ORDER — METOCLOPRAMIDE 10 MG/1
10 TABLET ORAL EVERY 6 HOURS
Qty: 30 TABLET | Refills: 0 | Status: ON HOLD | OUTPATIENT
Start: 2022-08-23 | End: 2023-04-07 | Stop reason: HOSPADM

## 2022-08-23 RX ORDER — METOCLOPRAMIDE HYDROCHLORIDE 5 MG/ML
10 INJECTION INTRAMUSCULAR; INTRAVENOUS
Status: COMPLETED | OUTPATIENT
Start: 2022-08-23 | End: 2022-08-23

## 2022-08-23 RX ORDER — IBUPROFEN 600 MG/1
600 TABLET ORAL
Status: COMPLETED | OUTPATIENT
Start: 2022-08-23 | End: 2022-08-23

## 2022-08-23 RX ADMIN — SODIUM CHLORIDE 1000 ML: 0.9 INJECTION, SOLUTION INTRAVENOUS at 09:08

## 2022-08-23 RX ADMIN — IBUPROFEN 600 MG: 600 TABLET, FILM COATED ORAL at 10:08

## 2022-08-23 RX ADMIN — ACETAMINOPHEN 1000 MG: 500 TABLET ORAL at 10:08

## 2022-08-23 RX ADMIN — METOCLOPRAMIDE 10 MG: 5 INJECTION, SOLUTION INTRAMUSCULAR; INTRAVENOUS at 10:08

## 2022-08-23 RX ADMIN — ONDANSETRON HYDROCHLORIDE 4 MG: 2 SOLUTION INTRAMUSCULAR; INTRAVENOUS at 09:08

## 2022-08-23 NOTE — TELEPHONE ENCOUNTER
This nurse called EMS for colleague Sylvia Morrison RN who was speaking to PT at the time. Spoke to [facility] ems via [facility] 911 to [facility] then [facility] and they had already dispatched EMS in route to Pts address at 0831.

## 2022-08-23 NOTE — ED PROVIDER NOTES
Encounter Date: 8/23/2022    This document was partially completed using speech recognition software and may contain misspellings, grammatical errors, and/or unexpected word substitutions.       History     Chief Complaint   Patient presents with    Back Pain     81 year old female with a PMHx of HTN, HLD, COPD on 3L O2, Afib on eliquis presents to the ED via EMS for dark black stools, nonbloody vomiting x 4, lower back pain. Symptoms started earlier today and she had diarrhea on herself. No falls or injuries. Reports the back pain is across her lower back. She was seen by me yesterday and diagnosed with COVID19, flu with Tamiflu called in and referral to antibody infusion center placed. Denies any abdominal pain. Has diffuse mid to lower back pain, bilateral. Denies urinary symptoms. Didn't take her AM meds today but did start the Tamiflu last night.        Review of patient's allergies indicates:   Allergen Reactions    Pcn [penicillins] Hives and Itching    Tetracyclines     Bactrim [sulfamethoxazole-trimethoprim] Rash    Ilosone Rash    Iodine and iodide containing products Rash    Sulfa (sulfonamide antibiotics) Hives and Rash     Past Medical History:   Diagnosis Date    Abnormal Pap smear 7/17/13    LGSIL    Atrial fibrillation     COPD (chronic obstructive pulmonary disease)     Depression     GERD (gastroesophageal reflux disease)     Hyperlipidemia     Hypertension      Past Surgical History:   Procedure Laterality Date    APPENDECTOMY      BRONCHOSCOPY N/A 8/5/2019    Procedure: BRONCHOSCOPY;  Surgeon: Howard Matson MD;  Location: [facility] OR;  Service: Pulmonary;  Laterality: N/A;    CERVICAL BIOPSY  W/ LOOP ELECTRODE EXCISION      CHOLECYSTECTOMY      COLLATERAL LIGAMENT REPAIR, KNEE      left knee    COLONOSCOPY      DILATION AND CURETTAGE OF UTERUS      SINUS SURGERY       Family History   Problem Relation Age of Onset    Breast cancer Mother     Colon cancer Neg Hx     Ovarian  cancer Neg Hx      Social History     Tobacco Use    Smoking status: Former Smoker     Years: 30.00     Types: Cigarettes     Quit date: 10/30/2006     Years since quitting: 15.8    Smokeless tobacco: Never Used   Substance Use Topics    Alcohol use: No     Comment: No    Drug use: No     Review of Systems   Constitutional: Negative for chills and fever.   HENT: Negative for congestion, rhinorrhea and sneezing.    Eyes: Negative for discharge and redness.   Respiratory: Negative for cough and shortness of breath.    Cardiovascular: Negative for chest pain and palpitations.   Gastrointestinal: Positive for diarrhea, nausea and vomiting. Negative for abdominal pain.   Genitourinary: Negative for dysuria, frequency, vaginal bleeding and vaginal discharge.   Musculoskeletal: Positive for back pain. Negative for neck pain.   Skin: Negative for rash and wound.   Neurological: Negative for weakness, numbness and headaches.       Physical Exam     Initial Vitals [08/23/22 0904]   BP Pulse Resp Temp SpO2   136/75 79 (!) 28 99.1 °F (37.3 °C) 99 %      MAP       --         Physical Exam    Nursing note and vitals reviewed.  Constitutional: She appears well-developed. She is not diaphoretic. No distress.   HENT:   Head: Normocephalic and atraumatic.   Right Ear: External ear normal.   Left Ear: External ear normal.   Eyes: Right eye exhibits no discharge. Left eye exhibits no discharge. No scleral icterus.   Neck: Neck supple.   Cardiovascular: Normal rate and regular rhythm.   Pulmonary/Chest: Breath sounds normal. No stridor. No respiratory distress. She has no wheezes. She has no rhonchi. She has no rales.   Abdominal: Abdomen is soft. There is no abdominal tenderness. There is no guarding.   Genitourinary:    Genitourinary Comments: Anitha PARK chaperoned    Brown stool on rectal exam, no melena, no hematochezia     Musculoskeletal:         General: No edema.      Cervical back: Neck supple.     Neurological: She is  alert and oriented to person, place, and time.   Skin: Skin is warm and dry. Capillary refill takes less than 2 seconds.   Psychiatric: She has a normal mood and affect.         ED Course   Procedures  Labs Reviewed   URINALYSIS, REFLEX TO URINE CULTURE - Abnormal; Notable for the following components:       Result Value    Specific Gravity, UA >=1.030 (*)     Protein, UA Trace (*)     Ketones, UA 2+ (*)     Occult Blood UA 2+ (*)     All other components within normal limits    Narrative:     Specimen Source->Urine   CBC W/ AUTO DIFFERENTIAL - Abnormal; Notable for the following components:    Platelets 92 (*)     Immature Granulocytes 0.7 (*)     Lymph # 0.9 (*)     Lymph % 14.5 (*)     All other components within normal limits   COMPREHENSIVE METABOLIC PANEL - Abnormal; Notable for the following components:     (*)      (*)     All other components within normal limits   APTT - Abnormal; Notable for the following components:    aPTT 33.0 (*)     All other components within normal limits   OCCULT BLOOD X 1, STOOL - Abnormal; Notable for the following components:    Occult Blood Positive (*)     All other components within normal limits   URINALYSIS MICROSCOPIC - Abnormal; Notable for the following components:    Bacteria Few (*)     Granular Casts, UA 1 (*)     All other components within normal limits    Narrative:     Specimen Source->Urine   LIPASE   PROTIME-INR   TROPONIN I   TYPE & SCREEN     EKG Readings: (Independently Interpreted)   Previous EKG: Compared with most recent EKG Previous EKG Date: 12/3/2020.   Sinus rhythm at 80 bpm. Normal axis. Nonspecific twave abnormalities.      ECG Results          EKG 12-lead (Final result)  Result time 08/23/22 10:44:16    Final result by Interface, Lab [facility] (08/23/22 10:44:16)                 Narrative:    Test Reason : K92.1    Vent. Rate : 080 BPM     Atrial Rate : 079 BPM     P-R Int : 000 ms          QRS Dur : 090 ms      QT Int : 402 ms        P-R-T Axes : 000 078 022 degrees     QTc Int : 463 ms    Baseline Artifact  Sinus rhythm  Nonspecific T wave abnormality in inferior leads  Borderline Abnormal ECG  When compared with ECG of 03-DEC-2020 08:23,      Nonspecific T wave abnormality now evident in Inferior leads  Nonspecific T wave abnormality no longer evident in Anterior leads  QT has lengthened  Confirmed by GENARO TATE MD ([id number]) on 8/23/2022 10:39:57 AM    Referred By: [email or web address]   SELF           Confirmed By:GENARO TATE MD                            Imaging Results    None          Medications   sodium chloride 0.9% flush 10 mL (has no administration in time range)   sodium chloride 0.9% bolus 1,000 mL (0 mLs Intravenous Stopped 8/23/22 1224)   ondansetron injection 4 mg (4 mg Intravenous Given 8/23/22 0926)   metoclopramide HCl injection 10 mg (10 mg Intravenous Given 8/23/22 1028)   acetaminophen tablet 1,000 mg (1,000 mg Oral Given 8/23/22 1029)   ibuprofen tablet 600 mg (600 mg Oral Given 8/23/22 1029)     Medical Decision Making:   Differential Diagnosis:   DDx: GI bleed, UTI, pyelonephritis, colitis, peritonitis, constipation, COVID19 mylagias  ED Management:  Based on the patient's evaluation - patient appears well for discharge home. Labs reassuring. No melena, no hematochezia. Brown stool with FOBT+. Felt better after fluids and antiemetics. Will discharge home with PCP f/u and rx for antiemetics. COVID19 antibody infusion referral in place by me yesterday already. Return precautions given. Son updated. Patient is in agreement with the plan.                      Clinical Impression:   Final diagnoses:  [U07.1] COVID-19 (Primary)  [R11.2, R19.7] Nausea vomiting and diarrhea          ED Disposition Condition    Discharge Stable        ED Prescriptions     Medication Sig Dispense Start Date End Date Auth. Provider    metoclopramide HCl (REGLAN) 10 MG tablet Take 1 tablet (10 mg total) by mouth every 6 (six) hours. 30 tablet 8/23/2022   Drew Estevez,         Follow-up Information     Follow up With Specialties Details Why Contact Info    Kevin Clements MD Family Medicine Schedule an appointment as soon as possible for a visit in 1 week As needed [address] [id number]  [phone]                  Drew Estevez DO  08/23/22 1910

## 2022-08-23 NOTE — DISCHARGE INSTRUCTIONS
If you have a COVID Test PENDING:  Please access [facility] to review the results of your test. Until the results of your COVID test return, you should isolate yourself so as not to potentially spread illness to others.   If your COVID test returns positive, you should isolate yourself so as not to spread illness to others. After five full days, if you are feeling better and you have not had fever for 24 hours, you can return to your typical daily activities, but you must wear a mask around others for an additional 5 days.   If your COVID test returns negative and you are either unvaccinated or more than six months out from your two-dose vaccine and are not yet boosted, you should still quarantine for 5 full days followed by strict mask use for an additional 5 full days.   If your COVID test returns negative and you have received your 2-dose initial vaccine as well as a booster, you should continue strict mask use for 10 full days after the exposure.  For all those exposed, best practice includes a test at day 5 after the exposure. This can be a home test or a test through one of the many testing centers throughout our community.   Masking is always advised to limit the spread of COVID. Cdc.gov is an excellent site to obtain the latest up to date recommendations regarding COVID and COVID testing.     After your evaluation today, we ruled out any emergent condition. However, if you develop shortness of breath, chest pain, or ANY OTHER CONCERNS please return to the emergency department for further care.      CDC Testing and Quarantine Guidelines for patients with exposure to a known-positive COVID-19 person:  A close exposure is defined as anyone who has had an exposure (masked or unmasked) to a known COVID -19 positive person within 6 feet of someone for a cumulative total of 15 minutes or more over a 24-hour period.   Vaccinated and/or if you recently had a positive covid test within 90 days do NOT need to  quarantine after contact with someone who had COVID-19 unless you develop symptoms.   Fully vaccinated people who have not had a positive test within 90 days, should get tested 3-5 days after their exposure, even if they don't have symptoms and wear a mask indoors in public for 14 days following exposure or until their test result is negative.      Unvaccinated and/or NOT had a positive test within 90 days and meet close exposure  You are required by CDC guidelines to quarantine for at least 5 days from time of exposure followed by 5 days of strict masking. It is recommended, but not required to test after 5 days, unless you develop symptoms, in which case you should test at that time.  If you get tested after 5 days and your test is positive, your 5 day period of isolation starts the day of the positive test.    If your exposure does not meet the above definition, you can return to your normal daily activities to include social distancing, wearing a mask and frequent handwashing.      Here is a link to guidance from the CDC:  https://www.cdc.gov/media/releases/2021/s1227-isolation-quarantine-guidance.html      [facility] Testing Sites:  https://ldh.la.gov/page/3934      [facility] website with testing locations and guidance:  [email or web address]

## 2022-08-23 NOTE — TELEPHONE ENCOUNTER
Pt called and she said that she has vomiting and diarrhea and weakness pt is home alone and I had to hold on while she threw up and also she said that she messed all over herself pt triaged and care advice is for her to hang up and call 911. Pt told me to hang on right now. Pt had to go to the restroom. Daughter called to have her call 911 as well to make sure someone was going to get her.               rReason for Disposition   SEVERE weakness (i.e., unable to walk or barely able to walk, requires support) and new-onset or worsening    Additional Information   Negative: SEVERE difficulty breathing (e.g., struggling for each breath, speaks in single words)   Negative: Shock suspected (e.g., cold/pale/clammy skin, too weak to stand, low BP, rapid pulse)   Negative: Difficult to awaken or acting confused (e.g., disoriented, slurred speech)   Negative: Fainted > 15 minutes ago and still feels too weak or dizzy to stand    Protocols used: WEAKNESS (GENERALIZED) AND FATIGUE-A-OH

## 2022-08-23 NOTE — ED TRIAGE NOTES
To ED per [facility] with c/o lower back pain, nausea, dark stool, and diarrhea today. Patient took Tylenol at 0130. Patient diagnosed with COVID and Influenza yesterday.

## 2022-08-24 ENCOUNTER — INFUSION (OUTPATIENT)
Dept: INFECTIOUS DISEASES | Facility: HOSPITAL | Age: 82
End: 2022-08-24
Attending: STUDENT IN AN ORGANIZED HEALTH CARE EDUCATION/TRAINING PROGRAM
Payer: MEDICARE

## 2022-08-24 ENCOUNTER — TELEPHONE (OUTPATIENT)
Dept: FAMILY MEDICINE | Facility: CLINIC | Age: 82
End: 2022-08-24
Payer: MEDICARE

## 2022-08-24 VITALS
SYSTOLIC BLOOD PRESSURE: 145 MMHG | DIASTOLIC BLOOD PRESSURE: 77 MMHG | TEMPERATURE: 98 F | RESPIRATION RATE: 22 BRPM | OXYGEN SATURATION: 98 % | HEART RATE: 76 BPM

## 2022-08-24 DIAGNOSIS — Z78.0 MENOPAUSE: ICD-10-CM

## 2022-08-24 DIAGNOSIS — U07.1 COVID-19: Primary | ICD-10-CM

## 2022-08-24 DIAGNOSIS — U07.1 COVID-19: ICD-10-CM

## 2022-08-24 PROCEDURE — 63600175 PHARM REV CODE 636 W HCPCS: Performed by: STUDENT IN AN ORGANIZED HEALTH CARE EDUCATION/TRAINING PROGRAM

## 2022-08-24 PROCEDURE — M0222 HC IV INJECTION, BEBTELOVIMAB, INCL POST ADMIN MONIT: HCPCS | Performed by: STUDENT IN AN ORGANIZED HEALTH CARE EDUCATION/TRAINING PROGRAM

## 2022-08-24 RX ORDER — ALBUTEROL SULFATE 90 UG/1
2 AEROSOL, METERED RESPIRATORY (INHALATION)
Status: ACTIVE | OUTPATIENT
Start: 2022-08-24 | End: 2022-08-27

## 2022-08-24 RX ORDER — ACETAMINOPHEN 325 MG/1
650 TABLET ORAL
Status: ACTIVE | OUTPATIENT
Start: 2022-08-24 | End: 2022-08-25

## 2022-08-24 RX ORDER — BEBTELOVIMAB 87.5 MG/ML
175 INJECTION, SOLUTION INTRAVENOUS
Status: COMPLETED | OUTPATIENT
Start: 2022-08-24 | End: 2022-08-24

## 2022-08-24 RX ORDER — ONDANSETRON 4 MG/1
4 TABLET, ORALLY DISINTEGRATING ORAL
Status: ACTIVE | OUTPATIENT
Start: 2022-08-24 | End: 2022-08-25

## 2022-08-24 RX ORDER — DIPHENHYDRAMINE HYDROCHLORIDE 50 MG/ML
25 INJECTION INTRAMUSCULAR; INTRAVENOUS
Status: ACTIVE | OUTPATIENT
Start: 2022-08-24 | End: 2022-08-25

## 2022-08-24 RX ORDER — EPINEPHRINE 0.3 MG/.3ML
0.3 INJECTION SUBCUTANEOUS
Status: ACTIVE | OUTPATIENT
Start: 2022-08-24 | End: 2022-08-27

## 2022-08-24 RX ADMIN — BEBTELOVIMAB 175 MG: 87.5 INJECTION, SOLUTION INTRAVENOUS at 09:08

## 2022-08-24 NOTE — PROGRESS NOTES
1005  Tolerated medication well  No reaction noted.  Dc to home in stable condition with friend.  Instructed to return to ER for worsening.

## 2022-08-24 NOTE — TELEPHONE ENCOUNTER
Patient is asking if she will be able to get the antibody infusion even though she is currently on blood thinners. She says her symptoms started yesterday. She would like a call back from the office explaining if she can get the infusion and what her other options are for COVID treatment. Please contact caller directly to discuss.

## 2022-08-24 NOTE — PROGRESS NOTES
0940  Pt with increase anxiousness.  Stating she is sweating and has increase back pain.  Vitals stable.  States has not eaten in 3 days.  powerade provided--drank a few sips and now feeling better.

## 2022-08-24 NOTE — TELEPHONE ENCOUNTER
Reason for Disposition   [1] Caller requesting NON-URGENT health information AND [2] PCP's office is the best resource    Additional Information   Negative: [1] Caller is not with the adult (patient) AND [2] reporting urgent symptoms   Negative: Lab result questions   Negative: Medication questions   Negative: Caller can't be reached by phone   Negative: Caller has already spoken to PCP or another triager   Negative: RN needs further essential information from caller in order to complete triage   Negative: Requesting regular office appointment    Protocols used: INFORMATION ONLY CALL - NO TRIAGE-A-

## 2022-08-24 NOTE — PROGRESS NOTES
0845   Patient arrived for iv injection of bebtelovimab. Home medications reviewed. Discussed Plan of Care with patient. Encouraged questions and answered adequately. Patient experiencing weakness, diarrhea, lower back pain, fever, and restless.  . VSS, will continue to monitor closely.  Pt on home O2

## 2022-08-24 NOTE — TELEPHONE ENCOUNTER
Requesting infusion.   Please advise                    ----- Message from Rhina Butts sent at 2022  7:43 AM CDT -----  Contact: self  Marva Perez  MRN: [id number]  : 1940  PCP: Kevin Clements  Home Phone      [phone]  Work Phone      Not on file.  Mobile          [phone]      MESSAGE:  Pt left VM asking about infusion. Pt tested positive for Covid on . PLEASE FORWARD TO IN CLINIC PROVIDER    Phone:  [phone]

## 2022-08-25 ENCOUNTER — PATIENT MESSAGE (OUTPATIENT)
Dept: PULMONOLOGY | Facility: CLINIC | Age: 82
End: 2022-08-25
Payer: MEDICARE

## 2022-08-25 ENCOUNTER — NURSE TRIAGE (OUTPATIENT)
Dept: ADMINISTRATIVE | Facility: CLINIC | Age: 82
End: 2022-08-25
Payer: MEDICARE

## 2022-08-25 NOTE — TELEPHONE ENCOUNTER
"Marva calling states she was diagnosed with Flu and Covid 3 days ago. Received Covid infusion 2 days ago., currently wears O2 at 4 liters at night. Currently c/o weakness, diarrhea, coughing up green, hoarseness & chills  Advised per triage protocol to call  now. Marva refusing care advice recommendation. Marva ortega I have already been to [facility] twice and they gave me infusion and sent me home twice. If I feel any worse, I'll call 911." Triager reiterated care advice recommendation to Marva to call  now. Marva hangs up prior to triager ending call.     Reason for Disposition   Sounds like a life-threatening emergency to the triager    Additional Information   Negative: SEVERE difficulty breathing (e.g., struggling for each breath, speaks in single words)   Negative: Difficult to awaken or acting confused (e.g., disoriented, slurred speech)   Negative: Bluish (or gray) lips or face now   Negative: Shock suspected (e.g., cold/pale/clammy skin, too weak to stand, low BP, rapid pulse)    Protocols used: CORONAVIRUS (COVID-19) DIAGNOSED OR [id number]      "

## 2022-08-26 ENCOUNTER — TELEPHONE (OUTPATIENT)
Dept: FAMILY MEDICINE | Facility: CLINIC | Age: 82
End: 2022-08-26
Payer: MEDICARE

## 2022-08-26 ENCOUNTER — HOSPITAL ENCOUNTER (INPATIENT)
Facility: HOSPITAL | Age: 82
LOS: 14 days | Discharge: HOME HEALTH | DRG: 177 | End: 2022-09-09
Attending: SURGERY | Admitting: STUDENT IN AN ORGANIZED HEALTH CARE EDUCATION/TRAINING PROGRAM
Payer: MEDICARE

## 2022-08-26 DIAGNOSIS — J96.11 CHRONIC RESPIRATORY FAILURE WITH HYPOXIA AND HYPERCAPNIA: ICD-10-CM

## 2022-08-26 DIAGNOSIS — R07.9 CHEST PAIN: ICD-10-CM

## 2022-08-26 DIAGNOSIS — U07.1 COVID-19: ICD-10-CM

## 2022-08-26 DIAGNOSIS — J44.9 COPD WITH HYPOXIA: Primary | ICD-10-CM

## 2022-08-26 DIAGNOSIS — J18.9 PNEUMONIA OF LEFT LOWER LOBE DUE TO INFECTIOUS ORGANISM: ICD-10-CM

## 2022-08-26 DIAGNOSIS — R04.2 COUGH WITH HEMOPTYSIS: ICD-10-CM

## 2022-08-26 DIAGNOSIS — R91.8 LUNG MASS: ICD-10-CM

## 2022-08-26 DIAGNOSIS — J44.1 COPD EXACERBATION: ICD-10-CM

## 2022-08-26 DIAGNOSIS — E86.0 DEHYDRATION: ICD-10-CM

## 2022-08-26 DIAGNOSIS — J43.2 CENTRILOBULAR EMPHYSEMA: ICD-10-CM

## 2022-08-26 DIAGNOSIS — R91.8 RIGHT LOWER LOBE LUNG MASS: ICD-10-CM

## 2022-08-26 DIAGNOSIS — R06.02 SOB (SHORTNESS OF BREATH): ICD-10-CM

## 2022-08-26 DIAGNOSIS — J96.12 CHRONIC RESPIRATORY FAILURE WITH HYPOXIA AND HYPERCAPNIA: ICD-10-CM

## 2022-08-26 DIAGNOSIS — J18.9 PNEUMONIA OF RIGHT LOWER LOBE DUE TO INFECTIOUS ORGANISM: ICD-10-CM

## 2022-08-26 DIAGNOSIS — E43 SEVERE MALNUTRITION: ICD-10-CM

## 2022-08-26 DIAGNOSIS — J10.1 INFLUENZA A: ICD-10-CM

## 2022-08-26 PROBLEM — R93.89 ABNORMAL CXR: Status: ACTIVE | Noted: 2022-08-26

## 2022-08-26 PROBLEM — I48.20 CHRONIC ATRIAL FIBRILLATION: Status: ACTIVE | Noted: 2022-08-26

## 2022-08-26 LAB
ALBUMIN SERPL BCP-MCNC: 3 G/DL (ref 3.5–5.2)
ALP SERPL-CCNC: 63 U/L (ref 55–135)
ALT SERPL W/O P-5'-P-CCNC: 60 U/L (ref 10–44)
ANION GAP SERPL CALC-SCNC: 13 MMOL/L (ref 8–16)
AST SERPL-CCNC: 69 U/L (ref 10–40)
BASOPHILS # BLD AUTO: 0 K/UL (ref 0–0.2)
BASOPHILS NFR BLD: 0 % (ref 0–1.9)
BILIRUB SERPL-MCNC: 0.5 MG/DL (ref 0.1–1)
BNP SERPL-MCNC: 83 PG/ML (ref 0–99)
BUN SERPL-MCNC: 13 MG/DL (ref 8–23)
CALCIUM SERPL-MCNC: 8 MG/DL (ref 8.7–10.5)
CHLORIDE SERPL-SCNC: 104 MMOL/L (ref 95–110)
CO2 SERPL-SCNC: 25 MMOL/L (ref 23–29)
CREAT SERPL-MCNC: 0.7 MG/DL (ref 0.5–1.4)
DIFFERENTIAL METHOD: ABNORMAL
EOSINOPHIL # BLD AUTO: 0 K/UL (ref 0–0.5)
EOSINOPHIL NFR BLD: 0 % (ref 0–8)
ERYTHROCYTE [DISTWIDTH] IN BLOOD BY AUTOMATED COUNT: 12.5 % (ref 11.5–14.5)
ERYTHROCYTE [SEDIMENTATION RATE] IN BLOOD BY WESTERGREN METHOD: 22 MM/HR (ref 0–20)
EST. GFR  (NO RACE VARIABLE): >60 ML/MIN/1.73 M^2
FERRITIN SERPL-MCNC: 852 NG/ML (ref 20–300)
GLUCOSE SERPL-MCNC: 70 MG/DL (ref 70–110)
HCT VFR BLD AUTO: 33 % (ref 37–48.5)
HGB BLD-MCNC: 10.7 G/DL (ref 12–16)
IMM GRANULOCYTES # BLD AUTO: 0.01 K/UL (ref 0–0.04)
IMM GRANULOCYTES NFR BLD AUTO: 0.3 % (ref 0–0.5)
LACTATE SERPL-SCNC: 1 MMOL/L (ref 0.5–2.2)
LDH SERPL L TO P-CCNC: 270 U/L (ref 110–260)
LYMPHOCYTES # BLD AUTO: 0.7 K/UL (ref 1–4.8)
LYMPHOCYTES NFR BLD: 20.3 % (ref 18–48)
MAGNESIUM SERPL-MCNC: 1.6 MG/DL (ref 1.6–2.6)
MCH RBC QN AUTO: 29.6 PG (ref 27–31)
MCHC RBC AUTO-ENTMCNC: 32.4 G/DL (ref 32–36)
MCV RBC AUTO: 91 FL (ref 82–98)
MONOCYTES # BLD AUTO: 0.5 K/UL (ref 0.3–1)
MONOCYTES NFR BLD: 15.8 % (ref 4–15)
NEUTROPHILS # BLD AUTO: 2.1 K/UL (ref 1.8–7.7)
NEUTROPHILS NFR BLD: 63.6 % (ref 38–73)
NRBC BLD-RTO: 0 /100 WBC
PLATELET # BLD AUTO: 76 K/UL (ref 150–450)
PMV BLD AUTO: 10.2 FL (ref 9.2–12.9)
POTASSIUM SERPL-SCNC: 3.2 MMOL/L (ref 3.5–5.1)
PROCALCITONIN SERPL IA-MCNC: 0.08 NG/ML
PROT SERPL-MCNC: 5.3 G/DL (ref 6–8.4)
RBC # BLD AUTO: 3.61 M/UL (ref 4–5.4)
SARS-COV-2 RDRP RESP QL NAA+PROBE: POSITIVE
SODIUM SERPL-SCNC: 142 MMOL/L (ref 136–145)
TROPONIN I SERPL DL<=0.01 NG/ML-MCNC: 0.03 NG/ML (ref 0–0.03)
WBC # BLD AUTO: 3.35 K/UL (ref 3.9–12.7)

## 2022-08-26 PROCEDURE — 96376 TX/PRO/DX INJ SAME DRUG ADON: CPT

## 2022-08-26 PROCEDURE — 83605 ASSAY OF LACTIC ACID: CPT | Performed by: SURGERY

## 2022-08-26 PROCEDURE — 27000207 HC ISOLATION

## 2022-08-26 PROCEDURE — 85025 COMPLETE CBC W/AUTO DIFF WBC: CPT | Performed by: SURGERY

## 2022-08-26 PROCEDURE — 93010 EKG 12-LEAD: ICD-10-PCS | Mod: ,,, | Performed by: INTERNAL MEDICINE

## 2022-08-26 PROCEDURE — 84145 PROCALCITONIN (PCT): CPT | Performed by: STUDENT IN AN ORGANIZED HEALTH CARE EDUCATION/TRAINING PROGRAM

## 2022-08-26 PROCEDURE — 99223 PR INITIAL HOSPITAL CARE,LEVL III: ICD-10-PCS | Mod: ,,, | Performed by: STUDENT IN AN ORGANIZED HEALTH CARE EDUCATION/TRAINING PROGRAM

## 2022-08-26 PROCEDURE — 63600175 PHARM REV CODE 636 W HCPCS: Performed by: SURGERY

## 2022-08-26 PROCEDURE — 93010 ELECTROCARDIOGRAM REPORT: CPT | Mod: ,,, | Performed by: INTERNAL MEDICINE

## 2022-08-26 PROCEDURE — 11000001 HC ACUTE MED/SURG PRIVATE ROOM

## 2022-08-26 PROCEDURE — 36415 COLL VENOUS BLD VENIPUNCTURE: CPT | Performed by: STUDENT IN AN ORGANIZED HEALTH CARE EDUCATION/TRAINING PROGRAM

## 2022-08-26 PROCEDURE — 25000242 PHARM REV CODE 250 ALT 637 W/ HCPCS: Performed by: SURGERY

## 2022-08-26 PROCEDURE — 80053 COMPREHEN METABOLIC PANEL: CPT | Performed by: SURGERY

## 2022-08-26 PROCEDURE — 63600175 PHARM REV CODE 636 W HCPCS: Performed by: STUDENT IN AN ORGANIZED HEALTH CARE EDUCATION/TRAINING PROGRAM

## 2022-08-26 PROCEDURE — 83615 LACTATE (LD) (LDH) ENZYME: CPT | Performed by: STUDENT IN AN ORGANIZED HEALTH CARE EDUCATION/TRAINING PROGRAM

## 2022-08-26 PROCEDURE — 83735 ASSAY OF MAGNESIUM: CPT | Performed by: STUDENT IN AN ORGANIZED HEALTH CARE EDUCATION/TRAINING PROGRAM

## 2022-08-26 PROCEDURE — 99900031 HC PATIENT EDUCATION (STAT)

## 2022-08-26 PROCEDURE — 85651 RBC SED RATE NONAUTOMATED: CPT | Performed by: STUDENT IN AN ORGANIZED HEALTH CARE EDUCATION/TRAINING PROGRAM

## 2022-08-26 PROCEDURE — 27000221 HC OXYGEN, UP TO 24 HOURS

## 2022-08-26 PROCEDURE — 96375 TX/PRO/DX INJ NEW DRUG ADDON: CPT

## 2022-08-26 PROCEDURE — 99900035 HC TECH TIME PER 15 MIN (STAT)

## 2022-08-26 PROCEDURE — 93005 ELECTROCARDIOGRAM TRACING: CPT

## 2022-08-26 PROCEDURE — 96374 THER/PROPH/DIAG INJ IV PUSH: CPT

## 2022-08-26 PROCEDURE — 25000003 PHARM REV CODE 250: Performed by: SURGERY

## 2022-08-26 PROCEDURE — 84484 ASSAY OF TROPONIN QUANT: CPT | Performed by: SURGERY

## 2022-08-26 PROCEDURE — 94761 N-INVAS EAR/PLS OXIMETRY MLT: CPT

## 2022-08-26 PROCEDURE — 94640 AIRWAY INHALATION TREATMENT: CPT

## 2022-08-26 PROCEDURE — 87040 BLOOD CULTURE FOR BACTERIA: CPT | Performed by: SURGERY

## 2022-08-26 PROCEDURE — 99291 CRITICAL CARE FIRST HOUR: CPT | Mod: 25

## 2022-08-26 PROCEDURE — 82728 ASSAY OF FERRITIN: CPT | Performed by: STUDENT IN AN ORGANIZED HEALTH CARE EDUCATION/TRAINING PROGRAM

## 2022-08-26 PROCEDURE — U0002 COVID-19 LAB TEST NON-CDC: HCPCS | Performed by: SURGERY

## 2022-08-26 PROCEDURE — 99223 1ST HOSP IP/OBS HIGH 75: CPT | Mod: ,,, | Performed by: STUDENT IN AN ORGANIZED HEALTH CARE EDUCATION/TRAINING PROGRAM

## 2022-08-26 PROCEDURE — 83880 ASSAY OF NATRIURETIC PEPTIDE: CPT | Performed by: SURGERY

## 2022-08-26 PROCEDURE — 25000003 PHARM REV CODE 250: Performed by: STUDENT IN AN ORGANIZED HEALTH CARE EDUCATION/TRAINING PROGRAM

## 2022-08-26 RX ORDER — DEXAMETHASONE SODIUM PHOSPHATE 4 MG/ML
6 INJECTION, SOLUTION INTRA-ARTICULAR; INTRALESIONAL; INTRAMUSCULAR; INTRAVENOUS; SOFT TISSUE
Status: COMPLETED | OUTPATIENT
Start: 2022-08-26 | End: 2022-08-26

## 2022-08-26 RX ORDER — ONDANSETRON 2 MG/ML
4 INJECTION INTRAMUSCULAR; INTRAVENOUS ONCE
Status: DISCONTINUED | OUTPATIENT
Start: 2022-08-26 | End: 2022-08-26

## 2022-08-26 RX ORDER — DEXAMETHASONE SODIUM PHOSPHATE 4 MG/ML
6 INJECTION, SOLUTION INTRA-ARTICULAR; INTRALESIONAL; INTRAMUSCULAR; INTRAVENOUS; SOFT TISSUE EVERY 24 HOURS
Status: DISCONTINUED | OUTPATIENT
Start: 2022-08-28 | End: 2022-09-03

## 2022-08-26 RX ORDER — ALBUTEROL SULFATE 90 UG/1
2 AEROSOL, METERED RESPIRATORY (INHALATION) EVERY 6 HOURS PRN
Status: DISCONTINUED | OUTPATIENT
Start: 2022-08-26 | End: 2022-09-06

## 2022-08-26 RX ORDER — CLONIDINE HYDROCHLORIDE 0.1 MG/1
0.1 TABLET ORAL
Status: DISCONTINUED | OUTPATIENT
Start: 2022-08-26 | End: 2022-08-26

## 2022-08-26 RX ORDER — TALC
6 POWDER (GRAM) TOPICAL NIGHTLY PRN
Status: DISCONTINUED | OUTPATIENT
Start: 2022-08-26 | End: 2022-09-06

## 2022-08-26 RX ORDER — ALPRAZOLAM 0.25 MG/1
0.25 TABLET ORAL 3 TIMES DAILY PRN
Status: DISCONTINUED | OUTPATIENT
Start: 2022-08-26 | End: 2022-08-29

## 2022-08-26 RX ORDER — ESCITALOPRAM OXALATE 5 MG/1
5 TABLET ORAL DAILY
Status: DISCONTINUED | OUTPATIENT
Start: 2022-08-27 | End: 2022-09-09 | Stop reason: HOSPADM

## 2022-08-26 RX ORDER — DIPHENOXYLATE HYDROCHLORIDE AND ATROPINE SULFATE 2.5; .025 MG/1; MG/1
2 TABLET ORAL
Status: COMPLETED | OUTPATIENT
Start: 2022-08-26 | End: 2022-08-26

## 2022-08-26 RX ORDER — ONDANSETRON 2 MG/ML
4 INJECTION INTRAMUSCULAR; INTRAVENOUS ONCE
Status: COMPLETED | OUTPATIENT
Start: 2022-08-26 | End: 2022-08-26

## 2022-08-26 RX ORDER — LOPERAMIDE HYDROCHLORIDE 2 MG/1
2 CAPSULE ORAL 4 TIMES DAILY PRN
Status: DISCONTINUED | OUTPATIENT
Start: 2022-08-26 | End: 2022-08-27

## 2022-08-26 RX ORDER — ONDANSETRON 2 MG/ML
8 INJECTION INTRAMUSCULAR; INTRAVENOUS
Status: DISCONTINUED | OUTPATIENT
Start: 2022-08-26 | End: 2022-08-26

## 2022-08-26 RX ORDER — IPRATROPIUM BROMIDE AND ALBUTEROL SULFATE 2.5; .5 MG/3ML; MG/3ML
3 SOLUTION RESPIRATORY (INHALATION)
Status: DISCONTINUED | OUTPATIENT
Start: 2022-08-26 | End: 2022-08-28

## 2022-08-26 RX ORDER — DIPHENHYDRAMINE HCL 50 MG
50 CAPSULE ORAL ONCE
Status: DISCONTINUED | OUTPATIENT
Start: 2022-08-26 | End: 2022-08-26

## 2022-08-26 RX ORDER — DILTIAZEM HYDROCHLORIDE 60 MG/1
60 TABLET, FILM COATED ORAL EVERY 12 HOURS
Status: DISCONTINUED | OUTPATIENT
Start: 2022-08-26 | End: 2022-09-09 | Stop reason: HOSPADM

## 2022-08-26 RX ORDER — FLECAINIDE ACETATE 50 MG/1
50 TABLET ORAL 2 TIMES DAILY
Status: DISCONTINUED | OUTPATIENT
Start: 2022-08-26 | End: 2022-09-09 | Stop reason: HOSPADM

## 2022-08-26 RX ORDER — ATORVASTATIN CALCIUM 10 MG/1
10 TABLET, FILM COATED ORAL NIGHTLY
Status: DISCONTINUED | OUTPATIENT
Start: 2022-08-26 | End: 2022-09-09 | Stop reason: HOSPADM

## 2022-08-26 RX ORDER — DEXAMETHASONE SODIUM PHOSPHATE 4 MG/ML
6 INJECTION, SOLUTION INTRA-ARTICULAR; INTRALESIONAL; INTRAMUSCULAR; INTRAVENOUS; SOFT TISSUE EVERY 24 HOURS
Status: DISCONTINUED | OUTPATIENT
Start: 2022-08-27 | End: 2022-08-26

## 2022-08-26 RX ORDER — MAGNESIUM SULFATE HEPTAHYDRATE 40 MG/ML
2 INJECTION, SOLUTION INTRAVENOUS ONCE
Status: COMPLETED | OUTPATIENT
Start: 2022-08-26 | End: 2022-08-26

## 2022-08-26 RX ORDER — DIPHENHYDRAMINE HCL 50 MG
50 CAPSULE ORAL ONCE
Status: COMPLETED | OUTPATIENT
Start: 2022-08-26 | End: 2022-08-27

## 2022-08-26 RX ORDER — MONTELUKAST SODIUM 10 MG/1
10 TABLET ORAL DAILY
Status: DISCONTINUED | OUTPATIENT
Start: 2022-08-27 | End: 2022-08-28 | Stop reason: SDUPTHER

## 2022-08-26 RX ORDER — VALSARTAN 80 MG/1
160 TABLET ORAL DAILY
Status: DISCONTINUED | OUTPATIENT
Start: 2022-08-27 | End: 2022-08-29

## 2022-08-26 RX ORDER — LORAZEPAM 0.5 MG/1
0.5 TABLET ORAL
Status: COMPLETED | OUTPATIENT
Start: 2022-08-26 | End: 2022-08-26

## 2022-08-26 RX ORDER — IPRATROPIUM BROMIDE AND ALBUTEROL SULFATE 2.5; .5 MG/3ML; MG/3ML
3 SOLUTION RESPIRATORY (INHALATION)
Status: COMPLETED | OUTPATIENT
Start: 2022-08-26 | End: 2022-08-26

## 2022-08-26 RX ORDER — POTASSIUM CHLORIDE 20 MEQ/1
40 TABLET, EXTENDED RELEASE ORAL ONCE
Status: DISCONTINUED | OUTPATIENT
Start: 2022-08-26 | End: 2022-08-26

## 2022-08-26 RX ORDER — SODIUM CHLORIDE 9 MG/ML
INJECTION, SOLUTION INTRAVENOUS CONTINUOUS
Status: DISCONTINUED | OUTPATIENT
Start: 2022-08-26 | End: 2022-08-28

## 2022-08-26 RX ORDER — ACETAMINOPHEN 325 MG/1
650 TABLET ORAL
Status: ACTIVE | OUTPATIENT
Start: 2022-08-26 | End: 2022-08-27

## 2022-08-26 RX ADMIN — DEXAMETHASONE SODIUM PHOSPHATE 6 MG: 4 INJECTION, SOLUTION INTRA-ARTICULAR; INTRALESIONAL; INTRAMUSCULAR; INTRAVENOUS; SOFT TISSUE at 10:08

## 2022-08-26 RX ADMIN — LORAZEPAM 0.5 MG: 0.5 TABLET ORAL at 11:08

## 2022-08-26 RX ADMIN — SODIUM CHLORIDE 1000 ML: 0.9 INJECTION, SOLUTION INTRAVENOUS at 10:08

## 2022-08-26 RX ADMIN — ONDANSETRON HYDROCHLORIDE 4 MG: 2 SOLUTION INTRAMUSCULAR; INTRAVENOUS at 09:08

## 2022-08-26 RX ADMIN — IPRATROPIUM BROMIDE AND ALBUTEROL SULFATE 3 ML: .5; 3 SOLUTION RESPIRATORY (INHALATION) at 09:08

## 2022-08-26 RX ADMIN — FLECAINIDE ACETATE 50 MG: 50 TABLET ORAL at 08:08

## 2022-08-26 RX ADMIN — ATORVASTATIN CALCIUM 10 MG: 10 TABLET, FILM COATED ORAL at 08:08

## 2022-08-26 RX ADMIN — ALPRAZOLAM 0.25 MG: 0.25 TABLET ORAL at 08:08

## 2022-08-26 RX ADMIN — APIXABAN 2.5 MG: 2.5 TABLET, FILM COATED ORAL at 08:08

## 2022-08-26 RX ADMIN — ONDANSETRON HYDROCHLORIDE 4 MG: 2 INJECTION, SOLUTION INTRAMUSCULAR; INTRAVENOUS at 10:08

## 2022-08-26 RX ADMIN — MAGNESIUM SULFATE 2 G: 2 INJECTION INTRAVENOUS at 05:08

## 2022-08-26 RX ADMIN — IPRATROPIUM BROMIDE AND ALBUTEROL SULFATE 3 ML: .5; 3 SOLUTION RESPIRATORY (INHALATION) at 11:08

## 2022-08-26 RX ADMIN — POTASSIUM BICARBONATE 40 MEQ: 391 TABLET, EFFERVESCENT ORAL at 06:08

## 2022-08-26 RX ADMIN — DIPHENOXYLATE HYDROCHLORIDE AND ATROPINE SULFATE 2 TABLET: 2.5; .025 TABLET ORAL at 10:08

## 2022-08-26 RX ADMIN — DILTIAZEM HYDROCHLORIDE 60 MG: 60 TABLET, FILM COATED ORAL at 08:08

## 2022-08-26 RX ADMIN — IPRATROPIUM BROMIDE AND ALBUTEROL SULFATE 3 ML: .5; 3 SOLUTION RESPIRATORY (INHALATION) at 02:08

## 2022-08-26 RX ADMIN — APIXABAN 2.5 MG: 2.5 TABLET, FILM COATED ORAL at 11:08

## 2022-08-26 RX ADMIN — CEFTRIAXONE 1 G: 1 INJECTION, SOLUTION INTRAVENOUS at 01:08

## 2022-08-26 RX ADMIN — PREDNISONE 50 MG: 20 TABLET ORAL at 08:08

## 2022-08-26 RX ADMIN — SODIUM CHLORIDE: 0.9 INJECTION, SOLUTION INTRAVENOUS at 01:08

## 2022-08-26 NOTE — ASSESSMENT & PLAN NOTE
Patient with atrial fibrillation which is controlled currently with Beta Blocker, Calcium Channel Blocker and flecainide. Patient is currently in sinus rhythm.[id number] Score: 4. HASBLED Score: Unable to calculate. Anticoagulation indicated. Anticoagulation done with eliquis.

## 2022-08-26 NOTE — ED PROVIDER NOTES
Encounter Date: 8/26/2022       History     Chief Complaint   Patient presents with    COVID-19 Concerns     Her primary doctor has been treating her for COVID and flu last several days however she has continued to deteriorate and not eating or drinking at all.  She is not short of breath main complaint is diarrhea and weakness    The history is provided by the patient.   Diarrhea   This is a new problem. The current episode started two days ago. The problem occurs 2 to 4 times per day. The problem has been gradually worsening. The stool consistency is described as watery. The patient states that diarrhea awakens her from sleep. Associated symptoms include a fever. Pertinent negatives include no abdominal pain. Nothing aggravates the symptoms. Risk factors include ill contacts. She has tried nothing for the symptoms.     Review of patient's allergies indicates:   Allergen Reactions    Pcn [penicillins] Hives and Itching    Tetracyclines     Bactrim [sulfamethoxazole-trimethoprim] Rash    Ilosone Rash    Iodine and iodide containing products Rash    Sulfa (sulfonamide antibiotics) Hives and Rash     Past Medical History:   Diagnosis Date    Abnormal Pap smear 7/17/13    LGSIL    Atrial fibrillation     COPD (chronic obstructive pulmonary disease)     Depression     GERD (gastroesophageal reflux disease)     Hyperlipidemia     Hypertension      Past Surgical History:   Procedure Laterality Date    APPENDECTOMY      BRONCHOSCOPY N/A 8/5/2019    Procedure: BRONCHOSCOPY;  Surgeon: Howard Matson MD;  Location: Replaced by [facility];  Service: Pulmonary;  Laterality: N/A;    CERVICAL BIOPSY  W/ LOOP ELECTRODE EXCISION      CHOLECYSTECTOMY      COLLATERAL LIGAMENT REPAIR, KNEE      left knee    COLONOSCOPY      DILATION AND CURETTAGE OF UTERUS      SINUS SURGERY       Family History   Problem Relation Age of Onset    Breast cancer Mother     Colon cancer Neg Hx     Ovarian cancer Neg Hx      Social History      Tobacco Use    Smoking status: Former Smoker     Years: 30.00     Types: Cigarettes     Quit date: 10/30/2006     Years since quitting: 15.8    Smokeless tobacco: Never Used   Substance Use Topics    Alcohol use: No     Comment: No    Drug use: No     Review of Systems   Constitutional: Positive for fever.   Eyes: Negative.    Respiratory: Negative.    Cardiovascular: Negative.    Gastrointestinal: Positive for diarrhea. Negative for abdominal pain.   Endocrine: Negative.    Genitourinary: Negative.    Musculoskeletal: Negative.    Allergic/Immunologic: Negative.    Neurological: Negative.    Hematological: Negative.    Psychiatric/Behavioral: Negative.    All other systems reviewed and are negative.      Physical Exam     Initial Vitals [08/26/22 0929]   BP Pulse Resp Temp SpO2   (!) 145/70 84 20 99.2 °F (37.3 °C) 95 %      MAP       --         Physical Exam    Nursing note and vitals reviewed.  Constitutional: She is not diaphoretic. No distress.   Thin and cachectic   HENT:   Head: Normocephalic.   Eyes: EOM are normal.   Neck:   Normal range of motion.  Cardiovascular: Normal rate.   Pulmonary/Chest:   Decreased breath sounds both bases   Abdominal: Abdomen is soft.   Musculoskeletal:      Cervical back: Normal range of motion.     Neurological: She is alert and oriented to person, place, and time. GCS score is 15. GCS eye subscore is 4. GCS verbal subscore is 5. GCS motor subscore is 6.   Skin: Skin is warm.   Psychiatric: She has a normal mood and affect.         ED Course   Critical Care    Date/Time: 8/26/2022 11:52 AM  Performed by: SHARMILA Stover III, MD  Authorized by: SHARMILA Stover III, MD   Direct patient critical care time: 35 minutes  Total critical care time (exclusive of procedural time) : 35 minutes        Labs Reviewed   SARS-COV-2 RNA AMPLIFICATION, QUAL - Abnormal; Notable for the following components:       Result Value    SARS-CoV-2 RNA, Amplification, Qual Positive (*)     All  other components within normal limits   CBC W/ AUTO DIFFERENTIAL - Abnormal; Notable for the following components:    WBC 3.35 (*)     RBC 3.61 (*)     Hemoglobin 10.7 (*)     Hematocrit 33.0 (*)     Platelets 76 (*)     Lymph # 0.7 (*)     Mono % 15.8 (*)     All other components within normal limits   COMPREHENSIVE METABOLIC PANEL - Abnormal; Notable for the following components:    Potassium 3.2 (*)     Calcium 8.0 (*)     Total Protein 5.3 (*)     Albumin 3.0 (*)     AST 69 (*)     ALT 60 (*)     All other components within normal limits   TROPONIN I - Abnormal; Notable for the following components:    Troponin I 0.029 (*)     All other components within normal limits   CULTURE, BLOOD   CULTURE, BLOOD   CLOSTRIDIUM DIFFICILE   B-TYPE NATRIURETIC PEPTIDE   LACTIC ACID, PLASMA   SEDIMENTATION RATE   LACTATE DEHYDROGENASE   FERRITIN   PROCALCITONIN     EKG Readings: (Independently Interpreted)   Initial Reading: No STEMI. Rhythm: Normal Sinus Rhythm. Ectopy: PVCs. Conduction: Normal. Axis: Normal.       Imaging Results          X-Ray Chest AP Portable (Final result)  Result time 08/26/22 10:44:11    Final result by Caitlyn Whitmore MD (08/26/22 10:44:11)                 Impression:      As above.  Consider further evaluation with a dedicated chest CT with contrast.      Electronically signed by: Caitlyn Whitmore MD  Date:    08/26/2022  Time:    10:44             Narrative:    EXAMINATION:  XR CHEST AP PORTABLE    CLINICAL HISTORY:  Chest Pain;    TECHNIQUE:  Single frontal view of the chest was performed.    COMPARISON:  08/22/2022    FINDINGS:  Chronic lung changes are seen.  Increasing hazy interstitial opacity throughout the left lung, pronounced in the left mid and lower lung zone which could reflect atelectasis, aspiration or possibly a developing pneumonia.  Probable small bilateral pleural effusions.  Calcified granulomas in the left lung.  Nodular density in the left mid lung which appears new as compared  to the previous examination none.  This could possibly represent a nipple shadow.  Heart size is normal.  Calcified atheromatous disease affects the aorta.  Fortunately age-appropriate degenerative changes affect the skeleton.                                 Medications   ondansetron injection 4 mg (4 mg Intravenous Given 8/26/22 0959)   sodium chloride 0.9% bolus 1,000 mL (0 mLs Intravenous Stopped 8/26/22 1042)   diphenoxylate-atropine 2.5-0.025 mg per tablet 2 tablet (2 tablets Oral Given 8/26/22 1031)   apixaban tablet 2.5 mg (2.5 mg Oral Given 8/26/22 1132)   ondansetron injection 4 mg (4 mg Intravenous Given 8/26/22 1030)   LORazepam tablet 0.5 mg (0.5 mg Oral Given 8/26/22 1104)   dexamethasone injection 6 mg (6 mg Intravenous Given 8/26/22 1056)   albuterol-ipratropium 2.5 mg-0.5 mg/3 mL nebulizer solution 3 mL (3 mLs Nebulization Given 8/26/22 1120)     Medical Decision Making:   Initial Assessment:   Patient with influenza and COVID with COPD with declining oxygen saturation levels she wears home oxygen 2 L a minute  ED Management:  Based on the hypoxia she meets criteria for admission.  She does not have COVID pneumonia she is of sepsis her laboratory exam is unremarkable recommend IV fluids because she tolerates the diet and Decadron and nebulizer treatments                      Clinical Impression:   Final diagnoses:  [U07.1] COVID-19  [R07.9] Chest pain  [E86.0] Dehydration  [J44.9, R09.02] COPD with hypoxia (Primary)          ED Disposition Condition    Admit               SHARMILA Stover III, MD  08/26/22 6513

## 2022-08-26 NOTE — ED NOTES
PT TRANSFER TO ROOM 305, REPORT GIVEN TO YEN MEJÍA TRANSPORTED VIA STRETCHER, O2 AT 3LNC GRANT, RN PRESENT FOR TRANSFER, STABLE.

## 2022-08-26 NOTE — TELEPHONE ENCOUNTER
Pt called spoke directly to Dr Clements,told to go to er to be admitted. Pt stated she will call [phone].

## 2022-08-26 NOTE — SUBJECTIVE & OBJECTIVE
Past Medical History:   Diagnosis Date    Abnormal Pap smear 13    LGSIL    Atrial fibrillation     COPD (chronic obstructive pulmonary disease)     Depression     GERD (gastroesophageal reflux disease)     Hyperlipidemia     Hypertension        Past Surgical History:   Procedure Laterality Date    APPENDECTOMY      BRONCHOSCOPY N/A 2019    Procedure: BRONCHOSCOPY;  Surgeon: Howard Matson MD;  Location: [facility] OR;  Service: Pulmonary;  Laterality: N/A;    CERVICAL BIOPSY  W/ LOOP ELECTRODE EXCISION      CHOLECYSTECTOMY      COLLATERAL LIGAMENT REPAIR, KNEE      left knee    COLONOSCOPY      DILATION AND CURETTAGE OF UTERUS      SINUS SURGERY         Review of patient's allergies indicates:   Allergen Reactions    Pcn [penicillins] Hives and Itching    Tetracyclines     Bactrim [sulfamethoxazole-trimethoprim] Rash    Ilosone Rash    Iodine and iodide containing products Rash    Sulfa (sulfonamide antibiotics) Hives and Rash       No current facility-administered medications on file prior to encounter.     Current Outpatient Medications on File Prior to Encounter   Medication Sig    albuterol (PROVENTIL/VENTOLIN HFA) 90 mcg/actuation inhaler Inhale 2 puffs into the lungs every 6 (six) hours as needed for Wheezing.    albuterol-ipratropium (DUO-NEB) 2.5 mg-0.5 mg/3 mL nebulizer solution SMARTSI Vial(s) Via Nebulizer Every 12 Hours    ALPRAZolam (XANAX) 0.25 MG tablet TAKE 1 TABLET DURING THE DAY FOR ANXIETY AS NEEDED AND 2 AT NIGHT FOR SLEEP    atenoloL (TENORMIN) 25 MG tablet Take 12.5 mg by mouth once daily.    carvediloL (COREG) 3.125 MG tablet Take 3.125 mg by mouth 2 (two) times daily.     clobetasoL (TEMOVATE) 0.05 % cream Apply topically 2 (two) times daily. To rash at the right lower leg    diltiaZEM (CARDIZEM) 120 MG tablet Take 120 mg by mouth 2 (two) times daily.    ELIQUIS 2.5 mg Tab Take 2.5 mg by mouth 2 (two) times daily.    ergocalciferol (ERGOCALCIFEROL) 50,000 unit Cap TAKE 1 CAPSULE BY  MOUTH ONE TIME PER WEEK    ergocalciferol (ERGOCALCIFEROL) 50,000 unit Cap TAKE 1 CAPSULE BY MOUTH ONE TIME PER WEEK    EScitalopram oxalate (LEXAPRO) 5 MG Tab TAKE 1 TABLET BY MOUTH EVERY DAY    ezetimibe (ZETIA) 10 mg tablet     flecainide (TAMBOCOR) 50 MG Tab Take 50 mg by mouth 2 (two) times daily.    furosemide (LASIX) 20 MG tablet Take 20 mg by mouth once daily.    gabapentin (NEURONTIN) 100 MG capsule Take 1 capsule (100 mg total) by mouth every evening.    levalbuterol (XOPENEX) 0.63 mg/3 mL nebulizer solution Take 3 mLs (0.63 mg total) by nebulization every 4 (four) hours as needed for Wheezing.    metoclopramide HCl (REGLAN) 10 MG tablet Take 1 tablet (10 mg total) by mouth every 6 (six) hours.    montelukast (SINGULAIR) 10 mg tablet Take 1 tablet (10 mg total) by mouth once daily.    oseltamivir (TAMIFLU) 75 MG capsule Take 1 capsule (75 mg total) by mouth 2 (two) times daily. for 5 days    predniSONE (DELTASONE) 10 MG tablet Take 10 mg by mouth once daily.    REPATHA SURECLICK 140 mg/mL PnIj Inject 140 mg into the skin every 14 (fourteen) days.    rifAXIMin (XIFAXAN) 550 mg Tab Take 1 tablet (550 mg total) by mouth 3 (three) times daily.    rosuvastatin (CRESTOR) 40 MG Tab Take 40 mg by mouth every evening.    TRELEGY ELLIPTA 100-62.5-25 mcg DsDv TAKE 1 PUFF BY MOUTH EVERY DAY    valsartan (DIOVAN) 160 MG tablet      Family History       Problem Relation (Age of Onset)    Breast cancer Mother          Tobacco Use    Smoking status: Former Smoker     Years: 30.00     Types: Cigarettes     Quit date: 10/30/2006     Years since quitting: 15.8    Smokeless tobacco: Never Used   Substance and Sexual Activity    Alcohol use: No     Comment: No    Drug use: No    Sexual activity: Not Currently     Birth control/protection: Post-menopausal     Comment:      Review of Systems   Constitutional:  Positive for activity change, appetite change, fatigue and fever.   HENT:  Positive for congestion, hearing loss  (chronic), sinus pressure and sore throat.    Eyes:  Negative for visual disturbance.   Respiratory:  Positive for cough and shortness of breath.    Cardiovascular:  Negative for chest pain, palpitations and leg swelling.   Gastrointestinal:  Positive for diarrhea and nausea. Negative for abdominal pain and blood in stool.   Genitourinary:  Negative for dysuria and hematuria.   Musculoskeletal:  Positive for gait problem. Negative for arthralgias and myalgias.   Neurological:  Positive for weakness (generalized).   Psychiatric/Behavioral:  Negative for confusion.    Objective:     Vital Signs (Most Recent):  Temp: 98.3 °F (36.8 °C) (08/26/22 1639)  Pulse: 107 (08/26/22 1639)  Resp: 20 (08/26/22 1639)  BP: 136/63 (08/26/22 1639)  SpO2: 99 % (08/26/22 1639) Vital Signs (24h Range):  Temp:  [98.2 °F (36.8 °C)-99.2 °F (37.3 °C)] 98.3 °F (36.8 °C)  Pulse:  [] 107  Resp:  [18-41] 20  SpO2:  [94 %-100 %] 99 %  BP: (136-179)/(63-84) 136/63     Weight: 50 kg (110 lb 3.7 oz)  Body mass index is 20.16 kg/m².    Physical Exam  Vitals and nursing note reviewed.   Constitutional:       General: She is not in acute distress.  HENT:      Head: Normocephalic and atraumatic.      Right Ear: External ear normal.      Left Ear: External ear normal.      Mouth/Throat:      Mouth: Mucous membranes are moist.   Eyes:      Extraocular Movements: Extraocular movements intact.      Conjunctiva/sclera: Conjunctivae normal.      Pupils: Pupils are equal, round, and reactive to light.   Cardiovascular:      Rate and Rhythm: Normal rate. Rhythm irregular.      Heart sounds: Normal heart sounds. No murmur heard.  Pulmonary:      Effort: Pulmonary effort is normal. No respiratory distress.      Breath sounds: Wheezing (scattered, mild, exp) and rhonchi present. No rales.   Abdominal:      General: Bowel sounds are normal. There is no distension.      Palpations: Abdomen is soft.      Tenderness: There is no abdominal tenderness.    Musculoskeletal:      Cervical back: Neck supple.      Right lower leg: No edema.      Left lower leg: No edema.   Neurological:      General: No focal deficit present.      Mental Status: She is alert and oriented to person, place, and time.   Psychiatric:         Mood and Affect: Mood normal.         Behavior: Behavior normal.         CRANIAL NERVES     CN III, IV, VI   Pupils are equal, round, and reactive to light.     Significant Labs: All pertinent labs within the past 24 hours have been reviewed.  Blood Culture: No results for input(s): LABBLOO in the last 48 hours.  BMP:   Recent Labs   Lab 08/26/22  1026 08/26/22  1237   GLU 70  --      --    K 3.2*  --      --    CO2 25  --    BUN 13  --    CREATININE 0.7  --    CALCIUM 8.0*  --    MG  --  1.6     CBC:   Recent Labs   Lab 08/26/22  1026   WBC 3.35*   HGB 10.7*   HCT 33.0*   PLT 76*     CMP:   Recent Labs   Lab 08/26/22  1026      K 3.2*      CO2 25   GLU 70   BUN 13   CREATININE 0.7   CALCIUM 8.0*   PROT 5.3*   ALBUMIN 3.0*   BILITOT 0.5   ALKPHOS 63   AST 69*   ALT 60*   ANIONGAP 13     Cardiac Markers:   Recent Labs   Lab 08/26/22  1026   BNP 83     Lactic Acid:   Recent Labs   Lab 08/26/22  1026   LACTATE 1.0     Magnesium:   Recent Labs   Lab 08/26/22  1237   MG 1.6     POCT Glucose: No results for input(s): POCTGLUCOSE in the last 48 hours.  Troponin:   Recent Labs   Lab 08/26/22  1026   TROPONINI 0.029*     LDH: 270  Procal: 0.08 (0.10)  ESR: 22  Ferritin: pending  BNP 83    Significant Imaging: I have reviewed all pertinent imaging results/findings within the past 24 hours.    CXR:   Chronic lung changes are seen.  Increasing hazy interstitial opacity throughout the left lung, pronounced in the left mid and lower lung zone which could reflect atelectasis, aspiration or possibly a developing pneumonia.  Probable small bilateral pleural effusions.  Calcified granulomas in the left lung.  Nodular density in the left mid lung  which appears new as compared to the previous examination none.  This could possibly represent a nipple shadow.  Heart size is normal.  Calcified atheromatous disease affects the aorta.  Fortunately age-appropriate degenerative changes affect the skeleton.

## 2022-08-26 NOTE — PLAN OF CARE
Pt alert and oriented X4. Pt on special  precautions for Flu and Covid 19 and rule out C-diff.  titrated down to 2l nasal canula while in bed (baseline) with sats 98-99%. Pt has complaints of weakness, so instructed to call for assistance when getting out of bed to insure safety. Awaiting bowel movement for Stool culture.Replaced Potassium orally (3.2) and 2g Mg (1.6) IV.

## 2022-08-26 NOTE — NURSING
Spoke with pts daughter, Lulu. Updated her on POC. Daughter states that she does not have a list of patients medications, but that all the medications on her [facility] should be up to date.     Pt with PCN allergy and Rocephin ordered by MD. Pts daughter states that she doesn't think shes ever had Rocephin specifically.

## 2022-08-26 NOTE — ASSESSMENT & PLAN NOTE
Patient is identified as High risk for severe complications of COVID 19 based on COVID risk score of 6   Initiate standard COVID protocols; COVID-19 testing ,Infection Control notification  and isolation- respiratory, contact and droplet per protocol    Diagnostics: (leukopenia, hyponatremia, hyperferritinemia, elevated troponin, elevated d-dimer, age, and comorbidities are significant predictors of poor clinical outcome)  CBC, CMP, Ferritin, CRP and Portable CXR    Management: Maintain oxygen saturations 92-96% via Nasal Cannula 3 LPM and monitor with continuous/intermittent pulse oximetry. , Inhaled bronchodilators as needed for shortness of breath. and Continuous cardiac monitoring.    Will hold off on remdesivir at this point as she seems stable from resp standpoint; currently on home dose of 2L NC

## 2022-08-26 NOTE — ASSESSMENT & PLAN NOTE
Nodular density on CXR which appears new, chect CT chest with contrast per radiology recs  Iodine allergy, will premedicate. She has had contrast studies in the past, last 2019 per records

## 2022-08-26 NOTE — ASSESSMENT & PLAN NOTE
Diagnosed 8/22/22  She was on tamiflu outpatient, but this may be the cause of her diarrhea. Will DC for now  Stable on home O2

## 2022-08-26 NOTE — H&P
[facility] ([address] [place] [address] Medicine  History & Physical    Patient Name: Marva Perez  MRN: [id number]  Patient Class: IP- Inpatient  Admission Date: 8/26/2022  Attending Physician: Kevin Victoria MD   Primary Care Provider: Kevin Clements MD         Patient information was obtained from patient and ER records.     Subjective:     Principal Problem:COVID-19    Chief Complaint:   Chief Complaint   Patient presents with    COVID-19 Concerns        HPI: 81yF with HTN, HLD, GERD, Depression, anxiety, aFib on eliquis, COPD/Emphysema, and chronic hypoxia on 2-3L home oxygen via NC presented to ER with chief complaint of diarrhea and generalized weakness. Pt reports she started with congestion, fatigue, and cough and 5-6 days ago. She was seen in ER 8/22/22 and diagnosed with COVID and flu B. She was started on tamiflu and referred for outpatient antibody infusion. Pt states she started having nausea, vomiting, and diarrhea as well as intermittent fever. She returned to ER 8/23/22 for the new onset GI symptoms. Workup was reassuring and she was discharged home with antiemetics. She received bebtelovimab infusion 8/24/22. She continued with poor appetite and diarrhea and called her PCP who recommended ER evaluation. She denies CP, abd pain, fever/chills, dysuria, hematuria, melena, BRBPR.       Past Medical History:   Diagnosis Date    Abnormal Pap smear 7/17/13    LGSIL    Atrial fibrillation     COPD (chronic obstructive pulmonary disease)     Depression     GERD (gastroesophageal reflux disease)     Hyperlipidemia     Hypertension        Past Surgical History:   Procedure Laterality Date    APPENDECTOMY      BRONCHOSCOPY N/A 8/5/2019    Procedure: BRONCHOSCOPY;  Surgeon: Howard Matson MD;  Location: [facility];  Service: Pulmonary;  Laterality: N/A;    CERVICAL BIOPSY  W/ LOOP ELECTRODE EXCISION      CHOLECYSTECTOMY      COLLATERAL LIGAMENT REPAIR, KNEE      left knee    COLONOSCOPY       DILATION AND CURETTAGE OF UTERUS      SINUS SURGERY         Review of patient's allergies indicates:   Allergen Reactions    Pcn [penicillins] Hives and Itching    Tetracyclines     Bactrim [sulfamethoxazole-trimethoprim] Rash    Ilosone Rash    Iodine and iodide containing products Rash    Sulfa (sulfonamide antibiotics) Hives and Rash       No current facility-administered medications on file prior to encounter.     Current Outpatient Medications on File Prior to Encounter   Medication Sig    albuterol (PROVENTIL/VENTOLIN HFA) 90 mcg/actuation inhaler Inhale 2 puffs into the lungs every 6 (six) hours as needed for Wheezing.    albuterol-ipratropium (DUO-NEB) 2.5 mg-0.5 mg/3 mL nebulizer solution SMARTSI Vial(s) Via Nebulizer Every 12 Hours    ALPRAZolam (XANAX) 0.25 MG tablet TAKE 1 TABLET DURING THE DAY FOR ANXIETY AS NEEDED AND 2 AT NIGHT FOR SLEEP    atenoloL (TENORMIN) 25 MG tablet Take 12.5 mg by mouth once daily.    carvediloL (COREG) 3.125 MG tablet Take 3.125 mg by mouth 2 (two) times daily.     clobetasoL (TEMOVATE) 0.05 % cream Apply topically 2 (two) times daily. To rash at the right lower leg    diltiaZEM (CARDIZEM) 120 MG tablet Take 120 mg by mouth 2 (two) times daily.    ELIQUIS 2.5 mg Tab Take 2.5 mg by mouth 2 (two) times daily.    ergocalciferol (ERGOCALCIFEROL) 50,000 unit Cap TAKE 1 CAPSULE BY MOUTH ONE TIME PER WEEK    ergocalciferol (ERGOCALCIFEROL) 50,000 unit Cap TAKE 1 CAPSULE BY MOUTH ONE TIME PER WEEK    EScitalopram oxalate (LEXAPRO) 5 MG Tab TAKE 1 TABLET BY MOUTH EVERY DAY    ezetimibe (ZETIA) 10 mg tablet     flecainide (TAMBOCOR) 50 MG Tab Take 50 mg by mouth 2 (two) times daily.    furosemide (LASIX) 20 MG tablet Take 20 mg by mouth once daily.    gabapentin (NEURONTIN) 100 MG capsule Take 1 capsule (100 mg total) by mouth every evening.    levalbuterol (XOPENEX) 0.63 mg/3 mL nebulizer solution Take 3 mLs (0.63 mg total) by nebulization every 4 (four)  hours as needed for Wheezing.    metoclopramide HCl (REGLAN) 10 MG tablet Take 1 tablet (10 mg total) by mouth every 6 (six) hours.    montelukast (SINGULAIR) 10 mg tablet Take 1 tablet (10 mg total) by mouth once daily.    oseltamivir (TAMIFLU) 75 MG capsule Take 1 capsule (75 mg total) by mouth 2 (two) times daily. for 5 days    predniSONE (DELTASONE) 10 MG tablet Take 10 mg by mouth once daily.    REPATHA SURECLICK 140 mg/mL PnIj Inject 140 mg into the skin every 14 (fourteen) days.    rifAXIMin (XIFAXAN) 550 mg Tab Take 1 tablet (550 mg total) by mouth 3 (three) times daily.    rosuvastatin (CRESTOR) 40 MG Tab Take 40 mg by mouth every evening.    TRELEGY ELLIPTA 100-62.5-25 mcg DsDv TAKE 1 PUFF BY MOUTH EVERY DAY    valsartan (DIOVAN) 160 MG tablet      Family History       Problem Relation (Age of Onset)    Breast cancer Mother          Tobacco Use    Smoking status: Former Smoker     Years: 30.00     Types: Cigarettes     Quit date: 10/30/2006     Years since quitting: 15.8    Smokeless tobacco: Never Used   Substance and Sexual Activity    Alcohol use: No     Comment: No    Drug use: No    Sexual activity: Not Currently     Birth control/protection: Post-menopausal     Comment:      Review of Systems   Constitutional:  Positive for activity change, appetite change, fatigue and fever.   HENT:  Positive for congestion, hearing loss (chronic), sinus pressure and sore throat.    Eyes:  Negative for visual disturbance.   Respiratory:  Positive for cough and shortness of breath.    Cardiovascular:  Negative for chest pain, palpitations and leg swelling.   Gastrointestinal:  Positive for diarrhea and nausea. Negative for abdominal pain and blood in stool.   Genitourinary:  Negative for dysuria and hematuria.   Musculoskeletal:  Positive for gait problem. Negative for arthralgias and myalgias.   Neurological:  Positive for weakness (generalized).   Psychiatric/Behavioral:  Negative for  confusion.    Objective:     Vital Signs (Most Recent):  Temp: 98.3 °F (36.8 °C) (08/26/22 1639)  Pulse: 107 (08/26/22 1639)  Resp: 20 (08/26/22 1639)  BP: 136/63 (08/26/22 1639)  SpO2: 99 % (08/26/22 1639) Vital Signs (24h Range):  Temp:  [98.2 °F (36.8 °C)-99.2 °F (37.3 °C)] 98.3 °F (36.8 °C)  Pulse:  [] 107  Resp:  [18-41] 20  SpO2:  [94 %-100 %] 99 %  BP: (136-179)/(63-84) 136/63     Weight: 50 kg (110 lb 3.7 oz)  Body mass index is 20.16 kg/m².    Physical Exam  Vitals and nursing note reviewed.   Constitutional:       General: She is not in acute distress.  HENT:      Head: Normocephalic and atraumatic.      Right Ear: External ear normal.      Left Ear: External ear normal.      Mouth/Throat:      Mouth: Mucous membranes are moist.   Eyes:      Extraocular Movements: Extraocular movements intact.      Conjunctiva/sclera: Conjunctivae normal.      Pupils: Pupils are equal, round, and reactive to light.   Cardiovascular:      Rate and Rhythm: Normal rate. Rhythm irregular.      Heart sounds: Normal heart sounds. No murmur heard.  Pulmonary:      Effort: Pulmonary effort is normal. No respiratory distress.      Breath sounds: Wheezing (scattered, mild, exp) and rhonchi present. No rales.   Abdominal:      General: Bowel sounds are normal. There is no distension.      Palpations: Abdomen is soft.      Tenderness: There is no abdominal tenderness.   Musculoskeletal:      Cervical back: Neck supple.      Right lower leg: No edema.      Left lower leg: No edema.   Neurological:      General: No focal deficit present.      Mental Status: She is alert and oriented to person, place, and time.   Psychiatric:         Mood and Affect: Mood normal.         Behavior: Behavior normal.         CRANIAL NERVES     CN III, IV, VI   Pupils are equal, round, and reactive to light.     Significant Labs: All pertinent labs within the past 24 hours have been reviewed.  Blood Culture: No results for input(s): LABBLOO in the last  48 hours.  BMP:   Recent Labs   Lab 08/26/22  1026 08/26/22  1237   GLU 70  --      --    K 3.2*  --      --    CO2 25  --    BUN 13  --    CREATININE 0.7  --    CALCIUM 8.0*  --    MG  --  1.6     CBC:   Recent Labs   Lab 08/26/22  1026   WBC 3.35*   HGB 10.7*   HCT 33.0*   PLT 76*     CMP:   Recent Labs   Lab 08/26/22  1026      K 3.2*      CO2 25   GLU 70   BUN 13   CREATININE 0.7   CALCIUM 8.0*   PROT 5.3*   ALBUMIN 3.0*   BILITOT 0.5   ALKPHOS 63   AST 69*   ALT 60*   ANIONGAP 13     Cardiac Markers:   Recent Labs   Lab 08/26/22  1026   BNP 83     Lactic Acid:   Recent Labs   Lab 08/26/22  1026   LACTATE 1.0     Magnesium:   Recent Labs   Lab 08/26/22  1237   MG 1.6     POCT Glucose: No results for input(s): POCTGLUCOSE in the last 48 hours.  Troponin:   Recent Labs   Lab 08/26/22  1026   TROPONINI 0.029*     LDH: 270  Procal: 0.08 (0.10)  ESR: 22  Ferritin: pending  BNP 83    Significant Imaging: I have reviewed all pertinent imaging results/findings within the past 24 hours.    CXR:   Chronic lung changes are seen.  Increasing hazy interstitial opacity throughout the left lung, pronounced in the left mid and lower lung zone which could reflect atelectasis, aspiration or possibly a developing pneumonia.  Probable small bilateral pleural effusions.  Calcified granulomas in the left lung.  Nodular density in the left mid lung which appears new as compared to the previous examination none.  This could possibly represent a nipple shadow.  Heart size is normal.  Calcified atheromatous disease affects the aorta.  Fortunately age-appropriate degenerative changes affect the skeleton.    Assessment/Plan:     * COVID-19  Patient is identified as High risk for severe complications of COVID 19 based on COVID risk score of 6   Initiate standard COVID protocols; COVID-19 testing ,Infection Control notification  and isolation- respiratory, contact and droplet per protocol    Diagnostics: (leukopenia,  hyponatremia, hyperferritinemia, elevated troponin, elevated d-dimer, age, and comorbidities are significant predictors of poor clinical outcome)  CBC, CMP, Ferritin, CRP and Portable CXR    Management: Maintain oxygen saturations 92-96% via Nasal Cannula 3 LPM and monitor with continuous/intermittent pulse oximetry. , Inhaled bronchodilators as needed for shortness of breath. and Continuous cardiac monitoring.    Will hold off on remdesivir at this point as she seems stable from resp standpoint; currently on home dose of 2L NC    Abnormal CXR  Nodular density on CXR which appears new, chect CT chest with contrast per radiology recs  Iodine allergy, will premedicate. She has had contrast studies in the past, last 2019 per records      Influenza B  Diagnosed 8/22/22  She was on tamiflu outpatient, but this may be the cause of her diarrhea. Will DC for now  Stable on home O2      Chronic atrial fibrillation  Patient with atrial fibrillation which is controlled currently with Beta Blocker, Calcium Channel Blocker and flecainide. Patient is currently in sinus rhythm.[id number] Score: 4. HASBLED Score: Unable to calculate. Anticoagulation indicated. Anticoagulation done with eliquis.        Chronic respiratory failure  On 2-3L NC at home      Diarrhea  Check c diff  Suspect this is 2/2 tamiflu; will DC as she seems stable from resp standpoint  S/p 1L NS in ER; cont 50/hr      Emphysema/COPD  On home oxygen 2-3L NC      Hyperlipidemia  Cont home crestor      HTN (hypertension)  Cont home atenolol and valsartan      KATHIE (generalized anxiety disorder)  Cont home xanax        VTE Risk Mitigation (From admission, onward)         Ordered     apixaban tablet 2.5 mg  2 times daily         08/26/22 1822                   Kevin Victoria MD  Department of Hospital Medicine   [facility] - Med Surg (3rd Fl)

## 2022-08-26 NOTE — ASSESSMENT & PLAN NOTE
Check c diff  Suspect this is 2/2 tamiflu; will DC as she seems stable from resp standpoint  S/p 1L NS in ER; cont 50/hr

## 2022-08-26 NOTE — ED TRIAGE NOTES
Patient to ED per [facility]. Diagnosed with COVID and Influenza Monday. Patient with continued weakness, diarrhea, and vomiting. Patient states was sent to ED per Dr. Clements for admission.

## 2022-08-26 NOTE — HPI
81yF with HTN, HLD, GERD, Depression, anxiety, aFib on eliquis, COPD/Emphysema, and chronic hypoxia on 2-3L home oxygen via NC presented to ER with chief complaint of diarrhea and generalized weakness. Pt reports she started with congestion, fatigue, and cough and 5-6 days ago. She was seen in ER 8/22/22 and diagnosed with COVID and flu B. She was started on tamiflu and referred for outpatient antibody infusion. Pt states she started having nausea, vomiting, and diarrhea as well as intermittent fever. She returned to ER 8/23/22 for the new onset GI symptoms. Workup was reassuring and she was discharged home with antiemetics. She received bebtelovimab infusion 8/24/22. She continued with poor appetite and diarrhea and called her PCP who recommended ER evaluation. She denies CP, abd pain, fever/chills, dysuria, hematuria, melena, BRBPR.

## 2022-08-27 PROBLEM — F41.9 ANXIETY: Status: ACTIVE | Noted: 2022-08-27

## 2022-08-27 LAB
ALBUMIN SERPL BCP-MCNC: 3 G/DL (ref 3.5–5.2)
ALP SERPL-CCNC: 59 U/L (ref 55–135)
ALT SERPL W/O P-5'-P-CCNC: 58 U/L (ref 10–44)
ANION GAP SERPL CALC-SCNC: 11 MMOL/L (ref 8–16)
AST SERPL-CCNC: 57 U/L (ref 10–40)
BASOPHILS # BLD AUTO: 0 K/UL (ref 0–0.2)
BASOPHILS NFR BLD: 0 % (ref 0–1.9)
BILIRUB SERPL-MCNC: 0.4 MG/DL (ref 0.1–1)
BUN SERPL-MCNC: 10 MG/DL (ref 8–23)
CALCIUM SERPL-MCNC: 8 MG/DL (ref 8.7–10.5)
CHLORIDE SERPL-SCNC: 105 MMOL/L (ref 95–110)
CO2 SERPL-SCNC: 27 MMOL/L (ref 23–29)
CREAT SERPL-MCNC: 0.7 MG/DL (ref 0.5–1.4)
DIFFERENTIAL METHOD: ABNORMAL
EOSINOPHIL # BLD AUTO: 0 K/UL (ref 0–0.5)
EOSINOPHIL NFR BLD: 0 % (ref 0–8)
ERYTHROCYTE [DISTWIDTH] IN BLOOD BY AUTOMATED COUNT: 12.6 % (ref 11.5–14.5)
EST. GFR  (NO RACE VARIABLE): >60 ML/MIN/1.73 M^2
GLUCOSE SERPL-MCNC: 156 MG/DL (ref 70–110)
HCT VFR BLD AUTO: 30.6 % (ref 37–48.5)
HGB BLD-MCNC: 10.1 G/DL (ref 12–16)
IMM GRANULOCYTES # BLD AUTO: 0.02 K/UL (ref 0–0.04)
IMM GRANULOCYTES NFR BLD AUTO: 0.9 % (ref 0–0.5)
LYMPHOCYTES # BLD AUTO: 0.3 K/UL (ref 1–4.8)
LYMPHOCYTES NFR BLD: 13 % (ref 18–48)
MAGNESIUM SERPL-MCNC: 2.3 MG/DL (ref 1.6–2.6)
MCH RBC QN AUTO: 30.1 PG (ref 27–31)
MCHC RBC AUTO-ENTMCNC: 33 G/DL (ref 32–36)
MCV RBC AUTO: 91 FL (ref 82–98)
MONOCYTES # BLD AUTO: 0.2 K/UL (ref 0.3–1)
MONOCYTES NFR BLD: 6.5 % (ref 4–15)
NEUTROPHILS # BLD AUTO: 1.8 K/UL (ref 1.8–7.7)
NEUTROPHILS NFR BLD: 79.6 % (ref 38–73)
NRBC BLD-RTO: 0 /100 WBC
PLATELET # BLD AUTO: 87 K/UL (ref 150–450)
PMV BLD AUTO: 10.2 FL (ref 9.2–12.9)
POTASSIUM SERPL-SCNC: 3.7 MMOL/L (ref 3.5–5.1)
PROT SERPL-MCNC: 5.5 G/DL (ref 6–8.4)
RBC # BLD AUTO: 3.35 M/UL (ref 4–5.4)
SODIUM SERPL-SCNC: 143 MMOL/L (ref 136–145)
WBC # BLD AUTO: 2.3 K/UL (ref 3.9–12.7)

## 2022-08-27 PROCEDURE — 27000221 HC OXYGEN, UP TO 24 HOURS

## 2022-08-27 PROCEDURE — 94640 AIRWAY INHALATION TREATMENT: CPT

## 2022-08-27 PROCEDURE — 94761 N-INVAS EAR/PLS OXIMETRY MLT: CPT

## 2022-08-27 PROCEDURE — 25500020 PHARM REV CODE 255: Performed by: STUDENT IN AN ORGANIZED HEALTH CARE EDUCATION/TRAINING PROGRAM

## 2022-08-27 PROCEDURE — 99233 SBSQ HOSP IP/OBS HIGH 50: CPT | Mod: ,,, | Performed by: STUDENT IN AN ORGANIZED HEALTH CARE EDUCATION/TRAINING PROGRAM

## 2022-08-27 PROCEDURE — 36415 COLL VENOUS BLD VENIPUNCTURE: CPT | Performed by: SURGERY

## 2022-08-27 PROCEDURE — 80053 COMPREHEN METABOLIC PANEL: CPT | Performed by: SURGERY

## 2022-08-27 PROCEDURE — 99900031 HC PATIENT EDUCATION (STAT)

## 2022-08-27 PROCEDURE — 97162 PT EVAL MOD COMPLEX 30 MIN: CPT

## 2022-08-27 PROCEDURE — 25000242 PHARM REV CODE 250 ALT 637 W/ HCPCS: Performed by: SURGERY

## 2022-08-27 PROCEDURE — 99233 PR SUBSEQUENT HOSPITAL CARE,LEVL III: ICD-10-PCS | Mod: ,,, | Performed by: STUDENT IN AN ORGANIZED HEALTH CARE EDUCATION/TRAINING PROGRAM

## 2022-08-27 PROCEDURE — 63600175 PHARM REV CODE 636 W HCPCS: Performed by: STUDENT IN AN ORGANIZED HEALTH CARE EDUCATION/TRAINING PROGRAM

## 2022-08-27 PROCEDURE — 83735 ASSAY OF MAGNESIUM: CPT | Performed by: SURGERY

## 2022-08-27 PROCEDURE — 25000003 PHARM REV CODE 250: Performed by: STUDENT IN AN ORGANIZED HEALTH CARE EDUCATION/TRAINING PROGRAM

## 2022-08-27 PROCEDURE — 99900035 HC TECH TIME PER 15 MIN (STAT)

## 2022-08-27 PROCEDURE — 87449 NOS EACH ORGANISM AG IA: CPT | Performed by: STUDENT IN AN ORGANIZED HEALTH CARE EDUCATION/TRAINING PROGRAM

## 2022-08-27 PROCEDURE — 11000001 HC ACUTE MED/SURG PRIVATE ROOM

## 2022-08-27 PROCEDURE — 85025 COMPLETE CBC W/AUTO DIFF WBC: CPT | Performed by: SURGERY

## 2022-08-27 PROCEDURE — 27000207 HC ISOLATION

## 2022-08-27 RX ADMIN — MONTELUKAST 10 MG: 10 TABLET, FILM COATED ORAL at 08:08

## 2022-08-27 RX ADMIN — IPRATROPIUM BROMIDE AND ALBUTEROL SULFATE 3 ML: .5; 3 SOLUTION RESPIRATORY (INHALATION) at 02:08

## 2022-08-27 RX ADMIN — DILTIAZEM HYDROCHLORIDE 60 MG: 60 TABLET, FILM COATED ORAL at 08:08

## 2022-08-27 RX ADMIN — IPRATROPIUM BROMIDE AND ALBUTEROL SULFATE 3 ML: .5; 3 SOLUTION RESPIRATORY (INHALATION) at 07:08

## 2022-08-27 RX ADMIN — ESCITALOPRAM OXALATE 5 MG: 5 TABLET, FILM COATED ORAL at 08:08

## 2022-08-27 RX ADMIN — DIPHENHYDRAMINE HYDROCHLORIDE 50 MG: 50 CAPSULE ORAL at 09:08

## 2022-08-27 RX ADMIN — APIXABAN 2.5 MG: 2.5 TABLET, FILM COATED ORAL at 08:08

## 2022-08-27 RX ADMIN — ATENOLOL 12.5 MG: 25 TABLET ORAL at 08:08

## 2022-08-27 RX ADMIN — FLECAINIDE ACETATE 50 MG: 50 TABLET ORAL at 08:08

## 2022-08-27 RX ADMIN — PREDNISONE 50 MG: 20 TABLET ORAL at 03:08

## 2022-08-27 RX ADMIN — ATORVASTATIN CALCIUM 10 MG: 10 TABLET, FILM COATED ORAL at 08:08

## 2022-08-27 RX ADMIN — VALSARTAN 160 MG: 80 TABLET, FILM COATED ORAL at 08:08

## 2022-08-27 RX ADMIN — ALPRAZOLAM 0.25 MG: 0.25 TABLET ORAL at 03:08

## 2022-08-27 RX ADMIN — PREDNISONE 50 MG: 20 TABLET ORAL at 10:08

## 2022-08-27 RX ADMIN — IOHEXOL 75 ML: 350 INJECTION, SOLUTION INTRAVENOUS at 11:08

## 2022-08-27 RX ADMIN — ALPRAZOLAM 0.25 MG: 0.25 TABLET ORAL at 08:08

## 2022-08-27 NOTE — PT/OT/SLP EVAL
"Physical Therapy Evaluation    Patient Name:  Marva Perez   MRN:  [id number]    Recommendations:     Discharge Recommendations:  home with home health   Discharge Equipment Recommendations: walker, rolling   Barriers to discharge: Decreased caregiver support    Assessment:     Marva Perez is a 81 y.o. female admitted with a medical diagnosis of COVID-19.  She presents with the following impairments/functional limitations:  weakness, impaired self care skills, impaired endurance, gait instability, impaired functional mobility, decreased lower extremity function, decreased safety awareness, impaired cardiopulmonary response to activity Pt very anxious with mobility activity with pt afraid of "getting too short of breath". Pt presented with 3 LPM of oxygen via NC with SpO2 at rest of 90% with SpO2 decreasing to 85% with mobility task with pt requesting to return to bed due to c/o "Shortness of breath" and feeling of "inability to walk by herself". Pt is able to complete bed mobility with contact guard assistance, sit to stand with stand by assistance and ambulate with hand held assistance while on supplemental oxygen.     Rehab Prognosis: Fair; patient would benefit from acute skilled PT services to address these deficits and reach maximum level of function.    Recent Surgery: * No surgery found *      Plan:     During this hospitalization, patient to be seen 5 x/week to address the identified rehab impairments via gait training, therapeutic activities, therapeutic exercises and progress toward the following goals:    Plan of Care Expires:  09/02/22    Subjective     Chief Complaint: "I don't think I can move around without help" "I'm tired"  Patient/Family Comments/goals: to get better  Pain/Comfort:  Pain Rating 1: 0/10  Pain Rating Post-Intervention 1: 0/10    Patients cultural, spiritual, Adventism conflicts given the current situation:      Living Environment:    Prior to admission, patients level of " "function was living alone and ambulates without assistive device. Pt did report on living on a home with a second floor but she mainly stays on the ground floor and has bedside commodes available in the home..  Equipment used at home: bedside commode.  DME owned (not currently used): bedside commode.  Upon discharge, patient will have assistance from family.    Objective:     Communicated with nursing and patient prior to session.  Patient found supine with peripheral IV, oxygen, telemetry  upon PT entry to room.    General Precautions: Standard, airborne, contact, special contact, fall   Orthopedic Precautions:N/A   Braces: N/A  Respiratory Status: Nasal cannula, flow 3 L/min    Exams:  Cognitive Exam:  Patient is oriented to Person, Place, Time, and Situation  Gross Motor Coordination:  WFL  RUE ROM: WFL  RUE Strength: WFL  LUE ROM: WFL  LUE Strength: WFL  RLE ROM: WFL  RLE Strength: WFL  LLE ROM: WFL  LLE Strength: WFL    Functional Mobility:  Bed Mobility:     Rolling Left:  stand by assistance  Rolling Right: stand by assistance  Supine to Sit: stand by assistance and contact guard assistance  Sit to Supine: stand by assistance and contact guard assistance  Transfers:     Sit to Stand:  stand by assistance with hand-held assist  Bed to Chair: contact guard assistance with  hand-held assist  using  Step Transfer  Gait: Patient able to ambulate with hand held assistance and O2 supplement x 10 ft with pt appearing with inc. Anxiousness and requesting to return to bed due to feeling of "shortness of breath" and "weakness"    Therapeutic Activities and Exercises:   Pt able to complete bed mobility, sitting at edge of bed, standing and short distance ambulation    AM-PAC 6 CLICK MOBILITY  Total Score:16     Patient left supine with all lines intact, call button in reach, and nurse notified.    GOALS:   Multidisciplinary Problems       Physical Therapy Goals          Problem: Physical Therapy    Goal Priority " Disciplines Outcome Goal Variances Interventions   Physical Therapy Goal     PT, PT/OT Ongoing, Progressing     Description: Patient will increase functional independence with mobility by performin. Supine to sit with Modified Independent  2. Sit to supine with Modified Independent  3. Bed to chair transfer with Supervision or Set-up Assistancewith or without rolling walker using Step Transfer TECHNIQUE  4. Gait  x 50  feet with Supervision or Set-up Assistance with or without rolling walker  5. Lower extremity exercise program x10 reps per handout, with assistance as needed                           History:     Past Medical History:   Diagnosis Date    Abnormal Pap smear 13    LGSIL    Atrial fibrillation     COPD (chronic obstructive pulmonary disease)     Depression     GERD (gastroesophageal reflux disease)     Hyperlipidemia     Hypertension        Past Surgical History:   Procedure Laterality Date    APPENDECTOMY      BRONCHOSCOPY N/A 2019    Procedure: BRONCHOSCOPY;  Surgeon: Howard Matson MD;  Location: [facility];  Service: Pulmonary;  Laterality: N/A;    CERVICAL BIOPSY  W/ LOOP ELECTRODE EXCISION      CHOLECYSTECTOMY      COLLATERAL LIGAMENT REPAIR, KNEE      left knee    COLONOSCOPY      DILATION AND CURETTAGE OF UTERUS      SINUS SURGERY         Time Tracking:     PT Received On: 22  PT Start Time: 1140     PT Stop Time: 1206  PT Total Time (min): 26 min     Billable Minutes: Evaluation Mod      2022

## 2022-08-27 NOTE — ASSESSMENT & PLAN NOTE
C diff pending  Suspect this is 2/2 tamiflu; will DC as she seems stable from resp standpoint  S/p 1L NS in ER; cont 50/hr

## 2022-08-27 NOTE — HOSPITAL COURSE
8/27 Pt remained stable on 2-3 L NC overnight which is her home dose. VSS, afebrile. She is very anxious. Diarrhea improved, she did have a loose stool this morning sent for c diff testing. CT chest planned for this morning to evaluate her new left nodular denisity on CXR.     8/28 Pt with hemoptysis overnight. Eliquis held. VSS, afebrile. She remains very anxious. C diff negative. Diarrhea improved. CT chest with COPD and severe chronic emphysematous changes, no infiltrate. There is a new soft tissue mass of the right lung base. Dr. Matson whom she follows with outpatient has been consulted. Discussed with daughter, she is concerned about patient going back home alone at discharge but she also states pt does not want NH. Advised we could order home health at [place], but that is limited visit per week. Daughter states they are looking into sitters at home.    8/29/22  Marva Perez is a 81 y.o. female  has eliquis held-still coughing up blood- dr matson and Dr Wilde following. She did test positive for both flu and covid.     8/30/22 pt is a 80yo COPD/emphysema on chronic O2 at home 2-3 liters. Dx with covid and flu B 8/22- treated outpt with tamiflu and IV covid infusion. She came to ER original c/o weakness and diarrhea. Tyamilfu d/kathryn. She was not requiring more than her routine O2. She progressed to hemoptysis on eliquis for a fib- this was held. CT chest shows lung mass. Dr Matson consulted.  Dr matson considering bronch but she would not be a good candidate- high risk for pneumo- started levaquin yesterday,. Today she is requiring more O2 now on 6L high flow. RR 26. Very anxious. Still coughing up bloody sputum.     There has been some confusion today regarding patient's POA and medical decision making capacity. Patient has no h/o dementia. Discussed with daughter who confirms this. Daughter reports she is POA and presented paperwork today but appears to be for financial and not medical decision making; looking into this  further.   On my assessment, however, she is alert and oriented and able to continue to make her own decisions at this time. However, should she continue to worsen and require intubation in the future where she could no longer communicate her wishes we want to ensure we have the proper paperwork on file.     8/31  Patient requires 4 liters of O2 this morning.  Was on BiPap.  Seems slightly improved.  No more hemoptosis.  Getting Remdesivir, Levaquin, Decadron.  Eliquis still on hold.  Dr Matson following.  She seems to be asking questions and aware of what is going on.    9/1 pt currently on 5L NC, also using BiPAP intermittently. Continues with hemoptysis, eliquis on hold although H&H improving. Remdesivir day 3. Levaquin day 4. Decadron day 7. She remains very anxious, discussed psych consult with patient and family and they agree. VSS. Afebrile.    9/2  Afternoon yesterday, patient was given depakote with improved agitation. Overnight though, she was once again agitated and was given a dose of remeron with hopes to help her appetite. She remains fatigued this morning. She has not been eating and has had increased stool output. She was given a dose of immodium yesterday without much improvement. 2 episodes of maroon sputum production but no further hemoptysis. She has remained afebrile. Sats are upper 90s on bipap and 5LNC intermittently. BP elevated.    9/3  Decreased oral intake yesterday. PPN started. Sats 94% on 5L NC. Urine output okay. Given single dose of lasix without much improvement in resp status. This morning she is more sleepy and has had improvement in her resp drive but still no appetite.    9/4  Continuing to have significant panic. She does well then seems to have a panic attack. She begins to hyperventilate and her sats drop, her blood pressure rises and she is able to be calmed after 10-15 minutes. These episodes are driving her hypertension and hypoxic spells. In between she has minimal o2  "requirements of 4L. With any movement she is desatting. She remains afebrile. Cough less productive today. Completed course of tamiflu, remdesivir, levaquin stopped as she was afebrile, without consolidation on cxr and normal WBC ct. She subsequently had a bump in her WBC ct after getting solumedrol + decadron. Hemoptysis has improved. Pulm still following. Diarrhea has improved. Urine output is good. Tolerating bipap well and resting well at night with new remeron.    9/5: remains anxious. On 4L NC this AM, sats 98%; home use is 3L. Labs stable. Completed tamiflu, remdesivir, levaquin. Hemoptysis improved. She is not eating, was started on TPN. This AM discussed why she is not eating. She does not give any reason "I'm cold. I'm cold" Noted her food choices were cold foods and asked if she would prefer warm broth and said yes. Discussed if she does not start eating we may need to consider hospice care as her lung disesase has improved to her baseline. States she will try to eat today. Labs this AM are pending at time of note.   "

## 2022-08-27 NOTE — ASSESSMENT & PLAN NOTE
Patient is identified as High risk for severe complications of COVID 19 based on COVID risk score of 6   Initiate standard COVID protocols; COVID-19 testing ,Infection Control notification  and isolation- respiratory, contact and droplet per protocol    Diagnostics: (leukopenia, hyponatremia, hyperferritinemia, elevated troponin, elevated d-dimer, age, and comorbidities are significant predictors of poor clinical outcome)  CBC, CMP, Ferritin, CRP and Portable CXR    Management: Maintain oxygen saturations 92-96% via Nasal Cannula 3 LPM and monitor with continuous/intermittent pulse oximetry. , Inhaled bronchodilators as needed for shortness of breath. and Continuous cardiac monitoring.    Will hold off on remdesivir at this point as she seems stable from resp standpoint; currently on home dose of 2L NC    8/27: pt remains stable on home Oxygen 2-3L NC. monitor

## 2022-08-27 NOTE — ASSESSMENT & PLAN NOTE
Cont home lexapro and xanax    She is very anxious. She lives alone. Discussed with family, she does not want any assisted living or NH.

## 2022-08-27 NOTE — SUBJECTIVE & OBJECTIVE
Past Medical History:   Diagnosis Date    Abnormal Pap smear 13    LGSIL    Atrial fibrillation     COPD (chronic obstructive pulmonary disease)     Depression     GERD (gastroesophageal reflux disease)     Hyperlipidemia     Hypertension        Past Surgical History:   Procedure Laterality Date    APPENDECTOMY      BRONCHOSCOPY N/A 2019    Procedure: BRONCHOSCOPY;  Surgeon: Howard Matson MD;  Location: [facility] OR;  Service: Pulmonary;  Laterality: N/A;    CERVICAL BIOPSY  W/ LOOP ELECTRODE EXCISION      CHOLECYSTECTOMY      COLLATERAL LIGAMENT REPAIR, KNEE      left knee    COLONOSCOPY      DILATION AND CURETTAGE OF UTERUS      SINUS SURGERY         Review of patient's allergies indicates:   Allergen Reactions    Pcn [penicillins] Hives and Itching    Tetracyclines     Bactrim [sulfamethoxazole-trimethoprim] Rash    Ilosone Rash    Iodine and iodide containing products Rash    Sulfa (sulfonamide antibiotics) Hives and Rash       No current facility-administered medications on file prior to encounter.     Current Outpatient Medications on File Prior to Encounter   Medication Sig    albuterol (PROVENTIL/VENTOLIN HFA) 90 mcg/actuation inhaler Inhale 2 puffs into the lungs every 6 (six) hours as needed for Wheezing.    albuterol-ipratropium (DUO-NEB) 2.5 mg-0.5 mg/3 mL nebulizer solution SMARTSI Vial(s) Via Nebulizer Every 12 Hours    ALPRAZolam (XANAX) 0.25 MG tablet TAKE 1 TABLET DURING THE DAY FOR ANXIETY AS NEEDED AND 2 AT NIGHT FOR SLEEP    atenoloL (TENORMIN) 25 MG tablet Take 12.5 mg by mouth once daily.    clobetasoL (TEMOVATE) 0.05 % cream Apply topically 2 (two) times daily. To rash at the right lower leg    diltiaZEM (CARDIZEM) 120 MG tablet Take 120 mg by mouth 2 (two) times daily.    ELIQUIS 2.5 mg Tab Take 2.5 mg by mouth 2 (two) times daily.    ergocalciferol (ERGOCALCIFEROL) 50,000 unit Cap TAKE 1 CAPSULE BY MOUTH ONE TIME PER WEEK    ergocalciferol (ERGOCALCIFEROL) 50,000 unit Cap TAKE 1  CAPSULE BY MOUTH ONE TIME PER WEEK    EScitalopram oxalate (LEXAPRO) 5 MG Tab TAKE 1 TABLET BY MOUTH EVERY DAY    ezetimibe (ZETIA) 10 mg tablet     flecainide (TAMBOCOR) 50 MG Tab Take 50 mg by mouth 2 (two) times daily.    furosemide (LASIX) 20 MG tablet Take 20 mg by mouth once daily.    gabapentin (NEURONTIN) 100 MG capsule Take 1 capsule (100 mg total) by mouth every evening.    levalbuterol (XOPENEX) 0.63 mg/3 mL nebulizer solution Take 3 mLs (0.63 mg total) by nebulization every 4 (four) hours as needed for Wheezing.    metoclopramide HCl (REGLAN) 10 MG tablet Take 1 tablet (10 mg total) by mouth every 6 (six) hours.    montelukast (SINGULAIR) 10 mg tablet Take 1 tablet (10 mg total) by mouth once daily.    oseltamivir (TAMIFLU) 75 MG capsule Take 1 capsule (75 mg total) by mouth 2 (two) times daily. for 5 days    predniSONE (DELTASONE) 10 MG tablet Take 10 mg by mouth once daily.    REPATHA SURECLICK 140 mg/mL PnIj Inject 140 mg into the skin every 14 (fourteen) days.    rifAXIMin (XIFAXAN) 550 mg Tab Take 1 tablet (550 mg total) by mouth 3 (three) times daily.    rosuvastatin (CRESTOR) 40 MG Tab Take 40 mg by mouth every evening.    TRELEGY ELLIPTA 100-62.5-25 mcg DsDv TAKE 1 PUFF BY MOUTH EVERY DAY    valsartan (DIOVAN) 160 MG tablet      Family History       Problem Relation (Age of Onset)    Breast cancer Mother          Tobacco Use    Smoking status: Former     Years: 30.00     Types: Cigarettes     Quit date: 10/30/2006     Years since quitting: 15.8    Smokeless tobacco: Never   Substance and Sexual Activity    Alcohol use: No     Comment: No    Drug use: No    Sexual activity: Not Currently     Birth control/protection: Post-menopausal     Comment:      Review of Systems   Unable to perform ROS: Age   Constitutional:  Positive for activity change, appetite change and fatigue. Negative for fever.   HENT:  Positive for congestion, hearing loss (chronic), sinus pressure and sore throat.    Eyes:   Negative for visual disturbance.   Respiratory:  Positive for cough and shortness of breath.    Cardiovascular:  Negative for chest pain, palpitations and leg swelling.   Gastrointestinal:  Positive for diarrhea and nausea. Negative for abdominal pain and blood in stool.   Genitourinary:  Negative for dysuria and hematuria.   Musculoskeletal:  Positive for gait problem. Negative for arthralgias and myalgias.   Neurological:  Positive for weakness (generalized).   Psychiatric/Behavioral:  Negative for confusion. The patient is nervous/anxious.    Objective:     Vital Signs (Most Recent):  Temp: 98.6 °F (37 °C) (08/27/22 0722)  Pulse: 98 (08/27/22 0800)  Resp: (!) 24 (08/27/22 0756)  BP: (!) 176/79 (08/27/22 0722)  SpO2: 96 % (08/27/22 0756) Vital Signs (24h Range):  Temp:  [96.9 °F (36.1 °C)-100.2 °F (37.9 °C)] 98.6 °F (37 °C)  Pulse:  [] 98  Resp:  [18-41] 24  SpO2:  [94 %-100 %] 96 %  BP: (127-179)/(60-84) 176/79     Weight: 54.8 kg (120 lb 13 oz)  Body mass index is 22.1 kg/m².    Physical Exam  Vitals and nursing note reviewed.   Constitutional:       General: She is not in acute distress.     Comments: Hard of hearing   HENT:      Head: Normocephalic and atraumatic.      Right Ear: External ear normal.      Left Ear: External ear normal.      Mouth/Throat:      Mouth: Mucous membranes are moist.   Eyes:      Extraocular Movements: Extraocular movements intact.      Conjunctiva/sclera: Conjunctivae normal.      Pupils: Pupils are equal, round, and reactive to light.   Cardiovascular:      Rate and Rhythm: Normal rate. Rhythm irregular.      Heart sounds: Normal heart sounds. No murmur heard.  Pulmonary:      Effort: Pulmonary effort is normal. No respiratory distress.      Breath sounds: Rhonchi present. No wheezing or rales.   Abdominal:      General: Bowel sounds are normal. There is no distension.      Palpations: Abdomen is soft.      Tenderness: There is no abdominal tenderness.   Musculoskeletal:       Cervical back: Neck supple.      Right lower leg: No edema.      Left lower leg: No edema.   Neurological:      General: No focal deficit present.      Mental Status: She is alert and oriented to person, place, and time.   Psychiatric:         Mood and Affect: Mood normal.         Behavior: Behavior normal.         CRANIAL NERVES     CN III, IV, VI   Pupils are equal, round, and reactive to light.     Significant Labs: All pertinent labs within the past 24 hours have been reviewed.  Blood Culture:   Recent Labs   Lab 08/26/22  1026   LABBLOO No Growth to date  No Growth to date     BMP:   Recent Labs   Lab 08/27/22  0556   *      K 3.7      CO2 27   BUN 10   CREATININE 0.7   CALCIUM 8.0*   MG 2.3       CBC:   Recent Labs   Lab 08/26/22  1026 08/27/22  0556   WBC 3.35* 2.30*   HGB 10.7* 10.1*   HCT 33.0* 30.6*   PLT 76* 87*       CMP:   Recent Labs   Lab 08/26/22  1026 08/27/22  0556    143   K 3.2* 3.7    105   CO2 25 27   GLU 70 156*   BUN 13 10   CREATININE 0.7 0.7   CALCIUM 8.0* 8.0*   PROT 5.3* 5.5*   ALBUMIN 3.0* 3.0*   BILITOT 0.5 0.4   ALKPHOS 63 59   AST 69* 57*   ALT 60* 58*   ANIONGAP 13 11       Cardiac Markers:   Recent Labs   Lab 08/26/22  1026   BNP 83       Lactic Acid:   Recent Labs   Lab 08/26/22  1026   LACTATE 1.0       Magnesium:   Recent Labs   Lab 08/26/22  1237 08/27/22  0556   MG 1.6 2.3       POCT Glucose: No results for input(s): POCTGLUCOSE in the last 48 hours.  Troponin:   Recent Labs   Lab 08/26/22  1026   TROPONINI 0.029*       LDH: 270  Procal: 0.08 (0.10)  ESR: 22  Ferritin: pending  BNP 83    Significant Imaging: I have reviewed all pertinent imaging results/findings within the past 24 hours.    CXR:   Chronic lung changes are seen.  Increasing hazy interstitial opacity throughout the left lung, pronounced in the left mid and lower lung zone which could reflect atelectasis, aspiration or possibly a developing pneumonia.  Probable small bilateral  pleural effusions.  Calcified granulomas in the left lung.  Nodular density in the left mid lung which appears new as compared to the previous examination none.  This could possibly represent a nipple shadow.  Heart size is normal.  Calcified atheromatous disease affects the aorta.  Fortunately age-appropriate degenerative changes affect the skeleton.

## 2022-08-27 NOTE — PLAN OF CARE
Problem: Adult Inpatient Plan of Care  Goal: Absence of Hospital-Acquired Illness or Injury  Outcome: Ongoing, Progressing     Problem: Adult Inpatient Plan of Care  Goal: Optimal Comfort and Wellbeing  Outcome: Ongoing, Progressing     Problem: Fall Injury Risk  Goal: Absence of Fall and Fall-Related Injury  Outcome: Ongoing, Progressing     Problem: Diarrhea  Goal: Fluid and Electrolyte Balance  Outcome: Ongoing, Progressing

## 2022-08-27 NOTE — ASSESSMENT & PLAN NOTE
Patient with atrial fibrillation which is controlled currently with Beta Blocker, Calcium Channel Blocker and flecainide. Patient is currently in sinus rhythm.[id number] Score: 4. HASBLED Score: Unable to calculate. Anticoagulation indicated. Anticoagulation done with eliquis.

## 2022-08-27 NOTE — PLAN OF CARE
Problem: Physical Therapy  Goal: Physical Therapy Goal  Description: Patient will increase functional independence with mobility by performin. Supine to sit with Modified Independent  2. Sit to supine with Modified Independent  3. Bed to chair transfer with Supervision or Set-up Assistancewith or without rolling walker using Step Transfer TECHNIQUE  4. Gait  x 50  feet with Supervision or Set-up Assistance with or without rolling walker  5. Lower extremity exercise program x10 reps per handout, with assistance as needed      Outcome: Ongoing, Progressing - POC started today

## 2022-08-27 NOTE — PROGRESS NOTES
formerly [facility] ([facility])  [facility] Hospital Medicine  Progress Note    Patient Name: Marva Perez  MRN: [id number]  Patient Class: IP- Inpatient   Admission Date: 8/26/2022  Length of Stay: 1 days  Attending Physician: Kevin Victoria MD  Primary Care Provider: Kevin Clements MD        Subjective:     Principal Problem:COVID-19        HPI:  81yF with HTN, HLD, GERD, Depression, anxiety, aFib on eliquis, COPD/Emphysema, and chronic hypoxia on 2-3L home oxygen via NC presented to ER with chief complaint of diarrhea and generalized weakness. Pt reports she started with congestion, fatigue, and cough and 5-6 days ago. She was seen in ER 8/22/22 and diagnosed with COVID and flu B. She was started on tamiflu and referred for outpatient antibody infusion. Pt states she started having nausea, vomiting, and diarrhea as well as intermittent fever. She returned to ER 8/23/22 for the new onset GI symptoms. Workup was reassuring and she was discharged home with antiemetics. She received bebtelovimab infusion 8/24/22. She continued with poor appetite and diarrhea and called her PCP who recommended ER evaluation. She denies CP, abd pain, fever/chills, dysuria, hematuria, melena, BRBPR.       Overview/Hospital Course:  Pt remained stable on 2-3 L NC overnight which is her home dose. VSS, afebrile. She is very anxious. Diarrhea improved, she did have a loose stool this morning sent for c diff testing. CT chest planned for this morning to evaluate her new left nodular denisity on CXR.       Past Medical History:   Diagnosis Date    Abnormal Pap smear 7/17/13    LGSIL    Atrial fibrillation     COPD (chronic obstructive pulmonary disease)     Depression     GERD (gastroesophageal reflux disease)     Hyperlipidemia     Hypertension        Past Surgical History:   Procedure Laterality Date    APPENDECTOMY      BRONCHOSCOPY N/A 8/5/2019    Procedure: BRONCHOSCOPY;  Surgeon: Howard Matson MD;  Location: [facility] OR;  Service:  Pulmonary;  Laterality: N/A;    CERVICAL BIOPSY  W/ LOOP ELECTRODE EXCISION      CHOLECYSTECTOMY      COLLATERAL LIGAMENT REPAIR, KNEE      left knee    COLONOSCOPY      DILATION AND CURETTAGE OF UTERUS      SINUS SURGERY         Review of patient's allergies indicates:   Allergen Reactions    Pcn [penicillins] Hives and Itching    Tetracyclines     Bactrim [sulfamethoxazole-trimethoprim] Rash    Ilosone Rash    Iodine and iodide containing products Rash    Sulfa (sulfonamide antibiotics) Hives and Rash       No current facility-administered medications on file prior to encounter.     Current Outpatient Medications on File Prior to Encounter   Medication Sig    albuterol (PROVENTIL/VENTOLIN HFA) 90 mcg/actuation inhaler Inhale 2 puffs into the lungs every 6 (six) hours as needed for Wheezing.    albuterol-ipratropium (DUO-NEB) 2.5 mg-0.5 mg/3 mL nebulizer solution SMARTSI Vial(s) Via Nebulizer Every 12 Hours    ALPRAZolam (XANAX) 0.25 MG tablet TAKE 1 TABLET DURING THE DAY FOR ANXIETY AS NEEDED AND 2 AT NIGHT FOR SLEEP    atenoloL (TENORMIN) 25 MG tablet Take 12.5 mg by mouth once daily.    clobetasoL (TEMOVATE) 0.05 % cream Apply topically 2 (two) times daily. To rash at the right lower leg    diltiaZEM (CARDIZEM) 120 MG tablet Take 120 mg by mouth 2 (two) times daily.    ELIQUIS 2.5 mg Tab Take 2.5 mg by mouth 2 (two) times daily.    ergocalciferol (ERGOCALCIFEROL) 50,000 unit Cap TAKE 1 CAPSULE BY MOUTH ONE TIME PER WEEK    ergocalciferol (ERGOCALCIFEROL) 50,000 unit Cap TAKE 1 CAPSULE BY MOUTH ONE TIME PER WEEK    EScitalopram oxalate (LEXAPRO) 5 MG Tab TAKE 1 TABLET BY MOUTH EVERY DAY    ezetimibe (ZETIA) 10 mg tablet     flecainide (TAMBOCOR) 50 MG Tab Take 50 mg by mouth 2 (two) times daily.    furosemide (LASIX) 20 MG tablet Take 20 mg by mouth once daily.    gabapentin (NEURONTIN) 100 MG capsule Take 1 capsule (100 mg total) by mouth every evening.    levalbuterol (XOPENEX)  0.63 mg/3 mL nebulizer solution Take 3 mLs (0.63 mg total) by nebulization every 4 (four) hours as needed for Wheezing.    metoclopramide HCl (REGLAN) 10 MG tablet Take 1 tablet (10 mg total) by mouth every 6 (six) hours.    montelukast (SINGULAIR) 10 mg tablet Take 1 tablet (10 mg total) by mouth once daily.    oseltamivir (TAMIFLU) 75 MG capsule Take 1 capsule (75 mg total) by mouth 2 (two) times daily. for 5 days    predniSONE (DELTASONE) 10 MG tablet Take 10 mg by mouth once daily.    REPATHA SURECLICK 140 mg/mL PnIj Inject 140 mg into the skin every 14 (fourteen) days.    rifAXIMin (XIFAXAN) 550 mg Tab Take 1 tablet (550 mg total) by mouth 3 (three) times daily.    rosuvastatin (CRESTOR) 40 MG Tab Take 40 mg by mouth every evening.    TRELEGY ELLIPTA 100-62.5-25 mcg DsDv TAKE 1 PUFF BY MOUTH EVERY DAY    valsartan (DIOVAN) 160 MG tablet      Family History       Problem Relation (Age of Onset)    Breast cancer Mother          Tobacco Use    Smoking status: Former     Years: 30.00     Types: Cigarettes     Quit date: 10/30/2006     Years since quitting: 15.8    Smokeless tobacco: Never   Substance and Sexual Activity    Alcohol use: No     Comment: No    Drug use: No    Sexual activity: Not Currently     Birth control/protection: Post-menopausal     Comment:      Review of Systems   Unable to perform ROS: Age   Constitutional:  Positive for activity change, appetite change and fatigue. Negative for fever.   HENT:  Positive for congestion, hearing loss (chronic), sinus pressure and sore throat.    Eyes:  Negative for visual disturbance.   Respiratory:  Positive for cough and shortness of breath.    Cardiovascular:  Negative for chest pain, palpitations and leg swelling.   Gastrointestinal:  Positive for diarrhea and nausea. Negative for abdominal pain and blood in stool.   Genitourinary:  Negative for dysuria and hematuria.   Musculoskeletal:  Positive for gait problem. Negative for  arthralgias and myalgias.   Neurological:  Positive for weakness (generalized).   Psychiatric/Behavioral:  Negative for confusion. The patient is nervous/anxious.    Objective:     Vital Signs (Most Recent):  Temp: 98.6 °F (37 °C) (08/27/22 0722)  Pulse: 98 (08/27/22 0800)  Resp: (!) 24 (08/27/22 0756)  BP: (!) 176/79 (08/27/22 0722)  SpO2: 96 % (08/27/22 0756) Vital Signs (24h Range):  Temp:  [96.9 °F (36.1 °C)-100.2 °F (37.9 °C)] 98.6 °F (37 °C)  Pulse:  [] 98  Resp:  [18-41] 24  SpO2:  [94 %-100 %] 96 %  BP: (127-179)/(60-84) 176/79     Weight: 54.8 kg (120 lb 13 oz)  Body mass index is 22.1 kg/m².    Physical Exam  Vitals and nursing note reviewed.   Constitutional:       General: She is not in acute distress.     Comments: Hard of hearing   HENT:      Head: Normocephalic and atraumatic.      Right Ear: External ear normal.      Left Ear: External ear normal.      Mouth/Throat:      Mouth: Mucous membranes are moist.   Eyes:      Extraocular Movements: Extraocular movements intact.      Conjunctiva/sclera: Conjunctivae normal.      Pupils: Pupils are equal, round, and reactive to light.   Cardiovascular:      Rate and Rhythm: Normal rate. Rhythm irregular.      Heart sounds: Normal heart sounds. No murmur heard.  Pulmonary:      Effort: Pulmonary effort is normal. No respiratory distress.      Breath sounds: Rhonchi present. No wheezing or rales.   Abdominal:      General: Bowel sounds are normal. There is no distension.      Palpations: Abdomen is soft.      Tenderness: There is no abdominal tenderness.   Musculoskeletal:      Cervical back: Neck supple.      Right lower leg: No edema.      Left lower leg: No edema.   Neurological:      General: No focal deficit present.      Mental Status: She is alert and oriented to person, place, and time.   Psychiatric:         Mood and Affect: Mood normal.         Behavior: Behavior normal.         CRANIAL NERVES     CN III, IV, VI   Pupils are equal, round, and  reactive to light.     Significant Labs: All pertinent labs within the past 24 hours have been reviewed.  Blood Culture:   Recent Labs   Lab 08/26/22  1026   LABBLOO No Growth to date  No Growth to date     BMP:   Recent Labs   Lab 08/27/22  0556   *      K 3.7      CO2 27   BUN 10   CREATININE 0.7   CALCIUM 8.0*   MG 2.3       CBC:   Recent Labs   Lab 08/26/22  1026 08/27/22  0556   WBC 3.35* 2.30*   HGB 10.7* 10.1*   HCT 33.0* 30.6*   PLT 76* 87*       CMP:   Recent Labs   Lab 08/26/22  1026 08/27/22  0556    143   K 3.2* 3.7    105   CO2 25 27   GLU 70 156*   BUN 13 10   CREATININE 0.7 0.7   CALCIUM 8.0* 8.0*   PROT 5.3* 5.5*   ALBUMIN 3.0* 3.0*   BILITOT 0.5 0.4   ALKPHOS 63 59   AST 69* 57*   ALT 60* 58*   ANIONGAP 13 11       Cardiac Markers:   Recent Labs   Lab 08/26/22  1026   BNP 83       Lactic Acid:   Recent Labs   Lab 08/26/22  1026   LACTATE 1.0       Magnesium:   Recent Labs   Lab 08/26/22  1237 08/27/22  0556   MG 1.6 2.3       POCT Glucose: No results for input(s): POCTGLUCOSE in the last 48 hours.  Troponin:   Recent Labs   Lab 08/26/22  1026   TROPONINI 0.029*       LDH: 270  Procal: 0.08 (0.10)  ESR: 22  Ferritin: pending  BNP 83    Significant Imaging: I have reviewed all pertinent imaging results/findings within the past 24 hours.    CXR:   Chronic lung changes are seen.  Increasing hazy interstitial opacity throughout the left lung, pronounced in the left mid and lower lung zone which could reflect atelectasis, aspiration or possibly a developing pneumonia.  Probable small bilateral pleural effusions.  Calcified granulomas in the left lung.  Nodular density in the left mid lung which appears new as compared to the previous examination none.  This could possibly represent a nipple shadow.  Heart size is normal.  Calcified atheromatous disease affects the aorta.  Fortunately age-appropriate degenerative changes affect the skeleton.      Assessment/Plan:      *  COVID-19  Patient is identified as High risk for severe complications of COVID 19 based on COVID risk score of 6   Initiate standard COVID protocols; COVID-19 testing ,Infection Control notification  and isolation- respiratory, contact and droplet per protocol    Diagnostics: (leukopenia, hyponatremia, hyperferritinemia, elevated troponin, elevated d-dimer, age, and comorbidities are significant predictors of poor clinical outcome)  CBC, CMP, Ferritin, CRP and Portable CXR    Management: Maintain oxygen saturations 92-96% via Nasal Cannula 3 LPM and monitor with continuous/intermittent pulse oximetry. , Inhaled bronchodilators as needed for shortness of breath. and Continuous cardiac monitoring.    Will hold off on remdesivir at this point as she seems stable from resp standpoint; currently on home dose of 2L NC    8/27: pt remains stable on home Oxygen 2-3L NC. monitor    Anxiety  Cont home lexapro and xanax    She is very anxious. She lives alone. Discussed with family, she does not want any assisted living or NH.       Abnormal CXR  Nodular density on CXR which appears new, chect CT chest with contrast per radiology recs  Iodine allergy, will premedicate. She has had contrast studies in the past, last 2019 per records      Influenza B  Diagnosed 8/22/22  She was on tamiflu outpatient, but this may be the cause of her diarrhea. Will DC for now  Stable on home O2      Chronic atrial fibrillation  Patient with atrial fibrillation which is controlled currently with Beta Blocker, Calcium Channel Blocker and flecainide. Patient is currently in sinus rhythm.[id number] Score: 4. HASBLED Score: Unable to calculate. Anticoagulation indicated. Anticoagulation done with eliquis.        Chronic respiratory failure  On 2-3L NC at home      Diarrhea  C diff pending  Suspect this is 2/2 tamiflu; will DC as she seems stable from resp standpoint  S/p 1L NS in ER; cont 50/hr      Emphysema/COPD  On home oxygen 2-3L  NC      Hyperlipidemia  Cont home crestor      HTN (hypertension)  Cont home atenolol and valsartan      KATHIE (generalized anxiety disorder)  Cont home xanax        VTE Risk Mitigation (From admission, onward)         Ordered     apixaban tablet 2.5 mg  2 times daily         08/26/22 1822                Discharge Planning   ROSAURA:      Code Status: Full Code   Is the patient medically ready for discharge?:     Reason for patient still in hospital (select all that apply): Patient trending condition, Treatment and Imaging                     Kevin Victoria MD  Department of Hospital Medicine   [place] [facility] (3rd Fl)

## 2022-08-27 NOTE — PLAN OF CARE
Pt alert and oriented X4. Pt currently on special precautions due to  +Covid-19, +Flu type B, and C-diff rule out. Awaiting C-Diff results. Pt reports weakness but improving. Pt remains of 2-3L nasal canula-(3L with exertion). PT, OT, and SW on case. Pt with frequent but small incontinent bouts of loose/mucous consistencies stools. Pt receiving continuous fluids -normal saline at 50ml/hr.         Problem: Adult Inpatient Plan of Care  Goal: Plan of Care Review  Outcome: Ongoing, Progressing  Goal: Patient-Specific Goal (Individualized)  Outcome: Ongoing, Progressing  Goal: Absence of Hospital-Acquired Illness or Injury  Outcome: Ongoing, Progressing  Goal: Optimal Comfort and Wellbeing  Outcome: Ongoing, Progressing  Goal: Readiness for Transition of Care  Outcome: Ongoing, Progressing  Problem: Fluid Imbalance (Pneumonia)  Goal: Fluid Balance  Outcome: Ongoing, Progressing  Problem: Infection (Pneumonia)  Goal: Resolution of Infection Signs and Symptoms  Outcome: Ongoing, Progressing  Problem: Respiratory Compromise (Pneumonia)  Goal: Effective Oxygenation and Ventilation  Outcome: Ongoing, Progressing  Problem: Infection  Goal: Absence of Infection Signs and Symptoms  Outcome: Ongoing, Progressing  Problem: Skin Injury Risk Increased  Goal: Skin Health and Integrity  Outcome: Ongoing, Progressing  Problem: Fall Injury Risk  Goal: Absence of Fall and Fall-Related Injury  Outcome: Ongoing, Progressing  Problem: Electrolyte Imbalance  Goal: Electrolyte Balance  Outcome: Ongoing, Progressing  Problem: Diarrhea  Goal: Fluid and Electrolyte Balance  Outcome: Ongoing, Progressing

## 2022-08-28 PROBLEM — E87.6 HYPOKALEMIA: Status: ACTIVE | Noted: 2022-08-28

## 2022-08-28 PROBLEM — R91.8 RIGHT LOWER LOBE LUNG MASS: Status: ACTIVE | Noted: 2022-08-28

## 2022-08-28 PROBLEM — R04.2 COUGH WITH HEMOPTYSIS: Status: ACTIVE | Noted: 2022-08-28

## 2022-08-28 LAB
ALBUMIN SERPL BCP-MCNC: 2.7 G/DL (ref 3.5–5.2)
ALP SERPL-CCNC: 51 U/L (ref 55–135)
ALT SERPL W/O P-5'-P-CCNC: 53 U/L (ref 10–44)
ANION GAP SERPL CALC-SCNC: 9 MMOL/L (ref 8–16)
AST SERPL-CCNC: 49 U/L (ref 10–40)
BASOPHILS # BLD AUTO: 0 K/UL (ref 0–0.2)
BASOPHILS NFR BLD: 0 % (ref 0–1.9)
BILIRUB SERPL-MCNC: 0.4 MG/DL (ref 0.1–1)
BUN SERPL-MCNC: 12 MG/DL (ref 8–23)
C DIFF GDH STL QL: NEGATIVE
C DIFF TOX A+B STL QL IA: NEGATIVE
CALCIUM SERPL-MCNC: 7.9 MG/DL (ref 8.7–10.5)
CHLORIDE SERPL-SCNC: 109 MMOL/L (ref 95–110)
CO2 SERPL-SCNC: 29 MMOL/L (ref 23–29)
CREAT SERPL-MCNC: 0.7 MG/DL (ref 0.5–1.4)
DIFFERENTIAL METHOD: ABNORMAL
EOSINOPHIL # BLD AUTO: 0 K/UL (ref 0–0.5)
EOSINOPHIL NFR BLD: 0 % (ref 0–8)
ERYTHROCYTE [DISTWIDTH] IN BLOOD BY AUTOMATED COUNT: 12.9 % (ref 11.5–14.5)
EST. GFR  (NO RACE VARIABLE): >60 ML/MIN/1.73 M^2
GLUCOSE SERPL-MCNC: 126 MG/DL (ref 70–110)
HCT VFR BLD AUTO: 26 % (ref 37–48.5)
HGB BLD-MCNC: 8.6 G/DL (ref 12–16)
IMM GRANULOCYTES # BLD AUTO: 0.06 K/UL (ref 0–0.04)
IMM GRANULOCYTES NFR BLD AUTO: 0.9 % (ref 0–0.5)
LYMPHOCYTES # BLD AUTO: 0.4 K/UL (ref 1–4.8)
LYMPHOCYTES NFR BLD: 6.7 % (ref 18–48)
MCH RBC QN AUTO: 30.2 PG (ref 27–31)
MCHC RBC AUTO-ENTMCNC: 33.1 G/DL (ref 32–36)
MCV RBC AUTO: 91 FL (ref 82–98)
MONOCYTES # BLD AUTO: 0.6 K/UL (ref 0.3–1)
MONOCYTES NFR BLD: 9.4 % (ref 4–15)
NEUTROPHILS # BLD AUTO: 5.5 K/UL (ref 1.8–7.7)
NEUTROPHILS NFR BLD: 83 % (ref 38–73)
NRBC BLD-RTO: 0 /100 WBC
PLATELET # BLD AUTO: 101 K/UL (ref 150–450)
PMV BLD AUTO: 11 FL (ref 9.2–12.9)
POTASSIUM SERPL-SCNC: 3.1 MMOL/L (ref 3.5–5.1)
PROT SERPL-MCNC: 4.7 G/DL (ref 6–8.4)
RBC # BLD AUTO: 2.85 M/UL (ref 4–5.4)
SODIUM SERPL-SCNC: 147 MMOL/L (ref 136–145)
WBC # BLD AUTO: 6.58 K/UL (ref 3.9–12.7)

## 2022-08-28 PROCEDURE — 94664 DEMO&/EVAL PT USE INHALER: CPT

## 2022-08-28 PROCEDURE — 99233 SBSQ HOSP IP/OBS HIGH 50: CPT | Mod: ,,, | Performed by: STUDENT IN AN ORGANIZED HEALTH CARE EDUCATION/TRAINING PROGRAM

## 2022-08-28 PROCEDURE — 99233 PR SUBSEQUENT HOSPITAL CARE,LEVL III: ICD-10-PCS | Mod: ,,, | Performed by: STUDENT IN AN ORGANIZED HEALTH CARE EDUCATION/TRAINING PROGRAM

## 2022-08-28 PROCEDURE — 25000003 PHARM REV CODE 250: Performed by: INTERNAL MEDICINE

## 2022-08-28 PROCEDURE — 63600175 PHARM REV CODE 636 W HCPCS: Performed by: STUDENT IN AN ORGANIZED HEALTH CARE EDUCATION/TRAINING PROGRAM

## 2022-08-28 PROCEDURE — 85025 COMPLETE CBC W/AUTO DIFF WBC: CPT | Performed by: STUDENT IN AN ORGANIZED HEALTH CARE EDUCATION/TRAINING PROGRAM

## 2022-08-28 PROCEDURE — 25000242 PHARM REV CODE 250 ALT 637 W/ HCPCS: Performed by: SURGERY

## 2022-08-28 PROCEDURE — 88112 CYTOPATH CELL ENHANCE TECH: CPT | Mod: 59 | Performed by: PATHOLOGY

## 2022-08-28 PROCEDURE — 11000001 HC ACUTE MED/SURG PRIVATE ROOM

## 2022-08-28 PROCEDURE — 25000003 PHARM REV CODE 250: Performed by: SURGERY

## 2022-08-28 PROCEDURE — 94640 AIRWAY INHALATION TREATMENT: CPT

## 2022-08-28 PROCEDURE — 88112 PR  CYTOPATH, CELL ENHANCE TECH: ICD-10-PCS | Mod: 26,,, | Performed by: PATHOLOGY

## 2022-08-28 PROCEDURE — 25000003 PHARM REV CODE 250: Performed by: STUDENT IN AN ORGANIZED HEALTH CARE EDUCATION/TRAINING PROGRAM

## 2022-08-28 PROCEDURE — 36415 COLL VENOUS BLD VENIPUNCTURE: CPT | Performed by: STUDENT IN AN ORGANIZED HEALTH CARE EDUCATION/TRAINING PROGRAM

## 2022-08-28 PROCEDURE — 25000242 PHARM REV CODE 250 ALT 637 W/ HCPCS: Performed by: INTERNAL MEDICINE

## 2022-08-28 PROCEDURE — 94761 N-INVAS EAR/PLS OXIMETRY MLT: CPT

## 2022-08-28 PROCEDURE — 27000207 HC ISOLATION

## 2022-08-28 PROCEDURE — 99900035 HC TECH TIME PER 15 MIN (STAT)

## 2022-08-28 PROCEDURE — 88112 CYTOPATH CELL ENHANCE TECH: CPT | Mod: 26,,, | Performed by: PATHOLOGY

## 2022-08-28 PROCEDURE — 27000221 HC OXYGEN, UP TO 24 HOURS

## 2022-08-28 PROCEDURE — 80053 COMPREHEN METABOLIC PANEL: CPT | Performed by: STUDENT IN AN ORGANIZED HEALTH CARE EDUCATION/TRAINING PROGRAM

## 2022-08-28 PROCEDURE — 27000646 HC AEROBIKA DEVICE

## 2022-08-28 RX ORDER — ASCORBIC ACID 500 MG
500 TABLET ORAL DAILY
Status: DISCONTINUED | OUTPATIENT
Start: 2022-08-28 | End: 2022-09-09 | Stop reason: HOSPADM

## 2022-08-28 RX ORDER — IPRATROPIUM BROMIDE AND ALBUTEROL SULFATE 2.5; .5 MG/3ML; MG/3ML
3 SOLUTION RESPIRATORY (INHALATION) EVERY 6 HOURS
Status: DISCONTINUED | OUTPATIENT
Start: 2022-08-28 | End: 2022-09-09 | Stop reason: HOSPADM

## 2022-08-28 RX ORDER — LOPERAMIDE HYDROCHLORIDE 2 MG/1
2 CAPSULE ORAL 4 TIMES DAILY PRN
Status: DISCONTINUED | OUTPATIENT
Start: 2022-08-28 | End: 2022-09-09 | Stop reason: HOSPADM

## 2022-08-28 RX ORDER — THIAMINE HCL 100 MG
100 TABLET ORAL DAILY
Status: DISCONTINUED | OUTPATIENT
Start: 2022-08-28 | End: 2022-09-04

## 2022-08-28 RX ORDER — MONTELUKAST SODIUM 10 MG/1
10 TABLET ORAL DAILY
Status: DISCONTINUED | OUTPATIENT
Start: 2022-08-28 | End: 2022-09-04

## 2022-08-28 RX ADMIN — FLECAINIDE ACETATE 50 MG: 50 TABLET ORAL at 10:08

## 2022-08-28 RX ADMIN — POTASSIUM BICARBONATE 40 MEQ: 391 TABLET, EFFERVESCENT ORAL at 06:08

## 2022-08-28 RX ADMIN — IPRATROPIUM BROMIDE AND ALBUTEROL SULFATE 3 ML: .5; 3 SOLUTION RESPIRATORY (INHALATION) at 08:08

## 2022-08-28 RX ADMIN — Medication 6 MG: at 08:08

## 2022-08-28 RX ADMIN — ATENOLOL 12.5 MG: 25 TABLET ORAL at 10:08

## 2022-08-28 RX ADMIN — VALSARTAN 160 MG: 80 TABLET, FILM COATED ORAL at 10:08

## 2022-08-28 RX ADMIN — ATORVASTATIN CALCIUM 10 MG: 10 TABLET, FILM COATED ORAL at 08:08

## 2022-08-28 RX ADMIN — ESCITALOPRAM OXALATE 5 MG: 5 TABLET, FILM COATED ORAL at 10:08

## 2022-08-28 RX ADMIN — FLECAINIDE ACETATE 50 MG: 50 TABLET ORAL at 08:08

## 2022-08-28 RX ADMIN — OXYCODONE HYDROCHLORIDE AND ACETAMINOPHEN 500 MG: 500 TABLET ORAL at 12:08

## 2022-08-28 RX ADMIN — IPRATROPIUM BROMIDE AND ALBUTEROL SULFATE 3 ML: .5; 3 SOLUTION RESPIRATORY (INHALATION) at 07:08

## 2022-08-28 RX ADMIN — ALPRAZOLAM 0.25 MG: 0.25 TABLET ORAL at 06:08

## 2022-08-28 RX ADMIN — ALPRAZOLAM 0.25 MG: 0.25 TABLET ORAL at 04:08

## 2022-08-28 RX ADMIN — DILTIAZEM HYDROCHLORIDE 60 MG: 60 TABLET, FILM COATED ORAL at 08:08

## 2022-08-28 RX ADMIN — MONTELUKAST 10 MG: 10 TABLET, FILM COATED ORAL at 10:08

## 2022-08-28 RX ADMIN — IPRATROPIUM BROMIDE AND ALBUTEROL SULFATE 3 ML: .5; 3 SOLUTION RESPIRATORY (INHALATION) at 02:08

## 2022-08-28 RX ADMIN — THIAMINE HCL TAB 100 MG 100 MG: 100 TAB at 12:08

## 2022-08-28 RX ADMIN — LOPERAMIDE HYDROCHLORIDE 2 MG: 2 CAPSULE ORAL at 04:08

## 2022-08-28 RX ADMIN — DEXAMETHASONE SODIUM PHOSPHATE 6 MG: 4 INJECTION, SOLUTION INTRA-ARTICULAR; INTRALESIONAL; INTRAMUSCULAR; INTRAVENOUS; SOFT TISSUE at 10:08

## 2022-08-28 RX ADMIN — DILTIAZEM HYDROCHLORIDE 60 MG: 60 TABLET, FILM COATED ORAL at 10:08

## 2022-08-28 RX ADMIN — POTASSIUM BICARBONATE 40 MEQ: 391 TABLET, EFFERVESCENT ORAL at 01:08

## 2022-08-28 RX ADMIN — LOPERAMIDE HYDROCHLORIDE 2 MG: 2 CAPSULE ORAL at 06:08

## 2022-08-28 NOTE — SUBJECTIVE & OBJECTIVE
Past Medical History:   Diagnosis Date    Abnormal Pap smear 13    LGSIL    Atrial fibrillation     COPD (chronic obstructive pulmonary disease)     Depression     GERD (gastroesophageal reflux disease)     Hyperlipidemia     Hypertension        Past Surgical History:   Procedure Laterality Date    APPENDECTOMY      BRONCHOSCOPY N/A 2019    Procedure: BRONCHOSCOPY;  Surgeon: Howard Matson MD;  Location: [facility] OR;  Service: Pulmonary;  Laterality: N/A;    CERVICAL BIOPSY  W/ LOOP ELECTRODE EXCISION      CHOLECYSTECTOMY      COLLATERAL LIGAMENT REPAIR, KNEE      left knee    COLONOSCOPY      DILATION AND CURETTAGE OF UTERUS      SINUS SURGERY         Review of patient's allergies indicates:   Allergen Reactions    Pcn [penicillins] Hives and Itching    Tetracyclines     Bactrim [sulfamethoxazole-trimethoprim] Rash    Ilosone Rash    Iodine and iodide containing products Rash    Sulfa (sulfonamide antibiotics) Hives and Rash       No current facility-administered medications on file prior to encounter.     Current Outpatient Medications on File Prior to Encounter   Medication Sig    albuterol (PROVENTIL/VENTOLIN HFA) 90 mcg/actuation inhaler Inhale 2 puffs into the lungs every 6 (six) hours as needed for Wheezing.    albuterol-ipratropium (DUO-NEB) 2.5 mg-0.5 mg/3 mL nebulizer solution SMARTSI Vial(s) Via Nebulizer Every 12 Hours    ALPRAZolam (XANAX) 0.25 MG tablet TAKE 1 TABLET DURING THE DAY FOR ANXIETY AS NEEDED AND 2 AT NIGHT FOR SLEEP    atenoloL (TENORMIN) 25 MG tablet Take 12.5 mg by mouth once daily.    clobetasoL (TEMOVATE) 0.05 % cream Apply topically 2 (two) times daily. To rash at the right lower leg    diltiaZEM (CARDIZEM) 120 MG tablet Take 120 mg by mouth 2 (two) times daily.    ELIQUIS 2.5 mg Tab Take 2.5 mg by mouth 2 (two) times daily.    ergocalciferol (ERGOCALCIFEROL) 50,000 unit Cap TAKE 1 CAPSULE BY MOUTH ONE TIME PER WEEK    ergocalciferol (ERGOCALCIFEROL) 50,000 unit Cap TAKE 1  CAPSULE BY MOUTH ONE TIME PER WEEK    EScitalopram oxalate (LEXAPRO) 5 MG Tab TAKE 1 TABLET BY MOUTH EVERY DAY    ezetimibe (ZETIA) 10 mg tablet     flecainide (TAMBOCOR) 50 MG Tab Take 50 mg by mouth 2 (two) times daily.    furosemide (LASIX) 20 MG tablet Take 20 mg by mouth once daily.    gabapentin (NEURONTIN) 100 MG capsule Take 1 capsule (100 mg total) by mouth every evening.    levalbuterol (XOPENEX) 0.63 mg/3 mL nebulizer solution Take 3 mLs (0.63 mg total) by nebulization every 4 (four) hours as needed for Wheezing.    metoclopramide HCl (REGLAN) 10 MG tablet Take 1 tablet (10 mg total) by mouth every 6 (six) hours.    montelukast (SINGULAIR) 10 mg tablet Take 1 tablet (10 mg total) by mouth once daily.    [] oseltamivir (TAMIFLU) 75 MG capsule Take 1 capsule (75 mg total) by mouth 2 (two) times daily. for 5 days    predniSONE (DELTASONE) 10 MG tablet Take 10 mg by mouth once daily.    REPATHA SURECLICK 140 mg/mL PnIj Inject 140 mg into the skin every 14 (fourteen) days.    rifAXIMin (XIFAXAN) 550 mg Tab Take 1 tablet (550 mg total) by mouth 3 (three) times daily.    rosuvastatin (CRESTOR) 40 MG Tab Take 40 mg by mouth every evening.    TRELEGY ELLIPTA 100-62.5-25 mcg DsDv TAKE 1 PUFF BY MOUTH EVERY DAY    valsartan (DIOVAN) 160 MG tablet      Family History       Problem Relation (Age of Onset)    Breast cancer Mother          Tobacco Use    Smoking status: Former     Years: 30.00     Types: Cigarettes     Quit date: 10/30/2006     Years since quitting: 15.8    Smokeless tobacco: Never   Substance and Sexual Activity    Alcohol use: No     Comment: No    Drug use: No    Sexual activity: Not Currently     Birth control/protection: Post-menopausal     Comment:      Review of Systems   Unable to perform ROS: Age   Constitutional:  Positive for activity change, appetite change and fatigue. Negative for fever.   HENT:  Positive for congestion and hearing loss (chronic). Negative for sinus pressure  and sore throat.    Eyes:  Negative for visual disturbance.   Respiratory:  Positive for cough and shortness of breath.    Cardiovascular:  Negative for chest pain, palpitations and leg swelling.   Gastrointestinal:  Positive for diarrhea (improving). Negative for abdominal pain, blood in stool and nausea.   Genitourinary:  Negative for dysuria and hematuria.   Musculoskeletal:  Negative for arthralgias and myalgias.   Neurological:  Positive for weakness (generalized).   Psychiatric/Behavioral:  Negative for confusion. The patient is nervous/anxious.    Objective:     Vital Signs (Most Recent):  Temp: 98.4 °F (36.9 °C) (08/28/22 1139)  Pulse: 78 (08/28/22 1200)  Resp: (!) 22 (08/28/22 1139)  BP: (!) 143/60 (08/28/22 1139)  SpO2: (!) 90 % (08/28/22 1139) Vital Signs (24h Range):  Temp:  [98 °F (36.7 °C)-99.3 °F (37.4 °C)] 98.4 °F (36.9 °C)  Pulse:  [73-98] 78  Resp:  [18-28] 22  SpO2:  [90 %-99 %] 90 %  BP: (123-169)/(59-72) 143/60     Weight: 54.8 kg (120 lb 13 oz)  Body mass index is 22.1 kg/m².    Physical Exam  Vitals and nursing note reviewed.   Constitutional:       General: She is not in acute distress.     Comments: Hard of hearing   HENT:      Head: Normocephalic and atraumatic.      Right Ear: External ear normal.      Left Ear: External ear normal.      Mouth/Throat:      Mouth: Mucous membranes are moist.   Eyes:      Extraocular Movements: Extraocular movements intact.      Conjunctiva/sclera: Conjunctivae normal.      Pupils: Pupils are equal, round, and reactive to light.   Cardiovascular:      Rate and Rhythm: Normal rate. Rhythm irregular.      Heart sounds: Normal heart sounds. No murmur heard.  Pulmonary:      Effort: Pulmonary effort is normal. No respiratory distress.      Breath sounds: Rhonchi present. No wheezing or rales.      Comments: Poor air movement  Abdominal:      General: Bowel sounds are normal. There is no distension.      Palpations: Abdomen is soft.      Tenderness: There is no  abdominal tenderness.   Musculoskeletal:      Cervical back: Neck supple.      Right lower leg: No edema.      Left lower leg: No edema.   Neurological:      General: No focal deficit present.      Mental Status: She is alert and oriented to person, place, and time.   Psychiatric:         Mood and Affect: Mood normal.         Behavior: Behavior normal.         CRANIAL NERVES     CN III, IV, VI   Pupils are equal, round, and reactive to light.     Significant Labs: All pertinent labs within the past 24 hours have been reviewed.  Blood Culture: No results for input(s): LABBLOO in the last 48 hours.    BMP:   Recent Labs   Lab 08/27/22  0556 08/28/22  0555   * 126*    147*   K 3.7 3.1*    109   CO2 27 29   BUN 10 12   CREATININE 0.7 0.7   CALCIUM 8.0* 7.9*   MG 2.3  --      CBC:   Recent Labs   Lab 08/27/22  0556 08/28/22  0555   WBC 2.30* 6.58   HGB 10.1* 8.6*   HCT 30.6* 26.0*   PLT 87* 101*     CMP:   Recent Labs   Lab 08/27/22  0556 08/28/22  0555    147*   K 3.7 3.1*    109   CO2 27 29   * 126*   BUN 10 12   CREATININE 0.7 0.7   CALCIUM 8.0* 7.9*   PROT 5.5* 4.7*   ALBUMIN 3.0* 2.7*   BILITOT 0.4 0.4   ALKPHOS 59 51*   AST 57* 49*   ALT 58* 53*   ANIONGAP 11 9     Cardiac Markers:   No results for input(s): CKMB, MYOGLOBIN, BNP, TROPISTAT in the last 48 hours.    Lactic Acid:   No results for input(s): LACTATE in the last 48 hours.    Magnesium:   Recent Labs   Lab 08/26/22  1237 08/27/22  0556   MG 1.6 2.3     POCT Glucose: No results for input(s): POCTGLUCOSE in the last 48 hours.  Troponin:   No results for input(s): TROPONINI in the last 48 hours.    LDH: 270  Procal: 0.08 (0.10)  ESR: 22  Ferritin: pending  BNP 83    Significant Imaging: I have reviewed all pertinent imaging results/findings within the past 24 hours.    CXR:   Chronic lung changes are seen.  Increasing hazy interstitial opacity throughout the left lung, pronounced in the left mid and lower lung zone which  could reflect atelectasis, aspiration or possibly a developing pneumonia.  Probable small bilateral pleural effusions.  Calcified granulomas in the left lung.  Nodular density in the left mid lung which appears new as compared to the previous examination none.  This could possibly represent a nipple shadow.  Heart size is normal.  Calcified atheromatous disease affects the aorta.  Fortunately age-appropriate degenerative changes affect the skeleton.

## 2022-08-28 NOTE — CONSULTS
[facility] ([facility])  Pulmonology  Consult Note    Patient Name: Marva Perez  MRN: [id number]  Admission Date: 2022  Hospital Length of Stay: 2 days  Code Status: Full Code  Attending Physician: Kevin Victoria MD  Primary Care Provider: Kevin Clements MD   Principal Problem: COVID-19    Consults  Subjective:     HPI:  Marva Perez is a 81 y.o. year old female that's presents with a chief complaint of SOB and Wheezing for several  Days.She went to a wedding found to have covid and flu B.C t Chest shows a new pleural based lesion in the RLL superior segment with associated hemoptysis in  A patient on Eliquise for afib eliquise has been stopped. Consulted to evaluate Respiratory status.    Past Medical History:   Diagnosis Date    Abnormal Pap smear 13    LGSIL    Atrial fibrillation     COPD (chronic obstructive pulmonary disease)     Depression     GERD (gastroesophageal reflux disease)     Hyperlipidemia     Hypertension         Past Surgical History:   Procedure Laterality Date    APPENDECTOMY      BRONCHOSCOPY N/A 2019    Procedure: BRONCHOSCOPY;  Surgeon: Howard Matson MD;  Location: [facility];  Service: Pulmonary;  Laterality: N/A;    CERVICAL BIOPSY  W/ LOOP ELECTRODE EXCISION      CHOLECYSTECTOMY      COLLATERAL LIGAMENT REPAIR, KNEE      left knee    COLONOSCOPY      DILATION AND CURETTAGE OF UTERUS      SINUS SURGERY         Prior to Admission medications    Medication Sig Start Date End Date Taking? Authorizing Provider   albuterol (PROVENTIL/VENTOLIN HFA) 90 mcg/actuation inhaler Inhale 2 puffs into the lungs every 6 (six) hours as needed for Wheezing. 3/21/19   Kevin Clements MD   albuterol-ipratropium (DUO-NEB) 2.5 mg-0.5 mg/3 mL nebulizer solution SMARTSI Vial(s) Via Nebulizer Every 12 Hours 22   Historical Provider   ALPRAZolam (XANAX) 0.25 MG tablet TAKE 1 TABLET DURING THE DAY FOR ANXIETY AS NEEDED AND 2 AT NIGHT FOR SLEEP 22   Kevin Clements MD    atenoloL (TENORMIN) 25 MG tablet Take 12.5 mg by mouth once daily. 5/31/22   Historical Provider   clobetasoL (TEMOVATE) 0.05 % cream Apply topically 2 (two) times daily. To rash at the right lower leg 3/21/22   Donna Harris NP   diltiaZEM (CARDIZEM) 120 MG tablet Take 120 mg by mouth 2 (two) times daily. 11/7/19   Historical Provider   ELIQUIS 2.5 mg Tab Take 2.5 mg by mouth 2 (two) times daily. 11/13/19   Historical Provider   ergocalciferol (ERGOCALCIFEROL) 50,000 unit Cap TAKE 1 CAPSULE BY MOUTH ONE TIME PER WEEK 7/8/21   Kevin Clements MD   ergocalciferol (ERGOCALCIFEROL) 50,000 unit Cap TAKE 1 CAPSULE BY MOUTH ONE TIME PER WEEK 6/20/22   Kevin Clements MD   EScitalopram oxalate (LEXAPRO) 5 MG Tab TAKE 1 TABLET BY MOUTH EVERY DAY 6/14/22   Kevin Clements MD   ezetimibe (ZETIA) 10 mg tablet  4/20/21   Historical Provider   flecainide (TAMBOCOR) 50 MG Tab Take 50 mg by mouth 2 (two) times daily. 11/13/19   Historical Provider   furosemide (LASIX) 20 MG tablet Take 20 mg by mouth once daily. 3/10/22   Historical Provider   gabapentin (NEURONTIN) 100 MG capsule Take 1 capsule (100 mg total) by mouth every evening. 3/1/21 3/1/22  Kevin Clements MD   levalbuterol (XOPENEX) 0.63 mg/3 mL nebulizer solution Take 3 mLs (0.63 mg total) by nebulization every 4 (four) hours as needed for Wheezing. 5/28/20 7/1/21  Nichelle Llanos NP   metoclopramide HCl (REGLAN) 10 MG tablet Take 1 tablet (10 mg total) by mouth every 6 (six) hours. 8/23/22   Drew Estevez DO   montelukast (SINGULAIR) 10 mg tablet Take 1 tablet (10 mg total) by mouth once daily. 7/6/22   Vance Ordaz MD   oseltamivir (TAMIFLU) 75 MG capsule Take 1 capsule (75 mg total) by mouth 2 (two) times daily. for 5 days 8/22/22 8/27/22  Drew Estevez DO   predniSONE (DELTASONE) 10 MG tablet Take 10 mg by mouth once daily. 4/18/22   Historical Provider   REPATHA SURECLICK 140 mg/mL Gail Inject 140 mg into the skin every 14  (fourteen) days. 6/4/21   Historical Provider   rifAXIMin (XIFAXAN) 550 mg Tab Take 1 tablet (550 mg total) by mouth 3 (three) times daily. 6/7/21   Kevin Clements MD   rosuvastatin (CRESTOR) 40 MG Tab Take 40 mg by mouth every evening. 10/20/20   Historical Provider   TRELEGY ELLIPTA 100-62.5-25 mcg DsDv TAKE 1 PUFF BY MOUTH EVERY DAY 11/1/19   Historical Provider   valsartan (DIOVAN) 160 MG tablet  4/20/21   Historical Provider       Social History     Socioeconomic History    Marital status:    Tobacco Use    Smoking status: Former     Years: 30.00     Types: Cigarettes     Quit date: 10/30/2006     Years since quitting: 15.8    Smokeless tobacco: Never   Substance and Sexual Activity    Alcohol use: No     Comment: No    Drug use: No    Sexual activity: Not Currently     Birth control/protection: Post-menopausal     Comment:        Family History   Problem Relation Age of Onset    Breast cancer Mother     Colon cancer Neg Hx     Ovarian cancer Neg Hx        Review of patient's allergies indicates:   Allergen Reactions    Pcn [penicillins] Hives and Itching    Tetracyclines     Bactrim [sulfamethoxazole-trimethoprim] Rash    Ilosone Rash    Iodine and iodide containing products Rash    Sulfa (sulfonamide antibiotics) Hives and Rash    Allergies have been reviewed.     ROS: ROS    PE:   Vitals:    08/28/22 0748 08/28/22 0800 08/28/22 0834 08/28/22 1000   BP: (!) 169/72      BP Location: Left arm      Patient Position: Lying      Pulse: 81 83 80 98   Resp: (!) 26  (!) 22    Temp: 98 °F (36.7 °C)      TempSrc: Oral      SpO2: (!) 90%  (!) 91%    Weight:       Height:        Physical Exam    Alert and orientated X 3   HEENT: Head: Normocephalic no trauma                Ears : Normal Pinna No Drainage no Battles sign                Eyes: Vision Unchanged, No conjunctivitis,No drainage                Neck: Supple, No JVD,No Abnormal Carotid Pulsations                Throat: No Erythema, No pus,No  Swelling,Mallampati score= 3    Chest: Course bs bilaterally with wheezes and Rhonchi   Cardiac: RRR S1+ S2 with a -S3: +M = 2/6, No R/H/G  Abdomen: Bowel Sounds are Normal.Soft Abdomen. No organomegaly of Liver,Spleen,or Kidneys   CNS: Non focal and intact. Cranial nerves 2, 346,8,9,10 and 12 are normal.Norrmal gait.Normal posture.  Extremities: No Clubbing,No Cyanosis with oxygen,Positive mild edema of lower extremities Bilateral  Skin: No Rash, No Ulcerative sores,and No cellulitis of the IV site.    Lab Results   Component Value Date    WBC 6.58 08/28/2022    HGB 8.6 (L) 08/28/2022    HCT 26.0 (L) 08/28/2022     (L) 08/28/2022    CHOL 158 10/21/2021    TRIG 84 10/21/2021    HDL 48 10/21/2021    ALT 53 (H) 08/28/2022    AST 49 (H) 08/28/2022     (H) 08/28/2022    K 3.1 (L) 08/28/2022     08/28/2022    CREATININE 0.7 08/28/2022    BUN 12 08/28/2022    CO2 29 08/28/2022    TSH 3.253 10/21/2021    INR 1.2 08/23/2022     Impression  EXAMINATION:  CT CHEST WITH CONTRAST     CLINICAL HISTORY:  Abnormal xray - lung opacity/opacities;     TECHNIQUE:  Low dose axial images, sagittal and coronal reformations were obtained from the thoracic inlet to the lung bases following the IV administration of 75 mL of Omnipaque 350.     COMPARISON:  Chest x-ray 08/26/2022.  CTA 08/01/2019.     FINDINGS:  There is pulmonary hyperinflation with diffuse emphysematous change throughout both lungs slightly greater on the right.  Chronic changes of pulmonary fibrosis are present at the left greater than right lung bases.  There are scattered small calcified granulomas.  No focal consolidation.  There has been interval development of a smooth soft tissue pleural based mass of the medial aspect of the superior segment of the right lower lobe which measures 2.5 cm AP x 1.3 cm transverse by 2.8 cm cc.  This is of uncertain etiology.  Neoplasia is not excludable.  Finding on recent chest x-ray most consistent with nipple  shadow.     There is mild left-sided volume loss with mild shift of the heart mediastinum to the left.  No pleural or pericardial effusions.  No suspicious endobronchial lesion.  No significant axillary or intrathoracic lymphadenopathy.  Calcified hilar lymph nodes consistent with prior granulomatous disease.     Limited evaluation of the upper abdomen demonstrates granulomatous change within the liver and spleen.  Previous cholecystectomy with mild intra and moderate extrahepatic biliary ductal dilatation.     Impression:     1. Interval development of a smooth pleural based soft tissue mass of the medial right lower lobe which is of uncertain etiology.  Neoplasia is not excludable.  Further evaluation with PET scan as deemed clinically necessary.  2. 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  3. Granulomatous change.  This report was flagged in Epic as abnormal.        Electronically signed by: Gerald Jaquez  Date:                                            08/27/2022  Time:                                           14:19           Exam Ended: 08/27/22 11:35                 Critical care time spent on the evaluation and treatment of severe organ dysfunction, review of pertinent labs and imaging studies, discussions with consulting providers and discussions with patient/family: 65 minutes.    Assessment/Plan:     * COVID-19  ++ test she went to a wedding   ++ covid and flu B    Cough with hemoptysis  Off eloquise now will do cytology on sputum and if persist do a bronchoscopy in 3 to 5 days    Anxiety  Worried about hemopsis    Abnormal CXR  CT shows a RLL superior segment pleural lesion     Influenza B  ++ covid and flu B    Chronic atrial fibrillation  On eloquise has been stopped     Atrial fibrillation with RVR  Was on eloquise now taken off    Chronic respiratory failure  Patient with Hypoxic Respiratory failure which is Chronic.  she is on home oxygen  at 3 LPM. Supplemental oxygen was provided and noted-  .   Signs/symptoms of respiratory failure include- increased work of breathing. Contributing diagnoses includes - COPD Labs and images were reviewed. Patient Has recent ABG, which has been reviewed. Will treat underlying causes and adjust management of respiratory failure as follows- 3    Centrilobular emphysema  Severe end stage    Emphysema/COPD  Severe end stage     HTN (hypertension)  stable    KATHIE (generalized anxiety disorder)  stable    sputum for cytology     Thank you for your consult. I will follow-up with patient. Please contact us if you have any additional questions.   Critical care time spent on the evaluation and treatment of severe organ dysfunction, review of pertinent labs and imaging studies, discussions with consulting providers and discussions with patient/family: 91 minutes.   Howard Matson MD  Pulmonology  [facility] - Med Surg (3rd Fl)

## 2022-08-28 NOTE — PLAN OF CARE
[facility] - Med Surg (3rd Fl)  Initial Discharge Assessment       Primary Care Provider: Kevin Clements MD    Admission Diagnosis: Dehydration [E86.0]  Influenza A [J10.1]  Chest pain [R07.9]  COPD with hypoxia [J44.9, R09.02]  COVID-19 [U07.1]    Admission Date: 8/26/2022  Expected Discharge Date:     Discharge Barriers Identified: None    Payor: [employer] MANAGED MEDICARE / Plan: [employer] MEDICARE PLAN PPO / Product Type: Medicare Advantage /     Extended Emergency Contact Information  Primary Emergency Contact: Mckenna Larson  Mobile Phone: [phone]  Relation: Daughter  Secondary Emergency Contact: Micha Berg  Address: [address] [place] [address]  Work Phone: [phone]  Mobile Phone: [phone]  Relation: Son    Discharge Plan A: Home with family, Home Health  Discharge Plan B: Home Health, Home with family      [id number] [place] - [address]  [address]  [facility] [address]  Phone: [phone] Fax: [phone]    [facility] - [place], [place] - [address] [place] [address]  Phone: [phone] Fax: [phone]    [facility] - [place] [address]  Phone: [phone] Fax: [phone]      Initial Assessment (most recent)       Adult Discharge Assessment - 08/28/22 0904          Discharge Assessment    Assessment Type Discharge Planning Assessment     Confirmed/corrected address, phone number and insurance No     Reason No family to provide information/patient unable to answer     Source of Information health record     Communicated ROSAURA with patient/caregiver Date not available/Unable to determine     Reason For Admission COVID 19     Lives With alone     Facility Arrived From: Home     Prior to hospitilization cognitive status: Alert/Oriented     Current cognitive status: Alert/Oriented     Walking or Climbing  Stairs Difficulty ambulation difficulty, requires equipment     Mobility Management PT recommending a rolling walker at discharge. Patient did not require DME to ambulate prior to admission.     Dressing/Bathing Difficulty none     Home Layout Able to live on 1st floor   According to PT note, patient lives in a two story home but does have a BSC she can utilize on the 1st floor if needed.    Equipment Currently Used at Home oxygen;bedside commode     Readmission within 30 days? No     Do you currently have service(s) that help you manage your care at home? No     Do you take prescription medications? Yes     Do you have prescription coverage? Yes     Coverage [employer] Medicare     Are you on dialysis? No     Do you take coumadin? No     Discharge Plan A Home with family;Home Health     Discharge Plan B Home Health;Home with family     DME Needed Upon Discharge  walker, rolling     Discharge Barriers Identified None                          Discharge assessment completed via the medical record as patient did not answer call placed to her hospital room phone and daughter did not answer call placed to her cell phone. SW did leave a voicemail on daughter's cell phone for a return call. Patient requesting home health at discharge. PT recommending a rolling walker at discharge as well. SW to remain available.

## 2022-08-28 NOTE — ASSESSMENT & PLAN NOTE
C diff negative; diarrhea improving  Suspect this is 2/2 tamiflu; will DC as she seems stable from resp standpoint  S/p 1L NS in ER; DC NS

## 2022-08-28 NOTE — PLAN OF CARE
Alert and oriented X4 but very anxious. Pt up in chair mid-day with 1 person assist. Pt reports SOB on exertion with increased anxiety. Pt remains on 2-3L nc.   Pulmonology on case. Collected two red-streaked sputum CX's per orders.   HELD eliquis per Md order. SCD's on. Will need CIS consult in am. Giving oral potassium Q4.    Pt with decreased PO intake throughout shift. Educating pt on importance of nutrient intake.     Remains normal sinus with occasional PAC's.       Problem: Adult Inpatient Plan of Care  Goal: Plan of Care Review  Outcome: Ongoing, Progressing  Goal: Patient-Specific Goal (Individualized)  Outcome: Ongoing, Progressing  Goal: Absence of Hospital-Acquired Illness or Injury  Outcome: Ongoing, Progressing  Goal: Optimal Comfort and Wellbeing  Outcome: Ongoing, Progressing  Goal: Readiness for Transition of Care  Outcome: Ongoing, Progressing  Problem: Fluid Imbalance (Pneumonia)  Goal: Fluid Balance  Outcome: Ongoing, Progressing  Problem: Infection (Pneumonia)  Goal: Resolution of Infection Signs and Symptoms  Outcome: Ongoing, Progressing  Problem: Respiratory Compromise (Pneumonia)  Goal: Effective Oxygenation and Ventilation  Outcome: Ongoing, Progressing  Problem: Infection  Goal: Absence of Infection Signs and Symptoms  Outcome: Ongoing, Progressing  Problem: Skin Injury Risk Increased  Goal: Skin Health and Integrity  Outcome: Ongoing, Progressing  Problem: Fall Injury Risk  Goal: Absence of Fall and Fall-Related Injury  Outcome: Ongoing, Progressing  Problem: Electrolyte Imbalance  Goal: Electrolyte Balance  Outcome: Ongoing, Progressing  Problem: Diarrhea  Goal: Fluid and Electrolyte Balance  Outcome: Ongoing, Progressing

## 2022-08-28 NOTE — PROGRESS NOTES
[place] [facility] ([facility])  [facility] Medicine  Progress Note    Patient Name: Marva Perez  MRN: [id number]  Patient Class: IP- Inpatient   Admission Date: 8/26/2022  Length of Stay: 2 days  Attending Physician: Kevin Victoria MD  Primary Care Provider: Kevin Clements MD        Subjective:     Principal Problem:COVID-19        HPI:  81yF with HTN, HLD, GERD, Depression, anxiety, aFib on eliquis, COPD/Emphysema, and chronic hypoxia on 2-3L home oxygen via NC presented to ER with chief complaint of diarrhea and generalized weakness. Pt reports she started with congestion, fatigue, and cough and 5-6 days ago. She was seen in ER 8/22/22 and diagnosed with COVID and flu B. She was started on tamiflu and referred for outpatient antibody infusion. Pt states she started having nausea, vomiting, and diarrhea as well as intermittent fever. She returned to ER 8/23/22 for the new onset GI symptoms. Workup was reassuring and she was discharged home with antiemetics. She received bebtelovimab infusion 8/24/22. She continued with poor appetite and diarrhea and called her PCP who recommended ER evaluation. She denies CP, abd pain, fever/chills, dysuria, hematuria, melena, BRBPR.       Overview/Hospital Course:  8/27 Pt remained stable on 2-3 L NC overnight which is her home dose. VSS, afebrile. She is very anxious. Diarrhea improved, she did have a loose stool this morning sent for c diff testing. CT chest planned for this morning to evaluate her new left nodular denisity on CXR.     8/28 Pt with hemoptysis overnight. Eliquis held. VSS, afebrile. She remains very anxious. C diff negative. Diarrhea improved. CT chest with COPD and severe chronic emphysematous changes, no infiltrate. There is a new soft tissue mass of the right lung base. Dr. Matson whom she follows with outpatient has been consulted. Discussed with daughter, she is concerned about patient going back home alone at discharge but she also states pt does not  want NH. Advised we could order home health at [place], but that is limited visit per week. Daughter states they are looking into sitters at home.      Past Medical History:   Diagnosis Date    Abnormal Pap smear 13    LGSIL    Atrial fibrillation     COPD (chronic obstructive pulmonary disease)     Depression     GERD (gastroesophageal reflux disease)     Hyperlipidemia     Hypertension        Past Surgical History:   Procedure Laterality Date    APPENDECTOMY      BRONCHOSCOPY N/A 2019    Procedure: BRONCHOSCOPY;  Surgeon: Howard Matson MD;  Location: [facility] OR;  Service: Pulmonary;  Laterality: N/A;    CERVICAL BIOPSY  W/ LOOP ELECTRODE EXCISION      CHOLECYSTECTOMY      COLLATERAL LIGAMENT REPAIR, KNEE      left knee    COLONOSCOPY      DILATION AND CURETTAGE OF UTERUS      SINUS SURGERY         Review of patient's allergies indicates:   Allergen Reactions    Pcn [penicillins] Hives and Itching    Tetracyclines     Bactrim [sulfamethoxazole-trimethoprim] Rash    Ilosone Rash    Iodine and iodide containing products Rash    Sulfa (sulfonamide antibiotics) Hives and Rash       No current facility-administered medications on file prior to encounter.     Current Outpatient Medications on File Prior to Encounter   Medication Sig    albuterol (PROVENTIL/VENTOLIN HFA) 90 mcg/actuation inhaler Inhale 2 puffs into the lungs every 6 (six) hours as needed for Wheezing.    albuterol-ipratropium (DUO-NEB) 2.5 mg-0.5 mg/3 mL nebulizer solution SMARTSI Vial(s) Via Nebulizer Every 12 Hours    ALPRAZolam (XANAX) 0.25 MG tablet TAKE 1 TABLET DURING THE DAY FOR ANXIETY AS NEEDED AND 2 AT NIGHT FOR SLEEP    atenoloL (TENORMIN) 25 MG tablet Take 12.5 mg by mouth once daily.    clobetasoL (TEMOVATE) 0.05 % cream Apply topically 2 (two) times daily. To rash at the right lower leg    diltiaZEM (CARDIZEM) 120 MG tablet Take 120 mg by mouth 2 (two) times daily.    ELIQUIS 2.5 mg Tab Take 2.5 mg by mouth 2  (two) times daily.    ergocalciferol (ERGOCALCIFEROL) 50,000 unit Cap TAKE 1 CAPSULE BY MOUTH ONE TIME PER WEEK    ergocalciferol (ERGOCALCIFEROL) 50,000 unit Cap TAKE 1 CAPSULE BY MOUTH ONE TIME PER WEEK    EScitalopram oxalate (LEXAPRO) 5 MG Tab TAKE 1 TABLET BY MOUTH EVERY DAY    ezetimibe (ZETIA) 10 mg tablet     flecainide (TAMBOCOR) 50 MG Tab Take 50 mg by mouth 2 (two) times daily.    furosemide (LASIX) 20 MG tablet Take 20 mg by mouth once daily.    gabapentin (NEURONTIN) 100 MG capsule Take 1 capsule (100 mg total) by mouth every evening.    levalbuterol (XOPENEX) 0.63 mg/3 mL nebulizer solution Take 3 mLs (0.63 mg total) by nebulization every 4 (four) hours as needed for Wheezing.    metoclopramide HCl (REGLAN) 10 MG tablet Take 1 tablet (10 mg total) by mouth every 6 (six) hours.    montelukast (SINGULAIR) 10 mg tablet Take 1 tablet (10 mg total) by mouth once daily.    [] oseltamivir (TAMIFLU) 75 MG capsule Take 1 capsule (75 mg total) by mouth 2 (two) times daily. for 5 days    predniSONE (DELTASONE) 10 MG tablet Take 10 mg by mouth once daily.    REPATHA SURECLICK 140 mg/mL PnIj Inject 140 mg into the skin every 14 (fourteen) days.    rifAXIMin (XIFAXAN) 550 mg Tab Take 1 tablet (550 mg total) by mouth 3 (three) times daily.    rosuvastatin (CRESTOR) 40 MG Tab Take 40 mg by mouth every evening.    TRELEGY ELLIPTA 100-62.5-25 mcg DsDv TAKE 1 PUFF BY MOUTH EVERY DAY    valsartan (DIOVAN) 160 MG tablet      Family History       Problem Relation (Age of Onset)    Breast cancer Mother          Tobacco Use    Smoking status: Former     Years: 30.00     Types: Cigarettes     Quit date: 10/30/2006     Years since quitting: 15.8    Smokeless tobacco: Never   Substance and Sexual Activity    Alcohol use: No     Comment: No    Drug use: No    Sexual activity: Not Currently     Birth control/protection: Post-menopausal     Comment:      Review of Systems   Unable to perform ROS:  Age   Constitutional:  Positive for activity change, appetite change and fatigue. Negative for fever.   HENT:  Positive for congestion and hearing loss (chronic). Negative for sinus pressure and sore throat.    Eyes:  Negative for visual disturbance.   Respiratory:  Positive for cough and shortness of breath.    Cardiovascular:  Negative for chest pain, palpitations and leg swelling.   Gastrointestinal:  Positive for diarrhea (improving). Negative for abdominal pain, blood in stool and nausea.   Genitourinary:  Negative for dysuria and hematuria.   Musculoskeletal:  Negative for arthralgias and myalgias.   Neurological:  Positive for weakness (generalized).   Psychiatric/Behavioral:  Negative for confusion. The patient is nervous/anxious.    Objective:     Vital Signs (Most Recent):  Temp: 98.4 °F (36.9 °C) (08/28/22 1139)  Pulse: 78 (08/28/22 1200)  Resp: (!) 22 (08/28/22 1139)  BP: (!) 143/60 (08/28/22 1139)  SpO2: (!) 90 % (08/28/22 1139) Vital Signs (24h Range):  Temp:  [98 °F (36.7 °C)-99.3 °F (37.4 °C)] 98.4 °F (36.9 °C)  Pulse:  [73-98] 78  Resp:  [18-28] 22  SpO2:  [90 %-99 %] 90 %  BP: (123-169)/(59-72) 143/60     Weight: 54.8 kg (120 lb 13 oz)  Body mass index is 22.1 kg/m².    Physical Exam  Vitals and nursing note reviewed.   Constitutional:       General: She is not in acute distress.     Comments: Hard of hearing   HENT:      Head: Normocephalic and atraumatic.      Right Ear: External ear normal.      Left Ear: External ear normal.      Mouth/Throat:      Mouth: Mucous membranes are moist.   Eyes:      Extraocular Movements: Extraocular movements intact.      Conjunctiva/sclera: Conjunctivae normal.      Pupils: Pupils are equal, round, and reactive to light.   Cardiovascular:      Rate and Rhythm: Normal rate. Rhythm irregular.      Heart sounds: Normal heart sounds. No murmur heard.  Pulmonary:      Effort: Pulmonary effort is normal. No respiratory distress.      Breath sounds: Rhonchi present. No  wheezing or rales.      Comments: Poor air movement  Abdominal:      General: Bowel sounds are normal. There is no distension.      Palpations: Abdomen is soft.      Tenderness: There is no abdominal tenderness.   Musculoskeletal:      Cervical back: Neck supple.      Right lower leg: No edema.      Left lower leg: No edema.   Neurological:      General: No focal deficit present.      Mental Status: She is alert and oriented to person, place, and time.   Psychiatric:         Mood and Affect: Mood normal.         Behavior: Behavior normal.         CRANIAL NERVES     CN III, IV, VI   Pupils are equal, round, and reactive to light.     Significant Labs: All pertinent labs within the past 24 hours have been reviewed.  Blood Culture: No results for input(s): LABBLOO in the last 48 hours.    BMP:   Recent Labs   Lab 08/27/22  0556 08/28/22  0555   * 126*    147*   K 3.7 3.1*    109   CO2 27 29   BUN 10 12   CREATININE 0.7 0.7   CALCIUM 8.0* 7.9*   MG 2.3  --      CBC:   Recent Labs   Lab 08/27/22  0556 08/28/22  0555   WBC 2.30* 6.58   HGB 10.1* 8.6*   HCT 30.6* 26.0*   PLT 87* 101*     CMP:   Recent Labs   Lab 08/27/22  0556 08/28/22  0555    147*   K 3.7 3.1*    109   CO2 27 29   * 126*   BUN 10 12   CREATININE 0.7 0.7   CALCIUM 8.0* 7.9*   PROT 5.5* 4.7*   ALBUMIN 3.0* 2.7*   BILITOT 0.4 0.4   ALKPHOS 59 51*   AST 57* 49*   ALT 58* 53*   ANIONGAP 11 9     Cardiac Markers:   No results for input(s): CKMB, MYOGLOBIN, BNP, TROPISTAT in the last 48 hours.    Lactic Acid:   No results for input(s): LACTATE in the last 48 hours.    Magnesium:   Recent Labs   Lab 08/26/22  1237 08/27/22  0556   MG 1.6 2.3     POCT Glucose: No results for input(s): POCTGLUCOSE in the last 48 hours.  Troponin:   No results for input(s): TROPONINI in the last 48 hours.    LDH: 270  Procal: 0.08 (0.10)  ESR: 22  Ferritin: pending  BNP 83    Significant Imaging: I have reviewed all pertinent imaging  results/findings within the past 24 hours.    CXR:   Chronic lung changes are seen.  Increasing hazy interstitial opacity throughout the left lung, pronounced in the left mid and lower lung zone which could reflect atelectasis, aspiration or possibly a developing pneumonia.  Probable small bilateral pleural effusions.  Calcified granulomas in the left lung.  Nodular density in the left mid lung which appears new as compared to the previous examination none.  This could possibly represent a nipple shadow.  Heart size is normal.  Calcified atheromatous disease affects the aorta.  Fortunately age-appropriate degenerative changes affect the skeleton.      Assessment/Plan:      * COVID-19  Patient is identified as High risk for severe complications of COVID 19 based on COVID risk score of 6   Initiate standard COVID protocols; COVID-19 testing ,Infection Control notification  and isolation- respiratory, contact and droplet per protocol    Diagnostics: (leukopenia, hyponatremia, hyperferritinemia, elevated troponin, elevated d-dimer, age, and comorbidities are significant predictors of poor clinical outcome)  CBC, CMP, Ferritin, CRP and Portable CXR    Management: Maintain oxygen saturations 92-96% via Nasal Cannula 3 LPM and monitor with continuous/intermittent pulse oximetry. , Inhaled bronchodilators as needed for shortness of breath. and Continuous cardiac monitoring.    Will hold off on remdesivir at this point as she seems stable from resp standpoint; currently on home dose of 2L NC    8/27: pt remains stable on home Oxygen 2-3L NC. Monitor    Hypokalemia  Replete and monitor      Right lower lobe lung mass  CT chest: Interval development of a smooth pleural based soft tissue mass of the medial right lower lobe which is of uncertain etiology.  Neoplasia is not excludable.  Further evaluation with PET scan as deemed clinically necessary  Dr. Matson consulted, does not feel she is a bronch candidate due to severe COPD  and emphysematous lung changes. Would consider PET scan as outpt.      Cough with hemoptysis  Hold eliquis for now  Dr. Matson following      Anxiety  Cont home lexapro and xanax    She is very anxious. She lives alone. Discussed with family, she does not want any assisted living or NH.       Abnormal CXR  Nodular density on CXR which appears new, chect CT chest with contrast per radiology recs  Iodine allergy, will premedicate. She has had contrast studies in the past, last 2019 per records      Influenza B  Diagnosed 8/22/22  She was on tamiflu outpatient, but this may be the cause of her diarrhea. Will DC for now  Stable on home O2      Chronic atrial fibrillation  Patient with atrial fibrillation which is controlled currently with Beta Blocker, Calcium Channel Blocker and flecainide. Patient is currently in sinus rhythm.[id number] Score: 4. HASBLED Score: Unable to calculate. Anticoagulation indicated. Anticoagulation done with eliquis.    8/28/22: H&H drop from 10.1/30.6 > 8.6/26.0; pt with hemoptysis. Will hold eliquis for now. Consult cardiology in AM. She is followed by Dr. Cox outpatient. Daughter with concerns about holding eliquis, but given her hemoptysis and H&H drop it is my opinion as well as that of Dr. Matson that the risks of continued anticoagulation outweigh the benefit currently.     Chronic respiratory failure  On 2-3L NC at home      Centrilobular emphysema  Severe changes on CT    Diarrhea  C diff negative; diarrhea improving  Suspect this is 2/2 tamiflu; will DC as she seems stable from resp standpoint  S/p 1L NS in ER; DC NS       Emphysema/COPD  On home oxygen 2-3L NC      Hyperlipidemia  Cont home crestor      HTN (hypertension)  Cont home atenolol and valsartan      KATHIE (generalized anxiety disorder)  Cont home xanax        VTE Risk Mitigation (From admission, onward)         Ordered     apixaban tablet 2.5 mg  2 times daily         08/26/22 1822                Discharge Planning   ROSAURA:       Code Status: Full Code   Is the patient medically ready for discharge?:     Reason for patient still in hospital (select all that apply): Patient new problem, Patient trending condition, Consult recommendations and Pending disposition  Discharge Plan A: Home with family, Home Health                  Kevin Victoria MD  Department of Hospital Medicine   [place] - [facility] (3rd Fl)

## 2022-08-28 NOTE — ASSESSMENT & PLAN NOTE
Patient with Hypoxic Respiratory failure which is Chronic.  she is on home oxygen at 3 LPM. Supplemental oxygen was provided and noted-  .   Signs/symptoms of respiratory failure include- increased work of breathing. Contributing diagnoses includes - COPD Labs and images were reviewed. Patient Has recent ABG, which has been reviewed. Will treat underlying causes and adjust management of respiratory failure as follows- 3

## 2022-08-28 NOTE — ASSESSMENT & PLAN NOTE
CT chest: Interval development of a smooth pleural based soft tissue mass of the medial right lower lobe which is of uncertain etiology.  Neoplasia is not excludable.  Further evaluation with PET scan as deemed clinically necessary  Dr. Matson consulted, does not feel she is a bronch candidate due to severe COPD and emphysematous lung changes. Would consider PET scan as outpt.

## 2022-08-28 NOTE — ASSESSMENT & PLAN NOTE
Patient with atrial fibrillation which is controlled currently with Beta Blocker, Calcium Channel Blocker and flecainide. Patient is currently in sinus rhythm.[id number] Score: 4. HASBLED Score: Unable to calculate. Anticoagulation indicated. Anticoagulation done with eliquis.    8/28/22: H&H drop from 10.1/30.6 > 8.6/26.0; pt with hemoptysis. Will hold eliquis for now. Consult cardiology in AM. She is followed by Dr. Cox outpatient. Daughter with concerns about holding eliquis, but given her hemoptysis and H&H drop it is my opinion as well as that of Dr. Matson that the risks of continued anticoagulation outweigh the benefit currently.

## 2022-08-28 NOTE — PLAN OF CARE
Patient resting with some complaints of diarrhea. C-diff negative. Called Dr. Victoria and started patient on imodium. Patient is hesitant of any PO intake at this time because she states it will cause more diarrhea. Educated on the need to eat and drink especially while in the hospital. Patient states understanding but still did not take much PO. Patient also called nurse into room to see that she had spit up bloody sputum. Dr. Victoria informed and ordered to consult pulmonary/Dr. Matson. VS stable. A/Ox4. IV fluids administered as ordered. No acute changes noted. Plan of care reviewed and followed.     Problem: Diarrhea  Goal: Fluid and Electrolyte Balance  Outcome: Ongoing, Not Progressing     Problem: Fall Injury Risk  Goal: Absence of Fall and Fall-Related Injury  Outcome: Ongoing, Progressing     Problem: Adult Inpatient Plan of Care  Goal: Plan of Care Review  Outcome: Ongoing, Progressing  Goal: Absence of Hospital-Acquired Illness or Injury  Outcome: Ongoing, Progressing  Goal: Optimal Comfort and Wellbeing  Outcome: Ongoing, Progressing  Goal: Readiness for Transition of Care  Outcome: Ongoing, Progressing

## 2022-08-29 ENCOUNTER — TELEPHONE (OUTPATIENT)
Dept: INTERNAL MEDICINE | Facility: CLINIC | Age: 82
End: 2022-08-29
Payer: MEDICARE

## 2022-08-29 PROBLEM — J18.9 PNEUMONIA DUE TO INFECTIOUS ORGANISM: Status: ACTIVE | Noted: 2022-08-29

## 2022-08-29 PROBLEM — R91.8 LUNG MASS: Status: ACTIVE | Noted: 2022-08-26

## 2022-08-29 LAB
ALBUMIN SERPL BCP-MCNC: 2.9 G/DL (ref 3.5–5.2)
ALLENS TEST: ABNORMAL
ALP SERPL-CCNC: 51 U/L (ref 55–135)
ALT SERPL W/O P-5'-P-CCNC: 52 U/L (ref 10–44)
ANION GAP SERPL CALC-SCNC: 8 MMOL/L (ref 8–16)
AST SERPL-CCNC: 37 U/L (ref 10–40)
BASOPHILS # BLD AUTO: 0.01 K/UL (ref 0–0.2)
BASOPHILS NFR BLD: 0.2 % (ref 0–1.9)
BILIRUB SERPL-MCNC: 0.6 MG/DL (ref 0.1–1)
BUN SERPL-MCNC: 15 MG/DL (ref 8–23)
CALCIUM SERPL-MCNC: 8 MG/DL (ref 8.7–10.5)
CHLORIDE SERPL-SCNC: 105 MMOL/L (ref 95–110)
CO2 SERPL-SCNC: 32 MMOL/L (ref 23–29)
CREAT SERPL-MCNC: 0.6 MG/DL (ref 0.5–1.4)
DELSYS: ABNORMAL
DIFFERENTIAL METHOD: ABNORMAL
EOSINOPHIL # BLD AUTO: 0 K/UL (ref 0–0.5)
EOSINOPHIL NFR BLD: 0 % (ref 0–8)
ERYTHROCYTE [DISTWIDTH] IN BLOOD BY AUTOMATED COUNT: 13.1 % (ref 11.5–14.5)
EST. GFR  (NO RACE VARIABLE): >60 ML/MIN/1.73 M^2
FIO2: 36 (ref 21–100)
GLUCOSE SERPL-MCNC: 140 MG/DL (ref 70–110)
HCO3 UR-SCNC: 36.5 MMOL/L
HCT VFR BLD AUTO: 26 % (ref 37–48.5)
HGB BLD-MCNC: 8.4 G/DL (ref 12–16)
IMM GRANULOCYTES # BLD AUTO: 0.13 K/UL (ref 0–0.04)
IMM GRANULOCYTES NFR BLD AUTO: 2 % (ref 0–0.5)
LYMPHOCYTES # BLD AUTO: 0.3 K/UL (ref 1–4.8)
LYMPHOCYTES NFR BLD: 5.2 % (ref 18–48)
MCH RBC QN AUTO: 29.9 PG (ref 27–31)
MCHC RBC AUTO-ENTMCNC: 32.3 G/DL (ref 32–36)
MCV RBC AUTO: 93 FL (ref 82–98)
MONOCYTES # BLD AUTO: 0.6 K/UL (ref 0.3–1)
MONOCYTES NFR BLD: 9.8 % (ref 4–15)
NEUTROPHILS # BLD AUTO: 5.4 K/UL (ref 1.8–7.7)
NEUTROPHILS NFR BLD: 82.8 % (ref 38–73)
NRBC BLD-RTO: 0 /100 WBC
PCO2 BLDA: 49 MMHG (ref 35–45)
PH SMN: 7.48 [PH] (ref 7.35–7.45)
PLATELET # BLD AUTO: 125 K/UL (ref 150–450)
PMV BLD AUTO: 11 FL (ref 9.2–12.9)
PO2 BLDA: 48 MMHG (ref 75–100)
POC BE: 11.6 MMOL/L (ref -2–2)
POC COHB: 2 % (ref 0–3)
POC METHB: 1 % (ref 0–1.5)
POC O2HB ARTERIAL: 86.5 % (ref 94–100)
POC SATURATED O2: 89.1 % (ref 90–100)
POC TCO2: 38 MMOL/L
POC THB: 9.5 G/DL (ref 12–18)
POTASSIUM SERPL-SCNC: 3.2 MMOL/L (ref 3.5–5.1)
PROT SERPL-MCNC: 5 G/DL (ref 6–8.4)
RBC # BLD AUTO: 2.81 M/UL (ref 4–5.4)
SITE: ABNORMAL
SODIUM SERPL-SCNC: 145 MMOL/L (ref 136–145)
WBC # BLD AUTO: 6.52 K/UL (ref 3.9–12.7)

## 2022-08-29 PROCEDURE — 27000221 HC OXYGEN, UP TO 24 HOURS

## 2022-08-29 PROCEDURE — 94664 DEMO&/EVAL PT USE INHALER: CPT

## 2022-08-29 PROCEDURE — 25000003 PHARM REV CODE 250: Performed by: NURSE PRACTITIONER

## 2022-08-29 PROCEDURE — 11000001 HC ACUTE MED/SURG PRIVATE ROOM

## 2022-08-29 PROCEDURE — 80053 COMPREHEN METABOLIC PANEL: CPT | Performed by: STUDENT IN AN ORGANIZED HEALTH CARE EDUCATION/TRAINING PROGRAM

## 2022-08-29 PROCEDURE — 25000003 PHARM REV CODE 250: Performed by: SURGERY

## 2022-08-29 PROCEDURE — 36415 COLL VENOUS BLD VENIPUNCTURE: CPT | Performed by: STUDENT IN AN ORGANIZED HEALTH CARE EDUCATION/TRAINING PROGRAM

## 2022-08-29 PROCEDURE — 63600175 PHARM REV CODE 636 W HCPCS: Performed by: STUDENT IN AN ORGANIZED HEALTH CARE EDUCATION/TRAINING PROGRAM

## 2022-08-29 PROCEDURE — 99233 SBSQ HOSP IP/OBS HIGH 50: CPT | Mod: ,,, | Performed by: INTERNAL MEDICINE

## 2022-08-29 PROCEDURE — 94640 AIRWAY INHALATION TREATMENT: CPT

## 2022-08-29 PROCEDURE — 25000003 PHARM REV CODE 250: Performed by: STUDENT IN AN ORGANIZED HEALTH CARE EDUCATION/TRAINING PROGRAM

## 2022-08-29 PROCEDURE — 94761 N-INVAS EAR/PLS OXIMETRY MLT: CPT

## 2022-08-29 PROCEDURE — 99900035 HC TECH TIME PER 15 MIN (STAT)

## 2022-08-29 PROCEDURE — 82803 BLOOD GASES ANY COMBINATION: CPT | Performed by: INTERNAL MEDICINE

## 2022-08-29 PROCEDURE — 85025 COMPLETE CBC W/AUTO DIFF WBC: CPT | Performed by: STUDENT IN AN ORGANIZED HEALTH CARE EDUCATION/TRAINING PROGRAM

## 2022-08-29 PROCEDURE — 25000242 PHARM REV CODE 250 ALT 637 W/ HCPCS: Performed by: INTERNAL MEDICINE

## 2022-08-29 PROCEDURE — 25000003 PHARM REV CODE 250: Performed by: INTERNAL MEDICINE

## 2022-08-29 PROCEDURE — 36600 WITHDRAWAL OF ARTERIAL BLOOD: CPT

## 2022-08-29 PROCEDURE — 99233 PR SUBSEQUENT HOSPITAL CARE,LEVL III: ICD-10-PCS | Mod: ,,, | Performed by: INTERNAL MEDICINE

## 2022-08-29 PROCEDURE — 27000207 HC ISOLATION

## 2022-08-29 RX ORDER — LEVOFLOXACIN 250 MG/1
750 TABLET ORAL DAILY
Status: DISCONTINUED | OUTPATIENT
Start: 2022-08-29 | End: 2022-09-01

## 2022-08-29 RX ORDER — LORAZEPAM 1 MG/1
1 TABLET ORAL ONCE
Status: COMPLETED | OUTPATIENT
Start: 2022-08-29 | End: 2022-08-29

## 2022-08-29 RX ORDER — POTASSIUM CHLORIDE 20 MEQ/1
40 TABLET, EXTENDED RELEASE ORAL ONCE
Status: COMPLETED | OUTPATIENT
Start: 2022-08-29 | End: 2022-08-29

## 2022-08-29 RX ORDER — VALSARTAN 80 MG/1
160 TABLET ORAL 2 TIMES DAILY
Status: DISCONTINUED | OUTPATIENT
Start: 2022-08-29 | End: 2022-09-09 | Stop reason: HOSPADM

## 2022-08-29 RX ORDER — ALPRAZOLAM 0.25 MG/1
0.5 TABLET ORAL 3 TIMES DAILY PRN
Status: DISCONTINUED | OUTPATIENT
Start: 2022-08-29 | End: 2022-09-04

## 2022-08-29 RX ADMIN — POTASSIUM CHLORIDE 40 MEQ: 1500 TABLET, EXTENDED RELEASE ORAL at 09:08

## 2022-08-29 RX ADMIN — ATENOLOL 12.5 MG: 25 TABLET ORAL at 09:08

## 2022-08-29 RX ADMIN — IPRATROPIUM BROMIDE AND ALBUTEROL SULFATE 3 ML: .5; 3 SOLUTION RESPIRATORY (INHALATION) at 06:08

## 2022-08-29 RX ADMIN — OXYCODONE HYDROCHLORIDE AND ACETAMINOPHEN 500 MG: 500 TABLET ORAL at 09:08

## 2022-08-29 RX ADMIN — DILTIAZEM HYDROCHLORIDE 60 MG: 60 TABLET, FILM COATED ORAL at 08:08

## 2022-08-29 RX ADMIN — LORAZEPAM 1 MG: 1 TABLET ORAL at 02:08

## 2022-08-29 RX ADMIN — ATORVASTATIN CALCIUM 10 MG: 10 TABLET, FILM COATED ORAL at 08:08

## 2022-08-29 RX ADMIN — IPRATROPIUM BROMIDE AND ALBUTEROL SULFATE 3 ML: .5; 3 SOLUTION RESPIRATORY (INHALATION) at 12:08

## 2022-08-29 RX ADMIN — FLECAINIDE ACETATE 50 MG: 50 TABLET ORAL at 09:08

## 2022-08-29 RX ADMIN — ALPRAZOLAM 0.5 MG: 0.5 TABLET ORAL at 08:08

## 2022-08-29 RX ADMIN — ALPRAZOLAM 0.25 MG: 0.25 TABLET ORAL at 11:08

## 2022-08-29 RX ADMIN — DEXAMETHASONE SODIUM PHOSPHATE 6 MG: 4 INJECTION, SOLUTION INTRA-ARTICULAR; INTRALESIONAL; INTRAMUSCULAR; INTRAVENOUS; SOFT TISSUE at 09:08

## 2022-08-29 RX ADMIN — IPRATROPIUM BROMIDE AND ALBUTEROL SULFATE 3 ML: .5; 3 SOLUTION RESPIRATORY (INHALATION) at 01:08

## 2022-08-29 RX ADMIN — IPRATROPIUM BROMIDE AND ALBUTEROL SULFATE 3 ML: .5; 3 SOLUTION RESPIRATORY (INHALATION) at 08:08

## 2022-08-29 RX ADMIN — ESCITALOPRAM OXALATE 5 MG: 5 TABLET, FILM COATED ORAL at 09:08

## 2022-08-29 RX ADMIN — DILTIAZEM HYDROCHLORIDE 60 MG: 60 TABLET, FILM COATED ORAL at 09:08

## 2022-08-29 RX ADMIN — VALSARTAN 160 MG: 80 TABLET, FILM COATED ORAL at 08:08

## 2022-08-29 RX ADMIN — MONTELUKAST 10 MG: 10 TABLET, FILM COATED ORAL at 09:08

## 2022-08-29 RX ADMIN — THIAMINE HCL TAB 100 MG 100 MG: 100 TAB at 09:08

## 2022-08-29 RX ADMIN — FLECAINIDE ACETATE 50 MG: 50 TABLET ORAL at 08:08

## 2022-08-29 RX ADMIN — ALPRAZOLAM 0.25 MG: 0.25 TABLET ORAL at 03:08

## 2022-08-29 RX ADMIN — Medication 6 MG: at 08:08

## 2022-08-29 RX ADMIN — LEVOFLOXACIN 750 MG: 250 TABLET, FILM COATED ORAL at 09:08

## 2022-08-29 RX ADMIN — VALSARTAN 160 MG: 80 TABLET, FILM COATED ORAL at 09:08

## 2022-08-29 NOTE — ASSESSMENT & PLAN NOTE
Nodular density on CXR which appears new, chect CT chest with contrast per radiology recs  Iodine allergy, will premedicate. She has had contrast studies in the past, last 2019 per records  Reviewed CT with new mass?? Infection?? Dr herbert consulted. Added levaquin, holding eliquis- consider bronch if bleeding does not improve .

## 2022-08-29 NOTE — PROGRESS NOTES
[place] - [facility] ([facility])  [facility] Medicine  Progress Note    Patient Name: Marva Perez  MRN: [id number]  Patient Class: IP- Inpatient   Admission Date: 8/26/2022  Length of Stay: 3 days  Attending Physician: Kevin Victoria MD  Primary Care Provider: Kevin Clements MD        Subjective:     Principal Problem:COVID-19        HPI:  81yF with HTN, HLD, GERD, Depression, anxiety, aFib on eliquis, COPD/Emphysema, and chronic hypoxia on 2-3L home oxygen via NC presented to ER with chief complaint of diarrhea and generalized weakness. Pt reports she started with congestion, fatigue, and cough and 5-6 days ago. She was seen in ER 8/22/22 and diagnosed with COVID and flu B. She was started on tamiflu and referred for outpatient antibody infusion. Pt states she started having nausea, vomiting, and diarrhea as well as intermittent fever. She returned to ER 8/23/22 for the new onset GI symptoms. Workup was reassuring and she was discharged home with antiemetics. She received bebtelovimab infusion 8/24/22. She continued with poor appetite and diarrhea and called her PCP who recommended ER evaluation. She denies CP, abd pain, fever/chills, dysuria, hematuria, melena, BRBPR.       Overview/Hospital Course:  8/27 Pt remained stable on 2-3 L NC overnight which is her home dose. VSS, afebrile. She is very anxious. Diarrhea improved, she did have a loose stool this morning sent for c diff testing. CT chest planned for this morning to evaluate her new left nodular denisity on CXR.     8/28 Pt with hemoptysis overnight. Eliquis held. VSS, afebrile. She remains very anxious. C diff negative. Diarrhea improved. CT chest with COPD and severe chronic emphysematous changes, no infiltrate. There is a new soft tissue mass of the right lung base. Dr. Matson whom she follows with outpatient has been consulted. Discussed with daughter, she is concerned about patient going back home alone at discharge but she also states pt does not  want NH. Advised we could order home health at [place], but that is limited visit per week. Daughter states they are looking into sitters at home.    22  Marva Perez is a 81 y.o. female  has eliquis held-still coughing up blood- dr herbert and Dr Wilde following. She did test positive for both flu and covid.         Past Medical History:   Diagnosis Date    Abnormal Pap smear 13    LGSIL    Atrial fibrillation     COPD (chronic obstructive pulmonary disease)     Depression     GERD (gastroesophageal reflux disease)     Hyperlipidemia     Hypertension        Past Surgical History:   Procedure Laterality Date    APPENDECTOMY      BRONCHOSCOPY N/A 2019    Procedure: BRONCHOSCOPY;  Surgeon: Howard Herbert MD;  Location: [facility] OR;  Service: Pulmonary;  Laterality: N/A;    CERVICAL BIOPSY  W/ LOOP ELECTRODE EXCISION      CHOLECYSTECTOMY      COLLATERAL LIGAMENT REPAIR, KNEE      left knee    COLONOSCOPY      DILATION AND CURETTAGE OF UTERUS      SINUS SURGERY         Review of patient's allergies indicates:   Allergen Reactions    Pcn [penicillins] Hives and Itching    Tetracyclines     Bactrim [sulfamethoxazole-trimethoprim] Rash    Ilosone Rash    Iodine and iodide containing products Rash    Sulfa (sulfonamide antibiotics) Hives and Rash       No current facility-administered medications on file prior to encounter.     Current Outpatient Medications on File Prior to Encounter   Medication Sig    albuterol (PROVENTIL/VENTOLIN HFA) 90 mcg/actuation inhaler Inhale 2 puffs into the lungs every 6 (six) hours as needed for Wheezing.    albuterol-ipratropium (DUO-NEB) 2.5 mg-0.5 mg/3 mL nebulizer solution SMARTSI Vial(s) Via Nebulizer Every 12 Hours    ALPRAZolam (XANAX) 0.25 MG tablet TAKE 1 TABLET DURING THE DAY FOR ANXIETY AS NEEDED AND 2 AT NIGHT FOR SLEEP    atenoloL (TENORMIN) 25 MG tablet Take 12.5 mg by mouth once daily.    clobetasoL (TEMOVATE) 0.05 % cream Apply topically 2 (two)  times daily. To rash at the right lower leg    diltiaZEM (CARDIZEM) 120 MG tablet Take 120 mg by mouth 2 (two) times daily.    ELIQUIS 2.5 mg Tab Take 2.5 mg by mouth 2 (two) times daily.    ergocalciferol (ERGOCALCIFEROL) 50,000 unit Cap TAKE 1 CAPSULE BY MOUTH ONE TIME PER WEEK    ergocalciferol (ERGOCALCIFEROL) 50,000 unit Cap TAKE 1 CAPSULE BY MOUTH ONE TIME PER WEEK    EScitalopram oxalate (LEXAPRO) 5 MG Tab TAKE 1 TABLET BY MOUTH EVERY DAY    ezetimibe (ZETIA) 10 mg tablet     flecainide (TAMBOCOR) 50 MG Tab Take 50 mg by mouth 2 (two) times daily.    furosemide (LASIX) 20 MG tablet Take 20 mg by mouth once daily.    gabapentin (NEURONTIN) 100 MG capsule Take 1 capsule (100 mg total) by mouth every evening.    levalbuterol (XOPENEX) 0.63 mg/3 mL nebulizer solution Take 3 mLs (0.63 mg total) by nebulization every 4 (four) hours as needed for Wheezing.    metoclopramide HCl (REGLAN) 10 MG tablet Take 1 tablet (10 mg total) by mouth every 6 (six) hours.    montelukast (SINGULAIR) 10 mg tablet Take 1 tablet (10 mg total) by mouth once daily.    predniSONE (DELTASONE) 10 MG tablet Take 10 mg by mouth once daily.    REPATHA SURECLICK 140 mg/mL PnIj Inject 140 mg into the skin every 14 (fourteen) days.    rifAXIMin (XIFAXAN) 550 mg Tab Take 1 tablet (550 mg total) by mouth 3 (three) times daily.    rosuvastatin (CRESTOR) 40 MG Tab Take 40 mg by mouth every evening.    TRELEGY ELLIPTA 100-62.5-25 mcg DsDv TAKE 1 PUFF BY MOUTH EVERY DAY    valsartan (DIOVAN) 160 MG tablet      Family History       Problem Relation (Age of Onset)    Breast cancer Mother          Tobacco Use    Smoking status: Former     Years: 30.00     Types: Cigarettes     Quit date: 10/30/2006     Years since quitting: 15.8    Smokeless tobacco: Never   Substance and Sexual Activity    Alcohol use: No     Comment: No    Drug use: No    Sexual activity: Not Currently     Birth control/protection: Post-menopausal     Comment:       Review of Systems   Constitutional:  Positive for activity change, appetite change and fatigue. Negative for fever.   HENT:  Positive for hearing loss (chronic). Negative for sinus pressure and sore throat.    Eyes:  Negative for discharge and visual disturbance.   Respiratory:  Positive for cough and shortness of breath.         + hemoptysis   Cardiovascular:  Negative for chest pain, palpitations and leg swelling.   Gastrointestinal:  Positive for diarrhea (improving). Negative for abdominal pain, blood in stool and nausea.   Genitourinary:  Negative for dysuria and hematuria.   Musculoskeletal:  Negative for arthralgias and myalgias.   Neurological:  Positive for weakness (generalized).   Psychiatric/Behavioral:  Negative for confusion. The patient is nervous/anxious.    Objective:     Vital Signs (Most Recent):  Temp: 98 °F (36.7 °C) (08/29/22 0750)  Pulse: 86 (08/29/22 0800)  Resp: (!) 26 (08/29/22 0750)  BP: (!) 182/72 (08/29/22 0750)  SpO2: (!) 93 % (08/29/22 0750) Vital Signs (24h Range):  Temp:  [98 °F (36.7 °C)-98.6 °F (37 °C)] 98 °F (36.7 °C)  Pulse:  [71-98] 86  Resp:  [18-26] 26  SpO2:  [87 %-99 %] 93 %  BP: (137-182)/(60-77) 182/72     Weight: 54.8 kg (120 lb 13 oz)  Body mass index is 22.1 kg/m².    Physical Exam  Vitals and nursing note reviewed.   Constitutional:       General: She is not in acute distress.     Comments: Hard of hearing   HENT:      Head: Normocephalic and atraumatic.      Right Ear: External ear normal.      Left Ear: External ear normal.      Mouth/Throat:      Mouth: Mucous membranes are moist.   Eyes:      Extraocular Movements: Extraocular movements intact.      Conjunctiva/sclera: Conjunctivae normal.      Pupils: Pupils are equal, round, and reactive to light.   Cardiovascular:      Rate and Rhythm: Normal rate. Rhythm irregular.      Heart sounds: Normal heart sounds. No murmur heard.  Pulmonary:      Effort: Pulmonary effort is normal. No respiratory distress.       Breath sounds: Rhonchi present. No wheezing or rales.      Comments: Poor air movement  Abdominal:      General: Bowel sounds are normal. There is no distension.      Palpations: Abdomen is soft.      Tenderness: There is no abdominal tenderness.   Musculoskeletal:      Cervical back: Neck supple.      Right lower leg: No edema.      Left lower leg: No edema.   Neurological:      General: No focal deficit present.      Mental Status: She is alert and oriented to person, place, and time.   Psychiatric:         Mood and Affect: Mood normal.         Behavior: Behavior normal.         CRANIAL NERVES     CN III, IV, VI   Pupils are equal, round, and reactive to light.     Significant Labs: .  Blood Culture: No results for input(s): LABBLOO in the last 48 hours.    BMP:   Recent Labs   Lab 08/29/22  0558   *      K 3.2*      CO2 32*   BUN 15   CREATININE 0.6   CALCIUM 8.0*       CBC:   Recent Labs   Lab 08/28/22  0555 08/29/22  0558   WBC 6.58 6.52   HGB 8.6* 8.4*   HCT 26.0* 26.0*   * 125*       CMP:   Recent Labs   Lab 08/28/22  0555 08/29/22  0558   * 145   K 3.1* 3.2*    105   CO2 29 32*   * 140*   BUN 12 15   CREATININE 0.7 0.6   CALCIUM 7.9* 8.0*   PROT 4.7* 5.0*   ALBUMIN 2.7* 2.9*   BILITOT 0.4 0.6   ALKPHOS 51* 51*   AST 49* 37   ALT 53* 52*   ANIONGAP 9 8     8/27 mag 2.3   8/26 sed rate 22  LDH: 270  Latic 1.0  Procal: 0.08 (0.10)  ESR: 22  Ferritin: 852    Troponin 0.029  8/22 flu B positive   Covid positive   C diff negative   Blood cultures NGTD x 2     Significant Imaging:     CXR:   Chronic lung changes are seen.  Increasing hazy interstitial opacity throughout the left lung, pronounced in the left mid and lower lung zone which could reflect atelectasis, aspiration or possibly a developing pneumonia.  Probable small bilateral pleural effusions.  Calcified granulomas in the left lung.  Nodular density in the left mid lung which appears new as compared  to the previous examination none.  This could possibly represent a nipple shadow.  Heart size is normal.  Calcified atheromatous disease affects the aorta.  Fortunately age-appropriate degenerative changes affect the skeleton.    8/27 CT chest    1. Interval development of a smooth pleural based soft tissue mass of the medial right lower lobe which is of uncertain etiology.  Neoplasia is not excludable.  Further evaluation with PET scan as deemed clinically necessary.  2. 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  3. Granulomatous change.    8/27 EKG Sinus rhythm with Premature supraventricular complexes  Nonspecific T wave abnormality  Abnormal ECG  When compared with ECG of 23-AUG-2022 09:32,  Premature supraventricular complexes are now Present  Confirmed by Wilmar MURRELL, El ([id number]) on 8/26/2022 6:00:40 PM      Assessment/Plan:      * COVID-19  Patient is identified as High risk for severe complications of COVID 19 based on COVID risk score of 6   Initiate standard COVID protocols; COVID-19 testing ,Infection Control notification  and isolation- respiratory, contact and droplet per protocol    Diagnostics: (leukopenia, hyponatremia, hyperferritinemia, elevated troponin, elevated d-dimer, age, and comorbidities are significant predictors of poor clinical outcome)  CBC, CMP, Ferritin, CRP and Portable CXR    Management: Maintain oxygen saturations 92-96% via Nasal Cannula 3 LPM and monitor with continuous/intermittent pulse oximetry. , Inhaled bronchodilators as needed for shortness of breath. and Continuous cardiac monitoring.    Will hold off on remdesivir at this point as she seems stable from resp standpoint; currently on home dose of 2L NC    8/27: pt remains stable on home Oxygen 2-3L NC. Monitor    8/29 pt remains on home O2 demands. Cont duonebs. No remedesivir had outpt infusion for covid and was on tamiflu outpt as well. Dr herbert added yoel  dexamethasone day 4.    Pneumonia due to infectious organism  Starting levaquin today 8/28 per dr matson  Cont duonebs and dexamethasone supplemental O2 .      Hypokalemia  Replete and monitor.      Right lower lobe lung mass  CT chest: Interval development of a smooth pleural based soft tissue mass of the medial right lower lobe which is of uncertain etiology.  Neoplasia is not excludable.  Further evaluation with PET scan as deemed clinically necessary  Dr. Matson consulted, does not feel she is a bronch candidate due to severe COPD and emphysematous lung changes. Would consider PET scan as outpt. Adding levaquin today .      Cough with hemoptysis  Hold eliquis for now  Dr. Matson following added levaquin.        Anxiety  Cont home lexapro and xanax> may need to increase     She is very anxious. She lives alone. Discussed with family, she does not want any assisted living or NH.       Lung mass  Nodular density on CXR which appears new, chect CT chest with contrast per radiology recs  Iodine allergy, will premedicate. She has had contrast studies in the past, last 2019 per records  Reviewed CT with new mass?? Infection?? Dr matson consulted. Added levaquin, holding eliquis- consider bronch if bleeding does not improve .    Influenza B  Diagnosed 8/22/22  She was on tamiflu outpatient, but this may be the cause of her diarrhea. Will DC for now  Stable on home O2.      Chronic atrial fibrillation  Patient with atrial fibrillation which is controlled currently with Beta Blocker, Calcium Channel Blocker and flecainide. Patient is currently in sinus rhythm.[id number] Score: 4. HASBLED Score: Unable to calculate. Anticoagulation indicated. Anticoagulation done with eliquis.    8/28/22: H&H drop from 10.1/30.6 > 8.6/26.0; pt with hemoptysis. Will hold eliquis for now. Consult cardiology in AM. She is followed by Dr. Cox outpatient. Daughter with concerns about holding eliquis, but given her hemoptysis and H&H drop it is my  opinion as well as that of Dr. Matson that the risks of continued anticoagulation outweigh the benefit currently.     8/29 dr Wilde consulted on patient this am. Cont to hold eliquis for now.    Chronic respiratory failure  On 2-3L NC at home  No increased O2 demands currently .    Centrilobular emphysema  Severe changes on CT this puts her at high risk fpr bronch per pulmonology recs. Cont O2 added levauin and cont nebs .    Diarrhea  C diff negative; diarrhea improving  Suspect this is 2/2 tamiflu; will DC as she seems stable from resp standpoint  S/p 1L NS in ER; DC NS .      Emphysema/COPD  On home oxygen 2-3L NC.;      Hyperlipidemia  Cont home crestor.      HTN (hypertension)  Cont home atenolol and increase valsartan to BID  //72.    KATHIE (generalized anxiety disorder)  Cont home xanax.        VTE Risk Mitigation (From admission, onward)         Ordered     Place sequential compression device  Until discontinued         08/29/22 0847                Discharge Planning   ROSAURA:      Code Status: Full Code   Is the patient medically ready for discharge?:     Reason for patient still in hospital (select all that apply): Treatment  Discharge Plan A: Home with family, Home Health                  Carissa Green MD  Department of Hospital Medicine   [place] - Med Surg (3rd Fl)

## 2022-08-29 NOTE — SUBJECTIVE & OBJECTIVE
Interval History: Now sputum is brown    Objective:     Vital Signs (Most Recent):  Temp: 98 °F (36.7 °C) (08/29/22 0750)  Pulse: 91 (08/29/22 0750)  Resp: (!) 26 (08/29/22 0750)  BP: (!) 182/72 (08/29/22 0750)  SpO2: (!) 93 % (08/29/22 0750)   Vital Signs (24h Range):  Temp:  [98 °F (36.7 °C)-98.6 °F (37 °C)] 98 °F (36.7 °C)  Pulse:  [71-98] 91  Resp:  [18-26] 26  SpO2:  [87 %-99 %] 93 %  BP: (137-182)/(60-77) 182/72     Weight: 54.8 kg (120 lb 13 oz)  Body mass index is 22.1 kg/m².    No intake or output data in the 24 hours ending 08/29/22 0802    Physical Exam  Vitals and nursing note reviewed.   Constitutional:       General: She is not in acute distress.     Appearance: Normal appearance. She is well-developed. She is not ill-appearing, toxic-appearing or diaphoretic.   HENT:      Head: Normocephalic and atraumatic.      Jaw: No trismus.      Right Ear: Hearing, tympanic membrane, ear canal and external ear normal.      Left Ear: Hearing, tympanic membrane, ear canal and external ear normal.      Nose: Nose normal. No nasal deformity, mucosal edema or rhinorrhea.      Right Sinus: No maxillary sinus tenderness or frontal sinus tenderness.      Left Sinus: No maxillary sinus tenderness or frontal sinus tenderness.      Mouth/Throat:      Dentition: Normal dentition.      Pharynx: Uvula midline. No posterior oropharyngeal erythema or uvula swelling.   Eyes:      General: Lids are normal. No scleral icterus.     Conjunctiva/sclera: Conjunctivae normal.      Comments: Sclera clear bilat   Neck:      Trachea: Trachea and phonation normal.   Cardiovascular:      Rate and Rhythm: Normal rate and regular rhythm.      Pulses: Normal pulses.      Heart sounds: Normal heart sounds.   Pulmonary:      Effort: Prolonged expiration and respiratory distress (mild to moderate) present.      Breath sounds: Decreased air movement present. Examination of the right-upper field reveals decreased breath sounds. Examination of the  left-upper field reveals decreased breath sounds. Examination of the right-middle field reveals decreased breath sounds. Examination of the left-middle field reveals decreased breath sounds. Examination of the right-lower field reveals decreased breath sounds, wheezing and rhonchi. Examination of the left-lower field reveals decreased breath sounds, wheezing and rhonchi. Decreased breath sounds, wheezing and rhonchi present.   Abdominal:      General: Bowel sounds are normal. There is no distension.      Palpations: Abdomen is soft.      Tenderness: There is no abdominal tenderness.   Musculoskeletal:         General: No deformity. Normal range of motion.      Cervical back: Full passive range of motion without pain and neck supple.   Skin:     General: Skin is warm and dry.      Coloration: Skin is not pale.   Neurological:      Mental Status: She is alert and oriented to person, place, and time.      Motor: No abnormal muscle tone.      Coordination: Coordination normal.   Psychiatric:         Speech: Speech normal.         Behavior: Behavior normal. Behavior is cooperative.         Thought Content: Thought content normal.         Judgment: Judgment normal.       Vents:       Lines/Drains/Airways       Peripheral Intravenous Line  Duration                  Peripheral IV - Single Lumen 08/27/22 0940 20 G Right Forearm 1 day                    Significant Labs:    CBC/Anemia Profile:  Recent Labs   Lab 08/28/22  0555 08/29/22  0558   WBC 6.58 6.52   HGB 8.6* 8.4*   HCT 26.0* 26.0*   * 125*   MCV 91 93   RDW 12.9 13.1        Chemistries:  Recent Labs   Lab 08/28/22  0555 08/29/22  0558   * 145   K 3.1* 3.2*    105   CO2 29 32*   BUN 12 15   CREATININE 0.7 0.6   CALCIUM 7.9* 8.0*   ALBUMIN 2.7* 2.9*   PROT 4.7* 5.0*   BILITOT 0.4 0.6   ALKPHOS 51* 51*   ALT 53* 52*   AST 49* 37       All pertinent labs within the past 24 hours have been reviewed.    Significant Imaging:  I have reviewed all  pertinent imaging results/findings within the past 24 hours.

## 2022-08-29 NOTE — ASSESSMENT & PLAN NOTE
Diagnosed 8/22/22  She was on tamiflu outpatient, but this may be the cause of her diarrhea. Will DC for now  Stable on home O2.

## 2022-08-29 NOTE — PLAN OF CARE
Patient A/Ox4. Extremely anxious regarding O2 needs and recent news of lung nodule. Questions and concerns addressed and answered best to my knowledge.VS stable, with some HTN noted when anxiety is high but rebounds when patient is calm. O2 sats 94-96% on 3L NC. No diarrhea reported this shift. Patient still with bloody sputum. Eliquis discontinued in anticipation of a possible bronch in the next 3-5 days per Dr. Matson. No acute changes noted. Plan of care reviewed and followed.

## 2022-08-29 NOTE — CONSULTS
[place] - [facility] Surg (3rd Fl)  Cardiology  Consult Note    Patient Name: Marva Perez  MRN: [id number]  Admission Date: 8/26/2022  Hospital Length of Stay: 3 days  Code Status: Full Code     Consulting Provider: Mattie Lim NP  Primary Care Physician: Kevin Clements MD  Principal Problem:COVID-19        Inpatient consult to [facility]  Consult performed by: Mattie Lim NP  Consult ordered by: Kevin Victoria MD      Subjective:     Chief Complaint:   Covid    HPI: 81yF with HTN, HLD, GERD, Depression, anxiety, aFib on eliquis, COPD/Emphysema, and chronic hypoxia on 2-3L home oxygen via [place] presented to ER with chief complaint of diarrhea and generalized weakness. Pt reports she started with congestion, fatigue, and cough and 5-6 days ago. She was seen in ER 8/22/22 and diagnosed with COVID and flu B. She was started on tamiflu and referred for outpatient antibody infusion. Pt states she started having nausea, vomiting, and diarrhea as well as intermittent fever. She returned to ER 8/23/22 for the new onset GI symptoms. Workup was reassuring and she was discharged home with antiemetics. She received bebtelovimab infusion 8/24/22. She continued with poor appetite and diarrhea and called her PCP who recommended ER evaluation. She denies CP, abd pain, fever/chills, dysuria, hematuria, melena, BRBPR    Past Medical History:   Diagnosis Date    Abnormal Pap smear 7/17/13    LGSIL    Atrial fibrillation     COPD (chronic obstructive pulmonary disease)     Depression     GERD (gastroesophageal reflux disease)     Hyperlipidemia     Hypertension        Past Surgical History:   Procedure Laterality Date    APPENDECTOMY      BRONCHOSCOPY N/A 8/5/2019    Procedure: BRONCHOSCOPY;  Surgeon: Howard Matson MD;  Location: [facility] OR;  Service: Pulmonary;  Laterality: N/A;    CERVICAL BIOPSY  W/ LOOP ELECTRODE EXCISION      CHOLECYSTECTOMY      COLLATERAL LIGAMENT REPAIR, KNEE      left knee    COLONOSCOPY       DILATION AND CURETTAGE OF UTERUS      SINUS SURGERY         Review of patient's allergies indicates:   Allergen Reactions    Pcn [penicillins] Hives and Itching    Tetracyclines     Bactrim [sulfamethoxazole-trimethoprim] Rash    Ilosone Rash    Iodine and iodide containing products Rash    Sulfa (sulfonamide antibiotics) Hives and Rash       No current facility-administered medications on file prior to encounter.     Current Outpatient Medications on File Prior to Encounter   Medication Sig    albuterol (PROVENTIL/VENTOLIN HFA) 90 mcg/actuation inhaler Inhale 2 puffs into the lungs every 6 (six) hours as needed for Wheezing.    albuterol-ipratropium (DUO-NEB) 2.5 mg-0.5 mg/3 mL nebulizer solution SMARTSI Vial(s) Via Nebulizer Every 12 Hours    ALPRAZolam (XANAX) 0.25 MG tablet TAKE 1 TABLET DURING THE DAY FOR ANXIETY AS NEEDED AND 2 AT NIGHT FOR SLEEP    atenoloL (TENORMIN) 25 MG tablet Take 12.5 mg by mouth once daily.    clobetasoL (TEMOVATE) 0.05 % cream Apply topically 2 (two) times daily. To rash at the right lower leg    diltiaZEM (CARDIZEM) 120 MG tablet Take 120 mg by mouth 2 (two) times daily.    ELIQUIS 2.5 mg Tab Take 2.5 mg by mouth 2 (two) times daily.    ergocalciferol (ERGOCALCIFEROL) 50,000 unit Cap TAKE 1 CAPSULE BY MOUTH ONE TIME PER WEEK    ergocalciferol (ERGOCALCIFEROL) 50,000 unit Cap TAKE 1 CAPSULE BY MOUTH ONE TIME PER WEEK    EScitalopram oxalate (LEXAPRO) 5 MG Tab TAKE 1 TABLET BY MOUTH EVERY DAY    ezetimibe (ZETIA) 10 mg tablet     flecainide (TAMBOCOR) 50 MG Tab Take 50 mg by mouth 2 (two) times daily.    furosemide (LASIX) 20 MG tablet Take 20 mg by mouth once daily.    gabapentin (NEURONTIN) 100 MG capsule Take 1 capsule (100 mg total) by mouth every evening.    levalbuterol (XOPENEX) 0.63 mg/3 mL nebulizer solution Take 3 mLs (0.63 mg total) by nebulization every 4 (four) hours as needed for Wheezing.    metoclopramide HCl (REGLAN) 10 MG tablet Take 1 tablet (10 mg total) by  mouth every 6 (six) hours.    montelukast (SINGULAIR) 10 mg tablet Take 1 tablet (10 mg total) by mouth once daily.    predniSONE (DELTASONE) 10 MG tablet Take 10 mg by mouth once daily.    REPATHA SURECLICK 140 mg/mL PnIj Inject 140 mg into the skin every 14 (fourteen) days.    rifAXIMin (XIFAXAN) 550 mg Tab Take 1 tablet (550 mg total) by mouth 3 (three) times daily.    rosuvastatin (CRESTOR) 40 MG Tab Take 40 mg by mouth every evening.    TRELEGY ELLIPTA 100-62.5-25 mcg DsDv TAKE 1 PUFF BY MOUTH EVERY DAY    valsartan (DIOVAN) 160 MG tablet      Family History       Problem Relation (Age of Onset)    Breast cancer Mother          Tobacco Use    Smoking status: Former     Years: 30.00     Types: Cigarettes     Quit date: 10/30/2006     Years since quitting: 15.8    Smokeless tobacco: Never   Substance and Sexual Activity    Alcohol use: No     Comment: No    Drug use: No    Sexual activity: Not Currently     Birth control/protection: Post-menopausal     Comment:      Review of Systems:  Review of Systems   Constitutional:  Positive for chills.   Respiratory:  Positive for cough and shortness of breath.    All other systems reviewed and are negative.    Objective:     Vital Signs (Most Recent):  Temp: 98 °F (36.7 °C) (08/29/22 0750)  Pulse: 86 (08/29/22 0800)  Resp: (!) 26 (08/29/22 0750)  BP: (!) 182/72 (08/29/22 0750)  SpO2: (!) 93 % (08/29/22 0750)   Vital Signs (24h Range):  Temp:  [98 °F (36.7 °C)-98.6 °F (37 °C)] 98 °F (36.7 °C)  Pulse:  [71-98] 86  Resp:  [18-26] 26  SpO2:  [87 %-99 %] 93 %  BP: (137-182)/(60-77) 182/72     Weight: 54.8 kg (120 lb 13 oz)  Body mass index is 22.1 kg/m².    SpO2: (!) 93 %  O2 Device (Oxygen Therapy): nasal cannula    No intake or output data in the 24 hours ending 08/29/22 0830    Lines/Drains/Airways       Peripheral Intravenous Line  Duration                  Peripheral IV - Single Lumen 08/27/22 0940 20 G Right Forearm 1 day                      Significant  Labs:  Recent Lab Results         08/29/22  0558        Albumin 2.9       Alkaline Phosphatase 51       ALT 52       Anion Gap 8       AST 37       Baso # 0.01       Basophil % 0.2       BILIRUBIN TOTAL 0.6  Comment: For infants and newborns, interpretation of results should be based  on gestational age, weight and in agreement with clinical  observations.    Premature Infant recommended reference ranges:  Up to 24 hours.............<8.0 mg/dL  Up to 48 hours............<12.0 mg/dL  3-5 days..................<15.0 mg/dL  6-29 days.................<15.0 mg/dL         BUN 15       Calcium 8.0       Chloride 105       CO2 32       Creatinine 0.6       Differential Method Automated       eGFR >60       Eos # 0.0       Eosinophil % 0.0       Glucose 140       Gran # (ANC) 5.4       Gran % 82.8       Hematocrit 26.0       Hemoglobin 8.4       Immature Grans (Abs) 0.13  Comment: Mild elevation in immature granulocytes is non specific and   can be seen in a variety of conditions including stress response,   acute inflammation, trauma and pregnancy. Correlation with other   laboratory and clinical findings is essential.         Immature Granulocytes 2.0       Lymph # 0.3       Lymph % 5.2       MCH 29.9       MCHC 32.3       MCV 93       Mono # 0.6       Mono % 9.8       MPV 11.0       nRBC 0       Platelets 125       Potassium 3.2       PROTEIN TOTAL 5.0       RBC 2.81       RDW 13.1       Sodium 145       WBC 6.52                     Last Echocardiogram:  6/21/2022:  The left ventricle is normal in size. Global left ventricular systolic function is normal. The left ventricular ejection fraction is 60%. Left ventricular diastolic function is normal.   Mild mitral annular calcification is noted.  Diffuse thickening of the aortic valve cusps is noted with normal cuspal excursion.  Mild (1+) mitral regurgitation. Mild (1+) aortic regurgitation. Mild (1+) tricuspid regurgitation. Mild to moderate (1-2+) pulmonic regurgitation.    The estimated pulmonary artery systolic pressure is 29 mmHg assuming a right atrial pressure of 3 mmHg.   Last Nuclear &/or ALBERT:  6/21/2022:  This is probably a normal perfusion study.   This scan is suggestive of low risk for future cardiovascular events.   Small perfusion abnormality of mild intensity in the anterior region which is more pronounced on rest images.   The left ventricular cavity is noted to be normal on the stress study. The left ventricular ejection fraction was calculated to be 79% and left ventricular global function is normal.   The study quality is average.   Last Lipids:  Lab Results   Component Value Date    CHOL 158 10/21/2021    CHOL 273 (H) 08/14/2019    CHOL 246 (H) 03/21/2019     Lab Results   Component Value Date    HDL 48 10/21/2021    HDL 40 08/14/2019    HDL 50 03/21/2019     Lab Results   Component Value Date    LDLCALC 93.2 10/21/2021    LDLCALC 210.4 (H) 08/14/2019    LDLCALC 170.2 (H) 03/21/2019     Lab Results   Component Value Date    TRIG 84 10/21/2021    TRIG 113 08/14/2019    TRIG 129 03/21/2019     Lab Results   Component Value Date    CHOLHDL 30.4 10/21/2021    CHOLHDL 14.7 (L) 08/14/2019    CHOLHDL 20.3 03/21/2019       EKG:    Results for orders placed or performed during the hospital encounter of 08/26/22   EKG 12-lead    Narrative    Test Reason : 407.1    Vent. Rate : 074 BPM     Atrial Rate : 074 BPM     P-R Int : 136 ms          QRS Dur : 084 ms      QT Int : 392 ms       P-R-T Axes : 090 086 032 degrees     QTc Int : 435 ms    Sinus rhythm with Premature supraventricular complexes  Nonspecific T wave abnormality  Abnormal ECG    When compared with ECG of 23-AUG-2022 09:32,  Premature supraventricular complexes are now Present  Confirmed by El Urban MD ([id number]) on 8/26/2022 6:00:40 PM    Referred By: RAN   SELF           Confirmed By:El Urban MD           :      Physical Exam:  Physical Exam  Vitals reviewed.   Constitutional:       Appearance: Normal  appearance. She is ill-appearing.   HENT:      Head: Normocephalic and atraumatic.      Mouth/Throat:      Mouth: Mucous membranes are moist.   Cardiovascular:      Rate and Rhythm: Normal rate and regular rhythm.   Pulmonary:      Breath sounds: Wheezing present.   Musculoskeletal:         General: Normal range of motion.   Skin:     General: Skin is warm and dry.   Neurological:      Mental Status: She is alert.       Home Medications:   No current facility-administered medications on file prior to encounter.     Current Outpatient Medications on File Prior to Encounter   Medication Sig Dispense Refill    albuterol (PROVENTIL/VENTOLIN HFA) 90 mcg/actuation inhaler Inhale 2 puffs into the lungs every 6 (six) hours as needed for Wheezing. 1 Inhaler 11    albuterol-ipratropium (DUO-NEB) 2.5 mg-0.5 mg/3 mL nebulizer solution SMARTSI Vial(s) Via Nebulizer Every 12 Hours      ALPRAZolam (XANAX) 0.25 MG tablet TAKE 1 TABLET DURING THE DAY FOR ANXIETY AS NEEDED AND 2 AT NIGHT FOR SLEEP 90 tablet 5    atenoloL (TENORMIN) 25 MG tablet Take 12.5 mg by mouth once daily.      clobetasoL (TEMOVATE) 0.05 % cream Apply topically 2 (two) times daily. To rash at the right lower leg 45 g 1    diltiaZEM (CARDIZEM) 120 MG tablet Take 120 mg by mouth 2 (two) times daily.  11    ELIQUIS 2.5 mg Tab Take 2.5 mg by mouth 2 (two) times daily.  11    ergocalciferol (ERGOCALCIFEROL) 50,000 unit Cap TAKE 1 CAPSULE BY MOUTH ONE TIME PER WEEK 12 capsule 1    ergocalciferol (ERGOCALCIFEROL) 50,000 unit Cap TAKE 1 CAPSULE BY MOUTH ONE TIME PER WEEK 12 capsule 1    EScitalopram oxalate (LEXAPRO) 5 MG Tab TAKE 1 TABLET BY MOUTH EVERY DAY 90 tablet 1    ezetimibe (ZETIA) 10 mg tablet       flecainide (TAMBOCOR) 50 MG Tab Take 50 mg by mouth 2 (two) times daily.  11    furosemide (LASIX) 20 MG tablet Take 20 mg by mouth once daily.      gabapentin (NEURONTIN) 100 MG capsule Take 1 capsule (100 mg total) by mouth every evening. 30 capsule 5     levalbuterol (XOPENEX) 0.63 mg/3 mL nebulizer solution Take 3 mLs (0.63 mg total) by nebulization every 4 (four) hours as needed for Wheezing. 60 mL 5    metoclopramide HCl (REGLAN) 10 MG tablet Take 1 tablet (10 mg total) by mouth every 6 (six) hours. 30 tablet 0    montelukast (SINGULAIR) 10 mg tablet Take 1 tablet (10 mg total) by mouth once daily. 90 tablet 3    predniSONE (DELTASONE) 10 MG tablet Take 10 mg by mouth once daily.      REPATHA SURECLICK 140 mg/mL PnIj Inject 140 mg into the skin every 14 (fourteen) days.      rifAXIMin (XIFAXAN) 550 mg Tab Take 1 tablet (550 mg total) by mouth 3 (three) times daily. 42 tablet 0    rosuvastatin (CRESTOR) 40 MG Tab Take 40 mg by mouth every evening.      TRELEGY ELLIPTA 100-62.5-25 mcg DsDv TAKE 1 PUFF BY MOUTH EVERY DAY  6    valsartan (DIOVAN) 160 MG tablet          Current Inpatient Medications:    Current Facility-Administered Medications:     albuterol inhaler 2 puff, 2 puff, Inhalation, Q6H PRN, Kevin Victoria MD    albuterol-ipratropium 2.5 mg-0.5 mg/3 mL nebulizer solution 3 mL, 3 mL, Nebulization, Q6H, Howard Matson MD, 3 mL at 08/29/22 0635    ALPRAZolam tablet 0.25 mg, 0.25 mg, Oral, TID PRN, Kevin Victoria MD, 0.25 mg at 08/29/22 0344    ascorbic acid (vitamin C) tablet 500 mg, 500 mg, Oral, Daily, Howard Matson MD, 500 mg at 08/28/22 1226    atenolol split tablet 12.5 mg, 12.5 mg, Oral, Daily, Kevin Victoria MD, 12.5 mg at 08/28/22 1017    atorvastatin tablet 10 mg, 10 mg, Oral, QHS, Kevin Victoria MD, 10 mg at 08/28/22 2028    dexamethasone injection 6 mg, 6 mg, Intravenous, Daily, Kevin Victoria MD, 6 mg at 08/28/22 1017    diltiaZEM tablet 60 mg, 60 mg, Oral, Q12H, Kevin Victoria MD, 60 mg at 08/28/22 2029    EScitalopram oxalate tablet 5 mg, 5 mg, Oral, Daily, Kevin Victoria MD, 5 mg at 08/28/22 1018    flecainide tablet 50 mg, 50 mg, Oral, BID, Kevin Victoria MD, 50 mg at 08/28/22 2028    levoFLOXacin tablet 750 mg, 750 mg,  Oral, Daily, Howard Matson MD    loperamide capsule 2 mg, 2 mg, Oral, QID PRN, Kevin Victoria MD, 2 mg at 08/28/22 1821    melatonin tablet 6 mg, 6 mg, Oral, Nightly PRN, SHARMILA Stover III, MD, 6 mg at 08/28/22 2028    montelukast tablet 10 mg, 10 mg, Oral, Daily, Howard Matson MD    thiamine tablet 100 mg, 100 mg, Oral, Daily, Howard Matson MD, 100 mg at 08/28/22 1226    valsartan tablet 160 mg, 160 mg, Oral, Daily, Kevin Victoria MD, 160 mg at 08/28/22 1018         VTE Risk Mitigation (From admission, onward)      None            Assessment:     Active Diagnoses:    Diagnosis Date Noted POA    PRINCIPAL PROBLEM:  COVID-19 [U07.1] 08/22/2022 Yes    Pneumonia due to infectious organism [J18.9] 08/29/2022 Yes    Cough with hemoptysis [R04.2] 08/28/2022 Yes    Right lower lobe lung mass [R91.8] 08/28/2022 Yes    Hypokalemia [E87.6] 08/28/2022 Yes    Anxiety [F41.9] 08/27/2022 Yes    Chronic atrial fibrillation [I48.20] 08/26/2022 Yes    Influenza B [J10.1] 08/26/2022 Yes    Abnormal CXR [R93.89] 08/26/2022 Yes    Chronic respiratory failure [J96.10] 07/31/2019 Yes    Centrilobular emphysema [J43.2] 06/06/2016 Yes    Diarrhea [R19.7] 01/13/2015 Yes    Emphysema/COPD [J43.9] 12/26/2013 Yes    KATHIE (generalized anxiety disorder) [F41.1] 10/30/2012 Yes    HTN (hypertension) [I10] 10/30/2012 Yes    Hyperlipidemia [E78.5] 10/30/2012 Yes      Problems Resolved During this Admission:   Frail wheezing with Hemoptysis due to Covid.  Severe COPD  No PE by CT 2019  PAF, LVEF 60% Mild AI MR 6/22 Normal stress perfusion study.    PLAN:Hold eliquis given anemia and hemoptysis  Continue Atenolol, diltiazem, Flecainide 50, Valsartan as tolerated  Hold statin, given abnormal LFTs  Daily EKG      Thank you for your consult.   Mattie Lim NP-scribed for Dr. Wilde  Cardiology  [place] [facility] ([facility])  08/29/2022 8:30 AM   I attest that I have personally seen and examined this patient. I have reviewed and discussed  the management in detail as outlined above.

## 2022-08-29 NOTE — PT/OT/SLP PROGRESS
Physical Therapy      Patient Name:  Marva Perez   MRN:  [id number]    Patient not seen today secondary to Patient unwilling to participate, Patient fatigue. Will follow-up as appropriate.

## 2022-08-29 NOTE — PROGRESS NOTES
Pharmacist Renal Dose Adjustment Note    Marva Perez is a 81 y.o. female being treated with the medication LEVAQUIN    Patient Data:    Vital Signs (Most Recent):  Temp: 98 °F (36.7 °C) (08/29/22 0750)  Pulse: 86 (08/29/22 0800)  Resp: (!) 26 (08/29/22 0750)  BP: (!) 182/72 (08/29/22 0750)  SpO2: (!) 93 % (08/29/22 0750)   Vital Signs (72h Range):  Temp:  [96.9 °F (36.1 °C)-100.2 °F (37.9 °C)]   Pulse:  []   Resp:  [18-41]   BP: (123-182)/(59-84)   SpO2:  [87 %-100 %]      Recent Labs   Lab 08/27/22  0556 08/28/22  0555 08/29/22  0558   CREATININE 0.7 0.7 0.6     Serum creatinine: 0.6 mg/dL 08/29/22 0558  Estimated creatinine clearance: 59.1 mL/min    Medication: LEVAQUIN dose: 500MG  frequency Q24H will be changed to medication: LEVAQUIN dose: 750MG  frequency: Q24H    Pharmacist's Name: Ray LylesD  Pharmacist's Extension:  [phone]

## 2022-08-29 NOTE — SUBJECTIVE & OBJECTIVE
Past Medical History:   Diagnosis Date    Abnormal Pap smear 14    LGSIL    Atrial fibrillation     COPD (chronic obstructive pulmonary disease)     Depression     GERD (gastroesophageal reflux disease)     Hyperlipidemia     Hypertension        Past Surgical History:   Procedure Laterality Date    APPENDECTOMY      BRONCHOSCOPY N/A 2019    Procedure: BRONCHOSCOPY;  Surgeon: Howard Matson MD;  Location: [facility] OR;  Service: Pulmonary;  Laterality: N/A;    CERVICAL BIOPSY  W/ LOOP ELECTRODE EXCISION      CHOLECYSTECTOMY      COLLATERAL LIGAMENT REPAIR, KNEE      left knee    COLONOSCOPY      DILATION AND CURETTAGE OF UTERUS      SINUS SURGERY         Review of patient's allergies indicates:   Allergen Reactions    Pcn [penicillins] Hives and Itching    Tetracyclines     Bactrim [sulfamethoxazole-trimethoprim] Rash    Ilosone Rash    Iodine and iodide containing products Rash    Sulfa (sulfonamide antibiotics) Hives and Rash       No current facility-administered medications on file prior to encounter.     Current Outpatient Medications on File Prior to Encounter   Medication Sig    albuterol (PROVENTIL/VENTOLIN HFA) 90 mcg/actuation inhaler Inhale 2 puffs into the lungs every 6 (six) hours as needed for Wheezing.    albuterol-ipratropium (DUO-NEB) 2.5 mg-0.5 mg/3 mL nebulizer solution SMARTSI Vial(s) Via Nebulizer Every 12 Hours    ALPRAZolam (XANAX) 0.25 MG tablet TAKE 1 TABLET DURING THE DAY FOR ANXIETY AS NEEDED AND 2 AT NIGHT FOR SLEEP    atenoloL (TENORMIN) 25 MG tablet Take 12.5 mg by mouth once daily.    clobetasoL (TEMOVATE) 0.05 % cream Apply topically 2 (two) times daily. To rash at the right lower leg    diltiaZEM (CARDIZEM) 120 MG tablet Take 120 mg by mouth 2 (two) times daily.    ELIQUIS 2.5 mg Tab Take 2.5 mg by mouth 2 (two) times daily.    ergocalciferol (ERGOCALCIFEROL) 50,000 unit Cap TAKE 1 CAPSULE BY MOUTH ONE TIME PER WEEK    ergocalciferol (ERGOCALCIFEROL) 50,000 unit Cap TAKE 1  CAPSULE BY MOUTH ONE TIME PER WEEK    EScitalopram oxalate (LEXAPRO) 5 MG Tab TAKE 1 TABLET BY MOUTH EVERY DAY    ezetimibe (ZETIA) 10 mg tablet     flecainide (TAMBOCOR) 50 MG Tab Take 50 mg by mouth 2 (two) times daily.    furosemide (LASIX) 20 MG tablet Take 20 mg by mouth once daily.    gabapentin (NEURONTIN) 100 MG capsule Take 1 capsule (100 mg total) by mouth every evening.    levalbuterol (XOPENEX) 0.63 mg/3 mL nebulizer solution Take 3 mLs (0.63 mg total) by nebulization every 4 (four) hours as needed for Wheezing.    metoclopramide HCl (REGLAN) 10 MG tablet Take 1 tablet (10 mg total) by mouth every 6 (six) hours.    montelukast (SINGULAIR) 10 mg tablet Take 1 tablet (10 mg total) by mouth once daily.    predniSONE (DELTASONE) 10 MG tablet Take 10 mg by mouth once daily.    REPATHA SURECLICK 140 mg/mL PnIj Inject 140 mg into the skin every 14 (fourteen) days.    rifAXIMin (XIFAXAN) 550 mg Tab Take 1 tablet (550 mg total) by mouth 3 (three) times daily.    rosuvastatin (CRESTOR) 40 MG Tab Take 40 mg by mouth every evening.    TRELEGY ELLIPTA 100-62.5-25 mcg DsDv TAKE 1 PUFF BY MOUTH EVERY DAY    valsartan (DIOVAN) 160 MG tablet      Family History       Problem Relation (Age of Onset)    Breast cancer Mother          Tobacco Use    Smoking status: Former     Years: 30.00     Types: Cigarettes     Quit date: 10/30/2006     Years since quitting: 15.8    Smokeless tobacco: Never   Substance and Sexual Activity    Alcohol use: No     Comment: No    Drug use: No    Sexual activity: Not Currently     Birth control/protection: Post-menopausal     Comment:      Review of Systems   Constitutional:  Positive for activity change, appetite change and fatigue. Negative for fever.   HENT:  Positive for hearing loss (chronic). Negative for sinus pressure and sore throat.    Eyes:  Negative for discharge and visual disturbance.   Respiratory:  Positive for cough and shortness of breath.         + hemoptysis    Cardiovascular:  Negative for chest pain, palpitations and leg swelling.   Gastrointestinal:  Positive for diarrhea (improving). Negative for abdominal pain, blood in stool and nausea.   Genitourinary:  Negative for dysuria and hematuria.   Musculoskeletal:  Negative for arthralgias and myalgias.   Neurological:  Positive for weakness (generalized).   Psychiatric/Behavioral:  Negative for confusion. The patient is nervous/anxious.    Objective:     Vital Signs (Most Recent):  Temp: 98 °F (36.7 °C) (08/29/22 0750)  Pulse: 86 (08/29/22 0800)  Resp: (!) 26 (08/29/22 0750)  BP: (!) 182/72 (08/29/22 0750)  SpO2: (!) 93 % (08/29/22 0750) Vital Signs (24h Range):  Temp:  [98 °F (36.7 °C)-98.6 °F (37 °C)] 98 °F (36.7 °C)  Pulse:  [71-98] 86  Resp:  [18-26] 26  SpO2:  [87 %-99 %] 93 %  BP: (137-182)/(60-77) 182/72     Weight: 54.8 kg (120 lb 13 oz)  Body mass index is 22.1 kg/m².    Physical Exam  Vitals and nursing note reviewed.   Constitutional:       General: She is not in acute distress.     Comments: Hard of hearing   HENT:      Head: Normocephalic and atraumatic.      Right Ear: External ear normal.      Left Ear: External ear normal.      Mouth/Throat:      Mouth: Mucous membranes are moist.   Eyes:      Extraocular Movements: Extraocular movements intact.      Conjunctiva/sclera: Conjunctivae normal.      Pupils: Pupils are equal, round, and reactive to light.   Cardiovascular:      Rate and Rhythm: Normal rate. Rhythm irregular.      Heart sounds: Normal heart sounds. No murmur heard.  Pulmonary:      Effort: Pulmonary effort is normal. No respiratory distress.      Breath sounds: Rhonchi present. No wheezing or rales.      Comments: Poor air movement  Abdominal:      General: Bowel sounds are normal. There is no distension.      Palpations: Abdomen is soft.      Tenderness: There is no abdominal tenderness.   Musculoskeletal:      Cervical back: Neck supple.      Right lower leg: No edema.      Left lower leg:  No edema.   Neurological:      General: No focal deficit present.      Mental Status: She is alert and oriented to person, place, and time.   Psychiatric:         Mood and Affect: Mood normal.         Behavior: Behavior normal.         CRANIAL NERVES     CN III, IV, VI   Pupils are equal, round, and reactive to light.     Significant Labs: .  Blood Culture: No results for input(s): LABBLOO in the last 48 hours.    BMP:   Recent Labs   Lab 08/29/22  0558   *      K 3.2*      CO2 32*   BUN 15   CREATININE 0.6   CALCIUM 8.0*       CBC:   Recent Labs   Lab 08/28/22 0555 08/29/22  0558   WBC 6.58 6.52   HGB 8.6* 8.4*   HCT 26.0* 26.0*   * 125*       CMP:   Recent Labs   Lab 08/28/22 0555 08/29/22  0558   * 145   K 3.1* 3.2*    105   CO2 29 32*   * 140*   BUN 12 15   CREATININE 0.7 0.6   CALCIUM 7.9* 8.0*   PROT 4.7* 5.0*   ALBUMIN 2.7* 2.9*   BILITOT 0.4 0.6   ALKPHOS 51* 51*   AST 49* 37   ALT 53* 52*   ANIONGAP 9 8     8/27 mag 2.3   8/26 sed rate 22  LDH: 270  Latic 1.0  Procal: 0.08 (0.10)  ESR: 22  Ferritin: 852    Troponin 0.029  8/22 flu B positive   Covid positive   C diff negative   Blood cultures NGTD x 2     Significant Imaging:     CXR:   Chronic lung changes are seen.  Increasing hazy interstitial opacity throughout the left lung, pronounced in the left mid and lower lung zone which could reflect atelectasis, aspiration or possibly a developing pneumonia.  Probable small bilateral pleural effusions.  Calcified granulomas in the left lung.  Nodular density in the left mid lung which appears new as compared to the previous examination none.  This could possibly represent a nipple shadow.  Heart size is normal.  Calcified atheromatous disease affects the aorta.  Fortunately age-appropriate degenerative changes affect the skeleton.    8/27 CT chest    1. Interval development of a smooth pleural based soft tissue mass of the medial right lower lobe which is of  uncertain etiology.  Neoplasia is not excludable.  Further evaluation with PET scan as deemed clinically necessary.  2. 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  3. Granulomatous change.    8/27 EKG Sinus rhythm with Premature supraventricular complexes  Nonspecific T wave abnormality  Abnormal ECG  When compared with ECG of 23-AUG-2022 09:32,  Premature supraventricular complexes are now Present  Confirmed by El Urban MD ([id number]) on 8/26/2022 6:00:40 PM

## 2022-08-29 NOTE — ASSESSMENT & PLAN NOTE
Severe changes on CT this puts her at high risk fpr bronch per pulmonology recs. Cont O2 added levauin and cont nebs .

## 2022-08-29 NOTE — ASSESSMENT & PLAN NOTE
C diff negative; diarrhea improving  Suspect this is 2/2 tamiflu; will DC as she seems stable from resp standpoint  S/p 1L NS in ER; DC NS .

## 2022-08-29 NOTE — ASSESSMENT & PLAN NOTE
Cont home lexapro and xanax> may need to increase     She is very anxious. She lives alone. Discussed with family, she does not want any assisted living or NH.

## 2022-08-29 NOTE — ASSESSMENT & PLAN NOTE
CT chest: Interval development of a smooth pleural based soft tissue mass of the medial right lower lobe which is of uncertain etiology.  Neoplasia is not excludable.  Further evaluation with PET scan as deemed clinically necessary  Dr. Matson consulted, does not feel she is a bronch candidate due to severe COPD and emphysematous lung changes. Would consider PET scan as outpt. Adding levaquin today .

## 2022-08-29 NOTE — PLAN OF CARE
08/29/22 1519   Post-Acute Status   Post-Acute Authorization Home Health;Other   Home Health Status Pending medical clearance/testing   Other Status See Comments   Discharge Delays None known at this time       SW discussed discharge planning with patient's daughter, Mckenna. [facility] brochure emailed to daughter to obtain sitters. [facility] to be ordered at discharge. Discussed difficulties discerning patient's SOB based on medical reasons vs anxiety. Mckenna to discuss with patient's nurse. MARRY to remain available.

## 2022-08-29 NOTE — ASSESSMENT & PLAN NOTE
Patient with atrial fibrillation which is controlled currently with Beta Blocker, Calcium Channel Blocker and flecainide. Patient is currently in sinus rhythm.[id number] Score: 4. HASBLED Score: Unable to calculate. Anticoagulation indicated. Anticoagulation done with eliquis.    8/28/22: H&H drop from 10.1/30.6 > 8.6/26.0; pt with hemoptysis. Will hold eliquis for now. Consult cardiology in AM. She is followed by Dr. Cox outpatient. Daughter with concerns about holding eliquis, but given her hemoptysis and H&H drop it is my opinion as well as that of Dr. Matson that the risks of continued anticoagulation outweigh the benefit currently.     8/29 dr Wilde consulted on patient this am. Cont to hold eliquis for now.

## 2022-08-29 NOTE — PT/OT/SLP PROGRESS
Occupational Therapy      Patient Name:  Marva Perez   MRN:  [id number]    Patient not seen today secondary to Nurse/ JUAN F hold.  Will follow-up 8/30/2022.    8/29/2022

## 2022-08-29 NOTE — PROGRESS NOTES
[facility] ([facility])  Pulmonology  Progress Note    Patient Name: Marva Perez  MRN: [id number]  Admission Date: 8/26/2022  Hospital Length of Stay: 3 days  Code Status: Full Code  Attending Provider: Kevin Victoria MD  Primary Care Provider: Kevin Clements MD   Principal Problem: COVID-19    Subjective:     Interval History: Now sputum is brown    Objective:     Vital Signs (Most Recent):  Temp: 98 °F (36.7 °C) (08/29/22 0750)  Pulse: 91 (08/29/22 0750)  Resp: (!) 26 (08/29/22 0750)  BP: (!) 182/72 (08/29/22 0750)  SpO2: (!) 93 % (08/29/22 0750)   Vital Signs (24h Range):  Temp:  [98 °F (36.7 °C)-98.6 °F (37 °C)] 98 °F (36.7 °C)  Pulse:  [71-98] 91  Resp:  [18-26] 26  SpO2:  [87 %-99 %] 93 %  BP: (137-182)/(60-77) 182/72     Weight: 54.8 kg (120 lb 13 oz)  Body mass index is 22.1 kg/m².    No intake or output data in the 24 hours ending 08/29/22 0802    Physical Exam  Vitals and nursing note reviewed.   Constitutional:       General: She is not in acute distress.     Appearance: Normal appearance. She is well-developed. She is not ill-appearing, toxic-appearing or diaphoretic.   HENT:      Head: Normocephalic and atraumatic.      Jaw: No trismus.      Right Ear: Hearing, tympanic membrane, ear canal and external ear normal.      Left Ear: Hearing, tympanic membrane, ear canal and external ear normal.      Nose: Nose normal. No nasal deformity, mucosal edema or rhinorrhea.      Right Sinus: No maxillary sinus tenderness or frontal sinus tenderness.      Left Sinus: No maxillary sinus tenderness or frontal sinus tenderness.      Mouth/Throat:      Dentition: Normal dentition.      Pharynx: Uvula midline. No posterior oropharyngeal erythema or uvula swelling.   Eyes:      General: Lids are normal. No scleral icterus.     Conjunctiva/sclera: Conjunctivae normal.      Comments: Sclera clear bilat   Neck:      Trachea: Trachea and phonation normal.   Cardiovascular:      Rate and Rhythm: Normal rate and  regular rhythm.      Pulses: Normal pulses.      Heart sounds: Normal heart sounds.   Pulmonary:      Effort: Prolonged expiration and respiratory distress (mild to moderate) present.      Breath sounds: Decreased air movement present. Examination of the right-upper field reveals decreased breath sounds. Examination of the left-upper field reveals decreased breath sounds. Examination of the right-middle field reveals decreased breath sounds. Examination of the left-middle field reveals decreased breath sounds. Examination of the right-lower field reveals decreased breath sounds, wheezing and rhonchi. Examination of the left-lower field reveals decreased breath sounds, wheezing and rhonchi. Decreased breath sounds, wheezing and rhonchi present.   Abdominal:      General: Bowel sounds are normal. There is no distension.      Palpations: Abdomen is soft.      Tenderness: There is no abdominal tenderness.   Musculoskeletal:         General: No deformity. Normal range of motion.      Cervical back: Full passive range of motion without pain and neck supple.   Skin:     General: Skin is warm and dry.      Coloration: Skin is not pale.   Neurological:      Mental Status: She is alert and oriented to person, place, and time.      Motor: No abnormal muscle tone.      Coordination: Coordination normal.   Psychiatric:         Speech: Speech normal.         Behavior: Behavior normal. Behavior is cooperative.         Thought Content: Thought content normal.         Judgment: Judgment normal.       Vents:       Lines/Drains/Airways       Peripheral Intravenous Line  Duration                  Peripheral IV - Single Lumen 08/27/22 0940 20 G Right Forearm 1 day                    Significant Labs:    CBC/Anemia Profile:  Recent Labs   Lab 08/28/22  0555 08/29/22  0558   WBC 6.58 6.52   HGB 8.6* 8.4*   HCT 26.0* 26.0*   * 125*   MCV 91 93   RDW 12.9 13.1        Chemistries:  Recent Labs   Lab 08/28/22  0555 08/29/22  0558   NA  147* 145   K 3.1* 3.2*    105   CO2 29 32*   BUN 12 15   CREATININE 0.7 0.6   CALCIUM 7.9* 8.0*   ALBUMIN 2.7* 2.9*   PROT 4.7* 5.0*   BILITOT 0.4 0.6   ALKPHOS 51* 51*   ALT 53* 52*   AST 49* 37       All pertinent labs within the past 24 hours have been reviewed.    Significant Imaging:  I have reviewed all pertinent imaging results/findings within the past 24 hours.    Assessment/Plan:     * COVID-19  ++ test she went to a wedding   ++ covid and flu B    Pneumonia due to infectious organism  Will start levoq    Cough with hemoptysis  Off eloquise now will do cytology on sputum and if persist do a bronchoscopy in 3 to 5 days    Anxiety  Worried about hemopsis    Abnormal CXR  CT shows a RLL superior segment pleural lesion     Chronic atrial fibrillation  On eloquise has been stopped     Chronic respiratory failure  Patient with Hypoxic Respiratory failure which is Chronic.  she is on home oxygen at 3 LPM. Supplemental oxygen was provided and noted-  .   Signs/symptoms of respiratory failure include- increased work of breathing. Contributing diagnoses includes - COPD Labs and images were reviewed. Patient Has recent ABG, which has been reviewed. Will treat underlying causes and adjust management of respiratory failure as follows- 3    Centrilobular emphysema  Severe end stage    Emphysema/COPD  Severe end stage     HTN (hypertension)  stable    KATHIE (generalized anxiety disorder)  stable        Critical care time spent on the evaluation and treatment of severe organ dysfunction, review of pertinent labs and imaging studies, discussions with consulting providers and discussions with patient/family: 61 minutes.      Howard Matson MD  Pulmonology  [facility] - Med [facility] (3rd Fl)

## 2022-08-29 NOTE — ASSESSMENT & PLAN NOTE
Patient is identified as High risk for severe complications of COVID 19 based on COVID risk score of 6   Initiate standard COVID protocols; COVID-19 testing ,Infection Control notification  and isolation- respiratory, contact and droplet per protocol    Diagnostics: (leukopenia, hyponatremia, hyperferritinemia, elevated troponin, elevated d-dimer, age, and comorbidities are significant predictors of poor clinical outcome)  CBC, CMP, Ferritin, CRP and Portable CXR    Management: Maintain oxygen saturations 92-96% via Nasal Cannula 3 LPM and monitor with continuous/intermittent pulse oximetry. , Inhaled bronchodilators as needed for shortness of breath. and Continuous cardiac monitoring.    Will hold off on remdesivir at this point as she seems stable from resp standpoint; currently on home dose of 2L NC    8/27: pt remains stable on home Oxygen 2-3L NC. Monitor    8/29 pt remains on home O2 demands. Cont duonebs. No remedesivir had outpt infusion for covid and was on tamiflu outpt as well. Dr herbert added levaquin dexamethasone day 4.

## 2022-08-30 ENCOUNTER — TELEPHONE (OUTPATIENT)
Dept: FAMILY MEDICINE | Facility: CLINIC | Age: 82
End: 2022-08-30
Payer: MEDICARE

## 2022-08-30 LAB
ALBUMIN SERPL BCP-MCNC: 2.9 G/DL (ref 3.5–5.2)
ALP SERPL-CCNC: 56 U/L (ref 55–135)
ALT SERPL W/O P-5'-P-CCNC: 52 U/L (ref 10–44)
ANION GAP SERPL CALC-SCNC: 13 MMOL/L (ref 8–16)
AST SERPL-CCNC: 29 U/L (ref 10–40)
BASOPHILS NFR BLD: 0 % (ref 0–1.9)
BILIRUB SERPL-MCNC: 0.8 MG/DL (ref 0.1–1)
BUN SERPL-MCNC: 21 MG/DL (ref 8–23)
CALCIUM SERPL-MCNC: 8.5 MG/DL (ref 8.7–10.5)
CHLORIDE SERPL-SCNC: 104 MMOL/L (ref 95–110)
CO2 SERPL-SCNC: 31 MMOL/L (ref 23–29)
CREAT SERPL-MCNC: 0.7 MG/DL (ref 0.5–1.4)
DIFFERENTIAL METHOD: ABNORMAL
EOSINOPHIL NFR BLD: 0 % (ref 0–8)
ERYTHROCYTE [DISTWIDTH] IN BLOOD BY AUTOMATED COUNT: 13.2 % (ref 11.5–14.5)
EST. GFR  (NO RACE VARIABLE): >60 ML/MIN/1.73 M^2
GLUCOSE SERPL-MCNC: 129 MG/DL (ref 70–110)
HCT VFR BLD AUTO: 28.9 % (ref 37–48.5)
HGB BLD-MCNC: 9.1 G/DL (ref 12–16)
IMM GRANULOCYTES # BLD AUTO: ABNORMAL K/UL (ref 0–0.04)
IMM GRANULOCYTES NFR BLD AUTO: ABNORMAL % (ref 0–0.5)
LYMPHOCYTES NFR BLD: 7 % (ref 18–48)
MCH RBC QN AUTO: 29.2 PG (ref 27–31)
MCHC RBC AUTO-ENTMCNC: 31.5 G/DL (ref 32–36)
MCV RBC AUTO: 93 FL (ref 82–98)
MONOCYTES NFR BLD: 5 % (ref 4–15)
NEUTROPHILS NFR BLD: 84 % (ref 38–73)
NEUTS BAND NFR BLD MANUAL: 4 %
NRBC BLD-RTO: 1 /100 WBC
OVALOCYTES BLD QL SMEAR: ABNORMAL
PLATELET # BLD AUTO: 156 K/UL (ref 150–450)
PMV BLD AUTO: 10.4 FL (ref 9.2–12.9)
POLYCHROMASIA BLD QL SMEAR: ABNORMAL
POTASSIUM SERPL-SCNC: 3.9 MMOL/L (ref 3.5–5.1)
PROT SERPL-MCNC: 5.4 G/DL (ref 6–8.4)
RBC # BLD AUTO: 3.12 M/UL (ref 4–5.4)
SODIUM SERPL-SCNC: 148 MMOL/L (ref 136–145)
WBC # BLD AUTO: 8.22 K/UL (ref 3.9–12.7)

## 2022-08-30 PROCEDURE — 94761 N-INVAS EAR/PLS OXIMETRY MLT: CPT

## 2022-08-30 PROCEDURE — 85027 COMPLETE CBC AUTOMATED: CPT | Performed by: NURSE PRACTITIONER

## 2022-08-30 PROCEDURE — 51701 INSERT BLADDER CATHETER: CPT

## 2022-08-30 PROCEDURE — 63600175 PHARM REV CODE 636 W HCPCS: Performed by: INTERNAL MEDICINE

## 2022-08-30 PROCEDURE — 99233 SBSQ HOSP IP/OBS HIGH 50: CPT | Mod: ,,, | Performed by: INTERNAL MEDICINE

## 2022-08-30 PROCEDURE — 27000207 HC ISOLATION

## 2022-08-30 PROCEDURE — 99900031 HC PATIENT EDUCATION (STAT)

## 2022-08-30 PROCEDURE — 51798 US URINE CAPACITY MEASURE: CPT

## 2022-08-30 PROCEDURE — 27000190 HC CPAP FULL FACE MASK W/VALVE

## 2022-08-30 PROCEDURE — 94660 CPAP INITIATION&MGMT: CPT

## 2022-08-30 PROCEDURE — 25000003 PHARM REV CODE 250: Performed by: NURSE PRACTITIONER

## 2022-08-30 PROCEDURE — 85007 BL SMEAR W/DIFF WBC COUNT: CPT | Performed by: NURSE PRACTITIONER

## 2022-08-30 PROCEDURE — 20000000 HC ICU ROOM

## 2022-08-30 PROCEDURE — 25000003 PHARM REV CODE 250: Performed by: STUDENT IN AN ORGANIZED HEALTH CARE EDUCATION/TRAINING PROGRAM

## 2022-08-30 PROCEDURE — 25000242 PHARM REV CODE 250 ALT 637 W/ HCPCS: Performed by: INTERNAL MEDICINE

## 2022-08-30 PROCEDURE — 80053 COMPREHEN METABOLIC PANEL: CPT | Performed by: NURSE PRACTITIONER

## 2022-08-30 PROCEDURE — 27000221 HC OXYGEN, UP TO 24 HOURS

## 2022-08-30 PROCEDURE — 63600175 PHARM REV CODE 636 W HCPCS: Performed by: STUDENT IN AN ORGANIZED HEALTH CARE EDUCATION/TRAINING PROGRAM

## 2022-08-30 PROCEDURE — 25000003 PHARM REV CODE 250: Performed by: INTERNAL MEDICINE

## 2022-08-30 PROCEDURE — 99233 PR SUBSEQUENT HOSPITAL CARE,LEVL III: ICD-10-PCS | Mod: ,,, | Performed by: INTERNAL MEDICINE

## 2022-08-30 PROCEDURE — 36415 COLL VENOUS BLD VENIPUNCTURE: CPT | Performed by: NURSE PRACTITIONER

## 2022-08-30 PROCEDURE — 94640 AIRWAY INHALATION TREATMENT: CPT

## 2022-08-30 PROCEDURE — 25500020 PHARM REV CODE 255: Performed by: STUDENT IN AN ORGANIZED HEALTH CARE EDUCATION/TRAINING PROGRAM

## 2022-08-30 PROCEDURE — 97110 THERAPEUTIC EXERCISES: CPT

## 2022-08-30 PROCEDURE — 99900035 HC TECH TIME PER 15 MIN (STAT)

## 2022-08-30 PROCEDURE — 63600175 PHARM REV CODE 636 W HCPCS: Performed by: NURSE PRACTITIONER

## 2022-08-30 RX ORDER — DIPHENHYDRAMINE HYDROCHLORIDE 50 MG/ML
50 INJECTION INTRAMUSCULAR; INTRAVENOUS ONCE
Status: COMPLETED | OUTPATIENT
Start: 2022-08-30 | End: 2022-08-30

## 2022-08-30 RX ORDER — CLONIDINE HYDROCHLORIDE 0.1 MG/1
0.1 TABLET ORAL EVERY 8 HOURS PRN
Status: DISCONTINUED | OUTPATIENT
Start: 2022-08-30 | End: 2022-09-09 | Stop reason: HOSPADM

## 2022-08-30 RX ADMIN — ESCITALOPRAM OXALATE 5 MG: 5 TABLET, FILM COATED ORAL at 09:08

## 2022-08-30 RX ADMIN — METHYLPREDNISOLONE SODIUM SUCCINATE 20 MG: 40 INJECTION, POWDER, FOR SOLUTION INTRAMUSCULAR; INTRAVENOUS at 02:08

## 2022-08-30 RX ADMIN — ALPRAZOLAM 0.5 MG: 0.5 TABLET ORAL at 08:08

## 2022-08-30 RX ADMIN — METHYLPREDNISOLONE SODIUM SUCCINATE 20 MG: 40 INJECTION, POWDER, FOR SOLUTION INTRAMUSCULAR; INTRAVENOUS at 05:08

## 2022-08-30 RX ADMIN — DEXAMETHASONE SODIUM PHOSPHATE 6 MG: 4 INJECTION, SOLUTION INTRA-ARTICULAR; INTRALESIONAL; INTRAMUSCULAR; INTRAVENOUS; SOFT TISSUE at 09:08

## 2022-08-30 RX ADMIN — ALPRAZOLAM 0.5 MG: 0.5 TABLET ORAL at 03:08

## 2022-08-30 RX ADMIN — DIPHENHYDRAMINE HYDROCHLORIDE 50 MG: 50 INJECTION, SOLUTION INTRAMUSCULAR; INTRAVENOUS at 12:08

## 2022-08-30 RX ADMIN — THIAMINE HCL TAB 100 MG 100 MG: 100 TAB at 09:08

## 2022-08-30 RX ADMIN — REMDESIVIR 200 MG: 100 INJECTION, POWDER, LYOPHILIZED, FOR SOLUTION INTRAVENOUS at 05:08

## 2022-08-30 RX ADMIN — VALSARTAN 160 MG: 80 TABLET, FILM COATED ORAL at 09:08

## 2022-08-30 RX ADMIN — ALPRAZOLAM 0.5 MG: 0.5 TABLET ORAL at 12:08

## 2022-08-30 RX ADMIN — IPRATROPIUM BROMIDE AND ALBUTEROL SULFATE 3 ML: .5; 3 SOLUTION RESPIRATORY (INHALATION) at 06:08

## 2022-08-30 RX ADMIN — OXYCODONE HYDROCHLORIDE AND ACETAMINOPHEN 500 MG: 500 TABLET ORAL at 09:08

## 2022-08-30 RX ADMIN — DILTIAZEM HYDROCHLORIDE 60 MG: 60 TABLET, FILM COATED ORAL at 09:08

## 2022-08-30 RX ADMIN — VALSARTAN 160 MG: 80 TABLET, FILM COATED ORAL at 08:08

## 2022-08-30 RX ADMIN — FLECAINIDE ACETATE 50 MG: 50 TABLET ORAL at 09:08

## 2022-08-30 RX ADMIN — IOHEXOL 75 ML: 350 INJECTION, SOLUTION INTRAVENOUS at 02:08

## 2022-08-30 RX ADMIN — IPRATROPIUM BROMIDE AND ALBUTEROL SULFATE 3 ML: .5; 3 SOLUTION RESPIRATORY (INHALATION) at 07:08

## 2022-08-30 RX ADMIN — LEVOFLOXACIN 750 MG: 250 TABLET, FILM COATED ORAL at 09:08

## 2022-08-30 RX ADMIN — ATORVASTATIN CALCIUM 10 MG: 10 TABLET, FILM COATED ORAL at 08:08

## 2022-08-30 RX ADMIN — ATENOLOL 12.5 MG: 25 TABLET ORAL at 09:08

## 2022-08-30 RX ADMIN — IPRATROPIUM BROMIDE AND ALBUTEROL SULFATE 3 ML: .5; 3 SOLUTION RESPIRATORY (INHALATION) at 12:08

## 2022-08-30 RX ADMIN — METHYLPREDNISOLONE SODIUM SUCCINATE 20 MG: 40 INJECTION, POWDER, FOR SOLUTION INTRAMUSCULAR; INTRAVENOUS at 10:08

## 2022-08-30 RX ADMIN — FLECAINIDE ACETATE 50 MG: 50 TABLET ORAL at 08:08

## 2022-08-30 RX ADMIN — MONTELUKAST 10 MG: 10 TABLET, FILM COATED ORAL at 09:08

## 2022-08-30 RX ADMIN — DILTIAZEM HYDROCHLORIDE 60 MG: 60 TABLET, FILM COATED ORAL at 08:08

## 2022-08-30 NOTE — ASSESSMENT & PLAN NOTE
Cont home lexapro and xanax>  Increased xanax yesterday    She is very anxious. She lives alone. Discussed with family, she does not want any assisted living or NH.

## 2022-08-30 NOTE — ASSESSMENT & PLAN NOTE
On 2-3L NC at home  Now requiring 6L NC high flow   Will repeat imaging to re-eval for ARDS, CAD, increasing consolidation consistent with bacterial PNA and eval for PE. All in differential for worsening hypoxia

## 2022-08-30 NOTE — NURSING
Spoke with Dr. Green in person and he stated he was ok with the ABG results, and ok with the patient being on 4lpm of oxygen per nasal cannula; informed him of patient's status

## 2022-08-30 NOTE — SUBJECTIVE & OBJECTIVE
Interval History: breathing has worsened still coughing up maroon sputum has flu plus covid and severe Emphysematous Lung disease . CT today . Will start Bipap to avoid intubation     Objective:     Vital Signs (Most Recent):  Temp: 99.8 °F (37.7 °C) (08/30/22 1148)  Pulse: 76 (08/30/22 1201)  Resp: 20 (08/30/22 1201)  BP: (!) 191/81 (08/30/22 1148)  SpO2: 99 % (08/30/22 1201)   Vital Signs (24h Range):  Temp:  [96.6 °F (35.9 °C)-99.8 °F (37.7 °C)] 99.8 °F (37.7 °C)  Pulse:  [64-95] 76  Resp:  [18-34] 20  SpO2:  [89 %-100 %] 99 %  BP: (137-208)/(70-84) 191/81     Weight: 54.8 kg (120 lb 13 oz)  Body mass index is 22.1 kg/m².      Intake/Output Summary (Last 24 hours) at 8/30/2022 1259  Last data filed at 8/29/2022 1700  Gross per 24 hour   Intake 222 ml   Output 300 ml   Net -78 ml       Physical Exam  Vitals and nursing note reviewed.   Constitutional:       General: She is not in acute distress.     Appearance: Normal appearance. She is well-developed. She is not ill-appearing, toxic-appearing or diaphoretic.   HENT:      Head: Normocephalic and atraumatic.      Jaw: No trismus.      Right Ear: Hearing, tympanic membrane, ear canal and external ear normal.      Left Ear: Hearing, tympanic membrane, ear canal and external ear normal.      Nose: Nose normal. No nasal deformity, mucosal edema or rhinorrhea.      Right Sinus: No maxillary sinus tenderness or frontal sinus tenderness.      Left Sinus: No maxillary sinus tenderness or frontal sinus tenderness.      Mouth/Throat:      Dentition: Normal dentition.      Pharynx: Uvula midline. No posterior oropharyngeal erythema or uvula swelling.   Eyes:      General: Lids are normal. No scleral icterus.     Conjunctiva/sclera: Conjunctivae normal.      Comments: Sclera clear bilat   Neck:      Trachea: Trachea and phonation normal.   Cardiovascular:      Rate and Rhythm: Normal rate and regular rhythm.      Pulses: Normal pulses.      Heart sounds: Normal heart sounds.    Pulmonary:      Effort: Prolonged expiration and respiratory distress (mild to moderate) present.      Breath sounds: Decreased air movement present. Examination of the right-upper field reveals decreased breath sounds. Examination of the left-upper field reveals decreased breath sounds. Examination of the right-middle field reveals decreased breath sounds, wheezing and rhonchi. Examination of the left-middle field reveals decreased breath sounds, wheezing and rhonchi. Examination of the right-lower field reveals decreased breath sounds, wheezing and rhonchi. Examination of the left-lower field reveals decreased breath sounds, wheezing and rhonchi. Decreased breath sounds, wheezing and rhonchi present.   Abdominal:      General: Bowel sounds are normal. There is no distension.      Palpations: Abdomen is soft.      Tenderness: There is no abdominal tenderness.   Musculoskeletal:         General: No deformity. Normal range of motion.      Cervical back: Full passive range of motion without pain and neck supple.   Skin:     General: Skin is warm and dry.      Capillary Refill: Capillary refill takes less than 2 seconds.      Coloration: Skin is not pale.   Neurological:      Mental Status: She is alert and oriented to person, place, and time.      Motor: No abnormal muscle tone.      Coordination: Coordination normal.   Psychiatric:         Speech: Speech normal.         Behavior: Behavior normal. Behavior is cooperative.         Thought Content: Thought content normal.         Judgment: Judgment normal.       Vents:       Lines/Drains/Airways       Peripheral Intravenous Line  Duration                  Peripheral IV - Single Lumen 08/27/22 0940 20 G Right Forearm 3 days                    Significant Labs:    CBC/Anemia Profile:  Recent Labs   Lab 08/29/22  0558 08/30/22  0615   WBC 6.52 8.22   HGB 8.4* 9.1*   HCT 26.0* 28.9*   * 156   MCV 93 93   RDW 13.1 13.2        Chemistries:  Recent Labs   Lab  08/29/22  0558 08/30/22  0615    148*   K 3.2* 3.9    104   CO2 32* 31*   BUN 15 21   CREATININE 0.6 0.7   CALCIUM 8.0* 8.5*   ALBUMIN 2.9* 2.9*   PROT 5.0* 5.4*   BILITOT 0.6 0.8   ALKPHOS 51* 56   ALT 52* 52*   AST 37 29       All pertinent labs within the past 24 hours have been reviewed.    Significant Imaging:  I have reviewed all pertinent imaging results/findings within the past 24 hours.

## 2022-08-30 NOTE — SUBJECTIVE & OBJECTIVE
Past Medical History:   Diagnosis Date    Abnormal Pap smear 14    LGSIL    Atrial fibrillation     COPD (chronic obstructive pulmonary disease)     Depression     GERD (gastroesophageal reflux disease)     Hyperlipidemia     Hypertension        Past Surgical History:   Procedure Laterality Date    APPENDECTOMY      BRONCHOSCOPY N/A 2019    Procedure: BRONCHOSCOPY;  Surgeon: Howard Matson MD;  Location: [facility] OR;  Service: Pulmonary;  Laterality: N/A;    CERVICAL BIOPSY  W/ LOOP ELECTRODE EXCISION      CHOLECYSTECTOMY      COLLATERAL LIGAMENT REPAIR, KNEE      left knee    COLONOSCOPY      DILATION AND CURETTAGE OF UTERUS      SINUS SURGERY         Review of patient's allergies indicates:   Allergen Reactions    Pcn [penicillins] Hives and Itching    Tetracyclines     Bactrim [sulfamethoxazole-trimethoprim] Rash    Ilosone Rash    Iodine and iodide containing products Rash    Sulfa (sulfonamide antibiotics) Hives and Rash       No current facility-administered medications on file prior to encounter.     Current Outpatient Medications on File Prior to Encounter   Medication Sig    albuterol (PROVENTIL/VENTOLIN HFA) 90 mcg/actuation inhaler Inhale 2 puffs into the lungs every 6 (six) hours as needed for Wheezing.    albuterol-ipratropium (DUO-NEB) 2.5 mg-0.5 mg/3 mL nebulizer solution SMARTSI Vial(s) Via Nebulizer Every 12 Hours    ALPRAZolam (XANAX) 0.25 MG tablet TAKE 1 TABLET DURING THE DAY FOR ANXIETY AS NEEDED AND 2 AT NIGHT FOR SLEEP    atenoloL (TENORMIN) 25 MG tablet Take 12.5 mg by mouth once daily.    clobetasoL (TEMOVATE) 0.05 % cream Apply topically 2 (two) times daily. To rash at the right lower leg    diltiaZEM (CARDIZEM) 120 MG tablet Take 120 mg by mouth 2 (two) times daily.    ELIQUIS 2.5 mg Tab Take 2.5 mg by mouth 2 (two) times daily.    ergocalciferol (ERGOCALCIFEROL) 50,000 unit Cap TAKE 1 CAPSULE BY MOUTH ONE TIME PER WEEK    ergocalciferol (ERGOCALCIFEROL) 50,000 unit Cap TAKE 1  CAPSULE BY MOUTH ONE TIME PER WEEK    EScitalopram oxalate (LEXAPRO) 5 MG Tab TAKE 1 TABLET BY MOUTH EVERY DAY    ezetimibe (ZETIA) 10 mg tablet     flecainide (TAMBOCOR) 50 MG Tab Take 50 mg by mouth 2 (two) times daily.    furosemide (LASIX) 20 MG tablet Take 20 mg by mouth once daily.    gabapentin (NEURONTIN) 100 MG capsule Take 1 capsule (100 mg total) by mouth every evening.    levalbuterol (XOPENEX) 0.63 mg/3 mL nebulizer solution Take 3 mLs (0.63 mg total) by nebulization every 4 (four) hours as needed for Wheezing.    metoclopramide HCl (REGLAN) 10 MG tablet Take 1 tablet (10 mg total) by mouth every 6 (six) hours.    montelukast (SINGULAIR) 10 mg tablet Take 1 tablet (10 mg total) by mouth once daily.    predniSONE (DELTASONE) 10 MG tablet Take 10 mg by mouth once daily.    REPATHA SURECLICK 140 mg/mL PnIj Inject 140 mg into the skin every 14 (fourteen) days.    rifAXIMin (XIFAXAN) 550 mg Tab Take 1 tablet (550 mg total) by mouth 3 (three) times daily.    rosuvastatin (CRESTOR) 40 MG Tab Take 40 mg by mouth every evening.    TRELEGY ELLIPTA 100-62.5-25 mcg DsDv TAKE 1 PUFF BY MOUTH EVERY DAY    valsartan (DIOVAN) 160 MG tablet      Family History       Problem Relation (Age of Onset)    Breast cancer Mother          Tobacco Use    Smoking status: Former     Years: 30.00     Types: Cigarettes     Quit date: 10/30/2006     Years since quitting: 15.8    Smokeless tobacco: Never   Substance and Sexual Activity    Alcohol use: No     Comment: No    Drug use: No    Sexual activity: Not Currently     Birth control/protection: Post-menopausal     Comment:      Review of Systems   Constitutional:  Positive for activity change, appetite change and fatigue. Negative for fever.   HENT:  Positive for hearing loss (chronic). Negative for sinus pressure and sore throat.    Eyes:  Negative for discharge and visual disturbance.   Respiratory:  Positive for cough and shortness of breath.         + hemoptysis    Cardiovascular:  Negative for chest pain, palpitations and leg swelling.   Gastrointestinal:  Positive for diarrhea (improving). Negative for abdominal pain, blood in stool and nausea.   Genitourinary:  Negative for dysuria and hematuria.   Musculoskeletal:  Negative for arthralgias and myalgias.   Neurological:  Positive for weakness (generalized).   Psychiatric/Behavioral:  Negative for confusion. The patient is nervous/anxious.    Objective:     Vital Signs (Most Recent):  Temp: 96.6 °F (35.9 °C) (08/30/22 0802)  Pulse: 71 (08/30/22 0955)  Resp: (!) 26 (08/30/22 0855)  BP: (!) 175/70 (08/30/22 0802)  SpO2: 97 % (08/30/22 0855) Vital Signs (24h Range):  Temp:  [96.6 °F (35.9 °C)-98.2 °F (36.8 °C)] 96.6 °F (35.9 °C)  Pulse:  [64-95] 71  Resp:  [18-34] 26  SpO2:  [88 %-100 %] 97 %  BP: (137-208)/(63-84) 175/70     Weight: 54.8 kg (120 lb 13 oz)  Body mass index is 22.1 kg/m².    Physical Exam  Vitals and nursing note reviewed.   Constitutional:       General: She is not in acute distress.     Comments: Hard of hearing   HENT:      Head: Normocephalic and atraumatic.      Right Ear: External ear normal.      Left Ear: External ear normal.      Mouth/Throat:      Mouth: Mucous membranes are moist.   Eyes:      Extraocular Movements: Extraocular movements intact.      Conjunctiva/sclera: Conjunctivae normal.      Pupils: Pupils are equal, round, and reactive to light.   Cardiovascular:      Rate and Rhythm: Normal rate. Rhythm irregular.      Heart sounds: Normal heart sounds. No murmur heard.  Pulmonary:      Effort: Pulmonary effort is normal.      Breath sounds: Rhonchi present. No wheezing.      Comments: Poor air movement  Not use of accessory muscle but appears to be working harder to breath today---unsure how much is anxiety driven   Abdominal:      General: Bowel sounds are normal. There is no distension.      Palpations: Abdomen is soft.      Tenderness: There is no abdominal tenderness.   Musculoskeletal:       Cervical back: Neck supple.      Right lower leg: No edema.      Left lower leg: No edema.   Neurological:      General: No focal deficit present.      Mental Status: She is alert and oriented to person, place, and time.   Psychiatric:         Behavior: Behavior normal.         Thought Content: Thought content normal.         CRANIAL NERVES     CN III, IV, VI   Pupils are equal, round, and reactive to light.     Significant Labs: .      BMP:   Recent Labs   Lab 08/30/22 0615   *   *   K 3.9      CO2 31*   BUN 21   CREATININE 0.7   CALCIUM 8.5*       CBC:   Recent Labs   Lab 08/29/22 0558 08/30/22 0615   WBC 6.52 8.22   HGB 8.4* 9.1*   HCT 26.0* 28.9*   * 156       CMP:   Recent Labs   Lab 08/29/22 0558 08/30/22 0615    148*   K 3.2* 3.9    104   CO2 32* 31*   * 129*   BUN 15 21   CREATININE 0.6 0.7   CALCIUM 8.0* 8.5*   PROT 5.0* 5.4*   ALBUMIN 2.9* 2.9*   BILITOT 0.6 0.8   ALKPHOS 51* 56   AST 37 29   ALT 52* 52*   ANIONGAP 8 13     8/27 mag 2.3   8/26 sed rate 22  LDH: 270  Latic 1.0  Procal: 0.08 (0.10)  ESR: 22  Ferritin: 852    Troponin 0.029  8/22 flu B positive   Covid positive   C diff negative   Blood cultures NGTD x 2     Significant Imaging:     CXR:   Chronic lung changes are seen.  Increasing hazy interstitial opacity throughout the left lung, pronounced in the left mid and lower lung zone which could reflect atelectasis, aspiration or possibly a developing pneumonia.  Probable small bilateral pleural effusions.  Calcified granulomas in the left lung.  Nodular density in the left mid lung which appears new as compared to the previous examination none.  This could possibly represent a nipple shadow.  Heart size is normal.  Calcified atheromatous disease affects the aorta.  Fortunately age-appropriate degenerative changes affect the skeleton.    8/27 CT chest    1. Interval development of a smooth pleural based soft tissue mass of the medial  right lower lobe which is of uncertain etiology.  Neoplasia is not excludable.  Further evaluation with PET scan as deemed clinically necessary.  2. 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  3. Granulomatous change.    8/27 EKG Sinus rhythm with Premature supraventricular complexes  Nonspecific T wave abnormality  Abnormal ECG  When compared with ECG of 23-AUG-2022 09:32,  Premature supraventricular complexes are now Present  Confirmed by El Urban MD ([id number]) on 8/26/2022 6:00:40 PM

## 2022-08-30 NOTE — TELEPHONE ENCOUNTER
----- Message from Kanchan Rodriguez sent at 2022 10:51 AM CDT -----  Contact: Self  Marva Perez  MRN: [id number]  : 1940  PCP: Kevin Clements  Home Phone      [phone]  Work Phone      Not on file.  Mobile          [phone]      MESSAGE:     Pt requesting to speak to Dr. Victoria in regards to her hospital stay.    [phone]

## 2022-08-30 NOTE — NURSING
Notified at this time by staff members that patient is having increased shortness of breath and anxiety; called Nichelle PALACIO @[phone] with no answer for potential orders; called Dr. Green at [phone] and informed him of the situation, new order noted for ABG and ativan

## 2022-08-30 NOTE — PT/OT/SLP PROGRESS
"Physical Therapy Treatment    Patient Name:  Marva Perez   MRN:  [id number]    Recommendations:     Discharge Recommendations:  nursing facility, skilled, nursing facility, basic   Discharge Equipment Recommendations: walker, rolling, hospital bed, lift device   Barriers to discharge: Inaccessible home and Decreased caregiver support    Assessment:     Marva Perez is a 81 y.o. female admitted with a medical diagnosis of COVID-19.  She presents with the following impairments/functional limitations:  weakness, impaired endurance, impaired functional mobility, impaired self care skills, decreased safety awareness, impaired cardiopulmonary response to activity.  Pt displayed greatly decreased toleration of PT treatment due to fatigue and self-reported anxiety.  Pt was educated on HEP to be performed 4x/d.    Rehab Prognosis: Poor; patient would benefit from acute skilled PT services to address these deficits and reach maximum level of function.    Recent Surgery: * No surgery found *      Plan:     During this hospitalization, patient to be seen 5 x/week to address the identified rehab impairments via gait training, therapeutic activities, therapeutic exercises and progress toward the following goals:    Plan of Care Expires:  09/02/22    Subjective     Chief Complaint: fatigue  Patient/Family Comments/goals: to "sleep"  Pain/Comfort:  Pain Rating 1: 0/10      Objective:     Communicated with pt prior to session.  Patient found supine with telemetry, peripheral IV, oxygen, pulse ox (continuous) upon PT entry to room.     General Precautions: Standard, fall   Orthopedic Precautions:N/A   Braces: N/A     Functional Mobility:  Refused to perform      AM-PAC 6 CLICK MOBILITY  Turning over in bed (including adjusting bedclothes, sheets and blankets)?: 2  Sitting down on and standing up from a chair with arms (e.g., wheelchair, bedside commode, etc.): 2  Moving from lying on back to sitting on the side of the bed?: " 2  Moving to and from a bed to a chair (including a wheelchair)?: 1  Need to walk in hospital room?: 1  Climbing 3-5 steps with a railing?: 1  Basic Mobility Total Score: 9       Therapeutic Activities and Exercises:   B ankle pumps x 10  B quad sets x 10  B heel slides x 10  B SLRs x 10    Patient left supine with all lines intact and call button in reach..    GOALS:   Multidisciplinary Problems       Physical Therapy Goals          Problem: Physical Therapy    Goal Priority Disciplines Outcome Goal Variances Interventions   Physical Therapy Goal     PT, PT/OT Ongoing, Progressing     Description: Patient will increase functional independence with mobility by performin. Supine to sit with Modified Independent  2. Sit to supine with Modified Independent  3. Bed to chair transfer with Supervision or Set-up Assistancewith or without rolling walker using Step Transfer TECHNIQUE  4. Gait  x 50  feet with Supervision or Set-up Assistance with or without rolling walker  5. Lower extremity exercise program x10 reps per handout, with assistance as needed                           Time Tracking:     PT Received On: 22  PT Start Time: 1005     PT Stop Time: 1020  PT Total Time (min): 15 min     Billable Minutes: Therapeutic Exercise 8    Treatment Type: Treatment  PT/PTA: PT           2022

## 2022-08-30 NOTE — ASSESSMENT & PLAN NOTE
Starting levaquin today 8/28 per dr king Mali garcia and dexamethasone supplemental O2 .    Check CTA

## 2022-08-30 NOTE — NURSING
Spoke with Dr. Green via phone at this time and informed him that the patient's daughter would like the patient to be on continuous pulse oximetry and to have a dietary consult for the patient to have shakes or smoothies; orders entered

## 2022-08-30 NOTE — PT/OT/SLP PROGRESS
Patient transferred to a higher level of care. Patient not appropriate for OT services at this time. Please re-consult Occupational Therapy services once patient is stable and appropriate for OT evaluation and treatment. Current OT orders to be discharged.    Weston Polk OT  08/30/2022

## 2022-08-30 NOTE — PLAN OF CARE
Problem: Infection (Pneumonia)  Goal: Resolution of Infection Signs and Symptoms  Outcome: Ongoing, Not Progressing     Problem: Respiratory Compromise (Pneumonia)  Goal: Effective Oxygenation and Ventilation  Outcome: Ongoing, Not Progressing     Problem: Fall Injury Risk  Goal: Absence of Fall and Fall-Related Injury  Outcome: Ongoing, Not Progressing       Patient admitted with pneumonia; had episodes of anxiety today accompanied with decreased O2 sats and shortness of breath; providers notified, new orders noted; encouraged patient multiple times to slow breathing and take deep breaths; prn xanax given and minimal relief noted, called MD and received new orders; noted patient resting more after ativan administration, stats 92%on 4lpm, MD notified and agrees with plan of care; patient's daughter and son notified today of patient's status, all questions answered

## 2022-08-30 NOTE — PLAN OF CARE
PT RESTING COMFORTABLY ON BIPAP WITH O2 SATURATION OF 95-90%.  PT DENIES SOB.  REMAINS IN NSR AND AFEBRILE.  BP ELEVATED  AND DR. RO AWARE.  TOLERATES WEIGHT SHIFTING.  HEEL LIFT BOOTS AND SCD'S APPLIED.  BLADDER SCAN  ML AND DR. RO MADE AWARE.  WILL IN AND OUT CATH PER MD.  DAUGHTER UPDATED VIA PHONE CALL TO PT CONDITION, LOCATION AND POC.

## 2022-08-30 NOTE — NURSING
Called patient's daughter at this time and gave update on patient's status; informed daughter that patient states she is resting better after the medicine and her O2 sats are better at this time

## 2022-08-30 NOTE — PROGRESS NOTES
[place] [facility] ([facility])  Pulmonology  Progress Note    Patient Name: Marva Perez  MRN: [id number]  Admission Date: 8/26/2022  Hospital Length of Stay: 4 days  Code Status: Full Code  Attending Provider: Kevin Victoria MD  Primary Care Provider: Kevin Clements MD   Principal Problem: COVID-19    Subjective:     Interval History: breathing has worsened still coughing up maroon sputum has flu plus covid and severe Emphysematous Lung disease . CT today . Will start Bipap to avoid intubation     Objective:     Vital Signs (Most Recent):  Temp: 99.8 °F (37.7 °C) (08/30/22 1148)  Pulse: 76 (08/30/22 1201)  Resp: 20 (08/30/22 1201)  BP: (!) 191/81 (08/30/22 1148)  SpO2: 99 % (08/30/22 1201)   Vital Signs (24h Range):  Temp:  [96.6 °F (35.9 °C)-99.8 °F (37.7 °C)] 99.8 °F (37.7 °C)  Pulse:  [64-95] 76  Resp:  [18-34] 20  SpO2:  [89 %-100 %] 99 %  BP: (137-208)/(70-84) 191/81     Weight: 54.8 kg (120 lb 13 oz)  Body mass index is 22.1 kg/m².      Intake/Output Summary (Last 24 hours) at 8/30/2022 1259  Last data filed at 8/29/2022 1700  Gross per 24 hour   Intake 222 ml   Output 300 ml   Net -78 ml       Physical Exam  Vitals and nursing note reviewed.   Constitutional:       General: She is not in acute distress.     Appearance: Normal appearance. She is well-developed. She is not ill-appearing, toxic-appearing or diaphoretic.   HENT:      Head: Normocephalic and atraumatic.      Jaw: No trismus.      Right Ear: Hearing, tympanic membrane, ear canal and external ear normal.      Left Ear: Hearing, tympanic membrane, ear canal and external ear normal.      Nose: Nose normal. No nasal deformity, mucosal edema or rhinorrhea.      Right Sinus: No maxillary sinus tenderness or frontal sinus tenderness.      Left Sinus: No maxillary sinus tenderness or frontal sinus tenderness.      Mouth/Throat:      Dentition: Normal dentition.      Pharynx: Uvula midline. No posterior oropharyngeal erythema or uvula swelling.    Eyes:      General: Lids are normal. No scleral icterus.     Conjunctiva/sclera: Conjunctivae normal.      Comments: Sclera clear bilat   Neck:      Trachea: Trachea and phonation normal.   Cardiovascular:      Rate and Rhythm: Normal rate and regular rhythm.      Pulses: Normal pulses.      Heart sounds: Normal heart sounds.   Pulmonary:      Effort: Prolonged expiration and respiratory distress (mild to moderate) present.      Breath sounds: Decreased air movement present. Examination of the right-upper field reveals decreased breath sounds. Examination of the left-upper field reveals decreased breath sounds. Examination of the right-middle field reveals decreased breath sounds, wheezing and rhonchi. Examination of the left-middle field reveals decreased breath sounds, wheezing and rhonchi. Examination of the right-lower field reveals decreased breath sounds, wheezing and rhonchi. Examination of the left-lower field reveals decreased breath sounds, wheezing and rhonchi. Decreased breath sounds, wheezing and rhonchi present.   Abdominal:      General: Bowel sounds are normal. There is no distension.      Palpations: Abdomen is soft.      Tenderness: There is no abdominal tenderness.   Musculoskeletal:         General: No deformity. Normal range of motion.      Cervical back: Full passive range of motion without pain and neck supple.   Skin:     General: Skin is warm and dry.      Capillary Refill: Capillary refill takes less than 2 seconds.      Coloration: Skin is not pale.   Neurological:      Mental Status: She is alert and oriented to person, place, and time.      Motor: No abnormal muscle tone.      Coordination: Coordination normal.   Psychiatric:         Speech: Speech normal.         Behavior: Behavior normal. Behavior is cooperative.         Thought Content: Thought content normal.         Judgment: Judgment normal.       Vents:       Lines/Drains/Airways       Peripheral Intravenous Line  Duration                   Peripheral IV - Single Lumen 08/27/22 0940 20 G Right Forearm 3 days                    Significant Labs:    CBC/Anemia Profile:  Recent Labs   Lab 08/29/22  0558 08/30/22  0615   WBC 6.52 8.22   HGB 8.4* 9.1*   HCT 26.0* 28.9*   * 156   MCV 93 93   RDW 13.1 13.2        Chemistries:  Recent Labs   Lab 08/29/22  0558 08/30/22  0615    148*   K 3.2* 3.9    104   CO2 32* 31*   BUN 15 21   CREATININE 0.6 0.7   CALCIUM 8.0* 8.5*   ALBUMIN 2.9* 2.9*   PROT 5.0* 5.4*   BILITOT 0.6 0.8   ALKPHOS 51* 56   ALT 52* 52*   AST 37 29       All pertinent labs within the past 24 hours have been reviewed.    Significant Imaging:  I have reviewed all pertinent imaging results/findings within the past 24 hours.    Assessment/Plan:     * COVID-19  ++ test she went to a wedding   ++ covid and flu B    Right lower lobe lung mass  Will need to access ++ hemoptysis    Chronic atrial fibrillation  On eloquise has been stopped     Atrial fibrillation with RVR  Was on eloquise now taken off    Chronic respiratory failure  Patient with Hypoxic Respiratory failure which is Chronic.  she is on home oxygen at 3 LPM. Supplemental oxygen was provided and noted-  .   Signs/symptoms of respiratory failure include- increased work of breathing. Contributing diagnoses includes - COPD Labs and images were reviewed. Patient Has recent ABG, which has been reviewed. Will treat underlying causes and adjust management of respiratory failure as follows- 3    Centrilobular emphysema  Severe end stage    Emphysema/COPD  Severe end stage     HTN (hypertension)  stable    KATHIE (generalized anxiety disorder)  stable    Critical care time spent on the evaluation and treatment of severe organ dysfunction, review of pertinent labs and imaging studies, discussions with consulting providers and discussions with patient/family: 91 minutes.         Howard Matson MD  Pulmonology  [facility] - [facility] S[facility] (3rd Fl)

## 2022-08-30 NOTE — NURSING
Pt lying in bed. Pt sons present at bedside. Pt asleep, but arousable to touch. Pt tachypneic at 26%, sats 96% on 6L NC. Pt lung sounds coarse. Pt coughing up dark red/maroon sputum. PT lethargic stating she wants to sleep. Pt anxious at times.     Dr. Taylor updated on patients condition.

## 2022-08-30 NOTE — ASSESSMENT & PLAN NOTE
Cont home atenolol and increased valsartan to BID yesterday  //73.  Some highs likely anxiety driven

## 2022-08-30 NOTE — ASSESSMENT & PLAN NOTE
Patient is identified as High risk for severe complications of COVID 19 based on COVID risk score of 6   Initiate standard COVID protocols; COVID-19 testing ,Infection Control notification  and isolation- respiratory, contact and droplet per protocol    Diagnostics: (leukopenia, hyponatremia, hyperferritinemia, elevated troponin, elevated d-dimer, age, and comorbidities are significant predictors of poor clinical outcome)  CBC, CMP, Ferritin, CRP and Portable CXR    Management: Maintain oxygen saturations 92-96% via Nasal Cannula 6 LPM and monitor with continuous/intermittent pulse oximetry. , Inhaled bronchodilators as needed for shortness of breath. and Continuous cardiac monitoring.    Will hold off on remdesivir at this point as she seems stable from resp standpoint; currently on home dose of 2L NC    8/27: pt remains stable on home Oxygen 2-3L NC. Monitor    8/30: worsening hypoxia  Cont steroids  Cont levaquin  Discussing with pharmacy based on [facility] protocol if any benefit for remdesivir at this point (s/p MAB outpatient) vs toci  Repeat imaging pending    8/29 pt remains on home O2 demands. Cont duonebs. No remedesivir had outpt infusion for covid and was on tamiflu outpt as well. Dr herbert added levaquin dexamethasone day 4.    8/30 dexamethasone day 5- call pharmacy for consult- pt now with increased o2 demands

## 2022-08-30 NOTE — PLAN OF CARE
Patient's daughter, Mckenna, presented a POA for patient this morning; however, upon review, it does not appear to include medical decision making. MARRY has emailed a copy to the [facility] legal department to review and clarify whether or not this document contains medical decision making. Awaiting response. MD, NP, nursing and case management are aware.

## 2022-08-30 NOTE — NURSING
Called patient's daughter at this time with update; informed her that patient stated she is feeling a little better and O@ sats maintained at 4lpm per n/c

## 2022-08-30 NOTE — ASSESSMENT & PLAN NOTE
Patient with atrial fibrillation which is controlled currently with Beta Blocker, Calcium Channel Blocker and flecainide. Patient is currently in sinus rhythm.[id number] Score: 4. HASBLED Score: Unable to calculate. Anticoagulation indicated. Anticoagulation done with eliquis.    8/28/22: H&H drop from 10.1/30.6 > 8.6/26.0; pt with hemoptysis. Will hold eliquis for now. Consult cardiology in AM. She is followed by Dr. Cox outpatient. Daughter with concerns about holding eliquis, but given her hemoptysis and H&H drop it is my opinion as well as that of Dr. Matson that the risks of continued anticoagulation outweigh the benefit currently.     8/29 dr Wilde consulted on patient this am. Cont to hold eliquis for now.    8/30 cont to hold eliquis as she is still having hemoptysis   Cont atenolol and flecainide   Cont telemetry  Monitor electrolytes

## 2022-08-30 NOTE — TELEPHONE ENCOUNTER
Mckenna came by the office get p/w (advanced directives) signed. Dr Clements took care of that for her.

## 2022-08-30 NOTE — ASSESSMENT & PLAN NOTE
Diagnosed 8/22/22  She was on tamiflu outpatient, but this may be the cause of her diarrhea. Will DC for now

## 2022-08-30 NOTE — ASSESSMENT & PLAN NOTE
Per Dr. Matson- Severe changes on CT this puts her at high risk fpr bronch per pulmonology recs. Cont O2 added levauin and cont nebs .    repeat CTA today due to worsening hypoxia. Ensure no PE and re-eval lung parenchyma (? ARDS, DAH)

## 2022-08-30 NOTE — ASSESSMENT & PLAN NOTE
Nodular density on CXR which appears new, chect CT chest with contrast per radiology recs  Iodine allergy, will premedicate. She has had contrast studies in the past, last 2019 per records  Reviewed CT with new mass?? Infection?? Dr herbert consulted. Added levaquin, holding eliquis- consider bronch if bleeding does not improve .  Cont levaquin and repeat CTA today due to worsening hypoxia

## 2022-08-30 NOTE — PROGRESS NOTES
"Daughter, Mckenna Larson, presented to hospital today with POA forms. On further review these forms are not for medical decision making. They were reviewed by [facility] legal team as well who agree they are not for medical decision making.    On my assessment today patient is alert and oriented to person, place and situation. For time she gives her birth date which was correct but not today's date. She is able to tell me Armando is president.    At this time I do believe patient has decision making capacity.     I asked for clarification of code status. She states if her heart stops beating she would like chest compressions and defibrillation. If she can no longer breath on her own I asked if she would like to be intubated, she nodded yes.    I asked patient if she is no longer to make her own medical decision who should make decisions for her. She stated "Micha and Edie". I asked again to clarify if both of her children should make medical decisions together and she replied "yes"    I asked if she was aware Mary has forms stating she has financial POA. She nodded yes. I asked if she remembers signing any forms for medical POA and she states she can not remember.     At this time patient will remain FULL CODE per her wishes expressed to me today. Code status can reassessed.       "

## 2022-08-30 NOTE — PROGRESS NOTES
[place] - Med Surg ([facility])  Cardiology  Progress Note    Patient Name: Marva Perez  MRN: [id number]  Admission Date: 8/26/2022  Hospital Length of Stay: 4 days  Code Status: Full Code   Attending Physician: Kevin Victoria MD   Primary Care Physician: Kevin Clements MD  Expected Discharge Date:   Principal Problem:COVID-19    Subjective:     Brief HPI:    81yF with HTN, HLD, GERD, Depression, anxiety, aFib on eliquis, COPD/Emphysema, and chronic hypoxia on 2-3L home oxygen via NC presented to ER with chief complaint of diarrhea and generalized weakness. Pt reports she started with congestion, fatigue, and cough and 5-6 days ago. She was seen in ER 8/22/22 and diagnosed with COVID and flu B. She was started on tamiflu and referred for outpatient antibody infusion. Pt states she started having nausea, vomiting, and diarrhea as well as intermittent fever. She returned to ER 8/23/22 for the new onset GI symptoms. Workup was reassuring and she was discharged home with antiemetics. She received bebtelovimab infusion 8/24/22. She continued with poor appetite and diarrhea and called her PCP who recommended ER evaluation. She denies CP, abd pain, fever/chills, dysuria, hematuria, melena, BRBPR      Interval History: BP above goal today. Per RN report, pt extremely anxious this morning. Continue supportive care per primary     Review of Systems:  Review of Systems   Constitutional:  Positive for chills.   Respiratory:  Positive for cough and shortness of breath.    All other systems reviewed and are negative.    Social History:  Social History     Tobacco Use    Smoking status: Former     Years: 30.00     Types: Cigarettes     Quit date: 10/30/2006     Years since quitting: 15.8    Smokeless tobacco: Never   Substance Use Topics    Alcohol use: No     Comment: No       Objective:     Vital Signs (Most Recent):  Temp: 96.6 °F (35.9 °C) (08/30/22 0802)  Pulse: 85 (08/30/22 0802)  Resp: (!) 24 (08/30/22 0802)  BP: (!)  175/70 (08/30/22 0802)  SpO2: 98 % (08/30/22 0802)   Vital Signs (24h Range):  Temp:  [96.6 °F (35.9 °C)-98.2 °F (36.8 °C)] 96.6 °F (35.9 °C)  Pulse:  [64-99] 85  Resp:  [18-34] 24  SpO2:  [88 %-100 %] 98 %  BP: (137-208)/(63-84) 175/70     Weight: 54.8 kg (120 lb 13 oz)  Body mass index is 22.1 kg/m².    SpO2: 98 %  O2 Device (Oxygen Therapy): High Flow nasal Cannula      Intake/Output Summary (Last 24 hours) at 8/30/2022 0813  Last data filed at 8/29/2022 1700  Gross per 24 hour   Intake 222 ml   Output 300 ml   Net -78 ml       Lines/Drains/Airways       Peripheral Intravenous Line  Duration                  Peripheral IV - Single Lumen 08/27/22 0940 20 G Right Forearm 2 days                    VTE Risk Mitigation (From admission, onward)           Ordered     Place sequential compression device  Until discontinued         08/29/22 0847                    Significant Labs:   Recent Lab Results         08/30/22  0615   08/29/22  1535        POC THb   9.5       POC O2Hb Arterial   86.5       POC COHb   2.0       POC MetHb   1.0       Albumin 2.9         Alkaline Phosphatase 56         Allens Test   Pass       ALT 52         Anion Gap 13         AST 29         Bands 4.0         Basophil % 0.0         BILIRUBIN TOTAL 0.8  Comment: For infants and newborns, interpretation of results should be based  on gestational age, weight and in agreement with clinical  observations.    Premature Infant recommended reference ranges:  Up to 24 hours.............<8.0 mg/dL  Up to 48 hours............<12.0 mg/dL  3-5 days..................<15.0 mg/dL  6-29 days.................<15.0 mg/dL           Site   Left Radial       BUN 21         Calcium 8.5         Chloride 104         CO2 31         Creatinine 0.7         DelSys   Nasal Cannula  Comment: NC 4 lpm       Differential Method Manual         eGFR >60         Eosinophil % 0.0         FiO2   36       Glucose 129         Gran % 84.0         Hematocrit 28.9         Hemoglobin 9.1          Immature Grans (Abs) CANCELED  Comment: Mild elevation in immature granulocytes is non specific and   can be seen in a variety of conditions including stress response,   acute inflammation, trauma and pregnancy. Correlation with other   laboratory and clinical findings is essential.    Result canceled by the ancillary.           Immature Granulocytes CANCELED  Comment: Result canceled by the ancillary.         Lymph % 7.0         MCH 29.2         MCHC 31.5         MCV 93         Mono % 5.0         MPV 10.4         nRBC 1         Ovalocytes Occasional         Platelets 156         POC BE   11.60       POC HCO3   36.50       POC PCO2   49       POC PH   7.480       POC PO2   48       POC SATURATED O2   89.1       POC TCO2   38       Poly Occasional         Potassium 3.9         PROTEIN TOTAL 5.4         RBC 3.12         RDW 13.2         Sodium 148         WBC 8.22                     Last Lipids:     Lab Results   Component Value Date    CHOL 158 10/21/2021    CHOL 273 (H) 08/14/2019    CHOL 246 (H) 03/21/2019     Lab Results   Component Value Date    HDL 48 10/21/2021    HDL 40 08/14/2019    HDL 50 03/21/2019     Lab Results   Component Value Date    LDLCALC 93.2 10/21/2021    LDLCALC 210.4 (H) 08/14/2019    LDLCALC 170.2 (H) 03/21/2019     Lab Results   Component Value Date    TRIG 84 10/21/2021    TRIG 113 08/14/2019    TRIG 129 03/21/2019     Lab Results   Component Value Date    CHOLHDL 30.4 10/21/2021    CHOLHDL 14.7 (L) 08/14/2019    CHOLHDL 20.3 03/21/2019             Physical Exam:  Physical Exam  Vitals reviewed.   Constitutional:       Appearance: Normal appearance. She is ill-appearing.   HENT:      Head: Normocephalic and atraumatic.      Mouth/Throat:      Mouth: Mucous membranes are moist.   Cardiovascular:      Rate and Rhythm: Normal rate and regular rhythm.   Pulmonary:      Breath sounds: Wheezing present.   Musculoskeletal:         General: Normal range of motion.   Skin:     General: Skin is  warm and dry.   Neurological:      Mental Status: She is alert.       Assessment:     Active Diagnoses:    Diagnosis Date Noted POA    PRINCIPAL PROBLEM:  COVID-19 [U07.1] 2022 Yes    Pneumonia due to infectious organism [J18.9] 2022 Yes    Cough with hemoptysis [R04.2] 2022 Yes    Right lower lobe lung mass [R91.8] 2022 Yes    Hypokalemia [E87.6] 2022 Yes    Anxiety [F41.9] 2022 Yes    Chronic atrial fibrillation [I48.20] 2022 Yes    Influenza B [J10.1] 2022 Yes    Lung mass [R91.8] 2022 Yes    Chronic respiratory failure [J96.10] 2019 Yes    Centrilobular emphysema [J43.2] 2016 Yes    Diarrhea [R19.7] 2015 Yes    Emphysema/COPD [J43.9] 2013 Yes    KATHIE (generalized anxiety disorder) [F41.1] 10/30/2012 Yes    HTN (hypertension) [I10] 10/30/2012 Yes    Hyperlipidemia [E78.5] 10/30/2012 Yes      Problems Resolved During this Admission:       Home Medications:  No current facility-administered medications on file prior to encounter.     Current Outpatient Medications on File Prior to Encounter   Medication Sig Dispense Refill    albuterol (PROVENTIL/VENTOLIN HFA) 90 mcg/actuation inhaler Inhale 2 puffs into the lungs every 6 (six) hours as needed for Wheezing. 1 Inhaler 11    albuterol-ipratropium (DUO-NEB) 2.5 mg-0.5 mg/3 mL nebulizer solution SMARTSI Vial(s) Via Nebulizer Every 12 Hours      ALPRAZolam (XANAX) 0.25 MG tablet TAKE 1 TABLET DURING THE DAY FOR ANXIETY AS NEEDED AND 2 AT NIGHT FOR SLEEP 90 tablet 5    atenoloL (TENORMIN) 25 MG tablet Take 12.5 mg by mouth once daily.      clobetasoL (TEMOVATE) 0.05 % cream Apply topically 2 (two) times daily. To rash at the right lower leg 45 g 1    diltiaZEM (CARDIZEM) 120 MG tablet Take 120 mg by mouth 2 (two) times daily.  11    ELIQUIS 2.5 mg Tab Take 2.5 mg by mouth 2 (two) times daily.  11    ergocalciferol (ERGOCALCIFEROL) 50,000 unit Cap TAKE 1 CAPSULE BY MOUTH ONE TIME PER WEEK 12  capsule 1    ergocalciferol (ERGOCALCIFEROL) 50,000 unit Cap TAKE 1 CAPSULE BY MOUTH ONE TIME PER WEEK 12 capsule 1    EScitalopram oxalate (LEXAPRO) 5 MG Tab TAKE 1 TABLET BY MOUTH EVERY DAY 90 tablet 1    ezetimibe (ZETIA) 10 mg tablet       flecainide (TAMBOCOR) 50 MG Tab Take 50 mg by mouth 2 (two) times daily.  11    furosemide (LASIX) 20 MG tablet Take 20 mg by mouth once daily.      gabapentin (NEURONTIN) 100 MG capsule Take 1 capsule (100 mg total) by mouth every evening. 30 capsule 5    levalbuterol (XOPENEX) 0.63 mg/3 mL nebulizer solution Take 3 mLs (0.63 mg total) by nebulization every 4 (four) hours as needed for Wheezing. 60 mL 5    metoclopramide HCl (REGLAN) 10 MG tablet Take 1 tablet (10 mg total) by mouth every 6 (six) hours. 30 tablet 0    montelukast (SINGULAIR) 10 mg tablet Take 1 tablet (10 mg total) by mouth once daily. 90 tablet 3    predniSONE (DELTASONE) 10 MG tablet Take 10 mg by mouth once daily.      REPATHA SURECLICK 140 mg/mL PnIj Inject 140 mg into the skin every 14 (fourteen) days.      rifAXIMin (XIFAXAN) 550 mg Tab Take 1 tablet (550 mg total) by mouth 3 (three) times daily. 42 tablet 0    rosuvastatin (CRESTOR) 40 MG Tab Take 40 mg by mouth every evening.      TRELEGY ELLIPTA 100-62.5-25 mcg DsDv TAKE 1 PUFF BY MOUTH EVERY DAY  6    valsartan (DIOVAN) 160 MG tablet          Current Inpatient Medications:    Current Facility-Administered Medications:     albuterol inhaler 2 puff, 2 puff, Inhalation, Q6H PRN, Kevin Victoria MD    albuterol-ipratropium 2.5 mg-0.5 mg/3 mL nebulizer solution 3 mL, 3 mL, Nebulization, Q6H, Howard Matson MD, 3 mL at 08/30/22 0704    ALPRAZolam tablet 0.5 mg, 0.5 mg, Oral, TID PRN, Nichelle Llanos NP, 0.5 mg at 08/30/22 0338    ascorbic acid (vitamin C) tablet 500 mg, 500 mg, Oral, Daily, Howard aMtson MD, 500 mg at 08/29/22 0925    atenolol split tablet 12.5 mg, 12.5 mg, Oral, Daily, Kevin Victoria MD, 12.5 mg at 08/29/22 0925    atorvastatin  tablet 10 mg, 10 mg, Oral, QHS, Kevin Victoria MD, 10 mg at 08/29/22 2012    dexamethasone injection 6 mg, 6 mg, Intravenous, Daily, Kevin Victoria MD, 6 mg at 08/29/22 0924    diltiaZEM tablet 60 mg, 60 mg, Oral, Q12H, Kevin Victoria MD, 60 mg at 08/29/22 2012    EScitalopram oxalate tablet 5 mg, 5 mg, Oral, Daily, Kevin Victoria MD, 5 mg at 08/29/22 0925    flecainide tablet 50 mg, 50 mg, Oral, BID, Kevin Victoria MD, 50 mg at 08/29/22 2012    levoFLOXacin tablet 750 mg, 750 mg, Oral, Daily, Howard Matson MD, 750 mg at 08/29/22 0924    loperamide capsule 2 mg, 2 mg, Oral, QID PRN, Kevin Victoria MD, 2 mg at 08/28/22 1821    melatonin tablet 6 mg, 6 mg, Oral, Nightly PRN, SHARMILA Stover III, MD, 6 mg at 08/29/22 2012    montelukast tablet 10 mg, 10 mg, Oral, Daily, Howard Matson MD, 10 mg at 08/29/22 0925    thiamine tablet 100 mg, 100 mg, Oral, Daily, Howard Matson MD, 100 mg at 08/29/22 0924    valsartan tablet 160 mg, 160 mg, Oral, BID, Nichelle Llanos NP, 160 mg at 08/29/22 2012    VTE Risk Mitigation (From admission, onward)           Ordered     Place sequential compression device  Until discontinued         08/29/22 0847                        Plan:   DX: BP elevated this AM  Frail wheezing with Hemoptysis due to Covid.  Severe COPD  No PE by CT 2019  PAF, LVEF 60% Mild AI MR 6/22 Normal stress perfusion study.    PLAN:  Add amlodipine 2.5 mg for BP      Mattie Lim NP-scribed for Dr. Wilde  Cardiology  [facility] - Med Surg (3rd Fl)  08/30/2022   I attest that I have personally reviewed and discussed the management in detail as outlined above.

## 2022-08-30 NOTE — ASSESSMENT & PLAN NOTE
CT chest: Interval development of a smooth pleural based soft tissue mass of the medial right lower lobe which is of uncertain etiology.  Neoplasia is not excludable.  Further evaluation with PET scan as deemed clinically necessary  Dr. Matson consulted, does not feel she is a bronch candidate due to severe COPD and emphysematous lung changes. Would consider PET scan as outpt. Added  levaquin 8/29    Repeat CTA today to re-eval lung parenchyma and ensure no PE as cause of worsening hypoxia  Cont levaquin for now

## 2022-08-30 NOTE — NURSING
Lexa Ruby Supervisor transported patient to CT scan with transporter.     I called report to VIVIAN Clayton in ICU. Pt to go straight from CT to ICU per Lexa Ruby Sup.

## 2022-08-30 NOTE — PROGRESS NOTES
[place] - [facility] Surg ([facility])  [facility] Medicine  Progress Note    Patient Name: Marva Perez  MRN: [id number]  Patient Class: IP- Inpatient   Admission Date: 8/26/2022  Length of Stay: 4 days  Attending Physician: Kevin Victoria MD  Primary Care Provider: Kevin Clements MD        Subjective:     Principal Problem:COVID-19        HPI:  81yF with HTN, HLD, GERD, Depression, anxiety, aFib on eliquis, COPD/Emphysema, and chronic hypoxia on 2-3L home oxygen via NC presented to ER with chief complaint of diarrhea and generalized weakness. Pt reports she started with congestion, fatigue, and cough and 5-6 days ago. She was seen in ER 8/22/22 and diagnosed with COVID and flu B. She was started on tamiflu and referred for outpatient antibody infusion. Pt states she started having nausea, vomiting, and diarrhea as well as intermittent fever. She returned to ER 8/23/22 for the new onset GI symptoms. Workup was reassuring and she was discharged home with antiemetics. She received bebtelovimab infusion 8/24/22. She continued with poor appetite and diarrhea and called her PCP who recommended ER evaluation. She denies CP, abd pain, fever/chills, dysuria, hematuria, melena, BRBPR.       Overview/Hospital Course:  8/27 Pt remained stable on 2-3 L NC overnight which is her home dose. VSS, afebrile. She is very anxious. Diarrhea improved, she did have a loose stool this morning sent for c diff testing. CT chest planned for this morning to evaluate her new left nodular denisity on CXR.     8/28 Pt with hemoptysis overnight. Eliquis held. VSS, afebrile. She remains very anxious. C diff negative. Diarrhea improved. CT chest with COPD and severe chronic emphysematous changes, no infiltrate. There is a new soft tissue mass of the right lung base. Dr. Matson whom she follows with outpatient has been consulted. Discussed with daughter, she is concerned about patient going back home alone at discharge but she also states pt does not  want NH. Advised we could order home health at [place], but that is limited visit per week. Daughter states they are looking into sitters at home.    8/29/22  Marva Perez is a 81 y.o. female  has eliquis held-still coughing up blood- dr matson and Dr Wilde following. She did test positive for both flu and covid.     8/30/22 pt is a 80yo COPD/emphysema on chronic O2 at home 2-3 liters. Dx with covid and flu B 8/22- treated outpt with tamiflu and IV covid infusion. She came to ER original c/o weakness and diarrhea. Tyamilfu d/kathryn. She was not requiring more than her routine O2. She progressed to hemoptysis on eliquis for a fib- this was held. CT chest shows lung mass. Dr Matson consulted.  Dr matson considering bronch but she would not be a good candidate- high risk for pneumo- started levaquin yesterday,. Today she is requiring more O2 now on 6L high flow. RR 26. Very anxious. Still coughing up bloody sputum.     There has been some confusion today regarding patient's POA and medical decision making capacity. Patient has no h/o dementia. Discussed with daughter who confirms this. Daughter reports she is POA and presented paperwork today but appears to be for financial and not medical decision making; looking into this further.   On my assessment, however, she is alert and oriented and able to continue to make her own decisions at this time. However, should she continue to worsen and require intubation in the future where she could no longer communicate her wishes we want to ensure we have the proper paperwork on file.       Past Medical History:   Diagnosis Date    Abnormal Pap smear 7/17/13    LGSIL    Atrial fibrillation     COPD (chronic obstructive pulmonary disease)     Depression     GERD (gastroesophageal reflux disease)     Hyperlipidemia     Hypertension        Past Surgical History:   Procedure Laterality Date    APPENDECTOMY      BRONCHOSCOPY N/A 8/5/2019    Procedure: BRONCHOSCOPY;  Surgeon: Howard DOLL  MD Dano;  Location: [facility] OR;  Service: Pulmonary;  Laterality: N/A;    CERVICAL BIOPSY  W/ LOOP ELECTRODE EXCISION      CHOLECYSTECTOMY      COLLATERAL LIGAMENT REPAIR, KNEE      left knee    COLONOSCOPY      DILATION AND CURETTAGE OF UTERUS      SINUS SURGERY         Review of patient's allergies indicates:   Allergen Reactions    Pcn [penicillins] Hives and Itching    Tetracyclines     Bactrim [sulfamethoxazole-trimethoprim] Rash    Ilosone Rash    Iodine and iodide containing products Rash    Sulfa (sulfonamide antibiotics) Hives and Rash       No current facility-administered medications on file prior to encounter.     Current Outpatient Medications on File Prior to Encounter   Medication Sig    albuterol (PROVENTIL/VENTOLIN HFA) 90 mcg/actuation inhaler Inhale 2 puffs into the lungs every 6 (six) hours as needed for Wheezing.    albuterol-ipratropium (DUO-NEB) 2.5 mg-0.5 mg/3 mL nebulizer solution SMARTSI Vial(s) Via Nebulizer Every 12 Hours    ALPRAZolam (XANAX) 0.25 MG tablet TAKE 1 TABLET DURING THE DAY FOR ANXIETY AS NEEDED AND 2 AT NIGHT FOR SLEEP    atenoloL (TENORMIN) 25 MG tablet Take 12.5 mg by mouth once daily.    clobetasoL (TEMOVATE) 0.05 % cream Apply topically 2 (two) times daily. To rash at the right lower leg    diltiaZEM (CARDIZEM) 120 MG tablet Take 120 mg by mouth 2 (two) times daily.    ELIQUIS 2.5 mg Tab Take 2.5 mg by mouth 2 (two) times daily.    ergocalciferol (ERGOCALCIFEROL) 50,000 unit Cap TAKE 1 CAPSULE BY MOUTH ONE TIME PER WEEK    ergocalciferol (ERGOCALCIFEROL) 50,000 unit Cap TAKE 1 CAPSULE BY MOUTH ONE TIME PER WEEK    EScitalopram oxalate (LEXAPRO) 5 MG Tab TAKE 1 TABLET BY MOUTH EVERY DAY    ezetimibe (ZETIA) 10 mg tablet     flecainide (TAMBOCOR) 50 MG Tab Take 50 mg by mouth 2 (two) times daily.    furosemide (LASIX) 20 MG tablet Take 20 mg by mouth once daily.    gabapentin (NEURONTIN) 100 MG capsule Take 1 capsule (100 mg total) by mouth every  evening.    levalbuterol (XOPENEX) 0.63 mg/3 mL nebulizer solution Take 3 mLs (0.63 mg total) by nebulization every 4 (four) hours as needed for Wheezing.    metoclopramide HCl (REGLAN) 10 MG tablet Take 1 tablet (10 mg total) by mouth every 6 (six) hours.    montelukast (SINGULAIR) 10 mg tablet Take 1 tablet (10 mg total) by mouth once daily.    predniSONE (DELTASONE) 10 MG tablet Take 10 mg by mouth once daily.    REPATHA SURECLICK 140 mg/mL PnIj Inject 140 mg into the skin every 14 (fourteen) days.    rifAXIMin (XIFAXAN) 550 mg Tab Take 1 tablet (550 mg total) by mouth 3 (three) times daily.    rosuvastatin (CRESTOR) 40 MG Tab Take 40 mg by mouth every evening.    TRELEGY ELLIPTA 100-62.5-25 mcg DsDv TAKE 1 PUFF BY MOUTH EVERY DAY    valsartan (DIOVAN) 160 MG tablet      Family History       Problem Relation (Age of Onset)    Breast cancer Mother          Tobacco Use    Smoking status: Former     Years: 30.00     Types: Cigarettes     Quit date: 10/30/2006     Years since quitting: 15.8    Smokeless tobacco: Never   Substance and Sexual Activity    Alcohol use: No     Comment: No    Drug use: No    Sexual activity: Not Currently     Birth control/protection: Post-menopausal     Comment:      Review of Systems   Constitutional:  Positive for activity change, appetite change and fatigue. Negative for fever.   HENT:  Positive for hearing loss (chronic). Negative for sinus pressure and sore throat.    Eyes:  Negative for discharge and visual disturbance.   Respiratory:  Positive for cough and shortness of breath.         + hemoptysis   Cardiovascular:  Negative for chest pain, palpitations and leg swelling.   Gastrointestinal:  Positive for diarrhea (improving). Negative for abdominal pain, blood in stool and nausea.   Genitourinary:  Negative for dysuria and hematuria.   Musculoskeletal:  Negative for arthralgias and myalgias.   Neurological:  Positive for weakness (generalized).    Psychiatric/Behavioral:  Negative for confusion. The patient is nervous/anxious.    Objective:     Vital Signs (Most Recent):  Temp: 96.6 °F (35.9 °C) (08/30/22 0802)  Pulse: 71 (08/30/22 0955)  Resp: (!) 26 (08/30/22 0855)  BP: (!) 175/70 (08/30/22 0802)  SpO2: 97 % (08/30/22 0855) Vital Signs (24h Range):  Temp:  [96.6 °F (35.9 °C)-98.2 °F (36.8 °C)] 96.6 °F (35.9 °C)  Pulse:  [64-95] 71  Resp:  [18-34] 26  SpO2:  [88 %-100 %] 97 %  BP: (137-208)/(63-84) 175/70     Weight: 54.8 kg (120 lb 13 oz)  Body mass index is 22.1 kg/m².    Physical Exam  Vitals and nursing note reviewed.   Constitutional:       General: She is not in acute distress.     Comments: Hard of hearing   HENT:      Head: Normocephalic and atraumatic.      Right Ear: External ear normal.      Left Ear: External ear normal.      Mouth/Throat:      Mouth: Mucous membranes are moist.   Eyes:      Extraocular Movements: Extraocular movements intact.      Conjunctiva/sclera: Conjunctivae normal.      Pupils: Pupils are equal, round, and reactive to light.   Cardiovascular:      Rate and Rhythm: Normal rate. Rhythm irregular.      Heart sounds: Normal heart sounds. No murmur heard.  Pulmonary:      Effort: Pulmonary effort is normal.      Breath sounds: Rhonchi present. No wheezing.      Comments: Poor air movement  Not use of accessory muscle but appears to be working harder to breath today---unsure how much is anxiety driven   Abdominal:      General: Bowel sounds are normal. There is no distension.      Palpations: Abdomen is soft.      Tenderness: There is no abdominal tenderness.   Musculoskeletal:      Cervical back: Neck supple.      Right lower leg: No edema.      Left lower leg: No edema.   Neurological:      General: No focal deficit present.      Mental Status: She is alert and oriented to person, place, and time.   Psychiatric:         Behavior: Behavior normal.         Thought Content: Thought content normal.         CRANIAL NERVES     CN  III, IV, VI   Pupils are equal, round, and reactive to light.     Significant Labs: .      BMP:   Recent Labs   Lab 08/30/22  0615   *   *   K 3.9      CO2 31*   BUN 21   CREATININE 0.7   CALCIUM 8.5*       CBC:   Recent Labs   Lab 08/29/22  0558 08/30/22  0615   WBC 6.52 8.22   HGB 8.4* 9.1*   HCT 26.0* 28.9*   * 156       CMP:   Recent Labs   Lab 08/29/22  0558 08/30/22  0615    148*   K 3.2* 3.9    104   CO2 32* 31*   * 129*   BUN 15 21   CREATININE 0.6 0.7   CALCIUM 8.0* 8.5*   PROT 5.0* 5.4*   ALBUMIN 2.9* 2.9*   BILITOT 0.6 0.8   ALKPHOS 51* 56   AST 37 29   ALT 52* 52*   ANIONGAP 8 13     8/27 mag 2.3   8/26 sed rate 22  LDH: 270  Latic 1.0  Procal: 0.08 (0.10)  ESR: 22  Ferritin: 852    Troponin 0.029  8/22 flu B positive   Covid positive   C diff negative   Blood cultures NGTD x 2     Significant Imaging:     CXR:   Chronic lung changes are seen.  Increasing hazy interstitial opacity throughout the left lung, pronounced in the left mid and lower lung zone which could reflect atelectasis, aspiration or possibly a developing pneumonia.  Probable small bilateral pleural effusions.  Calcified granulomas in the left lung.  Nodular density in the left mid lung which appears new as compared to the previous examination none.  This could possibly represent a nipple shadow.  Heart size is normal.  Calcified atheromatous disease affects the aorta.  Fortunately age-appropriate degenerative changes affect the skeleton.    8/27 CT chest    1. Interval development of a smooth pleural based soft tissue mass of the medial right lower lobe which is of uncertain etiology.  Neoplasia is not excludable.  Further evaluation with PET scan as deemed clinically necessary.  2. 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  3. Granulomatous change.    8/27 EKG Sinus rhythm with Premature supraventricular complexes  Nonspecific T  wave abnormality  Abnormal ECG  When compared with ECG of 23-AUG-2022 09:32,  Premature supraventricular complexes are now Present  Confirmed by El Urban MD ([id number]) on 8/26/2022 6:00:40 PM      Assessment/Plan:      * COVID-19  Patient is identified as High risk for severe complications of COVID 19 based on COVID risk score of 6   Initiate standard COVID protocols; COVID-19 testing ,Infection Control notification  and isolation- respiratory, contact and droplet per protocol    Diagnostics: (leukopenia, hyponatremia, hyperferritinemia, elevated troponin, elevated d-dimer, age, and comorbidities are significant predictors of poor clinical outcome)  CBC, CMP, Ferritin, CRP and Portable CXR    Management: Maintain oxygen saturations 92-96% via Nasal Cannula 6 LPM and monitor with continuous/intermittent pulse oximetry. , Inhaled bronchodilators as needed for shortness of breath. and Continuous cardiac monitoring.    Will hold off on remdesivir at this point as she seems stable from resp standpoint; currently on home dose of 2L NC    8/27: pt remains stable on home Oxygen 2-3L NC. Monitor    8/30: worsening hypoxia  Cont steroids  Cont levaquin  Discussing with pharmacy based on [facility] protocol if any benefit for remdesivir at this point (s/p MAB outpatient) vs toci  Repeat imaging pending    8/29 pt remains on home O2 demands. Cont duonebs. No remedesivir had outpt infusion for covid and was on tamiflu outpt as well. Dr herbert added levaquin dexamethasone day 4.    8/30 dexamethasone day 5- call pharmacy for consult- pt now with increased o2 demands     Pneumonia due to infectious organism  Starting levaquin today 8/28 per dr herbert  Cont duonebs and dexamethasone supplemental O2 .    Check CTA       Hypokalemia  Replete and monitor.      Right lower lobe lung mass  CT chest: Interval development of a smooth pleural based soft tissue mass of the medial right lower lobe which is of uncertain etiology.  Neoplasia is not  excludable.  Further evaluation with PET scan as deemed clinically necessary  Dr. Matson consulted, does not feel she is a bronch candidate due to severe COPD and emphysematous lung changes. Would consider PET scan as outpt. Added  levaquin 8/29    Repeat CTA today to re-eval lung parenchyma and ensure no PE as cause of worsening hypoxia  Cont levaquin for now      Cough with hemoptysis  Hold eliquis for now  Dr. Matson following added levaquin.  Repeat CTA today      Anxiety  Cont home lexapro and xanax>  Increased xanax yesterday    She is very anxious. She lives alone. Discussed with family, she does not want any assisted living or NH.       Lung mass  Nodular density on CXR which appears new, chect CT chest with contrast per radiology recs  Iodine allergy, will premedicate. She has had contrast studies in the past, last 2019 per records  Reviewed CT with new mass?? Infection?? Dr matson consulted. Added levaquin, holding eliquis- consider bronch if bleeding does not improve .  Cont levaquin and repeat CTA today due to worsening hypoxia    Influenza B  Diagnosed 8/22/22  She was on tamiflu outpatient, but this may be the cause of her diarrhea. Will DC for now        Chronic atrial fibrillation  Patient with atrial fibrillation which is controlled currently with Beta Blocker, Calcium Channel Blocker and flecainide. Patient is currently in sinus rhythm.[id number] Score: 4. HASBLED Score: Unable to calculate. Anticoagulation indicated. Anticoagulation done with eliquis.    8/28/22: H&H drop from 10.1/30.6 > 8.6/26.0; pt with hemoptysis. Will hold eliquis for now. Consult cardiology in AM. She is followed by Dr. Cox outpatient. Daughter with concerns about holding eliquis, but given her hemoptysis and H&H drop it is my opinion as well as that of Dr. Matson that the risks of continued anticoagulation outweigh the benefit currently.     8/29 dr Wilde consulted on patient this am. Cont to hold eliquis for now.    8/30 cont to  hold eliquis as she is still having hemoptysis   Cont atenolol and flecainide   Cont telemetry  Monitor electrolytes    Chronic respiratory failure  On 2-3L NC at home  Now requiring 6L NC high flow   Will repeat imaging to re-eval for ARDS, CAD, increasing consolidation consistent with bacterial PNA and eval for PE. All in differential for worsening hypoxia    Centrilobular emphysema  Per Dr. Matson- Severe changes on CT this puts her at high risk fpr bronch per pulmonology recs. Cont O2 added levauin and cont nebs .    repeat CTA today due to worsening hypoxia. Ensure no PE and re-eval lung parenchyma (? ARDS, DAH)    Diarrhea  C diff negative; diarrhea improving  Suspect this is 2/2 tamiflu; will DC as she seems stable from resp standpoint  S/p 1L NS in ER; DC NS .      Emphysema/COPD  On home oxygen 2-3L NC.;  Now with increased demands   Cont nebs, steroids, levaquin    Hyperlipidemia  Cont home crestor.      HTN (hypertension)  Cont home atenolol and increased valsartan to BID yesterday  //73.  Some highs likely anxiety driven    KATHIE (generalized anxiety disorder)  Cont home xanax.        VTE Risk Mitigation (From admission, onward)         Ordered     Place sequential compression device  Until discontinued         08/29/22 0847                Discharge Planning   ROSAURA:      Code Status: Full Code   Is the patient medically ready for discharge?:     Reason for patient still in hospital (select all that apply): Patient unstable and Treatment  Discharge Plan A: Home with family, Home Health   Discharge Delays: None known at this time              Maria Del Carmen Taylor MD  Department of Hospital Medicine   [place] - [facility] ([facility])

## 2022-08-30 NOTE — PLAN OF CARE
Patient extremely anxious to start the shift. Tachypnea 30-40 shallow BPM. Very dusky/grey looking. Patient seeming worse when visitors in the room. Patient sating 93-96 on 7L highflow NC. Able to calm patient enough to take xanax and breathing treatment. Patient states they just want to get some sleep. Continuous pulse OX applied. Patient able to rest and O2 turned down to 4L HFNC while continuing to sat 93-99%. Still continuing to have bloody sputum. 2nd dose of xanax given 0330. Still some anxiety r/t any exertion. Some HTN noted while patient is anxious, MD is aware. Family updated. Plan of care reviewed and followed.

## 2022-08-31 LAB
ALBUMIN SERPL BCP-MCNC: 2.8 G/DL (ref 3.5–5.2)
ALP SERPL-CCNC: 53 U/L (ref 55–135)
ALT SERPL W/O P-5'-P-CCNC: 42 U/L (ref 10–44)
ANION GAP SERPL CALC-SCNC: 13 MMOL/L (ref 8–16)
AST SERPL-CCNC: 21 U/L (ref 10–40)
BACTERIA BLD CULT: NORMAL
BACTERIA BLD CULT: NORMAL
BASOPHILS # BLD AUTO: ABNORMAL K/UL (ref 0–0.2)
BASOPHILS NFR BLD: 0 % (ref 0–1.9)
BILIRUB SERPL-MCNC: 0.9 MG/DL (ref 0.1–1)
BUN SERPL-MCNC: 23 MG/DL (ref 8–23)
CALCIUM SERPL-MCNC: 8.2 MG/DL (ref 8.7–10.5)
CHLORIDE SERPL-SCNC: 103 MMOL/L (ref 95–110)
CO2 SERPL-SCNC: 32 MMOL/L (ref 23–29)
CREAT SERPL-MCNC: 0.7 MG/DL (ref 0.5–1.4)
DIFFERENTIAL METHOD: ABNORMAL
EOSINOPHIL # BLD AUTO: ABNORMAL K/UL (ref 0–0.5)
EOSINOPHIL NFR BLD: 0 % (ref 0–8)
ERYTHROCYTE [DISTWIDTH] IN BLOOD BY AUTOMATED COUNT: 13.2 % (ref 11.5–14.5)
EST. GFR  (NO RACE VARIABLE): >60 ML/MIN/1.73 M^2
GLUCOSE SERPL-MCNC: 155 MG/DL (ref 70–110)
HCT VFR BLD AUTO: 27.4 % (ref 37–48.5)
HGB BLD-MCNC: 8.9 G/DL (ref 12–16)
IMM GRANULOCYTES # BLD AUTO: ABNORMAL K/UL (ref 0–0.04)
IMM GRANULOCYTES NFR BLD AUTO: ABNORMAL % (ref 0–0.5)
LYMPHOCYTES # BLD AUTO: ABNORMAL K/UL (ref 1–4.8)
LYMPHOCYTES NFR BLD: 5 % (ref 18–48)
MCH RBC QN AUTO: 30.4 PG (ref 27–31)
MCHC RBC AUTO-ENTMCNC: 32.5 G/DL (ref 32–36)
MCV RBC AUTO: 94 FL (ref 82–98)
MONOCYTES # BLD AUTO: ABNORMAL K/UL (ref 0.3–1)
MONOCYTES NFR BLD: 3 % (ref 4–15)
NEUTROPHILS NFR BLD: 88 % (ref 38–73)
NEUTS BAND NFR BLD MANUAL: 4 %
NRBC BLD-RTO: 1 /100 WBC
PLATELET # BLD AUTO: 177 K/UL (ref 150–450)
PMV BLD AUTO: 10.6 FL (ref 9.2–12.9)
POTASSIUM SERPL-SCNC: 3.7 MMOL/L (ref 3.5–5.1)
PROT SERPL-MCNC: 5.1 G/DL (ref 6–8.4)
RBC # BLD AUTO: 2.93 M/UL (ref 4–5.4)
SODIUM SERPL-SCNC: 148 MMOL/L (ref 136–145)
WBC # BLD AUTO: 6.05 K/UL (ref 3.9–12.7)

## 2022-08-31 PROCEDURE — 25000003 PHARM REV CODE 250: Performed by: STUDENT IN AN ORGANIZED HEALTH CARE EDUCATION/TRAINING PROGRAM

## 2022-08-31 PROCEDURE — 25000003 PHARM REV CODE 250: Performed by: INTERNAL MEDICINE

## 2022-08-31 PROCEDURE — 94761 N-INVAS EAR/PLS OXIMETRY MLT: CPT

## 2022-08-31 PROCEDURE — 80053 COMPREHEN METABOLIC PANEL: CPT | Performed by: NURSE PRACTITIONER

## 2022-08-31 PROCEDURE — 99233 SBSQ HOSP IP/OBS HIGH 50: CPT | Mod: ,,, | Performed by: FAMILY MEDICINE

## 2022-08-31 PROCEDURE — 99233 PR SUBSEQUENT HOSPITAL CARE,LEVL III: ICD-10-PCS | Mod: ,,, | Performed by: FAMILY MEDICINE

## 2022-08-31 PROCEDURE — 25000242 PHARM REV CODE 250 ALT 637 W/ HCPCS: Performed by: INTERNAL MEDICINE

## 2022-08-31 PROCEDURE — 94660 CPAP INITIATION&MGMT: CPT

## 2022-08-31 PROCEDURE — 85007 BL SMEAR W/DIFF WBC COUNT: CPT | Performed by: NURSE PRACTITIONER

## 2022-08-31 PROCEDURE — 85027 COMPLETE CBC AUTOMATED: CPT | Performed by: NURSE PRACTITIONER

## 2022-08-31 PROCEDURE — 63600175 PHARM REV CODE 636 W HCPCS: Performed by: INTERNAL MEDICINE

## 2022-08-31 PROCEDURE — 27000207 HC ISOLATION

## 2022-08-31 PROCEDURE — 25000003 PHARM REV CODE 250: Performed by: NURSE PRACTITIONER

## 2022-08-31 PROCEDURE — 27000221 HC OXYGEN, UP TO 24 HOURS

## 2022-08-31 PROCEDURE — 99900035 HC TECH TIME PER 15 MIN (STAT)

## 2022-08-31 PROCEDURE — 20000000 HC ICU ROOM

## 2022-08-31 PROCEDURE — 94640 AIRWAY INHALATION TREATMENT: CPT

## 2022-08-31 PROCEDURE — 63600175 PHARM REV CODE 636 W HCPCS: Performed by: STUDENT IN AN ORGANIZED HEALTH CARE EDUCATION/TRAINING PROGRAM

## 2022-08-31 PROCEDURE — 36415 COLL VENOUS BLD VENIPUNCTURE: CPT | Performed by: NURSE PRACTITIONER

## 2022-08-31 RX ADMIN — ESCITALOPRAM OXALATE 5 MG: 5 TABLET, FILM COATED ORAL at 09:08

## 2022-08-31 RX ADMIN — METHYLPREDNISOLONE SODIUM SUCCINATE 20 MG: 40 INJECTION, POWDER, FOR SOLUTION INTRAMUSCULAR; INTRAVENOUS at 01:08

## 2022-08-31 RX ADMIN — ATORVASTATIN CALCIUM 10 MG: 10 TABLET, FILM COATED ORAL at 10:08

## 2022-08-31 RX ADMIN — LEVOFLOXACIN 750 MG: 250 TABLET, FILM COATED ORAL at 09:08

## 2022-08-31 RX ADMIN — DILTIAZEM HYDROCHLORIDE 60 MG: 60 TABLET, FILM COATED ORAL at 09:08

## 2022-08-31 RX ADMIN — ALPRAZOLAM 0.5 MG: 0.5 TABLET ORAL at 10:08

## 2022-08-31 RX ADMIN — METHYLPREDNISOLONE SODIUM SUCCINATE 20 MG: 40 INJECTION, POWDER, FOR SOLUTION INTRAMUSCULAR; INTRAVENOUS at 06:08

## 2022-08-31 RX ADMIN — MONTELUKAST 10 MG: 10 TABLET, FILM COATED ORAL at 09:08

## 2022-08-31 RX ADMIN — METHYLPREDNISOLONE SODIUM SUCCINATE 20 MG: 40 INJECTION, POWDER, FOR SOLUTION INTRAMUSCULAR; INTRAVENOUS at 10:08

## 2022-08-31 RX ADMIN — VALSARTAN 160 MG: 80 TABLET, FILM COATED ORAL at 09:08

## 2022-08-31 RX ADMIN — DEXAMETHASONE SODIUM PHOSPHATE 6 MG: 4 INJECTION, SOLUTION INTRA-ARTICULAR; INTRALESIONAL; INTRAMUSCULAR; INTRAVENOUS; SOFT TISSUE at 09:08

## 2022-08-31 RX ADMIN — OXYCODONE HYDROCHLORIDE AND ACETAMINOPHEN 500 MG: 500 TABLET ORAL at 09:08

## 2022-08-31 RX ADMIN — DILTIAZEM HYDROCHLORIDE 60 MG: 60 TABLET, FILM COATED ORAL at 10:08

## 2022-08-31 RX ADMIN — IPRATROPIUM BROMIDE AND ALBUTEROL SULFATE 3 ML: .5; 3 SOLUTION RESPIRATORY (INHALATION) at 07:08

## 2022-08-31 RX ADMIN — FLECAINIDE ACETATE 50 MG: 50 TABLET ORAL at 09:08

## 2022-08-31 RX ADMIN — CLONIDINE HYDROCHLORIDE 0.1 MG: 0.1 TABLET ORAL at 01:08

## 2022-08-31 RX ADMIN — IPRATROPIUM BROMIDE AND ALBUTEROL SULFATE 3 ML: .5; 3 SOLUTION RESPIRATORY (INHALATION) at 12:08

## 2022-08-31 RX ADMIN — REMDESIVIR 100 MG: 100 INJECTION, POWDER, LYOPHILIZED, FOR SOLUTION INTRAVENOUS at 10:08

## 2022-08-31 RX ADMIN — ATENOLOL 12.5 MG: 25 TABLET ORAL at 09:08

## 2022-08-31 RX ADMIN — IPRATROPIUM BROMIDE AND ALBUTEROL SULFATE 3 ML: .5; 3 SOLUTION RESPIRATORY (INHALATION) at 01:08

## 2022-08-31 RX ADMIN — VALSARTAN 160 MG: 80 TABLET, FILM COATED ORAL at 10:08

## 2022-08-31 RX ADMIN — FLECAINIDE ACETATE 50 MG: 50 TABLET ORAL at 10:08

## 2022-08-31 RX ADMIN — METHYLPREDNISOLONE SODIUM SUCCINATE 20 MG: 40 INJECTION, POWDER, FOR SOLUTION INTRAMUSCULAR; INTRAVENOUS at 09:08

## 2022-08-31 RX ADMIN — THIAMINE HCL TAB 100 MG 100 MG: 100 TAB at 09:08

## 2022-08-31 RX ADMIN — ALPRAZOLAM 0.5 MG: 0.5 TABLET ORAL at 09:08

## 2022-08-31 RX ADMIN — METHYLPREDNISOLONE SODIUM SUCCINATE 20 MG: 40 INJECTION, POWDER, FOR SOLUTION INTRAMUSCULAR; INTRAVENOUS at 05:08

## 2022-08-31 NOTE — NURSING
1908 Lying in bed sleeping. Bi pap in progress sat 92%. No signs of respiratory distress assessed at this time. Awakened to assess. When awake she is alert and oriented times 4. Answering all questions appropriately and follows all commands. Continuous cardiac monitoring in progress. HR 75 NSR. MAEW. Incontinece. SCD's in progress to BLE's. Discussed plan of care with patient.      0600 Patient slept throughout the night without complications.

## 2022-08-31 NOTE — ASSESSMENT & PLAN NOTE
Patient with Hypoxic Respiratory failure which is Chronic.  she is on home oxygen at 3 LPM. Supplemental oxygen was provided and noted- Oxygen Concentration (%):  [35-50] 50.   Signs/symptoms of respiratory failure include- increased work of breathing. Contributing diagnoses includes - COPD Labs and images were reviewed. Patient Has recent ABG, which has been reviewed. Will treat underlying causes and adjust management of respiratory failure as follows- 3

## 2022-08-31 NOTE — ASSESSMENT & PLAN NOTE
C diff negative; diarrhea improving-2 episodes last night  Suspect this is due to tamiflu; will DC as she seems stable from resp standpoint  S/p 1L NS in ER; DC NS .

## 2022-08-31 NOTE — PROGRESS NOTES
[facility] Medicine  Progress Note    Patient Name: Marva Perez  MRN: [id number]  Patient Class: IP- Inpatient   Admission Date: 8/26/2022  Length of Stay: 5 days  Attending Physician: Stewart Lucia MD  Primary Care Provider: Kevin Clements MD        Subjective:     Principal Problem:COVID-19        HPI:  81yF with HTN, HLD, GERD, Depression, anxiety, aFib on eliquis, COPD/Emphysema, and chronic hypoxia on 2-3L home oxygen via NC presented to ER with chief complaint of diarrhea and generalized weakness. Pt reports she started with congestion, fatigue, and cough and 5-6 days ago. She was seen in ER 8/22/22 and diagnosed with COVID and flu B. She was started on tamiflu and referred for outpatient antibody infusion. Pt states she started having nausea, vomiting, and diarrhea as well as intermittent fever. She returned to ER 8/23/22 for the new onset GI symptoms. Workup was reassuring and she was discharged home with antiemetics. She received bebtelovimab infusion 8/24/22. She continued with poor appetite and diarrhea and called her PCP who recommended ER evaluation. She denies CP, abd pain, fever/chills, dysuria, hematuria, melena, BRBPR.       Overview/Hospital Course:  8/27 Pt remained stable on 2-3 L NC overnight which is her home dose. VSS, afebrile. She is very anxious. Diarrhea improved, she did have a loose stool this morning sent for c diff testing. CT chest planned for this morning to evaluate her new left nodular denisity on CXR.     8/28 Pt with hemoptysis overnight. Eliquis held. VSS, afebrile. She remains very anxious. C diff negative. Diarrhea improved. CT chest with COPD and severe chronic emphysematous changes, no infiltrate. There is a new soft tissue mass of the right lung base. Dr. Matson whom she follows with outpatient has been consulted. Discussed with daughter, she is concerned about patient going back home alone at discharge but she also states pt does not  want NH. Advised we could order home health at [place], but that is limited visit per week. Daughter states they are looking into sitters at home.    8/29/22  Marva Perez is a 81 y.o. female  has eliquis held-still coughing up blood- dr herbert and Dr Wilde following. She did test positive for both flu and covid.     8/30/22 pt is a 80yo COPD/emphysema on chronic O2 at home 2-3 liters. Dx with covid and flu B 8/22- treated outpt with tamiflu and IV covid infusion. She came to ER original c/o weakness and diarrhea. Tyamilfu d/kathryn. She was not requiring more than her routine O2. She progressed to hemoptysis on eliquis for a fib- this was held. CT chest shows lung mass. Dr Herbert consulted.  Dr herbert considering bronch but she would not be a good candidate- high risk for pneumo- started levaquin yesterday,. Today she is requiring more O2 now on 6L high flow. RR 26. Very anxious. Still coughing up bloody sputum.     There has been some confusion today regarding patient's POA and medical decision making capacity. Patient has no h/o dementia. Discussed with daughter who confirms this. Daughter reports she is POA and presented paperwork today but appears to be for financial and not medical decision making; looking into this further.   On my assessment, however, she is alert and oriented and able to continue to make her own decisions at this time. However, should she continue to worsen and require intubation in the future where she could no longer communicate her wishes we want to ensure we have the proper paperwork on file.     8/31  Patient requires 4 liters of O2 this morning.  Was on BiPap.  Seems slightly improved.  No more hemoptosis.  Getting Remdesivir, Levaquin, Decadron.  Eloquis still on hold.  Dr Herbert following.  She seems to be asking questions and aware of what is going on.      Past Medical History:   Diagnosis Date    Abnormal Pap smear 7/17/13    LGSIL    Atrial fibrillation     COPD (chronic obstructive  pulmonary disease)     Depression     GERD (gastroesophageal reflux disease)     Hyperlipidemia     Hypertension        Past Surgical History:   Procedure Laterality Date    APPENDECTOMY      BRONCHOSCOPY N/A 2019    Procedure: BRONCHOSCOPY;  Surgeon: Howard Matson MD;  Location: [facility] OR;  Service: Pulmonary;  Laterality: N/A;    CERVICAL BIOPSY  W/ LOOP ELECTRODE EXCISION      CHOLECYSTECTOMY      COLLATERAL LIGAMENT REPAIR, KNEE      left knee    COLONOSCOPY      DILATION AND CURETTAGE OF UTERUS      SINUS SURGERY         Review of patient's allergies indicates:   Allergen Reactions    Pcn [penicillins] Hives and Itching    Tetracyclines     Bactrim [sulfamethoxazole-trimethoprim] Rash    Ilosone Rash    Iodine and iodide containing products Rash    Sulfa (sulfonamide antibiotics) Hives and Rash       No current facility-administered medications on file prior to encounter.     Current Outpatient Medications on File Prior to Encounter   Medication Sig    albuterol (PROVENTIL/VENTOLIN HFA) 90 mcg/actuation inhaler Inhale 2 puffs into the lungs every 6 (six) hours as needed for Wheezing.    albuterol-ipratropium (DUO-NEB) 2.5 mg-0.5 mg/3 mL nebulizer solution SMARTSI Vial(s) Via Nebulizer Every 12 Hours    ALPRAZolam (XANAX) 0.25 MG tablet TAKE 1 TABLET DURING THE DAY FOR ANXIETY AS NEEDED AND 2 AT NIGHT FOR SLEEP    atenoloL (TENORMIN) 25 MG tablet Take 12.5 mg by mouth once daily.    clobetasoL (TEMOVATE) 0.05 % cream Apply topically 2 (two) times daily. To rash at the right lower leg    diltiaZEM (CARDIZEM) 120 MG tablet Take 120 mg by mouth 2 (two) times daily.    ELIQUIS 2.5 mg Tab Take 2.5 mg by mouth 2 (two) times daily.    ergocalciferol (ERGOCALCIFEROL) 50,000 unit Cap TAKE 1 CAPSULE BY MOUTH ONE TIME PER WEEK    ergocalciferol (ERGOCALCIFEROL) 50,000 unit Cap TAKE 1 CAPSULE BY MOUTH ONE TIME PER WEEK    EScitalopram oxalate (LEXAPRO) 5 MG Tab TAKE 1 TABLET BY MOUTH EVERY  DAY    ezetimibe (ZETIA) 10 mg tablet     flecainide (TAMBOCOR) 50 MG Tab Take 50 mg by mouth 2 (two) times daily.    furosemide (LASIX) 20 MG tablet Take 20 mg by mouth once daily.    gabapentin (NEURONTIN) 100 MG capsule Take 1 capsule (100 mg total) by mouth every evening.    levalbuterol (XOPENEX) 0.63 mg/3 mL nebulizer solution Take 3 mLs (0.63 mg total) by nebulization every 4 (four) hours as needed for Wheezing.    metoclopramide HCl (REGLAN) 10 MG tablet Take 1 tablet (10 mg total) by mouth every 6 (six) hours.    montelukast (SINGULAIR) 10 mg tablet Take 1 tablet (10 mg total) by mouth once daily.    predniSONE (DELTASONE) 10 MG tablet Take 10 mg by mouth once daily.    REPATHA SURECLICK 140 mg/mL PnIj Inject 140 mg into the skin every 14 (fourteen) days.    rifAXIMin (XIFAXAN) 550 mg Tab Take 1 tablet (550 mg total) by mouth 3 (three) times daily.    rosuvastatin (CRESTOR) 40 MG Tab Take 40 mg by mouth every evening.    TRELEGY ELLIPTA 100-62.5-25 mcg DsDv TAKE 1 PUFF BY MOUTH EVERY DAY    valsartan (DIOVAN) 160 MG tablet      Family History       Problem Relation (Age of Onset)    Breast cancer Mother          Tobacco Use    Smoking status: Former     Years: 30.00     Types: Cigarettes     Quit date: 10/30/2006     Years since quitting: 15.8    Smokeless tobacco: Never   Substance and Sexual Activity    Alcohol use: No     Comment: No    Drug use: No    Sexual activity: Not Currently     Birth control/protection: Post-menopausal     Comment:      Review of Systems   Constitutional:  Positive for activity change, appetite change and fatigue. Negative for fever.   HENT:  Positive for hearing loss (chronic). Negative for sinus pressure and sore throat.    Eyes:  Negative for discharge and visual disturbance.   Respiratory:  Positive for cough and shortness of breath.         + hemoptysis   Cardiovascular:  Negative for chest pain, palpitations and leg swelling.   Gastrointestinal:   Positive for diarrhea (improving). Negative for abdominal pain, blood in stool and nausea.   Genitourinary:  Negative for dysuria and hematuria.   Musculoskeletal:  Negative for arthralgias and myalgias.   Neurological:  Positive for weakness (generalized).   Psychiatric/Behavioral:  Negative for confusion. The patient is nervous/anxious.    Objective:     Vital Signs (Most Recent):  Temp: 96.8 °F (36 °C) (08/31/22 0701)  Pulse: 75 (08/31/22 0703)  Resp: (!) 31 (08/31/22 0703)  BP: 117/81 (08/31/22 0802)  SpO2: (!) 91 % (08/31/22 0703) Vital Signs (24h Range):  Temp:  [96 °F (35.6 °C)-99.8 °F (37.7 °C)] 96.8 °F (36 °C)  Pulse:  [63-90] 75  Resp:  [18-56] 31  SpO2:  [90 %-100 %] 91 %  BP: (117-205)/(66-93) 117/81     Weight: 54.8 kg (120 lb 13 oz)  Body mass index is 22.1 kg/m².    Physical Exam  Vitals and nursing note reviewed.   Constitutional:       General: She is not in acute distress.     Comments: Hard of hearing   HENT:      Head: Normocephalic and atraumatic.      Right Ear: External ear normal.      Left Ear: External ear normal.      Mouth/Throat:      Mouth: Mucous membranes are moist.   Eyes:      Extraocular Movements: Extraocular movements intact.      Conjunctiva/sclera: Conjunctivae normal.      Pupils: Pupils are equal, round, and reactive to light.   Cardiovascular:      Rate and Rhythm: Normal rate. Rhythm irregular.      Heart sounds: Normal heart sounds. No murmur heard.  Pulmonary:      Effort: Pulmonary effort is normal.      Breath sounds: No wheezing or rhonchi.      Comments: Poor air movement.  Mild respiratory distress.  Abdominal:      General: Bowel sounds are normal. There is no distension.      Palpations: Abdomen is soft.      Tenderness: There is no abdominal tenderness.   Musculoskeletal:      Cervical back: Neck supple.      Right lower leg: No edema.      Left lower leg: No edema.   Neurological:      General: No focal deficit present.      Mental Status: She is alert and  oriented to person, place, and time.   Psychiatric:         Behavior: Behavior normal.         Thought Content: Thought content normal.         CRANIAL NERVES     CN III, IV, VI   Pupils are equal, round, and reactive to light.     Significant Labs: .      BMP:   Recent Labs   Lab 08/31/22  0404   *   *   K 3.7      CO2 32*   BUN 23   CREATININE 0.7   CALCIUM 8.2*       CBC:   Recent Labs   Lab 08/30/22  0615 08/31/22  0405   WBC 8.22 6.05   HGB 9.1* 8.9*   HCT 28.9* 27.4*    177       CMP:   Recent Labs   Lab 08/30/22  0615 08/31/22  0404   * 148*   K 3.9 3.7    103   CO2 31* 32*   * 155*   BUN 21 23   CREATININE 0.7 0.7   CALCIUM 8.5* 8.2*   PROT 5.4* 5.1*   ALBUMIN 2.9* 2.8*   BILITOT 0.8 0.9   ALKPHOS 56 53*   AST 29 21   ALT 52* 42   ANIONGAP 13 13     8/27 mag 2.3   8/26 sed rate 22  LDH: 270  Latic 1.0  Procal: 0.08 (0.10)  ESR: 22  Ferritin: 852    Troponin 0.029  8/22 flu B positive   Covid positive   C diff negative   Blood cultures NGTD x 2     Significant Imaging:     CXR:   Chronic lung changes are seen.  Increasing hazy interstitial opacity throughout the left lung, pronounced in the left mid and lower lung zone which could reflect atelectasis, aspiration or possibly a developing pneumonia.  Probable small bilateral pleural effusions.  Calcified granulomas in the left lung.  Nodular density in the left mid lung which appears new as compared to the previous examination none.  This could possibly represent a nipple shadow.  Heart size is normal.  Calcified atheromatous disease affects the aorta.  Fortunately age-appropriate degenerative changes affect the skeleton.    8/27 CT chest    1. Interval development of a smooth pleural based soft tissue mass of the medial right lower lobe which is of uncertain etiology.  Neoplasia is not excludable.  Further evaluation with PET scan as deemed clinically necessary.  2. COPD with severe extensive emphysematous  change and pulmonary fibrosis of the bilateral lung bases.  3. Granulomatous change.    8/27 EKG Sinus rhythm with Premature supraventricular complexes  Nonspecific T wave abnormality  Abnormal ECG  When compared with ECG of 23-AUG-2022 09:32,  Premature supraventricular complexes are now Present  Confirmed by El Urban MD ([id number]) on 8/26/2022 6:00:40 PM      Assessment/Plan:      * COVID-19  Patient is identified as High risk for severe complications of COVID 19 based on COVID risk score of 6   Initiate standard COVID protocols; COVID-19 testing ,Infection Control notification  and isolation- respiratory, contact and droplet per protocol    Diagnostics: (leukopenia, hyponatremia, hyperferritinemia, elevated troponin, elevated d-dimer, age, and comorbidities are significant predictors of poor clinical outcome)  CBC, CMP, Ferritin, CRP and Portable CXR    Management: Maintain oxygen saturations 92-96% via Nasal Cannula 4 LPM and monitor with continuous/intermittent pulse oximetry. , Inhaled bronchodilators as needed for shortness of breath. and Continuous cardiac monitoring.    Will hold off on remdesivir at this point as she seems stable from resp standpoint; currently on home dose of 2L NC    8/27: pt remains stable on home Oxygen 2-3L NC. Monitor    8/30: worsening hypoxia  Cont steroids  Cont levaquin  Discussing with pharmacy based on [facility] protocol if any benefit for remdesivir at this point (s/p MAB outpatient) vs toci  Repeat imaging pending    8/29 pt remains on home O2 demands. Cont duonebs. No remedesivir had outpt infusion for covid and was on tamiflu outpt as well. Dr herbert added levaquin dexamethasone day 4.    8/30 dexamethasone day 5- call pharmacy for consult- pt now with increased o2 demands     8/31 Day 6 decadron, Last day of Remdesivir    Pneumonia due to infectious organism  Starting levaquin 8/28 per dr herbert  Cont duonebs and dexamethasone supplemental O2 .        Hypokalemia  Replete and  monitor.      Right lower lobe lung mass  CT chest: Interval development of a smooth pleural based soft tissue mass of the medial right lower lobe which is of uncertain etiology.  Neoplasia is not excludable.  Further evaluation with PET scan as deemed clinically necessary  Dr. Matson consulted, does not feel she is a bronch candidate due to severe COPD and emphysematous lung changes. Would consider PET scan as outpt. Added  levaquin 8/29    Repeat CTA done 8/30.  No changes.  Severe emphasema.  Poor study.  PE not completely ruled out  Cont levaquin for now  Consider restarting Eloquis.   Will discuss with Dr Matson      Cough with hemoptysis  Hold eliquis for now  Dr. Matson following added levaquin.  Repeat CTA done 8/30 - reviewed      Anxiety  Cont home lexapro and xanax>  Increased xanax yesterday    She is very anxious. She lives alone. Discussed with family, she does not want any assisted living or NH.       Lung mass  Nodular density on CXR which appears new, chect CT chest with contrast per radiology recs  Iodine allergy, will premedicate. She has had contrast studies in the past, last 2019 per records  Reviewed CT with new mass?? Infection?? Dr matson consulted. Added levaquin, holding eliquis- consider bronch if bleeding does not improve .  Cont levaquin and repeat CTA today due to worsening hypoxia    Influenza B  Diagnosed 8/22/22  She was on tamiflu outpatient, but this may be the cause of her diarrhea. Will DC for now        Chronic atrial fibrillation  Patient with atrial fibrillation which is controlled currently with Beta Blocker, Calcium Channel Blocker and flecainide. Patient is currently in sinus rhythm.[id number] Score: 4. HASBLED Score: Unable to calculate. Anticoagulation indicated. Anticoagulation done with eliquis.    8/28/22: H&H drop from 10.1/30.6 > 8.6/26.0; pt with hemoptysis. Will hold eliquis for now. Consult cardiology in AM. She is followed by Dr. Cox outpatient. Daughter with concerns  about holding eliquis, but given her hemoptysis and H&H drop it is my opinion as well as that of Dr. Matson that the risks of continued anticoagulation outweigh the benefit currently.     8/29 dr Wilde consulted on patient this am. Cont to hold eliquis for now.    8/30 cont to hold eliquis as she is still having hemoptysis   Cont atenolol and flecainide   Cont telemetry  Monitor electrolytes    8/31- No more hemoptysis.  Consider restarting Eloquis since she is such a high risk for PE.    Chronic respiratory failure  On 2-3L NC at home  Now requiring 6L NC high flow   Will repeat imaging to re-eval for ARDS, CAD, increasing consolidation consistent with bacterial PNA and eval for PE. All in differential for worsening hypoxia    Centrilobular emphysema  Per Dr. Matson- Severe changes on CT this puts her at high risk fpr bronch per pulmonology recs. Cont O2 added levauin and cont nebs .    repeat CTA today due to worsening hypoxia. Ensure no PE and re-eval lung parenchyma (? ARDS, DAH)    Diarrhea  C diff negative; diarrhea improving-2 episodes last night  Suspect this is due to tamiflu; will DC as she seems stable from resp standpoint  S/p 1L NS in ER; DC NS .      Emphysema/COPD  On home oxygen 2-3L NC.;  Now with increased demands   Cont nebs, steroids, levaquin  Now on 4 liters NC      Hyperlipidemia  Cont home crestor.      HTN (hypertension)  Cont home atenolol and increased valsartan to BID yesterday  //73.  Some highs likely anxiety driven    KATHIE (generalized anxiety disorder)  Cont home xanax.        VTE Risk Mitigation (From admission, onward)         Ordered     Place sequential compression device  Until discontinued         08/29/22 4773                Discharge Planning   ROSAURA:      Code Status: Full Code   Is the patient medically ready for discharge?:     Reason for patient still in hospital (select all that apply): Patient trending condition  Discharge Plan A: Home with family, Home Health    Discharge Delays: None known at this time        Critical care time spent on the evaluation and treatment of severe organ dysfunction, review of pertinent labs and imaging studies, discussions with consulting providers and discussions with patient/family: 30 minutes.      Stewart Lucia MD  Department of Hospital Medicine   [place] - Intensive Care

## 2022-08-31 NOTE — ASSESSMENT & PLAN NOTE
Patient is identified as High risk for severe complications of COVID 19 based on COVID risk score of 6   Initiate standard COVID protocols; COVID-19 testing ,Infection Control notification  and isolation- respiratory, contact and droplet per protocol    Diagnostics: (leukopenia, hyponatremia, hyperferritinemia, elevated troponin, elevated d-dimer, age, and comorbidities are significant predictors of poor clinical outcome)  CBC, CMP, Ferritin, CRP and Portable CXR    Management: Maintain oxygen saturations 92-96% via Nasal Cannula 4 LPM and monitor with continuous/intermittent pulse oximetry. , Inhaled bronchodilators as needed for shortness of breath. and Continuous cardiac monitoring.    Will hold off on remdesivir at this point as she seems stable from resp standpoint; currently on home dose of 2L NC    8/27: pt remains stable on home Oxygen 2-3L NC. Monitor    8/30: worsening hypoxia  Cont steroids  Cont levaquin  Discussing with pharmacy based on [facility] protocol if any benefit for remdesivir at this point (s/p MAB outpatient) vs toci  Repeat imaging pending    8/29 pt remains on home O2 demands. Cont duonebs. No remedesivir had outpt infusion for covid and was on tamiflu outpt as well. Dr herbert added levaquin dexamethasone day 4.    8/30 dexamethasone day 5- call pharmacy for consult- pt now with increased o2 demands     8/31 Day 6 decadron, Last day of Remdesivir

## 2022-08-31 NOTE — SUBJECTIVE & OBJECTIVE
Past Medical History:   Diagnosis Date    Abnormal Pap smear 13    LGSIL    Atrial fibrillation     COPD (chronic obstructive pulmonary disease)     Depression     GERD (gastroesophageal reflux disease)     Hyperlipidemia     Hypertension        Past Surgical History:   Procedure Laterality Date    APPENDECTOMY      BRONCHOSCOPY N/A 2019    Procedure: BRONCHOSCOPY;  Surgeon: Howard Matson MD;  Location: [facility];  Service: Pulmonary;  Laterality: N/A;    CERVICAL BIOPSY  W/ LOOP ELECTRODE EXCISION      CHOLECYSTECTOMY      COLLATERAL LIGAMENT REPAIR, KNEE      left knee    COLONOSCOPY      DILATION AND CURETTAGE OF UTERUS      SINUS SURGERY         Review of patient's allergies indicates:   Allergen Reactions    Pcn [penicillins] Hives and Itching    Tetracyclines     Bactrim [sulfamethoxazole-trimethoprim] Rash    Ilosone Rash    Iodine and iodide containing products Rash    Sulfa (sulfonamide antibiotics) Hives and Rash       No current facility-administered medications on file prior to encounter.     Current Outpatient Medications on File Prior to Encounter   Medication Sig    albuterol (PROVENTIL/VENTOLIN HFA) 90 mcg/actuation inhaler Inhale 2 puffs into the lungs every 6 (six) hours as needed for Wheezing.    albuterol-ipratropium (DUO-NEB) 2.5 mg-0.5 mg/3 mL nebulizer solution SMARTSI Vial(s) Via Nebulizer Every 12 Hours    ALPRAZolam (XANAX) 0.25 MG tablet TAKE 1 TABLET DURING THE DAY FOR ANXIETY AS NEEDED AND 2 AT NIGHT FOR SLEEP    atenoloL (TENORMIN) 25 MG tablet Take 12.5 mg by mouth once daily.    clobetasoL (TEMOVATE) 0.05 % cream Apply topically 2 (two) times daily. To rash at the right lower leg    diltiaZEM (CARDIZEM) 120 MG tablet Take 120 mg by mouth 2 (two) times daily.    ELIQUIS 2.5 mg Tab Take 2.5 mg by mouth 2 (two) times daily.    ergocalciferol (ERGOCALCIFEROL) 50,000 unit Cap TAKE 1 CAPSULE BY MOUTH ONE TIME PER WEEK    ergocalciferol (ERGOCALCIFEROL) 50,000 unit Cap TAKE 1  CAPSULE BY MOUTH ONE TIME PER WEEK    EScitalopram oxalate (LEXAPRO) 5 MG Tab TAKE 1 TABLET BY MOUTH EVERY DAY    ezetimibe (ZETIA) 10 mg tablet     flecainide (TAMBOCOR) 50 MG Tab Take 50 mg by mouth 2 (two) times daily.    furosemide (LASIX) 20 MG tablet Take 20 mg by mouth once daily.    gabapentin (NEURONTIN) 100 MG capsule Take 1 capsule (100 mg total) by mouth every evening.    levalbuterol (XOPENEX) 0.63 mg/3 mL nebulizer solution Take 3 mLs (0.63 mg total) by nebulization every 4 (four) hours as needed for Wheezing.    metoclopramide HCl (REGLAN) 10 MG tablet Take 1 tablet (10 mg total) by mouth every 6 (six) hours.    montelukast (SINGULAIR) 10 mg tablet Take 1 tablet (10 mg total) by mouth once daily.    predniSONE (DELTASONE) 10 MG tablet Take 10 mg by mouth once daily.    REPATHA SURECLICK 140 mg/mL PnIj Inject 140 mg into the skin every 14 (fourteen) days.    rifAXIMin (XIFAXAN) 550 mg Tab Take 1 tablet (550 mg total) by mouth 3 (three) times daily.    rosuvastatin (CRESTOR) 40 MG Tab Take 40 mg by mouth every evening.    TRELEGY ELLIPTA 100-62.5-25 mcg DsDv TAKE 1 PUFF BY MOUTH EVERY DAY    valsartan (DIOVAN) 160 MG tablet      Family History       Problem Relation (Age of Onset)    Breast cancer Mother          Tobacco Use    Smoking status: Former     Years: 30.00     Types: Cigarettes     Quit date: 10/30/2006     Years since quitting: 15.8    Smokeless tobacco: Never   Substance and Sexual Activity    Alcohol use: No     Comment: No    Drug use: No    Sexual activity: Not Currently     Birth control/protection: Post-menopausal     Comment:      Review of Systems   Constitutional:  Positive for activity change, appetite change and fatigue. Negative for fever.   HENT:  Positive for hearing loss (chronic). Negative for sinus pressure and sore throat.    Eyes:  Negative for discharge and visual disturbance.   Respiratory:  Positive for cough and shortness of breath.         + hemoptysis    Cardiovascular:  Negative for chest pain, palpitations and leg swelling.   Gastrointestinal:  Positive for diarrhea (improving). Negative for abdominal pain, blood in stool and nausea.   Genitourinary:  Negative for dysuria and hematuria.   Musculoskeletal:  Negative for arthralgias and myalgias.   Neurological:  Positive for weakness (generalized).   Psychiatric/Behavioral:  Negative for confusion. The patient is nervous/anxious.    Objective:     Vital Signs (Most Recent):  Temp: 96.8 °F (36 °C) (08/31/22 0701)  Pulse: 75 (08/31/22 0703)  Resp: (!) 31 (08/31/22 0703)  BP: 117/81 (08/31/22 0802)  SpO2: (!) 91 % (08/31/22 0703) Vital Signs (24h Range):  Temp:  [96 °F (35.6 °C)-99.8 °F (37.7 °C)] 96.8 °F (36 °C)  Pulse:  [63-90] 75  Resp:  [18-56] 31  SpO2:  [90 %-100 %] 91 %  BP: (117-205)/(66-93) 117/81     Weight: 54.8 kg (120 lb 13 oz)  Body mass index is 22.1 kg/m².    Physical Exam  Vitals and nursing note reviewed.   Constitutional:       General: She is not in acute distress.     Comments: Hard of hearing   HENT:      Head: Normocephalic and atraumatic.      Right Ear: External ear normal.      Left Ear: External ear normal.      Mouth/Throat:      Mouth: Mucous membranes are moist.   Eyes:      Extraocular Movements: Extraocular movements intact.      Conjunctiva/sclera: Conjunctivae normal.      Pupils: Pupils are equal, round, and reactive to light.   Cardiovascular:      Rate and Rhythm: Normal rate. Rhythm irregular.      Heart sounds: Normal heart sounds. No murmur heard.  Pulmonary:      Effort: Pulmonary effort is normal.      Breath sounds: No wheezing or rhonchi.      Comments: Poor air movement.  Mild respiratory distress.  Abdominal:      General: Bowel sounds are normal. There is no distension.      Palpations: Abdomen is soft.      Tenderness: There is no abdominal tenderness.   Musculoskeletal:      Cervical back: Neck supple.      Right lower leg: No edema.      Left lower leg: No edema.    Neurological:      General: No focal deficit present.      Mental Status: She is alert and oriented to person, place, and time.   Psychiatric:         Behavior: Behavior normal.         Thought Content: Thought content normal.         CRANIAL NERVES     CN III, IV, VI   Pupils are equal, round, and reactive to light.     Significant Labs: .      BMP:   Recent Labs   Lab 08/31/22  0404   *   *   K 3.7      CO2 32*   BUN 23   CREATININE 0.7   CALCIUM 8.2*       CBC:   Recent Labs   Lab 08/30/22  0615 08/31/22  0405   WBC 8.22 6.05   HGB 9.1* 8.9*   HCT 28.9* 27.4*    177       CMP:   Recent Labs   Lab 08/30/22  0615 08/31/22  0404   * 148*   K 3.9 3.7    103   CO2 31* 32*   * 155*   BUN 21 23   CREATININE 0.7 0.7   CALCIUM 8.5* 8.2*   PROT 5.4* 5.1*   ALBUMIN 2.9* 2.8*   BILITOT 0.8 0.9   ALKPHOS 56 53*   AST 29 21   ALT 52* 42   ANIONGAP 13 13     8/27 mag 2.3   8/26 sed rate 22  LDH: 270  Latic 1.0  Procal: 0.08 (0.10)  ESR: 22  Ferritin: 852    Troponin 0.029  8/22 flu B positive   Covid positive   C diff negative   Blood cultures NGTD x 2     Significant Imaging:     CXR:   Chronic lung changes are seen.  Increasing hazy interstitial opacity throughout the left lung, pronounced in the left mid and lower lung zone which could reflect atelectasis, aspiration or possibly a developing pneumonia.  Probable small bilateral pleural effusions.  Calcified granulomas in the left lung.  Nodular density in the left mid lung which appears new as compared to the previous examination none.  This could possibly represent a nipple shadow.  Heart size is normal.  Calcified atheromatous disease affects the aorta.  Fortunately age-appropriate degenerative changes affect the skeleton.    8/27 CT chest    1. Interval development of a smooth pleural based soft tissue mass of the medial right lower lobe which is of uncertain etiology.  Neoplasia is not excludable.  Further evaluation  with PET scan as deemed clinically necessary.  2. 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  3. Granulomatous change.    8/27 EKG Sinus rhythm with Premature supraventricular complexes  Nonspecific T wave abnormality  Abnormal ECG  When compared with ECG of 23-AUG-2022 09:32,  Premature supraventricular complexes are now Present  Confirmed by El Urban MD ([id number]) on 8/26/2022 6:00:40 PM

## 2022-08-31 NOTE — PHYSICIAN QUERY
PT Name: Marva Perez  MR #: [id number]     DOCUMENTATION CLARIFICATION      CDS: Nasim Chandler RN CCDS               Contact information: [email or web address]    This form is a permanent document in the medical record.     Query Date: August 31, 2022    By submitting this query, we are merely seeking further clarification of documentation.  Please utilize your independent clinical judgment when addressing the question(s) below.     The Medical Record contains the following:    Clinical Information Location in Medical Record   Diarrhea   The current episode started two days ago. The problem occurs 2 to 4 times per day. The problem has been gradually worsening. The stool consistency is described as watery. The patient states that diarrhea awakens her from sleep.    Diarrhea  Check c diff  Suspect this is 2/2 tamiflu; will DC as she seems stable from resp standpoint  S/p 1L NS in ER; cont 50/hr    She was seen in ER 8/22/22 and diagnosed with COVID and flu B. She was started on tamiflu and referred for outpatient antibody infusion. Pt states she started having nausea, vomiting, and diarrhea as well as intermittent fever. She returned to ER 8/23/22 for the new onset GI symptoms. Workup was reassuring and she was discharged home with antiemetics. She received bebtelovimab infusion 8/24/22. She continued with poor appetite and diarrhea and called her PCP who recommended ER evaluation.    COVID-19  Influenza B    Diarrhea improved, she did have a loose stool this morning sent for c diff testing.    Pt with frequent but small incontinent bouts of loose/mucous consistencies stools.  Called Dr. Victoria and started patient on Imodium.    C diff negative. Diarrhea improved 8/26/2022 ED provider notes          8/26/2022 H&P                              8/27/2022 Hospital Medicine progress notes    8/27/2022 Nursing notes      8/28/2022 Hospital Medicine progress notes     Please document your best medical opinion regarding  the etiology of Diarrhea? El all that apply.       [  x ] COVID-19   [  x ] Tamiflu   [   ] Other etiology (please specify):___________________   [  ] Clinically Undetermined             Please document in your progress notes daily for the duration of treatment, until resolved, and include in your discharge summary.

## 2022-08-31 NOTE — PT/OT/SLP DISCHARGE
Physical Therapy Discharge Summary    Name: Marva Perez  MRN: [id number]   Principal Problem: COVID-19     Patient Discharged from acute Physical Therapy on 2022.  Please refer to prior PT noted date on 2022 for functional status.     Assessment:     Patient transferred to a higher level of care. Patient not appropriate for PT services at this time. Please re-consult Physical Therapy services once patient is stable and appropriate for PT services. Current PT orders to be discharged.    Objective:     GOALS:   Multidisciplinary Problems       Physical Therapy Goals          Problem: Physical Therapy    Goal Priority Disciplines Outcome Goal Variances Interventions   Physical Therapy Goal     PT, PT/OT Adequate for Care Transition     Description: Patient will increase functional independence with mobility by performin. Supine to sit with Modified Independent  2. Sit to supine with Modified Independent  3. Bed to chair transfer with Supervision or Set-up Assistancewith or without rolling walker using Step Transfer TECHNIQUE  4. Gait  x 50  feet with Supervision or Set-up Assistance with or without rolling walker  5. Lower extremity exercise program x10 reps per handout, with assistance as needed                           Reasons for Discontinuation of Therapy Services  Transfer to alternate level of care.      Plan:     Patient Discharged to: CCU.      2022

## 2022-08-31 NOTE — PHYSICIAN QUERY
PT Name: Marva Perez  MR #: [id number]     DOCUMENTATION CLARIFICATION     CDS: Nasim Chandler RN CCDS               Contact information: [email or web address]    This form is a permanent document in the medical record.     Query Date: August 31, 2022    By submitting this query, we are merely seeking further clarification of documentation.  Please utilize your independent clinical judgment when addressing the question(s) below.  The Medical Record contains the following   Indicators   Supporting Clinical Findings Location in Medical Record    x SOB, TAVARES, Wheezing, Productive Cough, Use of Accessory Muscles, etc. Positive for cough and shortness of breath.  Wheezing (scattered, mild, exp)    Cough with hemoptysis, increased work of breathing    Frail wheezing with Hemoptysis due to Covid.    Tachypnea 30-40 shallow BPM. Very dusky/grey looking.    breathing has worsened still coughing up maroon sputum    No use of accessory muscle but appears to be working harder to breath today---unsure how much is anxiety driven    Lethargic 8/26/2022 H&P      8/28/2022 Pulmonology consult    8/29/2022 Cardiology consult    8/30/2022 Nursing notes    8/30/2022 Pulmonology progress notes    8/30/2022 Hospital Medicine progress notes      8/30/2022 Nursing notes    x RR         ABGs         O2 sat O2 sats 87-91% on 3L, RR 18-24  O2 sat 89% on 4L O2, RR 20-34     08/29/22 15:35   POC PH 7.480    POC PCO2 49    POC PO2 48    POC HCO3 36.50   POC SATURATED O2 89.1    POC THb 9.5    POC O2Hb Arterial 86.5    POC COHb 2.0   POC MetHb 1.0   POC BE 11.60    POC TCO2 38   FiO2 36   DelSys Nasal Cannula   Allens Test Pass   Site Left Radial    8/28/2022 Vitals  8/29/2022 Vitals    ABGs    x Hypoxia/Hypercapnia Hypoxia  worsening hypoxia 8/26/2022 ED provider notes  8/30/2022 Hospital Medicine progress notes    x BiPAP/Intubation/Mechanical Ventilation Will start Bipap to avoid intubation 8/30/2022 Pulmonology progress notes    x  Supplemental O2 Supplemental O2 3-4L  Supplemental O2 4-7L 8/28/2022 Vitals  8/29/2022 Vitals    x Home O2, Oxygen Dependence On 2-3L NC at home  she is on home oxygen at 3 LPM 8/26/2022 H&P  8/28/2022 Pulmonology consult    x Respiratory distress or failure Chronic respiratory failure  Hypoxic Respiratory failure which is Chronic.      respiratory distress (mild to moderate), wheezing  Poor air movement.  Mild respiratory distress. 8/26/2022 H&P  8/28/2022 Pulmonology consult    8/29/2022 Pulmonology progress notes  8/31/2022 Hospital Medicine progress notes    x Radiology findings Chronic lung changes are seen.  Increasing hazy interstitial opacity throughout the left lung, pronounced in the left mid and lower lung zone which could reflect atelectasis, aspiration or possibly a developing pneumonia.  Probable small bilateral pleural effusions.  Calcified granulomas in the left lung.  Nodular density in the left mid lung which appears new as compared to the previous examination none.  This could possibly represent a nipple shadow. 8/26/2022 Chest x-ray    x Acute/Chronic Illness COVID-19  Influenza B  Centrilobular emphysema  Severe end stage  Pneumonia due to infectious organism 8/26/2022 H&P    8/28/2022 Pulmonology consult    8/29/2022 Hospital Medicine progress notes    x Treatment dexamethasone 6 mg IV x 1  dexamethasone 6 mg IV daily  remdesivir 200 mg IV x 1  remdesivir 100 mg IV daily 8/26/2022 Medication  8/28/2022- Current Medication  8/30/2022 Medication  8/31/2022- Current Medication    Other         The noted clinical guidelines are only system guidelines and do not replace the providers clinical judgment.    Provider, please specify the diagnosis or diagnoses associated with above clinical findings.     [  x  ] Acute and (on) Chronic Respiratory Failure with Hypoxia - pO2 >10 mmHg below baseline or SpO2 < 91% on usual home O2 or O2 ? 2L/min over baseline home O2 and respiratory symptoms documented   [     ] Chronic Respiratory Failure with Hypoxia - Continuous home oxygen use   [    ] Other Respiratory Diagnosis (please specify): _________________   [   ] Clinically Undetermined         Please document in your progress notes daily for the duration of treatment until resolved and include in your discharge summary.     Form No. [id number]

## 2022-08-31 NOTE — ASSESSMENT & PLAN NOTE
On home oxygen 2-3L NC.;  Now with increased demands   Cont nebs, steroids, levaquin  Now on 4 liters NC

## 2022-08-31 NOTE — CONSULTS
[place] - Intensive Care  Adult Nutrition  Consult Note    SUMMARY     Recommendations    Recommendation/Intervention:   1. Provide Boost Plus ONS TID with meals to provide an additional 1080 kcals and 42 g protein.   2. Rec'd honoring pt food preferences to encourage po intake.   3. RD to follow and make rec's accordingly.    Goals: Pt will meet % EER by f/u.  Nutrition Goal Status: new  Communication of RD Recs: reviewed with RN (VIVIAN Caro)    Assessment and Plan    Nutrition Problem  Inadequate oral intake    Related to (etiology):   Decreased appetite and GI symptoms    Signs and Symptoms (as evidenced by):   Pt consuming 0-25% of meals and meeting 0-25% of EER.    Interventions/Recommendations (treatment strategy):    Recommendation/Intervention:   1. Provide Boost Plus ONS TID with meals to provide an additional 1080 kcals and 42 g protein.   2. Rec'd honoring pt food preferences to encourage oral intake.   3. RD to follow and make rec's accordingly.    Goals: Pt will meet % EER by f/u.  Nutrition Goal Status: new      Nutrition Diagnosis Status:   New                                            Reason for Assessment    Reason For Assessment: consult (daughter requesting shakes/smoothies as supplements)  Diagnosis: infection/sepsis (COVID-19)  Relevant Medical History: HTN, HLD, GERD, COPD/emphysema, afib, dementia  Interdisciplinary Rounds: did not attend  General Information Comments: Attempted to visit with pt's daughter about meal supplementation due to pt consuming 0-25% of meals. Daughter not present. Per chart, pt reports appetite changes, nausea, and diarrhea (improving). Observed pt on bipap. Spoke with VIVIAN Caro about adding milkshakes with Boost Plus Vanilla to all meals. Will f/u to assess intake, meal tolerance, and reassessment of GI symptoms.  Nutrition Discharge Planning: cardiac diet    Nutrition Risk Screen    Nutrition Risk Screen: no indicators present    Nutrition/Diet  "History    Patient Reported Diet/Restrictions/Preferences: other (see comments) (unable to speak with pt)  Food Allergies: iodine  Factors Affecting Nutritional Intake: decreased appetite, diarrhea, nausea/vomiting    Anthropometrics    Temp: 97 °F (36.1 °C)  Height Method: Measured  Height: 5' 2" (157.5 cm)  Height (inches): 62 in  Weight Method: Bed Scale  Weight: 54.8 kg (120 lb 13 oz)  Weight (lb): 120.81 lb  Ideal Body Weight (IBW), Female: 110 lb  % Ideal Body Weight, Female (lb): 109.83 %  BMI (Calculated): 22.1  BMI Grade: 18.5-24.9 - normal       Lab/Procedures/Meds    Pertinent Labs Reviewed: reviewed  Pertinent Labs Comments: H&H: 8.9/27.4 (L), Na: 148 (H), CO2: 32 (H), glucose: 155 (H), Ca: 8.2 (L), alk phos: 53 (L)  Pertinent Medications Reviewed: reviewed  Pertinent Medications Comments: vitamin C, thiamin, steroid, abx, remdesivir in 250 mL NS       Estimated/Assessed Needs    Weight Used For Calorie Calculations: 54.8 kg (120 lb 13 oz)  Energy Calorie Requirements (kcal): 1214  Energy Need Method: [place] (MSJ x 1.25 AF for critically ill)  Protein Requirements: 55-69 g (1-1.25 for OA maintenance)  Weight Used For Protein Calculations: 54.8 kg (120 lb 13 oz)  Fluid Requirements (mL): 1214  Estimated Fluid Requirement Method: RDA Method (RDA or per MD)  RDA Method (mL): 1214         Nutrition Prescription Ordered    Current Diet Order: cardiac diet  Oral Nutrition Supplement: Vanilla Boost Plus milkshakes at all meals    Evaluation of Received Nutrient/Fluid Intake    % Kcal Needs: 0-25%  % Protein Needs: 0-25%  IV Fluid (mL): 250 (remdesivir in 250 mL NaCl 0.9%)  I/O: +180  Energy Calories Required: not meeting needs  Protein Required: not meeting needs  Fluid Required: not meeting needs  Tolerance: tolerating  % Intake of Estimated Energy Needs: 0 - 25 %  % Meal Intake: 0 - 25 %    Nutrition Risk    Level of Risk/Frequency of Follow-up: moderate       Monitor and Evaluation    Food and " Nutrient Intake: energy intake, food and beverage intake, enteral nutrition intake  Food and Nutrient Adminstration: diet order  Knowledge/Beliefs/Attitudes: food and nutrition knowledge/skill, beliefs and attitudes  Physical Activity and Function: nutrition-related ADLs and IADLs, factors affecting access to physical activity  Anthropometric Measurements: height/length, weight, weight change, body mass index  Biochemical Data, Medical Tests and Procedures: electrolyte and renal panel, gastrointestinal profile, glucose/endocrine profile, inflammatory profile, lipid profile  Nutrition-Focused Physical Findings: overall appearance       Nutrition Follow-Up     9/01/2022

## 2022-08-31 NOTE — PROGRESS NOTES
[facility] - Intensive Care  Pulmonology  Progress Note    Patient Name: Marva Perez  MRN: [id number]  Admission Date: 8/26/2022  Hospital Length of Stay: 5 days  Code Status: Full Code  Attending Provider: Stewart Lucia MD  Primary Care Provider: Kevin Clements MD   Principal Problem: COVID-19    Subjective:     Interval History: stable not requiring intubation will follow clinically    Objective:     Vital Signs (Most Recent):  Temp: 96.8 °F (36 °C) (08/31/22 0701)  Pulse: 75 (08/31/22 0703)  Resp: (!) 31 (08/31/22 0703)  BP: 117/81 (08/31/22 0802)  SpO2: (!) 91 % (08/31/22 0703)   Vital Signs (24h Range):  Temp:  [96 °F (35.6 °C)-99.8 °F (37.7 °C)] 96.8 °F (36 °C)  Pulse:  [63-90] 75  Resp:  [18-56] 31  SpO2:  [90 %-100 %] 91 %  BP: (117-205)/(66-93) 117/81     Weight: 54.8 kg (120 lb 13 oz)  Body mass index is 22.1 kg/m².      Intake/Output Summary (Last 24 hours) at 8/31/2022 1022  Last data filed at 8/31/2022 0600  Gross per 24 hour   Intake 282.08 ml   Output 525 ml   Net -242.92 ml       Physical Exam    Vents:  Oxygen Concentration (%): 50 (08/31/22 0600)    Lines/Drains/Airways       Peripheral Intravenous Line  Duration                  Peripheral IV - Single Lumen 08/30/22 1516 20 G Right Hand <1 day                    Significant Labs:    CBC/Anemia Profile:  Recent Labs   Lab 08/30/22  0615 08/31/22  0405   WBC 8.22 6.05   HGB 9.1* 8.9*   HCT 28.9* 27.4*    177   MCV 93 94   RDW 13.2 13.2        Chemistries:  Recent Labs   Lab 08/30/22  0615 08/31/22  0404   * 148*   K 3.9 3.7    103   CO2 31* 32*   BUN 21 23   CREATININE 0.7 0.7   CALCIUM 8.5* 8.2*   ALBUMIN 2.9* 2.8*   PROT 5.4* 5.1*   BILITOT 0.8 0.9   ALKPHOS 56 53*   ALT 52* 42   AST 29 21       All pertinent labs within the past 24 hours have been reviewed.    Significant Imaging:  I have reviewed all pertinent imaging results/findings within the past 24 hours.    Assessment/Plan:     * COVID-19  ++ test she went to  a wedding   ++ covid and flu B    Cough with hemoptysis  off eloquise now will do cytology on sputum and if persist do a bronchoscopy in 3 to 5 days    Chronic atrial fibrillation  On eloquise has been stopped     Chronic respiratory failure  Patient with Hypoxic Respiratory failure which is Chronic.  she is on home oxygen at 3 LPM. Supplemental oxygen was provided and noted- Oxygen Concentration (%):  [35-50] 50.   Signs/symptoms of respiratory failure include- increased work of breathing. Contributing diagnoses includes - COPD Labs and images were reviewed. Patient Has recent ABG, which has been reviewed. Will treat underlying causes and adjust management of respiratory failure as follows- 3    Centrilobular emphysema  Severe end stage    Emphysema/COPD  Severe end stage     HTN (hypertension)  High but stable   stable    KATHIE (generalized anxiety disorder)  stable      Critical care time spent on the evaluation and treatment of severe organ dysfunction, review of pertinent labs and imaging studies, discussions with consulting providers and discussions with patient/family: 61 minutes.    Howard Matson MD  Pulmonology  [facility] - Intensive Care

## 2022-08-31 NOTE — ASSESSMENT & PLAN NOTE
CT chest: Interval development of a smooth pleural based soft tissue mass of the medial right lower lobe which is of uncertain etiology.  Neoplasia is not excludable.  Further evaluation with PET scan as deemed clinically necessary  Dr. Matson consulted, does not feel she is a bronch candidate due to severe COPD and emphysematous lung changes. Would consider PET scan as outpt. Added  levaquin 8/29    Repeat CTA done 8/30.  No changes.  Severe emphasema.  Poor study.  PE not completely ruled out  Cont levaquin for now  Consider restarting Eloquis.   Will discuss with Dr Matson

## 2022-08-31 NOTE — ASSESSMENT & PLAN NOTE
Patient with atrial fibrillation which is controlled currently with Beta Blocker, Calcium Channel Blocker and flecainide. Patient is currently in sinus rhythm.[id number] Score: 4. HASBLED Score: Unable to calculate. Anticoagulation indicated. Anticoagulation done with eliquis.    8/28/22: H&H drop from 10.1/30.6 > 8.6/26.0; pt with hemoptysis. Will hold eliquis for now. Consult cardiology in AM. She is followed by Dr. Cox outpatient. Daughter with concerns about holding eliquis, but given her hemoptysis and H&H drop it is my opinion as well as that of Dr. Matson that the risks of continued anticoagulation outweigh the benefit currently.     8/29 dr Wilde consulted on patient this am. Cont to hold eliquis for now.    8/30 cont to hold eliquis as she is still having hemoptysis   Cont atenolol and flecainide   Cont telemetry  Monitor electrolytes    8/31- No more hemoptysis.  Consider restarting Eloquis since she is such a high risk for PE.

## 2022-08-31 NOTE — EICU
Rounding (Video Assessment):  Yes    Intervention Initiated From:  [facility] / EICU    Comments:  Video assessment complete. Pt resting, on Bipap. No acute distress noted. VS per flowsheet.

## 2022-08-31 NOTE — PHYSICIAN QUERY
PT Name: Marva Perez  MR #: [id number]     DOCUMENTATION CLARIFICATION     CDS: Nasim Chandler RN CCDS               Contact information: [email or web address]    This form is a permanent document in the medical record.    Query Date: August 31, 2022    By submitting this query, we are merely seeking further clarification of documentation.  Please utilize your independent clinical judgment when addressing the question(s) below.  The Medical Record contains the following:   Indicators   Supporting Clinical Findings Location in Medical Record    x Pneumonia documented Pneumonia due to infectious organism  Pneumonia due to infectious organism 8/29/2022 Hospital Medicine progress notes  8/29/2022 Pulmonology progress notes    x Chest X-Ray/CT Scan Chronic lung changes are seen.  Increasing hazy interstitial opacity throughout the left lung, pronounced in the left mid and lower lung zone which could reflect atelectasis, aspiration or possibly a developing pneumonia.  Probable small bilateral pleural effusions.  Calcified granulomas in the left lung.  Nodular density in the left mid lung which appears new as compared to the previous examination none.  This could possibly represent a nipple shadow.    Severe emphysematous disease with fibrotic changes in the lung bases bilaterally.  There is some superimposed ground-glass opacity seen in the bilateral lung bases, left greater than right, which could represent a superimposed acute inflammatory/infectious process or possibly represent a sequela of aspiration.   Smooth pleural base nodule in the medial aspect of the superior segment of the right upper lobe.  Malignancy not excluded.  Pulmonary consult recommended. 8/26/2022 Chest x-ray                8/30/2022 CTA chest    x PaO2    PaCO2     O2 sat O2 sats % on 2-3L O2  O2 sats 87-91% on 3L, RR 18-24  O2 sat 89% on 4L O2, RR 20-34     08/29/22 15:35   POC PH 7.480    POC PCO2 49    POC PO2 48    POC HCO3 36.50   POC  SATURATED O2 89.1    POC THb 9.5    POC O2Hb Arterial 86.5    POC COHb 2.0   POC MetHb 1.0   POC BE 11.60    POC TCO2 38   FiO2 36   DelSys Nasal Cannula   Allens Test Pass   Site Left Radial    8/26/2022 Vitals  8/28/2022 Vitals  8/29/2022 Vitals    ABGs    x WBC  08/26/22 10:26 08/27/22 05:56 08/28/22 05:55 08/31/22 04:05   WBC 3.35 (L) 2.30 (L) 6.58 6.05    Lab values    x Vital Signs T 98.2-100.2, HR , RR 20-41, /70 8/26/2022 Vitals    Cultures       x Treatment  cefTRIAXone (ROCEPHIN) 1 g IVPB x 1  dexamethasone 6 mg IV x 1  dexamethasone 6 mg IV daily  levoFLOXacin 750 mg PO daily  remdesivir 200 mg IV x 1  remdesivir 100 mg IV daily 8/26/2022 Medications    8/28/2022- Current Medication  8/29/2022- Current Medication  8/30/2022 Medication  8/31/2022- Current Medication    x Supplemental O2 on 2-3L O2  Supplemental O2 3-4L  Supplemental O2 4-7L  Will start Bipap to avoid intubation 8/26/2022 Vitals  8/28/2022 Vitals  8/29/2022 Vitals  8/30/2022 Pulmonology progress notes    Dysphagia/Swallow study      x Other Hypoxia, She does not have COVID pneumonia    Positive for cough and shortness of breath.    Wheezing (scattered, mild, exp)  COVID-19  Influenza B  Chronic respiratory failure  On 2-3L NC at home    Cough with hemoptysis, increased work of breathing    Frail wheezing with Hemoptysis due to Covid.    breathing has worsened still coughing up maroon sputum     08/26/22 12:37   Procalcitonin 0.08      08/26/22 10:04   SARS-CoV-2 RNA, Amplification, Qual Positive     8/26/2022 ED provider notes    8/26/2022 H&P              8/28/2022 Pulmonology consult    8/29/2022 Cardiology consult    8/30/2022 Pulmonology progress notes    Lab values     Provider, please clarify the diagnosis of Pneumonia due to infectious organism related to the above clinical indicators. El all that apply    [  x ] Bacterial Pneumonia (please further specify organism being treated, if known):______   [ x  ] COVID-19  Pneumonia   [  x ] Influenza Pneumonia   [   ] Pneumonia due to infectious organism, Unspecified   [   ] Other type of pneumonia (please specify): ________   [  ] Clinically undetermined         Please document in your progress notes daily for the duration of treatment, until resolved, and include in your discharge summary.     Form No. [id number]

## 2022-08-31 NOTE — SUBJECTIVE & OBJECTIVE
Interval History: stable not requiring intubation will follow clinically    Objective:     Vital Signs (Most Recent):  Temp: 96.8 °F (36 °C) (08/31/22 0701)  Pulse: 75 (08/31/22 0703)  Resp: (!) 31 (08/31/22 0703)  BP: 117/81 (08/31/22 0802)  SpO2: (!) 91 % (08/31/22 0703)   Vital Signs (24h Range):  Temp:  [96 °F (35.6 °C)-99.8 °F (37.7 °C)] 96.8 °F (36 °C)  Pulse:  [63-90] 75  Resp:  [18-56] 31  SpO2:  [90 %-100 %] 91 %  BP: (117-205)/(66-93) 117/81     Weight: 54.8 kg (120 lb 13 oz)  Body mass index is 22.1 kg/m².      Intake/Output Summary (Last 24 hours) at 8/31/2022 1022  Last data filed at 8/31/2022 0600  Gross per 24 hour   Intake 282.08 ml   Output 525 ml   Net -242.92 ml       Physical Exam    Vents:  Oxygen Concentration (%): 50 (08/31/22 0600)    Lines/Drains/Airways       Peripheral Intravenous Line  Duration                  Peripheral IV - Single Lumen 08/30/22 1516 20 G Right Hand <1 day                    Significant Labs:    CBC/Anemia Profile:  Recent Labs   Lab 08/30/22  0615 08/31/22  0405   WBC 8.22 6.05   HGB 9.1* 8.9*   HCT 28.9* 27.4*    177   MCV 93 94   RDW 13.2 13.2        Chemistries:  Recent Labs   Lab 08/30/22  0615 08/31/22  0404   * 148*   K 3.9 3.7    103   CO2 31* 32*   BUN 21 23   CREATININE 0.7 0.7   CALCIUM 8.5* 8.2*   ALBUMIN 2.9* 2.8*   PROT 5.4* 5.1*   BILITOT 0.8 0.9   ALKPHOS 56 53*   ALT 52* 42   AST 29 21       All pertinent labs within the past 24 hours have been reviewed.    Significant Imaging:  I have reviewed all pertinent imaging results/findings within the past 24 hours.

## 2022-09-01 LAB
ALBUMIN SERPL BCP-MCNC: 3.1 G/DL (ref 3.5–5.2)
ALP SERPL-CCNC: 56 U/L (ref 55–135)
ALT SERPL W/O P-5'-P-CCNC: 41 U/L (ref 10–44)
ANION GAP SERPL CALC-SCNC: 13 MMOL/L (ref 8–16)
AST SERPL-CCNC: 21 U/L (ref 10–40)
BASOPHILS # BLD AUTO: ABNORMAL K/UL (ref 0–0.2)
BASOPHILS NFR BLD: 0 % (ref 0–1.9)
BILIRUB SERPL-MCNC: 1.2 MG/DL (ref 0.1–1)
BUN SERPL-MCNC: 34 MG/DL (ref 8–23)
CALCIUM SERPL-MCNC: 8.4 MG/DL (ref 8.7–10.5)
CHLORIDE SERPL-SCNC: 102 MMOL/L (ref 95–110)
CO2 SERPL-SCNC: 32 MMOL/L (ref 23–29)
CREAT SERPL-MCNC: 0.8 MG/DL (ref 0.5–1.4)
DIFFERENTIAL METHOD: ABNORMAL
EOSINOPHIL # BLD AUTO: ABNORMAL K/UL (ref 0–0.5)
EOSINOPHIL NFR BLD: 0 % (ref 0–8)
ERYTHROCYTE [DISTWIDTH] IN BLOOD BY AUTOMATED COUNT: 13.4 % (ref 11.5–14.5)
EST. GFR  (NO RACE VARIABLE): >60 ML/MIN/1.73 M^2
GLUCOSE SERPL-MCNC: 143 MG/DL (ref 70–110)
HCT VFR BLD AUTO: 31 % (ref 37–48.5)
HGB BLD-MCNC: 9.9 G/DL (ref 12–16)
IMM GRANULOCYTES # BLD AUTO: ABNORMAL K/UL (ref 0–0.04)
IMM GRANULOCYTES NFR BLD AUTO: ABNORMAL % (ref 0–0.5)
LYMPHOCYTES # BLD AUTO: ABNORMAL K/UL (ref 1–4.8)
LYMPHOCYTES NFR BLD: 10 % (ref 18–48)
MCH RBC QN AUTO: 29.5 PG (ref 27–31)
MCHC RBC AUTO-ENTMCNC: 31.9 G/DL (ref 32–36)
MCV RBC AUTO: 92 FL (ref 82–98)
MONOCYTES # BLD AUTO: ABNORMAL K/UL (ref 0.3–1)
MONOCYTES NFR BLD: 12 % (ref 4–15)
NEUTROPHILS NFR BLD: 78 % (ref 38–73)
NRBC BLD-RTO: 1 /100 WBC
PLATELET # BLD AUTO: 218 K/UL (ref 150–450)
PMV BLD AUTO: 10.2 FL (ref 9.2–12.9)
POTASSIUM SERPL-SCNC: 3.9 MMOL/L (ref 3.5–5.1)
PROT SERPL-MCNC: 5.5 G/DL (ref 6–8.4)
RBC # BLD AUTO: 3.36 M/UL (ref 4–5.4)
SODIUM SERPL-SCNC: 147 MMOL/L (ref 136–145)
WBC # BLD AUTO: 6.66 K/UL (ref 3.9–12.7)

## 2022-09-01 PROCEDURE — 99222 PR INITIAL HOSPITAL CARE,LEVL II: ICD-10-PCS | Mod: ,,, | Performed by: STUDENT IN AN ORGANIZED HEALTH CARE EDUCATION/TRAINING PROGRAM

## 2022-09-01 PROCEDURE — 99900035 HC TECH TIME PER 15 MIN (STAT)

## 2022-09-01 PROCEDURE — 99291 CRITICAL CARE FIRST HOUR: CPT | Mod: ,,, | Performed by: STUDENT IN AN ORGANIZED HEALTH CARE EDUCATION/TRAINING PROGRAM

## 2022-09-01 PROCEDURE — 94761 N-INVAS EAR/PLS OXIMETRY MLT: CPT

## 2022-09-01 PROCEDURE — 63600175 PHARM REV CODE 636 W HCPCS: Performed by: INTERNAL MEDICINE

## 2022-09-01 PROCEDURE — 20000000 HC ICU ROOM

## 2022-09-01 PROCEDURE — 27000221 HC OXYGEN, UP TO 24 HOURS

## 2022-09-01 PROCEDURE — 25000003 PHARM REV CODE 250: Performed by: SURGERY

## 2022-09-01 PROCEDURE — 94660 CPAP INITIATION&MGMT: CPT

## 2022-09-01 PROCEDURE — 25000003 PHARM REV CODE 250: Performed by: NURSE PRACTITIONER

## 2022-09-01 PROCEDURE — 85027 COMPLETE CBC AUTOMATED: CPT | Performed by: FAMILY MEDICINE

## 2022-09-01 PROCEDURE — 25000003 PHARM REV CODE 250: Performed by: INTERNAL MEDICINE

## 2022-09-01 PROCEDURE — 25000003 PHARM REV CODE 250: Performed by: FAMILY MEDICINE

## 2022-09-01 PROCEDURE — 85007 BL SMEAR W/DIFF WBC COUNT: CPT | Performed by: FAMILY MEDICINE

## 2022-09-01 PROCEDURE — 99222 1ST HOSP IP/OBS MODERATE 55: CPT | Mod: ,,, | Performed by: STUDENT IN AN ORGANIZED HEALTH CARE EDUCATION/TRAINING PROGRAM

## 2022-09-01 PROCEDURE — 99291 PR CRITICAL CARE, E/M 30-74 MINUTES: ICD-10-PCS | Mod: ,,, | Performed by: STUDENT IN AN ORGANIZED HEALTH CARE EDUCATION/TRAINING PROGRAM

## 2022-09-01 PROCEDURE — 25000003 PHARM REV CODE 250: Performed by: STUDENT IN AN ORGANIZED HEALTH CARE EDUCATION/TRAINING PROGRAM

## 2022-09-01 PROCEDURE — 25000242 PHARM REV CODE 250 ALT 637 W/ HCPCS: Performed by: INTERNAL MEDICINE

## 2022-09-01 PROCEDURE — 94640 AIRWAY INHALATION TREATMENT: CPT

## 2022-09-01 PROCEDURE — 63600175 PHARM REV CODE 636 W HCPCS: Performed by: STUDENT IN AN ORGANIZED HEALTH CARE EDUCATION/TRAINING PROGRAM

## 2022-09-01 PROCEDURE — 27000207 HC ISOLATION

## 2022-09-01 PROCEDURE — 36415 COLL VENOUS BLD VENIPUNCTURE: CPT | Performed by: FAMILY MEDICINE

## 2022-09-01 PROCEDURE — 80053 COMPREHEN METABOLIC PANEL: CPT | Performed by: FAMILY MEDICINE

## 2022-09-01 RX ORDER — MIRTAZAPINE 7.5 MG/1
15 TABLET, FILM COATED ORAL ONCE
Status: DISCONTINUED | OUTPATIENT
Start: 2022-09-01 | End: 2022-09-01

## 2022-09-01 RX ORDER — MUPIROCIN 20 MG/G
OINTMENT TOPICAL 2 TIMES DAILY
Status: COMPLETED | OUTPATIENT
Start: 2022-09-01 | End: 2022-09-06

## 2022-09-01 RX ORDER — VALPROIC ACID 250 MG/5ML
250 SOLUTION ORAL EVERY 6 HOURS PRN
Status: DISCONTINUED | OUTPATIENT
Start: 2022-09-01 | End: 2022-09-04

## 2022-09-01 RX ORDER — MIRTAZAPINE 15 MG/1
15 TABLET, ORALLY DISINTEGRATING ORAL ONCE
Status: COMPLETED | OUTPATIENT
Start: 2022-09-01 | End: 2022-09-01

## 2022-09-01 RX ORDER — MIRTAZAPINE 15 MG/1
15 TABLET, ORALLY DISINTEGRATING ORAL NIGHTLY
Status: DISCONTINUED | OUTPATIENT
Start: 2022-09-01 | End: 2022-09-01 | Stop reason: CLARIF

## 2022-09-01 RX ADMIN — VALSARTAN 160 MG: 80 TABLET, FILM COATED ORAL at 08:09

## 2022-09-01 RX ADMIN — ATORVASTATIN CALCIUM 10 MG: 10 TABLET, FILM COATED ORAL at 08:09

## 2022-09-01 RX ADMIN — ESCITALOPRAM OXALATE 5 MG: 5 TABLET, FILM COATED ORAL at 09:09

## 2022-09-01 RX ADMIN — LEVOFLOXACIN 750 MG: 250 TABLET, FILM COATED ORAL at 09:09

## 2022-09-01 RX ADMIN — IPRATROPIUM BROMIDE AND ALBUTEROL SULFATE 3 ML: .5; 3 SOLUTION RESPIRATORY (INHALATION) at 07:09

## 2022-09-01 RX ADMIN — MIRTAZAPINE 7.5 MG: 15 TABLET, ORALLY DISINTEGRATING ORAL at 10:09

## 2022-09-01 RX ADMIN — REMDESIVIR 100 MG: 100 INJECTION, POWDER, LYOPHILIZED, FOR SOLUTION INTRAVENOUS at 09:09

## 2022-09-01 RX ADMIN — METHYLPREDNISOLONE SODIUM SUCCINATE 20 MG: 40 INJECTION, POWDER, FOR SOLUTION INTRAMUSCULAR; INTRAVENOUS at 03:09

## 2022-09-01 RX ADMIN — MUPIROCIN: 20 OINTMENT TOPICAL at 08:09

## 2022-09-01 RX ADMIN — METHYLPREDNISOLONE SODIUM SUCCINATE 20 MG: 40 INJECTION, POWDER, FOR SOLUTION INTRAMUSCULAR; INTRAVENOUS at 09:09

## 2022-09-01 RX ADMIN — ALPRAZOLAM 0.5 MG: 0.5 TABLET ORAL at 05:09

## 2022-09-01 RX ADMIN — VALSARTAN 160 MG: 80 TABLET, FILM COATED ORAL at 09:09

## 2022-09-01 RX ADMIN — DILTIAZEM HYDROCHLORIDE 60 MG: 60 TABLET, FILM COATED ORAL at 09:09

## 2022-09-01 RX ADMIN — METHYLPREDNISOLONE SODIUM SUCCINATE 20 MG: 40 INJECTION, POWDER, FOR SOLUTION INTRAMUSCULAR; INTRAVENOUS at 02:09

## 2022-09-01 RX ADMIN — DEXAMETHASONE SODIUM PHOSPHATE 6 MG: 4 INJECTION, SOLUTION INTRA-ARTICULAR; INTRALESIONAL; INTRAMUSCULAR; INTRAVENOUS; SOFT TISSUE at 09:09

## 2022-09-01 RX ADMIN — METHYLPREDNISOLONE SODIUM SUCCINATE 20 MG: 40 INJECTION, POWDER, FOR SOLUTION INTRAMUSCULAR; INTRAVENOUS at 05:09

## 2022-09-01 RX ADMIN — CLONIDINE HYDROCHLORIDE 0.1 MG: 0.1 TABLET ORAL at 06:09

## 2022-09-01 RX ADMIN — METHYLPREDNISOLONE SODIUM SUCCINATE 20 MG: 40 INJECTION, POWDER, FOR SOLUTION INTRAMUSCULAR; INTRAVENOUS at 06:09

## 2022-09-01 RX ADMIN — IPRATROPIUM BROMIDE AND ALBUTEROL SULFATE 3 ML: .5; 3 SOLUTION RESPIRATORY (INHALATION) at 01:09

## 2022-09-01 RX ADMIN — MONTELUKAST 10 MG: 10 TABLET, FILM COATED ORAL at 09:09

## 2022-09-01 RX ADMIN — CLONIDINE HYDROCHLORIDE 0.1 MG: 0.1 TABLET ORAL at 05:09

## 2022-09-01 RX ADMIN — ATENOLOL 12.5 MG: 25 TABLET ORAL at 09:09

## 2022-09-01 RX ADMIN — VALPROIC ACID 250 MG: 250 SOLUTION ORAL at 02:09

## 2022-09-01 RX ADMIN — FLECAINIDE ACETATE 50 MG: 50 TABLET ORAL at 08:09

## 2022-09-01 RX ADMIN — ALPRAZOLAM 0.5 MG: 0.5 TABLET ORAL at 09:09

## 2022-09-01 RX ADMIN — DILTIAZEM HYDROCHLORIDE 60 MG: 60 TABLET, FILM COATED ORAL at 08:09

## 2022-09-01 RX ADMIN — FLECAINIDE ACETATE 50 MG: 50 TABLET ORAL at 09:09

## 2022-09-01 RX ADMIN — THIAMINE HCL TAB 100 MG 100 MG: 100 TAB at 09:09

## 2022-09-01 RX ADMIN — Medication 6 MG: at 09:09

## 2022-09-01 RX ADMIN — OXYCODONE HYDROCHLORIDE AND ACETAMINOPHEN 500 MG: 500 TABLET ORAL at 09:09

## 2022-09-01 RX ADMIN — LOPERAMIDE HYDROCHLORIDE 2 MG: 2 CAPSULE ORAL at 08:09

## 2022-09-01 NOTE — PROGRESS NOTES
[facility] - Intensive Care  Pulmonology  Progress Note    Patient Name: Marva Perez  MRN: [id number]  Admission Date: 8/26/2022  Hospital Length of Stay: 6 days  Code Status: Full Code  Attending Provider: Stewart Lucia MD  Primary Care Provider: Kevin Clements MD   Principal Problem: COVID-19    Subjective:     Interval History: biting on a towel    Objective:     Vital Signs (Most Recent):  Temp: 97.1 °F (36.2 °C) (09/01/22 0715)  Pulse: 64 (09/01/22 0728)  Resp: 20 (09/01/22 0728)  BP: (!) 191/93 (09/01/22 0634)  SpO2: 95 % (09/01/22 0728)   Vital Signs (24h Range):  Temp:  [96.9 °F (36.1 °C)-98.5 °F (36.9 °C)] 97.1 °F (36.2 °C)  Pulse:  [] 64  Resp:  [20-31] 20  SpO2:  [84 %-100 %] 95 %  BP: (149-215)/(70-93) 191/93     Weight: 54.8 kg (120 lb 13 oz)  Body mass index is 22.1 kg/m².      Intake/Output Summary (Last 24 hours) at 9/1/2022 0926  Last data filed at 8/31/2022 1530  Gross per 24 hour   Intake 240 ml   Output --   Net 240 ml       Physical Exam  Vitals and nursing note reviewed.   Constitutional:       General: She is not in acute distress.     Appearance: Normal appearance. She is well-developed. She is not ill-appearing, toxic-appearing or diaphoretic.   HENT:      Head: Normocephalic and atraumatic.      Jaw: No trismus.      Right Ear: Hearing, tympanic membrane, ear canal and external ear normal.      Left Ear: Hearing, tympanic membrane, ear canal and external ear normal.      Nose: Nose normal. No nasal deformity, mucosal edema or rhinorrhea.      Right Sinus: No maxillary sinus tenderness or frontal sinus tenderness.      Left Sinus: No maxillary sinus tenderness or frontal sinus tenderness.      Mouth/Throat:      Dentition: Normal dentition.      Pharynx: Uvula midline. No posterior oropharyngeal erythema or uvula swelling.   Eyes:      General: Lids are normal. No scleral icterus.     Conjunctiva/sclera: Conjunctivae normal.      Comments: Sclera clear bilat   Neck:       Trachea: Trachea and phonation normal.   Cardiovascular:      Rate and Rhythm: Normal rate and regular rhythm.      Pulses: Normal pulses.      Heart sounds: Normal heart sounds.   Pulmonary:      Effort: Prolonged expiration and respiratory distress (mild to moderate) present. No accessory muscle usage.      Breath sounds: Decreased air movement present. No stridor. Examination of the right-upper field reveals decreased breath sounds. Examination of the left-upper field reveals decreased breath sounds. Examination of the right-middle field reveals decreased breath sounds, wheezing and rhonchi. Examination of the left-middle field reveals decreased breath sounds, wheezing and rhonchi. Examination of the right-lower field reveals decreased breath sounds, wheezing and rhonchi. Examination of the left-lower field reveals decreased breath sounds, wheezing and rhonchi. Decreased breath sounds, wheezing and rhonchi present.   Abdominal:      General: Bowel sounds are normal. There is no distension.      Palpations: Abdomen is soft.      Tenderness: There is no abdominal tenderness.   Musculoskeletal:         General: No deformity. Normal range of motion.      Cervical back: Full passive range of motion without pain and neck supple.   Skin:     General: Skin is warm and dry.      Coloration: Skin is not pale.   Neurological:      Mental Status: She is alert and oriented to person, place, and time.      Motor: No abnormal muscle tone.      Coordination: Coordination normal.   Psychiatric:         Speech: Speech normal.         Behavior: Behavior normal. Behavior is cooperative.         Thought Content: Thought content normal.         Judgment: Judgment normal.       Vents:  Oxygen Concentration (%): 40 (09/01/22 0728)    Lines/Drains/Airways       Peripheral Intravenous Line  Duration                  Peripheral IV - Single Lumen 08/30/22 1516 20 G Right Hand 1 day                    Significant Labs:    CBC/Anemia  Profile:  Recent Labs   Lab 08/31/22  0405 09/01/22  0616   WBC 6.05 6.66   HGB 8.9* 9.9*   HCT 27.4* 31.0*    218   MCV 94 92   RDW 13.2 13.4        Chemistries:  Recent Labs   Lab 08/31/22  0404 09/01/22  0616   * 147*   K 3.7 3.9    102   CO2 32* 32*   BUN 23 34*   CREATININE 0.7 0.8   CALCIUM 8.2* 8.4*   ALBUMIN 2.8* 3.1*   PROT 5.1* 5.5*   BILITOT 0.9 1.2*   ALKPHOS 53* 56   ALT 42 41   AST 21 21       All pertinent labs within the past 24 hours have been reviewed.    Significant Imaging:  I have reviewed all pertinent imaging results/findings within the past 24 hours.    Assessment/Plan:     * COVID-19  ++ test she went to a wedding   ++ covid and flu B    Pneumonia due to infectious organism  Will start levoq    Right lower lobe lung mass  Will need to access ++ hemoptysis    Cough with hemoptysis  off eloquise now will do cytology on sputum and if persist do a bronchoscopy in 3 to 5 days    Lung mass  CT shows a RLL superior segment pleural lesion     Influenza B  ++ covid and flu B    Chronic atrial fibrillation  On eloquise has been stopped     Chronic respiratory failure  Patient with Hypoxic Respiratory failure which is Chronic.  she is on home oxygen at 3 LPM. Supplemental oxygen was provided and noted- Oxygen Concentration (%):  [40-50] 40.   Signs/symptoms of respiratory failure include- increased work of breathing. Contributing diagnoses includes - COPD Labs and images were reviewed. Patient Has recent ABG, which has been reviewed. Will treat underlying causes and adjust management of respiratory failure as follows- 3    Centrilobular emphysema  Severe end stage    Emphysema/COPD  Severe end stage     HTN (hypertension)  High but stable   stable    KATHIE (generalized anxiety disorder)  stable                 Howard Matson MD  Pulmonology  [facility] - Intensive Care

## 2022-09-01 NOTE — ASSESSMENT & PLAN NOTE
Patient with Hypoxic Respiratory failure which is Chronic.  she is on home oxygen at 3 LPM. Supplemental oxygen was provided and noted- Oxygen Concentration (%):  [40-50] 40.   Signs/symptoms of respiratory failure include- increased work of breathing. Contributing diagnoses includes - COPD Labs and images were reviewed. Patient Has recent ABG, which has been reviewed. Will treat underlying causes and adjust management of respiratory failure as follows- 3

## 2022-09-01 NOTE — PROGRESS NOTES
[facility]  Progress Note    Patient Name: Marva Perez  MRN: [id number]  Patient Class: IP- Inpatient   Admission Date: 8/26/2022  Length of Stay: 6 days  Attending Physician: Stewart Lucia MD  Primary Care Provider: Kevin Clements MD        Subjective:     Principal Problem:COVID-19        HPI:  81yF with HTN, HLD, GERD, Depression, anxiety, aFib on eliquis, COPD/Emphysema, and chronic hypoxia on 2-3L home oxygen via NC presented to ER with chief complaint of diarrhea and generalized weakness. Pt reports she started with congestion, fatigue, and cough and 5-6 days ago. She was seen in ER 8/22/22 and diagnosed with COVID and flu B. She was started on tamiflu and referred for outpatient antibody infusion. Pt states she started having nausea, vomiting, and diarrhea as well as intermittent fever. She returned to ER 8/23/22 for the new onset GI symptoms. Workup was reassuring and she was discharged home with antiemetics. She received bebtelovimab infusion 8/24/22. She continued with poor appetite and diarrhea and called her PCP who recommended ER evaluation. She denies CP, abd pain, fever/chills, dysuria, hematuria, melena, BRBPR.       Overview/Hospital Course:  8/27 Pt remained stable on 2-3 L NC overnight which is her home dose. VSS, afebrile. She is very anxious. Diarrhea improved, she did have a loose stool this morning sent for c diff testing. CT chest planned for this morning to evaluate her new left nodular denisity on CXR.     8/28 Pt with hemoptysis overnight. Eliquis held. VSS, afebrile. She remains very anxious. C diff negative. Diarrhea improved. CT chest with COPD and severe chronic emphysematous changes, no infiltrate. There is a new soft tissue mass of the right lung base. Dr. Matson whom she follows with outpatient has been consulted. Discussed with daughter, she is concerned about patient going back home alone at discharge but she also states pt does not  want NH. Advised we could order home health at [place], but that is limited visit per week. Daughter states they are looking into sitters at home.    8/29/22  Marva Perez is a 81 y.o. female  has eliquis held-still coughing up blood- dr herbert and Dr Wilde following. She did test positive for both flu and covid.     8/30/22 pt is a 80yo COPD/emphysema on chronic O2 at home 2-3 liters. Dx with covid and flu B 8/22- treated outpt with tamiflu and IV covid infusion. She came to ER original c/o weakness and diarrhea. Tyamilfu d/kathryn. She was not requiring more than her routine O2. She progressed to hemoptysis on eliquis for a fib- this was held. CT chest shows lung mass. Dr Herbert consulted.  Dr herbert considering bronch but she would not be a good candidate- high risk for pneumo- started levaquin yesterday,. Today she is requiring more O2 now on 6L high flow. RR 26. Very anxious. Still coughing up bloody sputum.     There has been some confusion today regarding patient's POA and medical decision making capacity. Patient has no h/o dementia. Discussed with daughter who confirms this. Daughter reports she is POA and presented paperwork today but appears to be for financial and not medical decision making; looking into this further.   On my assessment, however, she is alert and oriented and able to continue to make her own decisions at this time. However, should she continue to worsen and require intubation in the future where she could no longer communicate her wishes we want to ensure we have the proper paperwork on file.     8/31  Patient requires 4 liters of O2 this morning.  Was on BiPap.  Seems slightly improved.  No more hemoptosis.  Getting Remdesivir, Levaquin, Decadron.  Eliquis still on hold.  Dr Herbert following.  She seems to be asking questions and aware of what is going on.    9/1 pt currently on 5L NC, also using BiPAP intermittently. Continues with hemoptysis, eliquis on hold although H&H improving. Remdesivir day  3. Levaquin day 4. Decadron day 7. She remains very anxious, discussed psych consult with patient and family and they agree. VSS. Afebrile.      Past Medical History:   Diagnosis Date    Abnormal Pap smear 13    LGSIL    Atrial fibrillation     COPD (chronic obstructive pulmonary disease)     Depression     GERD (gastroesophageal reflux disease)     Hyperlipidemia     Hypertension        Past Surgical History:   Procedure Laterality Date    APPENDECTOMY      BRONCHOSCOPY N/A 2019    Procedure: BRONCHOSCOPY;  Surgeon: Howard Matson MD;  [facility]: [facility];  Service: Pulmonary;  Laterality: N/A;    CERVICAL BIOPSY  W/ LOOP ELECTRODE EXCISION      CHOLECYSTECTOMY      COLLATERAL LIGAMENT REPAIR, KNEE      left knee    COLONOSCOPY      DILATION AND CURETTAGE OF UTERUS      SINUS SURGERY         Review of patient's allergies indicates:   Allergen Reactions    Pcn [penicillins] Hives and Itching    Tetracyclines     Bactrim [sulfamethoxazole-trimethoprim] Rash    Ilosone Rash    Iodine and iodide containing products Rash    Sulfa (sulfonamide antibiotics) Hives and Rash       No current facility-administered medications on file prior to encounter.     Current Outpatient Medications on File Prior to Encounter   Medication Sig    albuterol (PROVENTIL/VENTOLIN HFA) 90 mcg/actuation inhaler Inhale 2 puffs into the lungs every 6 (six) hours as needed for Wheezing.    albuterol-ipratropium (DUO-NEB) 2.5 mg-0.5 mg/3 mL nebulizer solution SMARTSI Vial(s) Via Nebulizer Every 12 Hours    ALPRAZolam (XANAX) 0.25 MG tablet TAKE 1 TABLET DURING THE DAY FOR ANXIETY AS NEEDED AND 2 AT NIGHT FOR SLEEP    atenoloL (TENORMIN) 25 MG tablet Take 12.5 mg by mouth once daily.    clobetasoL (TEMOVATE) 0.05 % cream Apply topically 2 (two) times daily. To rash at the right lower leg    diltiaZEM (CARDIZEM) 120 MG tablet Take 120 mg by mouth 2 (two) times daily.    ELIQUIS 2.5 mg Tab Take 2.5 mg by mouth 2  (two) times daily.    ergocalciferol (ERGOCALCIFEROL) 50,000 unit Cap TAKE 1 CAPSULE BY MOUTH ONE TIME PER WEEK    ergocalciferol (ERGOCALCIFEROL) 50,000 unit Cap TAKE 1 CAPSULE BY MOUTH ONE TIME PER WEEK    EScitalopram oxalate (LEXAPRO) 5 MG Tab TAKE 1 TABLET BY MOUTH EVERY DAY    ezetimibe (ZETIA) 10 mg tablet     flecainide (TAMBOCOR) 50 MG Tab Take 50 mg by mouth 2 (two) times daily.    furosemide (LASIX) 20 MG tablet Take 20 mg by mouth once daily.    gabapentin (NEURONTIN) 100 MG capsule Take 1 capsule (100 mg total) by mouth every evening.    levalbuterol (XOPENEX) 0.63 mg/3 mL nebulizer solution Take 3 mLs (0.63 mg total) by nebulization every 4 (four) hours as needed for Wheezing.    metoclopramide HCl (REGLAN) 10 MG tablet Take 1 tablet (10 mg total) by mouth every 6 (six) hours.    montelukast (SINGULAIR) 10 mg tablet Take 1 tablet (10 mg total) by mouth once daily.    predniSONE (DELTASONE) 10 MG tablet Take 10 mg by mouth once daily.    REPATHA SURECLICK 140 mg/mL PnIj Inject 140 mg into the skin every 14 (fourteen) days.    rifAXIMin (XIFAXAN) 550 mg Tab Take 1 tablet (550 mg total) by mouth 3 (three) times daily.    rosuvastatin (CRESTOR) 40 MG Tab Take 40 mg by mouth every evening.    TRELEGY ELLIPTA 100-62.5-25 mcg DsDv TAKE 1 PUFF BY MOUTH EVERY DAY    valsartan (DIOVAN) 160 MG tablet      Family History       Problem Relation (Age of Onset)    Breast cancer Mother          Tobacco Use    Smoking status: Former     Years: 30.00     Types: Cigarettes     Quit date: 10/30/2006     Years since quitting: 15.8    Smokeless tobacco: Never   Substance and Sexual Activity    Alcohol use: No     Comment: No    Drug use: No    Sexual activity: Not Currently     Birth control/protection: Post-menopausal     Comment:      Review of Systems   Constitutional:  Positive for activity change, appetite change and fatigue. Negative for fever.   HENT:  Positive for hearing loss (chronic).  Negative for sinus pressure and sore throat.    Eyes:  Negative for discharge and visual disturbance.   Respiratory:  Positive for cough and shortness of breath.         + hemoptysis   Cardiovascular:  Negative for chest pain, palpitations and leg swelling.   Gastrointestinal:  Negative for abdominal pain, blood in stool, diarrhea and nausea.   Genitourinary:  Negative for dysuria and hematuria.   Musculoskeletal:  Negative for arthralgias and myalgias.   Neurological:  Positive for weakness (generalized).   Psychiatric/Behavioral:  Negative for confusion. The patient is nervous/anxious.    Objective:     Vital Signs (Most Recent):  Temp: 97.1 °F (36.2 °C) (09/01/22 0715)  Pulse: 64 (09/01/22 0728)  Resp: 20 (09/01/22 0728)  BP: (!) 191/93 (09/01/22 0634)  SpO2: 95 % (09/01/22 0728) Vital Signs (24h Range):  Temp:  [96.9 °F (36.1 °C)-98.5 °F (36.9 °C)] 97.1 °F (36.2 °C)  Pulse:  [] 64  Resp:  [20-31] 20  SpO2:  [84 %-100 %] 95 %  BP: (149-215)/(70-93) 191/93     Weight: 54.8 kg (120 lb 13 oz)  Body mass index is 22.1 kg/m².    Physical Exam  Vitals and nursing note reviewed.   Constitutional:       General: She is not in acute distress.     Comments: Hard of hearing   HENT:      Head: Normocephalic and atraumatic.      Right Ear: External ear normal.      Left Ear: External ear normal.      Mouth/Throat:      Mouth: Mucous membranes are moist.   Eyes:      Extraocular Movements: Extraocular movements intact.      Conjunctiva/sclera: Conjunctivae normal.      Pupils: Pupils are equal, round, and reactive to light.   Cardiovascular:      Rate and Rhythm: Normal rate. Rhythm irregular.      Heart sounds: Normal heart sounds. No murmur heard.  Pulmonary:      Effort: Pulmonary effort is normal.      Breath sounds: Rhonchi present. No wheezing or rales.      Comments: Poor air movement.  Mild respiratory distress.   Abdominal:      General: Bowel sounds are normal. There is no distension.      Palpations: Abdomen  is soft.      Tenderness: There is no abdominal tenderness.   Musculoskeletal:      Cervical back: Neck supple.      Right lower leg: No edema.      Left lower leg: No edema.   Neurological:      General: No focal deficit present.      Mental Status: She is alert and oriented to person, place, and time.   Psychiatric:      Comments: Very anxious         CRANIAL NERVES     CN III, IV, VI   Pupils are equal, round, and reactive to light.     Significant Labs: .      BMP:   Recent Labs   Lab 09/01/22  0616   *   *   K 3.9      CO2 32*   BUN 34*   CREATININE 0.8   CALCIUM 8.4*       CBC:   Recent Labs   Lab 08/31/22  0405 09/01/22  0616   WBC 6.05 6.66   HGB 8.9* 9.9*   HCT 27.4* 31.0*    218       CMP:   Recent Labs   Lab 08/31/22  0404 09/01/22  0616   * 147*   K 3.7 3.9    102   CO2 32* 32*   * 143*   BUN 23 34*   CREATININE 0.7 0.8   CALCIUM 8.2* 8.4*   PROT 5.1* 5.5*   ALBUMIN 2.8* 3.1*   BILITOT 0.9 1.2*   ALKPHOS 53* 56   AST 21 21   ALT 42 41   ANIONGAP 13 13     8/27 mag 2.3   8/26 sed rate 22  LDH: 270  Latic 1.0  Procal: 0.08 (0.10)  ESR: 22  Ferritin: 852    Troponin 0.029  8/22 flu B positive   Covid positive   C diff negative   Blood cultures NGTD x 2     Significant Imaging:     CXR:   Chronic lung changes are seen.  Increasing hazy interstitial opacity throughout the left lung, pronounced in the left mid and lower lung zone which could reflect atelectasis, aspiration or possibly a developing pneumonia.  Probable small bilateral pleural effusions.  Calcified granulomas in the left lung.  Nodular density in the left mid lung which appears new as compared to the previous examination none.  This could possibly represent a nipple shadow.  Heart size is normal.  Calcified atheromatous disease affects the aorta.  Fortunately age-appropriate degenerative changes affect the skeleton.    8/27 CT chest    1. Interval development of a smooth pleural based soft  tissue mass of the medial right lower lobe which is of uncertain etiology.  Neoplasia is not excludable.  Further evaluation with PET scan as deemed clinically necessary.  2. 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  3. Granulomatous change.    8/27 EKG Sinus rhythm with Premature supraventricular complexes  Nonspecific T wave abnormality  Abnormal ECG  When compared with ECG of 23-AUG-2022 09:32,  Premature supraventricular complexes are now Present  Confirmed by El Urban MD ([id number]) on 8/26/2022 6:00:40 PM      Assessment/Plan:      * COVID-19  Patient is identified as High risk for severe complications of COVID 19 based on COVID risk score of 6   Initiate standard COVID protocols; COVID-19 testing ,Infection Control notification  and isolation- respiratory, contact and droplet per protocol    Diagnostics: (leukopenia, hyponatremia, hyperferritinemia, elevated troponin, elevated d-dimer, age, and comorbidities are significant predictors of poor clinical outcome)  CBC, CMP, Ferritin, CRP and Portable CXR    Management: Maintain oxygen saturations 92-96% via Nasal Cannula 5 LPM and monitor with continuous/intermittent pulse oximetry. , Inhaled bronchodilators as needed for shortness of breath. and Continuous cardiac monitoring.    Will hold off on remdesivir at this point as she seems stable from resp standpoint; currently on home dose of 2L NC    8/27: pt remains stable on home Oxygen 2-3L NC. Monitor    8/30: worsening hypoxia  Cont steroids  Cont levaquin  Discussing with pharmacy based on [facility] protocol if any benefit for remdesivir at this point (s/p MAB outpatient) vs toci  Repeat imaging pending    8/29 pt remains on home O2 demands. Cont duonebs. No remedesivir had outpt infusion for covid and was on tamiflu outpt as well. Dr herbert added levaquin dexamethasone day 4.    8/30 dexamethasone day 5- call pharmacy for consult- pt now with increased o2  demands     8/31 Day 6 decadron,  Remdesivir    9/1 day 7 decadron; day 3 remdesivir, day 4 levaquin    Pneumonia due to infectious organism  Starting levaquin 8/28 per dr matson  Cont duonebs and dexamethasone supplemental O2 .        Hypokalemia  Resolved, monitor.      Right lower lobe lung mass  CT chest: Interval development of a smooth pleural based soft tissue mass of the medial right lower lobe which is of uncertain etiology.  Neoplasia is not excludable.  Further evaluation with PET scan as deemed clinically necessary  Dr. Matson consulted, does not feel she is a bronch candidate due to severe COPD and emphysematous lung changes. Would consider PET scan as outpt. Added  levaquin 8/29    Repeat CTA done 8/30.  No changes.  Severe emphasema.  Poor study.  PE not completely ruled out  Cont levaquin for now  Consider restarting Eliquis.   Will discuss with Dr Matson      Cough with hemoptysis  Hold eliquis for now  Dr. Matson following added levaquin.  Repeat CTA done 8/30 - reviewed      Anxiety  Cont home lexapro and xanax>  Increased xanax to 0.5mg TID PRN 8/29/22    She is very anxious. She lives alone. Discussed with family, she does not want any assisted living or NH.     Consult psych      Lung mass  Nodular density on CXR which appears new, chect CT chest with contrast per radiology recs  Iodine allergy, will premedicate. She has had contrast studies in the past, last 2019 per records  Reviewed CT with new mass?? Infection?? Dr matson consulted. Added levaquin, holding eliquis- consider bronch if bleeding does not improve .  Cont levaquin; needs outpatient pulm follow-up    Influenza B  Diagnosed 8/22/22  She was on tamiflu outpatient, but this may be the cause of her diarrhea. Will DC for now        Chronic atrial fibrillation  Patient with atrial fibrillation which is controlled currently with Beta Blocker, Calcium Channel Blocker and flecainide. Patient is currently in sinus rhythm.[id number] Score: 4. HASBLED  Score: Unable to calculate. Anticoagulation indicated. Anticoagulation done with eliquis.    8/28/22: H&H drop from 10.1/30.6 > 8.6/26.0; pt with hemoptysis. Will hold eliquis for now. Consult cardiology in AM. She is followed by Dr. Cox outpatient. Daughter with concerns about holding eliquis, but given her hemoptysis and H&H drop it is my opinion as well as that of Dr. Matson that the risks of continued anticoagulation outweigh the benefit currently.     8/29 dr Wilde consulted on patient this am. Cont to hold eliquis for now.    8/30 cont to hold eliquis as she is still having hemoptysis   Cont atenolol and flecainide   Cont telemetry  Monitor electrolytes    8/31- No more hemoptysis.  Consider restarting Eloquis since she is such a high risk for PE.    9/1: continued hemoptysis, but H&H improving. Consider restarting eliquis, Dr. Matson recommends we cont to hold    Chronic respiratory failure  On 2-3L NC at home  Now requiring 6L NC high flow   CTA negative for PE    9/1: now on 5L NC with O2 sat 90-92%; cont BiPAP as needed    Centrilobular emphysema  Per Dr. Matson- Severe changes on CT this puts her at high risk for bronch per pulmonology recs. Cont O2 added levauin and cont nebs .    CTA without PE.     Diarrhea  C diff negative; diarrhea improved  Suspect this is due to tamiflu; will DC as she seems stable from resp standpoint  S/p 1L NS in ER; DC NS .      Emphysema/COPD  On home oxygen 2-3L NC.;  Now with increased demands   Cont nebs, steroids, levaquin  Now on 5 liters NC      Hyperlipidemia  Cont home crestor.      HTN (hypertension)  Cont home atenolol and increased valsartan to BID  Some highs likely anxiety driven    KATHIE (generalized anxiety disorder)  Cont home xanax, dose increased recently  Cont home lexapro 5mg daily  Consult psych, appreciate recs        VTE Risk Mitigation (From admission, onward)         Ordered     Place sequential compression device  Until discontinued         08/29/22 4637                 Discharge Planning   ROSAURA:      Code Status: Full Code   Is the patient medically ready for discharge?:     Reason for patient still in hospital (select all that apply): Patient trending condition, Treatment and Consult recommendations  Discharge Plan A: Home with family, Home Health   Discharge Delays: None known at this time        Critical care time spent on the evaluation and treatment of severe organ dysfunction, review of pertinent labs and imaging studies, discussions with consulting providers and discussions with patient/family: 63 minutes.      Kevin Victoria MD  Department of Hospital Medicine   [facility] [place]

## 2022-09-01 NOTE — ASSESSMENT & PLAN NOTE
Cont home xanax, dose increased recently  Cont home lexapro 5mg daily  Consult psych, appreciate recs

## 2022-09-01 NOTE — ASSESSMENT & PLAN NOTE
CT chest: Interval development of a smooth pleural based soft tissue mass of the medial right lower lobe which is of uncertain etiology.  Neoplasia is not excludable.  Further evaluation with PET scan as deemed clinically necessary  Dr. Matson consulted, does not feel she is a bronch candidate due to severe COPD and emphysematous lung changes. Would consider PET scan as outpt. Added  levaquin 8/29    Repeat CTA done 8/30.  No changes.  Severe emphasema.  Poor study.  PE not completely ruled out  Cont levaquin for now  Consider restarting Eliquis.   Will discuss with Dr Matson

## 2022-09-01 NOTE — ASSESSMENT & PLAN NOTE
C diff negative; diarrhea improved  Suspect this is due to tamiflu; will DC as she seems stable from resp standpoint  S/p 1L NS in ER; DC NS .

## 2022-09-01 NOTE — ASSESSMENT & PLAN NOTE
Patient is identified as High risk for severe complications of COVID 19 based on COVID risk score of 6   Initiate standard COVID protocols; COVID-19 testing ,Infection Control notification  and isolation- respiratory, contact and droplet per protocol    Diagnostics: (leukopenia, hyponatremia, hyperferritinemia, elevated troponin, elevated d-dimer, age, and comorbidities are significant predictors of poor clinical outcome)  CBC, CMP, Ferritin, CRP and Portable CXR    Management: Maintain oxygen saturations 92-96% via Nasal Cannula 5 LPM and monitor with continuous/intermittent pulse oximetry. , Inhaled bronchodilators as needed for shortness of breath. and Continuous cardiac monitoring.    Will hold off on remdesivir at this point as she seems stable from resp standpoint; currently on home dose of 2L NC    8/27: pt remains stable on home Oxygen 2-3L NC. Monitor    8/30: worsening hypoxia  Cont steroids  Cont levaquin  Discussing with pharmacy based on [facility] protocol if any benefit for remdesivir at this point (s/p MAB outpatient) vs toci  Repeat imaging pending    8/29 pt remains on home O2 demands. Cont duonebs. No remedesivir had outpt infusion for covid and was on tamiflu outpt as well. Dr herbert added levaquin dexamethasone day 4.    8/30 dexamethasone day 5- call pharmacy for consult- pt now with increased o2 demands     8/31 Day 6 decadron,  Remdesivir    9/1 day 7 decadron; day 3 remdesivir, day 4 levaquin

## 2022-09-01 NOTE — SUBJECTIVE & OBJECTIVE
Past Medical History:   Diagnosis Date    Abnormal Pap smear 14    LGSIL    Atrial fibrillation     COPD (chronic obstructive pulmonary disease)     Depression     GERD (gastroesophageal reflux disease)     Hyperlipidemia     Hypertension        Past Surgical History:   Procedure Laterality Date    APPENDECTOMY      BRONCHOSCOPY N/A 2019    Procedure: BRONCHOSCOPY;  Surgeon: Howard Matson MD;  Location: [facility] OR;  Service: Pulmonary;  Laterality: N/A;    CERVICAL BIOPSY  W/ LOOP ELECTRODE EXCISION      CHOLECYSTECTOMY      COLLATERAL LIGAMENT REPAIR, KNEE      left knee    COLONOSCOPY      DILATION AND CURETTAGE OF UTERUS      SINUS SURGERY         Review of patient's allergies indicates:   Allergen Reactions    Pcn [penicillins] Hives and Itching    Tetracyclines     Bactrim [sulfamethoxazole-trimethoprim] Rash    Ilosone Rash    Iodine and iodide containing products Rash    Sulfa (sulfonamide antibiotics) Hives and Rash       No current facility-administered medications on file prior to encounter.     Current Outpatient Medications on File Prior to Encounter   Medication Sig    albuterol (PROVENTIL/VENTOLIN HFA) 90 mcg/actuation inhaler Inhale 2 puffs into the lungs every 6 (six) hours as needed for Wheezing.    albuterol-ipratropium (DUO-NEB) 2.5 mg-0.5 mg/3 mL nebulizer solution SMARTSI Vial(s) Via Nebulizer Every 12 Hours    ALPRAZolam (XANAX) 0.25 MG tablet TAKE 1 TABLET DURING THE DAY FOR ANXIETY AS NEEDED AND 2 AT NIGHT FOR SLEEP    atenoloL (TENORMIN) 25 MG tablet Take 12.5 mg by mouth once daily.    clobetasoL (TEMOVATE) 0.05 % cream Apply topically 2 (two) times daily. To rash at the right lower leg    diltiaZEM (CARDIZEM) 120 MG tablet Take 120 mg by mouth 2 (two) times daily.    ELIQUIS 2.5 mg Tab Take 2.5 mg by mouth 2 (two) times daily.    ergocalciferol (ERGOCALCIFEROL) 50,000 unit Cap TAKE 1 CAPSULE BY MOUTH ONE TIME PER WEEK    ergocalciferol (ERGOCALCIFEROL) 50,000 unit Cap TAKE 1  CAPSULE BY MOUTH ONE TIME PER WEEK    EScitalopram oxalate (LEXAPRO) 5 MG Tab TAKE 1 TABLET BY MOUTH EVERY DAY    ezetimibe (ZETIA) 10 mg tablet     flecainide (TAMBOCOR) 50 MG Tab Take 50 mg by mouth 2 (two) times daily.    furosemide (LASIX) 20 MG tablet Take 20 mg by mouth once daily.    gabapentin (NEURONTIN) 100 MG capsule Take 1 capsule (100 mg total) by mouth every evening.    levalbuterol (XOPENEX) 0.63 mg/3 mL nebulizer solution Take 3 mLs (0.63 mg total) by nebulization every 4 (four) hours as needed for Wheezing.    metoclopramide HCl (REGLAN) 10 MG tablet Take 1 tablet (10 mg total) by mouth every 6 (six) hours.    montelukast (SINGULAIR) 10 mg tablet Take 1 tablet (10 mg total) by mouth once daily.    predniSONE (DELTASONE) 10 MG tablet Take 10 mg by mouth once daily.    REPATHA SURECLICK 140 mg/mL PnIj Inject 140 mg into the skin every 14 (fourteen) days.    rifAXIMin (XIFAXAN) 550 mg Tab Take 1 tablet (550 mg total) by mouth 3 (three) times daily.    rosuvastatin (CRESTOR) 40 MG Tab Take 40 mg by mouth every evening.    TRELEGY ELLIPTA 100-62.5-25 mcg DsDv TAKE 1 PUFF BY MOUTH EVERY DAY    valsartan (DIOVAN) 160 MG tablet      Family History       Problem Relation (Age of Onset)    Breast cancer Mother          Tobacco Use    Smoking status: Former     Years: 30.00     Types: Cigarettes     Quit date: 10/30/2006     Years since quitting: 15.8    Smokeless tobacco: Never   Substance and Sexual Activity    Alcohol use: No     Comment: No    Drug use: No    Sexual activity: Not Currently     Birth control/protection: Post-menopausal     Comment:      Review of Systems   Constitutional:  Positive for activity change, appetite change and fatigue. Negative for fever.   HENT:  Positive for hearing loss (chronic). Negative for sinus pressure and sore throat.    Eyes:  Negative for discharge and visual disturbance.   Respiratory:  Positive for cough and shortness of breath.         + hemoptysis    Cardiovascular:  Negative for chest pain, palpitations and leg swelling.   Gastrointestinal:  Negative for abdominal pain, blood in stool, diarrhea and nausea.   Genitourinary:  Negative for dysuria and hematuria.   Musculoskeletal:  Negative for arthralgias and myalgias.   Neurological:  Positive for weakness (generalized).   Psychiatric/Behavioral:  Negative for confusion. The patient is nervous/anxious.    Objective:     Vital Signs (Most Recent):  Temp: 97.1 °F (36.2 °C) (09/01/22 0715)  Pulse: 64 (09/01/22 0728)  Resp: 20 (09/01/22 0728)  BP: (!) 191/93 (09/01/22 0634)  SpO2: 95 % (09/01/22 0728) Vital Signs (24h Range):  Temp:  [96.9 °F (36.1 °C)-98.5 °F (36.9 °C)] 97.1 °F (36.2 °C)  Pulse:  [] 64  Resp:  [20-31] 20  SpO2:  [84 %-100 %] 95 %  BP: (149-215)/(70-93) 191/93     Weight: 54.8 kg (120 lb 13 oz)  Body mass index is 22.1 kg/m².    Physical Exam  Vitals and nursing note reviewed.   Constitutional:       General: She is not in acute distress.     Comments: Hard of hearing   HENT:      Head: Normocephalic and atraumatic.      Right Ear: External ear normal.      Left Ear: External ear normal.      Mouth/Throat:      Mouth: Mucous membranes are moist.   Eyes:      Extraocular Movements: Extraocular movements intact.      Conjunctiva/sclera: Conjunctivae normal.      Pupils: Pupils are equal, round, and reactive to light.   Cardiovascular:      Rate and Rhythm: Normal rate. Rhythm irregular.      Heart sounds: Normal heart sounds. No murmur heard.  Pulmonary:      Effort: Pulmonary effort is normal.      Breath sounds: Rhonchi present. No wheezing or rales.      Comments: Poor air movement.  Mild respiratory distress.   Abdominal:      General: Bowel sounds are normal. There is no distension.      Palpations: Abdomen is soft.      Tenderness: There is no abdominal tenderness.   Musculoskeletal:      Cervical back: Neck supple.      Right lower leg: No edema.      Left lower leg: No edema.    Neurological:      General: No focal deficit present.      Mental Status: She is alert and oriented to person, place, and time.   Psychiatric:      Comments: Very anxious         CRANIAL NERVES     CN III, IV, VI   Pupils are equal, round, and reactive to light.     Significant Labs: .      BMP:   Recent Labs   Lab 09/01/22  0616   *   *   K 3.9      CO2 32*   BUN 34*   CREATININE 0.8   CALCIUM 8.4*       CBC:   Recent Labs   Lab 08/31/22  0405 09/01/22  0616   WBC 6.05 6.66   HGB 8.9* 9.9*   HCT 27.4* 31.0*    218       CMP:   Recent Labs   Lab 08/31/22  0404 09/01/22  0616   * 147*   K 3.7 3.9    102   CO2 32* 32*   * 143*   BUN 23 34*   CREATININE 0.7 0.8   CALCIUM 8.2* 8.4*   PROT 5.1* 5.5*   ALBUMIN 2.8* 3.1*   BILITOT 0.9 1.2*   ALKPHOS 53* 56   AST 21 21   ALT 42 41   ANIONGAP 13 13     8/27 mag 2.3   8/26 sed rate 22  LDH: 270  Latic 1.0  Procal: 0.08 (0.10)  ESR: 22  Ferritin: 852    Troponin 0.029  8/22 flu B positive   Covid positive   C diff negative   Blood cultures NGTD x 2     Significant Imaging:     CXR:   Chronic lung changes are seen.  Increasing hazy interstitial opacity throughout the left lung, pronounced in the left mid and lower lung zone which could reflect atelectasis, aspiration or possibly a developing pneumonia.  Probable small bilateral pleural effusions.  Calcified granulomas in the left lung.  Nodular density in the left mid lung which appears new as compared to the previous examination none.  This could possibly represent a nipple shadow.  Heart size is normal.  Calcified atheromatous disease affects the aorta.  Fortunately age-appropriate degenerative changes affect the skeleton.    8/27 CT chest    1. Interval development of a smooth pleural based soft tissue mass of the medial right lower lobe which is of uncertain etiology.  Neoplasia is not excludable.  Further evaluation with PET scan as deemed clinically necessary.  2. COPD  with severe extensive emphysematous change and pulmonary fibrosis of the bilateral lung bases.  3. Granulomatous change.    8/27 EKG Sinus rhythm with Premature supraventricular complexes  Nonspecific T wave abnormality  Abnormal ECG  When compared with ECG of 23-AUG-2022 09:32,  Premature supraventricular complexes are now Present  Confirmed by El Urban MD ([id number]) on 8/26/2022 6:00:40 PM

## 2022-09-01 NOTE — ASSESSMENT & PLAN NOTE
On home oxygen 2-3L NC.;  Now with increased demands   Cont nebs, steroids, levaquin  Now on 5 liters NC

## 2022-09-01 NOTE — ASSESSMENT & PLAN NOTE
Nodular density on CXR which appears new, chect CT chest with contrast per radiology recs  Iodine allergy, will premedicate. She has had contrast studies in the past, last 2019 per records  Reviewed CT with new mass?? Infection?? Dr herbert consulted. Added levaquin, holding eliquis- consider bronch if bleeding does not improve .  Cont levaquin; needs outpatient pulm follow-up

## 2022-09-01 NOTE — ASSESSMENT & PLAN NOTE
On 2-3L NC at home  Now requiring 6L NC high flow   CTA negative for PE    9/1: now on 5L NC with O2 sat 90-92%; cont BiPAP as needed

## 2022-09-01 NOTE — ASSESSMENT & PLAN NOTE
Patient with atrial fibrillation which is controlled currently with Beta Blocker, Calcium Channel Blocker and flecainide. Patient is currently in sinus rhythm.[id number] Score: 4. HASBLED Score: Unable to calculate. Anticoagulation indicated. Anticoagulation done with eliquis.    8/28/22: H&H drop from 10.1/30.6 > 8.6/26.0; pt with hemoptysis. Will hold eliquis for now. Consult cardiology in AM. She is followed by Dr. Cox outpatient. Daughter with concerns about holding eliquis, but given her hemoptysis and H&H drop it is my opinion as well as that of Dr. Matson that the risks of continued anticoagulation outweigh the benefit currently.     8/29 dr Wilde consulted on patient this am. Cont to hold eliquis for now.    8/30 cont to hold eliquis as she is still having hemoptysis   Cont atenolol and flecainide   Cont telemetry  Monitor electrolytes    8/31- No more hemoptysis.  Consider restarting Eloquis since she is such a high risk for PE.    9/1: continued hemoptysis, but H&H improving. Consider restarting eliquis, Dr. Matson recommends we cont to hold

## 2022-09-01 NOTE — PROGRESS NOTES
Recommendation/Intervention:     1. Rec'd continue Boost Plus ONS TID to provide an additional 1080 kcals and 42 g protein.   2. Rec'd honoring pt preferences to encourage po intake.   3. RD to follow and make rec's accordingly.    Goals: Pt will consume 25-50% of meals by f/u.  Nutrition Goal Status: new

## 2022-09-01 NOTE — PLAN OF CARE
The patient remained stable throughout the night without any complaints.  Woire bi pap all night. Will continue to monitor.

## 2022-09-01 NOTE — ASSESSMENT & PLAN NOTE
Cont home lexapro and xanax>  Increased xanax to 0.5mg TID PRN 8/29/22    She is very anxious. She lives alone. Discussed with family, she does not want any assisted living or NH.     Consult psych

## 2022-09-01 NOTE — PROGRESS NOTES
[facility] - Intensive Care  Adult Nutrition  Progress Note    SUMMARY       Recommendations    Recommendation/Intervention:   1. Rec'd continue Boost Plus ONS TID to provide an additional 1080 kcals and 42 g protein.   2. Rec'd honoring pt preferences to encourage po intake.   3. RD to follow and make rec's accordingly.    Goals: Pt will consume 25-50% of meals by f/u.  Nutrition Goal Status: new  Communication of RD Recs: reviewed with RN (VIVIAN Chaparro)    Assessment and Plan  Nutrition Problem  Inadequate oral intake    Related to (etiology):   Decreased appetite    Signs and Symptoms (as evidenced by):   Pt meeting 0-25% EER.    Interventions/Recommendations (treatment strategy):  Recommendation/Intervention:   1. Rec'd continue Boost Plus ONS TID to provide an additional 1080 kcals and 42 g protein.   2. Rec'd honoring pt preferences to encourage po intake.   3. RD to follow and make rec's accordingly.    Goals: Pt will consume 25-50% of meals by f/u.  Nutrition Goal Status: new    Nutrition Diagnosis Status:   Continues             Malnutrition Assessment                                       Reason for Assessment    Reason For Assessment: RD follow-up  Diagnosis: infection/sepsis  Relevant Medical History: HTN, HLD, GERD, COPD/emphysema, afib, dementia  Interdisciplinary Rounds: did not attend  General Information Comments: Spoke with VIVIAN Chaparro about pt status for RD f/u. Pt consumed one of the milkshakes with ONS given yesterday; however, did not consume any today. Noted transition to regular diet today. Will send Boost pudding and other pt food preferences to encourage po intake. Will follow to evaluate intake and tolerance.  Nutrition Discharge Planning: regular diet as tolerated    Nutrition Risk Screen    Nutrition Risk Screen: no indicators present    Nutrition/Diet History    Patient Reported Diet/Restrictions/Preferences: other (see comments) (unable to speak with pt)  Food Preferences: red jello  Food  "Allergies: other (see comments) (iodine)  Factors Affecting Nutritional Intake: nausea/vomiting, diarrhea, decreased appetite, depression    Anthropometrics    Temp: 96.9 °F (36.1 °C)  Height Method: Measured  Height: 5' 2" (157.5 cm)  Height (inches): 62 in  Weight Method: Bed Scale  Weight: 54.8 kg (120 lb 13 oz)  Weight (lb): 120.81 lb  Ideal Body Weight (IBW), Female: 110 lb  % Ideal Body Weight, Female (lb): 109.83 %  BMI (Calculated): 22.1  BMI Grade: 18.5-24.9 - normal       Lab/Procedures/Meds    Pertinent Labs Reviewed: reviewed  Pertinent Labs Comments: H&H: 8.9/31 (L), Na: 147, CO2: 32 (H), BUN: 34 (H), glucose: 143 (H), MCHC: 31.9 (L)  Pertinent Medications Reviewed: reviewed  Pertinent Medications Comments: vitamin C, thiamin, steroid, abx    Physical Findings/Assessment         Estimated/Assessed Needs    Weight Used For Calorie Calculations: 54.8 kg (120 lb 13 oz)  Energy Calorie Requirements (kcal): 1214  Energy Need Method: Madison-St Jeor (MSJ x 1.25 AF for critical illness and sedentary)  Protein Requirements: 55-69 (1-1.25 for OA maintenance)  Weight Used For Protein Calculations: 54.8 kg (120 lb 13 oz)  Fluid Requirements (mL): 1214  Estimated Fluid Requirement Method: RDA Method  RDA Method (mL): 1214         Nutrition Prescription Ordered    Current Diet Order: regular  Oral Nutrition Supplement: Boost Plus Vanilla TID; Boost pudding vanilla    Evaluation of Received Nutrient/Fluid Intake    % Kcal Needs: 0-25%  % Protein Needs: 0-25%  IV Fluid (mL): 250 (remdesivir in 250 mL NaCl 0.9%)  I/O: +543.3  Energy Calories Required: not meeting needs  Protein Required: not meeting needs  Fluid Required: not meeting needs  Tolerance: tolerating  % Intake of Estimated Energy Needs: 0 - 25 %  % Meal Intake: 0 - 25 %    Nutrition Risk    Level of Risk/Frequency of Follow-up: moderate     Monitor and Evaluation    Food and Nutrient Intake: energy intake, food and beverage intake, enteral nutrition " intake  Food and Nutrient Adminstration: diet order  Knowledge/Beliefs/Attitudes: food and nutrition knowledge/skill, beliefs and attitudes  Physical Activity and Function: nutrition-related ADLs and IADLs, factors affecting access to physical activity  Anthropometric Measurements: height/length, weight, weight change, body mass index  Biochemical Data, Medical Tests and Procedures: electrolyte and renal panel, gastrointestinal profile, glucose/endocrine profile, inflammatory profile, lipid profile  Nutrition-Focused Physical Findings: overall appearance     Nutrition Follow-Up    RD Follow-up?: Yes

## 2022-09-01 NOTE — SUBJECTIVE & OBJECTIVE
Interval History: biting on a towel    Objective:     Vital Signs (Most Recent):  Temp: 97.1 °F (36.2 °C) (09/01/22 0715)  Pulse: 64 (09/01/22 0728)  Resp: 20 (09/01/22 0728)  BP: (!) 191/93 (09/01/22 0634)  SpO2: 95 % (09/01/22 0728)   Vital Signs (24h Range):  Temp:  [96.9 °F (36.1 °C)-98.5 °F (36.9 °C)] 97.1 °F (36.2 °C)  Pulse:  [] 64  Resp:  [20-31] 20  SpO2:  [84 %-100 %] 95 %  BP: (149-215)/(70-93) 191/93     Weight: 54.8 kg (120 lb 13 oz)  Body mass index is 22.1 kg/m².      Intake/Output Summary (Last 24 hours) at 9/1/2022 0926  Last data filed at 8/31/2022 1530  Gross per 24 hour   Intake 240 ml   Output --   Net 240 ml       Physical Exam  Vitals and nursing note reviewed.   Constitutional:       General: She is not in acute distress.     Appearance: Normal appearance. She is well-developed. She is not ill-appearing, toxic-appearing or diaphoretic.   HENT:      Head: Normocephalic and atraumatic.      Jaw: No trismus.      Right Ear: Hearing, tympanic membrane, ear canal and external ear normal.      Left Ear: Hearing, tympanic membrane, ear canal and external ear normal.      Nose: Nose normal. No nasal deformity, mucosal edema or rhinorrhea.      Right Sinus: No maxillary sinus tenderness or frontal sinus tenderness.      Left Sinus: No maxillary sinus tenderness or frontal sinus tenderness.      Mouth/Throat:      Dentition: Normal dentition.      Pharynx: Uvula midline. No posterior oropharyngeal erythema or uvula swelling.   Eyes:      General: Lids are normal. No scleral icterus.     Conjunctiva/sclera: Conjunctivae normal.      Comments: Sclera clear bilat   Neck:      Trachea: Trachea and phonation normal.   Cardiovascular:      Rate and Rhythm: Normal rate and regular rhythm.      Pulses: Normal pulses.      Heart sounds: Normal heart sounds.   Pulmonary:      Effort: Prolonged expiration and respiratory distress (mild to moderate) present. No accessory muscle usage.      Breath sounds:  Decreased air movement present. No stridor. Examination of the right-upper field reveals decreased breath sounds. Examination of the left-upper field reveals decreased breath sounds. Examination of the right-middle field reveals decreased breath sounds, wheezing and rhonchi. Examination of the left-middle field reveals decreased breath sounds, wheezing and rhonchi. Examination of the right-lower field reveals decreased breath sounds, wheezing and rhonchi. Examination of the left-lower field reveals decreased breath sounds, wheezing and rhonchi. Decreased breath sounds, wheezing and rhonchi present.   Abdominal:      General: Bowel sounds are normal. There is no distension.      Palpations: Abdomen is soft.      Tenderness: There is no abdominal tenderness.   Musculoskeletal:         General: No deformity. Normal range of motion.      Cervical back: Full passive range of motion without pain and neck supple.   Skin:     General: Skin is warm and dry.      Coloration: Skin is not pale.   Neurological:      Mental Status: She is alert and oriented to person, place, and time.      Motor: No abnormal muscle tone.      Coordination: Coordination normal.   Psychiatric:         Speech: Speech normal.         Behavior: Behavior normal. Behavior is cooperative.         Thought Content: Thought content normal.         Judgment: Judgment normal.       Vents:  Oxygen Concentration (%): 40 (09/01/22 0728)    Lines/Drains/Airways       Peripheral Intravenous Line  Duration                  Peripheral IV - Single Lumen 08/30/22 1516 20 G Right Hand 1 day                    Significant Labs:    CBC/Anemia Profile:  Recent Labs   Lab 08/31/22  0405 09/01/22  0616   WBC 6.05 6.66   HGB 8.9* 9.9*   HCT 27.4* 31.0*    218   MCV 94 92   RDW 13.2 13.4        Chemistries:  Recent Labs   Lab 08/31/22  0404 09/01/22  0616   * 147*   K 3.7 3.9    102   CO2 32* 32*   BUN 23 34*   CREATININE 0.7 0.8   CALCIUM 8.2* 8.4*    ALBUMIN 2.8* 3.1*   PROT 5.1* 5.5*   BILITOT 0.9 1.2*   ALKPHOS 53* 56   ALT 42 41   AST 21 21       All pertinent labs within the past 24 hours have been reviewed.    Significant Imaging:  I have reviewed all pertinent imaging results/findings within the past 24 hours.

## 2022-09-01 NOTE — ASSESSMENT & PLAN NOTE
Per Dr. Matson- Severe changes on CT this puts her at high risk for bronch per pulmonology recs. Cont O2 added levauin and cont nebs .    CTA without PE.

## 2022-09-01 NOTE — CONSULTS
PSYCHIATRY CONSULT NOTE       9/1/2022 11:54 AM   Marva Perez   1940   [id number]         DATE OF ADMISSION: 8/26/2022  9:24 AM    SITE: [facility]    The patient location is: ICU    Visit type: audio only    Time with patient: 15  50 minutes of total time spent on the encounter, which includes face to face time and non-face to face time preparing to see the patient (eg, review of tests), Obtaining and/or reviewing separately obtained history, Documenting clinical information in the electronic or other health record, Independently interpreting results (not separately reported) and communicating results to the patient/family/caregiver, or Care coordination (not separately reported).     Each patient to whom he or she provides medical services by telemedicine is:  (1) informed of the relationship between the physician and patient and the respective role of any other health care provider with respect to management of the patient; and (2) notified that he or she may decline to receive medical services by telemedicine and may withdraw from such care at any time.            HISTORY  HPI:  81yF with HTN, HLD, GERD, Depression, anxiety, aFib on eliquis, COPD/Emphysema, and chronic hypoxia on 2-3L home oxygen via NC presented to ER with chief complaint of diarrhea and generalized weakness. Pt reports she started with congestion, fatigue, and cough and 5-6 days ago. She was seen in ER 8/22/22 and diagnosed with COVID and flu B. She was started on tamiflu and referred for outpatient antibody infusion. Pt states she started having nausea, vomiting, and diarrhea as well as intermittent fever. She returned to ER 8/23/22 for the new onset GI symptoms. Workup was reassuring and she was discharged home with antiemetics. She received bebtelovimab infusion 8/24/22. She continued with poor appetite and diarrhea and called her PCP who recommended ER evaluation. She denies CP, abd pain, fever/chills, dysuria, hematuria,  melena, BRBPR.         Overview/Hospital Course:  8/27 Pt remained stable on 2-3 L NC overnight which is her home dose. VSS, afebrile. She is very anxious. Diarrhea improved, she did have a loose stool this morning sent for c diff testing. CT chest planned for this morning to evaluate her new left nodular denisity on CXR.      8/28 Pt with hemoptysis overnight. Eliquis held. VSS, afebrile. She remains very anxious. C diff negative. Diarrhea improved. CT chest with COPD and severe chronic emphysematous changes, no infiltrate. There is a new soft tissue mass of the right lung base. Dr. Matson whom she follows with outpatient has been consulted. Discussed with daughter, she is concerned about patient going back home alone at discharge but she also states pt does not want NH. Advised we could order home health at [facility], but that is limited visit per week. Daughter states they are looking into sitters at home.     8/29/22  Marva Perez is a 81 y.o. female  has eliquis held-still coughing up blood- dr matson and Dr Wilde following. She did test positive for both flu and covid.      8/30/22 pt is a 80yo COPD/emphysema on chronic O2 at home 2-3 liters. Dx with covid and flu B 8/22- treated outpt with tamiflu and IV covid infusion. She came to ER original c/o weakness and diarrhea. Tyamilfu d/kathryn. She was not requiring more than her routine O2. She progressed to hemoptysis on eliquis for a fib- this was held. CT chest shows lung mass. Dr Matson consulted.  Dr matson considering bronch but she would not be a good candidate- high risk for pneumo- started levaquin yesterday,. Today she is requiring more O2 now on 6L high flow. RR 26. Very anxious. Still coughing up bloody sputum.      There has been some confusion today regarding patient's POA and medical decision making capacity. Patient has no h/o dementia. Discussed with daughter who confirms this. Daughter reports she is POA and presented paperwork today but appears to be for  financial and not medical decision making; looking into this further.   On my assessment, however, she is alert and oriented and able to continue to make her own decisions at this time. However, should she continue to worsen and require intubation in the future where she could no longer communicate her wishes we want to ensure we have the proper paperwork on file.      8/31  Patient requires 4 liters of O2 this morning.  Was on BiPap.  Seems slightly improved.  No more hemoptosis.  Getting Remdesivir, Levaquin, Decadron.  Eliquis still on hold.  Dr Matson following.  She seems to be asking questions and aware of what is going on.     9/1 pt currently on 5L NC, also using BiPAP intermittently. Continues with hemoptysis, eliquis on hold although H&H improving. Remdesivir day 3. Levaquin day 4. Decadron day 7. She remains very anxious, discussed psych consult with patient and family and they agree. VSS. Afebrile.           Psychiatric interview:    Psychiatry has been consulted for anxiety. Interview conducted via telephone 2/2 +covid in order to reduce risk of viral spreading and to limit PPE usage.  Patient states she has been feeling anxious constantly if she is not asleep. She states she does not know how long it has been going on for but she does not feel her current medications are helping. Per collateral from RN, patient's anxiety is triggered by environmental stimuli and patient's BP will then increase significantly after she becomes anxious. Per medical MD, patient's anxiety is affecting her respiratory symptoms.  Patient is a very limited historian due to her anxiety and her respiratory condition and she could not provide many details.       Brief/limited Psychiatric ROS:    Symptoms of Depression: diminished mood - No,     Changes in Sleep: trouble with initiation- No,     Suicidal- active/passive ideations - No, organized plans, future intentions - No    Homicidal ideations: active/passive ideations - No,  organized plans, future intentions - No    Symptoms of psychosis: hallucinations - No, delusions - No,       Symptoms of KATHIE: excessive anxiety/worry/fear, more days than not -yes    Symptoms of Panic Disorder: yes          PAST PSYCHIATRIC HISTORY  Previous Psychiatric Hospitalizations: No  Previous SI/HI: No,  Previous Suicide Attempts: No,   Previous Medication Trials: Yes,  Psychiatric Care (current & past): No,        PAST MEDICAL & SURGICAL HISTORY   Past Medical History:   Diagnosis Date    Abnormal Pap smear 13    LGSIL    Atrial fibrillation     COPD (chronic obstructive pulmonary disease)     Depression     GERD (gastroesophageal reflux disease)     Hyperlipidemia     Hypertension      Past Surgical History:   Procedure Laterality Date    APPENDECTOMY      BRONCHOSCOPY N/A 2019    Procedure: BRONCHOSCOPY;  Surgeon: Howard Matson MD;  Location: [facility] OR;  Service: Pulmonary;  Laterality: N/A;    CERVICAL BIOPSY  W/ LOOP ELECTRODE EXCISION      CHOLECYSTECTOMY      COLLATERAL LIGAMENT REPAIR, KNEE      left knee    COLONOSCOPY      DILATION AND CURETTAGE OF UTERUS      SINUS SURGERY           Home Meds:   Prior to Admission medications    Medication Sig Start Date End Date Taking? Authorizing Provider   albuterol (PROVENTIL/VENTOLIN HFA) 90 mcg/actuation inhaler Inhale 2 puffs into the lungs every 6 (six) hours as needed for Wheezing. 3/21/19   Kevin Clements MD   albuterol-ipratropium (DUO-NEB) 2.5 mg-0.5 mg/3 mL nebulizer solution SMARTSI Vial(s) Via Nebulizer Every 12 Hours 22   Historical Provider   ALPRAZolam (XANAX) 0.25 MG tablet TAKE 1 TABLET DURING THE DAY FOR ANXIETY AS NEEDED AND 2 AT NIGHT FOR SLEEP 22   Kevin Clements MD   atenoloL (TENORMIN) 25 MG tablet Take 12.5 mg by mouth once daily. 22   Historical Provider   clobetasoL (TEMOVATE) 0.05 % cream Apply topically 2 (two) times daily. To rash at the right lower leg 3/21/22   Donna Harris NP   diltiaZEM  (CARDIZEM) 120 MG tablet Take 120 mg by mouth 2 (two) times daily. 11/7/19   Historical Provider   ELIQUIS 2.5 mg Tab Take 2.5 mg by mouth 2 (two) times daily. 11/13/19   Historical Provider   ergocalciferol (ERGOCALCIFEROL) 50,000 unit Cap TAKE 1 CAPSULE BY MOUTH ONE TIME PER WEEK 7/8/21   Kevin Clements MD   ergocalciferol (ERGOCALCIFEROL) 50,000 unit Cap TAKE 1 CAPSULE BY MOUTH ONE TIME PER WEEK 6/20/22   Kevin Clements MD   EScitalopram oxalate (LEXAPRO) 5 MG Tab TAKE 1 TABLET BY MOUTH EVERY DAY 6/14/22   Kevin Clements MD   ezetimibe (ZETIA) 10 mg tablet  4/20/21   Historical Provider   flecainide (TAMBOCOR) 50 MG Tab Take 50 mg by mouth 2 (two) times daily. 11/13/19   Historical Provider   furosemide (LASIX) 20 MG tablet Take 20 mg by mouth once daily. 3/10/22   Historical Provider   gabapentin (NEURONTIN) 100 MG capsule Take 1 capsule (100 mg total) by mouth every evening. 3/1/21 3/1/22  Kevin Clements MD   levalbuterol (XOPENEX) 0.63 mg/3 mL nebulizer solution Take 3 mLs (0.63 mg total) by nebulization every 4 (four) hours as needed for Wheezing. 5/28/20 7/1/21  Nichelle Llanos, HAKEEM   metoclopramide HCl (REGLAN) 10 MG tablet Take 1 tablet (10 mg total) by mouth every 6 (six) hours. 8/23/22   Drew Estevez DO   montelukast (SINGULAIR) 10 mg tablet Take 1 tablet (10 mg total) by mouth once daily. 7/6/22   Vance Ordaz MD   predniSONE (DELTASONE) 10 MG tablet Take 10 mg by mouth once daily. 4/18/22   Historical Provider   REPOTTO SURECLICK 140 mg/mL PnIj Inject 140 mg into the skin every 14 (fourteen) days. 6/4/21   Historical Provider   rifAXIMin (XIFAXAN) 550 mg Tab Take 1 tablet (550 mg total) by mouth 3 (three) times daily. 6/7/21   Kevin Clements MD   rosuvastatin (CRESTOR) 40 MG Tab Take 40 mg by mouth every evening. 10/20/20   Historical Provider   TRELEGY ELLIPTA 100-62.5-25 mcg DsDv TAKE 1 PUFF BY MOUTH EVERY DAY 11/1/19   Historical Provider   valsartan (DIOVAN)  160 MG tablet  4/20/21   Historical Provider         Scheduled Meds:    albuterol-ipratropium  3 mL Nebulization Q6H    ascorbic acid (vitamin C)  500 mg Oral Daily    atenoloL  12.5 mg Oral Daily    atorvastatin  10 mg Oral QHS    dexamethasone  6 mg Intravenous Daily    diltiaZEM  60 mg Oral Q12H    EScitalopram oxalate  5 mg Oral Daily    flecainide  50 mg Oral BID    levoFLOXacin  750 mg Oral Daily    methylPREDNISolone sodium succinate injection  20 mg Intravenous Q4H    montelukast  10 mg Oral Daily    remdesivir infusion  100 mg Intravenous Daily    thiamine  100 mg Oral Daily    valsartan  160 mg Oral BID      PRN Meds: albuterol, ALPRAZolam, cloNIDine, loperamide, melatonin   Psychotherapeutics (From admission, onward)      Start     Stop Route Frequency Ordered    08/29/22 1626  ALPRAZolam tablet 0.5 mg         -- Oral 3 times daily PRN 08/29/22 1626    08/27/22 0900  EScitalopram oxalate tablet 5 mg         -- Oral Daily 08/26/22 1822            ALLERGIES   Review of patient's allergies indicates:   Allergen Reactions    Pcn [penicillins] Hives and Itching    Tetracyclines     Bactrim [sulfamethoxazole-trimethoprim] Rash    Ilosone Rash    Iodine and iodide containing products Rash    Sulfa (sulfonamide antibiotics) Hives and Rash       SOCIAL HISTORY:    Education: 12th  Employment Status/Finances:Retired   Relationship Status/Sexual Orientation:   Children: 3  Housing Status: Home    history:  NO   Access to Firearms: NO ;  Locked up? n/a  Taoist:Actively participates in organized Yarsanism      SUBSTANCE ABUSE HISTORY   Recreational Drugs:  no    Use of Alcohol: denied  Rehab History:no   Tobacco Use:no      FAMILY PSYCHIATRIC HISTORY   Family History   Problem Relation Age of Onset    Breast cancer Mother     Colon cancer Neg Hx     Ovarian cancer Neg Hx            ROS  Review of Systems   Constitutional:  Negative for chills and fever.   HENT:  Negative for hearing loss.     Eyes:  Negative for blurred vision and double vision.   Respiratory:  Positive for cough and shortness of breath.    Cardiovascular:  Negative for chest pain and palpitations.   Gastrointestinal:  Negative for constipation, diarrhea, nausea and vomiting.   Musculoskeletal:  Negative for back pain and neck pain.   Skin:  Negative for rash.   Neurological:  Negative for dizziness and headaches.   Endo/Heme/Allergies:  Negative for environmental allergies.           EXAMINATION    PHYSICAL EXAM  Reviewed note/exam by Dr. Victoria from 9/1/2022  9:21 AM    VITALS   Vitals:    09/01/22 1126   BP:    Pulse:    Resp:    Temp: 96.9 °F (36.1 °C)        Body mass index is 22.1 kg/m².        LABORATORY DATA   Recent Results (from the past 72 hour(s))   Arterial Blood Gas-[place] Only Once    Collection Time: 08/29/22  3:35 PM   Result Value Ref Range    POC PH 7.480 (A) 7.350 - 7.450    POC PCO2 49 (A) 35 - 45 mmHg    POC PO2 48 (A) 75 - 100 mmHg    POC THb 9.5 (A) 12 - 18 g/dL    POC O2Hb Arterial 86.5 (A) 94 - 100 %    POC COHb 2.0 0 - 3.0 %    POC MetHb 1.0 0 - 1.5 %    POC SATURATED O2 89.1 (A) 90 - 100 %    POC TCO2 38 mmol/L    POC BE 11.60 (A) -2.00 - 2.00 mmol/L    POC HCO3 36.50 22 - 28 mmol/L    Site Left Radial     DelSys Nasal Cannula     Allens Test Pass     FiO2 36 21 - 100   CBC Auto Differential    Collection Time: 08/30/22  6:15 AM   Result Value Ref Range    WBC 8.22 3.90 - 12.70 K/uL    RBC 3.12 (L) 4.00 - 5.40 M/uL    Hemoglobin 9.1 (L) 12.0 - 16.0 g/dL    Hematocrit 28.9 (L) 37.0 - 48.5 %    MCV 93 82 - 98 fL    MCH 29.2 27.0 - 31.0 pg    MCHC 31.5 (L) 32.0 - 36.0 g/dL    RDW 13.2 11.5 - 14.5 %    Platelets 156 150 - 450 K/uL    MPV 10.4 9.2 - 12.9 fL    Immature Granulocytes CANCELED 0.0 - 0.5 %    Immature Grans (Abs) CANCELED 0.00 - 0.04 K/uL    nRBC 1 (A) 0 /100 WBC    Gran % 84.0 (H) 38.0 - 73.0 %    Lymph % 7.0 (L) 18.0 - 48.0 %    Mono % 5.0 4.0 - 15.0 %    Eosinophil % 0.0 0.0 - 8.0 %    Basophil %  0.0 0.0 - 1.9 %    Bands 4.0 %    Poly Occasional     Ovalocytes Occasional     Differential Method Manual    Comprehensive metabolic panel    Collection Time: 08/30/22  6:15 AM   Result Value Ref Range    Sodium 148 (H) 136 - 145 mmol/L    Potassium 3.9 3.5 - 5.1 mmol/L    Chloride 104 95 - 110 mmol/L    CO2 31 (H) 23 - 29 mmol/L    Glucose 129 (H) 70 - 110 mg/dL    BUN 21 8 - 23 mg/dL    Creatinine 0.7 0.5 - 1.4 mg/dL    Calcium 8.5 (L) 8.7 - 10.5 mg/dL    Total Protein 5.4 (L) 6.0 - 8.4 g/dL    Albumin 2.9 (L) 3.5 - 5.2 g/dL    Total Bilirubin 0.8 0.1 - 1.0 mg/dL    Alkaline Phosphatase 56 55 - 135 U/L    AST 29 10 - 40 U/L    ALT 52 (H) 10 - 44 U/L    Anion Gap 13 8 - 16 mmol/L    eGFR >60 >60 mL/min/1.73 m^2   Comprehensive metabolic panel    Collection Time: 08/31/22  4:04 AM   Result Value Ref Range    Sodium 148 (H) 136 - 145 mmol/L    Potassium 3.7 3.5 - 5.1 mmol/L    Chloride 103 95 - 110 mmol/L    CO2 32 (H) 23 - 29 mmol/L    Glucose 155 (H) 70 - 110 mg/dL    BUN 23 8 - 23 mg/dL    Creatinine 0.7 0.5 - 1.4 mg/dL    Calcium 8.2 (L) 8.7 - 10.5 mg/dL    Total Protein 5.1 (L) 6.0 - 8.4 g/dL    Albumin 2.8 (L) 3.5 - 5.2 g/dL    Total Bilirubin 0.9 0.1 - 1.0 mg/dL    Alkaline Phosphatase 53 (L) 55 - 135 U/L    AST 21 10 - 40 U/L    ALT 42 10 - 44 U/L    Anion Gap 13 8 - 16 mmol/L    eGFR >60 >60 mL/min/1.73 m^2   CBC Auto Differential    Collection Time: 08/31/22  4:05 AM   Result Value Ref Range    WBC 6.05 3.90 - 12.70 K/uL    RBC 2.93 (L) 4.00 - 5.40 M/uL    Hemoglobin 8.9 (L) 12.0 - 16.0 g/dL    Hematocrit 27.4 (L) 37.0 - 48.5 %    MCV 94 82 - 98 fL    MCH 30.4 27.0 - 31.0 pg    MCHC 32.5 32.0 - 36.0 g/dL    RDW 13.2 11.5 - 14.5 %    Platelets 177 150 - 450 K/uL    MPV 10.6 9.2 - 12.9 fL    Immature Granulocytes CANCELED 0.0 - 0.5 %    Immature Grans (Abs) CANCELED 0.00 - 0.04 K/uL    Lymph # CANCELED 1.0 - 4.8 K/uL    Mono # CANCELED 0.3 - 1.0 K/uL    Eos # CANCELED 0.0 - 0.5 K/uL    Baso # CANCELED 0.00 -  0.20 K/uL    nRBC 1 (A) 0 /100 WBC    Gran % 88.0 (H) 38.0 - 73.0 %    Lymph % 5.0 (L) 18.0 - 48.0 %    Mono % 3.0 (L) 4.0 - 15.0 %    Eosinophil % 0.0 0.0 - 8.0 %    Basophil % 0.0 0.0 - 1.9 %    Bands 4.0 %    Differential Method Manual    CBC Auto Differential    Collection Time: 09/01/22  6:16 AM   Result Value Ref Range    WBC 6.66 3.90 - 12.70 K/uL    RBC 3.36 (L) 4.00 - 5.40 M/uL    Hemoglobin 9.9 (L) 12.0 - 16.0 g/dL    Hematocrit 31.0 (L) 37.0 - 48.5 %    MCV 92 82 - 98 fL    MCH 29.5 27.0 - 31.0 pg    MCHC 31.9 (L) 32.0 - 36.0 g/dL    RDW 13.4 11.5 - 14.5 %    Platelets 218 150 - 450 K/uL    MPV 10.2 9.2 - 12.9 fL    Immature Granulocytes CANCELED 0.0 - 0.5 %    Immature Grans (Abs) CANCELED 0.00 - 0.04 K/uL    Lymph # CANCELED 1.0 - 4.8 K/uL    Mono # CANCELED 0.3 - 1.0 K/uL    Eos # CANCELED 0.0 - 0.5 K/uL    Baso # CANCELED 0.00 - 0.20 K/uL    nRBC 1 (A) 0 /100 WBC    Gran % 78.0 (H) 38.0 - 73.0 %    Lymph % 10.0 (L) 18.0 - 48.0 %    Mono % 12.0 4.0 - 15.0 %    Eosinophil % 0.0 0.0 - 8.0 %    Basophil % 0.0 0.0 - 1.9 %    Differential Method Manual    Comprehensive Metabolic Panel    Collection Time: 09/01/22  6:16 AM   Result Value Ref Range    Sodium 147 (H) 136 - 145 mmol/L    Potassium 3.9 3.5 - 5.1 mmol/L    Chloride 102 95 - 110 mmol/L    CO2 32 (H) 23 - 29 mmol/L    Glucose 143 (H) 70 - 110 mg/dL    BUN 34 (H) 8 - 23 mg/dL    Creatinine 0.8 0.5 - 1.4 mg/dL    Calcium 8.4 (L) 8.7 - 10.5 mg/dL    Total Protein 5.5 (L) 6.0 - 8.4 g/dL    Albumin 3.1 (L) 3.5 - 5.2 g/dL    Total Bilirubin 1.2 (H) 0.1 - 1.0 mg/dL    Alkaline Phosphatase 56 55 - 135 U/L    AST 21 10 - 40 U/L    ALT 41 10 - 44 U/L    Anion Gap 13 8 - 16 mmol/L    eGFR >60 >60 mL/min/1.73 m^2      No results found for: PHENYTOIN, PHENOBARB, VALPROATE, CBMZ        CONSTITUTIONAL  General Appearance: ELIZABETH    MUSCULOSKELETAL  Muscle Strength and Tone:ELIZABETH  Abnormal Involuntary Movements: ELIZABETH  Gait and Station: ELIZABETH    PSYCHIATRIC   Level of  Consciousness: awake and alert   Orientation: person, place, and situation  Grooming: ELIZABETH  Psychomotor Behavior: ELIZABETH  Speech: normal tone, normal rate, normal pitch, normal volume  Language: grossly intact  Mood: anxious  Affect:  ELIZABETH  Thought Process: linear, logical  Associations: intact   Thought Content: denies SI, denies HI, and no delusions  Perceptions: denies AH and denies  VH  Memory: Remote intact and Recent intact  Attention:Attends to interview without distraction  Fund of Knowledge: Vocabulary appropriate   Estimate if Intelligence:  Average based on work/education history, vocabulary and mental status exam  Insight: has awareness of illness  Judgment: behavior is adequate to circumstances      MEDICAL DECISION MAKING        ASSESSMENT       Unspecified anxiety disorder      PLAN:    - currently on home dose of lexapro, recommend caution with bleeding risk given recent hemoptysis  - home dose of xanax was increased to 0.5 mg PO TID by primary team, recommend caution with respiratory depression given end stage COPD  - recommend depakene 250 mg PO q 6 hours PRN as augmentation for anxiety to limit doses of above agents given aforementioned risks, monitor LFTs and cell counts will on VPA, check level in 4-5 days if receiving regularly, taper off once no longer needed  - Discussed with primary team MD  - Please call with any questions        Discussed diagnosis, risks and benefits of proposed treatment vs alternative treatments vs no treatment, potential side effects of these treatments and the inherent unpredictability of treatment. The patient expresses understanding of the above and displays the capacity to agree with this treatment given said understanding. Patient also agrees that, currently, the benefits outweigh the risks and would like to pursue/continue treatment at this time.    Any medications being used off-label were discussed with the patient inclusive of the evidence base for the use of the  medications and consent was obtained for the off-label use of the medication.           Jesus Martin III, MD  Psychiatry

## 2022-09-01 NOTE — PROGRESS NOTES
Pharmacist Renal Dose Adjustment Note    Marva Perez is a 81 y.o. female being treated with the medication  LEVAQUIN    Patient Data:    Vital Signs (Most Recent):  Temp: 96.9 °F (36.1 °C) (09/01/22 1126)  Pulse: 74 (09/01/22 1345)  Resp: (!) 22 (09/01/22 1345)  BP: (!) 162/74 (09/01/22 1300)  SpO2: 95 % (09/01/22 1345)   Vital Signs (72h Range):  Temp:  [96 °F (35.6 °C)-99.8 °F (37.7 °C)]   Pulse:  []   Resp:  [18-56]   BP: (117-221)/()   SpO2:  [84 %-100 %]      Recent Labs   Lab 08/30/22  0615 08/31/22  0404 09/01/22  0616   CREATININE 0.7 0.7 0.8     Serum creatinine: 0.8 mg/dL 09/01/22 0616  Estimated creatinine clearance: 44.4 mL/min    Medication: LEVAQUIN dose: 750MG  frequency Q24H will be changed to medication: LEVAQUIN  dose: 750MG  frequency: Q48H      Pharmacist's Name: Ray LylesD   Pharmacist's Extension: [phone]

## 2022-09-02 LAB
ALBUMIN SERPL BCP-MCNC: 2.8 G/DL (ref 3.5–5.2)
ALP SERPL-CCNC: 58 U/L (ref 55–135)
ALT SERPL W/O P-5'-P-CCNC: 33 U/L (ref 10–44)
ANION GAP SERPL CALC-SCNC: 10 MMOL/L (ref 8–16)
AST SERPL-CCNC: 18 U/L (ref 10–40)
BASOPHILS # BLD AUTO: ABNORMAL K/UL (ref 0–0.2)
BASOPHILS NFR BLD: 0 % (ref 0–1.9)
BILIRUB SERPL-MCNC: 1.1 MG/DL (ref 0.1–1)
BUN SERPL-MCNC: 33 MG/DL (ref 8–23)
CALCIUM SERPL-MCNC: 8.2 MG/DL (ref 8.7–10.5)
CHLORIDE SERPL-SCNC: 103 MMOL/L (ref 95–110)
CO2 SERPL-SCNC: 33 MMOL/L (ref 23–29)
CREAT SERPL-MCNC: 0.8 MG/DL (ref 0.5–1.4)
DIFFERENTIAL METHOD: ABNORMAL
EOSINOPHIL # BLD AUTO: ABNORMAL K/UL (ref 0–0.5)
EOSINOPHIL NFR BLD: 0 % (ref 0–8)
ERYTHROCYTE [DISTWIDTH] IN BLOOD BY AUTOMATED COUNT: 13.7 % (ref 11.5–14.5)
EST. GFR  (NO RACE VARIABLE): >60 ML/MIN/1.73 M^2
FINAL PATHOLOGIC DIAGNOSIS: NORMAL
FINAL PATHOLOGIC DIAGNOSIS: NORMAL
GLUCOSE SERPL-MCNC: 212 MG/DL (ref 70–110)
HCT VFR BLD AUTO: 28.5 % (ref 37–48.5)
HGB BLD-MCNC: 9.2 G/DL (ref 12–16)
IMM GRANULOCYTES # BLD AUTO: ABNORMAL K/UL (ref 0–0.04)
IMM GRANULOCYTES NFR BLD AUTO: ABNORMAL % (ref 0–0.5)
LDH SERPL L TO P-CCNC: 432 U/L (ref 110–260)
LYMPHOCYTES # BLD AUTO: ABNORMAL K/UL (ref 1–4.8)
LYMPHOCYTES NFR BLD: 5 % (ref 18–48)
Lab: NORMAL
Lab: NORMAL
MAGNESIUM SERPL-MCNC: 2.3 MG/DL (ref 1.6–2.6)
MCH RBC QN AUTO: 29.8 PG (ref 27–31)
MCHC RBC AUTO-ENTMCNC: 32.3 G/DL (ref 32–36)
MCV RBC AUTO: 92 FL (ref 82–98)
MONOCYTES # BLD AUTO: ABNORMAL K/UL (ref 0.3–1)
MONOCYTES NFR BLD: 9 % (ref 4–15)
NEUTROPHILS NFR BLD: 82 % (ref 38–73)
NEUTS BAND NFR BLD MANUAL: 4 %
NRBC BLD-RTO: 1 /100 WBC
PHOSPHATE SERPL-MCNC: 3.5 MG/DL (ref 2.7–4.5)
PLATELET # BLD AUTO: 210 K/UL (ref 150–450)
PMV BLD AUTO: 10.3 FL (ref 9.2–12.9)
POTASSIUM SERPL-SCNC: 3.2 MMOL/L (ref 3.5–5.1)
PROCALCITONIN SERPL IA-MCNC: 0.07 NG/ML
PROT SERPL-MCNC: 4.9 G/DL (ref 6–8.4)
RBC # BLD AUTO: 3.09 M/UL (ref 4–5.4)
SODIUM SERPL-SCNC: 146 MMOL/L (ref 136–145)
WBC # BLD AUTO: 5.18 K/UL (ref 3.9–12.7)

## 2022-09-02 PROCEDURE — 83615 LACTATE (LD) (LDH) ENZYME: CPT | Performed by: FAMILY MEDICINE

## 2022-09-02 PROCEDURE — 25000003 PHARM REV CODE 250: Performed by: NURSE PRACTITIONER

## 2022-09-02 PROCEDURE — 36415 COLL VENOUS BLD VENIPUNCTURE: CPT | Performed by: FAMILY MEDICINE

## 2022-09-02 PROCEDURE — 27000207 HC ISOLATION

## 2022-09-02 PROCEDURE — 84145 PROCALCITONIN (PCT): CPT | Performed by: FAMILY MEDICINE

## 2022-09-02 PROCEDURE — 25000003 PHARM REV CODE 250: Performed by: INTERNAL MEDICINE

## 2022-09-02 PROCEDURE — 99900035 HC TECH TIME PER 15 MIN (STAT)

## 2022-09-02 PROCEDURE — 80053 COMPREHEN METABOLIC PANEL: CPT | Performed by: STUDENT IN AN ORGANIZED HEALTH CARE EDUCATION/TRAINING PROGRAM

## 2022-09-02 PROCEDURE — 27000221 HC OXYGEN, UP TO 24 HOURS

## 2022-09-02 PROCEDURE — 63600175 PHARM REV CODE 636 W HCPCS: Performed by: STUDENT IN AN ORGANIZED HEALTH CARE EDUCATION/TRAINING PROGRAM

## 2022-09-02 PROCEDURE — 85027 COMPLETE CBC AUTOMATED: CPT | Performed by: STUDENT IN AN ORGANIZED HEALTH CARE EDUCATION/TRAINING PROGRAM

## 2022-09-02 PROCEDURE — 94640 AIRWAY INHALATION TREATMENT: CPT

## 2022-09-02 PROCEDURE — 94761 N-INVAS EAR/PLS OXIMETRY MLT: CPT

## 2022-09-02 PROCEDURE — 36415 COLL VENOUS BLD VENIPUNCTURE: CPT | Performed by: STUDENT IN AN ORGANIZED HEALTH CARE EDUCATION/TRAINING PROGRAM

## 2022-09-02 PROCEDURE — 25000242 PHARM REV CODE 250 ALT 637 W/ HCPCS: Performed by: INTERNAL MEDICINE

## 2022-09-02 PROCEDURE — 83735 ASSAY OF MAGNESIUM: CPT | Performed by: STUDENT IN AN ORGANIZED HEALTH CARE EDUCATION/TRAINING PROGRAM

## 2022-09-02 PROCEDURE — 94660 CPAP INITIATION&MGMT: CPT

## 2022-09-02 PROCEDURE — 25000003 PHARM REV CODE 250: Performed by: STUDENT IN AN ORGANIZED HEALTH CARE EDUCATION/TRAINING PROGRAM

## 2022-09-02 PROCEDURE — 63600175 PHARM REV CODE 636 W HCPCS: Performed by: INTERNAL MEDICINE

## 2022-09-02 PROCEDURE — 25000003 PHARM REV CODE 250: Performed by: FAMILY MEDICINE

## 2022-09-02 PROCEDURE — 85007 BL SMEAR W/DIFF WBC COUNT: CPT | Performed by: STUDENT IN AN ORGANIZED HEALTH CARE EDUCATION/TRAINING PROGRAM

## 2022-09-02 PROCEDURE — 84100 ASSAY OF PHOSPHORUS: CPT | Performed by: STUDENT IN AN ORGANIZED HEALTH CARE EDUCATION/TRAINING PROGRAM

## 2022-09-02 PROCEDURE — 94664 DEMO&/EVAL PT USE INHALER: CPT

## 2022-09-02 PROCEDURE — 20000000 HC ICU ROOM

## 2022-09-02 PROCEDURE — 63600175 PHARM REV CODE 636 W HCPCS: Performed by: FAMILY MEDICINE

## 2022-09-02 PROCEDURE — 99233 SBSQ HOSP IP/OBS HIGH 50: CPT | Mod: ,,, | Performed by: FAMILY MEDICINE

## 2022-09-02 PROCEDURE — 99233 PR SUBSEQUENT HOSPITAL CARE,LEVL III: ICD-10-PCS | Mod: ,,, | Performed by: FAMILY MEDICINE

## 2022-09-02 RX ORDER — ACETAMINOPHEN 325 MG/1
650 TABLET ORAL EVERY 6 HOURS PRN
Status: DISCONTINUED | OUTPATIENT
Start: 2022-09-02 | End: 2022-09-09 | Stop reason: HOSPADM

## 2022-09-02 RX ORDER — FUROSEMIDE 10 MG/ML
10 INJECTION INTRAMUSCULAR; INTRAVENOUS ONCE
Status: COMPLETED | OUTPATIENT
Start: 2022-09-02 | End: 2022-09-02

## 2022-09-02 RX ORDER — POTASSIUM CHLORIDE 20 MEQ/1
40 TABLET, EXTENDED RELEASE ORAL ONCE
Status: DISCONTINUED | OUTPATIENT
Start: 2022-09-02 | End: 2022-09-02

## 2022-09-02 RX ORDER — MIRTAZAPINE 7.5 MG/1
7.5 TABLET, FILM COATED ORAL NIGHTLY
Status: DISCONTINUED | OUTPATIENT
Start: 2022-09-02 | End: 2022-09-09 | Stop reason: HOSPADM

## 2022-09-02 RX ADMIN — MIRTAZAPINE 7.5 MG: 7.5 TABLET ORAL at 10:09

## 2022-09-02 RX ADMIN — LOPERAMIDE HYDROCHLORIDE 2 MG: 2 CAPSULE ORAL at 10:09

## 2022-09-02 RX ADMIN — FUROSEMIDE 10 MG: 10 INJECTION, SOLUTION INTRAMUSCULAR; INTRAVENOUS at 06:09

## 2022-09-02 RX ADMIN — IPRATROPIUM BROMIDE AND ALBUTEROL SULFATE 3 ML: .5; 3 SOLUTION RESPIRATORY (INHALATION) at 01:09

## 2022-09-02 RX ADMIN — IPRATROPIUM BROMIDE AND ALBUTEROL SULFATE 3 ML: .5; 3 SOLUTION RESPIRATORY (INHALATION) at 07:09

## 2022-09-02 RX ADMIN — ALPRAZOLAM 0.5 MG: 0.5 TABLET ORAL at 09:09

## 2022-09-02 RX ADMIN — ATORVASTATIN CALCIUM 10 MG: 10 TABLET, FILM COATED ORAL at 10:09

## 2022-09-02 RX ADMIN — DEXAMETHASONE SODIUM PHOSPHATE 6 MG: 4 INJECTION, SOLUTION INTRA-ARTICULAR; INTRALESIONAL; INTRAMUSCULAR; INTRAVENOUS; SOFT TISSUE at 09:09

## 2022-09-02 RX ADMIN — FLECAINIDE ACETATE 50 MG: 50 TABLET ORAL at 10:09

## 2022-09-02 RX ADMIN — CLONIDINE HYDROCHLORIDE 0.1 MG: 0.1 TABLET ORAL at 09:09

## 2022-09-02 RX ADMIN — VALSARTAN 160 MG: 80 TABLET, FILM COATED ORAL at 10:09

## 2022-09-02 RX ADMIN — VALSARTAN 160 MG: 80 TABLET, FILM COATED ORAL at 09:09

## 2022-09-02 RX ADMIN — ATENOLOL 12.5 MG: 25 TABLET ORAL at 09:09

## 2022-09-02 RX ADMIN — DILTIAZEM HYDROCHLORIDE 60 MG: 60 TABLET, FILM COATED ORAL at 10:09

## 2022-09-02 RX ADMIN — ACETAMINOPHEN 650 MG: 325 TABLET ORAL at 03:09

## 2022-09-02 RX ADMIN — LOPERAMIDE HYDROCHLORIDE 2 MG: 2 CAPSULE ORAL at 02:09

## 2022-09-02 RX ADMIN — POTASSIUM BICARBONATE 40 MEQ: 391 TABLET, EFFERVESCENT ORAL at 10:09

## 2022-09-02 RX ADMIN — MONTELUKAST 10 MG: 10 TABLET, FILM COATED ORAL at 09:09

## 2022-09-02 RX ADMIN — IPRATROPIUM BROMIDE AND ALBUTEROL SULFATE 3 ML: .5; 3 SOLUTION RESPIRATORY (INHALATION) at 12:09

## 2022-09-02 RX ADMIN — CLONIDINE HYDROCHLORIDE 0.1 MG: 0.1 TABLET ORAL at 07:09

## 2022-09-02 RX ADMIN — FLECAINIDE ACETATE 50 MG: 50 TABLET ORAL at 09:09

## 2022-09-02 RX ADMIN — REMDESIVIR 100 MG: 100 INJECTION, POWDER, LYOPHILIZED, FOR SOLUTION INTRAVENOUS at 09:09

## 2022-09-02 RX ADMIN — LEUCINE, PHENYLALANINE, LYSINE, METHIONINE, ISOLEUCINE, VALINE, HISTIDINE, THREONINE, TRYPTOPHAN, ALANINE, GLYCINE, ARGININE, PROLINE, SERINE, TYROSINE, SODIUM ACETATE, DIBASIC POTASSIUM PHOSPHATE, MAGNESIUM CHLORIDE, SODIUM CHLORIDE, CALCIUM CHLORIDE, DEXTROSE
311; 238; 247; 170; 255; 247; 204; 179; 77; 880; 438; 489; 289; 213; 17; 297; 261; 51; 77; 33; 5 INJECTION INTRAVENOUS at 10:09

## 2022-09-02 RX ADMIN — THIAMINE HCL TAB 100 MG 100 MG: 100 TAB at 09:09

## 2022-09-02 RX ADMIN — MUPIROCIN: 20 OINTMENT TOPICAL at 09:09

## 2022-09-02 RX ADMIN — OXYCODONE HYDROCHLORIDE AND ACETAMINOPHEN 500 MG: 500 TABLET ORAL at 09:09

## 2022-09-02 RX ADMIN — MUPIROCIN: 20 OINTMENT TOPICAL at 10:09

## 2022-09-02 RX ADMIN — ESCITALOPRAM OXALATE 5 MG: 5 TABLET, FILM COATED ORAL at 09:09

## 2022-09-02 RX ADMIN — DILTIAZEM HYDROCHLORIDE 60 MG: 60 TABLET, FILM COATED ORAL at 09:09

## 2022-09-02 NOTE — ASSESSMENT & PLAN NOTE
Patient with atrial fibrillation which is controlled currently with Beta Blocker, Calcium Channel Blocker and flecainide. Patient is currently in sinus rhythm.[id number] Score: 4. HASBLED Score: Unable to calculate. Anticoagulation indicated. Anticoagulation done with eliquis.    8/28/22: H&H drop from 10.1/30.6 > 8.6/26.0; pt with hemoptysis. Will hold eliquis for now. Consult cardiology in AM. She is followed by Dr. Cox outpatient. Daughter with concerns about holding eliquis, but given her hemoptysis and H&H drop it is my opinion as well as that of Dr. Matson that the risks of continued anticoagulation outweigh the benefit currently.     8/29 dr Wilde consulted on patient this am. Cont to hold eliquis for now.    8/30 cont to hold eliquis as she is still having hemoptysis   Cont atenolol and flecainide   Cont telemetry  Monitor electrolytes    8/31- No more hemoptysis.  Consider restarting Eloquis since she is such a high risk for PE.    9/1: continued hemoptysis, but H&H improving. Consider restarting eliquis, Dr. Matson recommends we cont to hold    9/2  In sinus. Holding eliquis because of hemoptysis

## 2022-09-02 NOTE — PROGRESS NOTES
[facility] Intensive Care  Pulmonology  Progress Note    Patient Name: Marva Perez  MRN: [id number]  Admission Date: 8/26/2022  Hospital Length of Stay: 7 days  Code Status: Full Code  Attending Provider: Stewart Lucia MD  Primary Care Provider: Kevin Clements MD   Principal Problem: COVID-19    Subjective:     Interval History: stable on bipap 35%   98%    Objective:     Vital Signs (Most Recent):  Temp: 96.9 °F (36.1 °C) (09/02/22 0730)  Pulse: 76 (09/02/22 0800)  Resp: (!) 23 (09/02/22 0800)  BP: (!) 153/72 (09/02/22 0800)  SpO2: 98 % (09/02/22 0800)   Vital Signs (24h Range):  Temp:  [96.9 °F (36.1 °C)-97.5 °F (36.4 °C)] 96.9 °F (36.1 °C)  Pulse:  [66-94] 76  Resp:  [21-50] 23  SpO2:  [83 %-98 %] 98 %  BP: (123-221)/() 153/72     Weight: 54.8 kg (120 lb 13 oz)  Body mass index is 22.1 kg/m².      Intake/Output Summary (Last 24 hours) at 9/2/2022 1011  Last data filed at 9/2/2022 0600  Gross per 24 hour   Intake 903.33 ml   Output --   Net 903.33 ml       Physical Exam  Vitals and nursing note reviewed.   Constitutional:       General: She is not in acute distress.     Appearance: Normal appearance. She is well-developed. She is not ill-appearing, toxic-appearing or diaphoretic.   HENT:      Head: Normocephalic and atraumatic.      Jaw: No trismus.      Right Ear: Hearing, tympanic membrane, ear canal and external ear normal.      Left Ear: Hearing, tympanic membrane, ear canal and external ear normal.      Nose: Nose normal. No nasal deformity, mucosal edema or rhinorrhea.      Right Sinus: No maxillary sinus tenderness or frontal sinus tenderness.      Left Sinus: No maxillary sinus tenderness or frontal sinus tenderness.      Mouth/Throat:      Dentition: Normal dentition.      Pharynx: Uvula midline. No posterior oropharyngeal erythema or uvula swelling.   Eyes:      General: Lids are normal. No scleral icterus.     Conjunctiva/sclera: Conjunctivae normal.      Comments: Sclera clear bilat    Neck:      Trachea: Trachea and phonation normal.   Cardiovascular:      Rate and Rhythm: Normal rate and regular rhythm.      Pulses: Normal pulses.      Heart sounds: Normal heart sounds.   Pulmonary:      Effort: Accessory muscle usage, prolonged expiration and respiratory distress (mild to moderate) present.      Breath sounds: Decreased air movement present. Examination of the right-upper field reveals decreased breath sounds. Examination of the left-upper field reveals decreased breath sounds. Examination of the right-middle field reveals decreased breath sounds and rhonchi. Examination of the left-middle field reveals decreased breath sounds and rhonchi. Examination of the right-lower field reveals decreased breath sounds, wheezing and rhonchi. Examination of the left-lower field reveals decreased breath sounds, wheezing and rhonchi. Decreased breath sounds, wheezing and rhonchi present.   Abdominal:      General: Bowel sounds are normal. There is no distension.      Palpations: Abdomen is soft.      Tenderness: There is no abdominal tenderness.   Musculoskeletal:         General: No deformity. Normal range of motion.      Cervical back: Full passive range of motion without pain and neck supple.   Skin:     General: Skin is warm and dry.      Coloration: Skin is not pale.   Neurological:      Mental Status: She is alert and oriented to person, place, and time.      Motor: No abnormal muscle tone.      Coordination: Coordination normal.   Psychiatric:         Speech: Speech normal.         Behavior: Behavior normal. Behavior is cooperative.         Thought Content: Thought content normal.         Judgment: Judgment normal.       Vents:  Oxygen Concentration (%): 40 (09/02/22 0740)    Lines/Drains/Airways       Peripheral Intravenous Line  Duration                  Peripheral IV - Single Lumen 08/30/22 1516 20 G Right Hand 2 days                    Significant Labs:    CBC/Anemia Profile:  Recent Labs   Lab  09/01/22  0616 09/02/22 0417   WBC 6.66 5.18   HGB 9.9* 9.2*   HCT 31.0* 28.5*    210   MCV 92 92   RDW 13.4 13.7        Chemistries:  Recent Labs   Lab 09/01/22 0616 09/02/22 0417   * 146*   K 3.9 3.2*    103   CO2 32* 33*   BUN 34* 33*   CREATININE 0.8 0.8   CALCIUM 8.4* 8.2*   ALBUMIN 3.1* 2.8*   PROT 5.5* 4.9*   BILITOT 1.2* 1.1*   ALKPHOS 56 58   ALT 41 33   AST 21 18   MG  --  2.3   PHOS  --  3.5       All pertinent labs within the past 24 hours have been reviewed.    Significant Imaging:  I have reviewed all pertinent imaging results/findings within the past 24 hours.    Assessment/Plan:     * COVID-19  ++ test she went to a wedding   ++ covid and flu B    Pneumonia due to infectious organism  Will start levoq    Right lower lobe lung mass  Will need to access ++ hemoptysis    Cough with hemoptysis  off eloquise now will do cytology on sputum and if persist do a bronchoscopy in 3 to 5 days    Lung mass  CT shows a RLL superior segment pleural lesion     Influenza B  ++ covid and flu B    Chronic atrial fibrillation  On eloquise has been stopped     Chronic respiratory failure  Patient with Hypoxic Respiratory failure which is Chronic.  she is on home oxygen at 3 LPM. Supplemental oxygen was provided and noted- Oxygen Concentration (%):  [40-50] 40.   Signs/symptoms of respiratory failure include- increased work of breathing. Contributing diagnoses includes - COPD Labs and images were reviewed. Patient Has recent ABG, which has been reviewed. Will treat underlying causes and adjust management of respiratory failure as follows- 3    Centrilobular emphysema  Severe end stage    Emphysema/COPD  Severe end stage     HTN (hypertension)  High but stable   stable    KATHIE (generalized anxiety disorder)  stable                 Howard Matson MD  Pulmonology  [facility] - Intensive Care

## 2022-09-02 NOTE — ASSESSMENT & PLAN NOTE
C diff negative; diarrhea improved  Suspect this is due to tamiflu; will DC as she seems stable from resp standpoint  S/p 1L NS in ER; DC NS .    She has known diarrhea at baseline. Now with slight hypokalemia. Replace. Okay to cont with immodium

## 2022-09-02 NOTE — PROGRESS NOTES
[facility] Medicine  Progress Note    Patient Name: Marva Perez  MRN: [id number]  Patient Class: IP- Inpatient   Admission Date: 8/26/2022  Length of Stay: 7 days  Attending Physician: Stewart Lucia MD  Primary Care Provider: Kevin Clements MD        Subjective:     Principal Problem:COVID-19        HPI:  81yF with HTN, HLD, GERD, Depression, anxiety, aFib on eliquis, COPD/Emphysema, and chronic hypoxia on 2-3L home oxygen via NC presented to ER with chief complaint of diarrhea and generalized weakness. Pt reports she started with congestion, fatigue, and cough and 5-6 days ago. She was seen in ER 8/22/22 and diagnosed with COVID and flu B. She was started on tamiflu and referred for outpatient antibody infusion. Pt states she started having nausea, vomiting, and diarrhea as well as intermittent fever. She returned to ER 8/23/22 for the new onset GI symptoms. Workup was reassuring and she was discharged home with antiemetics. She received bebtelovimab infusion 8/24/22. She continued with poor appetite and diarrhea and called her PCP who recommended ER evaluation. She denies CP, abd pain, fever/chills, dysuria, hematuria, melena, BRBPR.       Overview/Hospital Course:  8/27 Pt remained stable on 2-3 L NC overnight which is her home dose. VSS, afebrile. She is very anxious. Diarrhea improved, she did have a loose stool this morning sent for c diff testing. CT chest planned for this morning to evaluate her new left nodular denisity on CXR.     8/28 Pt with hemoptysis overnight. Eliquis held. VSS, afebrile. She remains very anxious. C diff negative. Diarrhea improved. CT chest with COPD and severe chronic emphysematous changes, no infiltrate. There is a new soft tissue mass of the right lung base. Dr. Matson whom she follows with outpatient has been consulted. Discussed with daughter, she is concerned about patient going back home alone at discharge but she also states pt does not  want NH. Advised we could order home health at [facility], but that is limited visit per week. Daughter states they are looking into sitters at home.    8/29/22  Marva Perez is a 81 y.o. female  has eliquis held-still coughing up blood- dr herbert and Dr Wilde following. She did test positive for both flu and covid.     8/30/22 pt is a 80yo COPD/emphysema on chronic O2 at home 2-3 liters. Dx with covid and flu B 8/22- treated outpt with tamiflu and IV covid infusion. She came to ER original c/o weakness and diarrhea. Tyamilfu d/kathryn. She was not requiring more than her routine O2. She progressed to hemoptysis on eliquis for a fib- this was held. CT chest shows lung mass. Dr Herbert consulted.  Dr herbert considering bronch but she would not be a good candidate- high risk for pneumo- started levaquin yesterday,. Today she is requiring more O2 now on 6L high flow. RR 26. Very anxious. Still coughing up bloody sputum.     There has been some confusion today regarding patient's POA and medical decision making capacity. Patient has no h/o dementia. Discussed with daughter who confirms this. Daughter reports she is POA and presented paperwork today but appears to be for financial and not medical decision making; looking into this further.   On my assessment, however, she is alert and oriented and able to continue to make her own decisions at this time. However, should she continue to worsen and require intubation in the future where she could no longer communicate her wishes we want to ensure we have the proper paperwork on file.     8/31  Patient requires 4 liters of O2 this morning.  Was on BiPap.  Seems slightly improved.  No more hemoptosis.  Getting Remdesivir, Levaquin, Decadron.  Eliquis still on hold.  Dr Herbert following.  She seems to be asking questions and aware of what is going on.    9/1 pt currently on 5L NC, also using BiPAP intermittently. Continues with hemoptysis, eliquis on hold although H&H improving. Remdesivir day  3. Levaquin day 4. Decadron day 7. She remains very anxious, discussed psych consult with patient and family and they agree. VSS. Afebrile.    9/2  Afternoon yesterday, patient was given depakote with improved agitation. Overnight though, she was once again agitated and was given a dose of remeron with hopes to help her appetite. She remains fatigued this morning. She has not been eating and has had increased stool output. She was given a dose of immodium yesterday without much improvement. 2 episodes of maroon sputum production but no further hemoptysis. She has remained afebrile. Sats are upper 90s on bipap and 5LNC intermittently. BP elevated.          Review of Systems   Constitutional:  Positive for appetite change and fatigue. Negative for activity change and fever.   HENT:  Positive for hearing loss (chronic). Negative for sinus pressure and sore throat.    Eyes:  Negative for discharge and visual disturbance.   Respiratory:  Positive for cough and shortness of breath.         + hemoptysis   Cardiovascular:  Negative for chest pain, palpitations and leg swelling.   Gastrointestinal:  Positive for diarrhea. Negative for abdominal pain, blood in stool and nausea.   Genitourinary:  Negative for dysuria and hematuria.   Musculoskeletal:  Negative for arthralgias and myalgias.   Neurological:  Positive for weakness (generalized).   Psychiatric/Behavioral:  Negative for confusion. The patient is nervous/anxious.    Objective:     Vital Signs (Most Recent):  Temp: 96.9 °F (36.1 °C) (09/02/22 0730)  Pulse: 76 (09/02/22 1100)  Resp: (!) 25 (09/02/22 1100)  BP: 138/63 (09/02/22 1100)  SpO2: 100 % (09/02/22 1100) Vital Signs (24h Range):  Temp:  [96.9 °F (36.1 °C)-97.5 °F (36.4 °C)] 96.9 °F (36.1 °C)  Pulse:  [] 76  Resp:  [21-56] 25  SpO2:  [68 %-100 %] 100 %  BP: (123-252)/() 138/63     Weight: 54.8 kg (120 lb 13 oz)  Body mass index is 22.1 kg/m².    Physical Exam  Vitals and nursing note reviewed.    Constitutional:       General: She is not in acute distress.     Comments: Hard of hearing   HENT:      Head: Normocephalic and atraumatic.      Right Ear: External ear normal.      Left Ear: External ear normal.      Mouth/Throat:      Mouth: Mucous membranes are moist.      Comments: Bipap in place  Eyes:      Extraocular Movements: Extraocular movements intact.      Conjunctiva/sclera: Conjunctivae normal.      Pupils: Pupils are equal, round, and reactive to light.   Cardiovascular:      Rate and Rhythm: Normal rate. Rhythm irregular.      Heart sounds: Normal heart sounds. No murmur heard.  Pulmonary:      Effort: Pulmonary effort is normal.      Breath sounds: No wheezing, rhonchi or rales.      Comments: Poor air movement.  Tachypneic on 5L NC  Abdominal:      General: Bowel sounds are normal. There is no distension.      Palpations: Abdomen is soft.      Tenderness: There is no abdominal tenderness.   Musculoskeletal:      Cervical back: Neck supple.      Right lower leg: No edema.      Left lower leg: No edema.   Skin:     Comments: Bruising left shoulder   Neurological:      General: No focal deficit present.      Mental Status: She is alert and oriented to person, place, and time.   Psychiatric:      Comments: Very anxious         CRANIAL NERVES     CN III, IV, VI   Pupils are equal, round, and reactive to light.     Significant Labs: .      BMP:   Recent Labs   Lab 09/02/22 0417   *   *   K 3.2*      CO2 33*   BUN 33*   CREATININE 0.8   CALCIUM 8.2*   MG 2.3       CBC:   Recent Labs   Lab 09/01/22  0616 09/02/22  0417   WBC 6.66 5.18   HGB 9.9* 9.2*   HCT 31.0* 28.5*    210       CMP:   Recent Labs   Lab 09/01/22  0616 09/02/22  0417   * 146*   K 3.9 3.2*    103   CO2 32* 33*   * 212*   BUN 34* 33*   CREATININE 0.8 0.8   CALCIUM 8.4* 8.2*   PROT 5.5* 4.9*   ALBUMIN 3.1* 2.8*   BILITOT 1.2* 1.1*   ALKPHOS 56 58   AST 21 18   ALT 41 33   ANIONGAP 13 10      8/27 mag 2.3   8/26 sed rate 22  LDH: 270  Latic 1.0  Procal: 0.08 (0.10)  ESR: 22  Ferritin: 852    Troponin 0.029  8/22 flu B positive   Covid positive   C diff negative   Blood cultures NGTD x 2     Significant Imaging:     CXR:   Chronic lung changes are seen.  Increasing hazy interstitial opacity throughout the left lung, pronounced in the left mid and lower lung zone which could reflect atelectasis, aspiration or possibly a developing pneumonia.  Probable small bilateral pleural effusions.  Calcified granulomas in the left lung.  Nodular density in the left mid lung which appears new as compared to the previous examination none.  This could possibly represent a nipple shadow.  Heart size is normal.  Calcified atheromatous disease affects the aorta.  Fortunately age-appropriate degenerative changes affect the skeleton.    8/27 CT chest    1. Interval development of a smooth pleural based soft tissue mass of the medial right lower lobe which is of uncertain etiology.  Neoplasia is not excludable.  Further evaluation with PET scan as deemed clinically necessary.  2. 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  3. Granulomatous change.    8/27 EKG Sinus rhythm with Premature supraventricular complexes  Nonspecific T wave abnormality  Abnormal ECG  When compared with ECG of 23-AUG-2022 09:32,  Premature supraventricular complexes are now Present  Confirmed by Wilmar MURRELL, El ([id number]) on 8/26/2022 6:00:40 PM      Assessment/Plan:      * COVID-19  Patient is identified as High risk for severe complications of COVID 19 based on COVID risk score of 6   Initiate standard COVID protocols; COVID-19 testing ,Infection Control notification  and isolation- respiratory, contact and droplet per protocol    Diagnostics: (leukopenia, hyponatremia, hyperferritinemia, elevated troponin, elevated d-dimer, age, and comorbidities are significant predictors of poor  clinical outcome)  CBC, CMP, Ferritin, CRP and Portable CXR    Management: Maintain oxygen saturations 92-96% via Nasal Cannula (S) 6 (increased while pt gets a bath) LPM and monitor with continuous/intermittent pulse oximetry. , Inhaled bronchodilators as needed for shortness of breath. and Continuous cardiac monitoring.    Will hold off on remdesivir at this point as she seems stable from resp standpoint; currently on home dose of 2L NC    8/27: pt remains stable on home Oxygen 2-3L NC. Monitor    8/30: worsening hypoxia  Cont steroids  Cont levaquin  Discussing with pharmacy based on [facility] protocol if any benefit for remdesivir at this point (s/p MAB outpatient) vs toci  Repeat imaging pending    8/29 pt remains on home O2 demands. Cont duonebs. No remedesivir had outpt infusion for covid and was on tamiflu outpt as well. Dr matson added levaquin dexamethasone day 4.    8/30 dexamethasone day 5- call pharmacy for consult- pt now with increased o2 demands     8/31 Day 6 decadron,  Remdesivir    9/1 day 7 decadron; day 3 remdesivir, day 4 levaquin    9/2 day 8 of decadron and day 4/5 remdesivir. Day 5 levaquin. Check procal. Will likely d/c levaquin pending cxr.    Pneumonia due to infectious organism  Starting levaquin 8/28 per dr matson  Cont duonebs and dexamethasone supplemental O2 .    Repeat CXR.    Hypokalemia  Resolved, monitor.      Right lower lobe lung mass  CT chest: Interval development of a smooth pleural based soft tissue mass of the medial right lower lobe which is of uncertain etiology.  Neoplasia is not excludable.  Further evaluation with PET scan as deemed clinically necessary  Dr. Matson consulted, does not feel she is a bronch candidate due to severe COPD and emphysematous lung changes. Would consider PET scan as outpt. Added  levaquin 8/29    Repeat CTA done 8/30.  No changes.  Severe emphasema.  Poor study.  PE not completely ruled out  Cont levaquin for now  Consider restarting Eliquis.   Will  discuss with Dr Matson      Cough with hemoptysis  Hold eliquis for now  Dr. Matson following added levaquin.  Repeat CTA done 8/30 - reviewed      Anxiety  Cont home lexapro and xanax>  Increased xanax to 0.5mg TID PRN 8/29/22    She is very anxious. She lives alone. Discussed with family, she does not want any assisted living or NH.     Consult psych      Lung mass  Nodular density on CXR which appears new, chect CT chest with contrast per radiology recs  Iodine allergy, will premedicate. She has had contrast studies in the past, last 2019 per records  Reviewed CT with new mass?? Infection?? Dr matson consulted. Added levaquin, holding eliquis- consider bronch if bleeding does not improve .  Cont levaquin; needs outpatient pulm follow-up    Influenza B  Diagnosed 8/22/22  She was on tamiflu outpatient, but this may be the cause of her diarrhea. Will DC for now        Chronic atrial fibrillation  Patient with atrial fibrillation which is controlled currently with Beta Blocker, Calcium Channel Blocker and flecainide. Patient is currently in sinus rhythm.[id number] Score: 4. HASBLED Score: Unable to calculate. Anticoagulation indicated. Anticoagulation done with eliquis.    8/28/22: H&H drop from 10.1/30.6 > 8.6/26.0; pt with hemoptysis. Will hold eliquis for now. Consult cardiology in AM. She is followed by Dr. Cox outpatient. Daughter with concerns about holding eliquis, but given her hemoptysis and H&H drop it is my opinion as well as that of Dr. Matson that the risks of continued anticoagulation outweigh the benefit currently.     8/29 dr Wilde consulted on patient this am. Cont to hold eliquis for now.    8/30 cont to hold eliquis as she is still having hemoptysis   Cont atenolol and flecainide   Cont telemetry  Monitor electrolytes    8/31- No more hemoptysis.  Consider restarting Eloquis since she is such a high risk for PE.    9/1: continued hemoptysis, but H&H improving. Consider restarting eliquis, Dr. Matson  recommends we cont to hold    9/2  In sinus. Holding eliquis because of hemoptysis    Chronic respiratory failure  On 2-3L NC at home  Now requiring 6L NC high flow   CTA negative for PE    9/1: now on 5L NC with O2 sat 90-92%; cont BiPAP as needed    Centrilobular emphysema  Per Dr. Matson- Severe changes on CT this puts her at high risk for bronch per pulmonology recs. Cont O2 added levauin and cont nebs .    CTA without PE.     Will check LDH level    Diarrhea  C diff negative; diarrhea improved  Suspect this is due to tamiflu; will DC as she seems stable from resp standpoint  S/p 1L NS in ER; DC NS .    She has known diarrhea at baseline. Now with slight hypokalemia. Replace. Okay to cont with immodium      Emphysema/COPD  On home oxygen 2-3L NC.;  Now with increased demands   Cont nebs, steroids, levaquin  Now on 5 liters NC/BIPAP.    Repeat CXR today.  May consider a dose of lasix as she surely has some degree of phtn and bp would tolerate this.  Recheck procal.      Hyperlipidemia  Cont home crestor.      HTN (hypertension)  Cont home atenolol and increased valsartan to BID  Some highs likely anxiety driven  She has prn clonidine ordered.    KATHIE (generalized anxiety disorder)  Cont home xanax, dose increased recently  Cont home lexapro 5mg daily  Consult psych, appreciate recs  Started on remeron and depakote. Will monitor. Do not want to oversedate        VTE Risk Mitigation (From admission, onward)         Ordered     Place sequential compression device  Until discontinued         08/29/22 0858                Discharge Planning   ROSAURA:      Code Status: Full Code   Is the patient medically ready for discharge?:     Reason for patient still in hospital (select all that apply): Patient unstable, Patient trending condition and Treatment  Discharge Plan A: Home with family, Home Health   Discharge Delays: None known at this time        Critical care time spent on the evaluation and treatment of severe organ  dysfunction, review of pertinent labs and imaging studies, discussions with consulting providers and discussions with patient/family: 45 minutes.      Pema Adams MD  Department of Hospital Medicine   [place] - Intensive Care

## 2022-09-02 NOTE — ASSESSMENT & PLAN NOTE
Cont home xanax, dose increased recently  Cont home lexapro 5mg daily  Consult psych, appreciate recs  Started on remeron and depakote. Will monitor. Do not want to oversedate

## 2022-09-02 NOTE — ASSESSMENT & PLAN NOTE
On home oxygen 2-3L NC.;  Now with increased demands   Cont nebs, steroids, levaquin  Now on 5 liters NC/BIPAP.    Repeat CXR today.  May consider a dose of lasix as she surely has some degree of phtn and bp would tolerate this.  Recheck procal.

## 2022-09-02 NOTE — ASSESSMENT & PLAN NOTE
Per Dr. Matson- Severe changes on CT this puts her at high risk for bronch per pulmonology recs. Cont O2 added levauin and cont nebs .    CTA without PE.     Will check LDH level

## 2022-09-02 NOTE — ASSESSMENT & PLAN NOTE
Patient is identified as High risk for severe complications of COVID 19 based on COVID risk score of 6   Initiate standard COVID protocols; COVID-19 testing ,Infection Control notification  and isolation- respiratory, contact and droplet per protocol    Diagnostics: (leukopenia, hyponatremia, hyperferritinemia, elevated troponin, elevated d-dimer, age, and comorbidities are significant predictors of poor clinical outcome)  CBC, CMP, Ferritin, CRP and Portable CXR    Management: Maintain oxygen saturations 92-96% via Nasal Cannula (S) 6 (increased while pt gets a bath) LPM and monitor with continuous/intermittent pulse oximetry. , Inhaled bronchodilators as needed for shortness of breath. and Continuous cardiac monitoring.    Will hold off on remdesivir at this point as she seems stable from resp standpoint; currently on home dose of 2L NC    8/27: pt remains stable on home Oxygen 2-3L NC. Monitor    8/30: worsening hypoxia  Cont steroids  Cont levaquin  Discussing with pharmacy based on [facility] protocol if any benefit for remdesivir at this point (s/p MAB outpatient) vs toci  Repeat imaging pending    8/29 pt remains on home O2 demands. Cont duonebs. No remedesivir had outpt infusion for covid and was on tamiflu outpt as well. Dr herbert added levaquin dexamethasone day 4.    8/30 dexamethasone day 5- call pharmacy for consult- pt now with increased o2 demands     8/31 Day 6 decadron,  Remdesivir    9/1 day 7 decadron; day 3 remdesivir, day 4 levaquin    9/2 day 8 of decadron and day 4/5 remdesivir. Day 5 levaquin. Check procal. Will likely d/c levaquin pending cxr.

## 2022-09-02 NOTE — NURSING
Patient started on PPN this shift due to inadequate calorie intake. Explained to patient that the IV Nutrition is not a substitute for nutrition and that the body processes nutrition best through the stomach and she has to eat. Patient nodded head yes.    Patient has eaten this shift  1 Boost pudding  1 doughnut bite  1 cheese it    Even with family encouraging patient and bringing favorite snacks from outside patient is still not eating.

## 2022-09-02 NOTE — SUBJECTIVE & OBJECTIVE
Interval History: stable on bipap 35%   98%    Objective:     Vital Signs (Most Recent):  Temp: 96.9 °F (36.1 °C) (09/02/22 0730)  Pulse: 76 (09/02/22 0800)  Resp: (!) 23 (09/02/22 0800)  BP: (!) 153/72 (09/02/22 0800)  SpO2: 98 % (09/02/22 0800)   Vital Signs (24h Range):  Temp:  [96.9 °F (36.1 °C)-97.5 °F (36.4 °C)] 96.9 °F (36.1 °C)  Pulse:  [66-94] 76  Resp:  [21-50] 23  SpO2:  [83 %-98 %] 98 %  BP: (123-221)/() 153/72     Weight: 54.8 kg (120 lb 13 oz)  Body mass index is 22.1 kg/m².      Intake/Output Summary (Last 24 hours) at 9/2/2022 1011  Last data filed at 9/2/2022 0600  Gross per 24 hour   Intake 903.33 ml   Output --   Net 903.33 ml       Physical Exam  Vitals and nursing note reviewed.   Constitutional:       General: She is not in acute distress.     Appearance: Normal appearance. She is well-developed. She is not ill-appearing, toxic-appearing or diaphoretic.   HENT:      Head: Normocephalic and atraumatic.      Jaw: No trismus.      Right Ear: Hearing, tympanic membrane, ear canal and external ear normal.      Left Ear: Hearing, tympanic membrane, ear canal and external ear normal.      Nose: Nose normal. No nasal deformity, mucosal edema or rhinorrhea.      Right Sinus: No maxillary sinus tenderness or frontal sinus tenderness.      Left Sinus: No maxillary sinus tenderness or frontal sinus tenderness.      Mouth/Throat:      Dentition: Normal dentition.      Pharynx: Uvula midline. No posterior oropharyngeal erythema or uvula swelling.   Eyes:      General: Lids are normal. No scleral icterus.     Conjunctiva/sclera: Conjunctivae normal.      Comments: Sclera clear bilat   Neck:      Trachea: Trachea and phonation normal.   Cardiovascular:      Rate and Rhythm: Normal rate and regular rhythm.      Pulses: Normal pulses.      Heart sounds: Normal heart sounds.   Pulmonary:      Effort: Accessory muscle usage, prolonged expiration and respiratory distress (mild to moderate) present.       Breath sounds: Decreased air movement present. Examination of the right-upper field reveals decreased breath sounds. Examination of the left-upper field reveals decreased breath sounds. Examination of the right-middle field reveals decreased breath sounds and rhonchi. Examination of the left-middle field reveals decreased breath sounds and rhonchi. Examination of the right-lower field reveals decreased breath sounds, wheezing and rhonchi. Examination of the left-lower field reveals decreased breath sounds, wheezing and rhonchi. Decreased breath sounds, wheezing and rhonchi present.   Abdominal:      General: Bowel sounds are normal. There is no distension.      Palpations: Abdomen is soft.      Tenderness: There is no abdominal tenderness.   Musculoskeletal:         General: No deformity. Normal range of motion.      Cervical back: Full passive range of motion without pain and neck supple.   Skin:     General: Skin is warm and dry.      Coloration: Skin is not pale.   Neurological:      Mental Status: She is alert and oriented to person, place, and time.      Motor: No abnormal muscle tone.      Coordination: Coordination normal.   Psychiatric:         Speech: Speech normal.         Behavior: Behavior normal. Behavior is cooperative.         Thought Content: Thought content normal.         Judgment: Judgment normal.       Vents:  Oxygen Concentration (%): 40 (09/02/22 0740)    Lines/Drains/Airways       Peripheral Intravenous Line  Duration                  Peripheral IV - Single Lumen 08/30/22 1516 20 G Right Hand 2 days                    Significant Labs:    CBC/Anemia Profile:  Recent Labs   Lab 09/01/22  0616 09/02/22 0417   WBC 6.66 5.18   HGB 9.9* 9.2*   HCT 31.0* 28.5*    210   MCV 92 92   RDW 13.4 13.7        Chemistries:  Recent Labs   Lab 09/01/22  0616 09/02/22  0417   * 146*   K 3.9 3.2*    103   CO2 32* 33*   BUN 34* 33*   CREATININE 0.8 0.8   CALCIUM 8.4* 8.2*   ALBUMIN 3.1* 2.8*    PROT 5.5* 4.9*   BILITOT 1.2* 1.1*   ALKPHOS 56 58   ALT 41 33   AST 21 18   MG  --  2.3   PHOS  --  3.5       All pertinent labs within the past 24 hours have been reviewed.    Significant Imaging:  I have reviewed all pertinent imaging results/findings within the past 24 hours.

## 2022-09-02 NOTE — ASSESSMENT & PLAN NOTE
Cont home atenolol and increased valsartan to BID  Some highs likely anxiety driven  She has prn clonidine ordered.

## 2022-09-02 NOTE — PLAN OF CARE
Problem: Adult Inpatient Plan of Care  Goal: Plan of Care Review  Outcome: Ongoing, Progressing  Goal: Optimal Comfort and Wellbeing  Outcome: Ongoing, Progressing     Problem: Infection  Goal: Absence of Infection Signs and Symptoms  Outcome: Ongoing, Progressing     Problem: Skin Injury Risk Increased  Goal: Skin Health and Integrity  Outcome: Ongoing, Progressing     Problem: Fall Injury Risk  Goal: Absence of Fall and Fall-Related Injury  Outcome: Ongoing, Progressing     Problem: Diarrhea  Goal: Fluid and Electrolyte Balance  Outcome: Ongoing, Progressing

## 2022-09-02 NOTE — EICU
Rounding (Video Assessment): Yes  Comments:video rounding completed. Patient is tachypneic with minimal movement. Patient when speaking noted to say one more and needs to breath deeply. Sa02 maintained during this time no less than 92%VS on cardiac monitor: P 82-SR, Sa02 93% on N/C unable to see amount per liter. NIBP: 192/88 (127). WCTM as needed.

## 2022-09-02 NOTE — PLAN OF CARE
The patient remained stable throughout the night. One time dose of remeron helped with sleep and tolerating bipap.  Patient was without complaints.  Will continue to monitor.

## 2022-09-02 NOTE — SUBJECTIVE & OBJECTIVE
Review of Systems   Constitutional:  Positive for appetite change and fatigue. Negative for activity change and fever.   HENT:  Positive for hearing loss (chronic). Negative for sinus pressure and sore throat.    Eyes:  Negative for discharge and visual disturbance.   Respiratory:  Positive for cough and shortness of breath.         + hemoptysis   Cardiovascular:  Negative for chest pain, palpitations and leg swelling.   Gastrointestinal:  Positive for diarrhea. Negative for abdominal pain, blood in stool and nausea.   Genitourinary:  Negative for dysuria and hematuria.   Musculoskeletal:  Negative for arthralgias and myalgias.   Neurological:  Positive for weakness (generalized).   Psychiatric/Behavioral:  Negative for confusion. The patient is nervous/anxious.    Objective:     Vital Signs (Most Recent):  Temp: 96.9 °F (36.1 °C) (09/02/22 0730)  Pulse: 76 (09/02/22 1100)  Resp: (!) 25 (09/02/22 1100)  BP: 138/63 (09/02/22 1100)  SpO2: 100 % (09/02/22 1100) Vital Signs (24h Range):  Temp:  [96.9 °F (36.1 °C)-97.5 °F (36.4 °C)] 96.9 °F (36.1 °C)  Pulse:  [] 76  Resp:  [21-56] 25  SpO2:  [68 %-100 %] 100 %  BP: (123-252)/() 138/63     Weight: 54.8 kg (120 lb 13 oz)  Body mass index is 22.1 kg/m².    Physical Exam  Vitals and nursing note reviewed.   Constitutional:       General: She is not in acute distress.     Comments: Hard of hearing   HENT:      Head: Normocephalic and atraumatic.      Right Ear: External ear normal.      Left Ear: External ear normal.      Mouth/Throat:      Mouth: Mucous membranes are moist.      Comments: Bipap in place  Eyes:      Extraocular Movements: Extraocular movements intact.      Conjunctiva/sclera: Conjunctivae normal.      Pupils: Pupils are equal, round, and reactive to light.   Cardiovascular:      Rate and Rhythm: Normal rate. Rhythm irregular.      Heart sounds: Normal heart sounds. No murmur heard.  Pulmonary:      Effort: Pulmonary effort is normal.       Breath sounds: No wheezing, rhonchi or rales.      Comments: Poor air movement.  Tachypneic on 5L NC  Abdominal:      General: Bowel sounds are normal. There is no distension.      Palpations: Abdomen is soft.      Tenderness: There is no abdominal tenderness.   Musculoskeletal:      Cervical back: Neck supple.      Right lower leg: No edema.      Left lower leg: No edema.   Skin:     Comments: Bruising left shoulder   Neurological:      General: No focal deficit present.      Mental Status: She is alert and oriented to person, place, and time.   Psychiatric:      Comments: Very anxious         CRANIAL NERVES     CN III, IV, VI   Pupils are equal, round, and reactive to light.     Significant Labs: .      BMP:   Recent Labs   Lab 09/02/22  0417   *   *   K 3.2*      CO2 33*   BUN 33*   CREATININE 0.8   CALCIUM 8.2*   MG 2.3       CBC:   Recent Labs   Lab 09/01/22  0616 09/02/22  0417   WBC 6.66 5.18   HGB 9.9* 9.2*   HCT 31.0* 28.5*    210       CMP:   Recent Labs   Lab 09/01/22  0616 09/02/22  0417   * 146*   K 3.9 3.2*    103   CO2 32* 33*   * 212*   BUN 34* 33*   CREATININE 0.8 0.8   CALCIUM 8.4* 8.2*   PROT 5.5* 4.9*   ALBUMIN 3.1* 2.8*   BILITOT 1.2* 1.1*   ALKPHOS 56 58   AST 21 18   ALT 41 33   ANIONGAP 13 10     8/27 mag 2.3   8/26 sed rate 22  LDH: 270  Latic 1.0  Procal: 0.08 (0.10)  ESR: 22  Ferritin: 852    Troponin 0.029  8/22 flu B positive   Covid positive   C diff negative   Blood cultures NGTD x 2     Significant Imaging:     CXR:   Chronic lung changes are seen.  Increasing hazy interstitial opacity throughout the left lung, pronounced in the left mid and lower lung zone which could reflect atelectasis, aspiration or possibly a developing pneumonia.  Probable small bilateral pleural effusions.  Calcified granulomas in the left lung.  Nodular density in the left mid lung which appears new as compared to the previous examination none.  This could  possibly represent a nipple shadow.  Heart size is normal.  Calcified atheromatous disease affects the aorta.  Fortunately age-appropriate degenerative changes affect the skeleton.    8/27 CT chest    1. Interval development of a smooth pleural based soft tissue mass of the medial right lower lobe which is of uncertain etiology.  Neoplasia is not excludable.  Further evaluation with PET scan as deemed clinically necessary.  2. 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  3. Granulomatous change.    8/27 EKG Sinus rhythm with Premature supraventricular complexes  Nonspecific T wave abnormality  Abnormal ECG  When compared with ECG of 23-AUG-2022 09:32,  Premature supraventricular complexes are now Present  Confirmed by El Urban MD ([id number]) on 8/26/2022 6:00:40 PM

## 2022-09-02 NOTE — EICU
Rounding (Video Assessment):  Yes at 19:02    Intervention Initiated From:  [facility] / EICU      Comments: pt resting quietly in bed. NAD observed. VSS. No drips infusing.

## 2022-09-03 LAB
ANION GAP SERPL CALC-SCNC: 9 MMOL/L (ref 8–16)
BASOPHILS # BLD AUTO: ABNORMAL K/UL (ref 0–0.2)
BASOPHILS NFR BLD: 0 % (ref 0–1.9)
BUN SERPL-MCNC: 32 MG/DL (ref 8–23)
CALCIUM SERPL-MCNC: 8.4 MG/DL (ref 8.7–10.5)
CHLORIDE SERPL-SCNC: 100 MMOL/L (ref 95–110)
CO2 SERPL-SCNC: 33 MMOL/L (ref 23–29)
CREAT SERPL-MCNC: 0.7 MG/DL (ref 0.5–1.4)
DIFFERENTIAL METHOD: ABNORMAL
EOSINOPHIL # BLD AUTO: ABNORMAL K/UL (ref 0–0.5)
EOSINOPHIL NFR BLD: 0 % (ref 0–8)
ERYTHROCYTE [DISTWIDTH] IN BLOOD BY AUTOMATED COUNT: 13.5 % (ref 11.5–14.5)
EST. GFR  (NO RACE VARIABLE): >60 ML/MIN/1.73 M^2
GLUCOSE SERPL-MCNC: 245 MG/DL (ref 70–110)
HCT VFR BLD AUTO: 33.5 % (ref 37–48.5)
HGB BLD-MCNC: 10.7 G/DL (ref 12–16)
IMM GRANULOCYTES # BLD AUTO: ABNORMAL K/UL (ref 0–0.04)
IMM GRANULOCYTES NFR BLD AUTO: ABNORMAL % (ref 0–0.5)
LYMPHOCYTES # BLD AUTO: ABNORMAL K/UL (ref 1–4.8)
LYMPHOCYTES NFR BLD: 7 % (ref 18–48)
MCH RBC QN AUTO: 30 PG (ref 27–31)
MCHC RBC AUTO-ENTMCNC: 31.9 G/DL (ref 32–36)
MCV RBC AUTO: 94 FL (ref 82–98)
MONOCYTES # BLD AUTO: ABNORMAL K/UL (ref 0.3–1)
MONOCYTES NFR BLD: 6 % (ref 4–15)
MYELOCYTES NFR BLD MANUAL: 3 %
NEUTROPHILS NFR BLD: 82 % (ref 38–73)
NEUTS BAND NFR BLD MANUAL: 2 %
NRBC BLD-RTO: 1 /100 WBC
PLATELET # BLD AUTO: 264 K/UL (ref 150–450)
PMV BLD AUTO: 10.1 FL (ref 9.2–12.9)
POTASSIUM SERPL-SCNC: 4.3 MMOL/L (ref 3.5–5.1)
RBC # BLD AUTO: 3.57 M/UL (ref 4–5.4)
SODIUM SERPL-SCNC: 142 MMOL/L (ref 136–145)
WBC # BLD AUTO: 15.97 K/UL (ref 3.9–12.7)

## 2022-09-03 PROCEDURE — 63600175 PHARM REV CODE 636 W HCPCS: Performed by: INTERNAL MEDICINE

## 2022-09-03 PROCEDURE — 25000003 PHARM REV CODE 250: Performed by: NURSE PRACTITIONER

## 2022-09-03 PROCEDURE — 99900035 HC TECH TIME PER 15 MIN (STAT)

## 2022-09-03 PROCEDURE — 25000003 PHARM REV CODE 250: Performed by: INTERNAL MEDICINE

## 2022-09-03 PROCEDURE — 63600175 PHARM REV CODE 636 W HCPCS: Performed by: STUDENT IN AN ORGANIZED HEALTH CARE EDUCATION/TRAINING PROGRAM

## 2022-09-03 PROCEDURE — 25000242 PHARM REV CODE 250 ALT 637 W/ HCPCS: Performed by: FAMILY MEDICINE

## 2022-09-03 PROCEDURE — 99233 SBSQ HOSP IP/OBS HIGH 50: CPT | Mod: ,,, | Performed by: FAMILY MEDICINE

## 2022-09-03 PROCEDURE — 85007 BL SMEAR W/DIFF WBC COUNT: CPT | Performed by: FAMILY MEDICINE

## 2022-09-03 PROCEDURE — 20000000 HC ICU ROOM

## 2022-09-03 PROCEDURE — 80048 BASIC METABOLIC PNL TOTAL CA: CPT | Performed by: FAMILY MEDICINE

## 2022-09-03 PROCEDURE — 94640 AIRWAY INHALATION TREATMENT: CPT

## 2022-09-03 PROCEDURE — 36415 COLL VENOUS BLD VENIPUNCTURE: CPT | Performed by: FAMILY MEDICINE

## 2022-09-03 PROCEDURE — 94667 MNPJ CHEST WALL 1ST: CPT

## 2022-09-03 PROCEDURE — 25000003 PHARM REV CODE 250: Performed by: STUDENT IN AN ORGANIZED HEALTH CARE EDUCATION/TRAINING PROGRAM

## 2022-09-03 PROCEDURE — 94761 N-INVAS EAR/PLS OXIMETRY MLT: CPT

## 2022-09-03 PROCEDURE — 25000003 PHARM REV CODE 250: Performed by: FAMILY MEDICINE

## 2022-09-03 PROCEDURE — 85027 COMPLETE CBC AUTOMATED: CPT | Performed by: FAMILY MEDICINE

## 2022-09-03 PROCEDURE — 94660 CPAP INITIATION&MGMT: CPT

## 2022-09-03 PROCEDURE — 25000242 PHARM REV CODE 250 ALT 637 W/ HCPCS: Performed by: INTERNAL MEDICINE

## 2022-09-03 PROCEDURE — 27000221 HC OXYGEN, UP TO 24 HOURS

## 2022-09-03 PROCEDURE — 27000207 HC ISOLATION

## 2022-09-03 PROCEDURE — 99233 PR SUBSEQUENT HOSPITAL CARE,LEVL III: ICD-10-PCS | Mod: ,,, | Performed by: FAMILY MEDICINE

## 2022-09-03 RX ORDER — ACETYLCYSTEINE 200 MG/ML
2 SOLUTION ORAL; RESPIRATORY (INHALATION) 2 TIMES DAILY
Status: DISCONTINUED | OUTPATIENT
Start: 2022-09-03 | End: 2022-09-09 | Stop reason: HOSPADM

## 2022-09-03 RX ORDER — DEXAMETHASONE SODIUM PHOSPHATE 4 MG/ML
2 INJECTION, SOLUTION INTRA-ARTICULAR; INTRALESIONAL; INTRAMUSCULAR; INTRAVENOUS; SOFT TISSUE EVERY 24 HOURS
Status: DISCONTINUED | OUTPATIENT
Start: 2022-09-04 | End: 2022-09-04

## 2022-09-03 RX ORDER — GUAIFENESIN 600 MG/1
600 TABLET, EXTENDED RELEASE ORAL 2 TIMES DAILY
Status: DISCONTINUED | OUTPATIENT
Start: 2022-09-03 | End: 2022-09-04

## 2022-09-03 RX ADMIN — IPRATROPIUM BROMIDE AND ALBUTEROL SULFATE 3 ML: .5; 3 SOLUTION RESPIRATORY (INHALATION) at 07:09

## 2022-09-03 RX ADMIN — FLECAINIDE ACETATE 50 MG: 50 TABLET ORAL at 08:09

## 2022-09-03 RX ADMIN — ACETYLCYSTEINE 2 ML: 200 INHALANT RESPIRATORY (INHALATION) at 07:09

## 2022-09-03 RX ADMIN — DILTIAZEM HYDROCHLORIDE 60 MG: 60 TABLET, FILM COATED ORAL at 08:09

## 2022-09-03 RX ADMIN — GUAIFENESIN 600 MG: 600 TABLET, EXTENDED RELEASE ORAL at 08:09

## 2022-09-03 RX ADMIN — VALSARTAN 160 MG: 80 TABLET, FILM COATED ORAL at 08:09

## 2022-09-03 RX ADMIN — CLONIDINE HYDROCHLORIDE 0.1 MG: 0.1 TABLET ORAL at 08:09

## 2022-09-03 RX ADMIN — IPRATROPIUM BROMIDE AND ALBUTEROL SULFATE 3 ML: .5; 3 SOLUTION RESPIRATORY (INHALATION) at 12:09

## 2022-09-03 RX ADMIN — ESCITALOPRAM OXALATE 5 MG: 5 TABLET, FILM COATED ORAL at 08:09

## 2022-09-03 RX ADMIN — REMDESIVIR 100 MG: 100 INJECTION, POWDER, LYOPHILIZED, FOR SOLUTION INTRAVENOUS at 09:09

## 2022-09-03 RX ADMIN — LOPERAMIDE HYDROCHLORIDE 2 MG: 2 CAPSULE ORAL at 07:09

## 2022-09-03 RX ADMIN — OXYCODONE HYDROCHLORIDE AND ACETAMINOPHEN 500 MG: 500 TABLET ORAL at 08:09

## 2022-09-03 RX ADMIN — LEUCINE, PHENYLALANINE, LYSINE, METHIONINE, ISOLEUCINE, VALINE, HISTIDINE, THREONINE, TRYPTOPHAN, ALANINE, GLYCINE, ARGININE, PROLINE, SERINE, TYROSINE, SODIUM ACETATE, DIBASIC POTASSIUM PHOSPHATE, MAGNESIUM CHLORIDE, SODIUM CHLORIDE, CALCIUM CHLORIDE, DEXTROSE
311; 238; 247; 170; 255; 247; 204; 179; 77; 880; 438; 489; 289; 213; 17; 297; 261; 51; 77; 33; 5 INJECTION INTRAVENOUS at 04:09

## 2022-09-03 RX ADMIN — ATORVASTATIN CALCIUM 10 MG: 10 TABLET, FILM COATED ORAL at 08:09

## 2022-09-03 RX ADMIN — DEXAMETHASONE SODIUM PHOSPHATE 6 MG: 4 INJECTION, SOLUTION INTRA-ARTICULAR; INTRALESIONAL; INTRAMUSCULAR; INTRAVENOUS; SOFT TISSUE at 08:09

## 2022-09-03 RX ADMIN — IPRATROPIUM BROMIDE AND ALBUTEROL SULFATE 3 ML: .5; 3 SOLUTION RESPIRATORY (INHALATION) at 01:09

## 2022-09-03 RX ADMIN — ALPRAZOLAM 0.5 MG: 0.5 TABLET ORAL at 08:09

## 2022-09-03 RX ADMIN — MUPIROCIN: 20 OINTMENT TOPICAL at 08:09

## 2022-09-03 RX ADMIN — MIRTAZAPINE 7.5 MG: 7.5 TABLET ORAL at 08:09

## 2022-09-03 RX ADMIN — ACETYLCYSTEINE 2 ML: 200 INHALANT RESPIRATORY (INHALATION) at 01:09

## 2022-09-03 RX ADMIN — ATENOLOL 12.5 MG: 25 TABLET ORAL at 08:09

## 2022-09-03 RX ADMIN — MONTELUKAST 10 MG: 10 TABLET, FILM COATED ORAL at 08:09

## 2022-09-03 RX ADMIN — THIAMINE HCL TAB 100 MG 100 MG: 100 TAB at 08:09

## 2022-09-03 NOTE — PROGRESS NOTES
[facility] Medicine  Progress Note    Patient Name: Marva Perez  MRN: [id number]  Patient Class: IP- Inpatient   Admission Date: 8/26/2022  Length of Stay: 8 days  Attending Physician: Stewart Lucia MD  Primary Care Provider: Kevin Clements MD        Subjective:     Principal Problem:COVID-19        HPI:  81yF with HTN, HLD, GERD, Depression, anxiety, aFib on eliquis, COPD/Emphysema, and chronic hypoxia on 2-3L home oxygen via NC presented to ER with chief complaint of diarrhea and generalized weakness. Pt reports she started with congestion, fatigue, and cough and 5-6 days ago. She was seen in ER 8/22/22 and diagnosed with COVID and flu B. She was started on tamiflu and referred for outpatient antibody infusion. Pt states she started having nausea, vomiting, and diarrhea as well as intermittent fever. She returned to ER 8/23/22 for the new onset GI symptoms. Workup was reassuring and she was discharged home with antiemetics. She received bebtelovimab infusion 8/24/22. She continued with poor appetite and diarrhea and called her PCP who recommended ER evaluation. She denies CP, abd pain, fever/chills, dysuria, hematuria, melena, BRBPR.       Overview/Hospital Course:  8/27 Pt remained stable on 2-3 L NC overnight which is her home dose. VSS, afebrile. She is very anxious. Diarrhea improved, she did have a loose stool this morning sent for c diff testing. CT chest planned for this morning to evaluate her new left nodular denisity on CXR.     8/28 Pt with hemoptysis overnight. Eliquis held. VSS, afebrile. She remains very anxious. C diff negative. Diarrhea improved. CT chest with COPD and severe chronic emphysematous changes, no infiltrate. There is a new soft tissue mass of the right lung base. Dr. Matson whom she follows with outpatient has been consulted. Discussed with daughter, she is concerned about patient going back home alone at discharge but she also states pt does not  want NH. Advised we could order home health at [place], but that is limited visit per week. Daughter states they are looking into sitters at home.    8/29/22  Marva Perez is a 81 y.o. female  has eliquis held-still coughing up blood- dr herbert and Dr Wilde following. She did test positive for both flu and covid.     8/30/22 pt is a 80yo COPD/emphysema on chronic O2 at home 2-3 liters. Dx with covid and flu B 8/22- treated outpt with tamiflu and IV covid infusion. She came to ER original c/o weakness and diarrhea. Tyamilfu d/kathryn. She was not requiring more than her routine O2. She progressed to hemoptysis on eliquis for a fib- this was held. CT chest shows lung mass. Dr Herbert consulted.  Dr herbert considering bronch but she would not be a good candidate- high risk for pneumo- started levaquin yesterday,. Today she is requiring more O2 now on 6L high flow. RR 26. Very anxious. Still coughing up bloody sputum.     There has been some confusion today regarding patient's POA and medical decision making capacity. Patient has no h/o dementia. Discussed with daughter who confirms this. Daughter reports she is POA and presented paperwork today but appears to be for financial and not medical decision making; looking into this further.   On my assessment, however, she is alert and oriented and able to continue to make her own decisions at this time. However, should she continue to worsen and require intubation in the future where she could no longer communicate her wishes we want to ensure we have the proper paperwork on file.     8/31  Patient requires 4 liters of O2 this morning.  Was on BiPap.  Seems slightly improved.  No more hemoptosis.  Getting Remdesivir, Levaquin, Decadron.  Eliquis still on hold.  Dr Herbert following.  She seems to be asking questions and aware of what is going on.    9/1 pt currently on 5L NC, also using BiPAP intermittently. Continues with hemoptysis, eliquis on hold although H&H improving. Remdesivir day  3. Levaquin day 4. Decadron day 7. She remains very anxious, discussed psych consult with patient and family and they agree. VSS. Afebrile.    9/2  Afternoon yesterday, patient was given depakote with improved agitation. Overnight though, she was once again agitated and was given a dose of remeron with hopes to help her appetite. She remains fatigued this morning. She has not been eating and has had increased stool output. She was given a dose of immodium yesterday without much improvement. 2 episodes of maroon sputum production but no further hemoptysis. She has remained afebrile. Sats are upper 90s on bipap and 5LNC intermittently. BP elevated.    9/3  Decreased oral intake yesterday. PPN started. Sats 94% on 5L NC. Urine output okay. Given single dose of lasix without much improvement in resp status. This morning she is more sleepy and has had improvement in her resp drive but still no appetite.          Review of Systems   Constitutional:  Positive for appetite change and fatigue. Negative for activity change and fever.   HENT:  Positive for hearing loss (chronic). Negative for sinus pressure and sore throat.    Eyes:  Negative for discharge and visual disturbance.   Respiratory:  Positive for cough and shortness of breath.         + hemoptysis   Cardiovascular:  Negative for chest pain, palpitations and leg swelling.   Gastrointestinal:  Negative for abdominal pain, blood in stool, diarrhea and nausea.   Genitourinary:  Negative for dysuria and hematuria.   Musculoskeletal:  Negative for arthralgias and myalgias.   Neurological:  Positive for weakness (generalized).   Psychiatric/Behavioral:  Negative for confusion. The patient is nervous/anxious.    Objective:     Vital Signs (Most Recent):  Temp: 96.9 °F (36.1 °C) (09/03/22 0714)  Pulse: 80 (09/03/22 0900)  Resp: (!) 29 (09/03/22 0900)  BP: (!) 170/79 (09/03/22 0900)  SpO2: (!) 93 % (09/03/22 0900) Vital Signs (24h Range):  Temp:  [96.7 °F (35.9 °C)-98.6 °F  (37 °C)] 96.9 °F (36.1 °C)  Pulse:  [] 80  Resp:  [19-56] 29  SpO2:  [68 %-100 %] 93 %  BP: (120-252)/() 170/79     Weight: 54.8 kg (120 lb 13 oz)  Body mass index is 22.1 kg/m².    Physical Exam  Vitals and nursing note reviewed.   Constitutional:       General: She is not in acute distress.     Comments: Hard of hearing   HENT:      Head: Normocephalic and atraumatic.      Right Ear: External ear normal.      Left Ear: External ear normal.      Mouth/Throat:      Mouth: Mucous membranes are moist.      Comments: Bipap in place  Eyes:      Extraocular Movements: Extraocular movements intact.      Conjunctiva/sclera: Conjunctivae normal.      Pupils: Pupils are equal, round, and reactive to light.   Cardiovascular:      Rate and Rhythm: Normal rate and regular rhythm.      Heart sounds: Normal heart sounds. No murmur heard.  Pulmonary:      Effort: Pulmonary effort is normal.      Breath sounds: No wheezing, rhonchi or rales.      Comments: 5L NC in place.  Increase UAN and copious sputum with weak cough.  Abdominal:      General: Bowel sounds are normal. There is no distension.      Palpations: Abdomen is soft.      Tenderness: There is no abdominal tenderness.   Musculoskeletal:      Cervical back: Neck supple.      Right lower leg: No edema.      Left lower leg: No edema.   Skin:     Comments: Bruising left shoulder   Neurological:      General: No focal deficit present.      Mental Status: She is alert and oriented to person, place, and time.   Psychiatric:      Comments: Very anxious         CRANIAL NERVES     CN III, IV, VI   Pupils are equal, round, and reactive to light.     Significant Labs: .      BMP:   Recent Labs   Lab 09/02/22 0417 09/03/22  0903   * 245*   * 142   K 3.2* 4.3    100   CO2 33* 33*   BUN 33* 32*   CREATININE 0.8 0.7   CALCIUM 8.2* 8.4*   MG 2.3  --      CBC:   Recent Labs   Lab 09/02/22 0417   WBC 5.18   HGB 9.2*   HCT 28.5*        CMP:   Recent  Labs   Lab 09/02/22  0417 09/03/22  0903   * 142   K 3.2* 4.3    100   CO2 33* 33*   * 245*   BUN 33* 32*   CREATININE 0.8 0.7   CALCIUM 8.2* 8.4*   PROT 4.9*  --    ALBUMIN 2.8*  --    BILITOT 1.1*  --    ALKPHOS 58  --    AST 18  --    ALT 33  --    ANIONGAP 10 9   8/27 mag 2.3   8/26 sed rate 22  LDH: 270  Latic 1.0  Procal: 0.08 (0.10)  ESR: 22  Ferritin: 852    Troponin 0.029  8/22 flu B positive   Covid positive   C diff negative   Blood cultures NGTD x 2     Significant Imaging:     CXR:   Chronic lung changes are seen.  Increasing hazy interstitial opacity throughout the left lung, pronounced in the left mid and lower lung zone which could reflect atelectasis, aspiration or possibly a developing pneumonia.  Probable small bilateral pleural effusions.  Calcified granulomas in the left lung.  Nodular density in the left mid lung which appears new as compared to the previous examination none.  This could possibly represent a nipple shadow.  Heart size is normal.  Calcified atheromatous disease affects the aorta.  Fortunately age-appropriate degenerative changes affect the skeleton.    8/27 CT chest    1. Interval development of a smooth pleural based soft tissue mass of the medial right lower lobe which is of uncertain etiology.  Neoplasia is not excludable.  Further evaluation with PET scan as deemed clinically necessary.  2. 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  3. Granulomatous change.    8/27 EKG Sinus rhythm with Premature supraventricular complexes  Nonspecific T wave abnormality  Abnormal ECG  When compared with ECG of 23-AUG-2022 09:32,  Premature supraventricular complexes are now Present  Confirmed by Wilmar MURRELL, El ([id number]) on 8/26/2022 6:00:40 PM      Assessment/Plan:      * COVID-19  Patient is identified as High risk for severe complications of COVID 19 based on COVID risk score of 6   Initiate standard COVID  protocols; COVID-19 testing ,Infection Control notification  and isolation- respiratory, contact and droplet per protocol    Diagnostics: (leukopenia, hyponatremia, hyperferritinemia, elevated troponin, elevated d-dimer, age, and comorbidities are significant predictors of poor clinical outcome)  CBC, CMP, Ferritin, CRP and Portable CXR    Management: Maintain oxygen saturations 92-96% via Nasal Cannula 4 LPM and monitor with continuous/intermittent pulse oximetry. , Inhaled bronchodilators as needed for shortness of breath. and Continuous cardiac monitoring.    Will hold off on remdesivir at this point as she seems stable from resp standpoint; currently on home dose of 2L NC    8/27: pt remains stable on home Oxygen 2-3L NC. Monitor    8/30: worsening hypoxia  Cont steroids  Cont levaquin  Discussing with pharmacy based on [facility] protocol if any benefit for remdesivir at this point (s/p MAB outpatient) vs toci  Repeat imaging pending    8/29 pt remains on home O2 demands. Cont duonebs. No remedesivir had outpt infusion for covid and was on tamiflu outpt as well. Dr herbert added levaquin dexamethasone day 4.    8/30 dexamethasone day 5- call pharmacy for consult- pt now with increased o2 demands     8/31 Day 6 decadron,  Remdesivir    9/1 day 7 decadron; day 3 remdesivir, day 4 levaquin    9/2 day 8 of decadron and day 4/5 remdesivir. Day 5 levaquin. Check procal. Will likely d/c levaquin pending cxr.    9/3  Day 5 of remdesivir day 9 of decadron. Stop remdesivir after today. D/c'd levaquin yeserday. procal remains normal. Afebrile normal wbc.    Pneumonia due to infectious organism  Starting levaquin 8/28 per dr herbert  Cont duonebs and dexamethasone supplemental O2 .    Repeat CXR.    CXR without infiltrate.  D/c abx.    Hypokalemia  Resolved, monitor.      Right lower lobe lung mass  CT chest: Interval development of a smooth pleural based soft tissue mass of the medial right lower lobe which is of uncertain  etiology.  Neoplasia is not excludable.  Further evaluation with PET scan as deemed clinically necessary  Dr. Matson consulted, does not feel she is a bronch candidate due to severe COPD and emphysematous lung changes. Would consider PET scan as outpt. Added  levaquin 8/29    Repeat CTA done 8/30.  No changes.  Severe emphasema.  Poor study.  PE not completely ruled out  Cont levaquin for now  Consider restarting Eliquis.   Will discuss with Dr Matson      Cough with hemoptysis  Hold eliquis for now  Dr. Matson following added levaquin.  Repeat CTA done 8/30 - reviewed      Anxiety  Cont home lexapro and xanax>  Increased xanax to 0.5mg TID PRN 8/29/22    She is very anxious. She lives alone. Discussed with family, she does not want any assisted living or NH.     Consult psych      Lung mass  Nodular density on CXR which appears new, chect CT chest with contrast per radiology recs  Iodine allergy, will premedicate. She has had contrast studies in the past, last 2019 per records  Reviewed CT with new mass?? Infection?? Dr matson consulted. Added levaquin, holding eliquis- consider bronch if bleeding does not improve .  Cont levaquin; needs outpatient pulm follow-up    Influenza B  Diagnosed 8/22/22  She was on tamiflu outpatient, but this may be the cause of her diarrhea. Will DC for now        Chronic atrial fibrillation  Patient with atrial fibrillation which is controlled currently with Beta Blocker, Calcium Channel Blocker and flecainide. Patient is currently in sinus rhythm.[id number] Score: 4. HASBLED Score: Unable to calculate. Anticoagulation indicated. Anticoagulation done with eliquis.    8/28/22: H&H drop from 10.1/30.6 > 8.6/26.0; pt with hemoptysis. Will hold eliquis for now. Consult cardiology in AM. She is followed by Dr. Cox outpatient. Daughter with concerns about holding eliquis, but given her hemoptysis and H&H drop it is my opinion as well as that of Dr. Matson that the risks of continued anticoagulation  outweigh the benefit currently.     8/29 dr Wilde consulted on patient this am. Cont to hold eliquis for now.    8/30 cont to hold eliquis as she is still having hemoptysis   Cont atenolol and flecainide   Cont telemetry  Monitor electrolytes    8/31- No more hemoptysis.  Consider restarting Eloquis since she is such a high risk for PE.    9/1: continued hemoptysis, but H&H improving. Consider restarting eliquis, Dr. Matson recommends we cont to hold    9/2  In sinus. Holding eliquis because of hemoptysis    Chronic respiratory failure  On 2-3L NC at home  Now requiring 6L NC high flow   CTA negative for PE    9/1: now on 5L NC with O2 sat 90-92%; cont BiPAP as needed    Centrilobular emphysema  Per Dr. Matson- Severe changes on CT this puts her at high risk for bronch per pulmonology recs. Cont O2 added levauin and cont nebs .    CTA without PE.     Will check LDH level    Diarrhea  C diff negative; diarrhea improved  Suspect this is due to tamiflu; will DC as she seems stable from resp standpoint  S/p 1L NS in ER; DC NS .    She has known diarrhea at baseline. Now with slight hypokalemia. Replace. Okay to cont with immodium    Improved.    Emphysema/COPD  On home oxygen 2-3L NC.;  Now with increased demands   Cont nebs, steroids, levaquin  Now on 5 liters NC/BIPAP.    Repeat CXR today.  May consider a dose of lasix as she surely has some degree of phtn and bp would tolerate this.  Recheck procal.    9/3  No consolidation on CXR. She has known end stage lung disease. She was given a dose of lasix - not much diuresis or improvement in resp status. Copious sputum noted. Increase pulm toileting and monitor for hemop.      Hyperlipidemia  Cont home crestor.      HTN (hypertension)  Cont home atenolol and increased valsartan to BID  Some highs likely anxiety driven  She has prn clonidine ordered.    BP has remained high/stable    KATHIE (generalized anxiety disorder)  Cont home xanax, dose increased recently  Cont home lexapro  5mg daily  Consult psych, appreciate recs  Started on remeron and depakote. Will monitor. Do not want to oversedate    Cont on remeron and xanax. Not getting depakote.        VTE Risk Mitigation (From admission, onward)         Ordered     Place sequential compression device  Until discontinued         08/29/22 0847                Discharge Planning   ROSAURA:      Code Status: Full Code   Is the patient medically ready for discharge?:     Reason for patient still in hospital (select all that apply): Patient unstable, Patient trending condition, Laboratory test and Treatment  Discharge Plan A: Home with family, Home Health   Discharge Delays: None known at this time    Today's plan is to increase pulm toileting, increase movement/activity and work on nutrition.    Critical care time spent on the evaluation and treatment of severe organ dysfunction, review of pertinent labs and imaging studies, discussions with consulting providers and discussions with patient/family: 45 minutes.      Pema Adams MD  Department of Hospital Medicine   [facility] - Intensive Care

## 2022-09-03 NOTE — ASSESSMENT & PLAN NOTE
On home oxygen 2-3L NC.;  Now with increased demands   Cont nebs, steroids, levaquin  Now on 5 liters NC/BIPAP.    Repeat CXR today.  May consider a dose of lasix as she surely has some degree of phtn and bp would tolerate this.  Recheck procal.    9/3  No consolidation on CXR. She has known end stage lung disease. She was given a dose of lasix - not much diuresis or improvement in resp status. Copious sputum noted. Increase pulm toileting and monitor for hemop.

## 2022-09-03 NOTE — ASSESSMENT & PLAN NOTE
Patient with Hypoxic Respiratory failure which is Chronic.  she is on home oxygen at 3 LPM. Supplemental oxygen was provided and noted- Oxygen Concentration (%):  [35-40] 40.   Signs/symptoms of respiratory failure include- increased work of breathing. Contributing diagnoses includes - COPD Labs and images were reviewed. Patient Has recent ABG, which has been reviewed. Will treat underlying causes and adjust management of respiratory failure as follows- 3

## 2022-09-03 NOTE — EICU
Rounding (Video Assessment):  Yes    Comments: Video assessment complete. Pt lying in bed, appears asleep. Pt on NC, slight tachypnea noted, O2 sats 92-94%. VSS and NAD noted.

## 2022-09-03 NOTE — ASSESSMENT & PLAN NOTE
Starting levaquin 8/28 per dr king Mali garcia and dexamethasone supplemental O2 .    Repeat CXR.    CXR without infiltrate.  D/c abx.

## 2022-09-03 NOTE — SUBJECTIVE & OBJECTIVE
Review of Systems   Constitutional:  Positive for appetite change and fatigue. Negative for activity change and fever.   HENT:  Positive for hearing loss (chronic). Negative for sinus pressure and sore throat.    Eyes:  Negative for discharge and visual disturbance.   Respiratory:  Positive for cough and shortness of breath.         + hemoptysis   Cardiovascular:  Negative for chest pain, palpitations and leg swelling.   Gastrointestinal:  Negative for abdominal pain, blood in stool, diarrhea and nausea.   Genitourinary:  Negative for dysuria and hematuria.   Musculoskeletal:  Negative for arthralgias and myalgias.   Neurological:  Positive for weakness (generalized).   Psychiatric/Behavioral:  Negative for confusion. The patient is nervous/anxious.    Objective:     Vital Signs (Most Recent):  Temp: 96.9 °F (36.1 °C) (09/03/22 0714)  Pulse: 80 (09/03/22 0900)  Resp: (!) 29 (09/03/22 0900)  BP: (!) 170/79 (09/03/22 0900)  SpO2: (!) 93 % (09/03/22 0900) Vital Signs (24h Range):  Temp:  [96.7 °F (35.9 °C)-98.6 °F (37 °C)] 96.9 °F (36.1 °C)  Pulse:  [] 80  Resp:  [19-56] 29  SpO2:  [68 %-100 %] 93 %  BP: (120-252)/() 170/79     Weight: 54.8 kg (120 lb 13 oz)  Body mass index is 22.1 kg/m².    Physical Exam  Vitals and nursing note reviewed.   Constitutional:       General: She is not in acute distress.     Comments: Hard of hearing   HENT:      Head: Normocephalic and atraumatic.      Right Ear: External ear normal.      Left Ear: External ear normal.      Mouth/Throat:      Mouth: Mucous membranes are moist.      Comments: Bipap in place  Eyes:      Extraocular Movements: Extraocular movements intact.      Conjunctiva/sclera: Conjunctivae normal.      Pupils: Pupils are equal, round, and reactive to light.   Cardiovascular:      Rate and Rhythm: Normal rate and regular rhythm.      Heart sounds: Normal heart sounds. No murmur heard.  Pulmonary:      Effort: Pulmonary effort is normal.      Breath  sounds: No wheezing, rhonchi or rales.      Comments: 5L NC in place.  Increase UAN and copious sputum with weak cough.  Abdominal:      General: Bowel sounds are normal. There is no distension.      Palpations: Abdomen is soft.      Tenderness: There is no abdominal tenderness.   Musculoskeletal:      Cervical back: Neck supple.      Right lower leg: No edema.      Left lower leg: No edema.   Skin:     Comments: Bruising left shoulder   Neurological:      General: No focal deficit present.      Mental Status: She is alert and oriented to person, place, and time.   Psychiatric:      Comments: Very anxious         CRANIAL NERVES     CN III, IV, VI   Pupils are equal, round, and reactive to light.     Significant Labs: .      BMP:   Recent Labs   Lab 09/02/22 0417 09/03/22  0903   * 245*   * 142   K 3.2* 4.3    100   CO2 33* 33*   BUN 33* 32*   CREATININE 0.8 0.7   CALCIUM 8.2* 8.4*   MG 2.3  --      CBC:   Recent Labs   Lab 09/02/22 0417   WBC 5.18   HGB 9.2*   HCT 28.5*        CMP:   Recent Labs   Lab 09/02/22 0417 09/03/22  0903   * 142   K 3.2* 4.3    100   CO2 33* 33*   * 245*   BUN 33* 32*   CREATININE 0.8 0.7   CALCIUM 8.2* 8.4*   PROT 4.9*  --    ALBUMIN 2.8*  --    BILITOT 1.1*  --    ALKPHOS 58  --    AST 18  --    ALT 33  --    ANIONGAP 10 9   8/27 mag 2.3   8/26 sed rate 22  LDH: 270  Latic 1.0  Procal: 0.08 (0.10)  ESR: 22  Ferritin: 852    Troponin 0.029  8/22 flu B positive   Covid positive   C diff negative   Blood cultures NGTD x 2     Significant Imaging:     CXR:   Chronic lung changes are seen.  Increasing hazy interstitial opacity throughout the left lung, pronounced in the left mid and lower lung zone which could reflect atelectasis, aspiration or possibly a developing pneumonia.  Probable small bilateral pleural effusions.  Calcified granulomas in the left lung.  Nodular density in the left mid lung which appears new as compared to the  previous examination none.  This could possibly represent a nipple shadow.  Heart size is normal.  Calcified atheromatous disease affects the aorta.  Fortunately age-appropriate degenerative changes affect the skeleton.    8/27 CT chest    1. Interval development of a smooth pleural based soft tissue mass of the medial right lower lobe which is of uncertain etiology.  Neoplasia is not excludable.  Further evaluation with PET scan as deemed clinically necessary.  2. 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  3. Granulomatous change.    8/27 EKG Sinus rhythm with Premature supraventricular complexes  Nonspecific T wave abnormality  Abnormal ECG  When compared with ECG of 23-AUG-2022 09:32,  Premature supraventricular complexes are now Present  Confirmed by El Urban MD ([id number]) on 8/26/2022 6:00:40 PM

## 2022-09-03 NOTE — ASSESSMENT & PLAN NOTE
C diff negative; diarrhea improved  Suspect this is due to tamiflu; will DC as she seems stable from resp standpoint  S/p 1L NS in ER; DC NS .    She has known diarrhea at baseline. Now with slight hypokalemia. Replace. Okay to cont with immodium    Improved.

## 2022-09-03 NOTE — ASSESSMENT & PLAN NOTE
Cont home xanax, dose increased recently  Cont home lexapro 5mg daily  Consult psych, appreciate recs  Started on remeron and depakote. Will monitor. Do not want to oversedate    Cont on remeron and xanax. Not getting depakote.

## 2022-09-03 NOTE — SUBJECTIVE & OBJECTIVE
Interval History: On nc 88 to 90 sat    Objective:     Vital Signs (Most Recent):  Temp: 97.1 °F (36.2 °C) (09/03/22 1108)  Pulse: 79 (09/03/22 1322)  Resp: (!) 30 (09/03/22 1322)  BP: (!) 187/88 (09/03/22 1300)  SpO2: 97 % (09/03/22 1322)   Vital Signs (24h Range):  Temp:  [96.7 °F (35.9 °C)-98.6 °F (37 °C)] 97.1 °F (36.2 °C)  Pulse:  [56-88] 79  Resp:  [19-54] 30  SpO2:  [86 %-100 %] 97 %  BP: (120-242)/() 187/88     Weight: 54.8 kg (120 lb 13 oz)  Body mass index is 22.1 kg/m².      Intake/Output Summary (Last 24 hours) at 9/3/2022 1403  Last data filed at 9/3/2022 1300  Gross per 24 hour   Intake 2362.47 ml   Output --   Net 2362.47 ml       Physical Exam  Vitals and nursing note reviewed.   Constitutional:       General: She is not in acute distress.     Appearance: Normal appearance. She is well-developed. She is not ill-appearing, toxic-appearing or diaphoretic.   HENT:      Head: Normocephalic and atraumatic.      Jaw: No trismus.      Right Ear: Hearing, tympanic membrane, ear canal and external ear normal.      Left Ear: Hearing, tympanic membrane, ear canal and external ear normal.      Nose: Nose normal. No nasal deformity, mucosal edema or rhinorrhea.      Right Sinus: No maxillary sinus tenderness or frontal sinus tenderness.      Left Sinus: No maxillary sinus tenderness or frontal sinus tenderness.      Mouth/Throat:      Dentition: Normal dentition.      Pharynx: Uvula midline. No posterior oropharyngeal erythema or uvula swelling.   Eyes:      General: Lids are normal. No scleral icterus.     Conjunctiva/sclera: Conjunctivae normal.      Comments: Sclera clear bilat   Neck:      Trachea: Trachea and phonation normal.   Cardiovascular:      Rate and Rhythm: Normal rate and regular rhythm.      Pulses: Normal pulses.      Heart sounds: Normal heart sounds.   Pulmonary:      Effort: Prolonged expiration and respiratory distress (mild to moderate) present.      Breath sounds: Examination of the  right-middle field reveals decreased breath sounds. Examination of the left-middle field reveals decreased breath sounds. Examination of the right-lower field reveals decreased breath sounds, wheezing and rhonchi. Examination of the left-lower field reveals decreased breath sounds, wheezing and rhonchi. Decreased breath sounds, wheezing and rhonchi present.   Abdominal:      General: Bowel sounds are normal. There is no distension.      Palpations: Abdomen is soft.      Tenderness: There is no abdominal tenderness.   Musculoskeletal:         General: No deformity. Normal range of motion.      Cervical back: Full passive range of motion without pain and neck supple.   Skin:     General: Skin is warm and dry.      Coloration: Skin is not pale.   Neurological:      Mental Status: She is alert and oriented to person, place, and time.      Motor: No abnormal muscle tone.      Coordination: Coordination normal.   Psychiatric:         Speech: Speech normal.         Behavior: Behavior normal. Behavior is cooperative.         Thought Content: Thought content normal.         Judgment: Judgment normal.       Vents:  Oxygen Concentration (%): 40 (09/03/22 1105)    Lines/Drains/Airways       Peripheral Intravenous Line  Duration                  Peripheral IV - Single Lumen 08/30/22 1516 20 G Right Hand 3 days         Peripheral IV - Single Lumen 09/02/22 1155 20 G Anterior;Left;Proximal Forearm 1 day                    Significant Labs:    CBC/Anemia Profile:  Recent Labs   Lab 09/02/22  0417 09/03/22  0903   WBC 5.18 15.97*   HGB 9.2* 10.7*   HCT 28.5* 33.5*    264   MCV 92 94   RDW 13.7 13.5        Chemistries:  Recent Labs   Lab 09/02/22  0417 09/03/22  0903   * 142   K 3.2* 4.3    100   CO2 33* 33*   BUN 33* 32*   CREATININE 0.8 0.7   CALCIUM 8.2* 8.4*   ALBUMIN 2.8*  --    PROT 4.9*  --    BILITOT 1.1*  --    ALKPHOS 58  --    ALT 33  --    AST 18  --    MG 2.3  --    PHOS 3.5  --        All pertinent  labs within the past 24 hours have been reviewed.    Significant Imaging:  I have reviewed all pertinent imaging results/findings within the past 24 hours.

## 2022-09-03 NOTE — EICU
Rounding (Video Assessment):  Yes    Intervention Initiated From:  [facility] / EICU    Comments: pt resting in bed with eyes closed. NAD observed.  VSS,except for b/p still elevated.  Pt remains on scheduled  and prn b/p meds.  TPN  is infusing.

## 2022-09-03 NOTE — ASSESSMENT & PLAN NOTE
Cont home atenolol and increased valsartan to BID  Some highs likely anxiety driven  She has prn clonidine ordered.    BP has remained high/stable

## 2022-09-03 NOTE — PROGRESS NOTES
[facility] - Intensive Care  Pulmonology  Progress Note    Patient Name: Marva Perez  MRN: [id number]  Admission Date: 8/26/2022  Hospital Length of Stay: 8 days  Code Status: Full Code  Attending Provider: Stewart Lucia MD  Primary Care Provider: Kevin Clements MD   Principal Problem: COVID-19    Subjective:     Interval History: On nc 88 to 90 sat    Objective:     Vital Signs (Most Recent):  Temp: 97.1 °F (36.2 °C) (09/03/22 1108)  Pulse: 79 (09/03/22 1322)  Resp: (!) 30 (09/03/22 1322)  BP: (!) 187/88 (09/03/22 1300)  SpO2: 97 % (09/03/22 1322)   Vital Signs (24h Range):  Temp:  [96.7 °F (35.9 °C)-98.6 °F (37 °C)] 97.1 °F (36.2 °C)  Pulse:  [56-88] 79  Resp:  [19-54] 30  SpO2:  [86 %-100 %] 97 %  BP: (120-242)/() 187/88     Weight: 54.8 kg (120 lb 13 oz)  Body mass index is 22.1 kg/m².      Intake/Output Summary (Last 24 hours) at 9/3/2022 1403  Last data filed at 9/3/2022 1300  Gross per 24 hour   Intake 2362.47 ml   Output --   Net 2362.47 ml       Physical Exam  Vitals and nursing note reviewed.   Constitutional:       General: She is not in acute distress.     Appearance: Normal appearance. She is well-developed. She is not ill-appearing, toxic-appearing or diaphoretic.   HENT:      Head: Normocephalic and atraumatic.      Jaw: No trismus.      Right Ear: Hearing, tympanic membrane, ear canal and external ear normal.      Left Ear: Hearing, tympanic membrane, ear canal and external ear normal.      Nose: Nose normal. No nasal deformity, mucosal edema or rhinorrhea.      Right Sinus: No maxillary sinus tenderness or frontal sinus tenderness.      Left Sinus: No maxillary sinus tenderness or frontal sinus tenderness.      Mouth/Throat:      Dentition: Normal dentition.      Pharynx: Uvula midline. No posterior oropharyngeal erythema or uvula swelling.   Eyes:      General: Lids are normal. No scleral icterus.     Conjunctiva/sclera: Conjunctivae normal.      Comments: Sclera clear bilat    Neck:      Trachea: Trachea and phonation normal.   Cardiovascular:      Rate and Rhythm: Normal rate and regular rhythm.      Pulses: Normal pulses.      Heart sounds: Normal heart sounds.   Pulmonary:      Effort: Prolonged expiration and respiratory distress (mild to moderate) present.      Breath sounds: Examination of the right-middle field reveals decreased breath sounds. Examination of the left-middle field reveals decreased breath sounds. Examination of the right-lower field reveals decreased breath sounds, wheezing and rhonchi. Examination of the left-lower field reveals decreased breath sounds, wheezing and rhonchi. Decreased breath sounds, wheezing and rhonchi present.   Abdominal:      General: Bowel sounds are normal. There is no distension.      Palpations: Abdomen is soft.      Tenderness: There is no abdominal tenderness.   Musculoskeletal:         General: No deformity. Normal range of motion.      Cervical back: Full passive range of motion without pain and neck supple.   Skin:     General: Skin is warm and dry.      Coloration: Skin is not pale.   Neurological:      Mental Status: She is alert and oriented to person, place, and time.      Motor: No abnormal muscle tone.      Coordination: Coordination normal.   Psychiatric:         Speech: Speech normal.         Behavior: Behavior normal. Behavior is cooperative.         Thought Content: Thought content normal.         Judgment: Judgment normal.       Vents:  Oxygen Concentration (%): 40 (09/03/22 1105)    Lines/Drains/Airways       Peripheral Intravenous Line  Duration                  Peripheral IV - Single Lumen 08/30/22 1516 20 G Right Hand 3 days         Peripheral IV - Single Lumen 09/02/22 1155 20 G Anterior;Left;Proximal Forearm 1 day                    Significant Labs:    CBC/Anemia Profile:  Recent Labs   Lab 09/02/22  0417 09/03/22  0903   WBC 5.18 15.97*   HGB 9.2* 10.7*   HCT 28.5* 33.5*    264   MCV 92 94   RDW 13.7 13.5         Chemistries:  Recent Labs   Lab 09/02/22  0417 09/03/22  0903   * 142   K 3.2* 4.3    100   CO2 33* 33*   BUN 33* 32*   CREATININE 0.8 0.7   CALCIUM 8.2* 8.4*   ALBUMIN 2.8*  --    PROT 4.9*  --    BILITOT 1.1*  --    ALKPHOS 58  --    ALT 33  --    AST 18  --    MG 2.3  --    PHOS 3.5  --        All pertinent labs within the past 24 hours have been reviewed.    Significant Imaging:  I have reviewed all pertinent imaging results/findings within the past 24 hours.    Assessment/Plan:     * COVID-19  ++ test she went to a wedding   ++ covid and flu B    Pneumonia due to infectious organism  Will start levoq    Right lower lobe lung mass  Will need to access ++ hemoptysis    Cough with hemoptysis  off eloquise now will do cytology on sputum and if persist do a bronchoscopy in 3 to 5 days    Lung mass  CT shows a RLL superior segment pleural lesion     Influenza B  ++ covid and flu B    Chronic atrial fibrillation  On eloquise has been stopped     Chronic respiratory failure  Patient with Hypoxic Respiratory failure which is Chronic.  she is on home oxygen at 3 LPM. Supplemental oxygen was provided and noted- Oxygen Concentration (%):  [35-40] 40.   Signs/symptoms of respiratory failure include- increased work of breathing. Contributing diagnoses includes - COPD Labs and images were reviewed. Patient Has recent ABG, which has been reviewed. Will treat underlying causes and adjust management of respiratory failure as follows- 3    Centrilobular emphysema  Severe end stage    Emphysema/COPD  Severe end stage     HTN (hypertension)  High but stable   stable    KATHIE (generalized anxiety disorder)  stable                 Howard Matson MD  Pulmonology  [place] - Intensive Care

## 2022-09-03 NOTE — PLAN OF CARE
Problem: Adult Inpatient Plan of Care  Goal: Plan of Care Review  Outcome: Ongoing, Progressing  Goal: Optimal Comfort and Wellbeing  Outcome: Ongoing, Progressing     Problem: Fluid Imbalance (Pneumonia)  Goal: Fluid Balance  Outcome: Ongoing, Progressing     Problem: Infection (Pneumonia)  Goal: Resolution of Infection Signs and Symptoms  Outcome: Ongoing, Progressing     Problem: Skin Injury Risk Increased  Goal: Skin Health and Integrity  Outcome: Ongoing, Progressing     Problem: Electrolyte Imbalance  Goal: Electrolyte Balance  Outcome: Ongoing, Progressing

## 2022-09-03 NOTE — ASSESSMENT & PLAN NOTE
Patient is identified as High risk for severe complications of COVID 19 based on COVID risk score of 6   Initiate standard COVID protocols; COVID-19 testing ,Infection Control notification  and isolation- respiratory, contact and droplet per protocol    Diagnostics: (leukopenia, hyponatremia, hyperferritinemia, elevated troponin, elevated d-dimer, age, and comorbidities are significant predictors of poor clinical outcome)  CBC, CMP, Ferritin, CRP and Portable CXR    Management: Maintain oxygen saturations 92-96% via Nasal Cannula 4 LPM and monitor with continuous/intermittent pulse oximetry. , Inhaled bronchodilators as needed for shortness of breath. and Continuous cardiac monitoring.    Will hold off on remdesivir at this point as she seems stable from resp standpoint; currently on home dose of 2L NC    8/27: pt remains stable on home Oxygen 2-3L NC. Monitor    8/30: worsening hypoxia  Cont steroids  Cont levaquin  [facility] any benefit for remdesivir at this point (s/p MAB outpatient) vs toci  Repeat imaging pending    8/29 pt remains on home O2 demands. Cont duonebs. No remedesivir had outpt infusion for covid and was on tamiflu outpt as well. Dr herbert added levaquin dexamethasone day 4.    8/30 dexamethasone day 5- call pharmacy for consult- pt now with increased o2 demands     8/31 Day 6 decadron,  Remdesivir    9/1 day 7 decadron; day 3 remdesivir, day 4 levaquin    9/2 day 8 of decadron and day 4/5 remdesivir. Day 5 levaquin. Check procal. Will likely d/c levaquin pending cxr.    9/3  Day 5 of remdesivir day 9 of decadron. Stop remdesivir after today. D/c'd levaquin yeserday. procal remains normal. Afebrile normal wbc.

## 2022-09-03 NOTE — PLAN OF CARE
The patient remained stable throughout the night.  Ppn at 75 ml/hr and wore bi pap for 6 hrs.  Patient not wanting to eat despite encouragement.  Will continue to monitor.

## 2022-09-04 PROBLEM — E43 SEVERE MALNUTRITION: Status: ACTIVE | Noted: 2022-09-04

## 2022-09-04 LAB
ANION GAP SERPL CALC-SCNC: 9 MMOL/L (ref 8–16)
ANISOCYTOSIS BLD QL SMEAR: SLIGHT
BASOPHILS # BLD AUTO: ABNORMAL K/UL (ref 0–0.2)
BASOPHILS NFR BLD: 0 % (ref 0–1.9)
BUN SERPL-MCNC: 36 MG/DL (ref 8–23)
CALCIUM SERPL-MCNC: 8.4 MG/DL (ref 8.7–10.5)
CHLORIDE SERPL-SCNC: 98 MMOL/L (ref 95–110)
CO2 SERPL-SCNC: 34 MMOL/L (ref 23–29)
CREAT SERPL-MCNC: 0.7 MG/DL (ref 0.5–1.4)
DIFFERENTIAL METHOD: ABNORMAL
EOSINOPHIL # BLD AUTO: ABNORMAL K/UL (ref 0–0.5)
EOSINOPHIL NFR BLD: 0 % (ref 0–8)
ERYTHROCYTE [DISTWIDTH] IN BLOOD BY AUTOMATED COUNT: 13.5 % (ref 11.5–14.5)
EST. GFR  (NO RACE VARIABLE): >60 ML/MIN/1.73 M^2
GLUCOSE SERPL-MCNC: 215 MG/DL (ref 70–110)
HCT VFR BLD AUTO: 32.8 % (ref 37–48.5)
HGB BLD-MCNC: 10.7 G/DL (ref 12–16)
IMM GRANULOCYTES # BLD AUTO: ABNORMAL K/UL (ref 0–0.04)
IMM GRANULOCYTES NFR BLD AUTO: ABNORMAL % (ref 0–0.5)
LYMPHOCYTES # BLD AUTO: ABNORMAL K/UL (ref 1–4.8)
LYMPHOCYTES NFR BLD: 8 % (ref 18–48)
MCH RBC QN AUTO: 30.2 PG (ref 27–31)
MCHC RBC AUTO-ENTMCNC: 32.6 G/DL (ref 32–36)
MCV RBC AUTO: 93 FL (ref 82–98)
METAMYELOCYTES NFR BLD MANUAL: 1 %
MONOCYTES # BLD AUTO: ABNORMAL K/UL (ref 0.3–1)
MONOCYTES NFR BLD: 6 % (ref 4–15)
MYELOCYTES NFR BLD MANUAL: 4 %
NEUTROPHILS NFR BLD: 78 % (ref 38–73)
NEUTS BAND NFR BLD MANUAL: 3 %
NRBC BLD-RTO: 1 /100 WBC
PLATELET # BLD AUTO: 203 K/UL (ref 150–450)
PLATELET BLD QL SMEAR: ABNORMAL
PMV BLD AUTO: 10 FL (ref 9.2–12.9)
POIKILOCYTOSIS BLD QL SMEAR: SLIGHT
POLYCHROMASIA BLD QL SMEAR: ABNORMAL
POTASSIUM SERPL-SCNC: 4.6 MMOL/L (ref 3.5–5.1)
RBC # BLD AUTO: 3.54 M/UL (ref 4–5.4)
SODIUM SERPL-SCNC: 141 MMOL/L (ref 136–145)
WBC # BLD AUTO: 13.01 K/UL (ref 3.9–12.7)

## 2022-09-04 PROCEDURE — 25000003 PHARM REV CODE 250: Performed by: FAMILY MEDICINE

## 2022-09-04 PROCEDURE — 36415 COLL VENOUS BLD VENIPUNCTURE: CPT | Performed by: FAMILY MEDICINE

## 2022-09-04 PROCEDURE — 99233 SBSQ HOSP IP/OBS HIGH 50: CPT | Mod: ,,, | Performed by: FAMILY MEDICINE

## 2022-09-04 PROCEDURE — 25000003 PHARM REV CODE 250: Performed by: NURSE PRACTITIONER

## 2022-09-04 PROCEDURE — 20000000 HC ICU ROOM

## 2022-09-04 PROCEDURE — 25000003 PHARM REV CODE 250: Performed by: INTERNAL MEDICINE

## 2022-09-04 PROCEDURE — 25000242 PHARM REV CODE 250 ALT 637 W/ HCPCS: Performed by: FAMILY MEDICINE

## 2022-09-04 PROCEDURE — 27000207 HC ISOLATION

## 2022-09-04 PROCEDURE — 63600175 PHARM REV CODE 636 W HCPCS: Performed by: FAMILY MEDICINE

## 2022-09-04 PROCEDURE — 99900035 HC TECH TIME PER 15 MIN (STAT)

## 2022-09-04 PROCEDURE — 94761 N-INVAS EAR/PLS OXIMETRY MLT: CPT

## 2022-09-04 PROCEDURE — 99233 PR SUBSEQUENT HOSPITAL CARE,LEVL III: ICD-10-PCS | Mod: ,,, | Performed by: FAMILY MEDICINE

## 2022-09-04 PROCEDURE — 80048 BASIC METABOLIC PNL TOTAL CA: CPT | Performed by: FAMILY MEDICINE

## 2022-09-04 PROCEDURE — 25000242 PHARM REV CODE 250 ALT 637 W/ HCPCS: Performed by: INTERNAL MEDICINE

## 2022-09-04 PROCEDURE — 85007 BL SMEAR W/DIFF WBC COUNT: CPT | Performed by: FAMILY MEDICINE

## 2022-09-04 PROCEDURE — 25000003 PHARM REV CODE 250: Performed by: STUDENT IN AN ORGANIZED HEALTH CARE EDUCATION/TRAINING PROGRAM

## 2022-09-04 PROCEDURE — 94640 AIRWAY INHALATION TREATMENT: CPT

## 2022-09-04 PROCEDURE — 27000221 HC OXYGEN, UP TO 24 HOURS

## 2022-09-04 PROCEDURE — 85027 COMPLETE CBC AUTOMATED: CPT | Performed by: FAMILY MEDICINE

## 2022-09-04 PROCEDURE — 94660 CPAP INITIATION&MGMT: CPT

## 2022-09-04 RX ORDER — LORAZEPAM 2 MG/ML
0.5 INJECTION INTRAMUSCULAR ONCE
Status: COMPLETED | OUTPATIENT
Start: 2022-09-04 | End: 2022-09-04

## 2022-09-04 RX ORDER — CLONAZEPAM 0.5 MG/1
0.5 TABLET ORAL 2 TIMES DAILY
Status: DISCONTINUED | OUTPATIENT
Start: 2022-09-04 | End: 2022-09-09 | Stop reason: HOSPADM

## 2022-09-04 RX ORDER — CLONIDINE 0.2 MG/24H
1 PATCH, EXTENDED RELEASE TRANSDERMAL
Status: DISCONTINUED | OUTPATIENT
Start: 2022-09-04 | End: 2022-09-09 | Stop reason: HOSPADM

## 2022-09-04 RX ADMIN — CLONAZEPAM 0.5 MG: 0.5 TABLET ORAL at 09:09

## 2022-09-04 RX ADMIN — FLECAINIDE ACETATE 50 MG: 50 TABLET ORAL at 11:09

## 2022-09-04 RX ADMIN — ACETYLCYSTEINE 2 ML: 200 INHALANT RESPIRATORY (INHALATION) at 07:09

## 2022-09-04 RX ADMIN — DILTIAZEM HYDROCHLORIDE 60 MG: 60 TABLET, FILM COATED ORAL at 11:09

## 2022-09-04 RX ADMIN — OXYCODONE HYDROCHLORIDE AND ACETAMINOPHEN 500 MG: 500 TABLET ORAL at 11:09

## 2022-09-04 RX ADMIN — VALSARTAN 160 MG: 80 TABLET, FILM COATED ORAL at 11:09

## 2022-09-04 RX ADMIN — LORAZEPAM 0.5 MG: 2 INJECTION INTRAMUSCULAR; INTRAVENOUS at 08:09

## 2022-09-04 RX ADMIN — MUPIROCIN: 20 OINTMENT TOPICAL at 10:09

## 2022-09-04 RX ADMIN — DEXAMETHASONE SODIUM PHOSPHATE 2 MG: 4 INJECTION, SOLUTION INTRA-ARTICULAR; INTRALESIONAL; INTRAMUSCULAR; INTRAVENOUS; SOFT TISSUE at 08:09

## 2022-09-04 RX ADMIN — ATENOLOL 12.5 MG: 25 TABLET ORAL at 11:09

## 2022-09-04 RX ADMIN — MIRTAZAPINE 7.5 MG: 7.5 TABLET ORAL at 10:09

## 2022-09-04 RX ADMIN — IPRATROPIUM BROMIDE AND ALBUTEROL SULFATE 3 ML: .5; 3 SOLUTION RESPIRATORY (INHALATION) at 06:09

## 2022-09-04 RX ADMIN — CLONIDINE HYDROCHLORIDE 0.1 MG: 0.1 TABLET ORAL at 01:09

## 2022-09-04 RX ADMIN — ATORVASTATIN CALCIUM 10 MG: 10 TABLET, FILM COATED ORAL at 10:09

## 2022-09-04 RX ADMIN — ACETYLCYSTEINE 2 ML: 200 INHALANT RESPIRATORY (INHALATION) at 06:09

## 2022-09-04 RX ADMIN — LEUCINE, PHENYLALANINE, LYSINE, METHIONINE, ISOLEUCINE, VALINE, HISTIDINE, THREONINE, TRYPTOPHAN, ALANINE, GLYCINE, ARGININE, PROLINE, SERINE, TYROSINE, SODIUM ACETATE, DIBASIC POTASSIUM PHOSPHATE, MAGNESIUM CHLORIDE, SODIUM CHLORIDE, CALCIUM CHLORIDE, DEXTROSE
311; 238; 247; 170; 255; 247; 204; 179; 77; 880; 438; 489; 289; 213; 17; 297; 261; 51; 77; 33; 5 INJECTION INTRAVENOUS at 06:09

## 2022-09-04 RX ADMIN — VALSARTAN 160 MG: 80 TABLET, FILM COATED ORAL at 10:09

## 2022-09-04 RX ADMIN — MUPIROCIN: 20 OINTMENT TOPICAL at 08:09

## 2022-09-04 RX ADMIN — CLONIDINE 1 PATCH: 0.2 PATCH TRANSDERMAL at 08:09

## 2022-09-04 RX ADMIN — IPRATROPIUM BROMIDE AND ALBUTEROL SULFATE 3 ML: .5; 3 SOLUTION RESPIRATORY (INHALATION) at 12:09

## 2022-09-04 RX ADMIN — FLECAINIDE ACETATE 50 MG: 50 TABLET ORAL at 10:09

## 2022-09-04 RX ADMIN — DILTIAZEM HYDROCHLORIDE 60 MG: 60 TABLET, FILM COATED ORAL at 10:09

## 2022-09-04 RX ADMIN — ESCITALOPRAM OXALATE 5 MG: 5 TABLET, FILM COATED ORAL at 11:09

## 2022-09-04 RX ADMIN — GUAIFENESIN 600 MG: 600 TABLET, EXTENDED RELEASE ORAL at 08:09

## 2022-09-04 RX ADMIN — IPRATROPIUM BROMIDE AND ALBUTEROL SULFATE 3 ML: .5; 3 SOLUTION RESPIRATORY (INHALATION) at 01:09

## 2022-09-04 RX ADMIN — CLONAZEPAM 0.5 MG: 0.5 TABLET ORAL at 10:09

## 2022-09-04 RX ADMIN — IPRATROPIUM BROMIDE AND ALBUTEROL SULFATE 3 ML: .5; 3 SOLUTION RESPIRATORY (INHALATION) at 07:09

## 2022-09-04 NOTE — ASSESSMENT & PLAN NOTE
Patient with atrial fibrillation which is controlled currently with Beta Blocker, Calcium Channel Blocker and flecainide. Patient is currently in sinus rhythm.[id number] Score: 4. HASBLED Score: Unable to calculate. Anticoagulation indicated. Anticoagulation done with eliquis.    8/28/22: H&H drop from 10.1/30.6 > 8.6/26.0; pt with hemoptysis. Will hold eliquis for now. Consult cardiology in AM. She is followed by Dr. Cox outpatient. Daughter with concerns about holding eliquis, but given her hemoptysis and H&H drop it is my opinion as well as that of Dr. Matson that the risks of continued anticoagulation outweigh the benefit currently.     8/29 dr Wilde consulted on patient this am. Cont to hold eliquis for now.    8/30 cont to hold eliquis as she is still having hemoptysis   Cont atenolol and flecainide   Cont telemetry  Monitor electrolytes    8/31- No more hemoptysis.  Consider restarting Eloquis since she is such a high risk for PE.    9/1: continued hemoptysis, but H&H improving. Consider restarting eliquis, Dr. Matson recommends we cont to hold    9/2  In sinus. Holding eliquis because of hemoptysis    9/4  Remains SINUS. Eliquis still on hold but h/h is okay.

## 2022-09-04 NOTE — SUBJECTIVE & OBJECTIVE
Review of Systems   Constitutional:  Positive for appetite change and fatigue. Negative for activity change and fever.   HENT:  Positive for hearing loss (chronic). Negative for sinus pressure and sore throat.    Eyes:  Negative for discharge and visual disturbance.   Respiratory:  Positive for cough and shortness of breath.    Cardiovascular:  Negative for chest pain, palpitations and leg swelling.   Gastrointestinal:  Negative for abdominal pain, blood in stool, diarrhea and nausea.   Genitourinary:  Negative for dysuria and hematuria.   Musculoskeletal:  Negative for arthralgias and myalgias.   Neurological:  Positive for weakness (generalized).   Psychiatric/Behavioral:  Negative for confusion. The patient is nervous/anxious.    Objective:     Vital Signs (Most Recent):  Temp: 98 °F (36.7 °C) (09/04/22 0400)  Pulse: 84 (09/04/22 0730)  Resp: (!) 25 (09/04/22 0730)  BP: (!) 191/85 (09/04/22 0730)  SpO2: (!) 94 % (09/04/22 0730) Vital Signs (24h Range):  Temp:  [97.1 °F (36.2 °C)-98 °F (36.7 °C)] 98 °F (36.7 °C)  Pulse:  [64-94] 84  Resp:  [20-54] 25  SpO2:  [83 %-100 %] 94 %  BP: (104-239)/() 191/85     Weight: 54.8 kg (120 lb 13 oz)  Body mass index is 22.1 kg/m².    Physical Exam  Vitals and nursing note reviewed.   Constitutional:       General: She is not in acute distress.     Comments: Hard of hearing   HENT:      Head: Normocephalic and atraumatic.      Right Ear: External ear normal.      Left Ear: External ear normal.      Mouth/Throat:      Mouth: Mucous membranes are moist.      Comments: Bipap in place  Eyes:      Extraocular Movements: Extraocular movements intact.      Conjunctiva/sclera: Conjunctivae normal.      Pupils: Pupils are equal, round, and reactive to light.   Cardiovascular:      Rate and Rhythm: Normal rate and regular rhythm.      Heart sounds: Normal heart sounds. No murmur heard.  Pulmonary:      Effort: Pulmonary effort is normal.      Breath sounds: No wheezing, rhonchi  or rales.      Comments:   Increase UAN and copious sputum with weak cough.  Abdominal:      General: Bowel sounds are normal. There is no distension.      Palpations: Abdomen is soft.      Tenderness: There is no abdominal tenderness.   Musculoskeletal:      Cervical back: Neck supple.      Right lower leg: No edema.      Left lower leg: No edema.   Skin:     Comments: Bruising left shoulder   Neurological:      General: No focal deficit present.      Mental Status: She is alert and oriented to person, place, and time.   Psychiatric:      Comments: Very anxious         CRANIAL NERVES     CN III, IV, VI   Pupils are equal, round, and reactive to light.     Significant Labs: .      BMP:   Recent Labs   Lab 09/04/22 0437   *      K 4.6   CL 98   CO2 34*   BUN 36*   CREATININE 0.7   CALCIUM 8.4*       CBC:   Recent Labs   Lab 09/03/22 0903 09/04/22 0437   WBC 15.97* 13.01*   HGB 10.7* 10.7*   HCT 33.5* 32.8*    203       CMP:   Recent Labs   Lab 09/03/22 0903 09/04/22 0437    141   K 4.3 4.6    98   CO2 33* 34*   * 215*   BUN 32* 36*   CREATININE 0.7 0.7   CALCIUM 8.4* 8.4*   ANIONGAP 9 9     8/27 mag 2.3   8/26 sed rate 22  LDH: 270  Latic 1.0  Procal: 0.08 (0.10)  ESR: 22  Ferritin: 852    Troponin 0.029  8/22 flu B positive   Covid positive   C diff negative   Blood cultures NGTD x 2     Significant Imaging:     CXR:   Chronic lung changes are seen.  Increasing hazy interstitial opacity throughout the left lung, pronounced in the left mid and lower lung zone which could reflect atelectasis, aspiration or possibly a developing pneumonia.  Probable small bilateral pleural effusions.  Calcified granulomas in the left lung.  Nodular density in the left mid lung which appears new as compared to the previous examination none.  This could possibly represent a nipple shadow.  Heart size is normal.  Calcified atheromatous disease affects the aorta.  Fortunately  age-appropriate degenerative changes affect the skeleton.    8/27 CT chest    1. Interval development of a smooth pleural based soft tissue mass of the medial right lower lobe which is of uncertain etiology.  Neoplasia is not excludable.  Further evaluation with PET scan as deemed clinically necessary.  2. 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  3. Granulomatous change.    8/27 EKG Sinus rhythm with Premature supraventricular complexes  Nonspecific T wave abnormality  Abnormal ECG  When compared with ECG of 23-AUG-2022 09:32,  Premature supraventricular complexes are now Present  Confirmed by El Urban MD ([id number]) on 8/26/2022 6:00:40 PM    CXR:  Emphysematous changes of the lungs with fibrotic changes in the lung bases, left greater than right.  Coarse interstitial prominence in the left lung base could simply represent fibrosis; however component of superimposed inflammation/infection not excluded.  Heart is at the upper limits of normal in size.  Calcified atheromatous disease affects the aorta.  Age-appropriate degenerative changes affect the skeleton.

## 2022-09-04 NOTE — ASSESSMENT & PLAN NOTE
Cont home atenolol and increased valsartan to BID  Some highs likely anxiety driven  She has prn clonidine ordered.    BP has remained high/stable    Will place clonidine patch today. Her BP spikes with 'anxiety episodes.'   She has been having a lot of rebound after PRNs. Will attempt this today and monitor.

## 2022-09-04 NOTE — SUBJECTIVE & OBJECTIVE
Interval History: Stable o2 dropped after turned off over 2.5 min to 87 % on 4 liters o2 sat is back up to 95% in less than 3 minutes .    Objective:     Vital Signs (Most Recent):  Temp: 97.2 °F (36.2 °C) (09/04/22 0715)  Pulse: 98 (09/04/22 1000)  Resp: (!) 31 (09/04/22 1000)  BP: (!) 199/95 (09/04/22 1000)  SpO2: (!) 93 % (09/04/22 1000)   Vital Signs (24h Range):  Temp:  [97.1 °F (36.2 °C)-98 °F (36.7 °C)] 97.2 °F (36.2 °C)  Pulse:  [64-98] 98  Resp:  [20-54] 31  SpO2:  [83 %-100 %] 93 %  BP: (104-239)/() 199/95     Weight: 54.8 kg (120 lb 13 oz)  Body mass index is 22.1 kg/m².      Intake/Output Summary (Last 24 hours) at 9/4/2022 1057  Last data filed at 9/4/2022 1000  Gross per 24 hour   Intake 2067.06 ml   Output --   Net 2067.06 ml       Physical Exam  Vitals and nursing note reviewed.   Constitutional:       General: She is not in acute distress.     Appearance: Normal appearance. She is well-developed. She is not ill-appearing, toxic-appearing or diaphoretic.   HENT:      Head: Normocephalic and atraumatic.      Jaw: No trismus.      Right Ear: Hearing, tympanic membrane, ear canal and external ear normal.      Left Ear: Hearing, tympanic membrane, ear canal and external ear normal.      Nose: Nose normal. No nasal deformity, mucosal edema or rhinorrhea.      Right Sinus: No maxillary sinus tenderness or frontal sinus tenderness.      Left Sinus: No maxillary sinus tenderness or frontal sinus tenderness.      Mouth/Throat:      Dentition: Normal dentition.      Pharynx: Uvula midline. No posterior oropharyngeal erythema or uvula swelling.   Eyes:      General: Lids are normal. No scleral icterus.     Conjunctiva/sclera: Conjunctivae normal.      Comments: Sclera clear bilat   Neck:      Trachea: Trachea and phonation normal.   Cardiovascular:      Rate and Rhythm: Normal rate and regular rhythm.      Pulses: Normal pulses.      Heart sounds: Normal heart sounds.   Pulmonary:      Effort: Prolonged  expiration and respiratory distress (mild to moderate) present.      Breath sounds: Decreased air movement present. Examination of the right-middle field reveals decreased breath sounds, wheezing and rhonchi. Examination of the left-middle field reveals decreased breath sounds, wheezing and rhonchi. Examination of the right-lower field reveals decreased breath sounds, wheezing and rhonchi. Examination of the left-lower field reveals decreased breath sounds, wheezing and rhonchi. Decreased breath sounds, wheezing (mid expiratory wheezing) and rhonchi present.   Abdominal:      General: Bowel sounds are normal. There is no distension.      Palpations: Abdomen is soft.      Tenderness: There is no abdominal tenderness.   Musculoskeletal:         General: No deformity. Normal range of motion.      Cervical back: Full passive range of motion without pain and neck supple.   Skin:     General: Skin is warm and dry.      Coloration: Skin is not pale.   Neurological:      Mental Status: She is alert and oriented to person, place, and time.      Motor: No abnormal muscle tone.      Coordination: Coordination normal.   Psychiatric:         Speech: Speech normal.         Behavior: Behavior normal. Behavior is cooperative.         Thought Content: Thought content normal.         Judgment: Judgment normal.       Vents:  Oxygen Concentration (%): 35 (09/04/22 0749)    Lines/Drains/Airways       Peripheral Intravenous Line  Duration                  Peripheral IV - Single Lumen 09/02/22 1155 20 G Anterior;Left;Proximal Forearm 1 day                    Significant Labs:    CBC/Anemia Profile:  Recent Labs   Lab 09/03/22  0903 09/04/22  0437   WBC 15.97* 13.01*   HGB 10.7* 10.7*   HCT 33.5* 32.8*    203   MCV 94 93   RDW 13.5 13.5        Chemistries:  Recent Labs   Lab 09/03/22  0903 09/04/22  0437    141   K 4.3 4.6    98   CO2 33* 34*   BUN 32* 36*   CREATININE 0.7 0.7   CALCIUM 8.4* 8.4*       All pertinent labs  within the past 24 hours have been reviewed.    Significant Imaging:  I have reviewed all pertinent imaging results/findings within the past 24 hours.

## 2022-09-04 NOTE — ASSESSMENT & PLAN NOTE
Patient with Hypoxic Respiratory failure which is Chronic.  she is on home oxygen at 3 LPM. Supplemental oxygen was provided and noted- Oxygen Concentration (%):  [35-40] 35.   Signs/symptoms of respiratory failure include- increased work of breathing. Contributing diagnoses includes - COPD Labs and images were reviewed. Patient Has recent ABG, which has been reviewed. Will treat underlying causes and adjust management of respiratory failure as follows- 3   ALEXI Vazquez

## 2022-09-04 NOTE — ASSESSMENT & PLAN NOTE
She isn't eating. She is now on Day 3 of PPN. She will NOT tolerate an NG tube at this time without having to have restraints. She will not tolerate this at all. Cont to encourage PO.                        Measurements:  Wt Readings from Last 1 Encounters:   09/01/22 54.8 kg (120 lb 13 oz)   Body mass index is 22.1 kg/m².    Recommendations: Recommendation/Intervention: 1. Rec'd continue Boost Plus ONS TID to provide an additional 1080 kcals and 42 g protein. 2. Rec'd honoring pt preferences to encourage po intake. 3. RD to follow and make rec's accordingly.  Goals: Pt will consume 25-50% of meals by f/u.    Patient has been screened and assessed by RD. RD will follow patient.

## 2022-09-04 NOTE — EICU
Rounding (Video Assessment):  Yes    Comments: Video assessment completed.  Pt lying in bed with eyes closed.  BiPAP mask in place.  Resp unlabored with BiPAP assist.  SaO2 96%.  Pt without acute distress at present.

## 2022-09-04 NOTE — ASSESSMENT & PLAN NOTE
Nutrition consulted. Most recent weight and BMI monitored-                         Measurements:  Wt Readings from Last 1 Encounters:   09/01/22 54.8 kg (120 lb 13 oz)   Body mass index is 22.1 kg/m².    Recommendations: Recommendation/Intervention: 1. Rec'd continue Boost Plus ONS TID to provide an additional 1080 kcals and 42 g protein. 2. Rec'd honoring pt preferences to encourage po intake. 3. RD to follow and make rec's accordingly.  Goals: Pt will consume 25-50% of meals by f/u.    Patient has been screened and assessed by RD. RD will follow patient.

## 2022-09-04 NOTE — ASSESSMENT & PLAN NOTE
Cont home lexapro and xanax>  Increased xanax to 0.5mg TID PRN 8/29/22    She is very anxious. She lives alone. Discussed with family, she does not want any assisted living or NH.     Consult psych    9/4  Switch to scheduled klonopin. May need to consider precidex.

## 2022-09-04 NOTE — ASSESSMENT & PLAN NOTE
Patient is identified as High risk for severe complications of COVID 19 based on COVID risk score of 6   Initiate standard COVID protocols; COVID-19 testing ,Infection Control notification  and isolation- respiratory, contact and droplet per protocol    Diagnostics: (leukopenia, hyponatremia, hyperferritinemia, elevated troponin, elevated d-dimer, age, and comorbidities are significant predictors of poor clinical outcome)  CBC, CMP, Ferritin, CRP and Portable CXR    Management: Maintain oxygen saturations 92-96% via Nasal Cannula 5 LPM and monitor with continuous/intermittent pulse oximetry. , Inhaled bronchodilators as needed for shortness of breath. and Continuous cardiac monitoring.    Will hold off on remdesivir at this point as she seems stable from resp standpoint; currently on home dose of 2L NC    8/27: pt remains stable on home Oxygen 2-3L NC. Monitor    8/30: worsening hypoxia  Cont steroids  Cont levaquin  [facility] any benefit for remdesivir at this point (s/p MAB outpatient) vs toci  Repeat imaging pending    8/29 pt remains on home O2 demands. Cont duonebs. No remedesivir had outpt infusion for covid and was on tamiflu outpt as well. Dr herbert added levaquin dexamethasone day 4.    8/30 dexamethasone day 5- call pharmacy for consult- pt now with increased o2 demands     8/31 Day 6 decadron,  Remdesivir    9/1 day 7 decadron; day 3 remdesivir, day 4 levaquin    9/2 day 8 of decadron and day 4/5 remdesivir. Day 5 levaquin. Check procal. Will likely d/c levaquin pending cxr.    9/3  Day 5 of remdesivir day 9 of decadron. Stop remdesivir after today. D/c'd levaquin yeserday. procal remains normal. Afebrile normal wbc.    9/4  CXR doesn't LOOK like covid. She has completed remdesivir.

## 2022-09-04 NOTE — ASSESSMENT & PLAN NOTE
On home oxygen 2-3L NC.;  Now with increased demands   Cont nebs, steroids, levaquin  Now on 5 liters NC/BIPAP.    Repeat CXR today.  May consider a dose of lasix as she surely has some degree of phtn and bp would tolerate this.  Recheck procal.    9/3  No consolidation on CXR. She has known end stage lung disease. She was given a dose of lasix - not much diuresis or improvement in resp status. Copious sputum noted. Increase pulm toileting and monitor for hemop.    9/4  NOT wheezing so will decrease decadron. She got 7 days of 4mg decadron. Today she was given 2mg. She also had solumedrol a couple of days per pulm. Pulm toileting increased yesterday and tolerated well without increase in hemoptysis

## 2022-09-04 NOTE — PLAN OF CARE
Problem: Adult Inpatient Plan of Care  Goal: Plan of Care Review  Outcome: Ongoing, Progressing  Goal: Optimal Comfort and Wellbeing  Outcome: Ongoing, Progressing     Problem: Fluid Imbalance (Pneumonia)  Goal: Fluid Balance  Outcome: Ongoing, Progressing     Problem: Infection (Pneumonia)  Goal: Resolution of Infection Signs and Symptoms  Outcome: Ongoing, Progressing     Problem: Skin Injury Risk Increased  Goal: Skin Health and Integrity  Outcome: Ongoing, Progressing     Problem: Fall Injury Risk  Goal: Absence of Fall and Fall-Related Injury  Outcome: Ongoing, Progressing

## 2022-09-04 NOTE — ASSESSMENT & PLAN NOTE
Will switch to scheduled klonopin rather than xanax prn.  Cont home lexapro 5mg daily  Consult psych - suggested to  Started on remeron and depakote. These have not helped much.

## 2022-09-04 NOTE — PLAN OF CARE
The patient remained stable on bipap throughout the night. Oral intake remains poor despite encouragement,.  Will continue to monitor.

## 2022-09-04 NOTE — ASSESSMENT & PLAN NOTE
Nodular density on CXR which appears new, chect CT chest with contrast per radiology recs  Iodine allergy, will premedicate. She has had contrast studies in the past, last 2019 per records  Reviewed CT with new mass?? Infection?? Dr herbert consulted. Added levaquin, holding eliquis- consider bronch if bleeding does not improve .  Cont levaquin; needs outpatient pulm follow-up    9/4  Will need a bronch.

## 2022-09-04 NOTE — ASSESSMENT & PLAN NOTE
Per Dr. Matson- Severe changes on CT this puts her at high risk for bronch per pulmonology recs. Cont O2 added levauin and cont nebs .    CTA without PE.     Will check [facility] - this was elevated. These patients are at risk for PCP if I recall. She may benefit from IV Bactrim - though she doesn't seem 'infected' at this point. Will discuss with pulm further.

## 2022-09-04 NOTE — PROGRESS NOTES
[facility][1] Medicine  Progress Note    Patient Name: Marva Perez  MRN: [id number]  Patient Class: IP- Inpatient   Admission Date: 8/26/2022  Length of Stay: 9 days  Attending Physician: Stewart Lucia MD  Primary Care Provider: Kevin Clements MD        Subjective:     Principal Problem:COVID-19        HPI:  81yF with HTN, HLD, GERD, Depression, anxiety, aFib on eliquis, COPD/Emphysema, and chronic hypoxia on 2-3L home oxygen via NC presented to ER with chief complaint of diarrhea and generalized weakness. Pt reports she started with congestion, fatigue, and cough and 5-6 days ago. She was seen in ER 8/22/22 and diagnosed with COVID and flu B. She was started on tamiflu and referred for outpatient antibody infusion. Pt states she started having nausea, vomiting, and diarrhea as well as intermittent fever. She returned to ER 8/23/22 for the new onset GI symptoms. Workup was reassuring and she was discharged home with antiemetics. She received bebtelovimab infusion 8/24/22. She continued with poor appetite and diarrhea and called her PCP who recommended ER evaluation. She denies CP, abd pain, fever/chills, dysuria, hematuria, melena, BRBPR.       Overview/Hospital Course:  8/27 Pt remained stable on 2-3 L NC overnight which is her home dose. VSS, afebrile. She is very anxious. Diarrhea improved, she did have a loose stool this morning sent for c diff testing. CT chest planned for this morning to evaluate her new left nodular denisity on CXR.     8/28 Pt with hemoptysis overnight. Eliquis held. VSS, afebrile. She remains very anxious. C diff negative. Diarrhea improved. CT chest with COPD and severe chronic emphysematous changes, no infiltrate. There is a new soft tissue mass of the right lung base. Dr. Matson whom she follows with outpatient has been consulted. Discussed with daughter, she is concerned about patient going back home alone at discharge but she also states pt does not  want NH. Advised we could order home health at [place], but that is limited visit per week. Daughter states they are looking into sitters at home.    8/29/22  Marva Perez is a 81 y.o. female  has eliquis held-still coughing up blood- dr herbert and Dr Wilde following. She did test positive for both flu and covid.     8/30/22 pt is a 80yo COPD/emphysema on chronic O2 at home 2-3 liters. Dx with covid and flu B 8/22- treated outpt with tamiflu and IV covid infusion. She came to ER original c/o weakness and diarrhea. Tyamilfu d/kathryn. She was not requiring more than her routine O2. She progressed to hemoptysis on eliquis for a fib- this was held. CT chest shows lung mass. Dr Herbert consulted.  Dr herbert considering bronch but she would not be a good candidate- high risk for pneumo- started levaquin yesterday,. Today she is requiring more O2 now on 6L high flow. RR 26. Very anxious. Still coughing up bloody sputum.     There has been some confusion today regarding patient's POA and medical decision making capacity. Patient has no h/o dementia. Discussed with daughter who confirms this. Daughter reports she is POA and presented paperwork today but appears to be for financial and not medical decision making; looking into this further.   On my assessment, however, she is alert and oriented and able to continue to make her own decisions at this time. However, should she continue to worsen and require intubation in the future where she could no longer communicate her wishes we want to ensure we have the proper paperwork on file.     8/31  Patient requires 4 liters of O2 this morning.  Was on BiPap.  Seems slightly improved.  No more hemoptosis.  Getting Remdesivir, Levaquin, Decadron.  Eliquis still on hold.  Dr Herbert following.  She seems to be asking questions and aware of what is going on.    9/1 pt currently on 5L NC, also using BiPAP intermittently. Continues with hemoptysis, eliquis on hold although H&H improving. Remdesivir day  3. Levaquin day 4. Decadron day 7. She remains very anxious, discussed psych consult with patient and family and they agree. VSS. Afebrile.    9/2  Afternoon yesterday, patient was given depakote with improved agitation. Overnight though, she was once again agitated and was given a dose of remeron with hopes to help her appetite. She remains fatigued this morning. She has not been eating and has had increased stool output. She was given a dose of immodium yesterday without much improvement. 2 episodes of maroon sputum production but no further hemoptysis. She has remained afebrile. Sats are upper 90s on bipap and 5LNC intermittently. BP elevated.    9/3  Decreased oral intake yesterday. PPN started. Sats 94% on 5L NC. Urine output okay. Given single dose of lasix without much improvement in resp status. This morning she is more sleepy and has had improvement in her resp drive but still no appetite.    9/4  Continuing to have significant panic. She does well then seems to have a panic attack. She begins to hyperventilate and her sats drop, her blood pressure rises and she is able to be calmed after 10-15 minutes. These episodes are driving her hypertension and hypoxic spells. In between she has minimal o2 requirements of 4L. With any movement she is desatting. She remains afebrile. Cough less productive today. Completed course of tamiflu, remdesivir, levaquin stopped as she was afebrile, without consolidation on cxr and normal WBC ct. She subsequently had a bump in her WBC ct after getting solumedrol + decadron. Hemoptysis has improved. Pulm still following. Diarrhea has improved. Urine output is good. Tolerating bipap well and resting well at night with new remeron.          Review of Systems   Constitutional:  Positive for appetite change and fatigue. Negative for activity change and fever.   HENT:  Positive for hearing loss (chronic). Negative for sinus pressure and sore throat.    Eyes:  Negative for discharge  and visual disturbance.   Respiratory:  Positive for cough and shortness of breath.    Cardiovascular:  Negative for chest pain, palpitations and leg swelling.   Gastrointestinal:  Negative for abdominal pain, blood in stool, diarrhea and nausea.   Genitourinary:  Negative for dysuria and hematuria.   Musculoskeletal:  Negative for arthralgias and myalgias.   Neurological:  Positive for weakness (generalized).   Psychiatric/Behavioral:  Negative for confusion. The patient is nervous/anxious.    Objective:     Vital Signs (Most Recent):  Temp: 98 °F (36.7 °C) (09/04/22 0400)  Pulse: 84 (09/04/22 0730)  Resp: (!) 25 (09/04/22 0730)  BP: (!) 191/85 (09/04/22 0730)  SpO2: (!) 94 % (09/04/22 0730) Vital Signs (24h Range):  Temp:  [97.1 °F (36.2 °C)-98 °F (36.7 °C)] 98 °F (36.7 °C)  Pulse:  [64-94] 84  Resp:  [20-54] 25  SpO2:  [83 %-100 %] 94 %  BP: (104-239)/() 191/85     Weight: 54.8 kg (120 lb 13 oz)  Body mass index is 22.1 kg/m².    Physical Exam  Vitals and nursing note reviewed.   Constitutional:       General: She is not in acute distress.     Comments: Hard of hearing   HENT:      Head: Normocephalic and atraumatic.      Right Ear: External ear normal.      Left Ear: External ear normal.      Mouth/Throat:      Mouth: Mucous membranes are moist.      Comments: Bipap in place  Eyes:      Extraocular Movements: Extraocular movements intact.      Conjunctiva/sclera: Conjunctivae normal.      Pupils: Pupils are equal, round, and reactive to light.   Cardiovascular:      Rate and Rhythm: Normal rate and regular rhythm.      Heart sounds: Normal heart sounds. No murmur heard.  Pulmonary:      Effort: Pulmonary effort is normal.      Breath sounds: No wheezing, rhonchi or rales.      Comments:   Increase UAN and copious sputum with weak cough.  Abdominal:      General: Bowel sounds are normal. There is no distension.      Palpations: Abdomen is soft.      Tenderness: There is no abdominal tenderness.    Musculoskeletal:      Cervical back: Neck supple.      Right lower leg: No edema.      Left lower leg: No edema.   Skin:     Comments: Bruising left shoulder   Neurological:      General: No focal deficit present.      Mental Status: She is alert and oriented to person, place, and time.   Psychiatric:      Comments: Very anxious         CRANIAL NERVES     CN III, IV, VI   Pupils are equal, round, and reactive to light.     Significant Labs: .      BMP:   Recent Labs   Lab 09/04/22 0437   *      K 4.6   CL 98   CO2 34*   BUN 36*   CREATININE 0.7   CALCIUM 8.4*       CBC:   Recent Labs   Lab 09/03/22 0903 09/04/22 0437   WBC 15.97* 13.01*   HGB 10.7* 10.7*   HCT 33.5* 32.8*    203       CMP:   Recent Labs   Lab 09/03/22 0903 09/04/22 0437    141   K 4.3 4.6    98   CO2 33* 34*   * 215*   BUN 32* 36*   CREATININE 0.7 0.7   CALCIUM 8.4* 8.4*   ANIONGAP 9 9     8/27 mag 2.3   8/26 sed rate 22  LDH: 270  Latic 1.0  Procal: 0.08 (0.10)  ESR: 22  Ferritin: 852    Troponin 0.029  8/22 flu B positive   Covid positive   C diff negative   Blood cultures NGTD x 2     Significant Imaging:     CXR:   Chronic lung changes are seen.  Increasing hazy interstitial opacity throughout the left lung, pronounced in the left mid and lower lung zone which could reflect atelectasis, aspiration or possibly a developing pneumonia.  Probable small bilateral pleural effusions.  Calcified granulomas in the left lung.  Nodular density in the left mid lung which appears new as compared to the previous examination none.  This could possibly represent a nipple shadow.  Heart size is normal.  Calcified atheromatous disease affects the aorta.  Fortunately age-appropriate degenerative changes affect the skeleton.    8/27 CT chest    1. Interval development of a smooth pleural based soft tissue mass of the medial right lower lobe which is of uncertain etiology.  Neoplasia is not excludable.  Further  evaluation with PET scan as deemed clinically necessary.  2. 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  3. Granulomatous change.    8/27 EKG Sinus rhythm with Premature supraventricular complexes  Nonspecific T wave abnormality  Abnormal ECG  When compared with ECG of 23-AUG-2022 09:32,  Premature supraventricular complexes are now Present  Confirmed by El Urban MD ([id number]) on 8/26/2022 6:00:40 PM    CXR:  Emphysematous changes of the lungs with fibrotic changes in the lung bases, left greater than right.  Coarse interstitial prominence in the left lung base could simply represent fibrosis; however component of superimposed inflammation/infection not excluded.  Heart is at the upper limits of normal in size.  Calcified atheromatous disease affects the aorta.  Age-appropriate degenerative changes affect the skeleton.      Assessment/Plan:      * COVID-19  Patient is identified as High risk for severe complications of COVID 19 based on COVID risk score of 6   Initiate standard COVID protocols; COVID-19 testing ,Infection Control notification  and isolation- respiratory, contact and droplet per protocol    Diagnostics: (leukopenia, hyponatremia, hyperferritinemia, elevated troponin, elevated d-dimer, age, and comorbidities are significant predictors of poor clinical outcome)  CBC, CMP, Ferritin, CRP and Portable CXR    Management: Maintain oxygen saturations 92-96% via Nasal Cannula 5 LPM and monitor with continuous/intermittent pulse oximetry. , Inhaled bronchodilators as needed for shortness of breath. and Continuous cardiac monitoring.    Will hold off on remdesivir at this point as she seems stable from resp standpoint; currently on home dose of 2L NC    8/27: pt remains stable on home Oxygen 2-3L NC. Monitor    8/30: worsening hypoxia  Cont steroids  Cont levaquin  [facility] any benefit for remdesivir at this  point (s/p MAB outpatient) vs toci  Repeat imaging pending    8/29 pt remains on home O2 demands. Cont duonebs. No remedesivir had outpt infusion for covid and was on tamiflu outpt as well. Dr matson added levaquin dexamethasone day 4.    8/30 dexamethasone day 5- call pharmacy for consult- pt now with increased o2 demands     8/31 Day 6 decadron,  Remdesivir    9/1 day 7 decadron; day 3 remdesivir, day 4 levaquin    9/2 day 8 of decadron and day 4/5 remdesivir. Day 5 levaquin. Check procal. Will likely d/c levaquin pending cxr.    9/3  Day 5 of remdesivir day 9 of decadron. Stop remdesivir after today. D/c'd levaquin yeserday. procal remains normal. Afebrile normal wbc.    9/4  CXR doesn't LOOK like covid. She has completed remdesivir.    Severe malnutrition  She isn't eating. She is now on Day 3 of PPN. She will NOT tolerate an NG tube at this time without having to have restraints. She will not tolerate this at all. Cont to encourage PO.                        Measurements:  Wt Readings from Last 1 Encounters:   09/01/22 54.8 kg (120 lb 13 oz)   Body mass index is 22.1 kg/m².    Recommendations: Recommendation/Intervention: 1. Rec'd continue Boost Plus ONS TID to provide an additional 1080 kcals and 42 g protein. 2. Rec'd honoring pt preferences to encourage po intake. 3. RD to follow and make rec's accordingly.  Goals: Pt will consume 25-50% of meals by f/u.    Patient has been screened and assessed by RD. RD will follow patient.      Pneumonia due to infectious organism  Starting levaquin 8/28 per dr matson  Cont duonebs and dexamethasone supplemental O2 .    Repeat CXR.    CXR without infiltrate.  D/c abx.    Hypokalemia  Resolved, monitor.      Right lower lobe lung mass  CT chest: Interval development of a smooth pleural based soft tissue mass of the medial right lower lobe which is of uncertain etiology.  Neoplasia is not excludable.  Further evaluation with PET scan as deemed clinically necessary  Dr. Matson  consulted, does not feel she is a bronch candidate due to severe COPD and emphysematous lung changes. Would consider PET scan as outpt. Added  levaquin 8/29    Repeat CTA done 8/30.  No changes.  Severe emphasema.  Poor study.  PE not completely ruled out  Cont levaquin for now  Consider restarting Eliquis.   Will discuss with Dr Matson      Cough with hemoptysis  Hold eliquis for now  Dr. Matson following added levaquin.  Repeat CTA done 8/30 - reviewed      Anxiety  Cont home lexapro and xanax>  Increased xanax to 0.5mg TID PRN 8/29/22    She is very anxious. She lives alone. Discussed with family, she does not want any assisted living or NH.     Consult psych    9/4  Switch to scheduled klonopin. May need to consider precidex.      Lung mass  Nodular density on CXR which appears new, chect CT chest with contrast per radiology recs  Iodine allergy, will premedicate. She has had contrast studies in the past, last 2019 per records  Reviewed CT with new mass?? Infection?? Dr matson consulted. Added levaquin, holding eliquis- consider bronch if bleeding does not improve .  Cont levaquin; needs outpatient pulm follow-up    9/4  Will need a bronch.    Influenza B  Diagnosed 8/22/22  She was on tamiflu outpatient, but this may be the cause of her diarrhea. Will DC for now        Chronic atrial fibrillation  Patient with atrial fibrillation which is controlled currently with Beta Blocker, Calcium Channel Blocker and flecainide. Patient is currently in sinus rhythm.[id number] Score: 4. HASBLED Score: Unable to calculate. Anticoagulation indicated. Anticoagulation done with eliquis.    8/28/22: H&H drop from 10.1/30.6 > 8.6/26.0; pt with hemoptysis. Will hold eliquis for now. Consult cardiology in AM. She is followed by Dr. Cox outpatient. Daughter with concerns about holding eliquis, but given her hemoptysis and H&H drop it is my opinion as well as that of Dr. Matson that the risks of continued anticoagulation outweigh the  benefit currently.     8/29 dr Wilde consulted on patient this am. Cont to hold eliquis for now.    8/30 cont to hold eliquis as she is still having hemoptysis   Cont atenolol and flecainide   Cont telemetry  Monitor electrolytes    8/31- No more hemoptysis.  Consider restarting Eloquis since she is such a high risk for PE.    9/1: continued hemoptysis, but H&H improving. Consider restarting eliquis, Dr. Matson recommends we cont to hold    9/2  In sinus. Holding eliquis because of hemoptysis    9/4  Remains SINUS. Eliquis still on hold but h/h is okay.    Chronic respiratory failure  On 2-3L NC at home  Now requiring 6L NC high flow   CTA negative for PE    9/1: now on 5L NC with O2 sat 90-92%; cont BiPAP as needed    Centrilobular emphysema  Per Dr. Matson- Severe changes on CT this puts her at high risk for bronch per pulmonology recs. Cont O2 added levauin and cont nebs .    CTA without PE.     Will check LDH - this was elevated. These patients are at risk for PCP if I recall. She may benefit from IV Bactrim - though she doesn't seem 'infected' at this point. Will discuss with pulm further.    Diarrhea  C diff negative; diarrhea improved  Suspect this is due to tamiflu; will DC as she seems stable from resp standpoint  S/p 1L NS in ER; DC NS .    She has known diarrhea at baseline. Now with slight hypokalemia. Replace. Okay to cont with immodium    Improved.    Emphysema/COPD  On home oxygen 2-3L NC.;  Now with increased demands   Cont nebs, steroids, levaquin  Now on 5 liters NC/BIPAP.    Repeat CXR today.  May consider a dose of lasix as she surely has some degree of phtn and bp would tolerate this.  Recheck procal.    9/3  No consolidation on CXR. She has known end stage lung disease. She was given a dose of lasix - not much diuresis or improvement in resp status. Copious sputum noted. Increase pulm toileting and monitor for hemop.    9/4  NOT wheezing so will decrease decadron. She got 7 days of 4mg decadron.  Today she was given 2mg. She also had solumedrol a couple of days per pulm. Pulm toileting increased yesterday and tolerated well without increase in hemoptysis      Hyperlipidemia  Cont home crestor.      HTN (hypertension)  Cont home atenolol and increased valsartan to BID  Some highs likely anxiety driven  She has prn clonidine ordered.    BP has remained high/stable    Will place clonidine patch today. Her BP spikes with 'anxiety episodes.'   She has been having a lot of rebound after PRNs. Will attempt this today and monitor.    KATHIE (generalized anxiety disorder)  Will switch to scheduled klonopin rather than xanax prn.  Cont home lexapro 5mg daily  Consult psych - suggested to  Started on remeron and depakote. These have not helped much.          VTE Risk Mitigation (From admission, onward)         Ordered     Place sequential compression device  Until discontinued         08/29/22 0847                Discharge Planning   ROSAURA:      Code Status: Full Code   Is the patient medically ready for discharge?:     Reason for patient still in hospital (select all that apply): Patient unstable and Patient trending condition  Discharge Plan A: Home with family, Home Health   Discharge Delays: None known at this time        Critical care time spent on the evaluation and treatment of severe organ dysfunction, review of pertinent labs and imaging studies, discussions with consulting providers and discussions with patient/family: 45 minutes.      Pema Adams MD  Department of Hospital Medicine   [place] - Intensive Care

## 2022-09-04 NOTE — PROGRESS NOTES
[facility] - Intensive Care  Pulmonology  Progress Note    Patient Name: Marva Perez  MRN: [id number]  Admission Date: 8/26/2022  Hospital Length of Stay: 9 days  Code Status: Full Code  Attending Provider: Stewart Lucia MD  Primary Care Provider: Kevin Clements MD   Principal Problem: COVID-19    Subjective:     Interval History: Stable o2 dropped after turned off over 2.5 min to 87 % on 4 liters o2 sat is back up to 95% in less than 3 minutes .    Objective:     Vital Signs (Most Recent):  Temp: 97.2 °F (36.2 °C) (09/04/22 0715)  Pulse: 98 (09/04/22 1000)  Resp: (!) 31 (09/04/22 1000)  BP: (!) 199/95 (09/04/22 1000)  SpO2: (!) 93 % (09/04/22 1000)   Vital Signs (24h Range):  Temp:  [97.1 °F (36.2 °C)-98 °F (36.7 °C)] 97.2 °F (36.2 °C)  Pulse:  [64-98] 98  Resp:  [20-54] 31  SpO2:  [83 %-100 %] 93 %  BP: (104-239)/() 199/95     Weight: 54.8 kg (120 lb 13 oz)  Body mass index is 22.1 kg/m².      Intake/Output Summary (Last 24 hours) at 9/4/2022 1057  Last data filed at 9/4/2022 1000  Gross per 24 hour   Intake 2067.06 ml   Output --   Net 2067.06 ml       Physical Exam  Vitals and nursing note reviewed.   Constitutional:       General: She is not in acute distress.     Appearance: Normal appearance. She is well-developed. She is not ill-appearing, toxic-appearing or diaphoretic.   HENT:      Head: Normocephalic and atraumatic.      Jaw: No trismus.      Right Ear: Hearing, tympanic membrane, ear canal and external ear normal.      Left Ear: Hearing, tympanic membrane, ear canal and external ear normal.      Nose: Nose normal. No nasal deformity, mucosal edema or rhinorrhea.      Right Sinus: No maxillary sinus tenderness or frontal sinus tenderness.      Left Sinus: No maxillary sinus tenderness or frontal sinus tenderness.      Mouth/Throat:      Dentition: Normal dentition.      Pharynx: Uvula midline. No posterior oropharyngeal erythema or uvula swelling.   Eyes:      General: Lids are normal. No  scleral icterus.     Conjunctiva/sclera: Conjunctivae normal.      Comments: Sclera clear bilat   Neck:      Trachea: Trachea and phonation normal.   Cardiovascular:      Rate and Rhythm: Normal rate and regular rhythm.      Pulses: Normal pulses.      Heart sounds: Normal heart sounds.   Pulmonary:      Effort: Prolonged expiration and respiratory distress (mild to moderate) present.      Breath sounds: Decreased air movement present. Examination of the right-middle field reveals decreased breath sounds, wheezing and rhonchi. Examination of the left-middle field reveals decreased breath sounds, wheezing and rhonchi. Examination of the right-lower field reveals decreased breath sounds, wheezing and rhonchi. Examination of the left-lower field reveals decreased breath sounds, wheezing and rhonchi. Decreased breath sounds, wheezing (mid expiratory wheezing) and rhonchi present.   Abdominal:      General: Bowel sounds are normal. There is no distension.      Palpations: Abdomen is soft.      Tenderness: There is no abdominal tenderness.   Musculoskeletal:         General: No deformity. Normal range of motion.      Cervical back: Full passive range of motion without pain and neck supple.   Skin:     General: Skin is warm and dry.      Coloration: Skin is not pale.   Neurological:      Mental Status: She is alert and oriented to person, place, and time.      Motor: No abnormal muscle tone.      Coordination: Coordination normal.   Psychiatric:         Speech: Speech normal.         Behavior: Behavior normal. Behavior is cooperative.         Thought Content: Thought content normal.         Judgment: Judgment normal.       Vents:  Oxygen Concentration (%): 35 (09/04/22 0749)    Lines/Drains/Airways       Peripheral Intravenous Line  Duration                  Peripheral IV - Single Lumen 09/02/22 1155 20 G Anterior;Left;Proximal Forearm 1 day                    Significant Labs:    CBC/Anemia Profile:  Recent Labs   Lab  09/03/22  0903 09/04/22  0437   WBC 15.97* 13.01*   HGB 10.7* 10.7*   HCT 33.5* 32.8*    203   MCV 94 93   RDW 13.5 13.5        Chemistries:  Recent Labs   Lab 09/03/22  0903 09/04/22  0437    141   K 4.3 4.6    98   CO2 33* 34*   BUN 32* 36*   CREATININE 0.7 0.7   CALCIUM 8.4* 8.4*       All pertinent labs within the past 24 hours have been reviewed.    Significant Imaging:  I have reviewed all pertinent imaging results/findings within the past 24 hours.    Assessment/Plan:     * COVID-19  ++ test she went to a wedding   ++ covid and flu B    Severe malnutrition  Nutrition consulted. Most recent weight and BMI monitored-                         Measurements:  Wt Readings from Last 1 Encounters:   09/01/22 54.8 kg (120 lb 13 oz)   Body mass index is 22.1 kg/m².    Recommendations: Recommendation/Intervention: 1. Rec'd continue Boost Plus ONS TID to provide an additional 1080 kcals and 42 g protein. 2. Rec'd honoring pt preferences to encourage po intake. 3. RD to follow and make rec's accordingly.  Goals: Pt will consume 25-50% of meals by f/u.    Patient has been screened and assessed by RD. RD will follow patient.      Pneumonia due to infectious organism  Will start levoq    Right lower lobe lung mass  Will need to access ++ hemoptysis    Cough with hemoptysis  off eloquise now will do cytology on sputum and if persist do a bronchoscopy in 3 to 5 days    Anxiety  Worried about hemoptysis    Lung mass  CT shows a RLL superior segment pleural lesion     Influenza B  ++ covid and flu B    Chronic atrial fibrillation  On eloquise has been stopped     COPD exacerbation  severe    Chronic respiratory failure  Patient with Hypoxic Respiratory failure which is Chronic.  she is on home oxygen at 3 LPM. Supplemental oxygen was provided and noted- Oxygen Concentration (%):  [35-40] 35.   Signs/symptoms of respiratory failure include- increased work of breathing. Contributing diagnoses includes - COPD Labs  and images were reviewed. Patient Has recent ABG, which has been reviewed. Will treat underlying causes and adjust management of respiratory failure as follows- 3    Centrilobular emphysema  Severe end stage    Emphysema/COPD  Severe end stage     HTN (hypertension)  High but stable   stable    KATHIE (generalized anxiety disorder)  stable      Critical care time spent on the evaluation and treatment of severe organ dysfunction, review of pertinent labs and imaging studies, discussions with consulting providers and discussions with patient/family: 31 minutes.       Howard Matson MD  Pulmonology  [facility] - Intensive Care

## 2022-09-04 NOTE — EICU
Rounding (Video Assessment):  Yes        Comments: Video rounding completed. VSS. Sats 99% on 4 L HFNC. RNs at bedside.

## 2022-09-05 LAB
ALBUMIN SERPL BCP-MCNC: 2.9 G/DL (ref 3.5–5.2)
ALP SERPL-CCNC: 66 U/L (ref 55–135)
ALT SERPL W/O P-5'-P-CCNC: 26 U/L (ref 10–44)
ANION GAP SERPL CALC-SCNC: 6 MMOL/L (ref 8–16)
ANISOCYTOSIS BLD QL SMEAR: SLIGHT
AST SERPL-CCNC: 18 U/L (ref 10–40)
BASOPHILS # BLD AUTO: ABNORMAL K/UL (ref 0–0.2)
BASOPHILS NFR BLD: 0 % (ref 0–1.9)
BILIRUB SERPL-MCNC: 0.9 MG/DL (ref 0.1–1)
BUN SERPL-MCNC: 34 MG/DL (ref 8–23)
CALCIUM SERPL-MCNC: 8.5 MG/DL (ref 8.7–10.5)
CHLORIDE SERPL-SCNC: 99 MMOL/L (ref 95–110)
CO2 SERPL-SCNC: 35 MMOL/L (ref 23–29)
CREAT SERPL-MCNC: 0.7 MG/DL (ref 0.5–1.4)
DIFFERENTIAL METHOD: ABNORMAL
EOSINOPHIL # BLD AUTO: ABNORMAL K/UL (ref 0–0.5)
EOSINOPHIL NFR BLD: 0 % (ref 0–8)
ERYTHROCYTE [DISTWIDTH] IN BLOOD BY AUTOMATED COUNT: 14.1 % (ref 11.5–14.5)
EST. GFR  (NO RACE VARIABLE): >60 ML/MIN/1.73 M^2
GLUCOSE SERPL-MCNC: 142 MG/DL (ref 70–110)
HCT VFR BLD AUTO: 34.6 % (ref 37–48.5)
HGB BLD-MCNC: 11.1 G/DL (ref 12–16)
IMM GRANULOCYTES # BLD AUTO: ABNORMAL K/UL (ref 0–0.04)
IMM GRANULOCYTES NFR BLD AUTO: ABNORMAL % (ref 0–0.5)
LYMPHOCYTES # BLD AUTO: ABNORMAL K/UL (ref 1–4.8)
LYMPHOCYTES NFR BLD: 7 % (ref 18–48)
MCH RBC QN AUTO: 29.9 PG (ref 27–31)
MCHC RBC AUTO-ENTMCNC: 32.1 G/DL (ref 32–36)
MCV RBC AUTO: 93 FL (ref 82–98)
MONOCYTES # BLD AUTO: ABNORMAL K/UL (ref 0.3–1)
MONOCYTES NFR BLD: 6 % (ref 4–15)
MYELOCYTES NFR BLD MANUAL: 2 %
NEUTROPHILS NFR BLD: 80 % (ref 38–73)
NEUTS BAND NFR BLD MANUAL: 5 %
NRBC BLD-RTO: 0 /100 WBC
PLATELET # BLD AUTO: 192 K/UL (ref 150–450)
PLATELET BLD QL SMEAR: ABNORMAL
PMV BLD AUTO: 10.7 FL (ref 9.2–12.9)
POIKILOCYTOSIS BLD QL SMEAR: SLIGHT
POLYCHROMASIA BLD QL SMEAR: ABNORMAL
POTASSIUM SERPL-SCNC: 4.8 MMOL/L (ref 3.5–5.1)
PROT SERPL-MCNC: 5.1 G/DL (ref 6–8.4)
RBC # BLD AUTO: 3.71 M/UL (ref 4–5.4)
SODIUM SERPL-SCNC: 140 MMOL/L (ref 136–145)
WBC # BLD AUTO: 16.05 K/UL (ref 3.9–12.7)

## 2022-09-05 PROCEDURE — 94660 CPAP INITIATION&MGMT: CPT

## 2022-09-05 PROCEDURE — 25000003 PHARM REV CODE 250: Performed by: FAMILY MEDICINE

## 2022-09-05 PROCEDURE — 85007 BL SMEAR W/DIFF WBC COUNT: CPT | Performed by: INTERNAL MEDICINE

## 2022-09-05 PROCEDURE — 99900035 HC TECH TIME PER 15 MIN (STAT)

## 2022-09-05 PROCEDURE — 25000242 PHARM REV CODE 250 ALT 637 W/ HCPCS: Performed by: FAMILY MEDICINE

## 2022-09-05 PROCEDURE — 27000221 HC OXYGEN, UP TO 24 HOURS

## 2022-09-05 PROCEDURE — 25000003 PHARM REV CODE 250: Performed by: NURSE PRACTITIONER

## 2022-09-05 PROCEDURE — 20000000 HC ICU ROOM

## 2022-09-05 PROCEDURE — 99233 PR SUBSEQUENT HOSPITAL CARE,LEVL III: ICD-10-PCS | Mod: ,,, | Performed by: INTERNAL MEDICINE

## 2022-09-05 PROCEDURE — 36415 COLL VENOUS BLD VENIPUNCTURE: CPT | Performed by: INTERNAL MEDICINE

## 2022-09-05 PROCEDURE — 94761 N-INVAS EAR/PLS OXIMETRY MLT: CPT

## 2022-09-05 PROCEDURE — 25000003 PHARM REV CODE 250: Performed by: STUDENT IN AN ORGANIZED HEALTH CARE EDUCATION/TRAINING PROGRAM

## 2022-09-05 PROCEDURE — 94640 AIRWAY INHALATION TREATMENT: CPT

## 2022-09-05 PROCEDURE — 27000207 HC ISOLATION

## 2022-09-05 PROCEDURE — 85027 COMPLETE CBC AUTOMATED: CPT | Performed by: INTERNAL MEDICINE

## 2022-09-05 PROCEDURE — 25000242 PHARM REV CODE 250 ALT 637 W/ HCPCS: Performed by: INTERNAL MEDICINE

## 2022-09-05 PROCEDURE — 99233 SBSQ HOSP IP/OBS HIGH 50: CPT | Mod: ,,, | Performed by: INTERNAL MEDICINE

## 2022-09-05 PROCEDURE — 25000003 PHARM REV CODE 250: Performed by: INTERNAL MEDICINE

## 2022-09-05 PROCEDURE — 80053 COMPREHEN METABOLIC PANEL: CPT | Performed by: INTERNAL MEDICINE

## 2022-09-05 RX ADMIN — FLECAINIDE ACETATE 50 MG: 50 TABLET ORAL at 09:09

## 2022-09-05 RX ADMIN — IPRATROPIUM BROMIDE AND ALBUTEROL SULFATE 3 ML: .5; 3 SOLUTION RESPIRATORY (INHALATION) at 12:09

## 2022-09-05 RX ADMIN — MIRTAZAPINE 7.5 MG: 7.5 TABLET ORAL at 09:09

## 2022-09-05 RX ADMIN — VALSARTAN 160 MG: 80 TABLET, FILM COATED ORAL at 09:09

## 2022-09-05 RX ADMIN — ATORVASTATIN CALCIUM 10 MG: 10 TABLET, FILM COATED ORAL at 09:09

## 2022-09-05 RX ADMIN — CLONAZEPAM 0.5 MG: 0.5 TABLET ORAL at 09:09

## 2022-09-05 RX ADMIN — ACETYLCYSTEINE 2 ML: 200 INHALANT RESPIRATORY (INHALATION) at 06:09

## 2022-09-05 RX ADMIN — MUPIROCIN: 20 OINTMENT TOPICAL at 09:09

## 2022-09-05 RX ADMIN — DILTIAZEM HYDROCHLORIDE 60 MG: 60 TABLET, FILM COATED ORAL at 09:09

## 2022-09-05 RX ADMIN — ESCITALOPRAM OXALATE 5 MG: 5 TABLET, FILM COATED ORAL at 09:09

## 2022-09-05 RX ADMIN — OXYCODONE HYDROCHLORIDE AND ACETAMINOPHEN 500 MG: 500 TABLET ORAL at 09:09

## 2022-09-05 RX ADMIN — IPRATROPIUM BROMIDE AND ALBUTEROL SULFATE 3 ML: .5; 3 SOLUTION RESPIRATORY (INHALATION) at 07:09

## 2022-09-05 RX ADMIN — IPRATROPIUM BROMIDE AND ALBUTEROL SULFATE 3 ML: .5; 3 SOLUTION RESPIRATORY (INHALATION) at 06:09

## 2022-09-05 RX ADMIN — ATENOLOL 12.5 MG: 25 TABLET ORAL at 09:09

## 2022-09-05 NOTE — ASSESSMENT & PLAN NOTE
"She isn't eating. She is now on Day 3 of PPN. She will NOT tolerate an NG tube at this time without having to have restraints. She will not tolerate this at all. Cont to encourage PO.    9/5: It is unclear to me why patient is not eating. She has no medical reason she can not tolerate PO intake.Discussed with patient today and just states she isn't eating because "it is cold". Will given warm broth instead of ice cream. If she continues to not take in PO nutrition we will need to consider hospice consultation because continuing PPN indefinitely is not an option. She tells me she will start eating today.                         Measurements:  Wt Readings from Last 1 Encounters:   09/01/22 54.8 kg (120 lb 13 oz)   Body mass index is 22.1 kg/m².    Recommendations: Recommendation/Intervention: 1. Rec'd continue Boost Plus ONS TID to provide an additional 1080 kcals and 42 g protein. 2. Rec'd honoring pt preferences to encourage po intake. 3. RD to follow and make rec's accordingly.  Goals: Pt will consume 25-50% of meals by f/u.    Patient has been screened and assessed by RD. RD will follow patient.    "

## 2022-09-05 NOTE — ASSESSMENT & PLAN NOTE
Cont home atenolol and increased valsartan to BID  Some highs likely anxiety driven  She has prn clonidine ordered.    BP has remained high/stable    9/4: Will place clonidine patch today. Her BP spikes with 'anxiety episodes.'   She has been having a lot of rebound after PRNs. Will attempt this today and monitor.    9/5: const same

## 2022-09-05 NOTE — ASSESSMENT & PLAN NOTE
Cont home lexapro and xanax>  Increased xanax to 0.5mg TID PRN 8/29/22    She is very anxious. She lives alone. Discussed with family, she does not want any assisted living or NH.     Consult psych    9/4  Switch to scheduled klonopin. May need to consider precidex.    9/5: Klonopin and lexapro; remeron at night. Needs outpatient psych and counseling  Contributing to her difficulty with medical improvement; at rest normal oxygen sats, when having panic attack RR > 30 and sats 87% with supplementation but quickly recovers to > 92%

## 2022-09-05 NOTE — ASSESSMENT & PLAN NOTE
Patient is identified as High risk for severe complications of COVID 19 based on COVID risk score of 6   Initiate standard COVID protocols; COVID-19 testing ,Infection Control notification  and isolation- respiratory, contact and droplet per protocol    Diagnostics: (leukopenia, hyponatremia, hyperferritinemia, elevated troponin, elevated d-dimer, age, and comorbidities are significant predictors of poor clinical outcome)  CBC, CMP, Ferritin, CRP and Portable CXR    Management: Maintain oxygen saturations 92-96% via Nasal Cannula 4 LPM and monitor with continuous/intermittent pulse oximetry. , Inhaled bronchodilators as needed for shortness of breath. and Continuous cardiac monitoring.    Will hold off on remdesivir at this point as she seems stable from resp standpoint; currently on home dose of 2L NC    8/27: pt remains stable on home Oxygen 2-3L NC. Monitor    8/30: worsening hypoxia  Cont steroids  Cont levaquin  Discussing with pharmacy based on [facility] protocol if any benefit for remdesivir at this point (s/p [facility] outpatient) vs toci  Repeat imaging pending    8/29 pt remains on home O2 demands. Cont duonebs. No remedesivir had outpt infusion for covid and was on tamiflu outpt as well. Dr herbert added levaquin dexamethasone day 4.    8/30 dexamethasone day 5- call pharmacy for consult- pt now with increased o2 demands     8/31 Day 6 decadron,  Remdesivir    9/1 day 7 decadron; day 3 remdesivir, day 4 levaquin    9/2 day 8 of decadron and day 4/5 remdesivir. Day 5 levaquin. Check procal. Will likely d/c levaquin pending cxr.    9/3  Day 5 of remdesivir day 9 of decadron. Stop remdesivir after today. D/c'd levaquin yeserday. procal remains normal. Afebrile normal wbc.    9/5  Completed dex and remdesivir  Wean oxygen to 3L today (home).   Start discharge planning. She needs outpatient pulm rehab. She wants to live at home. desats with exertion but suspect this will not improve much more with further inpatient  mangement

## 2022-09-05 NOTE — PROGRESS NOTES
Notified by house supervisor the family no longer wants me involved in patient's care. They want Dr. Howard Matson to be her primary physician during her admission here. House supervisor spoke to Dr. Matson who agrees.    This physician will sign off of her case effectively immediately.     The family medicine and internal medicine teams here will no longer round on this patient or be involved in her care. We can be consulted by Dr. Matson if needed but are no longer the primary team for the rest of her inpatient admission. She is not discharged but transferred to the pulmonary service/Dr. Matson. Will place a transfer order in the chart to reflect this as well.

## 2022-09-05 NOTE — SUBJECTIVE & OBJECTIVE
Interval History: Stable looks in NAD     Objective:     Vital Signs (Most Recent):  Temp: 96.7 °F (35.9 °C) (09/05/22 0713)  Pulse: 80 (09/05/22 0754)  Resp: (!) 28 (09/05/22 0754)  BP: (!) 198/88 (09/05/22 0749)  SpO2: (!) 92 % (09/05/22 0754)   Vital Signs (24h Range):  Temp:  [96.7 °F (35.9 °C)-98 °F (36.7 °C)] 96.7 °F (35.9 °C)  Pulse:  [61-93] 80  Resp:  [20-36] 28  SpO2:  [89 %-100 %] 92 %  BP: (113-221)/() 198/88     Weight: 54.8 kg (120 lb 13 oz)  Body mass index is 22.1 kg/m².      Intake/Output Summary (Last 24 hours) at 9/5/2022 1113  Last data filed at 9/4/2022 1900  Gross per 24 hour   Intake 776.64 ml   Output --   Net 776.64 ml       Physical Exam  Vitals and nursing note reviewed.   Constitutional:       General: She is not in acute distress.     Appearance: Normal appearance. She is well-developed. She is not ill-appearing, toxic-appearing or diaphoretic.   HENT:      Head: Normocephalic and atraumatic.      Jaw: No trismus.      Right Ear: Hearing, tympanic membrane, ear canal and external ear normal.      Left Ear: Hearing, tympanic membrane, ear canal and external ear normal.      Nose: Nose normal. No nasal deformity, mucosal edema or rhinorrhea.      Right Sinus: No maxillary sinus tenderness or frontal sinus tenderness.      Left Sinus: No maxillary sinus tenderness or frontal sinus tenderness.      Mouth/Throat:      Dentition: Normal dentition.      Pharynx: Uvula midline. No posterior oropharyngeal erythema or uvula swelling.   Eyes:      General: Lids are normal. No scleral icterus.     Conjunctiva/sclera: Conjunctivae normal.      Comments: Sclera clear bilat   Neck:      Trachea: Trachea and phonation normal.   Cardiovascular:      Rate and Rhythm: Normal rate and regular rhythm.      Pulses: Normal pulses.      Heart sounds: Normal heart sounds.   Pulmonary:      Effort: Accessory muscle usage, prolonged expiration and respiratory distress (mild to moderate) present.      Breath  sounds: Decreased air movement present. Examination of the right-middle field reveals decreased breath sounds, wheezing and rhonchi. Examination of the left-middle field reveals decreased breath sounds, wheezing and rhonchi. Examination of the right-lower field reveals decreased breath sounds, wheezing and rhonchi. Examination of the left-lower field reveals decreased breath sounds, wheezing and rhonchi. Decreased breath sounds, wheezing and rhonchi present.   Abdominal:      General: Bowel sounds are normal. There is no distension.      Palpations: Abdomen is soft.      Tenderness: There is no abdominal tenderness.   Musculoskeletal:         General: No deformity. Normal range of motion.      Cervical back: Full passive range of motion without pain and neck supple.   Skin:     General: Skin is warm and dry.      Coloration: Skin is not pale.   Neurological:      Mental Status: She is alert and oriented to person, place, and time.      Motor: No abnormal muscle tone.      Coordination: Coordination normal.   Psychiatric:         Speech: Speech normal.         Behavior: Behavior normal. Behavior is cooperative.         Thought Content: Thought content normal.         Judgment: Judgment normal.       Vents:  Oxygen Concentration (%): 35 (09/05/22 0320)    Lines/Drains/Airways       Peripheral Intravenous Line  Duration                  Peripheral IV - Single Lumen 09/02/22 1155 20 G Anterior;Left;Proximal Forearm 2 days                    Significant Labs:    CBC/Anemia Profile:  Recent Labs   Lab 09/04/22 0437   WBC 13.01*   HGB 10.7*   HCT 32.8*      MCV 93   RDW 13.5        Chemistries:  Recent Labs   Lab 09/04/22 0437 09/05/22  1006    140   K 4.6 4.8   CL 98 99   CO2 34* 35*   BUN 36* 34*   CREATININE 0.7 0.7   CALCIUM 8.4* 8.5*   ALBUMIN  --  2.9*   PROT  --  5.1*   BILITOT  --  0.9   ALKPHOS  --  66   ALT  --  26   AST  --  18       All pertinent labs within the past 24 hours have been  reviewed.    Significant Imaging:  I have reviewed all pertinent imaging results/findings within the past 24 hours.

## 2022-09-05 NOTE — ASSESSMENT & PLAN NOTE
Will switch to scheduled klonopin rather than xanax prn.  Cont home lexapro 5mg daily  Consult psych - suggested to  Started on remeron and depakote. These have not helped much.    Unfortunately this is complicating her medical treatment as desats with panic attacks. This is not likely to improve with further inpatient treatment however and will need outpatient psych follow up and counseling

## 2022-09-05 NOTE — ASSESSMENT & PLAN NOTE
C diff negative; diarrhea improved  Suspect this is due to tamiflu; will DC as she seems stable from resp standpoint  S/p 1L NS in ER; DC NS .    She has known diarrhea at baseline. Now with slight hypokalemia. Replace. Okay to cont with immodium    9/5: improved

## 2022-09-05 NOTE — ASSESSMENT & PLAN NOTE
Patient with atrial fibrillation which is controlled currently with Beta Blocker, Calcium Channel Blocker and flecainide. Patient is currently in sinus rhythm.[id number] Score: 4. HASBLED Score: Unable to calculate. Anticoagulation indicated. Anticoagulation done with eliquis.    8/28/22: H&H drop from 10.1/30.6 > 8.6/26.0; pt with hemoptysis. Will hold eliquis for now. Consult cardiology in AM. She is followed by Dr. Cox outpatient. Daughter with concerns about holding eliquis, but given her hemoptysis and H&H drop it is my opinion as well as that of Dr. Matson that the risks of continued anticoagulation outweigh the benefit currently.     8/29 dr Wilde consulted on patient this am. Cont to hold eliquis for now.    8/30 cont to hold eliquis as she is still having hemoptysis   Cont atenolol and flecainide   Cont telemetry  Monitor electrolytes    8/31- No more hemoptysis.  Consider restarting Eloquis since she is such a high risk for PE.    9/1: continued hemoptysis, but H&H improving. Consider restarting eliquis, Dr. Matson recommends we cont to hold    9/2  In sinus. Holding eliquis because of hemoptysis    9/5  Remains SINUS. Eliquis still on hold but h/h is okay. If Dr. Matson will not bronch will need to resume in anticipation of discharge

## 2022-09-05 NOTE — EICU
Rounding (Video Assessment):  Yes    Comments: Video assessment completed.  Pt lying in bed.  Pursed lip exhalation noted with NC in use.  SaO2 90%.  Pt alert at this time. Denies distress or discomfort.

## 2022-09-05 NOTE — PROGRESS NOTES
[facility] - Intensive Care  Pulmonology  Progress Note    Patient Name: Marva Perez  MRN: [id number]  Admission Date: 8/26/2022  Hospital Length of Stay: 10 days  Code Status: Full Code  Attending Provider: Stewart Lucia MD  Primary Care Provider: Kevin Clements MD   Principal Problem: COVID-19    Subjective:     Interval History: Stable looks in NAD     Objective:     Vital Signs (Most Recent):  Temp: 96.7 °F (35.9 °C) (09/05/22 0713)  Pulse: 80 (09/05/22 0754)  Resp: (!) 28 (09/05/22 0754)  BP: (!) 198/88 (09/05/22 0749)  SpO2: (!) 92 % (09/05/22 0754)   Vital Signs (24h Range):  Temp:  [96.7 °F (35.9 °C)-98 °F (36.7 °C)] 96.7 °F (35.9 °C)  Pulse:  [61-93] 80  Resp:  [20-36] 28  SpO2:  [89 %-100 %] 92 %  BP: (113-221)/() 198/88     Weight: 54.8 kg (120 lb 13 oz)  Body mass index is 22.1 kg/m².      Intake/Output Summary (Last 24 hours) at 9/5/2022 1113  Last data filed at 9/4/2022 1900  Gross per 24 hour   Intake 776.64 ml   Output --   Net 776.64 ml       Physical Exam  Vitals and nursing note reviewed.   Constitutional:       General: She is not in acute distress.     Appearance: Normal appearance. She is well-developed. She is not ill-appearing, toxic-appearing or diaphoretic.   HENT:      Head: Normocephalic and atraumatic.      Jaw: No trismus.      Right Ear: Hearing, tympanic membrane, ear canal and external ear normal.      Left Ear: Hearing, tympanic membrane, ear canal and external ear normal.      Nose: Nose normal. No nasal deformity, mucosal edema or rhinorrhea.      Right Sinus: No maxillary sinus tenderness or frontal sinus tenderness.      Left Sinus: No maxillary sinus tenderness or frontal sinus tenderness.      Mouth/Throat:      Dentition: Normal dentition.      Pharynx: Uvula midline. No posterior oropharyngeal erythema or uvula swelling.   Eyes:      General: Lids are normal. No scleral icterus.     Conjunctiva/sclera: Conjunctivae normal.      Comments: Sclera clear bilat    Neck:      Trachea: Trachea and phonation normal.   Cardiovascular:      Rate and Rhythm: Normal rate and regular rhythm.      Pulses: Normal pulses.      Heart sounds: Normal heart sounds.   Pulmonary:      Effort: Accessory muscle usage, prolonged expiration and respiratory distress (mild to moderate) present.      Breath sounds: Decreased air movement present. Examination of the right-middle field reveals decreased breath sounds, wheezing and rhonchi. Examination of the left-middle field reveals decreased breath sounds, wheezing and rhonchi. Examination of the right-lower field reveals decreased breath sounds, wheezing and rhonchi. Examination of the left-lower field reveals decreased breath sounds, wheezing and rhonchi. Decreased breath sounds, wheezing and rhonchi present.   Abdominal:      General: Bowel sounds are normal. There is no distension.      Palpations: Abdomen is soft.      Tenderness: There is no abdominal tenderness.   Musculoskeletal:         General: No deformity. Normal range of motion.      Cervical back: Full passive range of motion without pain and neck supple.   Skin:     General: Skin is warm and dry.      Coloration: Skin is not pale.   Neurological:      Mental Status: She is alert and oriented to person, place, and time.      Motor: No abnormal muscle tone.      Coordination: Coordination normal.   Psychiatric:         Speech: Speech normal.         Behavior: Behavior normal. Behavior is cooperative.         Thought Content: Thought content normal.         Judgment: Judgment normal.       Vents:  Oxygen Concentration (%): 35 (09/05/22 0320)    Lines/Drains/Airways       Peripheral Intravenous Line  Duration                  Peripheral IV - Single Lumen 09/02/22 1155 20 G Anterior;Left;Proximal Forearm 2 days                    Significant Labs:    CBC/Anemia Profile:  Recent Labs   Lab 09/04/22  0437   WBC 13.01*   HGB 10.7*   HCT 32.8*      MCV 93   RDW 13.5         Chemistries:  Recent Labs   Lab 09/04/22  0437 09/05/22  1006    140   K 4.6 4.8   CL 98 99   CO2 34* 35*   BUN 36* 34*   CREATININE 0.7 0.7   CALCIUM 8.4* 8.5*   ALBUMIN  --  2.9*   PROT  --  5.1*   BILITOT  --  0.9   ALKPHOS  --  66   ALT  --  26   AST  --  18       All pertinent labs within the past 24 hours have been reviewed.    Significant Imaging:  I have reviewed all pertinent imaging results/findings within the past 24 hours.    Assessment/Plan:     * COVID-19  ++ test she went to a wedding   ++ covid and flu B    Severe malnutrition  Nutrition consulted. Most recent weight and BMI monitored-                         Measurements:  Wt Readings from Last 1 Encounters:   09/01/22 54.8 kg (120 lb 13 oz)   Body mass index is 22.1 kg/m².    Recommendations: Recommendation/Intervention: 1. Rec'd continue Boost Plus ONS TID to provide an additional 1080 kcals and 42 g protein. 2. Rec'd honoring pt preferences to encourage po intake. 3. RD to follow and make rec's accordingly.  Goals: Pt will consume 25-50% of meals by f/u.    Patient has been screened and assessed by RD. RD will follow patient.      Pneumonia due to infectious organism  Will start levoq    Cough with hemoptysis  off eloquise now will do cytology on sputum and if persist do a bronchoscopy in 3 to 5 days    Anxiety  Worried about hemoptysis    Lung mass  CT shows a RLL superior segment pleural lesion     Influenza B  ++ covid and flu B    Chronic atrial fibrillation  On eloquise has been stopped     Chronic respiratory failure  Patient with Hypoxic Respiratory failure which is Chronic.  she is on home oxygen at 3 LPM. Supplemental oxygen was provided and noted- Oxygen Concentration (%):  [35] 35.   Signs/symptoms of respiratory failure include- increased work of breathing. Contributing diagnoses includes - COPD Labs and images were reviewed. Patient Has recent ABG, which has been reviewed. Will treat underlying causes and adjust management of  respiratory failure as follows- 3    Centrilobular emphysema  Severe end stage    Emphysema/COPD  Severe end stage     HTN (hypertension)  High but stable   stable    KATHIE (generalized anxiety disorder)  stable      Critical care time spent on the evaluation and treatment of severe organ dysfunction, review of pertinent labs and imaging studies, discussions with consulting providers and discussions with patient/family: 31 minutes.     Howard Matson MD  Pulmonology  [facility] - Intensive Care

## 2022-09-05 NOTE — EICU
Rounding (Video Assessment):  Yes  Comments:  Video rounds completed.  Resting quietly in bed,  VSS (refer to flowsheet), nad noted.

## 2022-09-05 NOTE — ASSESSMENT & PLAN NOTE
Patient with Hypoxic Respiratory failure which is Chronic.  she is on home oxygen at 3 LPM. Supplemental oxygen was provided and noted- Oxygen Concentration (%):  [35] 35.   Signs/symptoms of respiratory failure include- increased work of breathing. Contributing diagnoses includes - COPD Labs and images were reviewed. Patient Has recent ABG, which has been reviewed. Will treat underlying causes and adjust management of respiratory failure as follows- 3

## 2022-09-05 NOTE — ASSESSMENT & PLAN NOTE
Per Dr. Matson- Severe changes on CT this puts her at high risk for bronch per pulmonology recs. Cont O2 added levauin and cont nebs .    CTA without PE.     Will check LDH - this was elevated. These patients are at risk for PCP if I recall. She may benefit from IV Bactrim - though she doesn't seem 'infected' at this point. Will discuss with pulm further.    9/5: Dr. Matson following  This is likely her baseline  Put oxygen at 3L which is her home routine today and monitor. Sats are normal at rest but do drop with panic attacks and exertion. I suspect this may be her new baseline and I'm not convinced we will see much more improvement from this given her chronic, severe lung disease  Completed levaquin and dexamethasone  On duoenb and NAC per pulm

## 2022-09-05 NOTE — SUBJECTIVE & OBJECTIVE
Review of Systems   Constitutional:  Positive for appetite change and fatigue. Negative for activity change and fever.   HENT:  Positive for hearing loss (chronic). Negative for sinus pressure and sore throat.    Eyes:  Negative for discharge and visual disturbance.   Respiratory:  Positive for cough. Negative for shortness of breath.    Cardiovascular:  Negative for chest pain, palpitations and leg swelling.   Gastrointestinal:  Negative for abdominal pain, blood in stool, diarrhea and nausea.   Genitourinary:  Negative for dysuria and hematuria.   Musculoskeletal:  Negative for arthralgias and myalgias.   Neurological:  Positive for weakness (generalized).   Psychiatric/Behavioral:  Negative for confusion. The patient is nervous/anxious.    Objective:     Vital Signs (Most Recent):  Temp: 96.7 °F (35.9 °C) (09/05/22 0713)  Pulse: 80 (09/05/22 0754)  Resp: (!) 28 (09/05/22 0754)  BP: (!) 198/88 (09/05/22 0749)  SpO2: (!) 92 % (09/05/22 0754) Vital Signs (24h Range):  Temp:  [96.7 °F (35.9 °C)-98 °F (36.7 °C)] 96.7 °F (35.9 °C)  Pulse:  [61-93] 80  Resp:  [20-36] 28  SpO2:  [89 %-100 %] 92 %  BP: (113-221)/() 198/88     Weight: 54.8 kg (120 lb 13 oz)  Body mass index is 22.1 kg/m².    Physical Exam  Vitals and nursing note reviewed.   Constitutional:       General: She is not in acute distress.     Comments: Hard of hearing   HENT:      Head: Normocephalic and atraumatic.      Right Ear: External ear normal.      Left Ear: External ear normal.      Mouth/Throat:      Mouth: Mucous membranes are moist.      Pharynx: Oropharynx is clear.   Eyes:      Extraocular Movements: Extraocular movements intact.      Conjunctiva/sclera: Conjunctivae normal.      Pupils: Pupils are equal, round, and reactive to light.   Cardiovascular:      Rate and Rhythm: Normal rate and regular rhythm.      Heart sounds: Normal heart sounds. No murmur heard.  Pulmonary:      Effort: Pulmonary effort is normal.      Breath sounds: No  wheezing, rhonchi or rales.      Comments:   Weak cough  Abdominal:      General: Bowel sounds are normal. There is no distension.      Palpations: Abdomen is soft.      Tenderness: There is no abdominal tenderness.   Musculoskeletal:      Cervical back: Neck supple.      Right lower leg: No edema.      Left lower leg: No edema.   Skin:     Comments: Bruising left shoulder   Neurological:      General: No focal deficit present.      Mental Status: She is alert and oriented to person, place, and time.   Psychiatric:      Comments: Very anxious         CRANIAL NERVES     CN III, IV, VI   Pupils are equal, round, and reactive to light.     Significant Labs: .      BMP:   Recent Labs   Lab 09/04/22 0437   *      K 4.6   CL 98   CO2 34*   BUN 36*   CREATININE 0.7   CALCIUM 8.4*       CBC:   Recent Labs   Lab 09/04/22 0437   WBC 13.01*   HGB 10.7*   HCT 32.8*          CMP:   Recent Labs   Lab 09/04/22 0437      K 4.6   CL 98   CO2 34*   *   BUN 36*   CREATININE 0.7   CALCIUM 8.4*   ANIONGAP 9     8/27 mag 2.3   8/26 sed rate 22  LDH: 270  Latic 1.0  Procal: 0.08 (0.10)  ESR: 22  Ferritin: 852    Troponin 0.029  8/22 flu B positive   Covid positive   C diff negative   Blood cultures NGTD x 2     Significant Imaging:     CXR:   Chronic lung changes are seen.  Increasing hazy interstitial opacity throughout the left lung, pronounced in the left mid and lower lung zone which could reflect atelectasis, aspiration or possibly a developing pneumonia.  Probable small bilateral pleural effusions.  Calcified granulomas in the left lung.  Nodular density in the left mid lung which appears new as compared to the previous examination none.  This could possibly represent a nipple shadow.  Heart size is normal.  Calcified atheromatous disease affects the aorta.  Fortunately age-appropriate degenerative changes affect the skeleton.    8/27 CT chest    1. Interval development of a smooth pleural  based soft tissue mass of the medial right lower lobe which is of uncertain etiology.  Neoplasia is not excludable.  Further evaluation with PET scan as deemed clinically necessary.  2. 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  3. Granulomatous change.    8/27 EKG Sinus rhythm with Premature supraventricular complexes  Nonspecific T wave abnormality  Abnormal ECG  When compared with ECG of 23-AUG-2022 09:32,  Premature supraventricular complexes are now Present  Confirmed by El Urban MD ([id number]) on 8/26/2022 6:00:40 PM    CXR:  Emphysematous changes of the lungs with fibrotic changes in the lung bases, left greater than right.  Coarse interstitial prominence in the left lung base could simply represent fibrosis; however component of superimposed inflammation/infection not excluded.  Heart is at the upper limits of normal in size.  Calcified atheromatous disease affects the aorta.  Age-appropriate degenerative changes affect the skeleton.

## 2022-09-05 NOTE — ASSESSMENT & PLAN NOTE
Hold eliquis for now  Dr. Matson following added levaquin.  Repeat CTA done 8/30 - reviewed    9/5: hemoptysis resolving  If Dr. Matson will not bronch this week, need to resume eliquis. Discuss with pulm as need to start discharge planning

## 2022-09-05 NOTE — ASSESSMENT & PLAN NOTE
Nodular density on CXR which appears new, chect CT chest with contrast per radiology recs  Iodine allergy, will premedicate. She has had contrast studies in the past, last 2019 per records  Reviewed CT with new mass?? Infection?? Dr matson consulted. Added levaquin, holding eliquis- consider bronch if bleeding does not improve .  Cont levaquin; needs outpatient pulm follow-up    9/5  Will need a bronch. If not done inpatient will schedule ouptatient with Dr. Matson. He needs to make final determination so we can resume Eliquis and start d/c planning

## 2022-09-05 NOTE — ASSESSMENT & PLAN NOTE
On home oxygen 2-3L NC.;  Now with increased demands   Cont nebs, steroids, levaquin  Now on 5 liters NC/BIPAP.    Repeat CXR today.  May consider a dose of lasix as she surely has some degree of phtn and bp would tolerate this.  Recheck procal.    9/3  No consolidation on CXR. She has known end stage lung disease. She was given a dose of lasix - not much diuresis or improvement in resp status. Copious sputum noted. Increase pulm toileting and monitor for hemop.    9/5  NOT wheezing so will decrease decadron. She got 7 days of 4mg decadron. Today she was given 2mg. She also had solumedrol a couple of days per pulm. Pulm toileting increased yesterday and tolerated well without increase in hemoptysis

## 2022-09-05 NOTE — ASSESSMENT & PLAN NOTE
On 2-3L NC at home  Now requiring 6L NC high flow   CTA negative for PE    9/5: now on 4L NC with O2 sat 98% at rest. Put on 3L (home) this AM.  She desats with anxiety and exertion but unsure this will improve much with further inpatient management  On nebs  Off steroids, antibx, remdesivir  I think from a respiratory standpoint she is nearing discharge criteria if Dr. Matson will not bronch while inpatient. She was recommended in past for pulm rehab but declined--would likely benefit from this at discharge due to severe disease but again further inpatient management unlikely to result in significant improvement and time to start considering discharge disposition. She wants to go home.

## 2022-09-05 NOTE — PROGRESS NOTES
[facility] Medicine  Progress Note    Patient Name: Marva Perez  MRN: [id number]  Patient Class: IP- Inpatient   Admission Date: 8/26/2022  Length of Stay: 10 days  Attending Physician: Stewart Lucia MD  Primary Care Provider: Kevin Clements MD        Subjective:     Principal Problem:COVID-19        HPI:  81yF with HTN, HLD, GERD, Depression, anxiety, aFib on eliquis, COPD/Emphysema, and chronic hypoxia on 2-3L home oxygen via NC presented to ER with chief complaint of diarrhea and generalized weakness. Pt reports she started with congestion, fatigue, and cough and 5-6 days ago. She was seen in ER 8/22/22 and diagnosed with COVID and flu B. She was started on tamiflu and referred for outpatient antibody infusion. Pt states she started having nausea, vomiting, and diarrhea as well as intermittent fever. She returned to ER 8/23/22 for the new onset GI symptoms. Workup was reassuring and she was discharged home with antiemetics. She received bebtelovimab infusion 8/24/22. She continued with poor appetite and diarrhea and called her PCP who recommended ER evaluation. She denies CP, abd pain, fever/chills, dysuria, hematuria, melena, BRBPR.       Overview/Hospital Course:  8/27 Pt remained stable on 2-3 L NC overnight which is her home dose. VSS, afebrile. She is very anxious. Diarrhea improved, she did have a loose stool this morning sent for c diff testing. CT chest planned for this morning to evaluate her new left nodular denisity on CXR.     8/28 Pt with hemoptysis overnight. Eliquis held. VSS, afebrile. She remains very anxious. C diff negative. Diarrhea improved. CT chest with COPD and severe chronic emphysematous changes, no infiltrate. There is a new soft tissue mass of the right lung base. Dr. Matson whom she follows with outpatient has been consulted. Discussed with daughter, she is concerned about patient going back home alone at discharge but she also states pt does not  want NH. Advised we could order home health at [place], but that is limited visit per week. Daughter states they are looking into sitters at home.    8/29/22  Marva Perez is a 81 y.o. female  has eliquis held-still coughing up blood- dr herbert and Dr Wilde following. She did test positive for both flu and covid.     8/30/22 pt is a 80yo COPD/emphysema on chronic O2 at home 2-3 liters. Dx with covid and flu B 8/22- treated outpt with tamiflu and IV covid infusion. She came to ER original c/o weakness and diarrhea. Tyamilfu d/kathryn. She was not requiring more than her routine O2. She progressed to hemoptysis on eliquis for a fib- this was held. CT chest shows lung mass. Dr Herbert consulted.  Dr herbert considering bronch but she would not be a good candidate- high risk for pneumo- started levaquin yesterday,. Today she is requiring more O2 now on 6L high flow. RR 26. Very anxious. Still coughing up bloody sputum.     There has been some confusion today regarding patient's POA and medical decision making capacity. Patient has no h/o dementia. Discussed with daughter who confirms this. Daughter reports she is POA and presented paperwork today but appears to be for financial and not medical decision making; looking into this further.   On my assessment, however, she is alert and oriented and able to continue to make her own decisions at this time. However, should she continue to worsen and require intubation in the future where she could no longer communicate her wishes we want to ensure we have the proper paperwork on file.     8/31  Patient requires 4 liters of O2 this morning.  Was on BiPap.  Seems slightly improved.  No more hemoptosis.  Getting Remdesivir, Levaquin, Decadron.  Eliquis still on hold.  Dr Herbert following.  She seems to be asking questions and aware of what is going on.    9/1 pt currently on 5L NC, also using BiPAP intermittently. Continues with hemoptysis, eliquis on hold although H&H improving. Remdesivir day  3. Levaquin day 4. Decadron day 7. She remains very anxious, discussed psych consult with patient and family and they agree. VSS. Afebrile.    9/2  Afternoon yesterday, patient was given depakote with improved agitation. Overnight though, she was once again agitated and was given a dose of remeron with hopes to help her appetite. She remains fatigued this morning. She has not been eating and has had increased stool output. She was given a dose of immodium yesterday without much improvement. 2 episodes of maroon sputum production but no further hemoptysis. She has remained afebrile. Sats are upper 90s on bipap and 5LNC intermittently. BP elevated.    9/3  Decreased oral intake yesterday. PPN started. Sats 94% on 5L NC. Urine output okay. Given single dose of lasix without much improvement in resp status. This morning she is more sleepy and has had improvement in her resp drive but still no appetite.    9/4  Continuing to have significant panic. She does well then seems to have a panic attack. She begins to hyperventilate and her sats drop, her blood pressure rises and she is able to be calmed after 10-15 minutes. These episodes are driving her hypertension and hypoxic spells. In between she has minimal o2 requirements of 4L. With any movement she is desatting. She remains afebrile. Cough less productive today. Completed course of tamiflu, remdesivir, levaquin stopped as she was afebrile, without consolidation on cxr and normal WBC ct. She subsequently had a bump in her WBC ct after getting solumedrol + decadron. Hemoptysis has improved. Pulm still following. Diarrhea has improved. Urine output is good. Tolerating bipap well and resting well at night with new remeron.    9/5: remains anxious. On 4L NC this AM, sats 98%; home use is 3L. Labs stable. Completed tamiflu, remdesivir, levaquin. Hemoptysis improved. She is not eating, was started on TPN. This AM discussed why she is not eating. She does not give any reason  ""I'm cold. I'm cold" Noted her food choices were cold foods and asked if she would prefer warm broth and said yes. Discussed if she does not start eating we may need to consider hospice care as her lung disesase has improved to her baseline. States she will try to eat today. Labs this AM are pending at time of note.           Review of Systems   Constitutional:  Positive for appetite change and fatigue. Negative for activity change and fever.   HENT:  Positive for hearing loss (chronic). Negative for sinus pressure and sore throat.    Eyes:  Negative for discharge and visual disturbance.   Respiratory:  Positive for cough. Negative for shortness of breath.    Cardiovascular:  Negative for chest pain, palpitations and leg swelling.   Gastrointestinal:  Negative for abdominal pain, blood in stool, diarrhea and nausea.   Genitourinary:  Negative for dysuria and hematuria.   Musculoskeletal:  Negative for arthralgias and myalgias.   Neurological:  Positive for weakness (generalized).   Psychiatric/Behavioral:  Negative for confusion. The patient is nervous/anxious.    Objective:     Vital Signs (Most Recent):  Temp: 96.7 °F (35.9 °C) (09/05/22 0713)  Pulse: 80 (09/05/22 0754)  Resp: (!) 28 (09/05/22 0754)  BP: (!) 198/88 (09/05/22 0749)  SpO2: (!) 92 % (09/05/22 0754) Vital Signs (24h Range):  Temp:  [96.7 °F (35.9 °C)-98 °F (36.7 °C)] 96.7 °F (35.9 °C)  Pulse:  [61-93] 80  Resp:  [20-36] 28  SpO2:  [89 %-100 %] 92 %  BP: (113-221)/() 198/88     Weight: 54.8 kg (120 lb 13 oz)  Body mass index is 22.1 kg/m².    Physical Exam  Vitals and nursing note reviewed.   Constitutional:       General: She is not in acute distress.     Comments: Hard of hearing   HENT:      Head: Normocephalic and atraumatic.      Right Ear: External ear normal.      Left Ear: External ear normal.      Mouth/Throat:      Mouth: Mucous membranes are moist.      Pharynx: Oropharynx is clear.   Eyes:      Extraocular Movements: Extraocular " movements intact.      Conjunctiva/sclera: Conjunctivae normal.      Pupils: Pupils are equal, round, and reactive to light.   Cardiovascular:      Rate and Rhythm: Normal rate and regular rhythm.      Heart sounds: Normal heart sounds. No murmur heard.  Pulmonary:      Effort: Pulmonary effort is normal.      Breath sounds: No wheezing, rhonchi or rales.      Comments:   Weak cough  Abdominal:      General: Bowel sounds are normal. There is no distension.      Palpations: Abdomen is soft.      Tenderness: There is no abdominal tenderness.   Musculoskeletal:      Cervical back: Neck supple.      Right lower leg: No edema.      Left lower leg: No edema.   Skin:     Comments: Bruising left shoulder   Neurological:      General: No focal deficit present.      Mental Status: She is alert and oriented to person, place, and time.   Psychiatric:      Comments: Very anxious         CRANIAL NERVES     CN III, IV, VI   Pupils are equal, round, and reactive to light.     Significant Labs: .      BMP:   Recent Labs   Lab 09/04/22 0437   *      K 4.6   CL 98   CO2 34*   BUN 36*   CREATININE 0.7   CALCIUM 8.4*       CBC:   Recent Labs   Lab 09/04/22 0437   WBC 13.01*   HGB 10.7*   HCT 32.8*          CMP:   Recent Labs   Lab 09/04/22 0437      K 4.6   CL 98   CO2 34*   *   BUN 36*   CREATININE 0.7   CALCIUM 8.4*   ANIONGAP 9     8/27 mag 2.3   8/26 sed rate 22  LDH: 270  Latic 1.0  Procal: 0.08 (0.10)  ESR: 22  Ferritin: 852    Troponin 0.029  8/22 flu B positive   Covid positive   C diff negative   Blood cultures NGTD x 2     Significant Imaging:     CXR:   Chronic lung changes are seen.  Increasing hazy interstitial opacity throughout the left lung, pronounced in the left mid and lower lung zone which could reflect atelectasis, aspiration or possibly a developing pneumonia.  Probable small bilateral pleural effusions.  Calcified granulomas in the left lung.  Nodular density in the left  mid lung which appears new as compared to the previous examination none.  This could possibly represent a nipple shadow.  Heart size is normal.  Calcified atheromatous disease affects the aorta.  Fortunately age-appropriate degenerative changes affect the skeleton.    8/27 CT chest    1. Interval development of a smooth pleural based soft tissue mass of the medial right lower lobe which is of uncertain etiology.  Neoplasia is not excludable.  Further evaluation with PET scan as deemed clinically necessary.  2. 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  3. Granulomatous change.    8/27 EKG Sinus rhythm with Premature supraventricular complexes  Nonspecific T wave abnormality  Abnormal ECG  When compared with ECG of 23-AUG-2022 09:32,  Premature supraventricular complexes are now Present  Confirmed by El Urban MD ([id number]) on 8/26/2022 6:00:40 PM    CXR:  Emphysematous changes of the lungs with fibrotic changes in the lung bases, left greater than right.  Coarse interstitial prominence in the left lung base could simply represent fibrosis; however component of superimposed inflammation/infection not excluded.  Heart is at the upper limits of normal in size.  Calcified atheromatous disease affects the aorta.  Age-appropriate degenerative changes affect the skeleton.      Assessment/Plan:      * COVID-19  Patient is identified as High risk for severe complications of COVID 19 based on COVID risk score of 6   Initiate standard COVID protocols; COVID-19 testing ,Infection Control notification  and isolation- respiratory, contact and droplet per protocol    Diagnostics: (leukopenia, hyponatremia, hyperferritinemia, elevated troponin, elevated d-dimer, age, and comorbidities are significant predictors of poor clinical outcome)  CBC, CMP, Ferritin, CRP and Portable CXR    Management: Maintain oxygen saturations 92-96% via Nasal Cannula 4 LPM and monitor with  "continuous/intermittent pulse oximetry. , Inhaled bronchodilators as needed for shortness of breath. and Continuous cardiac monitoring.    Will hold off on remdesivir at this point as she seems stable from resp standpoint; currently on home dose of 2L NC    8/27: pt remains stable on home Oxygen 2-3L NC. Monitor    8/30: worsening hypoxia  Cont steroids  Cont levaquin  Discussing with pharmacy based [facility] protocol if any benefit for remdesivir at this point (s/p MAB outpatient) vs toci  Repeat imaging pending    8/29 pt remains on home O2 demands. Cont duonebs. No remedesivir had outpt infusion for covid and was on tamiflu outpt as well. Dr herbert added levaquin dexamethasone day 4.    8/30 dexamethasone day 5- call pharmacy for consult- pt now with increased o2 demands     8/31 Day 6 decadron,  Remdesivir    9/1 day 7 decadron; day 3 remdesivir, day 4 levaquin    9/2 day 8 of decadron and day 4/5 remdesivir. Day 5 levaquin. Check procal. Will likely d/c levaquin pending cxr.    9/3  Day 5 of remdesivir day 9 of decadron. Stop remdesivir after today. D/c'd levaquin yeserday. procal remains normal. Afebrile normal wbc.    9/5  Completed dex and remdesivir  Wean oxygen to 3L today (home).   Start discharge planning. She needs outpatient pulm rehab. She wants to live at home. desats with exertion but suspect this will not improve much more with further inpatient mangement     Severe malnutrition  She isn't eating. She is now on Day 3 of PPN. She will NOT tolerate an NG tube at this time without having to have restraints. She will not tolerate this at all. Cont to encourage PO.    9/5: It is unclear to me why patient is not eating. She has no medical reason she can not tolerate PO intake.Discussed with patient today and just states she isn't eating because "it is cold". Will given warm broth instead of ice cream. If she continues to not take in PO nutrition we will need to consider hospice consultation because " continuing PPN indefinitely is not an option. She tells me she will start eating today.                         Measurements:  Wt Readings from Last 1 Encounters:   09/01/22 54.8 kg (120 lb 13 oz)   Body mass index is 22.1 kg/m².    Recommendations: Recommendation/Intervention: 1. Rec'd continue Boost Plus ONS TID to provide an additional 1080 kcals and 42 g protein. 2. Rec'd honoring pt preferences to encourage po intake. 3. RD to follow and make rec's accordingly.  Goals: Pt will consume 25-50% of meals by f/u.    Patient has been screened and assessed by RD. RD will follow patient.      Pneumonia due to infectious organism  Starting levaquin 8/28 per dr matson  Cont duonebs and dexamethasone supplemental O2 .    Repeat CXR.    CXR without infiltrate.  D/c abx.    Hypokalemia  Resolved, monitor.      Right lower lobe lung mass  CT chest: Interval development of a smooth pleural based soft tissue mass of the medial right lower lobe which is of uncertain etiology.  Neoplasia is not excludable.  Further evaluation with PET scan as deemed clinically necessary  Dr. Matson consulted, does not feel she is a bronch candidate due to severe COPD and emphysematous lung changes. Would consider PET scan as outpt. Added  levaquin 8/29    Repeat CTA done 8/30.  No changes.  Severe emphasema.  Poor study.  PE not completely ruled out  Cont levaquin for now  Consider restarting Eliquis.   Will discuss with Dr Matson      Cough with hemoptysis  Hold eliquis for now  Dr. Matson following added levaquin.  Repeat CTA done 8/30 - reviewed    9/5: hemoptysis resolving  If Dr. Matson will not bronch this week, need to resume eliquis. Discuss with pulm as need to start discharge planning        Anxiety  Cont home lexapro and xanax>  Increased xanax to 0.5mg TID PRN 8/29/22    She is very anxious. She lives alone. Discussed with family, she does not want any assisted living or NH.     Consult psych    9/4  Switch to scheduled klonopin. May need to  consider precidex.    9/5: Klonopin and lexapro; remeron at night. Needs outpatient psych and counseling  Contributing to her difficulty with medical improvement; at rest normal oxygen sats, when having panic attack RR > 30 and sats 87% with supplementation but quickly recovers to > 92%      Lung mass  Nodular density on CXR which appears new, chect CT chest with contrast per radiology recs  Iodine allergy, will premedicate. She has had contrast studies in the past, last 2019 per records  Reviewed CT with new mass?? Infection?? Dr matson consulted. Added levaquin, holding eliquis- consider bronch if bleeding does not improve .  Cont levaquin; needs outpatient pulm follow-up    9/5  Will need a bronch. If not done inpatient will schedule ouptatient with Dr. Matson. He needs to make final determination so we can resume Eliquis and start d/c planning     Influenza B  Diagnosed 8/22/22  She was on tamiflu outpatient, but this may be the cause of her diarrhea. Will DC for now        Chronic atrial fibrillation  Patient with atrial fibrillation which is controlled currently with Beta Blocker, Calcium Channel Blocker and flecainide. Patient is currently in sinus rhythm.[id number] Score: 4. HASBLED Score: Unable to calculate. Anticoagulation indicated. Anticoagulation done with eliquis.    8/28/22: H&H drop from 10.1/30.6 > 8.6/26.0; pt with hemoptysis. Will hold eliquis for now. Consult cardiology in AM. She is followed by Dr. Cox outpatient. Daughter with concerns about holding eliquis, but given her hemoptysis and H&H drop it is my opinion as well as that of Dr. Matson that the risks of continued anticoagulation outweigh the benefit currently.     8/29 dr Wilde consulted on patient this am. Cont to hold eliquis for now.    8/30 cont to hold eliquis as she is still having hemoptysis   Cont atenolol and flecainide   Cont telemetry  Monitor electrolytes    8/31- No more hemoptysis.  Consider restarting Eloquis since she is such  a high risk for PE.    9/1: continued hemoptysis, but H&H improving. Consider restarting eliquis, Dr. Matson recommends we cont to hold    9/2  In sinus. Holding eliquis because of hemoptysis    9/5  Remains SINUS. Eliquis still on hold but h/h is okay. If Dr. Matson will not bronch will need to resume in anticipation of discharge    Chronic respiratory failure  On 2-3L NC at home  Now requiring 6L NC high flow   CTA negative for PE    9/5: now on 4L NC with O2 sat 98% at rest. Put on 3L (home) this AM.  She desats with anxiety and exertion but unsure this will improve much with further inpatient management  On nebs  Off steroids, antibx, remdesivir  I think from a respiratory standpoint she is nearing discharge criteria if Dr. Matson will not bronch while inpatient. She was recommended in past for pulm rehab but declined--would likely benefit from this at discharge due to severe disease but again further inpatient management unlikely to result in significant improvement and time to start considering discharge disposition. She wants to go home.     Centrilobular emphysema  Per Dr. Matson- Severe changes on CT this puts her at high risk for bronch per pulmonology recs. Cont O2 added levauin and cont nebs .    CTA without PE.     Will check LDH - this was elevated. These patients are at risk for PCP if I recall. She may benefit from IV Bactrim - though she doesn't seem 'infected' at this point. Will discuss with pulm further.    9/5: Dr. Matson following  This is likely her baseline  Put oxygen at 3L which is her home routine today and monitor. Sats are normal at rest but do drop with panic attacks and exertion. I suspect this may be her new baseline and I'm not convinced we will see much more improvement from this given her chronic, severe lung disease  Completed levaquin and dexamethasone  On duoenb and NAC per pulm    Diarrhea  C diff negative; diarrhea improved  Suspect this is due to tamiflu; will DC as she seems stable  from resp standpoint  S/p 1L NS in ER; DC NS .    She has known diarrhea at baseline. Now with slight hypokalemia. Replace. Okay to cont with immodium    9/5: improved    Emphysema/COPD  On home oxygen 2-3L NC.;  Now with increased demands   Cont nebs, steroids, levaquin  Now on 5 liters NC/BIPAP.    Repeat CXR today.  May consider a dose of lasix as she surely has some degree of phtn and bp would tolerate this.  Recheck procal.    9/3  No consolidation on CXR. She has known end stage lung disease. She was given a dose of lasix - not much diuresis or improvement in resp status. Copious sputum noted. Increase pulm toileting and monitor for hemop.    9/5  NOT wheezing so will decrease decadron. She got 7 days of 4mg decadron. Today she was given 2mg. She also had solumedrol a couple of days per pulm. Pulm toileting increased yesterday and tolerated well without increase in hemoptysis      Hyperlipidemia  Cont home crestor.      HTN (hypertension)  Cont home atenolol and increased valsartan to BID  Some highs likely anxiety driven  She has prn clonidine ordered.    BP has remained high/stable    9/4: Will place clonidine patch today. Her BP spikes with 'anxiety episodes.'   She has been having a lot of rebound after PRNs. Will attempt this today and monitor.    9/5: const same    KATHIE (generalized anxiety disorder)  Will switch to scheduled klonopin rather than xanax prn.  Cont home lexapro 5mg daily  Consult psych - suggested to  Started on remeron and depakote. These have not helped much.    Unfortunately this is complicating her medical treatment as desats with panic attacks. This is not likely to improve with further inpatient treatment however and will need outpatient psych follow up and counseling           VTE Risk Mitigation (From admission, onward)         Ordered     Place sequential compression device  Until discontinued         08/29/22 0871                Discharge Planning   ROSAURA:      Code Status: Full  Code   Is the patient medically ready for discharge?:     Reason for patient still in hospital (select all that apply): Treatment  Discharge Plan A: Home with family, Home Health   Discharge Delays: None known at this time        Critical care time spent on the evaluation and treatment of severe organ dysfunction, review of pertinent labs and imaging studies, discussions with consulting providers and discussions with patient/family: 65 minutes.      Maria Del Carmen Taylor MD  Department of Hospital Medicine   [place] [facility]

## 2022-09-06 LAB
ANION GAP SERPL CALC-SCNC: 7 MMOL/L (ref 8–16)
ANISOCYTOSIS BLD QL SMEAR: SLIGHT
BASO STIPL BLD QL SMEAR: ABNORMAL
BASOPHILS # BLD AUTO: ABNORMAL K/UL (ref 0–0.2)
BASOPHILS NFR BLD: 0 % (ref 0–1.9)
BUN SERPL-MCNC: 39 MG/DL (ref 8–23)
CALCIUM SERPL-MCNC: 8 MG/DL (ref 8.7–10.5)
CHLORIDE SERPL-SCNC: 103 MMOL/L (ref 95–110)
CO2 SERPL-SCNC: 34 MMOL/L (ref 23–29)
CREAT SERPL-MCNC: 0.9 MG/DL (ref 0.5–1.4)
DACRYOCYTES BLD QL SMEAR: ABNORMAL
DIFFERENTIAL METHOD: ABNORMAL
EOSINOPHIL # BLD AUTO: ABNORMAL K/UL (ref 0–0.5)
EOSINOPHIL NFR BLD: 0 % (ref 0–8)
ERYTHROCYTE [DISTWIDTH] IN BLOOD BY AUTOMATED COUNT: 14.4 % (ref 11.5–14.5)
EST. GFR  (NO RACE VARIABLE): >60 ML/MIN/1.73 M^2
GLUCOSE SERPL-MCNC: 101 MG/DL (ref 70–110)
HCT VFR BLD AUTO: 31.3 % (ref 37–48.5)
HGB BLD-MCNC: 10.3 G/DL (ref 12–16)
IMM GRANULOCYTES # BLD AUTO: ABNORMAL K/UL (ref 0–0.04)
IMM GRANULOCYTES NFR BLD AUTO: ABNORMAL % (ref 0–0.5)
INFLUENZA A, MOLECULAR: NEGATIVE
INFLUENZA B, MOLECULAR: NEGATIVE
LYMPHOCYTES # BLD AUTO: ABNORMAL K/UL (ref 1–4.8)
LYMPHOCYTES NFR BLD: 10 % (ref 18–48)
MCH RBC QN AUTO: 30.8 PG (ref 27–31)
MCHC RBC AUTO-ENTMCNC: 32.9 G/DL (ref 32–36)
MCV RBC AUTO: 94 FL (ref 82–98)
METAMYELOCYTES NFR BLD MANUAL: 1 %
MONOCYTES # BLD AUTO: ABNORMAL K/UL (ref 0.3–1)
MONOCYTES NFR BLD: 3 % (ref 4–15)
MYELOCYTES NFR BLD MANUAL: 1 %
NEUTROPHILS NFR BLD: 83 % (ref 38–73)
NEUTS BAND NFR BLD MANUAL: 2 %
NRBC BLD-RTO: 0 /100 WBC
OVALOCYTES BLD QL SMEAR: ABNORMAL
PLATELET # BLD AUTO: 162 K/UL (ref 150–450)
PLATELET BLD QL SMEAR: ABNORMAL
PMV BLD AUTO: 11.1 FL (ref 9.2–12.9)
POIKILOCYTOSIS BLD QL SMEAR: SLIGHT
POLYCHROMASIA BLD QL SMEAR: ABNORMAL
POTASSIUM SERPL-SCNC: 4.6 MMOL/L (ref 3.5–5.1)
RBC # BLD AUTO: 3.34 M/UL (ref 4–5.4)
SARS-COV-2 RDRP RESP QL NAA+PROBE: POSITIVE
SODIUM SERPL-SCNC: 144 MMOL/L (ref 136–145)
SPECIMEN SOURCE: NORMAL
WBC # BLD AUTO: 14.91 K/UL (ref 3.9–12.7)

## 2022-09-06 PROCEDURE — 25000003 PHARM REV CODE 250: Performed by: INTERNAL MEDICINE

## 2022-09-06 PROCEDURE — 94640 AIRWAY INHALATION TREATMENT: CPT

## 2022-09-06 PROCEDURE — 87502 INFLUENZA DNA AMP PROBE: CPT | Performed by: INTERNAL MEDICINE

## 2022-09-06 PROCEDURE — 63600175 PHARM REV CODE 636 W HCPCS: Performed by: INTERNAL MEDICINE

## 2022-09-06 PROCEDURE — 94660 CPAP INITIATION&MGMT: CPT

## 2022-09-06 PROCEDURE — 99900035 HC TECH TIME PER 15 MIN (STAT)

## 2022-09-06 PROCEDURE — 94761 N-INVAS EAR/PLS OXIMETRY MLT: CPT

## 2022-09-06 PROCEDURE — 25000242 PHARM REV CODE 250 ALT 637 W/ HCPCS: Performed by: FAMILY MEDICINE

## 2022-09-06 PROCEDURE — 20000000 HC ICU ROOM

## 2022-09-06 PROCEDURE — 85027 COMPLETE CBC AUTOMATED: CPT | Performed by: INTERNAL MEDICINE

## 2022-09-06 PROCEDURE — 36415 COLL VENOUS BLD VENIPUNCTURE: CPT | Performed by: INTERNAL MEDICINE

## 2022-09-06 PROCEDURE — 27000221 HC OXYGEN, UP TO 24 HOURS

## 2022-09-06 PROCEDURE — 25000003 PHARM REV CODE 250: Performed by: FAMILY MEDICINE

## 2022-09-06 PROCEDURE — 25000003 PHARM REV CODE 250: Performed by: STUDENT IN AN ORGANIZED HEALTH CARE EDUCATION/TRAINING PROGRAM

## 2022-09-06 PROCEDURE — 27000207 HC ISOLATION

## 2022-09-06 PROCEDURE — 25000242 PHARM REV CODE 250 ALT 637 W/ HCPCS: Performed by: INTERNAL MEDICINE

## 2022-09-06 PROCEDURE — U0002 COVID-19 LAB TEST NON-CDC: HCPCS | Performed by: INTERNAL MEDICINE

## 2022-09-06 PROCEDURE — 25000003 PHARM REV CODE 250: Performed by: NURSE PRACTITIONER

## 2022-09-06 PROCEDURE — 80048 BASIC METABOLIC PNL TOTAL CA: CPT | Performed by: INTERNAL MEDICINE

## 2022-09-06 PROCEDURE — 85007 BL SMEAR W/DIFF WBC COUNT: CPT | Performed by: INTERNAL MEDICINE

## 2022-09-06 RX ORDER — LEVOFLOXACIN 500 MG/1
500 TABLET, FILM COATED ORAL DAILY
Status: DISCONTINUED | OUTPATIENT
Start: 2022-09-06 | End: 2022-09-06

## 2022-09-06 RX ORDER — TALC
3 POWDER (GRAM) TOPICAL NIGHTLY
Status: DISCONTINUED | OUTPATIENT
Start: 2022-09-06 | End: 2022-09-09 | Stop reason: HOSPADM

## 2022-09-06 RX ORDER — PREDNISONE 10 MG/1
10 TABLET ORAL DAILY
Status: DISCONTINUED | OUTPATIENT
Start: 2022-09-06 | End: 2022-09-09 | Stop reason: HOSPADM

## 2022-09-06 RX ORDER — PREDNISONE 5 MG/ML
10 SOLUTION ORAL DAILY
Status: DISCONTINUED | OUTPATIENT
Start: 2022-09-06 | End: 2022-09-06

## 2022-09-06 RX ADMIN — LEVOFLOXACIN 750 MG: 500 TABLET, FILM COATED ORAL at 10:09

## 2022-09-06 RX ADMIN — ACETYLCYSTEINE 2 ML: 200 INHALANT RESPIRATORY (INHALATION) at 07:09

## 2022-09-06 RX ADMIN — FLECAINIDE ACETATE 50 MG: 50 TABLET ORAL at 09:09

## 2022-09-06 RX ADMIN — IPRATROPIUM BROMIDE AND ALBUTEROL SULFATE 3 ML: .5; 3 SOLUTION RESPIRATORY (INHALATION) at 01:09

## 2022-09-06 RX ADMIN — PREDNISONE 10 MG: 10 TABLET ORAL at 10:09

## 2022-09-06 RX ADMIN — ESCITALOPRAM OXALATE 5 MG: 5 TABLET, FILM COATED ORAL at 09:09

## 2022-09-06 RX ADMIN — CLONAZEPAM 0.5 MG: 0.5 TABLET ORAL at 09:09

## 2022-09-06 RX ADMIN — IPRATROPIUM BROMIDE AND ALBUTEROL SULFATE 3 ML: .5; 3 SOLUTION RESPIRATORY (INHALATION) at 12:09

## 2022-09-06 RX ADMIN — MIRTAZAPINE 7.5 MG: 7.5 TABLET ORAL at 09:09

## 2022-09-06 RX ADMIN — OXYCODONE HYDROCHLORIDE AND ACETAMINOPHEN 500 MG: 500 TABLET ORAL at 09:09

## 2022-09-06 RX ADMIN — DILTIAZEM HYDROCHLORIDE 60 MG: 60 TABLET, FILM COATED ORAL at 09:09

## 2022-09-06 RX ADMIN — VALSARTAN 160 MG: 80 TABLET, FILM COATED ORAL at 09:09

## 2022-09-06 RX ADMIN — ATENOLOL 12.5 MG: 25 TABLET ORAL at 09:09

## 2022-09-06 RX ADMIN — MUPIROCIN: 20 OINTMENT TOPICAL at 09:09

## 2022-09-06 RX ADMIN — DILTIAZEM HYDROCHLORIDE 60 MG: 60 TABLET, FILM COATED ORAL at 10:09

## 2022-09-06 RX ADMIN — ATORVASTATIN CALCIUM 10 MG: 10 TABLET, FILM COATED ORAL at 09:09

## 2022-09-06 RX ADMIN — IPRATROPIUM BROMIDE AND ALBUTEROL SULFATE 3 ML: .5; 3 SOLUTION RESPIRATORY (INHALATION) at 07:09

## 2022-09-06 RX ADMIN — Medication 3 MG: at 09:09

## 2022-09-06 NOTE — PROGRESS NOTES
[facility] - Intensive Care  Pulmonology  Progress Note    Patient Name: Marva Perez  MRN: [id number]  Admission Date: 8/26/2022  Hospital Length of Stay: 11 days  Code Status: Full Code  Attending Provider: No att. providers found  Primary Care Provider: Kevin Clements MD   Principal Problem: COVID-19    Subjective:     Interval History: O2 SAT 97 % on 4 Liters NSR BP 96/51 HR 96    Objective:     Vital Signs (Most Recent):  Temp: 97.7 °F (36.5 °C) (09/06/22 0703)  Pulse: 90 (09/06/22 0800)  Resp: (!) 28 (09/06/22 0800)  BP: (!) 96/51 (09/06/22 0800)  SpO2: 97 % (09/06/22 0800)   Vital Signs (24h Range):  Temp:  [96.9 °F (36.1 °C)-98.8 °F (37.1 °C)] 97.7 °F (36.5 °C)  Pulse:  [] 90  Resp:  [18-45] 28  SpO2:  [89 %-100 %] 97 %  BP: ()/(48-95) 96/51     Weight: 54.8 kg (120 lb 13 oz)  Body mass index is 22.1 kg/m².      Intake/Output Summary (Last 24 hours) at 9/6/2022 0832  Last data filed at 9/6/2022 0600  Gross per 24 hour   Intake 60 ml   Output --   Net 60 ml       Physical Exam  Vitals and nursing note reviewed.   Constitutional:       General: She is not in acute distress.     Appearance: Normal appearance. She is well-developed. She is not ill-appearing, toxic-appearing or diaphoretic.   HENT:      Head: Normocephalic and atraumatic. No raccoon eyes or Gr's sign.      Jaw: No trismus.      Right Ear: Hearing, tympanic membrane, ear canal and external ear normal. Tympanic membrane is not injected.      Left Ear: Hearing, tympanic membrane, ear canal and external ear normal. Tympanic membrane is not injected.      Nose: Nose normal. No nasal deformity, mucosal edema or rhinorrhea.      Right Nostril: No epistaxis.      Left Nostril: No epistaxis.      Right Turbinates: Not enlarged or swollen.      Left Turbinates: Not enlarged or swollen.      Right Sinus: No maxillary sinus tenderness or frontal sinus tenderness.      Left Sinus: No maxillary sinus tenderness or frontal sinus tenderness.       Mouth/Throat:      Mouth: Mucous membranes are dry.      Dentition: Normal dentition.      Pharynx: Uvula midline. No pharyngeal swelling, oropharyngeal exudate, posterior oropharyngeal erythema or uvula swelling.   Eyes:      General: Lids are normal. No scleral icterus.     Conjunctiva/sclera: Conjunctivae normal.      Comments: Sclera clear bilat   Neck:      Trachea: Trachea and phonation normal.      Meningeal: Brudzinski's sign and Kernig's sign absent.   Cardiovascular:      Rate and Rhythm: Normal rate and regular rhythm.      Pulses:           Carotid pulses are 1+ on the right side and 1+ on the left side.       Radial pulses are 1+ on the right side and 1+ on the left side.        Femoral pulses are 1+ on the right side and 1+ on the left side.       Popliteal pulses are 1+ on the right side and 1+ on the left side.        Dorsalis pedis pulses are 1+ on the right side and 1+ on the left side.        Posterior tibial pulses are 1+ on the right side and 1+ on the left side.      Heart sounds: Normal heart sounds, S1 normal and S2 normal. No murmur heard.    No S3 or S4 sounds.   Pulmonary:      Effort: Respiratory distress (mild to moderate) present.      Breath sounds: Examination of the right-upper field reveals decreased breath sounds. Examination of the left-upper field reveals decreased breath sounds. Examination of the right-middle field reveals decreased breath sounds, wheezing and rhonchi. Examination of the left-middle field reveals decreased breath sounds, wheezing and rhonchi. Examination of the right-lower field reveals decreased breath sounds, wheezing and rhonchi. Examination of the left-lower field reveals decreased breath sounds, wheezing and rhonchi. Decreased breath sounds, wheezing and rhonchi present.   Abdominal:      General: Bowel sounds are normal. There is no distension.      Palpations: Abdomen is soft. There is no hepatomegaly or splenomegaly.      Tenderness: There is no  abdominal tenderness. There is no guarding. Negative signs include Duckworth's sign, Rovsing's sign, psoas sign and obturator sign.   Musculoskeletal:         General: No deformity. Normal range of motion.      Cervical back: Full passive range of motion without pain and neck supple. No pain with movement.   Skin:     General: Skin is warm and dry.      Coloration: Skin is not pale.   Neurological:      Mental Status: She is alert and oriented to person, place, and time.      Motor: No abnormal muscle tone.      Coordination: Coordination normal.   Psychiatric:         Speech: Speech normal.         Behavior: Behavior normal. Behavior is cooperative.         Thought Content: Thought content normal.         Judgment: Judgment normal.       Vents:  Oxygen Concentration (%): 35 (09/05/22 0320)    Lines/Drains/Airways       Peripheral Intravenous Line  Duration                  Peripheral IV - Single Lumen 09/02/22 1155 20 G Anterior;Left;Proximal Forearm 3 days                    Significant Labs:    CBC/Anemia Profile:  Recent Labs   Lab 09/05/22  1006 09/06/22  0348   WBC 16.05* 14.91*   HGB 11.1* 10.3*   HCT 34.6* 31.3*    162   MCV 93 94   RDW 14.1 14.4        Chemistries:  Recent Labs   Lab 09/05/22  1006 09/06/22  0348    144   K 4.8 4.6   CL 99 103   CO2 35* 34*   BUN 34* 39*   CREATININE 0.7 0.9   CALCIUM 8.5* 8.0*   ALBUMIN 2.9*  --    PROT 5.1*  --    BILITOT 0.9  --    ALKPHOS 66  --    ALT 26  --    AST 18  --        All pertinent labs within the past 24 hours have been reviewed.    Significant Imaging:  I have reviewed all pertinent imaging results/findings within the past 24 hours.    Assessment/Plan:     * COVID-19  ++ test she went to a wedding   ++ covid and flu B    Pneumonia due to infectious organism  Will start levoq    Hypokalemia  Resolved     Cough with hemoptysis  No hemoptysis will need to work up lung lesion unless it resolves on xray off eloquise   off eloquise now will do cytology  on sputum and if persist do a bronchoscopy in 3 to 5 days    Anxiety  On Klonopin   Worried about hemoptysis    Lung mass  Will consider bronch possible as an outpatient   CT shows a RLL superior segment pleural lesion     Influenza B  Will check covid and flu and move to the floor today or tomorrow   ++ covid and flu B    Chronic atrial fibrillation  On eloquise has been stopped     Chronic respiratory failure  Still respiratory failure she is not back to baseline   Still not eating still sob when in bed will get pt to get her up   Patient with Hypoxic Respiratory failure which is Chronic.  she is on home oxygen at 3 LPM. Supplemental oxygen was provided and noted-  .   Signs/symptoms of respiratory failure include- increased work of breathing. Contributing diagnoses includes - COPD Labs and images were reviewed. Patient Has recent ABG, which has been reviewed. Will treat underlying causes and adjust management of respiratory failure as follows- 3    Centrilobular emphysema  Severe end stage    Emphysema/COPD  Severe end stage     HTN (hypertension)  Was High but stable now   stable    KATHIE (generalized anxiety disorder)  stable      SOB    Critical care time spent on the evaluation and treatment of severe organ dysfunction, review of pertinent labs and imaging studies, discussions with consulting providers and discussions with patient/family: 65 minutes.   In ICU 2  Will transfer pt to floor if covid and flu test negative     Howard Matson MD  Pulmonology  [facility] - Intensive Care

## 2022-09-06 NOTE — ASSESSMENT & PLAN NOTE
Still respiratory failure she is not back to baseline   Still not eating still sob when in bed will get pt to get her up   Patient with Hypoxic Respiratory failure which is Chronic.  she is on home oxygen at 3 LPM. Supplemental oxygen was provided and noted-  .   Signs/symptoms of respiratory failure include- increased work of breathing. Contributing diagnoses includes - COPD Labs and images were reviewed. Patient Has recent ABG, which has been reviewed. Will treat underlying causes and adjust management of respiratory failure as follows- 3

## 2022-09-06 NOTE — NURSING
1908 Resting in bed awake and oriented times 4. Discussed plan of care with patient and she verbalized understanding. Continuous cardiac monitoring in progress HR 85 NSR. On high flow NC  4 liters sat 100%. SOB with exertion. Denies pain at this time. Due to void. Will continue to monitor. Side rails times 2 up, call button in reach, bed low and locked.    0000 Sleeping. Respirations even and unlabored sat 100%. No signs of respiratory distress assessed at this time. Call button in reach and side rails times 2 up.    0600 Patient slept throughout the night with no complications. Stable.

## 2022-09-06 NOTE — EICU
Rounding (Video Assessment):  Yes    Comments: Video assessment complete. Pt lying in bed on bipap. Pt appears asleep and comfortable w/ no distress noted. VSS.

## 2022-09-06 NOTE — PROGRESS NOTES
[facility] - Intensive Care  Adult Nutrition  Progress Note    SUMMARY       Recommendations    Recommendation/Intervention:   1. Provide Boost Plus ONS TID with meals to provide an additional 1080 kcals and 42 g protein.     2. Rec'd honoring pt food preferences to encourage po intake.     3. RD to follow and make rec's accordingly.    Goals: Pt ill consume 50-75% of EER by RD f/u.  Nutrition Goal Status: new  Communication of RD Recs: reviewed with RN (VIVIAN Braga)    Assessment and Plan  Nutrition Problem:  Inadequate oral intake     Related to (etiology):   Decreased appetite    Signs and Symptoms (as evidenced by):   Pt meeting 0-25% of EER.      Interventions/Recommendations (treatment strategy):  Recommendation/Intervention:   1. Provide Boost Plus ONS TID with meals to provide an additional 1080 kcals and 42 g protein.     2. Rec'd honoring pt food preferences to encourage po intake.     3. RD to follow and make rec's accordingly.    Goals: Pt ill consume 50-75% of EER by RD f/u.  Nutrition Goal Status: new    Nutrition Diagnosis Status:   Continues                                                Reason for Assessment    Reason For Assessment: RD follow-up  Diagnosis: infection/sepsis (COVID-19)  Relevant Medical History: HTN, HLD, GERD, COPD/emphysema, afib, dementia  Interdisciplinary Rounds: did not attend  General Information Comments: Spoke with VIVIAN Braga for RD f/u. Noted pt was on TPN over the weekend and is now d/c'd. Pt received meal from family for dinner on 9/05/2022 and consumed 45% per RN reports. In addition pt consumed 25% of breakfast this AM. RN stated he is encouraging her to consume meals and ONS. He also stated that her health status is improving today. Will continue to monitor pt for intake, tolerance and any requesting for food preferences.  Nutrition Discharge Planning: regular diet with ONS as needed to supplement for decrease po intake    Nutrition Risk Screen    Nutrition Risk  "Screen: other (see comments) (pt consuming < 50% of meals since admission)    Nutrition/Diet History    Patient Reported Diet/Restrictions/Preferences: other (see comments) (unable to speak with pt)  Food Preferences: ice cream, sherbert, red jello  Food Allergies: other (see comments) (iodine)  Factors Affecting Nutritional Intake: constipation, decreased appetite    Anthropometrics    Temp: 97.7 °F (36.5 °C)  Height Method: Measured  Height: 5' 2" (157.5 cm)  Height (inches): 62 in  Weight Method: Bed Scale  Weight: 54.8 kg (120 lb 13 oz)  Weight (lb): 120.81 lb  Ideal Body Weight (IBW), Female: 110 lb  % Ideal Body Weight, Female (lb): 109.83 %  BMI (Calculated): 22.1  BMI Grade: 18.5-24.9 - normal       Lab/Procedures/Meds    Pertinent Labs Reviewed: reviewed  Pertinent Labs Comments: H&H: 10.3/31.3 (L), CO2: 34 (H), BUN: 39 (H)  Pertinent Medications Reviewed: reviewed  Pertinent Medications Comments: vitamin C, abx, steroid    Physical Findings/Assessment         Estimated/Assessed Needs    Weight Used For Calorie Calculations: 54.8 kg (120 lb 13 oz)  Energy Calorie Requirements (kcal): 1214  Energy Need Method: [place] (MSJ x 1.25 AF for critical illness)  Protein Requirements: 55-69 ((1-1.25 for OA maintenance))  Weight Used For Protein Calculations: 54.8 kg (120 lb 13 oz)  Fluid Requirements (mL): 1214  Estimated Fluid Requirement Method: RDA Method  RDA Method (mL): 1214         Nutrition Prescription Ordered    Current Diet Order: regular  Oral Nutrition Supplement: Boost and Boost Pudding TID    Evaluation of Received Nutrient/Fluid Intake    % Kcal Needs: 0-25%  % Protein Needs: 0-25%  IV Fluid (mL): 250 (remdesivir in 250 mL NaCl 0.9%)  I/O: +60  Energy Calories Required: not meeting needs  Protein Required: not meeting needs  Fluid Required: not meeting needs  Tolerance: tolerating  % Intake of Estimated Energy Needs: 0 - 25 %  % Meal Intake: 0 - 25 %    Nutrition Risk    Level of " Risk/Frequency of Follow-up: high     Monitor and Evaluation    Food and Nutrient Intake: energy intake, food and beverage intake, enteral nutrition intake  Food and Nutrient Adminstration: diet order  Knowledge/Beliefs/Attitudes: food and nutrition knowledge/skill, beliefs and attitudes  Physical Activity and Function: nutrition-related ADLs and IADLs, factors affecting access to physical activity  Anthropometric Measurements: height/length, weight, weight change, body mass index  Biochemical Data, Medical Tests and Procedures: electrolyte and renal panel, gastrointestinal profile, glucose/endocrine profile, inflammatory profile, lipid profile  Nutrition-Focused Physical Findings: overall appearance     Nutrition Follow-Up    RD Follow-up?: Yes

## 2022-09-06 NOTE — PLAN OF CARE
Recommendation/Intervention:   1. Provide Boost Plus ONS TID with meals to provide an additional 1080 kcals and 42 g protein.     2. Rec'd honoring pt food preferences to encourage po intake.     3. RD to follow and make rec's accordingly.    Goals: Pt ill consume 50-75% of EER by RD f/u.  Nutrition Goal Status: new

## 2022-09-06 NOTE — EICU
Rounding (Video Assessment):  Yes  Comments: Video rounds completed.  Resting quietly in bed,  HR 86, SR, RR 24 POx 100%, 4 l/min n/c, /61, nad noted.

## 2022-09-06 NOTE — ASSESSMENT & PLAN NOTE
No hemoptysis will need to work up lung lesion unless it resolves on xray off eloquise   off eloquise now will do cytology on sputum and if persist do a bronchoscopy in 3 to 5 days

## 2022-09-06 NOTE — PROGRESS NOTES
Pharmacist Renal Dose Adjustment Note    Marva Perez is a 81 y.o. female being treated with the medication levaquin.    Patient Data:    Vital Signs (Most Recent):  Temp: 97.7 °F (36.5 °C) (09/06/22 0703)  Pulse: 90 (09/06/22 0800)  Resp: (!) 28 (09/06/22 0800)  BP: (!) 96/51 (09/06/22 0800)  SpO2: 97 % (09/06/22 0800)   Vital Signs (72h Range):  Temp:  [96.7 °F (35.9 °C)-98.8 °F (37.1 °C)]   Pulse:  []   Resp:  [18-54]   BP: ()/()   SpO2:  [83 %-100 %]      Recent Labs   Lab 09/04/22  0437 09/05/22  1006 09/06/22  0348   CREATININE 0.7 0.7 0.9     Serum creatinine: 0.9 mg/dL 09/06/22 0348  Estimated creatinine clearance: 39.4 mL/min    Medication:Levaquin dose: 500mg frequency daily will be changed to medication:levaquin dose:750mg frequency:every 48 hours.     Pharmacist's Name: Nakita Romo  Pharmacist's Extension: [phone]

## 2022-09-07 LAB
ALBUMIN SERPL BCP-MCNC: 2.6 G/DL (ref 3.5–5.2)
ALP SERPL-CCNC: 67 U/L (ref 55–135)
ALT SERPL W/O P-5'-P-CCNC: 22 U/L (ref 10–44)
ANION GAP SERPL CALC-SCNC: 9 MMOL/L (ref 8–16)
AST SERPL-CCNC: 18 U/L (ref 10–40)
BASOPHILS # BLD AUTO: 0.03 K/UL (ref 0–0.2)
BASOPHILS NFR BLD: 0.2 % (ref 0–1.9)
BILIRUB SERPL-MCNC: 1.2 MG/DL (ref 0.1–1)
BUN SERPL-MCNC: 37 MG/DL (ref 8–23)
CALCIUM SERPL-MCNC: 8.3 MG/DL (ref 8.7–10.5)
CHLORIDE SERPL-SCNC: 101 MMOL/L (ref 95–110)
CO2 SERPL-SCNC: 33 MMOL/L (ref 23–29)
CREAT SERPL-MCNC: 1 MG/DL (ref 0.5–1.4)
DIFFERENTIAL METHOD: ABNORMAL
EOSINOPHIL # BLD AUTO: 0.1 K/UL (ref 0–0.5)
EOSINOPHIL NFR BLD: 0.8 % (ref 0–8)
ERYTHROCYTE [DISTWIDTH] IN BLOOD BY AUTOMATED COUNT: 14.7 % (ref 11.5–14.5)
EST. GFR  (NO RACE VARIABLE): 57 ML/MIN/1.73 M^2
GLUCOSE SERPL-MCNC: 120 MG/DL (ref 70–110)
HCT VFR BLD AUTO: 30.5 % (ref 37–48.5)
HGB BLD-MCNC: 9.8 G/DL (ref 12–16)
IMM GRANULOCYTES # BLD AUTO: 0.69 K/UL (ref 0–0.04)
IMM GRANULOCYTES NFR BLD AUTO: 4.4 % (ref 0–0.5)
LYMPHOCYTES # BLD AUTO: 1.1 K/UL (ref 1–4.8)
LYMPHOCYTES NFR BLD: 6.7 % (ref 18–48)
MCH RBC QN AUTO: 30.4 PG (ref 27–31)
MCHC RBC AUTO-ENTMCNC: 32.1 G/DL (ref 32–36)
MCV RBC AUTO: 95 FL (ref 82–98)
MONOCYTES # BLD AUTO: 1.4 K/UL (ref 0.3–1)
MONOCYTES NFR BLD: 8.7 % (ref 4–15)
NEUTROPHILS # BLD AUTO: 12.5 K/UL (ref 1.8–7.7)
NEUTROPHILS NFR BLD: 79.2 % (ref 38–73)
NRBC BLD-RTO: 0 /100 WBC
PLATELET # BLD AUTO: 135 K/UL (ref 150–450)
PMV BLD AUTO: 11 FL (ref 9.2–12.9)
POTASSIUM SERPL-SCNC: 4.5 MMOL/L (ref 3.5–5.1)
PROT SERPL-MCNC: 5 G/DL (ref 6–8.4)
RBC # BLD AUTO: 3.22 M/UL (ref 4–5.4)
SODIUM SERPL-SCNC: 143 MMOL/L (ref 136–145)
WBC # BLD AUTO: 15.82 K/UL (ref 3.9–12.7)

## 2022-09-07 PROCEDURE — 25000003 PHARM REV CODE 250: Performed by: STUDENT IN AN ORGANIZED HEALTH CARE EDUCATION/TRAINING PROGRAM

## 2022-09-07 PROCEDURE — 25000003 PHARM REV CODE 250: Performed by: NURSE PRACTITIONER

## 2022-09-07 PROCEDURE — 99900031 HC PATIENT EDUCATION (STAT)

## 2022-09-07 PROCEDURE — 94640 AIRWAY INHALATION TREATMENT: CPT

## 2022-09-07 PROCEDURE — 27000207 HC ISOLATION

## 2022-09-07 PROCEDURE — 94660 CPAP INITIATION&MGMT: CPT

## 2022-09-07 PROCEDURE — 25000242 PHARM REV CODE 250 ALT 637 W/ HCPCS: Performed by: INTERNAL MEDICINE

## 2022-09-07 PROCEDURE — 27000221 HC OXYGEN, UP TO 24 HOURS

## 2022-09-07 PROCEDURE — 25000003 PHARM REV CODE 250: Performed by: INTERNAL MEDICINE

## 2022-09-07 PROCEDURE — 80053 COMPREHEN METABOLIC PANEL: CPT | Performed by: INTERNAL MEDICINE

## 2022-09-07 PROCEDURE — 99900026 HC AIRWAY MAINTENANCE (STAT)

## 2022-09-07 PROCEDURE — 94761 N-INVAS EAR/PLS OXIMETRY MLT: CPT

## 2022-09-07 PROCEDURE — 97162 PT EVAL MOD COMPLEX 30 MIN: CPT

## 2022-09-07 PROCEDURE — 97530 THERAPEUTIC ACTIVITIES: CPT

## 2022-09-07 PROCEDURE — 25000003 PHARM REV CODE 250: Performed by: FAMILY MEDICINE

## 2022-09-07 PROCEDURE — 25000242 PHARM REV CODE 250 ALT 637 W/ HCPCS: Performed by: FAMILY MEDICINE

## 2022-09-07 PROCEDURE — 36415 COLL VENOUS BLD VENIPUNCTURE: CPT | Performed by: INTERNAL MEDICINE

## 2022-09-07 PROCEDURE — 94668 MNPJ CHEST WALL SBSQ: CPT

## 2022-09-07 PROCEDURE — 85025 COMPLETE CBC W/AUTO DIFF WBC: CPT | Performed by: INTERNAL MEDICINE

## 2022-09-07 PROCEDURE — 11000001 HC ACUTE MED/SURG PRIVATE ROOM

## 2022-09-07 PROCEDURE — 99900035 HC TECH TIME PER 15 MIN (STAT)

## 2022-09-07 PROCEDURE — 63600175 PHARM REV CODE 636 W HCPCS: Performed by: INTERNAL MEDICINE

## 2022-09-07 RX ADMIN — FLECAINIDE ACETATE 50 MG: 50 TABLET ORAL at 09:09

## 2022-09-07 RX ADMIN — IPRATROPIUM BROMIDE AND ALBUTEROL SULFATE 3 ML: .5; 3 SOLUTION RESPIRATORY (INHALATION) at 07:09

## 2022-09-07 RX ADMIN — IPRATROPIUM BROMIDE AND ALBUTEROL SULFATE 3 ML: .5; 3 SOLUTION RESPIRATORY (INHALATION) at 12:09

## 2022-09-07 RX ADMIN — VALSARTAN 160 MG: 80 TABLET, FILM COATED ORAL at 08:09

## 2022-09-07 RX ADMIN — ATORVASTATIN CALCIUM 10 MG: 10 TABLET, FILM COATED ORAL at 08:09

## 2022-09-07 RX ADMIN — ACETYLCYSTEINE 2 ML: 200 INHALANT RESPIRATORY (INHALATION) at 07:09

## 2022-09-07 RX ADMIN — DILTIAZEM HYDROCHLORIDE 60 MG: 60 TABLET, FILM COATED ORAL at 08:09

## 2022-09-07 RX ADMIN — OXYCODONE HYDROCHLORIDE AND ACETAMINOPHEN 500 MG: 500 TABLET ORAL at 09:09

## 2022-09-07 RX ADMIN — PREDNISONE 10 MG: 10 TABLET ORAL at 09:09

## 2022-09-07 RX ADMIN — CLONAZEPAM 0.5 MG: 0.5 TABLET ORAL at 09:09

## 2022-09-07 RX ADMIN — MIRTAZAPINE 7.5 MG: 7.5 TABLET ORAL at 08:09

## 2022-09-07 RX ADMIN — VALSARTAN 160 MG: 80 TABLET, FILM COATED ORAL at 09:09

## 2022-09-07 RX ADMIN — Medication 3 MG: at 08:09

## 2022-09-07 RX ADMIN — CLONAZEPAM 0.5 MG: 0.5 TABLET ORAL at 08:09

## 2022-09-07 RX ADMIN — ESCITALOPRAM OXALATE 5 MG: 5 TABLET, FILM COATED ORAL at 09:09

## 2022-09-07 RX ADMIN — ATENOLOL 12.5 MG: 25 TABLET ORAL at 09:09

## 2022-09-07 RX ADMIN — DILTIAZEM HYDROCHLORIDE 60 MG: 60 TABLET, FILM COATED ORAL at 09:09

## 2022-09-07 RX ADMIN — IPRATROPIUM BROMIDE AND ALBUTEROL SULFATE 3 ML: .5; 3 SOLUTION RESPIRATORY (INHALATION) at 01:09

## 2022-09-07 RX ADMIN — FLECAINIDE ACETATE 50 MG: 50 TABLET ORAL at 08:09

## 2022-09-07 NOTE — PLAN OF CARE
Pt with improved oxygenation today. 2 l/min oxygen via nasal cannula with spo2 93-96%. Pt oob for meals to chair. Tolerated well. Continue to monitor

## 2022-09-07 NOTE — NURSING
1908 Lying in bed resting quietly. Awake, alert, and oriented times 4. Calm and cooperative. Denies pain. SOB noted with exertion. O2 at 3 liters NC and sat is 94%, Continuous cardiac monitoring in progress  ST. MAEW. Due to void. Discussed plan of care with patient and she verbalized understanding. Side rails times 2 up, call button in reach, and bed low position and locked.      0000 Sleeping. Bi pap in progress. Patient tolerating very well. Sat 99%. No distress assessed at this time, stable.    0600 Patient slept throughout the night without complications. Remains on bi pap and tolerating very well.

## 2022-09-07 NOTE — PROGRESS NOTES
[facility] - Intensive Care  Pulmonology  Progress Note    Patient Name: Marva Perez  MRN: [id number]  Admission Date: 8/26/2022  Hospital Length of Stay: 12 days  Code Status: Full Code  Attending Provider: No att. providers found  Primary Care Provider: Kevin Clements MD   Principal Problem: COVID-19    Subjective:     Interval History: stable improving on 2 liters with a sat of 96%  Looks great up in chair early vitals good not eating as well as I like     Objective:     Vital Signs (Most Recent):  Temp: 97.4 °F (36.3 °C) (09/07/22 0712)  Pulse: 98 (09/07/22 0906)  Resp: (!) 27 (09/07/22 0726)  BP: 112/64 (09/07/22 0905)  SpO2: 99 % (09/07/22 0726)   Vital Signs (24h Range):  Temp:  [97 °F (36.1 °C)-98.1 °F (36.7 °C)] 97.4 °F (36.3 °C)  Pulse:  [] 98  Resp:  [18-35] 27  SpO2:  [91 %-100 %] 99 %  BP: ()/(48-74) 112/64     Weight: 54.8 kg (120 lb 13 oz)  Body mass index is 22.1 kg/m².      Intake/Output Summary (Last 24 hours) at 9/7/2022 0950  Last data filed at 9/7/2022 0600  Gross per 24 hour   Intake 50 ml   Output --   Net 50 ml       Physical Exam  Vitals and nursing note reviewed.   Constitutional:       General: She is not in acute distress.     Appearance: Normal appearance. She is well-developed. She is not ill-appearing, toxic-appearing or diaphoretic.   HENT:      Head: Normocephalic and atraumatic.      Jaw: No trismus.      Right Ear: Hearing, tympanic membrane, ear canal and external ear normal.      Left Ear: Hearing, tympanic membrane, ear canal and external ear normal.      Nose: Nose normal. No nasal deformity, mucosal edema or rhinorrhea.      Right Sinus: No maxillary sinus tenderness or frontal sinus tenderness.      Left Sinus: No maxillary sinus tenderness or frontal sinus tenderness.      Mouth/Throat:      Dentition: Normal dentition.      Pharynx: Uvula midline. No posterior oropharyngeal erythema or uvula swelling.   Eyes:      General: Lids are normal. No scleral  icterus.     Conjunctiva/sclera: Conjunctivae normal.      Comments: Sclera clear bilat   Neck:      Trachea: Trachea and phonation normal.   Cardiovascular:      Rate and Rhythm: Normal rate and regular rhythm.      Pulses: Normal pulses.      Heart sounds: Normal heart sounds, S1 normal and S2 normal. No murmur heard.    No S3 or S4 sounds.   Pulmonary:      Effort: Respiratory distress (mild to moderate) present.      Breath sounds: Examination of the right-upper field reveals decreased breath sounds. Examination of the left-upper field reveals decreased breath sounds. Examination of the right-middle field reveals decreased breath sounds, wheezing and rhonchi. Examination of the left-middle field reveals decreased breath sounds, wheezing and rhonchi. Examination of the right-lower field reveals decreased breath sounds, wheezing and rhonchi. Examination of the left-lower field reveals decreased breath sounds, wheezing and rhonchi. Decreased breath sounds, wheezing and rhonchi present.   Abdominal:      General: Bowel sounds are normal. There is no distension.      Palpations: Abdomen is soft.      Tenderness: There is no abdominal tenderness.   Musculoskeletal:         General: No deformity. Normal range of motion.      Cervical back: Full passive range of motion without pain and neck supple.      Right lower leg: No edema.      Left lower leg: No edema.   Skin:     General: Skin is warm and dry.      Coloration: Skin is not pale.   Neurological:      Mental Status: She is alert and oriented to person, place, and time.      Motor: No abnormal muscle tone.      Coordination: Coordination normal.   Psychiatric:         Speech: Speech normal.         Behavior: Behavior normal. Behavior is cooperative.         Thought Content: Thought content normal.         Judgment: Judgment normal.       Vents:  Oxygen Concentration (%): 35 (09/07/22 0712)    Lines/Drains/Airways       Peripheral Intravenous Line  Duration                   Peripheral IV - Single Lumen 09/02/22 1155 20 G Anterior;Left;Proximal Forearm 4 days                    Significant Labs:    CBC/Anemia Profile:  Recent Labs   Lab 09/05/22  1006 09/06/22  0348 09/07/22  0701   WBC 16.05* 14.91* 15.82*   HGB 11.1* 10.3* 9.8*   HCT 34.6* 31.3* 30.5*    162 135*   MCV 93 94 95   RDW 14.1 14.4 14.7*        Chemistries:  Recent Labs   Lab 09/05/22  1006 09/06/22  0348 09/07/22  0701    144 143   K 4.8 4.6 4.5   CL 99 103 101   CO2 35* 34* 33*   BUN 34* 39* 37*   CREATININE 0.7 0.9 1.0   CALCIUM 8.5* 8.0* 8.3*   ALBUMIN 2.9*  --  2.6*   PROT 5.1*  --  5.0*   BILITOT 0.9  --  1.2*   ALKPHOS 66  --  67   ALT 26  --  22   AST 18  --  18       All pertinent labs within the past 24 hours have been reviewed.    Significant Imaging:  I have reviewed all pertinent imaging results/findings within the past 24 hours.    Assessment/Plan:     * COVID-19  ++ test she went to a wedding   ++ covid and flu B    Severe malnutrition  Nutrition consulted. Most recent weight and BMI monitored- get food favorites                        Measurements:  Wt Readings from Last 1 Encounters:   09/06/22 54.8 kg (120 lb 13 oz)   Body mass index is 22.1 kg/m².    Recommendations: Recommendation/Intervention: 1. Provide Boost Plus ONS TID with meals to provide an additional 1080 kcals and 42 g protein.   2. Rec'd honoring pt food preferences to encourage po intake.   3. RD to follow and make rec's accordingly.  Goals: Pt ill consume 50-75% of EER by RD f/u.    Patient has been screened and assessed by RD. RD will follow patient.      Pneumonia due to infectious organism  Will start levoq    Hypokalemia  Resolved     Cough with hemoptysis  No hemoptysis will need to work up lung lesion unless it resolves on xray off eloquise   off eloquise now will do cytology on sputum and if persist do a bronchoscopy in 3 to 5 days    Anxiety  On Klonopin   Worried about hemoptysis    Lung mass  Will consider  bronch possible as an outpatient   CT shows a RLL superior segment pleural lesion     Influenza B  Will check covid and flu and move to the floor today or tomorrow   ++ covid and flu B    Chronic atrial fibrillation  On eloquise has been stopped     Atrial fibrillation with RVR  Was on eloquise now taken off    Chronic obstructive pulmonary disease  Copd severe     Chronic respiratory failure  Still respiratory failure she is not back to baseline   Still not eating still sob when in bed will get pt to get her up   Patient with Hypoxic Respiratory failure which is Chronic.  she is on home oxygen at 3 LPM. Supplemental oxygen was provided and noted- Oxygen Concentration (%):  [35] 35.   Signs/symptoms of respiratory failure include- increased work of breathing. Contributing diagnoses includes - COPD Labs and images were reviewed. Patient Has recent ABG, which has been reviewed. Will treat underlying causes and adjust management of respiratory failure as follows- 3    Centrilobular emphysema  Severe end stage    Emphysema/COPD  Severe end stage     HTN (hypertension)  Was High but stable now   stable    KATHIE (generalized anxiety disorder)  stable    Malnutrition     Covid Infection   Remains posive neg flu    Critical care time spent on the evaluation and treatment of severe organ dysfunction, review of pertinent labs and imaging studies, discussions with consulting providers and discussions with patient/family: 95minutes.     Howard Matson MD  Pulmonology  [facility] - Intensive Care

## 2022-09-07 NOTE — PT/OT/SLP EVAL
"Physical Therapy Evaluation    Patient Name:  Marva Perez   MRN:  [id number]    Recommendations:     Discharge Recommendations:  nursing facility, skilled, home with home health   Discharge Equipment Recommendations: walker, rolling, hospital bed, lift device   Barriers to discharge: Decreased caregiver support    Assessment:     Marva Perez is a 81 y.o. female admitted with a medical diagnosis of COVID-19.  She presents with the following impairments/functional limitations:  weakness, impaired endurance, impaired self care skills, impaired functional mobility, gait instability, impaired balance, decreased safety awareness, impaired cardiopulmonary response to activity. Patient presented on bipap at 35% with SAT= 98%. Patient tolerated sitting up edge of the bed with minimal assistance. Replaced bipap to NC O2 at 4 liters.Gradually advanced functional activity to standing at bedside and took side  steps with handheld moderate assistance. Fatigue easily. Left patient at bedside chair with O2 SAT= 95%. Nursing notified.    Rehab Prognosis: Fair; patient would benefit from acute skilled PT services to address these deficits and reach maximum level of function.    Recent Surgery: * No surgery found *      Plan:     During this hospitalization, patient to be seen 5 x/week to address the identified rehab impairments via gait training, therapeutic activities, therapeutic exercises and progress toward the following goals:    Plan of Care Expires:  09/14/222    Subjective     Chief Complaint: general weakness  Patient/Family Comments/goals: "to get better and gain strength"  Pain/Comfort:  Pain Rating 1: 0/10    Patients cultural, spiritual, Syn[employer] conflicts given the current situation: no    Living Environment:  Patient lives alone 2 story house and stays on the 1st floor with bedside commode.  Prior to admission, patients level of function was Independent with no AD.  Equipment used at home: oxygen, bedside " commode.  DME owned (not currently used): bedside commode.  Upon discharge, patient will have assistance from family. .    Objective:     Communicated with nursing and patient prior to session.  Patient found HOB elevated with BIPAP, oxygen, peripheral IV, pulse ox (continuous), SCD, telemetry, blood pressure cuff  upon PT entry to room.    General Precautions: Standard, fall   Orthopedic Precautions:N/A   Braces: N/A  Respiratory Status: Nasal cannula, flow 4 L/min    Exams:  Cognitive Exam:  Patient is oriented to Person, Place, and Time  Fine Motor Coordination:    -       Intact  Gross Motor Coordination:  WFL  Postural Exam:  Patient presented with the following abnormalities:    -       Rounded shoulders  -       Forward head  Skin Integrity/Edema:      -       Skin integrity: Visible skin intact  RUE ROM: WNL  RUE Strength: WFL  LUE ROM: WNL  LUE Strength: WFL  RLE ROM: WNL  RLE Strength: 3-/5  LLE ROM: WNL  LLE Strength: 3-/5    Functional Mobility:  Bed Mobility:     Rolling Left:  minimum assistance  Rolling Right: minimum assistance  Scooting: minimum assistance  Supine to Sit: minimum assistance  Sit to Supine: minimum assistance  Transfers:     Sit to Stand:  moderate assistance with hand-held assist  Bed to Chair: moderate assistance with  hand-held assist  using  Step Transfer  Gait: Moderate assistance x 5 - 7  short steps with handheld assistance  Balance: Standing static: handheld minimal assistance    Therapeutic Activities and Exercises:   PT eval. Educated pt with PT role. Initiated out of bed activity, side steps with handheld assistance and sitting activity.    AM-PAC 6 CLICK MOBILITY  Total Score:13     Patient left up in chair with all lines intact, call button in reach, and nursing notified.    GOALS:   Multidisciplinary Problems       Physical Therapy Goals          Problem: Physical Therapy    Goal Priority Disciplines Outcome Goal Variances Interventions   Physical Therapy Goal     PT,  PT/OT Adequate for Care Transition     Description: Patient will increase functional independence with mobility by performin. Supine to sit with Modified Independent  2. Sit to supine with Modified Independent  3. Bed to chair transfer with Supervision or Set-up Assistancewith or without rolling walker using Step Transfer TECHNIQUE  4. Gait  x 50  feet with Supervision or Set-up Assistance with or without rolling walker  5. Lower extremity exercise program x10 reps per handout, with assistance as needed                           History:     Past Medical History:   Diagnosis Date    Abnormal Pap smear 14    LGSIL    Atrial fibrillation     COPD (chronic obstructive pulmonary disease)     Depression     GERD (gastroesophageal reflux disease)     Hyperlipidemia     Hypertension        Past Surgical History:   Procedure Laterality Date    APPENDECTOMY      BRONCHOSCOPY N/A 2019    Procedure: BRONCHOSCOPY;  Surgeon: Howard Matson MD;  Location: [facility] OR;  Service: Pulmonary;  Laterality: N/A;    CERVICAL BIOPSY  W/ LOOP ELECTRODE EXCISION      CHOLECYSTECTOMY      COLLATERAL LIGAMENT REPAIR, KNEE      left knee    COLONOSCOPY      DILATION AND CURETTAGE OF UTERUS      SINUS SURGERY         Time Tracking:     PT Received On: 22  PT Start Time: 0720     PT Stop Time: 0750  PT Total Time (min): 30 min     Billable Minutes: Evaluation 15 and Therapeutic Activity 15      2022

## 2022-09-07 NOTE — EICU
Rounding (Video Assessment):  Yes    Intervention Initiated From:  [facility] / EICU        Comments: patient awake, lying in bed. Nurse in room. Patient in no acute distress. Patient has no complaints or requests.

## 2022-09-07 NOTE — ASSESSMENT & PLAN NOTE
Still respiratory failure she is not back to baseline   Still not eating still sob when in bed will get pt to get her up   Patient with Hypoxic Respiratory failure which is Chronic.  she is on home oxygen at 3 LPM. Supplemental oxygen was provided and noted- Oxygen Concentration (%):  [35] 35.   Signs/symptoms of respiratory failure include- increased work of breathing. Contributing diagnoses includes - COPD Labs and images were reviewed. Patient Has recent ABG, which has been reviewed. Will treat underlying causes and adjust management of respiratory failure as follows- 3

## 2022-09-07 NOTE — EICU
Rounding (Video Assessment):  Yes  Comments: Video rounds completed.  Sitting up in bed, Talkative, VSS, nad noted.

## 2022-09-07 NOTE — SUBJECTIVE & OBJECTIVE
Interval History: stable improving on 2 liters with a sat of 96%  Looks great up in chair early vitals good not eating as well as I like     Objective:     Vital Signs (Most Recent):  Temp: 97.4 °F (36.3 °C) (09/07/22 0712)  Pulse: 98 (09/07/22 0906)  Resp: (!) 27 (09/07/22 0726)  BP: 112/64 (09/07/22 0905)  SpO2: 99 % (09/07/22 0726)   Vital Signs (24h Range):  Temp:  [97 °F (36.1 °C)-98.1 °F (36.7 °C)] 97.4 °F (36.3 °C)  Pulse:  [] 98  Resp:  [18-35] 27  SpO2:  [91 %-100 %] 99 %  BP: ()/(48-74) 112/64     Weight: 54.8 kg (120 lb 13 oz)  Body mass index is 22.1 kg/m².      Intake/Output Summary (Last 24 hours) at 9/7/2022 0950  Last data filed at 9/7/2022 0600  Gross per 24 hour   Intake 50 ml   Output --   Net 50 ml       Physical Exam  Vitals and nursing note reviewed.   Constitutional:       General: She is not in acute distress.     Appearance: Normal appearance. She is well-developed. She is not ill-appearing, toxic-appearing or diaphoretic.   HENT:      Head: Normocephalic and atraumatic.      Jaw: No trismus.      Right Ear: Hearing, tympanic membrane, ear canal and external ear normal.      Left Ear: Hearing, tympanic membrane, ear canal and external ear normal.      Nose: Nose normal. No nasal deformity, mucosal edema or rhinorrhea.      Right Sinus: No maxillary sinus tenderness or frontal sinus tenderness.      Left Sinus: No maxillary sinus tenderness or frontal sinus tenderness.      Mouth/Throat:      Dentition: Normal dentition.      Pharynx: Uvula midline. No posterior oropharyngeal erythema or uvula swelling.   Eyes:      General: Lids are normal. No scleral icterus.     Conjunctiva/sclera: Conjunctivae normal.      Comments: Sclera clear bilat   Neck:      Trachea: Trachea and phonation normal.   Cardiovascular:      Rate and Rhythm: Normal rate and regular rhythm.      Pulses: Normal pulses.      Heart sounds: Normal heart sounds, S1 normal and S2 normal. No murmur heard.    No S3 or  S4 sounds.   Pulmonary:      Effort: Respiratory distress (mild to moderate) present.      Breath sounds: Examination of the right-upper field reveals decreased breath sounds. Examination of the left-upper field reveals decreased breath sounds. Examination of the right-middle field reveals decreased breath sounds, wheezing and rhonchi. Examination of the left-middle field reveals decreased breath sounds, wheezing and rhonchi. Examination of the right-lower field reveals decreased breath sounds, wheezing and rhonchi. Examination of the left-lower field reveals decreased breath sounds, wheezing and rhonchi. Decreased breath sounds, wheezing and rhonchi present.   Abdominal:      General: Bowel sounds are normal. There is no distension.      Palpations: Abdomen is soft.      Tenderness: There is no abdominal tenderness.   Musculoskeletal:         General: No deformity. Normal range of motion.      Cervical back: Full passive range of motion without pain and neck supple.      Right lower leg: No edema.      Left lower leg: No edema.   Skin:     General: Skin is warm and dry.      Coloration: Skin is not pale.   Neurological:      Mental Status: She is alert and oriented to person, place, and time.      Motor: No abnormal muscle tone.      Coordination: Coordination normal.   Psychiatric:         Speech: Speech normal.         Behavior: Behavior normal. Behavior is cooperative.         Thought Content: Thought content normal.         Judgment: Judgment normal.       Vents:  Oxygen Concentration (%): 35 (09/07/22 0712)    Lines/Drains/Airways       Peripheral Intravenous Line  Duration                  Peripheral IV - Single Lumen 09/02/22 1155 20 G Anterior;Left;Proximal Forearm 4 days                    Significant Labs:    CBC/Anemia Profile:  Recent Labs   Lab 09/05/22  1006 09/06/22  0348 09/07/22  0701   WBC 16.05* 14.91* 15.82*   HGB 11.1* 10.3* 9.8*   HCT 34.6* 31.3* 30.5*    162 135*   MCV 93 94 95   RDW  14.1 14.4 14.7*        Chemistries:  Recent Labs   Lab 09/05/22  1006 09/06/22  0348 09/07/22  0701    144 143   K 4.8 4.6 4.5   CL 99 103 101   CO2 35* 34* 33*   BUN 34* 39* 37*   CREATININE 0.7 0.9 1.0   CALCIUM 8.5* 8.0* 8.3*   ALBUMIN 2.9*  --  2.6*   PROT 5.1*  --  5.0*   BILITOT 0.9  --  1.2*   ALKPHOS 66  --  67   ALT 26  --  22   AST 18  --  18       All pertinent labs within the past 24 hours have been reviewed.    Significant Imaging:  I have reviewed all pertinent imaging results/findings within the past 24 hours.

## 2022-09-07 NOTE — ASSESSMENT & PLAN NOTE
Nutrition consulted. Most recent weight and BMI monitored- get food favorites                        Measurements:  Wt Readings from Last 1 Encounters:   09/06/22 54.8 kg (120 lb 13 oz)   Body mass index is 22.1 kg/m².    Recommendations: Recommendation/Intervention: 1. Provide Boost Plus ONS TID with meals to provide an additional 1080 kcals and 42 g protein.   2. Rec'd honoring pt food preferences to encourage po intake.   3. RD to follow and make rec's accordingly.  Goals: Pt ill consume 50-75% of EER by RD f/u.    Patient has been screened and assessed by RD. RD will follow patient.     no heavy lifting

## 2022-09-08 PROBLEM — J96.11 CHRONIC RESPIRATORY FAILURE WITH HYPOXIA AND HYPERCAPNIA: Status: ACTIVE | Noted: 2019-07-31

## 2022-09-08 PROBLEM — J96.12 CHRONIC RESPIRATORY FAILURE WITH HYPOXIA AND HYPERCAPNIA: Status: ACTIVE | Noted: 2019-07-31

## 2022-09-08 PROCEDURE — 94660 CPAP INITIATION&MGMT: CPT

## 2022-09-08 PROCEDURE — 25000242 PHARM REV CODE 250 ALT 637 W/ HCPCS: Performed by: INTERNAL MEDICINE

## 2022-09-08 PROCEDURE — 94668 MNPJ CHEST WALL SBSQ: CPT

## 2022-09-08 PROCEDURE — 99900035 HC TECH TIME PER 15 MIN (STAT)

## 2022-09-08 PROCEDURE — 25000003 PHARM REV CODE 250: Performed by: STUDENT IN AN ORGANIZED HEALTH CARE EDUCATION/TRAINING PROGRAM

## 2022-09-08 PROCEDURE — 97530 THERAPEUTIC ACTIVITIES: CPT

## 2022-09-08 PROCEDURE — 99900026 HC AIRWAY MAINTENANCE (STAT)

## 2022-09-08 PROCEDURE — 27000221 HC OXYGEN, UP TO 24 HOURS

## 2022-09-08 PROCEDURE — 25000242 PHARM REV CODE 250 ALT 637 W/ HCPCS: Performed by: FAMILY MEDICINE

## 2022-09-08 PROCEDURE — 94640 AIRWAY INHALATION TREATMENT: CPT

## 2022-09-08 PROCEDURE — 94761 N-INVAS EAR/PLS OXIMETRY MLT: CPT

## 2022-09-08 PROCEDURE — 25000003 PHARM REV CODE 250: Performed by: FAMILY MEDICINE

## 2022-09-08 PROCEDURE — 11000001 HC ACUTE MED/SURG PRIVATE ROOM

## 2022-09-08 PROCEDURE — 25000003 PHARM REV CODE 250: Performed by: NURSE PRACTITIONER

## 2022-09-08 PROCEDURE — 63600175 PHARM REV CODE 636 W HCPCS: Performed by: INTERNAL MEDICINE

## 2022-09-08 PROCEDURE — 25000003 PHARM REV CODE 250: Performed by: INTERNAL MEDICINE

## 2022-09-08 PROCEDURE — 27000207 HC ISOLATION

## 2022-09-08 RX ADMIN — PREDNISONE 10 MG: 10 TABLET ORAL at 09:09

## 2022-09-08 RX ADMIN — ATORVASTATIN CALCIUM 10 MG: 10 TABLET, FILM COATED ORAL at 08:09

## 2022-09-08 RX ADMIN — IPRATROPIUM BROMIDE AND ALBUTEROL SULFATE 3 ML: .5; 3 SOLUTION RESPIRATORY (INHALATION) at 01:09

## 2022-09-08 RX ADMIN — Medication 3 MG: at 08:09

## 2022-09-08 RX ADMIN — ACETYLCYSTEINE 2 ML: 200 INHALANT RESPIRATORY (INHALATION) at 07:09

## 2022-09-08 RX ADMIN — FLECAINIDE ACETATE 50 MG: 50 TABLET ORAL at 09:09

## 2022-09-08 RX ADMIN — IPRATROPIUM BROMIDE AND ALBUTEROL SULFATE 3 ML: .5; 3 SOLUTION RESPIRATORY (INHALATION) at 07:09

## 2022-09-08 RX ADMIN — VALSARTAN 160 MG: 80 TABLET, FILM COATED ORAL at 09:09

## 2022-09-08 RX ADMIN — MIRTAZAPINE 7.5 MG: 7.5 TABLET ORAL at 08:09

## 2022-09-08 RX ADMIN — OXYCODONE HYDROCHLORIDE AND ACETAMINOPHEN 500 MG: 500 TABLET ORAL at 09:09

## 2022-09-08 RX ADMIN — CLONAZEPAM 0.5 MG: 0.5 TABLET ORAL at 08:09

## 2022-09-08 RX ADMIN — CLONAZEPAM 0.5 MG: 0.5 TABLET ORAL at 09:09

## 2022-09-08 RX ADMIN — FLECAINIDE ACETATE 50 MG: 50 TABLET ORAL at 08:09

## 2022-09-08 RX ADMIN — IPRATROPIUM BROMIDE AND ALBUTEROL SULFATE 3 ML: .5; 3 SOLUTION RESPIRATORY (INHALATION) at 12:09

## 2022-09-08 RX ADMIN — DILTIAZEM HYDROCHLORIDE 60 MG: 60 TABLET, FILM COATED ORAL at 08:09

## 2022-09-08 RX ADMIN — LEVOFLOXACIN 750 MG: 500 TABLET, FILM COATED ORAL at 10:09

## 2022-09-08 RX ADMIN — IPRATROPIUM BROMIDE AND ALBUTEROL SULFATE 3 ML: .5; 3 SOLUTION RESPIRATORY (INHALATION) at 06:09

## 2022-09-08 RX ADMIN — DILTIAZEM HYDROCHLORIDE 60 MG: 60 TABLET, FILM COATED ORAL at 09:09

## 2022-09-08 RX ADMIN — ESCITALOPRAM OXALATE 5 MG: 5 TABLET, FILM COATED ORAL at 09:09

## 2022-09-08 RX ADMIN — ATENOLOL 12.5 MG: 25 TABLET ORAL at 09:09

## 2022-09-08 NOTE — PT/OT/SLP PROGRESS
Physical Therapy Treatment    Patient Name:  Marva Perez   MRN:  [id number]    Recommendations:     Discharge Recommendations:  nursing facility, skilled, home with home health   Discharge Equipment Recommendations: walker, rolling, wheelchair   Barriers to discharge: Decreased caregiver support    Assessment:     Marva Perez is a 81 y.o. female admitted with a medical diagnosis of COVID-19.  She presents with the following impairments/functional limitations:  weakness, impaired endurance, impaired self care skills, impaired functional mobility, gait instability, impaired balance, impaired cardiopulmonary response to activity, decreased safety awareness. Patient tolerated PT session today. Completed gait functions using RW at room air x ~30 feet contact guard assistance.  O2 SAT based line from 98% down to 78%. After 2 minutes rest with NC O2 replaced and deep breathing back up to 93%. Patient agreed to stay up at bedside chair and nursing made aware and present in the room.    Rehab Prognosis: Fair; patient would benefit from acute skilled PT services to address these deficits and reach maximum level of function.    Recent Surgery: * No surgery found *      Plan:     During this hospitalization, patient to be seen 5 x/week to address the identified rehab impairments via gait training, therapeutic activities, therapeutic exercises and progress toward the following goals:    Plan of Care Expires:  09/14/22    Subjective     Chief Complaint: SOB upon exertion  Patient/Family Comments/goals: to get better  Pain/Comfort:  Pain Rating 1: 0/10      Objective:     Communicated with patient and nursing prior to session.  Patient found HOB elevated with oxygen, telemetry upon PT entry to room.     General Precautions: Standard, fall   Orthopedic Precautions:N/A   Braces: N/A  Respiratory Status: Nasal cannula, flow 2 L/min     Functional Mobility:  Bed Mobility:     Rolling Left:  contact guard assistance  Rolling  Right: contact guard assistance  Scooting: contact guard assistance  Supine to Sit: contact guard assistance  Transfers:     Sit to Stand:  contact guard assistance with rolling walker  Bed to Chair: contact guard assistance with  rolling walker  using  Step Transfer  Gait: Contact Guard Assistance x ~30 feet with RW      AM-PAC 6 CLICK MOBILITY  Turning over in bed (including adjusting bedclothes, sheets and blankets)?: 3  Sitting down on and standing up from a chair with arms (e.g., wheelchair, bedside commode, etc.): 3  Moving from lying on back to sitting on the side of the bed?: 3  Moving to and from a bed to a chair (including a wheelchair)?: 3  Need to walk in hospital room?: 3  Climbing 3-5 steps with a railing?: 2 (clinical judgement)  Basic Mobility Total Score: 17       Therapeutic Activities and Exercises:   Worked on body sequence on out of bed activity; breathing ex; and gait trng with RW x ~30 feet with CG assistance    Patient left up in chair with all lines intact, call button in reach, and nursing present..    GOALS:   Multidisciplinary Problems       Physical Therapy Goals          Problem: Physical Therapy    Goal Priority Disciplines Outcome Goal Variances Interventions   Physical Therapy Goal     PT, PT/OT Adequate for Care Transition     Description: Patient will increase functional independence with mobility by performin. Supine to sit with Modified Independent  2. Sit to supine with Modified Independent  3. Bed to chair transfer with Supervision or Set-up Assistancewith or without rolling walker using Step Transfer TECHNIQUE  4. Gait  x 50  feet with Supervision or Set-up Assistance with or without rolling walker  5. Lower extremity exercise program x10 reps per handout, with assistance as needed                           Time Tracking:     PT Received On: 22  PT Start Time: 743     PT Stop Time: 0800  PT Total Time (min): 17 min     Billable Minutes: Therapeutic Activity  17    Treatment Type: Treatment  PT/PTA: PT           09/08/2022

## 2022-09-08 NOTE — ASSESSMENT & PLAN NOTE
Still respiratory failure she is not back to baseline   Still not eating still sob when in bed will get pt to get her up   Patient with Hypoxic Respiratory failure which is Chronic.  she is on home oxygen at 3 LPM. Supplemental oxygen was provided and noted- Oxygen Concentration (%):  [35] 35.   Signs/symptoms of respiratory failure include- increased work of breathing. Contributing diagnoses includes - COPD Labs and images were reviewed. Patient Has recent ABG, which has been reviewed. Will treat underlying causes and adjust management of respiratory failure as follows- 3  Patient requiring noninvasive home ventilator due to Chronic Respiratory Failure and COPD. Patient severely desaturates their PO2 during Sleep or while relaxing. Patient needs Non Invasive Ventilator day and night in order to decrease Work of Breathing and improve Oxygenation by recruitment and distension of the alveoli and terminal Bronchioles. Although patient's condition has improved since admit on BiPap, patient remains symptomatic on BiPap. Patient is compliant with home respiratory regimen but remains symptomatic. Patient could die at home without Non Invasive Ventilator or have frequent/increased admissions to hospital. AVAPS-AE mode of therapy is required for proper ventilatory support and chronic symptom management.

## 2022-09-08 NOTE — PROGRESS NOTES
[place] [facility] ([facility])  Pulmonology  Progress Note    Patient Name: Marva Perez  MRN: [id number]  Admission Date: 8/26/2022  Hospital Length of Stay: 13 days  Code Status: Full Code  Attending Provider: No att. providers found  Primary Care Provider: Kevin Clements MD   Principal Problem: COVID-19    Subjective:     Interval History: pt still with resp failure chronic on o2 at home    Objective:     Vital Signs (Most Recent):  Temp: 98.5 °F (36.9 °C) (09/08/22 1614)  Pulse: 79 (09/08/22 1614)  Resp: (!) 22 (09/08/22 1614)  BP: (!) 93/48 (09/08/22 1614)  SpO2: 97 % (09/08/22 1614)   Vital Signs (24h Range):  Temp:  [97.3 °F (36.3 °C)-98.5 °F (36.9 °C)] 98.5 °F (36.9 °C)  Pulse:  [70-94] 79  Resp:  [16-27] 22  SpO2:  [93 %-100 %] 97 %  BP: ()/(48-61) 93/48     Weight: 54.8 kg (120 lb 13 oz)  Body mass index is 22.1 kg/m².      Intake/Output Summary (Last 24 hours) at 9/8/2022 1628  Last data filed at 9/8/2022 0400  Gross per 24 hour   Intake 320 ml   Output 500 ml   Net -180 ml       Physical Exam  Vitals and nursing note reviewed.   Constitutional:       General: She is not in acute distress.     Appearance: Normal appearance. She is well-developed. She is not ill-appearing, toxic-appearing or diaphoretic.   HENT:      Head: Normocephalic and atraumatic.      Jaw: No trismus.      Right Ear: Hearing, tympanic membrane, ear canal and external ear normal.      Left Ear: Hearing, tympanic membrane, ear canal and external ear normal.      Nose: Nose normal. No nasal deformity, mucosal edema or rhinorrhea.      Right Sinus: No maxillary sinus tenderness or frontal sinus tenderness.      Left Sinus: No maxillary sinus tenderness or frontal sinus tenderness.      Mouth/Throat:      Dentition: Normal dentition.      Pharynx: Uvula midline. No posterior oropharyngeal erythema or uvula swelling.   Eyes:      General: Lids are normal. No scleral icterus.     Conjunctiva/sclera: Conjunctivae normal.       Comments: Sclera clear bilat   Neck:      Trachea: Trachea and phonation normal.   Cardiovascular:      Rate and Rhythm: Normal rate and regular rhythm.      Pulses: Normal pulses.      Heart sounds: Normal heart sounds.   Pulmonary:      Effort: Respiratory distress (mild to moderate) present.      Breath sounds: Examination of the right-upper field reveals decreased breath sounds. Examination of the left-upper field reveals decreased breath sounds. Examination of the right-middle field reveals decreased breath sounds, wheezing and rhonchi. Examination of the left-middle field reveals decreased breath sounds, wheezing and rhonchi. Examination of the right-lower field reveals decreased breath sounds, wheezing and rhonchi. Examination of the left-lower field reveals decreased breath sounds, wheezing and rhonchi. Decreased breath sounds, wheezing and rhonchi present.   Abdominal:      General: Bowel sounds are normal. There is no distension.      Palpations: Abdomen is soft.      Tenderness: There is no abdominal tenderness.   Musculoskeletal:         General: No deformity. Normal range of motion.      Cervical back: Full passive range of motion without pain and neck supple.   Skin:     General: Skin is warm and dry.      Coloration: Skin is not pale.   Neurological:      Mental Status: She is alert and oriented to person, place, and time.      Motor: No abnormal muscle tone.      Coordination: Coordination normal.   Psychiatric:         Speech: Speech normal.         Behavior: Behavior normal. Behavior is cooperative.         Thought Content: Thought content normal.         Judgment: Judgment normal.       Vents:  Oxygen Concentration (%): 35 (09/08/22 0700)    Lines/Drains/Airways       Peripheral Intravenous Line  Duration                  Peripheral IV - Single Lumen 09/02/22 1155 20 G Anterior;Left;Proximal Forearm 6 days                    Significant Labs:    CBC/Anemia Profile:  Recent Labs   Lab  09/07/22  0701   WBC 15.82*   HGB 9.8*   HCT 30.5*   *   MCV 95   RDW 14.7*        Chemistries:  Recent Labs   Lab 09/07/22  0701      K 4.5      CO2 33*   BUN 37*   CREATININE 1.0   CALCIUM 8.3*   ALBUMIN 2.6*   PROT 5.0*   BILITOT 1.2*   ALKPHOS 67   ALT 22   AST 18       All pertinent labs within the past 24 hours have been reviewed.    Significant Imaging:  I have reviewed all pertinent imaging results/findings within the past 24 hours.    Assessment/Plan:     * COVID-19  + test she went to a wedding   ++ covid and flu B    Severe malnutrition  Nutrition consulted. Most recent weight and BMI monitored- get food favorites                        Measurements:  Wt Readings from Last 1 Encounters:   09/06/22 54.8 kg (120 lb 13 oz)   Body mass index is 22.1 kg/m².    Recommendations: Recommendation/Intervention: 1. Provide Boost Plus ONS TID with meals to provide an additional 1080 kcals and 42 g protein.   2. Rec'd honoring pt food preferences to encourage po intake.   3. RD to follow and make rec's accordingly.  Goals: Pt ill consume 50-75% of EER by RD f/u.    Patient has been screened and assessed by RD. RD will follow patient.      Pneumonia due to infectious organism  Will start levoq    Anxiety  On Klonopin   Worried about hemoptysis    Lung mass  Will consider bronch possible as an outpatient   CT shows a RLL superior segment pleural lesion     Influenza B  Will check covid and flu and move to the floor today or tomorrow   ++ covid and flu B    COPD exacerbation  Severe both covid and flu A  Patient requiring noninvasive home ventilator due to Chronic Respiratory Failure and COPD. Patient severely desaturates their PO2 during Sleep or while relaxing. Patient needs Non Invasive Ventilator day and night in order to decrease Work of Breathing and improve Oxygenation by recruitment and distension of the alveoli and terminal Bronchioles. Although patient's condition has improved since admit on  BiPap, patient remains symptomatic on BiPap. Patient is compliant with home respiratory regimen but remains symptomatic. Patient could die at home without Non Invasive Ventilator or have frequent/increased admissions to hospital. AVAPS-AE mode of therapy is required for proper ventilatory support and chronic symptom management.     Hemoptysis  Will work up RLL lesion as an outpt     Chronic obstructive pulmonary disease  Copd severe   Patient requiring noninvasive home ventilator due to Chronic Respiratory Failure and COPD. Patient severely desaturates their PO2 during Sleep or while relaxing. Patient needs Non Invasive Ventilator day and night in order to decrease Work of Breathing and improve Oxygenation by recruitment and distension of the alveoli and terminal Bronchioles. Although patient's condition has improved since admit on BiPap, patient remains symptomatic on BiPap. Patient is compliant with home respiratory regimen but remains symptomatic. Patient could die at home without Non Invasive Ventilator or have frequent/increased admissions to hospital. AVAPS-AE mode of therapy is required for proper ventilatory support and chronic symptom management.     Chronic respiratory failure with hypoxia and hypercapnia  Still respiratory failure she is not back to baseline   Still not eating still sob when in bed will get pt to get her up   Patient with Hypoxic Respiratory failure which is Chronic.  she is on home oxygen at 3 LPM. Supplemental oxygen was provided and noted- Oxygen Concentration (%):  [35] 35.   Signs/symptoms of respiratory failure include- increased work of breathing. Contributing diagnoses includes - COPD Labs and images were reviewed. Patient Has recent ABG, which has been reviewed. Will treat underlying causes and adjust management of respiratory failure as follows- 3  Patient requiring noninvasive home ventilator due to Chronic Respiratory Failure and COPD. Patient severely desaturates their PO2  during Sleep or while relaxing. Patient needs Non Invasive Ventilator day and night in order to decrease Work of Breathing and improve Oxygenation by recruitment and distension of the alveoli and terminal Bronchioles. Although patient's condition has improved since admit on BiPap, patient remains symptomatic on BiPap. Patient is compliant with home respiratory regimen but remains symptomatic. Patient could die at home without Non Invasive Ventilator or have frequent/increased admissions to hospital. AVAPS-AE mode of therapy is required for proper ventilatory support and chronic symptom management.     Centrilobular emphysema  Severe end stage  Patient requiring noninvasive home ventilator due to Chronic Respiratory Failure and COPD. Patient severely desaturates their PO2 during Sleep or while relaxing. Patient needs Non Invasive Ventilator day and night in order to decrease Work of Breathing and improve Oxygenation by recruitment and distension of the alveoli and terminal Bronchioles. Although patient's condition has improved since admit on BiPap, patient remains symptomatic on BiPap. Patient is compliant with home respiratory regimen but remains symptomatic. Patient could die at home without Non Invasive Ventilator or have frequent/increased admissions to hospital. AVAPS-AE mode of therapy is required for proper ventilatory support and chronic symptom management.     Diarrhea  Stable     Emphysema/COPD  Severe end stage   Patient requiring noninvasive home ventilator due to Chronic Respiratory Failure and COPD. Patient severely desaturates their PO2 during Sleep or while relaxing. Patient needs Non Invasive Ventilator day and night in order to decrease Work of Breathing and improve Oxygenation by recruitment and distension of the alveoli and terminal Bronchioles. Although patient's condition has improved since admit on BiPap, patient remains symptomatic on BiPap. Patient is compliant with home respiratory regimen  but remains symptomatic. Patient could die at home without Non Invasive Ventilator or have frequent/increased admissions to hospital. AVAPS-AE mode of therapy is required for proper ventilatory support and chronic symptom management.     HTN (hypertension)  Was High but stable now   stable    KATHIE (generalized anxiety disorder)  stable      Home tomorrow   Patient requiring noninvasive home ventilator due to Chronic Respiratory Failure and COPD. Patient severely desaturates their PO2 during Sleep or while relaxing. Patient needs Non Invasive Ventilator day and night in order to decrease Work of Breathing and improve Oxygenation by recruitment and distension of the alveoli and terminal Bronchioles. Although patient's condition has improved since admit on BiPap, patient remains symptomatic on BiPap. Patient is compliant with home respiratory regimen but remains symptomatic. Patient could die at home without Non Invasive Ventilator or have frequent/increased admissions to hospital. AVAPS-AE mode of therapy is required for proper ventilatory support and chronic symptom management.        Critical care time spent on the evaluation and treatment of severe organ dysfunction, review of pertinent labs and imaging studies, discussions with consulting providers and discussions with patient/family: 95 minutes.    Howard Matson MD  Pulmonology  [place] - Med Surg (3rd Fl)

## 2022-09-08 NOTE — ASSESSMENT & PLAN NOTE
Nutrition consulted. Most recent weight and BMI monitored- get food favorites                        Measurements:  Wt Readings from Last 1 Encounters:   09/06/22 54.8 kg (120 lb 13 oz)   Body mass index is 22.1 kg/m².    Recommendations: Recommendation/Intervention: 1. Provide Boost Plus ONS TID with meals to provide an additional 1080 kcals and 42 g protein.   2. Rec'd honoring pt food preferences to encourage po intake.   3. RD to follow and make rec's accordingly.  Goals: Pt ill consume 50-75% of EER by RD f/u.    Patient has been screened and assessed by RD. RD will follow patient.

## 2022-09-08 NOTE — SUBJECTIVE & OBJECTIVE
Interval History: pt still with resp failure chronic on o2 at home    Objective:     Vital Signs (Most Recent):  Temp: 98.5 °F (36.9 °C) (09/08/22 1614)  Pulse: 79 (09/08/22 1614)  Resp: (!) 22 (09/08/22 1614)  BP: (!) 93/48 (09/08/22 1614)  SpO2: 97 % (09/08/22 1614)   Vital Signs (24h Range):  Temp:  [97.3 °F (36.3 °C)-98.5 °F (36.9 °C)] 98.5 °F (36.9 °C)  Pulse:  [70-94] 79  Resp:  [16-27] 22  SpO2:  [93 %-100 %] 97 %  BP: ()/(48-61) 93/48     Weight: 54.8 kg (120 lb 13 oz)  Body mass index is 22.1 kg/m².      Intake/Output Summary (Last 24 hours) at 9/8/2022 1628  Last data filed at 9/8/2022 0400  Gross per 24 hour   Intake 320 ml   Output 500 ml   Net -180 ml       Physical Exam  Vitals and nursing note reviewed.   Constitutional:       General: She is not in acute distress.     Appearance: Normal appearance. She is well-developed. She is not ill-appearing, toxic-appearing or diaphoretic.   HENT:      Head: Normocephalic and atraumatic.      Jaw: No trismus.      Right Ear: Hearing, tympanic membrane, ear canal and external ear normal.      Left Ear: Hearing, tympanic membrane, ear canal and external ear normal.      Nose: Nose normal. No nasal deformity, mucosal edema or rhinorrhea.      Right Sinus: No maxillary sinus tenderness or frontal sinus tenderness.      Left Sinus: No maxillary sinus tenderness or frontal sinus tenderness.      Mouth/Throat:      Dentition: Normal dentition.      Pharynx: Uvula midline. No posterior oropharyngeal erythema or uvula swelling.   Eyes:      General: Lids are normal. No scleral icterus.     Conjunctiva/sclera: Conjunctivae normal.      Comments: Sclera clear bilat   Neck:      Trachea: Trachea and phonation normal.   Cardiovascular:      Rate and Rhythm: Normal rate and regular rhythm.      Pulses: Normal pulses.      Heart sounds: Normal heart sounds.   Pulmonary:      Effort: Respiratory distress (mild to moderate) present.      Breath sounds: Examination of the  right-upper field reveals decreased breath sounds. Examination of the left-upper field reveals decreased breath sounds. Examination of the right-middle field reveals decreased breath sounds, wheezing and rhonchi. Examination of the left-middle field reveals decreased breath sounds, wheezing and rhonchi. Examination of the right-lower field reveals decreased breath sounds, wheezing and rhonchi. Examination of the left-lower field reveals decreased breath sounds, wheezing and rhonchi. Decreased breath sounds, wheezing and rhonchi present.   Abdominal:      General: Bowel sounds are normal. There is no distension.      Palpations: Abdomen is soft.      Tenderness: There is no abdominal tenderness.   Musculoskeletal:         General: No deformity. Normal range of motion.      Cervical back: Full passive range of motion without pain and neck supple.   Skin:     General: Skin is warm and dry.      Coloration: Skin is not pale.   Neurological:      Mental Status: She is alert and oriented to person, place, and time.      Motor: No abnormal muscle tone.      Coordination: Coordination normal.   Psychiatric:         Speech: Speech normal.         Behavior: Behavior normal. Behavior is cooperative.         Thought Content: Thought content normal.         Judgment: Judgment normal.       Vents:  Oxygen Concentration (%): 35 (09/08/22 0700)    Lines/Drains/Airways       Peripheral Intravenous Line  Duration                  Peripheral IV - Single Lumen 09/02/22 1155 20 G Anterior;Left;Proximal Forearm 6 days                    Significant Labs:    CBC/Anemia Profile:  Recent Labs   Lab 09/07/22  0701   WBC 15.82*   HGB 9.8*   HCT 30.5*   *   MCV 95   RDW 14.7*        Chemistries:  Recent Labs   Lab 09/07/22  0701      K 4.5      CO2 33*   BUN 37*   CREATININE 1.0   CALCIUM 8.3*   ALBUMIN 2.6*   PROT 5.0*   BILITOT 1.2*   ALKPHOS 67   ALT 22   AST 18       All pertinent labs within the past 24 hours have been  reviewed.    Significant Imaging:  I have reviewed all pertinent imaging results/findings within the past 24 hours.

## 2022-09-08 NOTE — ASSESSMENT & PLAN NOTE
Severe end stage   Patient requiring noninvasive home ventilator due to Chronic Respiratory Failure and COPD. Patient severely desaturates their PO2 during Sleep or while relaxing. Patient needs Non Invasive Ventilator day and night in order to decrease Work of Breathing and improve Oxygenation by recruitment and distension of the alveoli and terminal Bronchioles. Although patient's condition has improved since admit on BiPap, patient remains symptomatic on BiPap. Patient is compliant with home respiratory regimen but remains symptomatic. Patient could die at home without Non Invasive Ventilator or have frequent/increased admissions to hospital. AVAPS-AE mode of therapy is required for proper ventilatory support and chronic symptom management.

## 2022-09-08 NOTE — ASSESSMENT & PLAN NOTE
Copd severe   Patient requiring noninvasive home ventilator due to Chronic Respiratory Failure and COPD. Patient severely desaturates their PO2 during Sleep or while relaxing. Patient needs Non Invasive Ventilator day and night in order to decrease Work of Breathing and improve Oxygenation by recruitment and distension of the alveoli and terminal Bronchioles. Although patient's condition has improved since admit on BiPap, patient remains symptomatic on BiPap. Patient is compliant with home respiratory regimen but remains symptomatic. Patient could die at home without Non Invasive Ventilator or have frequent/increased admissions to hospital. AVAPS-AE mode of therapy is required for proper ventilatory support and chronic symptom management.

## 2022-09-08 NOTE — ASSESSMENT & PLAN NOTE
Severe both covid and flu A  Patient requiring noninvasive home ventilator due to Chronic Respiratory Failure and COPD. Patient severely desaturates their PO2 during Sleep or while relaxing. Patient needs Non Invasive Ventilator day and night in order to decrease Work of Breathing and improve Oxygenation by recruitment and distension of the alveoli and terminal Bronchioles. Although patient's condition has improved since admit on BiPap, patient remains symptomatic on BiPap. Patient is compliant with home respiratory regimen but remains symptomatic. Patient could die at home without Non Invasive Ventilator or have frequent/increased admissions to hospital. AVAPS-AE mode of therapy is required for proper ventilatory support and chronic symptom management.

## 2022-09-08 NOTE — PHYSICIAN QUERY
"PT Name: Marva Perez  MR #: [id number]     Documentation Clarification      CDS: Nasim Chandler RN CCDS               Contact information: [email or web address]      This form is a permanent document in the medical record.     Query Date: September 8, 2022    By submitting this query, we are merely seeking further clarification of documentation. Please utilize your independent clinical judgment when addressing the question(s) below.    The Medical Record reflects the following:    Clinical Findings Location in Medical Records   Severe malnutrition  Patient has been screened and assessed by RD. RD will follow patient.    Pneumonia due to infectious organism   Right lower lobe lung mass  KATHIE  Influenza B  COPD exacerbation  Centrilobular emphysema 9/4/2022 Pulmonology progress notes   Constitutional: Thin and cachectic   Dehydration 8/26/2022 ED provider notes   Acute and (on) Chronic Respiratory Failure with Hypoxia  Bacterial Pneumonia   COVID-19 Pneumonia  Influenza Pneumonia  Diarrhea due to COVID-19 and Tamiflu 8/31/2022 Physician Queries   Nutrition Risk Screen: no indicators present    Nutrition Problem  Inadequate oral intake     Related to (etiology):   Decreased appetite and GI symptoms     Signs and Symptoms (as evidenced by):   Pt consuming 0-25% of meals and meeting 0-25% of EER.    Per chart, pt reports appetite changes, nausea, and diarrhea (improving).    Height: 5' 2" (157.5 cm)  Weight: 54.8 kg (120 lb 13 oz)  BMI (Calculated): 22.1    Interventions/Recommendations (treatment strategy):     Recommendation/Intervention:   1. Provide Boost Plus ONS TID with meals to provide an additional 1080 kcals and 42 g protein.   2. Rec'd honoring pt food preferences to encourage oral intake.   3. RD to follow and make rec's accordingly. 8/31/2022 RD consult       Academy of Nutrition and Dietetics (Academy) and the American Society for Parenteral and Enteral Nutrition (A.S.P.E.N.) Clinical Characteristics to " support Malnutrition       Malnutrition in the Context of Acute Illness or Injury Malnutrition in the Context of Chronic Illness or Injury Malnutrition in the Context of Social or Environmental Circumstances   Malnutrition Level Moderate Severe Moderate Severe   Moderate   Severe   Energy Intake <75%                   >7 days <50%                 >5 days <75%           >1 month <75%                      >1 month   <75% for >3 months   <50% for >1 month   Weight Loss   1-2% in 1 week >2% in 1 week 5% in 1 month >5% in 1 month 5% in 1 month >5% in 1 month    5% in 1 month >5% in 1 month 7.5% in 3 months >7.5% in 3 months 7.5% in 3 months >7.5% in 3 months    7.5% in 3 months >7.5% in 3 months 10% in 6 months >10% in 6 months 10% in 6 months >10% in 6 months        20% in 1 year                    >20% in 1 year                                                                  20% in 1 year                            >20% in 1 year                                                  Subcutaneous Fat Loss Mild  Moderate  Mild  Severe    Mild   Severe   Muscle Loss Mild depletion Moderate depletion  Mild depletion Severe Depletion   Mild   Severe   Edema/Fluid Accumulation Mild Moderate to severe  Mild  Severe   Mild   Severe   Reduced  Strength         (based on standards supplied by  of dynamometer) N/A Measurably reduced N/A Measurably reduced N/A Measurably reduced     Criteria for mild malnutrition is defined as 1 characteristic outlined above within the established moderate or severe parameters.  A minimum of 2 out of the 6 characteristics noted above are recommended for a diagnosis of moderate or severe malnutrition.  Chronic illness/injury is a disease/condition lasting 3 months or longer.    The noted clinical guidelines are only system guidelines and do not replace the provider's clinical judgment.                                                                           Provider, please clarify the  diagnosis of Severe Malnutrition:      [  x ] Severe Malnutrition is not confirmed and/or it has been ruled out, other diagnosis ruled in:     [   x] Cachexia     [   x] Inadequate oral intake     [   ] Cachexia and Inadequate oral intake     [   ] Other, (please specify):_______________   [  x ] Severe Malnutrition is not confirmed and/or it has been ruled out   [   ] Severe Malnutrition is confirmed and additional clinical support/decision-making indicators for the diagnosis include (please specify):________________   [   ] Other clarification (please specify): ___________________   [  ] Clinically undetermined       References:    SHYAM Kinsey, & GOPAL Gallegos (2022, April). Assessment and management of anorexia and cachexia in palliative care. Retrieved May 23, 2022, from [email or web address]-and-management-[email or web address]-anorexia-[email or web address]-cachexia-in-[email or web address]-care?[email or web address]=see_link     CELSO Whitlock, PhD, RD, Krystyna DUFFY P., PhD, RN, JUDSON Carbone MD, PhD, Susy ENGLAND A., MS, RD, [facility], JEFFREY Nick, MS, RD, The Academy Malnutrition Work Group, The A.S.P.E.N. Board of Directors. (2012). Consensus Statement: Academy of Nutrition and Dietetics and American Society for Parenteral and Enteral Nutrition: Characteristics Recommended for the Identification and Documentation of Adult Malnutrition (Undernutrition). Journal of Parenteral and Enteral Nutrition, 36(3), 275-283. doi:10.1177/3615911360257841

## 2022-09-08 NOTE — PLAN OF CARE
09/08/22 0958   Post-Acute Status   Post-Acute Authorization Placement   Post-Acute Placement Status Referrals Sent   Discharge Delays None known at this time       MARRY contacted patient to discuss discharge planning. She is declining SNF placement at a nursing home at this time and would like to go home with sitters and home health.     MARRY then contacted patient's daughter, Mckenna, to discuss. Discussed concerns regarding discharge planning and lack in communication. Expressed empathy and understanding. Daughter has concerns about patient returning home. Plan would be for patient to reside with daughter for a time after this hospital stay; however, she would like for the patient to be more independent with ADLs for discharge. Daughter does work at home but is often unavailable to attend to the patient spontaneously. She states that Dr. Matson discussed LTAC with her earlier this week. [facility] would be their LTAC of choice. MARRY left a message for LTAC admissions [facility], and sent patient referral through [facility]. Awaiting a response at this time. Daughter's second choice would be SNF. [facility] may be an option given that they do not have long-term, [facility] care and are only a SNF nursing home facility. MARRY encouraged patient's daughter to have the conversation with the patient to discuss needs going forward and lack of capacity to provide all of patient's care in current state.     MARRY did leave a message for Dr. Matson to update family on patient's plan of care and discharge disposition.     [id number]-- Dr. Matson contacted the case management staff to inform that he has spoken with the patient's family and the patient will discharge home tomorrow with sitters. He states that he will order a trilogy for the patient but that the patient does not need to obtain the equipment before discharge tomorrow. Patient's daughter then contacted MARRY to inquire about LTAC referral. MARRY did receive a denial by [facility] in [facility]  stating that the request was an 'inappropriate level of care' and that patient's needs can be addressed at a lower level of care. She agrees to take patient home tomorrow with sitters. MARRY offered to contact Dr Matson to see if he believes the patient needs SNF prior to discharge tomorrow. Daughter declines. Patient will be discharging to her own home. MARRY also requested that Dr. Matson order home health at discharge.

## 2022-09-09 ENCOUNTER — PATIENT MESSAGE (OUTPATIENT)
Dept: ADMINISTRATIVE | Facility: CLINIC | Age: 82
End: 2022-09-09
Payer: MEDICARE

## 2022-09-09 ENCOUNTER — NURSE TRIAGE (OUTPATIENT)
Dept: ADMINISTRATIVE | Facility: CLINIC | Age: 82
End: 2022-09-09
Payer: MEDICARE

## 2022-09-09 VITALS
OXYGEN SATURATION: 94 % | WEIGHT: 120.81 LBS | RESPIRATION RATE: 18 BRPM | DIASTOLIC BLOOD PRESSURE: 51 MMHG | HEIGHT: 62 IN | TEMPERATURE: 98 F | SYSTOLIC BLOOD PRESSURE: 95 MMHG | HEART RATE: 83 BPM | BODY MASS INDEX: 22.23 KG/M2

## 2022-09-09 PROCEDURE — 25000003 PHARM REV CODE 250: Performed by: FAMILY MEDICINE

## 2022-09-09 PROCEDURE — 25000242 PHARM REV CODE 250 ALT 637 W/ HCPCS: Performed by: INTERNAL MEDICINE

## 2022-09-09 PROCEDURE — 94761 N-INVAS EAR/PLS OXIMETRY MLT: CPT

## 2022-09-09 PROCEDURE — 25000003 PHARM REV CODE 250: Performed by: STUDENT IN AN ORGANIZED HEALTH CARE EDUCATION/TRAINING PROGRAM

## 2022-09-09 PROCEDURE — 94660 CPAP INITIATION&MGMT: CPT

## 2022-09-09 PROCEDURE — 97530 THERAPEUTIC ACTIVITIES: CPT

## 2022-09-09 PROCEDURE — 99900035 HC TECH TIME PER 15 MIN (STAT)

## 2022-09-09 PROCEDURE — 25000242 PHARM REV CODE 250 ALT 637 W/ HCPCS: Performed by: FAMILY MEDICINE

## 2022-09-09 PROCEDURE — 94640 AIRWAY INHALATION TREATMENT: CPT

## 2022-09-09 PROCEDURE — 63600175 PHARM REV CODE 636 W HCPCS: Performed by: INTERNAL MEDICINE

## 2022-09-09 PROCEDURE — 25000003 PHARM REV CODE 250: Performed by: INTERNAL MEDICINE

## 2022-09-09 PROCEDURE — 27000221 HC OXYGEN, UP TO 24 HOURS

## 2022-09-09 PROCEDURE — 25000003 PHARM REV CODE 250: Performed by: NURSE PRACTITIONER

## 2022-09-09 RX ORDER — CLONAZEPAM 0.5 MG/1
0.5 TABLET ORAL 2 TIMES DAILY PRN
Qty: 60 TABLET | Refills: 0 | Status: ON HOLD | OUTPATIENT
Start: 2022-09-09 | End: 2023-02-17 | Stop reason: HOSPADM

## 2022-09-09 RX ADMIN — OXYCODONE HYDROCHLORIDE AND ACETAMINOPHEN 500 MG: 500 TABLET ORAL at 08:09

## 2022-09-09 RX ADMIN — IPRATROPIUM BROMIDE AND ALBUTEROL SULFATE 3 ML: .5; 3 SOLUTION RESPIRATORY (INHALATION) at 01:09

## 2022-09-09 RX ADMIN — CLONAZEPAM 0.5 MG: 0.5 TABLET ORAL at 08:09

## 2022-09-09 RX ADMIN — IPRATROPIUM BROMIDE AND ALBUTEROL SULFATE 3 ML: .5; 3 SOLUTION RESPIRATORY (INHALATION) at 12:09

## 2022-09-09 RX ADMIN — IPRATROPIUM BROMIDE AND ALBUTEROL SULFATE 3 ML: .5; 3 SOLUTION RESPIRATORY (INHALATION) at 07:09

## 2022-09-09 RX ADMIN — PREDNISONE 10 MG: 10 TABLET ORAL at 08:09

## 2022-09-09 RX ADMIN — FLECAINIDE ACETATE 50 MG: 50 TABLET ORAL at 08:09

## 2022-09-09 RX ADMIN — ACETYLCYSTEINE 2 ML: 200 INHALANT RESPIRATORY (INHALATION) at 07:09

## 2022-09-09 RX ADMIN — ESCITALOPRAM OXALATE 5 MG: 5 TABLET, FILM COATED ORAL at 08:09

## 2022-09-09 NOTE — SUBJECTIVE & OBJECTIVE
Interval History: sleeping 97% sat    Objective:     Vital Signs (Most Recent):  Temp: 97.1 °F (36.2 °C) (09/09/22 0428)  Pulse: 71 (09/09/22 0719)  Resp: (!) 22 (09/09/22 0719)  BP: (!) 119/56 (09/09/22 0428)  SpO2: 97 % (09/09/22 0719)   Vital Signs (24h Range):  Temp:  [97.1 °F (36.2 °C)-98.5 °F (36.9 °C)] 97.1 °F (36.2 °C)  Pulse:  [65-94] 71  Resp:  [16-27] 22  SpO2:  [93 %-98 %] 97 %  BP: ()/(47-59) 119/56     Weight: 54.8 kg (120 lb 13 oz)  Body mass index is 22.1 kg/m².      Intake/Output Summary (Last 24 hours) at 9/9/2022 0813  Last data filed at 9/8/2022 1800  Gross per 24 hour   Intake 500 ml   Output 350 ml   Net 150 ml       Physical Exam  Vitals and nursing note reviewed.   Constitutional:       General: She is not in acute distress.     Appearance: Normal appearance. She is well-developed. She is not ill-appearing, toxic-appearing or diaphoretic.   HENT:      Head: Normocephalic and atraumatic.      Jaw: No trismus.      Right Ear: Hearing, tympanic membrane, ear canal and external ear normal.      Left Ear: Hearing, tympanic membrane, ear canal and external ear normal.      Nose: Nose normal. No nasal deformity, mucosal edema or rhinorrhea.      Right Sinus: No maxillary sinus tenderness or frontal sinus tenderness.      Left Sinus: No maxillary sinus tenderness or frontal sinus tenderness.      Mouth/Throat:      Dentition: Normal dentition.      Pharynx: Uvula midline. No posterior oropharyngeal erythema or uvula swelling.   Eyes:      General: Lids are normal. No scleral icterus.     Conjunctiva/sclera: Conjunctivae normal.      Comments: Sclera clear bilat   Neck:      Trachea: Trachea and phonation normal.   Cardiovascular:      Rate and Rhythm: Normal rate and regular rhythm.      Pulses: Normal pulses.      Heart sounds: Normal heart sounds.   Pulmonary:      Effort: Prolonged expiration and respiratory distress (mild to moderate) present. No accessory muscle usage.      Breath sounds:  Examination of the right-middle field reveals decreased breath sounds. Examination of the left-middle field reveals decreased breath sounds. Examination of the right-lower field reveals decreased breath sounds and rhonchi. Examination of the left-lower field reveals decreased breath sounds and rhonchi. Decreased breath sounds and rhonchi present. No wheezing.   Abdominal:      General: Bowel sounds are normal. There is no distension.      Palpations: Abdomen is soft.      Tenderness: There is no abdominal tenderness.   Musculoskeletal:         General: No deformity. Normal range of motion.      Cervical back: Full passive range of motion without pain and neck supple.   Skin:     General: Skin is warm and dry.      Coloration: Skin is not pale.   Neurological:      Mental Status: She is alert and oriented to person, place, and time.      Motor: No abnormal muscle tone.      Coordination: Coordination normal.   Psychiatric:         Speech: Speech normal.         Behavior: Behavior normal. Behavior is cooperative.         Thought Content: Thought content normal.         Judgment: Judgment normal.       Vents:  Oxygen Concentration (%): 35 (09/09/22 0719)    Lines/Drains/Airways       Peripheral Intravenous Line  Duration                  Peripheral IV - Single Lumen 09/02/22 1155 20 G Anterior;Left;Proximal Forearm 6 days                    Significant Labs:    CBC/Anemia Profile:  No results for input(s): WBC, HGB, HCT, PLT, MCV, RDW, IRON, FERRITIN, RETIC, FOLATE, [id number], OCCULTBLOOD in the last 48 hours.     Chemistries:  No results for input(s): NA, K, CL, CO2, BUN, CREATININE, CALCIUM, ALBUMIN, PROT, BILITOT, ALKPHOS, ALT, AST, GLUCOSE, MG, PHOS in the last 48 hours.    All pertinent labs within the past 24 hours have been reviewed.    Significant Imaging:  I have reviewed all pertinent imaging results/findings within the past 24 hours.  U/S: I have reviewed all pertinent results/findings within the past 24  hours and my personal findings are:  unchanged

## 2022-09-09 NOTE — NURSING
Pts BP 98/50. Dr. Matson notified and Clonidine patch removed.    Dr. Matson gave orders to hold atenolol, diltiazem, and valsartan.     MD gave orders to d/c atenolol and valsartan and to educated patient not to take at home.     MD gave ok to continue flecainide and klonopin.

## 2022-09-09 NOTE — PROGRESS NOTES
[facility] ([facility])  Pulmonology  Progress Note    Patient Name: Marva Perez  MRN: [id number]  Admission Date: 8/26/2022  Hospital Length of Stay: 14 days  Code Status: Full Code  Attending Provider: No att. providers found  Primary Care Provider: Kevin Clements MD   Principal Problem: COVID-19    Subjective:     Interval History: sleeping 97% sat    Objective:     Vital Signs (Most Recent):  Temp: 97.1 °F (36.2 °C) (09/09/22 0428)  Pulse: 71 (09/09/22 0719)  Resp: (!) 22 (09/09/22 0719)  BP: (!) 119/56 (09/09/22 0428)  SpO2: 97 % (09/09/22 0719)   Vital Signs (24h Range):  Temp:  [97.1 °F (36.2 °C)-98.5 °F (36.9 °C)] 97.1 °F (36.2 °C)  Pulse:  [65-94] 71  Resp:  [16-27] 22  SpO2:  [93 %-98 %] 97 %  BP: ()/(47-59) 119/56     Weight: 54.8 kg (120 lb 13 oz)  Body mass index is 22.1 kg/m².      Intake/Output Summary (Last 24 hours) at 9/9/2022 0813  Last data filed at 9/8/2022 1800  Gross per 24 hour   Intake 500 ml   Output 350 ml   Net 150 ml       Physical Exam  Vitals and nursing note reviewed.   Constitutional:       General: She is not in acute distress.     Appearance: Normal appearance. She is well-developed. She is not ill-appearing, toxic-appearing or diaphoretic.   HENT:      Head: Normocephalic and atraumatic.      Jaw: No trismus.      Right Ear: Hearing, tympanic membrane, ear canal and external ear normal.      Left Ear: Hearing, tympanic membrane, ear canal and external ear normal.      Nose: Nose normal. No nasal deformity, mucosal edema or rhinorrhea.      Right Sinus: No maxillary sinus tenderness or frontal sinus tenderness.      Left Sinus: No maxillary sinus tenderness or frontal sinus tenderness.      Mouth/Throat:      Dentition: Normal dentition.      Pharynx: Uvula midline. No posterior oropharyngeal erythema or uvula swelling.   Eyes:      General: Lids are normal. No scleral icterus.     Conjunctiva/sclera: Conjunctivae normal.      Comments: Sclera clear bilat   Neck:       Trachea: Trachea and phonation normal.   Cardiovascular:      Rate and Rhythm: Normal rate and regular rhythm.      Pulses: Normal pulses.      Heart sounds: Normal heart sounds.   Pulmonary:      Effort: Prolonged expiration and respiratory distress (mild to moderate) present. No accessory muscle usage.      Breath sounds: Examination of the right-middle field reveals decreased breath sounds. Examination of the left-middle field reveals decreased breath sounds. Examination of the right-lower field reveals decreased breath sounds and rhonchi. Examination of the left-lower field reveals decreased breath sounds and rhonchi. Decreased breath sounds and rhonchi present. No wheezing.   Abdominal:      General: Bowel sounds are normal. There is no distension.      Palpations: Abdomen is soft.      Tenderness: There is no abdominal tenderness.   Musculoskeletal:         General: No deformity. Normal range of motion.      Cervical back: Full passive range of motion without pain and neck supple.   Skin:     General: Skin is warm and dry.      Coloration: Skin is not pale.   Neurological:      Mental Status: She is alert and oriented to person, place, and time.      Motor: No abnormal muscle tone.      Coordination: Coordination normal.   Psychiatric:         Speech: Speech normal.         Behavior: Behavior normal. Behavior is cooperative.         Thought Content: Thought content normal.         Judgment: Judgment normal.       Vents:  Oxygen Concentration (%): 35 (09/09/22 0719)    Lines/Drains/Airways       Peripheral Intravenous Line  Duration                  Peripheral IV - Single Lumen 09/02/22 1155 20 G Anterior;Left;Proximal Forearm 6 days                    Significant Labs:    CBC/Anemia Profile:  No results for input(s): WBC, HGB, HCT, PLT, MCV, RDW, IRON, FERRITIN, RETIC, FOLATE, [id number], OCCULTBLOOD in the last 48 hours.     Chemistries:  No results for input(s): NA, K, CL, CO2, BUN, CREATININE, CALCIUM,  ALBUMIN, PROT, BILITOT, ALKPHOS, ALT, AST, GLUCOSE, MG, PHOS in the last 48 hours.    All pertinent labs within the past 24 hours have been reviewed.    Significant Imaging:  I have reviewed all pertinent imaging results/findings within the past 24 hours.  U/S: I have reviewed all pertinent results/findings within the past 24 hours and my personal findings are:  unchanged    Assessment/Plan:     * COVID-19  + test she went to a wedding   ++ covid and flu B    Severe malnutrition  Nutrition consulted. Most recent weight and BMI monitored- get food favorites                        Measurements:  Wt Readings from Last 1 Encounters:   09/06/22 54.8 kg (120 lb 13 oz)   Body mass index is 22.1 kg/m².    Recommendations: Recommendation/Intervention: 1. Provide Boost Plus ONS TID with meals to provide an additional 1080 kcals and 42 g protein.   2. Rec'd honoring pt food preferences to encourage po intake.   3. RD to follow and make rec's accordingly.  Goals: Pt ill consume 50-75% of EER by RD f/u.    Patient has been screened and assessed by RD. RD will follow patient.      Pneumonia due to infectious organism  Will start levoq    Hypokalemia  Resolved     Cough with hemoptysis  No hemoptysis will need to work up lung lesion unless it resolves on xray off eloquise   off eloquise now will do cytology on sputum and if persist do a bronchoscopy in 3 to 5 days    Anxiety  On Klonopin   Worried about hemoptysis    Lung mass  Will consider bronch possible as an outpatient   CT shows a RLL superior segment pleural lesion     Influenza B  Will check covid and flu and move to the floor today or tomorrow   ++ covid and flu B    Chronic atrial fibrillation  On eloquise has been stopped     Hemoptysis  Will work up RLL lesion as an outpt     Chronic obstructive pulmonary disease  Copd severe   Patient requiring noninvasive home ventilator due to Chronic Respiratory Failure and COPD. Patient severely desaturates their PO2 during Sleep  or while relaxing. Patient needs Non Invasive Ventilator day and night in order to decrease Work of Breathing and improve Oxygenation by recruitment and distension of the alveoli and terminal Bronchioles. Although patient's condition has improved since admit on BiPap, patient remains symptomatic on BiPap. Patient is compliant with home respiratory regimen but remains symptomatic. Patient could die at home without Non Invasive Ventilator or have frequent/increased admissions to hospital. AVAPS-AE mode of therapy is required for proper ventilatory support and chronic symptom management.     Chronic respiratory failure with hypoxia and hypercapnia  Still respiratory failure she is not back to baseline   Still not eating still sob when in bed will get pt to get her up   Patient with Hypoxic Respiratory failure which is Chronic.  she is on home oxygen at 3 LPM. Supplemental oxygen was provided and noted- Oxygen Concentration (%):  [35] 35.   Signs/symptoms of respiratory failure include- increased work of breathing. Contributing diagnoses includes - COPD Labs and images were reviewed. Patient Has recent ABG, which has been reviewed. Will treat underlying causes and adjust management of respiratory failure as follows- 3  Patient requiring noninvasive home ventilator due to Chronic Respiratory Failure and COPD. Patient severely desaturates their PO2 during Sleep or while relaxing. Patient needs Non Invasive Ventilator day and night in order to decrease Work of Breathing and improve Oxygenation by recruitment and distension of the alveoli and terminal Bronchioles. Although patient's condition has improved since admit on BiPap, patient remains symptomatic on BiPap. Patient is compliant with home respiratory regimen but remains symptomatic. Patient could die at home without Non Invasive Ventilator or have frequent/increased admissions to hospital. AVAPS-AE mode of therapy is required for proper ventilatory support and chronic  symptom management.     Centrilobular emphysema  Severe end stage  Patient requiring noninvasive home ventilator due to Chronic Respiratory Failure and COPD. Patient severely desaturates their PO2 during Sleep or while relaxing. Patient needs Non Invasive Ventilator day and night in order to decrease Work of Breathing and improve Oxygenation by recruitment and distension of the alveoli and terminal Bronchioles. Although patient's condition has improved since admit on BiPap, patient remains symptomatic on BiPap. Patient is compliant with home respiratory regimen but remains symptomatic. Patient could die at home without Non Invasive Ventilator or have frequent/increased admissions to hospital. AVAPS-AE mode of therapy is required for proper ventilatory support and chronic symptom management.     Diarrhea  Stable     Emphysema/COPD  Severe end stage   Patient requiring noninvasive home ventilator due to Chronic Respiratory Failure and COPD. Patient severely desaturates their PO2 during Sleep or while relaxing. Patient needs Non Invasive Ventilator day and night in order to decrease Work of Breathing and improve Oxygenation by recruitment and distension of the alveoli and terminal Bronchioles. Although patient's condition has improved since admit on BiPap, patient remains symptomatic on BiPap. Patient is compliant with home respiratory regimen but remains symptomatic. Patient could die at home without Non Invasive Ventilator or have frequent/increased admissions to hospital. AVAPS-AE mode of therapy is required for proper ventilatory support and chronic symptom management.     HTN (hypertension)  Was High but stable now   stable    KATHIE (generalized anxiety disorder)  stable                 Howard Matson MD  Pulmonology  [place] - Med Surg (3rd Fl)

## 2022-09-09 NOTE — PT/OT/SLP DISCHARGE
Physical Therapy Discharge Summary    Name: Marva Perez  MRN: [id number]   Principal Problem: COVID-19     Patient Discharged from acute Physical Therapy on 22.  Please refer to prior PT noted date on 22 for functional status.     Assessment:     Patient was discharged unexpectedly.  Information required to complete an accurate discharge summary is unknown.  Refer to therapy initial evaluation and last progress note for initial and most recent functional status and goal achievement.  Recommendations made may be found in medical record.    Objective:     GOALS:   Multidisciplinary Problems       Physical Therapy Goals          Problem: Physical Therapy    Goal Priority Disciplines Outcome Goal Variances Interventions   Physical Therapy Goal     PT, PT/OT Adequate for Care Transition     Description: Patient will increase functional independence with mobility by performin. Supine to sit with Modified Independent  2. Sit to supine with Modified Independent  3. Bed to chair transfer with Supervision or Set-up Assistancewith or without rolling walker using Step Transfer TECHNIQUE  4. Gait  x 50  feet with Supervision or Set-up Assistance with or without rolling walker  5. Lower extremity exercise program x10 reps per handout, with assistance as needed                           Reasons for Discontinuation of Therapy Services  Transfer to alternate level of care.      Plan:     Patient Discharged to: Home with Home Health Service.      2022

## 2022-09-09 NOTE — DISCHARGE SUMMARY
[place] [facility] S[facility] ([facility])  Pulmonology  Discharge Summary      Patient Name: Marva Perez  MRN: [id number]  Admission Date: 8/26/2022  Hospital Length of Stay: 14 days  Discharge Date and Time:  09/09/2022 8:12 AM  Attending Physician: No att. providers found   Discharging Provider: Radha Matson MD  Primary Care Provider: Kevin Clements MD    HPI:  No notes on file    * No surgery found *    Indwelling Lines/Drains at Time of Discharge:   Lines/Drains/Airways     None                 Hospital Course:  No notes on file    Goals of Care Treatment Preferences:  Code Status: Full Code      Consults (From admission, onward)        Status Ordering Provider     Inpatient consult to Registered Dietitian/Nutritionist  Once        Provider:  (Not yet assigned)    Acknowledged RADHA MATSON     Inpatient consult to Psychiatry  Once        Provider:  (Not yet assigned)    Completed KEVIN MCNALLY     Inpatient consult to Registered Dietitian/Nutritionist  Once        Provider:  (Not yet assigned)    Completed MARCIN CHIIRNOS     Inpatient consult to Cardiology-CIS  Once        Provider:  Carlo Wilde MD    Completed KEVIN MCNALLY     Inpatient consult to Pulmonology  Once        Provider:  Radha Matson MD    Acknowledged KEVIN MCNALLY     Inpatient consult to Social Work  Once        Provider:  (Not yet assigned)    Acknowledged KEVIN MCNALLY          Significant Labs:  Profile:      Recent Labs   Lab 09/07/22  0701   WBC 15.82*   HGB 9.8*   HCT 30.5*   *   MCV 95   RDW 14.7*         Chemistries:      Recent Labs   Lab 09/07/22  0701      K 4.5      CO2 33*   BUN 37*   CREATININE 1.0   CALCIUM 8.3*   ALBUMIN 2.6*   PROT 5.0*   BILITOT 1.2*   ALKPHOS 67   ALT 22   AST 18         All pertinent labs within the past 24 hours have been reviewed.     Significant Imaging:  I have reviewed all pertinent imaging results/findings within the past 24 hours.        Significant Imaging:  I  "have reviewed and interpreted all pertinent imaging results/findings within the past 24 hours.    Pending Diagnostic Studies:     None        Final Active Diagnoses:    Diagnosis Date Noted POA    PRINCIPAL PROBLEM:  COVID-19 [U07.1] 08/22/2022 Yes    Severe malnutrition [E43] 09/04/2022 No    Pneumonia due to infectious organism [J18.9] 08/29/2022 Yes    Cough with hemoptysis [R04.2] 08/28/2022 Yes    Hypokalemia [E87.6] 08/28/2022 Yes    Anxiety [F41.9] 08/27/2022 Yes    Chronic atrial fibrillation [I48.20] 08/26/2022 Yes    Influenza B [J10.1] 08/26/2022 Yes    Lung mass [R91.8] 08/26/2022 Yes    Hemoptysis [R04.2] 08/04/2019 Yes    Chronic respiratory failure with hypoxia and hypercapnia [J96.11, J96.12] 07/31/2019 Unknown    Chronic obstructive pulmonary disease [J44.9] 07/31/2019 Yes    Centrilobular emphysema [J43.2] 06/06/2016 Yes    Diarrhea [R19.7] 01/13/2015 Yes    Emphysema/COPD [J43.9] 12/26/2013 Yes    KATHIE (generalized anxiety disorder) [F41.1] 10/30/2012 Yes    HTN (hypertension) [I10] 10/30/2012 Yes    Hyperlipidemia [E78.5] 10/30/2012 Yes      Problems Resolved During this Admission:       Discharged Condition: stable    [place] [facility] [place]:   Follow-up Information     Howard Matson MD Follow up in 1 week(s).    Specialty: Pulmonary Disease  Why: 10 am tuesday 19/13/22  Contact information:  [address] [place], [place] [address] [address]  [phone]                       Patient Instructions:      VENTILATOR FOR HOME USE     Order Specific Question Answer Comments   Height: 5' 2" (1.575 m)    Weight: 54.8 kg (120 lb 13 oz)    Does patient have medical equipment at home? oxygen    Does patient have medical equipment at home? bedside commode    Length of need (1-99 months): 99    Type (): Non-invasive    Interface needed: Full face mask    Mode: AVAPS-AE    Rate: 12    Tidal Volume 450    PEEP - EPAP 5.0 CM/H2O    Pressure support - IPAP 14    FiO2% 35  "   Humidifier needed? No      Ambulatory referral/consult to Home Health   Standing Status: Future   Referral Priority: Routine Referral Type: Home Health   Referral Reason: Specialty Services Required   Requested Specialty: Home Health Services   Number of Visits Requested: 1     COVID-19 Surveillance Program     Order Specific Question Answer Comments   Does patient have a smartphone? No    Does patient have the [facility] on their smartphone? No    While in surveillance program, will patient be using home oxygen? No      Medications:  Reconciled Home Medications:      Medication List      START taking these medications    clonazePAM 0.5 MG tablet  Commonly known as: KlonoPIN  Take 1 tablet (0.5 mg total) by mouth 2 (two) times daily as needed for Anxiety.        CONTINUE taking these medications    predniSONE 10 MG tablet  Commonly known as: DELTASONE  Take 10 mg by mouth once daily.     rosuvastatin 40 MG Tab  Commonly known as: CRESTOR  Take 40 mg by mouth every evening.     valsartan 160 MG tablet  Commonly known as: DIOVAN        STOP taking these medications    carvediloL 3.125 MG tablet  Commonly known as: COREG        ASK your doctor about these medications    albuterol 90 mcg/actuation inhaler  Commonly known as: PROVENTIL/VENTOLIN HFA  Inhale 2 puffs into the lungs every 6 (six) hours as needed for Wheezing.     albuterol-ipratropium 2.5 mg-0.5 mg/3 mL nebulizer solution  Commonly known as: DUO-NEB  SMARTSI Vial(s) Via Nebulizer Every 12 Hours     ALPRAZolam 0.25 MG tablet  Commonly known as: XANAX  TAKE 1 TABLET DURING THE DAY FOR ANXIETY AS NEEDED AND 2 AT NIGHT FOR SLEEP     atenoloL 25 MG tablet  Commonly known as: TENORMIN  Take 12.5 mg by mouth once daily.     clobetasoL 0.05 % cream  Commonly known as: TEMOVATE  Apply topically 2 (two) times daily. To rash at the right lower leg     diltiaZEM 120 MG tablet  Commonly known as: CARDIZEM  Take 120 mg by mouth 2 (two) times daily.     ELIQUIS 2.5  mg Tab  Generic drug: apixaban  Take 2.5 mg by mouth 2 (two) times daily.     * ergocalciferol 50,000 unit Cap  Commonly known as: ERGOCALCIFEROL  TAKE 1 CAPSULE BY MOUTH ONE TIME PER WEEK     * ergocalciferol 50,000 unit Cap  Commonly known as: ERGOCALCIFEROL  TAKE 1 CAPSULE BY MOUTH ONE TIME PER WEEK     EScitalopram oxalate 5 MG Tab  Commonly known as: LEXAPRO  TAKE 1 TABLET BY MOUTH EVERY DAY     ezetimibe 10 mg tablet  Commonly known as: ZETIA     flecainide 50 MG Tab  Commonly known as: TAMBOCOR  Take 50 mg by mouth 2 (two) times daily.     furosemide 20 MG tablet  Commonly known as: LASIX  Take 20 mg by mouth once daily.     gabapentin 100 MG capsule  Commonly known as: NEURONTIN  Take 1 capsule (100 mg total) by mouth every evening.     levalbuterol 0.63 mg/3 mL nebulizer solution  Commonly known as: XOPENEX  Take 3 mLs (0.63 mg total) by nebulization every 4 (four) hours as needed for Wheezing.     metoclopramide HCl 10 MG tablet  Commonly known as: REGLAN  Take 1 tablet (10 mg total) by mouth every 6 (six) hours.     montelukast 10 mg tablet  Commonly known as: SINGULAIR  Take 1 tablet (10 mg total) by mouth once daily.     oseltamivir 75 MG capsule  Commonly known as: TAMIFLU  Take 1 capsule (75 mg total) by mouth 2 (two) times daily. for 5 days  Ask about: Should I take this medication?     REPATHA SURECLICK 140 mg/mL Pnij  Generic drug: evolocumab  Inject 140 mg into the skin every 14 (fourteen) days.     rifAXIMin 550 mg Tab  Commonly known as: XIFAXAN  Take 1 tablet (550 mg total) by mouth 3 (three) times daily.     TRELEGY ELLIPTA 100-62.5-25 mcg Dsdv  Generic drug: fluticasone-umeclidin-vilanter  TAKE 1 PUFF BY MOUTH EVERY DAY         * This list has 2 medication(s) that are the same as other medications prescribed for you. Read the directions carefully, and ask your doctor or other care provider to review them with you.                Howard Matson MD  Pulmonology  [place] - Med Surg (3rd Fl)

## 2022-09-09 NOTE — TELEPHONE ENCOUNTER
Mrs. Larson is calling to express concerns about her mother being enrolled in the COVID Surveillance program, feels like her mother is not tech savvy enough to be able to use if effectively, expressed concern for the elderly population in general being left out due to inability to use the necessary technology required for entering their vitals signs into the portal.  She agrees that her mother is able to manage text messages, so we discussed the COVID home symptom monitoring program, and she was agreeable to signing her mother up or that option. I advised her on the [facility] RN Triage line being available 24 hours per day, as another service that is available to her mother. I gave her the phone number to Patient Relations at [facility], and recommended that she voice her concerns to them directly, as well.  She verbalized understanding.    Reason for Disposition   Information only question and nurse able to answer    Additional Information   Negative: Nursing judgment   Negative: Nursing judgment   Negative: Nursing judgment   Negative: Nursing judgment    Protocols used: Information Only Call - No Triage-A-[place]

## 2022-09-09 NOTE — PLAN OF CARE
[place] - Med Surg (3rd Fl)  Discharge Final Note    Primary Care Provider: Kevin Clements MD    Expected Discharge Date: 9/9/2022    Final Discharge Note (most recent)       Final Note - 09/09/22 1408          Final Note    Assessment Type Final Discharge Note     [place]tic[place] [facility] [place]ospital Resources/Appts/Education Provided Appointments scheduled and added to AVS        Post-Acute Status    Post-Acute Authorization HME;Home Health     HME Status Pending post-acute provider review/more information requested     Home Health Status Set-up Complete/Auth obtained     Discharge Delays None known at this time                     Important Message from Medicare  Important Message from Medicare regarding Discharge Appeal Rights: Given to patient/caregiver, Explained to patient/caregiver, Signed/date by patient/caregiver     Date IMM was signed: 09/08/22  Time IMM was signed: 1400    Contact Info       Howard Matson MD   Specialty: Pulmonary Disease    [address] [place] [address]   Phone: [phone]       Next Steps: Follow up on 9/13/2022    Instructions: 10 am tuesday 9/13/22    Kevin Clements MD   Specialty: Family Medicine   Relationship: PCP - General    [address] [place] [address] [address] [place] [address]   Phone: [phone]       Next Steps: Go on 9/26/2022    Instructions: Hospital Follow-up at 1pm              Patient discharging home with family, sitters and [facility]. Patient ordered a trilogy by Dr. Matson. SW faxed referral to [facility] as patient's oxygen is through [employer]. [employer] replied with additional documentation needs. This information was forwarded to Dr. Matson via fax for fulfillment. Dr. Matson states that patient can await trilogy approval and set-up at home as not to delay discharge.

## 2022-09-09 NOTE — PLAN OF CARE
Problem: Adult Inpatient Plan of Care  Goal: Plan of Care Review  Outcome: Ongoing, Progressing     Problem: Adult Inpatient Plan of Care  Goal: Readiness for Transition of Care  Outcome: Ongoing, Progressing      Patient admitted with COPD exac and covid positive; sats maintained today on 2lpm per nc; spoke with Dr. Matson, patient's daughter, and patient's son; informed that patient is planning to go home tomorrow with sitters; patient was up in chair for meals today and tolerated well; safety maintained, isolation precautions maintained

## 2022-09-09 NOTE — SUBJECTIVE & OBJECTIVE
Interval History: Home today with sitters sleeping with bipap and a sat of 97%     Objective:     Vital Signs (Most Recent):  Temp: 97.1 °F (36.2 °C) (09/09/22 0428)  Pulse: 71 (09/09/22 0719)  Resp: (!) 22 (09/09/22 0719)  BP: (!) 119/56 (09/09/22 0428)  SpO2: 97 % (09/09/22 0719)   Vital Signs (24h Range):  Temp:  [97.1 °F (36.2 °C)-98.5 °F (36.9 °C)] 97.1 °F (36.2 °C)  Pulse:  [65-94] 71  Resp:  [16-27] 22  SpO2:  [93 %-98 %] 97 %  BP: ()/(47-61) 119/56     Weight: 54.8 kg (120 lb 13 oz)  Body mass index is 22.1 kg/m².      Intake/Output Summary (Last 24 hours) at 9/9/2022 0745  Last data filed at 9/8/2022 1800  Gross per 24 hour   Intake 500 ml   Output 350 ml   Net 150 ml       Physical Exam  Vitals and nursing note reviewed.   Constitutional:       General: She is not in acute distress.     Appearance: Normal appearance. She is well-developed. She is not ill-appearing, toxic-appearing or diaphoretic.   HENT:      Head: Normocephalic and atraumatic.      Jaw: No trismus.      Right Ear: Hearing, tympanic membrane, ear canal and external ear normal.      Left Ear: Hearing, tympanic membrane, ear canal and external ear normal.      Nose: Nose normal. No nasal deformity, mucosal edema or rhinorrhea.      Right Sinus: No maxillary sinus tenderness or frontal sinus tenderness.      Left Sinus: No maxillary sinus tenderness or frontal sinus tenderness.      Mouth/Throat:      Dentition: Normal dentition.      Pharynx: Uvula midline. No posterior oropharyngeal erythema or uvula swelling.   Eyes:      General: Lids are normal. No scleral icterus.     Conjunctiva/sclera: Conjunctivae normal.      Comments: Sclera clear bilat   Neck:      Trachea: Trachea and phonation normal.   Cardiovascular:      Rate and Rhythm: Normal rate and regular rhythm.      Pulses: Normal pulses.      Heart sounds: Normal heart sounds.   Pulmonary:      Effort: Respiratory distress (mild to moderate) present.      Breath sounds:  Examination of the right-upper field reveals decreased breath sounds. Examination of the left-upper field reveals decreased breath sounds. Examination of the right-middle field reveals decreased breath sounds, wheezing and rhonchi. Examination of the left-middle field reveals decreased breath sounds, wheezing and rhonchi. Examination of the right-lower field reveals decreased breath sounds, wheezing and rhonchi. Examination of the left-lower field reveals decreased breath sounds, wheezing and rhonchi. Decreased breath sounds, wheezing and rhonchi present.   Abdominal:      General: Bowel sounds are normal. There is no distension.      Palpations: Abdomen is soft.      Tenderness: There is no abdominal tenderness.   Musculoskeletal:         General: No deformity. Normal range of motion.      Cervical back: Full passive range of motion without pain and neck supple.   Skin:     General: Skin is warm and dry.      Coloration: Skin is not pale.   Neurological:      Mental Status: She is alert and oriented to person, place, and time.      Motor: No abnormal muscle tone.      Coordination: Coordination normal.   Psychiatric:         Speech: Speech normal.         Behavior: Behavior normal. Behavior is cooperative.         Thought Content: Thought content normal.         Judgment: Judgment normal.       Vents:  Oxygen Concentration (%): 35 (09/09/22 0719)    Lines/Drains/Airways       Peripheral Intravenous Line  Duration                  Peripheral IV - Single Lumen 09/02/22 1155 20 G Anterior;Left;Proximal Forearm 6 days                    Significant Labs:    CBC/Anemia Profile:  No results for input(s): WBC, HGB, HCT, PLT, MCV, RDW, IRON, FERRITIN, RETIC, FOLATE, [id number], OCCULTBLOOD in the last 48 hours.     Chemistries:  No results for input(s): NA, K, CL, CO2, BUN, CREATININE, CALCIUM, ALBUMIN, PROT, BILITOT, ALKPHOS, ALT, AST, GLUCOSE, MG, PHOS in the last 48 hours.    All pertinent labs within the past 24 hours  have been reviewed.    Significant Imaging:  I have reviewed all pertinent imaging results/findings within the past 24 hours.  U/S: I have reviewed all pertinent results/findings within the past 24 hours and my personal findings are:  good

## 2022-09-09 NOTE — PLAN OF CARE
09/09/22 0822   Post-Acute Status   Post-Acute Authorization [facility]   Home Health Status Referrals Sent   Discharge Delays None known at this time       Home health orders received. Patient's referral sent through [facility] to [facility]. Awaiting response.

## 2022-09-09 NOTE — PT/OT/SLP PROGRESS
"Physical Therapy Treatment    Patient Name:  Marva Perez   MRN:  [id number]    Recommendations:     Discharge Recommendations:  home with home health, home health PT   Discharge Equipment Recommendations: walker, rolling, wheelchair   Barriers to discharge: Decreased caregiver support    Assessment:     Marva Perez is a 81 y.o. female admitted with a medical diagnosis of COVID-19.  She presents with the following impairments/functional limitations:  weakness, impaired endurance, impaired functional mobility, impaired self care skills, gait instability, impaired balance, impaired cardiopulmonary response to activity. Patient tolerated with gradual increased gait distance using RW and portable O2 x ~40 feet with Standby Assistance. O2 SAT based line form 97% down to 86%. After 1 minute rest with deep breathing ex back up to 92%. Left patient up at bedside chair and nursing notified.    Rehab Prognosis: Fair; patient would benefit from acute skilled PT services to address these deficits and reach maximum level of function.    Recent Surgery: * No surgery found *      Plan:     During this hospitalization, patient to be seen 5 x/week to address the identified rehab impairments via gait training, therapeutic activities, therapeutic exercises and progress toward the following goals:    Plan of Care Expires:  09/14/22    Subjective     Chief Complaint: Nonew complain  Patient/Family Comments/goals: "to return home"  Pain/Comfort:  Pain Rating 1: 0/10      Objective:     Communicated with patient prior to session.  Patient found HOB elevated with BIPAP, oxygen, telemetry upon PT entry to room.     General Precautions: Standard, fall   Orthopedic Precautions:N/A   Braces: N/A  Respiratory Status: Nasal cannula, flow 2 L/min     Functional Mobility:  Bed Mobility:     Rolling Left:  modified independence  Rolling Right: modified independence  Scooting: modified independence  Supine to Sit: modified " independence  Transfers:     Sit to Stand:  supervision with rolling walker  Bed to Chair: supervision with  rolling walker  using  Step Transfer  Gait: Standby  Assistance x ~40 feet using portable O2 with RW      AM-PAC 6 CLICK MOBILITY  Turning over in bed (including adjusting bedclothes, sheets and blankets)?: 4  Sitting down on and standing up from a chair with arms (e.g., wheelchair, bedside commode, etc.): 3  Moving from lying on back to sitting on the side of the bed?: 4  Moving to and from a bed to a chair (including a wheelchair)?: 3  Need to walk in hospital room?: 3  Climbing 3-5 steps with a railing?: 3  Basic Mobility Total Score: 20       Therapeutic Activities and Exercises:   Reviewed and performed home ex program including proper breathing ex. Gait trng x ~40 feet with portable O2 using RW.    Patient left up in chair with all lines intact, call button in reach, and nursing notified..    GOALS:   Multidisciplinary Problems       Physical Therapy Goals          Problem: Physical Therapy    Goal Priority Disciplines Outcome Goal Variances Interventions   Physical Therapy Goal     PT, PT/OT Adequate for Care Transition     Description: Patient will increase functional independence with mobility by performin. Supine to sit with Modified Independent  2. Sit to supine with Modified Independent  3. Bed to chair transfer with Supervision or Set-up Assistancewith or without rolling walker using Step Transfer TECHNIQUE  4. Gait  x 50  feet with Supervision or Set-up Assistance with or without rolling walker  5. Lower extremity exercise program x10 reps per handout, with assistance as needed                           Time Tracking:     PT Received On: 22  PT Start Time: 0751     PT Stop Time: 0810  PT Total Time (min): 19 min     Billable Minutes: Therapeutic Activity 19    Treatment Type: Treatment  PT/PTA: PT           2022

## 2022-09-09 NOTE — PLAN OF CARE
09/09/22 1403   Medicare Message   Important Message from Medicare regarding Discharge Appeal Rights Given to patient/caregiver;Explained to patient/caregiver;Signed/date by patient/caregiver   Date IMM was signed 09/08/22   Time IMM was signed 1400

## 2022-09-09 NOTE — NURSING
Discharge education completed with patients sonMicha. All questions answered.       Dr. Matson to send prescription over to [facility] for prednisone.

## 2022-09-09 NOTE — NURSING
Clarified which meds to continue upon d/c wth Dr. Matson.     Pt is to continue the following meds:    Ergocalciferol  Xanax  Lexapro  Zetia  Flecanide  Lasix every 3 days prn  Gabapentin  Reglan  Singulair  Repatha  Trelegy  Klonopin  Clonidine for SBP >180    Pt to stop all of the rest of her previous home medications.

## 2022-09-12 ENCOUNTER — PATIENT OUTREACH (OUTPATIENT)
Dept: ADMINISTRATIVE | Facility: CLINIC | Age: 82
End: 2022-09-12
Payer: MEDICARE

## 2022-09-12 ENCOUNTER — TELEPHONE (OUTPATIENT)
Dept: ADMINISTRATIVE | Facility: CLINIC | Age: 82
End: 2022-09-12
Payer: MEDICARE

## 2022-09-12 NOTE — PROGRESS NOTES
C3 nurse attempted to contact Marva Perez for a TCC post hospital discharge follow up call. No answer. Left voicemail with callback information. The patient has a scheduled HOSFU appointment with Kevin Clements MD on 9/26/22 @ 1300.

## 2022-09-12 NOTE — PROGRESS NOTES
C3 Nurse made second attempt to reach patient for TCC call. Left voicemail asking patient to please call [phone] and leave first name, last name, and , and Maria Del Carmen will return call.

## 2022-09-12 NOTE — PROGRESS NOTES
"Phoned patient after receiving a "2" reply to post-discharge texting tracker. No answer. Left message on vm which included OOC number.  2nd attempt-spoke to Ms. Perez who states that she has yet to receive a face mask that she is to use at night for oxygen delivery. Sent secure chat to  Shaneka (listed at discharge) and to Dr. Matson and am awaiting a reply.  Shaneka replied that Dr. Matson is taking care of Ms. Perez. Dr. Matson responded that he did not have an update that he would get one. I phoned Ms. Perez to inform her that Dr. Matson was going to have the update regarding the mask. Patient verbalized understanding.    "

## 2022-09-13 NOTE — PROGRESS NOTES
C3 Nurse made third attempt to reach patient for TCC call. Left voicemail asking patient to please call [phone] and leave first name, last name, and , and Maria Del Carmen will return call.

## 2022-09-25 DIAGNOSIS — Z74.09 IMPAIRED MOBILITY AND ENDURANCE: Primary | ICD-10-CM

## 2022-09-26 ENCOUNTER — OFFICE VISIT (OUTPATIENT)
Dept: FAMILY MEDICINE | Facility: CLINIC | Age: 82
End: 2022-09-26
Payer: MEDICARE

## 2022-09-26 VITALS
RESPIRATION RATE: 22 BRPM | DIASTOLIC BLOOD PRESSURE: 58 MMHG | OXYGEN SATURATION: 90 % | SYSTOLIC BLOOD PRESSURE: 140 MMHG | BODY MASS INDEX: 22.1 KG/M2 | HEART RATE: 79 BPM | HEIGHT: 62 IN

## 2022-09-26 DIAGNOSIS — J43.2 CENTRILOBULAR EMPHYSEMA: Primary | ICD-10-CM

## 2022-09-26 DIAGNOSIS — I10 ESSENTIAL HYPERTENSION: ICD-10-CM

## 2022-09-26 DIAGNOSIS — D64.9 ANEMIA, UNSPECIFIED TYPE: ICD-10-CM

## 2022-09-26 DIAGNOSIS — H61.21 IMPACTED CERUMEN OF RIGHT EAR: ICD-10-CM

## 2022-09-26 DIAGNOSIS — I25.10 ASCVD (ARTERIOSCLEROTIC CARDIOVASCULAR DISEASE): ICD-10-CM

## 2022-09-26 DIAGNOSIS — J44.1 COPD EXACERBATION: ICD-10-CM

## 2022-09-26 DIAGNOSIS — E55.9 VITAMIN D INSUFFICIENCY: ICD-10-CM

## 2022-09-26 PROCEDURE — 69210 REMOVE IMPACTED EAR WAX UNI: CPT | Mod: S$GLB,,, | Performed by: FAMILY MEDICINE

## 2022-09-26 PROCEDURE — 99213 OFFICE O/P EST LOW 20 MIN: CPT | Mod: 25,S$GLB,, | Performed by: FAMILY MEDICINE

## 2022-09-26 PROCEDURE — 1101F PT FALLS ASSESS-DOCD LE1/YR: CPT | Mod: CPTII,S$GLB,, | Performed by: FAMILY MEDICINE

## 2022-09-26 PROCEDURE — 1126F PR PAIN SEVERITY QUANTIFIED, NO PAIN PRESENT: ICD-10-PCS | Mod: CPTII,S$GLB,, | Performed by: FAMILY MEDICINE

## 2022-09-26 PROCEDURE — 3288F FALL RISK ASSESSMENT DOCD: CPT | Mod: CPTII,S$GLB,, | Performed by: FAMILY MEDICINE

## 2022-09-26 PROCEDURE — 69210 PR REMOVAL IMPACTED CERUMEN REQUIRING INSTRUMENTATION, UNILATERAL: ICD-10-PCS | Mod: S$GLB,,, | Performed by: FAMILY MEDICINE

## 2022-09-26 PROCEDURE — 99213 PR OFFICE/OUTPT VISIT, EST, LEVL III, 20-29 MIN: ICD-10-PCS | Mod: 25,S$GLB,, | Performed by: FAMILY MEDICINE

## 2022-09-26 PROCEDURE — 3288F PR FALLS RISK ASSESSMENT DOCUMENTED: ICD-10-PCS | Mod: CPTII,S$GLB,, | Performed by: FAMILY MEDICINE

## 2022-09-26 PROCEDURE — 1159F MED LIST DOCD IN RCRD: CPT | Mod: CPTII,S$GLB,, | Performed by: FAMILY MEDICINE

## 2022-09-26 PROCEDURE — 1101F PR PT FALLS ASSESS DOC 0-1 FALLS W/OUT INJ PAST YR: ICD-10-PCS | Mod: CPTII,S$GLB,, | Performed by: FAMILY MEDICINE

## 2022-09-26 PROCEDURE — 1160F RVW MEDS BY RX/DR IN RCRD: CPT | Mod: CPTII,S$GLB,, | Performed by: FAMILY MEDICINE

## 2022-09-26 PROCEDURE — 1160F PR REVIEW ALL MEDS BY PRESCRIBER/CLIN PHARMACIST DOCUMENTED: ICD-10-PCS | Mod: CPTII,S$GLB,, | Performed by: FAMILY MEDICINE

## 2022-09-26 PROCEDURE — 99999 PR PBB SHADOW E&M-EST. PATIENT-LVL IV: CPT | Mod: PBBFAC,,, | Performed by: FAMILY MEDICINE

## 2022-09-26 PROCEDURE — 1126F AMNT PAIN NOTED NONE PRSNT: CPT | Mod: CPTII,S$GLB,, | Performed by: FAMILY MEDICINE

## 2022-09-26 PROCEDURE — 3077F SYST BP >= 140 MM HG: CPT | Mod: CPTII,S$GLB,, | Performed by: FAMILY MEDICINE

## 2022-09-26 PROCEDURE — 3078F PR MOST RECENT DIASTOLIC BLOOD PRESSURE < 80 MM HG: ICD-10-PCS | Mod: CPTII,S$GLB,, | Performed by: FAMILY MEDICINE

## 2022-09-26 PROCEDURE — 1159F PR MEDICATION LIST DOCUMENTED IN MEDICAL RECORD: ICD-10-PCS | Mod: CPTII,S$GLB,, | Performed by: FAMILY MEDICINE

## 2022-09-26 PROCEDURE — 99999 PR PBB SHADOW E&M-EST. PATIENT-LVL IV: ICD-10-PCS | Mod: PBBFAC,,, | Performed by: FAMILY MEDICINE

## 2022-09-26 PROCEDURE — 3078F DIAST BP <80 MM HG: CPT | Mod: CPTII,S$GLB,, | Performed by: FAMILY MEDICINE

## 2022-09-26 PROCEDURE — 1111F PR DISCHARGE MEDS RECONCILED W/ CURRENT OUTPATIENT MED LIST: ICD-10-PCS | Mod: CPTII,S$GLB,, | Performed by: FAMILY MEDICINE

## 2022-09-26 PROCEDURE — 3077F PR MOST RECENT SYSTOLIC BLOOD PRESSURE >= 140 MM HG: ICD-10-PCS | Mod: CPTII,S$GLB,, | Performed by: FAMILY MEDICINE

## 2022-09-26 PROCEDURE — 1111F DSCHRG MED/CURRENT MED MERGE: CPT | Mod: CPTII,S$GLB,, | Performed by: FAMILY MEDICINE

## 2022-09-26 RX ORDER — APIXABAN 2.5 MG/1
2.5 TABLET, FILM COATED ORAL 2 TIMES DAILY
Qty: 60 TABLET | Refills: 11
Start: 2022-09-26 | End: 2022-09-26 | Stop reason: SINTOL

## 2022-09-26 RX ORDER — ASPIRIN 81 MG/1
TABLET ORAL
Status: ON HOLD | COMMUNITY
Start: 2022-09-19 | End: 2023-03-25

## 2022-09-26 NOTE — PROGRESS NOTES
Subjective:       Patient ID: Marva Perez is a 81 y.o. female.    Chief Complaint: Hospital Follow Up    Pt is a 81 y.o. female who presents for F U for COPD and pneumonia. Doing well on current meds. Denies any side effects. Prevention is up to date.     Review of Systems   Respiratory:  Positive for cough and shortness of breath.         Sleeping well w her CPAP     Objective:      Physical Exam  Vitals and nursing note reviewed.   Constitutional:       Appearance: She is well-developed.      Comments: On nasal 02   HENT:      Head: Normocephalic and atraumatic.      Right Ear: External ear normal.      Left Ear: External ear normal.      Nose: Nose normal.   Eyes:      Conjunctiva/sclera: Conjunctivae normal.      Pupils: Pupils are equal, round, and reactive to light.   Neck:      Thyroid: No thyromegaly.   Cardiovascular:      Rate and Rhythm: Normal rate and regular rhythm.      Heart sounds: Normal heart sounds.   Pulmonary:      Effort: Pulmonary effort is normal. No respiratory distress.      Breath sounds: No wheezing or rales.   Chest:      Chest wall: No tenderness.   Abdominal:      General: Bowel sounds are normal. There is no distension.      Palpations: Abdomen is soft.      Tenderness: There is no abdominal tenderness. There is no guarding or rebound.   Musculoskeletal:         General: No tenderness. Normal range of motion.      Cervical back: Normal range of motion and neck supple.   Lymphadenopathy:      Cervical: No cervical adenopathy.   Skin:     General: Skin is warm and dry.      Coloration: Skin is not pale.      Findings: No rash.   Neurological:      Mental Status: She is alert and oriented to person, place, and time.      Cranial Nerves: No cranial nerve deficit.      Motor: No abnormal muscle tone.      Coordination: Coordination normal.      Deep Tendon Reflexes: Reflexes are normal and symmetric. Reflexes normal.   Psychiatric:         Behavior: Behavior normal.         Thought  Content: Thought content normal.         Judgment: Judgment normal.       Assessment:       Encounter Diagnoses   Name Primary?    Centrilobular emphysema Yes    COPD exacerbation     ASCVD (arteriosclerotic cardiovascular disease)     Essential hypertension     Anemia, unspecified type     Vitamin D insufficiency     Impacted cerumen of right ear          Plan:   1. Centrilobular emphysema    2. COPD exacerbation    3. ASCVD (arteriosclerotic cardiovascular disease)    4. Essential hypertension  -     CBC Auto Differential  -     Comprehensive Metabolic Panel    5. Anemia, unspecified type  -     CBC Auto Differential  -     Comprehensive Metabolic Panel    6. Vitamin D insufficiency  -     Vitamin D    7. Impacted cerumen of right ear     R ear w excess wax and lavaged of excess ear wax

## 2022-09-27 ENCOUNTER — TELEPHONE (OUTPATIENT)
Dept: FAMILY MEDICINE | Facility: CLINIC | Age: 82
End: 2022-09-27
Payer: MEDICARE

## 2022-09-27 NOTE — TELEPHONE ENCOUNTER
----- Message from Kanchan Rodriguez sent at 2022  9:18 AM CDT -----  Contact: Karlee/gumaro  Marva Perez  MRN: [id number]  : 1940  PCP: Kevin Clements  Home Phone      [phone]  Work Phone      Not on file.  Mobile          [phone]      MESSAGE:     Karlee ROBLERO is requesting chart notes for pt. Please advise.    [phone]

## 2022-10-03 ENCOUNTER — DOCUMENT SCAN (OUTPATIENT)
Dept: HOME HEALTH SERVICES | Facility: HOSPITAL | Age: 82
End: 2022-10-03
Payer: MEDICARE

## 2022-10-10 ENCOUNTER — TELEPHONE (OUTPATIENT)
Dept: FAMILY MEDICINE | Facility: CLINIC | Age: 82
End: 2022-10-10
Payer: MEDICARE

## 2022-10-10 DIAGNOSIS — F41.1 GAD (GENERALIZED ANXIETY DISORDER): ICD-10-CM

## 2022-10-10 DIAGNOSIS — G47.00 INSOMNIA, UNSPECIFIED TYPE: ICD-10-CM

## 2022-10-10 RX ORDER — ALPRAZOLAM 0.25 MG/1
TABLET ORAL
Qty: 90 TABLET | Refills: 5 | Status: SHIPPED | OUTPATIENT
Start: 2022-10-10 | End: 2023-01-03 | Stop reason: SDUPTHER

## 2022-10-10 NOTE — TELEPHONE ENCOUNTER
----- Message from Lennox Son sent at 10/6/2022  4:27 PM CDT -----  Contact: Patient  Marva Perez  MRN: [id number]  : 1940  PCP: Kevin Clements  Home Phone      [phone]  Work Phone      Not on file.  Mobile          [phone]      MESSAGE: recent hospitalization for Covid, flu, & pneumonia -- Dr Matson discontinued her Xanax 0.25 mg - she has the shakes -- requesting to be put back on the medication -- uses [facility] in [place] -- please advise    Call [phone]    PCP: Tyree

## 2022-10-10 NOTE — TELEPHONE ENCOUNTER
Patient asking if she may restart her xanax. Patient was recently hospitalized due to respiratory issue. Patient states that Dr. Matson d/c'd this medication.     Please advise if appropriate to restart on xanax.   Rx pending if approved

## 2022-10-10 NOTE — TELEPHONE ENCOUNTER
----- Message from Lennox Son sent at 10/4/2022  2:03 PM CDT -----  Contact: Yosef @ [facility]  Marva Perez  MRN: [id number]  : 1940  PCP: Kevin Clements  Home Phone      [phone]  Work Phone      Not on file.  Mobile          [phone]      MESSAGE: [facility] - requesting verbal order to extend home physical therapy    Call Yosef @ [phone]    PCP: Tyree

## 2022-10-10 NOTE — TELEPHONE ENCOUNTER
No new care gaps identified.  [facility] Embedded Care Gaps. Reference number: [id number]. 10/10/2022   12:27:02 PM CDT

## 2022-10-25 ENCOUNTER — DOCUMENT SCAN (OUTPATIENT)
Dept: HOME HEALTH SERVICES | Facility: HOSPITAL | Age: 82
End: 2022-10-25
Payer: MEDICARE

## 2022-10-28 ENCOUNTER — PATIENT OUTREACH (OUTPATIENT)
Dept: ADMINISTRATIVE | Facility: OTHER | Age: 82
End: 2022-10-28
Payer: MEDICARE

## 2022-10-28 NOTE — PROGRESS NOTES
CHW - Case Closure    This Community Health Worker spoke to patient via telephone today.   Pt/Caregiver reported: Pt stated she didn't need any resources/ assistance at this time.  Pt/Caregiver denied any additional needs at this time and agrees with episode closure at this time.  Provided patient with Community Health Worker's contact information and encouraged him/her to contact this Community Health Worker if additional needs arise.

## 2022-10-28 NOTE — PROGRESS NOTES
CHW - Initial Contact    This Community Health Worker updated the Social Determinant of Health questionnaire with patient via telephone today.    Pt identified barriers of most importance are: None  Referrals to community agencies completed with patient/caregiver consent outside of [facility] include: None  Referrals were put through [facility] - No  Support and Services: No support & services have been documented.  Other information discussed the patient needs / wants help with: None  Follow up required: No: Pt stated she didn't need any resources/ assistance at this time.  No future outreach task assigned

## 2022-11-04 ENCOUNTER — LAB VISIT (OUTPATIENT)
Dept: LAB | Facility: HOSPITAL | Age: 82
End: 2022-11-04
Attending: INTERNAL MEDICINE
Payer: MEDICARE

## 2022-11-04 DIAGNOSIS — I10 ESSENTIAL HYPERTENSION, MALIGNANT: ICD-10-CM

## 2022-11-04 DIAGNOSIS — E78.5 HYPERLIPEMIA: Primary | ICD-10-CM

## 2022-11-04 LAB
ALBUMIN SERPL BCP-MCNC: 3.4 G/DL (ref 3.5–5.2)
ALP SERPL-CCNC: 76 U/L (ref 55–135)
ALT SERPL W/O P-5'-P-CCNC: 16 U/L (ref 10–44)
ANION GAP SERPL CALC-SCNC: 9 MMOL/L (ref 8–16)
AST SERPL-CCNC: 15 U/L (ref 10–40)
BASOPHILS # BLD AUTO: 0.1 K/UL (ref 0–0.2)
BASOPHILS NFR BLD: 0.9 % (ref 0–1.9)
BILIRUB SERPL-MCNC: 0.4 MG/DL (ref 0.1–1)
BUN SERPL-MCNC: 19 MG/DL (ref 8–23)
CALCIUM SERPL-MCNC: 9.4 MG/DL (ref 8.7–10.5)
CHLORIDE SERPL-SCNC: 105 MMOL/L (ref 95–110)
CHOLEST SERPL-MCNC: 348 MG/DL (ref 120–199)
CHOLEST/HDLC SERPL: 3.9 {RATIO} (ref 2–5)
CO2 SERPL-SCNC: 30 MMOL/L (ref 23–29)
CREAT SERPL-MCNC: 0.8 MG/DL (ref 0.5–1.4)
DIFFERENTIAL METHOD: ABNORMAL
EOSINOPHIL # BLD AUTO: 0.2 K/UL (ref 0–0.5)
EOSINOPHIL NFR BLD: 1.9 % (ref 0–8)
ERYTHROCYTE [DISTWIDTH] IN BLOOD BY AUTOMATED COUNT: 15.1 % (ref 11.5–14.5)
EST. GFR  (NO RACE VARIABLE): >60 ML/MIN/1.73 M^2
GLUCOSE SERPL-MCNC: 85 MG/DL (ref 70–110)
HCT VFR BLD AUTO: 40.9 % (ref 37–48.5)
HDLC SERPL-MCNC: 90 MG/DL (ref 40–75)
HDLC SERPL: 25.9 % (ref 20–50)
HGB BLD-MCNC: 12.8 G/DL (ref 12–16)
IMM GRANULOCYTES # BLD AUTO: 0.17 K/UL (ref 0–0.04)
IMM GRANULOCYTES NFR BLD AUTO: 1.5 % (ref 0–0.5)
LDLC SERPL CALC-MCNC: 233 MG/DL (ref 63–159)
LYMPHOCYTES # BLD AUTO: 2.4 K/UL (ref 1–4.8)
LYMPHOCYTES NFR BLD: 21.2 % (ref 18–48)
MCH RBC QN AUTO: 27.9 PG (ref 27–31)
MCHC RBC AUTO-ENTMCNC: 31.3 G/DL (ref 32–36)
MCV RBC AUTO: 89 FL (ref 82–98)
MONOCYTES # BLD AUTO: 1.3 K/UL (ref 0.3–1)
MONOCYTES NFR BLD: 11.5 % (ref 4–15)
NEUTROPHILS # BLD AUTO: 7.1 K/UL (ref 1.8–7.7)
NEUTROPHILS NFR BLD: 63 % (ref 38–73)
NONHDLC SERPL-MCNC: 258 MG/DL
NRBC BLD-RTO: 0 /100 WBC
PLATELET # BLD AUTO: 234 K/UL (ref 150–450)
PMV BLD AUTO: 10.3 FL (ref 9.2–12.9)
POTASSIUM SERPL-SCNC: 4.3 MMOL/L (ref 3.5–5.1)
PROT SERPL-MCNC: 6.5 G/DL (ref 6–8.4)
RBC # BLD AUTO: 4.59 M/UL (ref 4–5.4)
SODIUM SERPL-SCNC: 144 MMOL/L (ref 136–145)
TRIGL SERPL-MCNC: 125 MG/DL (ref 30–150)
WBC # BLD AUTO: 11.32 K/UL (ref 3.9–12.7)

## 2022-11-04 PROCEDURE — 80061 LIPID PANEL: CPT

## 2022-11-04 PROCEDURE — 80053 COMPREHEN METABOLIC PANEL: CPT

## 2022-11-04 PROCEDURE — 85025 COMPLETE CBC W/AUTO DIFF WBC: CPT

## 2022-11-09 ENCOUNTER — TELEPHONE (OUTPATIENT)
Dept: FAMILY MEDICINE | Facility: CLINIC | Age: 82
End: 2022-11-09
Payer: MEDICARE

## 2022-11-09 PROCEDURE — G0179 PR HOME HEALTH MD RECERTIFICATION: ICD-10-PCS | Mod: ,,, | Performed by: FAMILY MEDICINE

## 2022-11-09 PROCEDURE — G0179 MD RECERTIFICATION HHA PT: HCPCS | Mod: ,,, | Performed by: FAMILY MEDICINE

## 2022-11-09 NOTE — TELEPHONE ENCOUNTER
Gave a verbal to continue PT.        ----- Message from Lennox Son sent at 2022 12:21 PM CST -----  Contact: Yosef @ [facility]  Marva Perez  MRN: [id number]  : 1940  PCP: Kevin Clements  Home Phone      [phone]  Work Phone      Not on file.  Mobile          [phone]      MESSAGE: [facility] -- requesting verbal order to continue PT for 2 more wks    Call Yosef @ [phone]    PCP: Tyree

## 2022-11-14 ENCOUNTER — EXTERNAL HOME HEALTH (OUTPATIENT)
Dept: HOME HEALTH SERVICES | Facility: HOSPITAL | Age: 82
End: 2022-11-14
Payer: MEDICARE

## 2022-11-15 ENCOUNTER — DOCUMENT SCAN (OUTPATIENT)
Dept: HOME HEALTH SERVICES | Facility: HOSPITAL | Age: 82
End: 2022-11-15
Payer: MEDICARE

## 2022-11-28 PROBLEM — J18.9 PNEUMONIA DUE TO INFECTIOUS ORGANISM: Status: RESOLVED | Noted: 2022-08-29 | Resolved: 2022-11-28

## 2022-11-30 ENCOUNTER — DOCUMENT SCAN (OUTPATIENT)
Dept: HOME HEALTH SERVICES | Facility: HOSPITAL | Age: 82
End: 2022-11-30
Payer: MEDICARE

## 2022-11-30 NOTE — PLAN OF CARE
09/09/22 0814   Post-Acute Status   Post-Acute Authorization Home Health;HME   HME Status Referrals Sent   Home Health Status   (Awaiting orders)   Hospital Resources/Appts/Education Provided Appointments scheduled and added to AVS   Discharge Delays None known at this time       Patient ordered a trilogy for home use at discharge; however, per Dr. Matson, obtaining the trilogy will not delay patient's discharge. He states that she can wait for that set-up as an outpatient. MARRY faxed trilogy referral to [facility] as patient's oxygen is fulfilled through [facility]. Awaiting response.     MARRY awaiting home health orders to send patient referral in [facility]. Patient utilizes [facility]. A Choice Form was not signed given COVID isolation.    done

## 2022-12-12 ENCOUNTER — TELEPHONE (OUTPATIENT)
Dept: FAMILY MEDICINE | Facility: CLINIC | Age: 82
End: 2022-12-12
Payer: MEDICARE

## 2022-12-12 PROBLEM — J96.12 CHRONIC RESPIRATORY FAILURE WITH HYPOXIA AND HYPERCAPNIA: Status: RESOLVED | Noted: 2019-07-31 | Resolved: 2022-12-12

## 2022-12-12 PROBLEM — J96.11 CHRONIC RESPIRATORY FAILURE WITH HYPOXIA AND HYPERCAPNIA: Status: RESOLVED | Noted: 2019-07-31 | Resolved: 2022-12-12

## 2022-12-12 NOTE — TELEPHONE ENCOUNTER
Daja with [facility] wanting to extend occupational therapy for pt for another 3 weeks       Phone: [phone]

## 2022-12-19 ENCOUNTER — DOCUMENT SCAN (OUTPATIENT)
Dept: HOME HEALTH SERVICES | Facility: HOSPITAL | Age: 82
End: 2022-12-19
Payer: MEDICARE

## 2022-12-20 DIAGNOSIS — L03.115 CELLULITIS OF RIGHT LOWER EXTREMITY: ICD-10-CM

## 2022-12-20 RX ORDER — CLOBETASOL PROPIONATE 0.5 MG/G
CREAM TOPICAL 2 TIMES DAILY
Qty: 45 G | Refills: 5 | Status: ON HOLD | OUTPATIENT
Start: 2022-12-20 | End: 2023-02-15

## 2022-12-21 ENCOUNTER — DOCUMENT SCAN (OUTPATIENT)
Dept: HOME HEALTH SERVICES | Facility: HOSPITAL | Age: 82
End: 2022-12-21
Payer: MEDICARE

## 2022-12-22 ENCOUNTER — LAB VISIT (OUTPATIENT)
Dept: LAB | Facility: HOSPITAL | Age: 82
End: 2022-12-22
Attending: INTERNAL MEDICINE
Payer: MEDICARE

## 2022-12-22 DIAGNOSIS — I10 ESSENTIAL HYPERTENSION, MALIGNANT: Primary | ICD-10-CM

## 2022-12-22 LAB
ALBUMIN SERPL BCP-MCNC: 3.8 G/DL (ref 3.5–5.2)
ALP SERPL-CCNC: 80 U/L (ref 55–135)
ALT SERPL W/O P-5'-P-CCNC: 27 U/L (ref 10–44)
ANION GAP SERPL CALC-SCNC: 11 MMOL/L (ref 8–16)
AST SERPL-CCNC: 20 U/L (ref 10–40)
BASOPHILS # BLD AUTO: 0.06 K/UL (ref 0–0.2)
BASOPHILS NFR BLD: 0.5 % (ref 0–1.9)
BILIRUB SERPL-MCNC: 0.6 MG/DL (ref 0.1–1)
BUN SERPL-MCNC: 20 MG/DL (ref 8–23)
CALCIUM SERPL-MCNC: 9.6 MG/DL (ref 8.7–10.5)
CHLORIDE SERPL-SCNC: 104 MMOL/L (ref 95–110)
CO2 SERPL-SCNC: 27 MMOL/L (ref 23–29)
CREAT SERPL-MCNC: 1.2 MG/DL (ref 0.5–1.4)
DIFFERENTIAL METHOD: ABNORMAL
EOSINOPHIL # BLD AUTO: 0 K/UL (ref 0–0.5)
EOSINOPHIL NFR BLD: 0.1 % (ref 0–8)
ERYTHROCYTE [DISTWIDTH] IN BLOOD BY AUTOMATED COUNT: 14.8 % (ref 11.5–14.5)
EST. GFR  (NO RACE VARIABLE): 45 ML/MIN/1.73 M^2
GLUCOSE SERPL-MCNC: 134 MG/DL (ref 70–110)
HCT VFR BLD AUTO: 42.5 % (ref 37–48.5)
HGB BLD-MCNC: 13.3 G/DL (ref 12–16)
IMM GRANULOCYTES # BLD AUTO: 0.13 K/UL (ref 0–0.04)
IMM GRANULOCYTES NFR BLD AUTO: 1 % (ref 0–0.5)
LYMPHOCYTES # BLD AUTO: 0.6 K/UL (ref 1–4.8)
LYMPHOCYTES NFR BLD: 4.5 % (ref 18–48)
MCH RBC QN AUTO: 28.7 PG (ref 27–31)
MCHC RBC AUTO-ENTMCNC: 31.3 G/DL (ref 32–36)
MCV RBC AUTO: 92 FL (ref 82–98)
MONOCYTES # BLD AUTO: 0.5 K/UL (ref 0.3–1)
MONOCYTES NFR BLD: 3.8 % (ref 4–15)
NEUTROPHILS # BLD AUTO: 11.3 K/UL (ref 1.8–7.7)
NEUTROPHILS NFR BLD: 90.1 % (ref 38–73)
NRBC BLD-RTO: 0 /100 WBC
PLATELET # BLD AUTO: 201 K/UL (ref 150–450)
PMV BLD AUTO: 10.1 FL (ref 9.2–12.9)
POTASSIUM SERPL-SCNC: 4.8 MMOL/L (ref 3.5–5.1)
PROT SERPL-MCNC: 6.8 G/DL (ref 6–8.4)
RBC # BLD AUTO: 4.64 M/UL (ref 4–5.4)
SODIUM SERPL-SCNC: 142 MMOL/L (ref 136–145)
WBC # BLD AUTO: 12.53 K/UL (ref 3.9–12.7)

## 2022-12-22 PROCEDURE — 85025 COMPLETE CBC W/AUTO DIFF WBC: CPT

## 2022-12-22 PROCEDURE — 80053 COMPREHEN METABOLIC PANEL: CPT

## 2022-12-30 ENCOUNTER — DOCUMENT SCAN (OUTPATIENT)
Dept: HOME HEALTH SERVICES | Facility: HOSPITAL | Age: 82
End: 2022-12-30
Payer: MEDICARE

## 2023-01-03 ENCOUNTER — OFFICE VISIT (OUTPATIENT)
Dept: FAMILY MEDICINE | Facility: CLINIC | Age: 83
End: 2023-01-03
Payer: MEDICARE

## 2023-01-03 VITALS
WEIGHT: 113.63 LBS | HEART RATE: 80 BPM | OXYGEN SATURATION: 80 % | DIASTOLIC BLOOD PRESSURE: 58 MMHG | RESPIRATION RATE: 16 BRPM | SYSTOLIC BLOOD PRESSURE: 118 MMHG | HEIGHT: 62 IN | BODY MASS INDEX: 20.91 KG/M2

## 2023-01-03 DIAGNOSIS — J44.1 COPD EXACERBATION: Primary | ICD-10-CM

## 2023-01-03 DIAGNOSIS — G47.00 INSOMNIA, UNSPECIFIED TYPE: ICD-10-CM

## 2023-01-03 DIAGNOSIS — I25.10 ASCVD (ARTERIOSCLEROTIC CARDIOVASCULAR DISEASE): ICD-10-CM

## 2023-01-03 DIAGNOSIS — F41.1 GAD (GENERALIZED ANXIETY DISORDER): ICD-10-CM

## 2023-01-03 DIAGNOSIS — I10 ESSENTIAL HYPERTENSION: ICD-10-CM

## 2023-01-03 PROCEDURE — 99999 PR PBB SHADOW E&M-EST. PATIENT-LVL III: CPT | Mod: PBBFAC,,, | Performed by: FAMILY MEDICINE

## 2023-01-03 PROCEDURE — 3074F PR MOST RECENT SYSTOLIC BLOOD PRESSURE < 130 MM HG: ICD-10-PCS | Mod: CPTII,S$GLB,, | Performed by: FAMILY MEDICINE

## 2023-01-03 PROCEDURE — 3288F FALL RISK ASSESSMENT DOCD: CPT | Mod: CPTII,S$GLB,, | Performed by: FAMILY MEDICINE

## 2023-01-03 PROCEDURE — 99213 PR OFFICE/OUTPT VISIT, EST, LEVL III, 20-29 MIN: ICD-10-PCS | Mod: S$GLB,,, | Performed by: FAMILY MEDICINE

## 2023-01-03 PROCEDURE — 3074F SYST BP LT 130 MM HG: CPT | Mod: CPTII,S$GLB,, | Performed by: FAMILY MEDICINE

## 2023-01-03 PROCEDURE — 3288F PR FALLS RISK ASSESSMENT DOCUMENTED: ICD-10-PCS | Mod: CPTII,S$GLB,, | Performed by: FAMILY MEDICINE

## 2023-01-03 PROCEDURE — 1126F PR PAIN SEVERITY QUANTIFIED, NO PAIN PRESENT: ICD-10-PCS | Mod: CPTII,S$GLB,, | Performed by: FAMILY MEDICINE

## 2023-01-03 PROCEDURE — 3078F DIAST BP <80 MM HG: CPT | Mod: CPTII,S$GLB,, | Performed by: FAMILY MEDICINE

## 2023-01-03 PROCEDURE — 1126F AMNT PAIN NOTED NONE PRSNT: CPT | Mod: CPTII,S$GLB,, | Performed by: FAMILY MEDICINE

## 2023-01-03 PROCEDURE — 1101F PR PT FALLS ASSESS DOC 0-1 FALLS W/OUT INJ PAST YR: ICD-10-PCS | Mod: CPTII,S$GLB,, | Performed by: FAMILY MEDICINE

## 2023-01-03 PROCEDURE — 1159F MED LIST DOCD IN RCRD: CPT | Mod: CPTII,S$GLB,, | Performed by: FAMILY MEDICINE

## 2023-01-03 PROCEDURE — 3078F PR MOST RECENT DIASTOLIC BLOOD PRESSURE < 80 MM HG: ICD-10-PCS | Mod: CPTII,S$GLB,, | Performed by: FAMILY MEDICINE

## 2023-01-03 PROCEDURE — 1160F RVW MEDS BY RX/DR IN RCRD: CPT | Mod: CPTII,S$GLB,, | Performed by: FAMILY MEDICINE

## 2023-01-03 PROCEDURE — 1101F PT FALLS ASSESS-DOCD LE1/YR: CPT | Mod: CPTII,S$GLB,, | Performed by: FAMILY MEDICINE

## 2023-01-03 PROCEDURE — 99999 PR PBB SHADOW E&M-EST. PATIENT-LVL III: ICD-10-PCS | Mod: PBBFAC,,, | Performed by: FAMILY MEDICINE

## 2023-01-03 PROCEDURE — 99213 OFFICE O/P EST LOW 20 MIN: CPT | Mod: S$GLB,,, | Performed by: FAMILY MEDICINE

## 2023-01-03 PROCEDURE — 1159F PR MEDICATION LIST DOCUMENTED IN MEDICAL RECORD: ICD-10-PCS | Mod: CPTII,S$GLB,, | Performed by: FAMILY MEDICINE

## 2023-01-03 PROCEDURE — 1160F PR REVIEW ALL MEDS BY PRESCRIBER/CLIN PHARMACIST DOCUMENTED: ICD-10-PCS | Mod: CPTII,S$GLB,, | Performed by: FAMILY MEDICINE

## 2023-01-03 RX ORDER — ALPRAZOLAM 0.25 MG/1
TABLET ORAL
Qty: 90 TABLET | Refills: 5 | Status: SHIPPED | OUTPATIENT
Start: 2023-01-03 | End: 2023-03-21 | Stop reason: SDUPTHER

## 2023-01-03 RX ORDER — ESCITALOPRAM OXALATE 5 MG/1
5 TABLET ORAL DAILY
Qty: 90 TABLET | Refills: 1 | Status: SHIPPED | OUTPATIENT
Start: 2023-01-03 | End: 2023-08-22 | Stop reason: SDUPTHER

## 2023-01-03 NOTE — PROGRESS NOTES
Subjective:       Patient ID: Marva Perez is a 82 y.o. female.    Chief Complaint: Follow-up (Pt here for f/u of covid, flu and pneumonia states she was in CCU 14 days.)    Pt is a 82 y.o. female who presents for check up for post Covid and pneumonia. Doing well on current meds. Denies any side effects. Prevention is up to date.     Review of Systems   Respiratory:          Less SOB and breathing; on continuous 02   Gastrointestinal:         Having formed BMs     Objective:      Physical Exam  Constitutional:       Appearance: She is well-developed.   HENT:      Head: Normocephalic.   Eyes:      Pupils: Pupils are equal, round, and reactive to light.   Neck:      Thyroid: No thyromegaly.   Cardiovascular:      Rate and Rhythm: Normal rate and regular rhythm.   Pulmonary:      Effort: No respiratory distress.      Breath sounds: No wheezing or rales.   Chest:      Chest wall: No tenderness.   Abdominal:      General: There is no distension.      Tenderness: There is no abdominal tenderness. There is no guarding or rebound.   Musculoskeletal:         General: No tenderness. Normal range of motion.      Cervical back: Normal range of motion and neck supple.   Lymphadenopathy:      Cervical: No cervical adenopathy.   Skin:     General: Skin is warm and dry.      Coloration: Skin is not pale.      Findings: No rash.   Neurological:      Mental Status: She is alert and oriented to person, place, and time.      Cranial Nerves: No cranial nerve deficit.      Motor: No abnormal muscle tone.      Coordination: Coordination normal.      Deep Tendon Reflexes: Reflexes are normal and symmetric. Reflexes normal.   Psychiatric:         Thought Content: Thought content normal.         Judgment: Judgment normal.       Assessment:       Encounter Diagnoses   Name Primary?    COPD exacerbation Yes    ASCVD (arteriosclerotic cardiovascular disease)     Essential hypertension          Plan:   1. COPD exacerbation    2. ASCVD  (arteriosclerotic cardiovascular disease)    3. Essential hypertension

## 2023-01-24 ENCOUNTER — DOCUMENT SCAN (OUTPATIENT)
Dept: HOME HEALTH SERVICES | Facility: HOSPITAL | Age: 83
End: 2023-01-24
Payer: MEDICARE

## 2023-01-28 ENCOUNTER — DOCUMENT SCAN (OUTPATIENT)
Dept: HOME HEALTH SERVICES | Facility: HOSPITAL | Age: 83
End: 2023-01-28
Payer: MEDICARE

## 2023-02-05 ENCOUNTER — HOSPITAL ENCOUNTER (EMERGENCY)
Facility: HOSPITAL | Age: 83
Discharge: HOME | End: 2023-02-05
Attending: SURGERY
Payer: MEDICARE

## 2023-02-05 VITALS
RESPIRATION RATE: 18 BRPM | OXYGEN SATURATION: 97 % | TEMPERATURE: 99 F | HEART RATE: 73 BPM | SYSTOLIC BLOOD PRESSURE: 153 MMHG | DIASTOLIC BLOOD PRESSURE: 79 MMHG | WEIGHT: 114 LBS | BODY MASS INDEX: 20.85 KG/M2

## 2023-02-05 DIAGNOSIS — N30.00 ACUTE CYSTITIS WITHOUT HEMATURIA: Primary | ICD-10-CM

## 2023-02-05 LAB
ALBUMIN SERPL BCP-MCNC: 3.6 G/DL (ref 3.5–5.2)
ALP SERPL-CCNC: 80 U/L (ref 55–135)
ALT SERPL W/O P-5'-P-CCNC: 17 U/L (ref 10–44)
ANION GAP SERPL CALC-SCNC: 12 MMOL/L (ref 8–16)
AST SERPL-CCNC: 13 U/L (ref 10–40)
BACTERIA #/AREA URNS HPF: ABNORMAL /HPF
BASOPHILS # BLD AUTO: 0.06 K/UL (ref 0–0.2)
BASOPHILS NFR BLD: 0.5 % (ref 0–1.9)
BILIRUB SERPL-MCNC: 0.6 MG/DL (ref 0.1–1)
BILIRUB UR QL STRIP: NEGATIVE
BUN SERPL-MCNC: 26 MG/DL (ref 8–23)
CALCIUM SERPL-MCNC: 9.7 MG/DL (ref 8.7–10.5)
CHLORIDE SERPL-SCNC: 101 MMOL/L (ref 95–110)
CLARITY UR: CLEAR
CO2 SERPL-SCNC: 29 MMOL/L (ref 23–29)
COLOR UR: YELLOW
CREAT SERPL-MCNC: 1.1 MG/DL (ref 0.5–1.4)
DIFFERENTIAL METHOD: ABNORMAL
EOSINOPHIL # BLD AUTO: 0 K/UL (ref 0–0.5)
EOSINOPHIL NFR BLD: 0 % (ref 0–8)
ERYTHROCYTE [DISTWIDTH] IN BLOOD BY AUTOMATED COUNT: 14.6 % (ref 11.5–14.5)
EST. GFR  (NO RACE VARIABLE): 50 ML/MIN/1.73 M^2
GLUCOSE SERPL-MCNC: 175 MG/DL (ref 70–110)
GLUCOSE UR QL STRIP: ABNORMAL
HCT VFR BLD AUTO: 42 % (ref 37–48.5)
HGB BLD-MCNC: 13.2 G/DL (ref 12–16)
HGB UR QL STRIP: ABNORMAL
IMM GRANULOCYTES # BLD AUTO: 0.13 K/UL (ref 0–0.04)
IMM GRANULOCYTES NFR BLD AUTO: 1 % (ref 0–0.5)
KETONES UR QL STRIP: NEGATIVE
LEUKOCYTE ESTERASE UR QL STRIP: NEGATIVE
LYMPHOCYTES # BLD AUTO: 0.7 K/UL (ref 1–4.8)
LYMPHOCYTES NFR BLD: 5.2 % (ref 18–48)
MCH RBC QN AUTO: 29.7 PG (ref 27–31)
MCHC RBC AUTO-ENTMCNC: 31.4 G/DL (ref 32–36)
MCV RBC AUTO: 95 FL (ref 82–98)
MICROSCOPIC COMMENT: ABNORMAL
MONOCYTES # BLD AUTO: 0.8 K/UL (ref 0.3–1)
MONOCYTES NFR BLD: 6.3 % (ref 4–15)
NEUTROPHILS # BLD AUTO: 11.5 K/UL (ref 1.8–7.7)
NEUTROPHILS NFR BLD: 87 % (ref 38–73)
NITRITE UR QL STRIP: NEGATIVE
NRBC BLD-RTO: 0 /100 WBC
PH UR STRIP: 5 [PH] (ref 5–8)
PLATELET # BLD AUTO: 179 K/UL (ref 150–450)
PMV BLD AUTO: 9.6 FL (ref 9.2–12.9)
POTASSIUM SERPL-SCNC: 5 MMOL/L (ref 3.5–5.1)
PROT SERPL-MCNC: 6.8 G/DL (ref 6–8.4)
PROT UR QL STRIP: ABNORMAL
RBC # BLD AUTO: 4.44 M/UL (ref 4–5.4)
RBC #/AREA URNS HPF: 6 /HPF (ref 0–4)
SODIUM SERPL-SCNC: 142 MMOL/L (ref 136–145)
SP GR UR STRIP: >=1.03 (ref 1–1.03)
SQUAMOUS #/AREA URNS HPF: 1 /HPF
URN SPEC COLLECT METH UR: ABNORMAL
UROBILINOGEN UR STRIP-ACNC: NEGATIVE EU/DL
WBC # BLD AUTO: 13.22 K/UL (ref 3.9–12.7)
WBC #/AREA URNS HPF: 1 /HPF (ref 0–5)

## 2023-02-05 PROCEDURE — 25000003 PHARM REV CODE 250: Performed by: SURGERY

## 2023-02-05 PROCEDURE — 99284 EMERGENCY DEPT VISIT MOD MDM: CPT

## 2023-02-05 PROCEDURE — 80053 COMPREHEN METABOLIC PANEL: CPT | Performed by: NURSE PRACTITIONER

## 2023-02-05 PROCEDURE — 36415 COLL VENOUS BLD VENIPUNCTURE: CPT | Performed by: NURSE PRACTITIONER

## 2023-02-05 PROCEDURE — 81000 URINALYSIS NONAUTO W/SCOPE: CPT | Performed by: NURSE PRACTITIONER

## 2023-02-05 PROCEDURE — 25000003 PHARM REV CODE 250: Performed by: NURSE PRACTITIONER

## 2023-02-05 PROCEDURE — 85025 COMPLETE CBC W/AUTO DIFF WBC: CPT | Performed by: NURSE PRACTITIONER

## 2023-02-05 RX ORDER — NITROFURANTOIN 25; 75 MG/1; MG/1
100 CAPSULE ORAL 2 TIMES DAILY
Qty: 14 CAPSULE | Refills: 0 | Status: SHIPPED | OUTPATIENT
Start: 2023-02-05 | End: 2023-02-12

## 2023-02-05 RX ORDER — TRAMADOL HYDROCHLORIDE 50 MG/1
50 TABLET ORAL
Status: COMPLETED | OUTPATIENT
Start: 2023-02-05 | End: 2023-02-05

## 2023-02-05 RX ORDER — TRAMADOL HYDROCHLORIDE 50 MG/1
50 TABLET ORAL EVERY 6 HOURS PRN
Qty: 10 TABLET | Refills: 0 | Status: SHIPPED | OUTPATIENT
Start: 2023-02-05 | End: 2023-02-07

## 2023-02-05 RX ORDER — NITROFURANTOIN 25; 75 MG/1; MG/1
100 CAPSULE ORAL
Status: COMPLETED | OUTPATIENT
Start: 2023-02-05 | End: 2023-02-05

## 2023-02-05 RX ORDER — NITROFURANTOIN 25; 75 MG/1; MG/1
100 CAPSULE ORAL
Status: DISCONTINUED | OUTPATIENT
Start: 2023-02-05 | End: 2023-02-05

## 2023-02-05 RX ORDER — NITROFURANTOIN 25; 75 MG/1; MG/1
CAPSULE ORAL
Status: DISCONTINUED
Start: 2023-02-05 | End: 2023-02-05 | Stop reason: HOSPADM

## 2023-02-05 RX ADMIN — TRAMADOL HYDROCHLORIDE 50 MG: 50 TABLET, COATED ORAL at 06:02

## 2023-02-05 RX ADMIN — NITROFURANTOIN 100 MG: 25; 75 CAPSULE ORAL at 08:02

## 2023-02-06 ENCOUNTER — TELEPHONE (OUTPATIENT)
Dept: FAMILY MEDICINE | Facility: CLINIC | Age: 83
End: 2023-02-06
Payer: MEDICARE

## 2023-02-06 ENCOUNTER — NURSE TRIAGE (OUTPATIENT)
Dept: ADMINISTRATIVE | Facility: CLINIC | Age: 83
End: 2023-02-06
Payer: MEDICARE

## 2023-02-06 NOTE — ED PROVIDER NOTES
Encounter Date: 2/5/2023       History     Chief Complaint   Patient presents with    Back Pain     TO ED VIA EMS FOR C/O OF BACK PAIN SINCE YESTERDAY. PAIN WORSE WITH WALKING AND STANDING. DENIES ANY TRAUMA.      Marva Perez is a 82 y.o. female with PMH of atrial fibrillation, COPD, GERD, hyperlipidemia, hypertension who presents to the ED for evaluation of back pain.  Patient presents with a 2 day history of right lower back pain.  Pain is described as throbbing, currently rated 6 to 7/10 in severity.  She reports that moving, walking, and standing increases pain.  She denies trauma or history trauma.  Denies urinary symptoms.  Denies numbness or tingling to bilateral lower extremities, saddle anesthesia, or bowel or bladder dysfunction.    The history is provided by the patient.   Review of patient's allergies indicates:   Allergen Reactions    Pcn [penicillins] Hives and Itching    Tetracyclines     Bactrim [sulfamethoxazole-trimethoprim] Rash    Ilosone Rash    Iodine and iodide containing products Rash    Sulfa (sulfonamide antibiotics) Hives and Rash     Past Medical History:   Diagnosis Date    Abnormal Pap smear 7/17/13    LGSIL    Atrial fibrillation     COPD (chronic obstructive pulmonary disease)     Depression     GERD (gastroesophageal reflux disease)     Hyperlipidemia     Hypertension      Past Surgical History:   Procedure Laterality Date    APPENDECTOMY      BRONCHOSCOPY N/A 8/5/2019    Procedure: BRONCHOSCOPY;  Surgeon: Howard Matson MD;  Location: [facility] OR;  Service: Pulmonary;  Laterality: N/A;    CERVICAL BIOPSY  W/ LOOP ELECTRODE EXCISION      CHOLECYSTECTOMY      COLLATERAL LIGAMENT REPAIR, KNEE      left knee    COLONOSCOPY      DILATION AND CURETTAGE OF UTERUS      SINUS SURGERY       Family History   Problem Relation Age of Onset    Breast cancer Mother     Colon cancer Neg Hx     Ovarian cancer Neg Hx      Social History     Tobacco Use    Smoking status: Former     Years: 30.00      Types: Cigarettes     Quit date: 10/30/2006     Years since quittin.2    Smokeless tobacco: Never   Substance Use Topics    Alcohol use: No     Comment: No    Drug use: No     Review of Systems   Constitutional:  Negative for fever.   HENT:  Negative for sore throat.    Respiratory:  Negative for chest tightness and shortness of breath.    Cardiovascular:  Negative for chest pain.   Gastrointestinal:  Negative for abdominal pain and nausea.   Genitourinary:  Negative for dysuria, frequency and urgency.   Musculoskeletal:  Positive for back pain.   Skin:  Negative for rash.   Neurological:  Negative for dizziness, weakness, light-headedness and numbness.   Hematological:  Does not bruise/bleed easily.     Physical Exam     Initial Vitals   BP Pulse Resp Temp SpO2   23 1755 23 1754 23 1754 23 1757 23 1754   (!) 153/79 73 18 98.5 °F (36.9 °C) 97 %      MAP       --                Physical Exam    Nursing note and vitals reviewed.  Constitutional: She appears well-developed and well-nourished.   HENT:   Head: Normocephalic and atraumatic.   Eyes: Conjunctivae and EOM are normal. Pupils are equal, round, and reactive to light.   Neck: Neck supple.   Cardiovascular:  Normal rate, regular rhythm, normal heart sounds and intact distal pulses.           Pulmonary/Chest: Breath sounds normal.   Abdominal: Abdomen is soft. Bowel sounds are normal.   Musculoskeletal:      Cervical back: Normal and neck supple.      Thoracic back: Normal.      Lumbar back: Tenderness present. Decreased range of motion.     Neurological: She is alert and oriented to person, place, and time. She has normal strength.   Skin: Skin is warm and dry.   Psychiatric: She has a normal mood and affect. Her behavior is normal. Judgment and thought content normal.       ED Course   Procedures  Labs Reviewed   CBC W/ AUTO DIFFERENTIAL - Abnormal; Notable for the following components:       Result Value    WBC 13.22 (*)      MCHC 31.4 (*)     RDW 14.6 (*)     Immature Granulocytes 1.0 (*)     Gran # (ANC) 11.5 (*)     Immature Grans (Abs) 0.13 (*)     Lymph # 0.7 (*)     Gran % 87.0 (*)     Lymph % 5.2 (*)     All other components within normal limits   COMPREHENSIVE METABOLIC PANEL - Abnormal; Notable for the following components:    Glucose 175 (*)     BUN 26 (*)     eGFR 50 (*)     All other components within normal limits   URINALYSIS, REFLEX TO URINE CULTURE - Abnormal; Notable for the following components:    Specific Gravity, UA >=1.030 (*)     Protein, UA Trace (*)     Glucose, UA Trace (*)     Occult Blood UA 1+ (*)     All other components within normal limits    Narrative:     Specimen Source->Urine   URINALYSIS MICROSCOPIC - Abnormal; Notable for the following components:    RBC, UA 6 (*)     Bacteria Moderate (*)     All other components within normal limits    Narrative:     Specimen Source->Urine          Imaging Results    None          Medications   traMADoL tablet 50 mg (50 mg Oral Given 2/5/23 1834)   nitrofurantoin (macrocrystal-monohydrate) 100 MG capsule 100 mg (100 mg Oral Given 2/5/23 2021)     Medical Decision Making:   Differential Diagnosis:   Lumbar strain, UTI, degenerative disc disease, musculoskeletal pain  Clinical Tests:   Lab Tests: Ordered and Reviewed  ED Management:  Evaluation of an 82-year-old female with right lower back pain.  + TTP, no spinous process tenderness.  Lab workup is unremarkable.  UA with bacteria and RBCs.  Patient given tramadol and Macrobid in the ED and will be discharged home. Patient/caregiver voices understanding and feels comfortable with discharge plan.      The patient acknowledges that close follow up with medical provider is required. Instructed to follow up with PCP within 2 days. Patient was given specific return precautions. The patient agrees to comply with all instruction and directions given in the ER.                          Clinical Impression:   Final  diagnoses:  [N30.00] Acute cystitis without hematuria (Primary)        ED Disposition Condition    Discharge Stable          ED Prescriptions       Medication Sig Dispense Start Date End Date Auth. Provider    nitrofurantoin, macrocrystal-monohydrate, (MACROBID) 100 MG capsule Take 1 capsule (100 mg total) by mouth 2 (two) times daily. for 7 days 14 capsule 2/5/2023 2/12/2023 Jerson Padgett MD    traMADoL (ULTRAM) 50 mg tablet Take 1 tablet (50 mg total) by mouth every 6 (six) hours as needed for Pain. 10 tablet 2/5/2023 2/7/2023 Jerson Padgett MD          Follow-up Information       Follow up With Specialties Details Why Contact Info    Kevin Clements MD Family Medicine Schedule an appointment as soon as possible for a visit in 2 days  [address] [address]  [phone]               Nicole Lofton NP  02/05/23 2022

## 2023-02-06 NOTE — TELEPHONE ENCOUNTER
Spoke with patient and informed her that bloodwork completed at ER yesterday. Patient states she does not need any more bloodwork, but is having a great amount of sciatica pain. Patient states that she cannot get up and has just been lying in bed due to this pain. Appointment made with Ms. Pastrana, patient confirmed

## 2023-02-06 NOTE — TELEPHONE ENCOUNTER
----- Message from Rhina Butts sent at 2/3/2023  1:26 PM CST -----  Contact: self  Marva Perez  MRN: [id number]  : 1940  PCP: Kevin Clements  Home Phone      [phone]  Work Phone      Not on file.  Mobile          [phone]      MESSAGE:   Pt is requesting orders for blood work thru [facility]. Pt is asking for a complete blood work panel.   Please let pt know when orders are in.       Phone:  [phone]

## 2023-02-06 NOTE — TELEPHONE ENCOUNTER
Pt states she was seen in ED yesterday and diagnosed with UTI. States she started having severe low back pain yesterday which caused her to take ambulance to ED in [place]. States he was put on pain medication and antibiotics. States she wanted to be sure about meds before she takes any. States she had been taking tylenol for pain. No relief. Took one does Tramadol about 30 min ago (few min before calling NT) and not getting relief. States pain is 10/10 to left lower back. States she does not understand why she did not get an XR when she was in the ED yesterday. No abd pain. Also wants to know if she should continue her heart and BP meds while on abx and pain meds. Advised to continue all prescribed meds as directed. Care advice per protocol. Pt hesitant to go to ED, would like to speak with PCP as she already went to ED with the same yesterday. Advised would route to PCP asking for outreach but to still strongly consider going to ED. Pt states she understands but would really like to speak with PCP. Advised would route high priority to PCP asking for urgent outreach. Advised to call back with concerns or worsening symptoms. Verbalized understanding.     Reason for Disposition   SEVERE back pain of sudden onset and age > 60 years    Additional Information   Negative: Dangerous mechanism of injury (e.g., MVA, contact sports, trampoline, diving, fall > 10 feet or 3 meters)  (Exception: Back pain began > 1 hour after injury.)   Negative: Weakness (i.e., paralysis, loss of muscle strength) of the leg(s) or foot and sudden onset after back injury   Negative: Numbness (i.e., loss of sensation) of the leg(s) or foot and sudden onset after back injury   Negative: Major bleeding (actively dripping or spurting) that can't be stopped   Negative: Bullet, knife or other serious penetrating wound   Back pain not from an injury   Negative: Passed out (i.e., fainted, collapsed and was not responding)   Negative: Shock suspected  (e.g., cold/pale/clammy skin, too weak to stand, low BP, rapid pulse)   Negative: Sounds like a life-threatening emergency to the triager    Protocols used: Back Injury-A-OH, Back Pain-A-OH

## 2023-02-10 ENCOUNTER — TELEPHONE (OUTPATIENT)
Dept: FAMILY MEDICINE | Facility: CLINIC | Age: 83
End: 2023-02-10
Payer: MEDICARE

## 2023-02-10 ENCOUNTER — HOSPITAL ENCOUNTER (EMERGENCY)
Facility: HOSPITAL | Age: 83
Discharge: HOME | End: 2023-02-10
Attending: STUDENT IN AN ORGANIZED HEALTH CARE EDUCATION/TRAINING PROGRAM
Payer: MEDICARE

## 2023-02-10 VITALS
DIASTOLIC BLOOD PRESSURE: 73 MMHG | HEART RATE: 88 BPM | TEMPERATURE: 99 F | OXYGEN SATURATION: 96 % | RESPIRATION RATE: 18 BRPM | WEIGHT: 114 LBS | BODY MASS INDEX: 20.85 KG/M2 | SYSTOLIC BLOOD PRESSURE: 178 MMHG

## 2023-02-10 DIAGNOSIS — R10.9 FLANK PAIN: ICD-10-CM

## 2023-02-10 DIAGNOSIS — M54.32 SCIATICA OF LEFT SIDE: ICD-10-CM

## 2023-02-10 DIAGNOSIS — M54.30 SCIATICA, UNSPECIFIED LATERALITY: Primary | ICD-10-CM

## 2023-02-10 LAB
ALBUMIN SERPL BCP-MCNC: 3.2 G/DL (ref 3.5–5.2)
ALP SERPL-CCNC: 76 U/L (ref 55–135)
ALT SERPL W/O P-5'-P-CCNC: 22 U/L (ref 10–44)
ANION GAP SERPL CALC-SCNC: 9 MMOL/L (ref 8–16)
AST SERPL-CCNC: 21 U/L (ref 10–40)
BACTERIA #/AREA URNS HPF: ABNORMAL /HPF
BASOPHILS # BLD AUTO: 0.09 K/UL (ref 0–0.2)
BASOPHILS NFR BLD: 0.7 % (ref 0–1.9)
BILIRUB SERPL-MCNC: 1 MG/DL (ref 0.1–1)
BILIRUB UR QL STRIP: NEGATIVE
BUN SERPL-MCNC: 22 MG/DL (ref 8–23)
CALCIUM SERPL-MCNC: 8.6 MG/DL (ref 8.7–10.5)
CHLORIDE SERPL-SCNC: 107 MMOL/L (ref 95–110)
CLARITY UR: CLEAR
CO2 SERPL-SCNC: 26 MMOL/L (ref 23–29)
COLOR UR: YELLOW
CREAT SERPL-MCNC: 0.8 MG/DL (ref 0.5–1.4)
DIFFERENTIAL METHOD: ABNORMAL
EOSINOPHIL # BLD AUTO: 0.1 K/UL (ref 0–0.5)
EOSINOPHIL NFR BLD: 0.5 % (ref 0–8)
ERYTHROCYTE [DISTWIDTH] IN BLOOD BY AUTOMATED COUNT: 13.9 % (ref 11.5–14.5)
EST. GFR  (NO RACE VARIABLE): >60 ML/MIN/1.73 M^2
GLUCOSE SERPL-MCNC: 118 MG/DL (ref 70–110)
GLUCOSE UR QL STRIP: NEGATIVE
HCT VFR BLD AUTO: 35.9 % (ref 37–48.5)
HGB BLD-MCNC: 11.5 G/DL (ref 12–16)
HGB UR QL STRIP: ABNORMAL
IMM GRANULOCYTES # BLD AUTO: 0.12 K/UL (ref 0–0.04)
IMM GRANULOCYTES NFR BLD AUTO: 0.9 % (ref 0–0.5)
KETONES UR QL STRIP: NEGATIVE
LACTATE SERPL-SCNC: 0.8 MMOL/L (ref 0.5–2.2)
LEUKOCYTE ESTERASE UR QL STRIP: NEGATIVE
LYMPHOCYTES # BLD AUTO: 1 K/UL (ref 1–4.8)
LYMPHOCYTES NFR BLD: 7.4 % (ref 18–48)
MCH RBC QN AUTO: 29.7 PG (ref 27–31)
MCHC RBC AUTO-ENTMCNC: 32 G/DL (ref 32–36)
MCV RBC AUTO: 93 FL (ref 82–98)
MICROSCOPIC COMMENT: ABNORMAL
MONOCYTES # BLD AUTO: 0.9 K/UL (ref 0.3–1)
MONOCYTES NFR BLD: 6.8 % (ref 4–15)
NEUTROPHILS # BLD AUTO: 10.9 K/UL (ref 1.8–7.7)
NEUTROPHILS NFR BLD: 83.7 % (ref 38–73)
NITRITE UR QL STRIP: NEGATIVE
NRBC BLD-RTO: 0 /100 WBC
PH UR STRIP: 5 [PH] (ref 5–8)
PLATELET # BLD AUTO: 187 K/UL (ref 150–450)
PMV BLD AUTO: 10 FL (ref 9.2–12.9)
POTASSIUM SERPL-SCNC: 3.4 MMOL/L (ref 3.5–5.1)
PROT SERPL-MCNC: 5.7 G/DL (ref 6–8.4)
PROT UR QL STRIP: NEGATIVE
RBC # BLD AUTO: 3.87 M/UL (ref 4–5.4)
RBC #/AREA URNS HPF: 12 /HPF (ref 0–4)
SODIUM SERPL-SCNC: 142 MMOL/L (ref 136–145)
SP GR UR STRIP: 1.02 (ref 1–1.03)
URN SPEC COLLECT METH UR: ABNORMAL
UROBILINOGEN UR STRIP-ACNC: NEGATIVE EU/DL
WBC # BLD AUTO: 12.98 K/UL (ref 3.9–12.7)

## 2023-02-10 PROCEDURE — 36415 COLL VENOUS BLD VENIPUNCTURE: CPT | Performed by: STUDENT IN AN ORGANIZED HEALTH CARE EDUCATION/TRAINING PROGRAM

## 2023-02-10 PROCEDURE — 81000 URINALYSIS NONAUTO W/SCOPE: CPT | Performed by: STUDENT IN AN ORGANIZED HEALTH CARE EDUCATION/TRAINING PROGRAM

## 2023-02-10 PROCEDURE — 96361 HYDRATE IV INFUSION ADD-ON: CPT

## 2023-02-10 PROCEDURE — 25000003 PHARM REV CODE 250: Performed by: STUDENT IN AN ORGANIZED HEALTH CARE EDUCATION/TRAINING PROGRAM

## 2023-02-10 PROCEDURE — 80053 COMPREHEN METABOLIC PANEL: CPT | Performed by: STUDENT IN AN ORGANIZED HEALTH CARE EDUCATION/TRAINING PROGRAM

## 2023-02-10 PROCEDURE — 63600175 PHARM REV CODE 636 W HCPCS: Performed by: STUDENT IN AN ORGANIZED HEALTH CARE EDUCATION/TRAINING PROGRAM

## 2023-02-10 PROCEDURE — 83605 ASSAY OF LACTIC ACID: CPT | Performed by: STUDENT IN AN ORGANIZED HEALTH CARE EDUCATION/TRAINING PROGRAM

## 2023-02-10 PROCEDURE — 85025 COMPLETE CBC W/AUTO DIFF WBC: CPT | Performed by: STUDENT IN AN ORGANIZED HEALTH CARE EDUCATION/TRAINING PROGRAM

## 2023-02-10 PROCEDURE — 96374 THER/PROPH/DIAG INJ IV PUSH: CPT

## 2023-02-10 PROCEDURE — 96375 TX/PRO/DX INJ NEW DRUG ADDON: CPT

## 2023-02-10 PROCEDURE — 99285 EMERGENCY DEPT VISIT HI MDM: CPT | Mod: 25

## 2023-02-10 RX ORDER — KETOROLAC TROMETHAMINE 30 MG/ML
15 INJECTION, SOLUTION INTRAMUSCULAR; INTRAVENOUS
Status: COMPLETED | OUTPATIENT
Start: 2023-02-10 | End: 2023-02-10

## 2023-02-10 RX ORDER — DEXAMETHASONE SODIUM PHOSPHATE 4 MG/ML
8 INJECTION, SOLUTION INTRA-ARTICULAR; INTRALESIONAL; INTRAMUSCULAR; INTRAVENOUS; SOFT TISSUE
Status: COMPLETED | OUTPATIENT
Start: 2023-02-10 | End: 2023-02-10

## 2023-02-10 RX ORDER — NALOXONE HYDROCHLORIDE 1 MG/ML
INJECTION INTRAMUSCULAR; INTRAVENOUS; SUBCUTANEOUS
Status: DISCONTINUED
Start: 2023-02-10 | End: 2023-02-10 | Stop reason: HOSPADM

## 2023-02-10 RX ORDER — LIDOCAINE 50 MG/G
1 PATCH TOPICAL
Status: DISCONTINUED | OUTPATIENT
Start: 2023-02-10 | End: 2023-02-10 | Stop reason: HOSPADM

## 2023-02-10 RX ORDER — LIDOCAINE 50 MG/G
1 PATCH TOPICAL DAILY
Qty: 10 PATCH | Refills: 0 | Status: ON HOLD | OUTPATIENT
Start: 2023-02-10 | End: 2023-04-03

## 2023-02-10 RX ADMIN — LIDOCAINE 1 PATCH: 50 PATCH CUTANEOUS at 09:02

## 2023-02-10 RX ADMIN — KETOROLAC TROMETHAMINE 15 MG: 30 INJECTION, SOLUTION INTRAMUSCULAR; INTRAVENOUS at 09:02

## 2023-02-10 RX ADMIN — DEXAMETHASONE SODIUM PHOSPHATE 8 MG: 4 INJECTION, SOLUTION INTRA-ARTICULAR; INTRALESIONAL; INTRAMUSCULAR; INTRAVENOUS; SOFT TISSUE at 09:02

## 2023-02-10 RX ADMIN — SODIUM CHLORIDE 1000 ML: 9 INJECTION, SOLUTION INTRAVENOUS at 09:02

## 2023-02-10 NOTE — ED PROVIDER NOTES
Encounter Date: 2/10/2023       History     Chief Complaint   Patient presents with    Flank Pain     Pt arrives per [facility] with c/o lower back pain.     82-year-old female with history of atrial fibrillation, COPD, hypertension, presenting with left flank pain.  Patient reports this has been present for the last 5-6 days.  Patient also reports mild diarrhea.  No dysuria or fever or nausea or vomiting.  Patient reports pain is intermittently worse.  She is been taking tramadol with little improvement.  Patient was diagnosed with a urinary tract infection five days ago and started on Keflex.    Review of patient's allergies indicates:   Allergen Reactions    Pcn [penicillins] Hives and Itching    Tetracyclines     Bactrim [sulfamethoxazole-trimethoprim] Rash    Ilosone Rash    Iodine and iodide containing products Rash    Sulfa (sulfonamide antibiotics) Hives and Rash     Past Medical History:   Diagnosis Date    Abnormal Pap smear 13    LGSIL    Atrial fibrillation     COPD (chronic obstructive pulmonary disease)     Depression     GERD (gastroesophageal reflux disease)     Hyperlipidemia     Hypertension      Past Surgical History:   Procedure Laterality Date    APPENDECTOMY      BRONCHOSCOPY N/A 2019    Procedure: BRONCHOSCOPY;  Surgeon: Howard Matson MD;  Location: [facility] OR;  Service: Pulmonary;  Laterality: N/A;    CERVICAL BIOPSY  W/ LOOP ELECTRODE EXCISION      CHOLECYSTECTOMY      COLLATERAL LIGAMENT REPAIR, KNEE      left knee    COLONOSCOPY      DILATION AND CURETTAGE OF UTERUS      SINUS SURGERY       Family History   Problem Relation Age of Onset    Breast cancer Mother     Colon cancer Neg Hx     Ovarian cancer Neg Hx      Social History     Tobacco Use    Smoking status: Former     Years: 30.00     Types: Cigarettes     Quit date: 10/30/2006     Years since quittin.2    Smokeless tobacco: Never   Substance Use Topics    Alcohol use: No     Comment: No    Drug use: No     Review of Systems    Constitutional:  Negative for fever.   HENT:  Negative for sore throat.    Respiratory:  Negative for shortness of breath.    Cardiovascular:  Negative for chest pain.   Gastrointestinal:  Positive for diarrhea. Negative for abdominal pain, nausea and vomiting.   Genitourinary:  Positive for flank pain. Negative for dysuria and hematuria.   Musculoskeletal:  Negative for back pain.   Skin:  Negative for rash.   Neurological:  Negative for weakness.   Hematological:  Does not bruise/bleed easily.     Physical Exam     Initial Vitals [02/10/23 0910]   BP Pulse Resp Temp SpO2   (!) 178/73 88 18 98.5 °F (36.9 °C) 96 %      MAP       --         Physical Exam    Nursing note and vitals reviewed.  Constitutional: She appears well-developed.   HENT:   Head: Normocephalic.   Eyes: Pupils are equal, round, and reactive to light.   Neck:   Normal range of motion.  Cardiovascular:            No murmur heard.  Pulmonary/Chest: No respiratory distress.   Abdominal: Abdomen is soft.   No TTP diffusely. No guarding, rebound, or masses. No TTP at McBurney's point.  No right CVA tenderness.  Positive left CVA tenderness.   Musculoskeletal:         General: No edema.      Cervical back: Normal range of motion.     Neurological: She is alert.   Skin: Skin is warm.   Psychiatric: She has a normal mood and affect.       ED Course   Procedures  Labs Reviewed   URINALYSIS, REFLEX TO URINE CULTURE - Abnormal; Notable for the following components:       Result Value    Occult Blood UA 2+ (*)     All other components within normal limits    Narrative:     Specimen Source->Urine   COMPREHENSIVE METABOLIC PANEL - Abnormal; Notable for the following components:    Potassium 3.4 (*)     Glucose 118 (*)     Calcium 8.6 (*)     Total Protein 5.7 (*)     Albumin 3.2 (*)     All other components within normal limits   CBC W/ AUTO DIFFERENTIAL - Abnormal; Notable for the following components:    WBC 12.98 (*)     RBC 3.87 (*)     Hemoglobin 11.5 (*)      Hematocrit 35.9 (*)     Immature Granulocytes 0.9 (*)     Gran # (ANC) 10.9 (*)     Immature Grans (Abs) 0.12 (*)     Gran % 83.7 (*)     Lymph % 7.4 (*)     All other components within normal limits   URINALYSIS MICROSCOPIC - Abnormal; Notable for the following components:    RBC, UA 12 (*)     Bacteria Few (*)     All other components within normal limits    Narrative:     Specimen Source->Urine   LACTIC ACID, PLASMA          Imaging Results              CT Renal Stone Study ABD Pelvis WO (Final result)  Result time 02/10/23 09:46:54      Final result by Janes Gomez Jr., MD (02/10/23 09:46:54)                   Impression:      1.  No CT evidence of obstructive genitourinary pathology.    2.  Small calcific density about the inner cortical margin both kidneys at are vascular in etiology.    3.  Granulomatous focus throughout both liver and spleen.    4.  Advanced pulmonary of with evidence of prominent bullous relation of the pulmonary architecture bilaterally.      Electronically signed by: Janes Gomez MD  Date:    02/10/2023  Time:    09:46               Narrative:    EXAMINATION:  CT RENAL STONE STUDY ABD PELVIS WO    CLINICAL HISTORY:  Flank pain, kidney stone suspected;    TECHNIQUE:  Low dose axial images, sagittal and coronal reformations were obtained from the lung bases to the pubic symphysis.  Contrast was not administered.    COMPARISON:  Comparison made with the patient's previous CT scan of the abdomen pelvis without contrast 06/07/2021    FINDINGS:  The kidneys appear normal in size, orientation, and position with normal contour and overall outline.  There is evidence of a defined exophytic cyst arising from lateral cortex of left kidney reaching 1.2 cm in size.  No evidence of solid mass or relation either kidney.  No evidence of hydronephrosis.  Ureters appear normal in course caliber and contour, normal insertion upon the base of the urinary bladder, though no evidence of definable  obstructing calcification is exhibited.  Urinary bladder is filled to capacity without evidence of intraluminal stone formation nor significant trabecular changes or wall thickening.    Enteric suture line is established along the midportion of the colon, status post right hemicolectomy, and decompressed distal descending colon with scattered diverticular changes.  No evidence of acute diverticulitis.  Marginal submucosal low attenuation is established along the distal portions of the sigmoid colon and descending colon attributed towards poor colonic distension.  Small bowel loops are normal caliber.  The adrenal glands, pancreas, and gallbladder appear normal.  There are granulomatous type calcifications throughout the liver and spleen.  Thoracic aorta appears normal in caliber with evidence of prominent atheromatous calcifications.    Lung bases show evidence of advanced pulmonary emphysema with evidence of a fairly large bullous changes of replacement of the pulmonary architecture in the right lower lobe.  There is no evidence of cyst solid mass, pulmonic infiltrate, or significant pleural effusion.  Cardiac chambers maintain normal size and overall configuration.  No evidence of significant pericardial fluid.  Tortuous aorta as a manifestation of systemic hypertension.  Osseous structures yield no significant finding.  Chronic degenerative spondylosis changes of the entirety of the thoracic neuraxis are noted.                                       Medications   LIDOcaine 5 % patch 1 patch (1 patch Transdermal Patch Applied 2/10/23 0980)   naloxone (NARCAN) 1 mg/mL injection (has no administration in time range)   sodium chloride 0.9% bolus 1,000 mL 1,000 mL (0 mLs Intravenous Stopped 2/10/23 1033)   ketorolac injection 15 mg (15 mg Intravenous Given 2/10/23 0933)   dexAMETHasone injection 8 mg (8 mg Intravenous Not Given 2/10/23 1001)     Medical Decision Making:   Differential Diagnosis:   DDX: Possible kidney  stone. R/o UTI/pyelonephritis, infected stone. Less likely appendicitis/diverticulitis given benign abdomen, but will be screened. Otherwise likely muscular strain.  DX: UA. CTAP w/o contrast. Consider labs if large stone to assess for VARINDER.  TX: Analgesia, antiemetic PRN. IVF. Treatment/consult as indicated by studies.  DISPO: Pending studies, reassessment. If studies WNL or stable for outpatient management, symptoms controlled, discharge to follow up with PMD +/- urology within 1 week with supportive care recommendations and RTED precautions.               ED Course as of 02/10/23 1304   Fri Feb 10, 2023   0942 Patient does have known history of sciatica.  Will treat for this also, given her UA five days ago was not a strong UTI. [NB]   1112 No urinary retention or saddle anesthesia.  No midline tenderness.  Symptoms are unilateral. [NB]      ED Course User Index  [NB] Kyle Piedra MD                 Clinical Impression:   Final diagnoses:  [R10.9] Flank pain  [M54.30] Sciatica, unspecified laterality (Primary)        ED Disposition Condition    Discharge Stable          ED Prescriptions       Medication Sig Dispense Start Date End Date Auth. Provider    LIDOcaine (LIDODERM) 5 % Place 1 patch onto the skin once daily. Remove & Discard patch within 12 hours or as directed by MD 10 patch 2/10/2023 -- Kyle Piedra MD          Follow-up Information       Follow up With Specialties Details Why Contact Info    Kevin Clements MD Family Medicine Schedule an appointment as soon as possible for a visit in 2 days  [address] [id number]  [phone]      [facility] - Emergency Dept Emergency Medicine  If symptoms worsen [address] [id number] [phone]    [facility] Orthopedic Surgery Schedule an appointment as soon as possible for a visit in 1 week  [address] [address]  [address]  [facility] [id number] [phone]               Kyle Piedra,  MD  02/10/23 0925       Kyle Piedra MD  02/10/23 0555

## 2023-02-10 NOTE — DISCHARGE INSTRUCTIONS
Please follow up with your primary care physician within 2 days. Ensure that you review all lab work results and/or imaging results. If you have any questions about your discharge paperwork please call the Emergency Department.     Return to the Emergency Department for any numbness, tingling, weakness, worsening pain, fever, numbness to your groin, urinary or bowel incontinence or retention, or any new or worsening symptoms.       Thank you for visiting [facility], Department of Emergency Medicine. Please see the entirety of the educational materials provided. Please note that a visit to the emergency department does not substitute ongoing care from a primary medical provider or specialist. Please ensure to follow up as recommended. However, please return to the emergency department immediately if symptoms do not improve as discussed, symptoms worsen, new symptoms develop, difficulty in following up or for any of your concerns or issues. Please note on discharge you are acknowledging understanding and agreement on medical evaluation, management recommendations and follow up recommendations.

## 2023-02-10 NOTE — TELEPHONE ENCOUNTER
----- Message from Rhina Butts sent at 2023  4:07 PM CST -----  Contact: self  Marva Perez  MRN: [id number]  : 1940  PCP: Kevin Clements  Home Phone      [phone]  Work Phone      Not on file.  Mobile          [phone]      MESSAGE:  Pt called stating she cannot take anymore of this pain she is experiencing. Pt states she has been to ER, UC and still no relief. Pt is asking to see someone in radiology so she can get a scan to see what exactly is going on. Severe Pain in lower back on left side      Phone:  [phone]

## 2023-02-10 NOTE — TELEPHONE ENCOUNTER
----- Message from Tania Lulú sent at 2023  4:28 PM CST -----  Contact: self  Marva Perez  MRN: [id number]  : 1940  PCP: Kevin Clements  Home Phone      [phone]  Work Phone      Not on file.  Mobile          [phone]      MESSAGE:   Patient in excruciation sciatic pain--was advised by Arlin in previous message. She states she has gone to urgent care and the ER and she is still struggling. Arlin was able to put on schedule tomorrow with Dr. Adams at 3:00pm. Patient is requesting to be back with [facility]. Requesting to speak with nurse or Doc regarding this.         [phone]

## 2023-02-14 ENCOUNTER — TELEPHONE (OUTPATIENT)
Dept: FAMILY MEDICINE | Facility: CLINIC | Age: 83
End: 2023-02-14
Payer: MEDICARE

## 2023-02-14 DIAGNOSIS — M54.50 LUMBAR PAIN: Primary | ICD-10-CM

## 2023-02-14 NOTE — TELEPHONE ENCOUNTER
PT WOULD LIKE A REFERRAL [facility] FOR HER SCIATIC NERVE PAIN  PLEASE ADVISE          ----- Message from Rhina Butts sent at 2/10/2023 11:39 AM CST -----  Contact: self  Marva Perez  MRN: [id number]  : 1940  PCP: Kevin Clements  Home Phone      [phone]  Work Phone      Not on file.  Mobile          [phone]      MESSAGE:   Pt was seen in ER on Friday 2/10 and was told she has a pinched nerve. Pt will need physical therapy AND occupational therapy and is needing referrals for both.    Phone:  [phone]

## 2023-02-15 ENCOUNTER — TELEPHONE (OUTPATIENT)
Dept: FAMILY MEDICINE | Facility: CLINIC | Age: 83
End: 2023-02-15
Payer: MEDICARE

## 2023-02-15 ENCOUNTER — HOSPITAL ENCOUNTER (OUTPATIENT)
Facility: HOSPITAL | Age: 83
Discharge: HOME HEALTH | End: 2023-02-17
Attending: SURGERY | Admitting: FAMILY MEDICINE
Payer: MEDICARE

## 2023-02-15 DIAGNOSIS — R52 INTRACTABLE PAIN: Primary | ICD-10-CM

## 2023-02-15 DIAGNOSIS — S32.030S COMPRESSION FRACTURE OF L3 LUMBAR VERTEBRA, SEQUELA: ICD-10-CM

## 2023-02-15 DIAGNOSIS — S32.010A COMPRESSION FRACTURE OF L1 VERTEBRA, INITIAL ENCOUNTER: ICD-10-CM

## 2023-02-15 DIAGNOSIS — S32.030A CLOSED COMPRESSION FRACTURE OF L3 LUMBAR VERTEBRA, INITIAL ENCOUNTER: ICD-10-CM

## 2023-02-15 PROBLEM — S32.000A LUMBAR COMPRESSION FRACTURE, CLOSED, INITIAL ENCOUNTER: Status: ACTIVE | Noted: 2023-02-15

## 2023-02-15 LAB
ALBUMIN SERPL BCP-MCNC: 3.5 G/DL (ref 3.5–5.2)
ALP SERPL-CCNC: 191 U/L (ref 55–135)
ALT SERPL W/O P-5'-P-CCNC: 28 U/L (ref 10–44)
ANION GAP SERPL CALC-SCNC: 12 MMOL/L (ref 8–16)
AST SERPL-CCNC: 20 U/L (ref 10–40)
BACTERIA #/AREA URNS HPF: NORMAL /HPF
BASOPHILS # BLD AUTO: 0.1 K/UL (ref 0–0.2)
BASOPHILS NFR BLD: 0.6 % (ref 0–1.9)
BILIRUB SERPL-MCNC: 0.7 MG/DL (ref 0.1–1)
BILIRUB UR QL STRIP: NEGATIVE
BUN SERPL-MCNC: 17 MG/DL (ref 8–23)
CALCIUM SERPL-MCNC: 9.2 MG/DL (ref 8.7–10.5)
CHLORIDE SERPL-SCNC: 101 MMOL/L (ref 95–110)
CLARITY UR: CLEAR
CO2 SERPL-SCNC: 29 MMOL/L (ref 23–29)
COLOR UR: YELLOW
CREAT SERPL-MCNC: 1.1 MG/DL (ref 0.5–1.4)
DIFFERENTIAL METHOD: ABNORMAL
EOSINOPHIL # BLD AUTO: 0.1 K/UL (ref 0–0.5)
EOSINOPHIL NFR BLD: 0.5 % (ref 0–8)
ERYTHROCYTE [DISTWIDTH] IN BLOOD BY AUTOMATED COUNT: 13.6 % (ref 11.5–14.5)
EST. GFR  (NO RACE VARIABLE): 50 ML/MIN/1.73 M^2
GLUCOSE SERPL-MCNC: 181 MG/DL (ref 70–110)
GLUCOSE UR QL STRIP: NEGATIVE
HCT VFR BLD AUTO: 41.1 % (ref 37–48.5)
HGB BLD-MCNC: 13.1 G/DL (ref 12–16)
HGB UR QL STRIP: ABNORMAL
IMM GRANULOCYTES # BLD AUTO: 0.21 K/UL (ref 0–0.04)
IMM GRANULOCYTES NFR BLD AUTO: 1.3 % (ref 0–0.5)
KETONES UR QL STRIP: NEGATIVE
LEUKOCYTE ESTERASE UR QL STRIP: ABNORMAL
LYMPHOCYTES # BLD AUTO: 1.2 K/UL (ref 1–4.8)
LYMPHOCYTES NFR BLD: 7.3 % (ref 18–48)
MCH RBC QN AUTO: 29.9 PG (ref 27–31)
MCHC RBC AUTO-ENTMCNC: 31.9 G/DL (ref 32–36)
MCV RBC AUTO: 94 FL (ref 82–98)
MICROSCOPIC COMMENT: NORMAL
MONOCYTES # BLD AUTO: 0.7 K/UL (ref 0.3–1)
MONOCYTES NFR BLD: 4.3 % (ref 4–15)
NEUTROPHILS # BLD AUTO: 14.1 K/UL (ref 1.8–7.7)
NEUTROPHILS NFR BLD: 86 % (ref 38–73)
NITRITE UR QL STRIP: NEGATIVE
NRBC BLD-RTO: 0 /100 WBC
PH UR STRIP: 6 [PH] (ref 5–8)
PLATELET # BLD AUTO: 264 K/UL (ref 150–450)
PMV BLD AUTO: 9.7 FL (ref 9.2–12.9)
POTASSIUM SERPL-SCNC: 3.7 MMOL/L (ref 3.5–5.1)
PROT SERPL-MCNC: 6.5 G/DL (ref 6–8.4)
PROT UR QL STRIP: NEGATIVE
RBC # BLD AUTO: 4.38 M/UL (ref 4–5.4)
RBC #/AREA URNS HPF: 0 /HPF (ref 0–4)
SODIUM SERPL-SCNC: 142 MMOL/L (ref 136–145)
SP GR UR STRIP: 1.02 (ref 1–1.03)
SQUAMOUS #/AREA URNS HPF: 7 /HPF
URN SPEC COLLECT METH UR: ABNORMAL
UROBILINOGEN UR STRIP-ACNC: NEGATIVE EU/DL
WBC # BLD AUTO: 16.41 K/UL (ref 3.9–12.7)
WBC #/AREA URNS HPF: 5 /HPF (ref 0–5)

## 2023-02-15 PROCEDURE — 96375 TX/PRO/DX INJ NEW DRUG ADDON: CPT

## 2023-02-15 PROCEDURE — 36415 COLL VENOUS BLD VENIPUNCTURE: CPT | Performed by: SURGERY

## 2023-02-15 PROCEDURE — 63600175 PHARM REV CODE 636 W HCPCS: Performed by: SURGERY

## 2023-02-15 PROCEDURE — G0378 HOSPITAL OBSERVATION PER HR: HCPCS

## 2023-02-15 PROCEDURE — 85025 COMPLETE CBC W/AUTO DIFF WBC: CPT | Performed by: SURGERY

## 2023-02-15 PROCEDURE — 80053 COMPREHEN METABOLIC PANEL: CPT | Performed by: SURGERY

## 2023-02-15 PROCEDURE — 96374 THER/PROPH/DIAG INJ IV PUSH: CPT

## 2023-02-15 PROCEDURE — 25000003 PHARM REV CODE 250: Performed by: FAMILY MEDICINE

## 2023-02-15 PROCEDURE — 81000 URINALYSIS NONAUTO W/SCOPE: CPT | Performed by: SURGERY

## 2023-02-15 PROCEDURE — 99285 EMERGENCY DEPT VISIT HI MDM: CPT | Mod: 25

## 2023-02-15 PROCEDURE — 25000003 PHARM REV CODE 250: Performed by: SURGERY

## 2023-02-15 PROCEDURE — 94760 N-INVAS EAR/PLS OXIMETRY 1: CPT

## 2023-02-15 PROCEDURE — 96372 THER/PROPH/DIAG INJ SC/IM: CPT | Performed by: SURGERY

## 2023-02-15 PROCEDURE — 27000221 HC OXYGEN, UP TO 24 HOURS

## 2023-02-15 RX ORDER — MORPHINE SULFATE 2 MG/ML
2 INJECTION, SOLUTION INTRAMUSCULAR; INTRAVENOUS
Status: COMPLETED | OUTPATIENT
Start: 2023-02-15 | End: 2023-02-15

## 2023-02-15 RX ORDER — ACETAMINOPHEN 325 MG/1
650 TABLET ORAL EVERY 8 HOURS PRN
Status: DISCONTINUED | OUTPATIENT
Start: 2023-02-15 | End: 2023-02-17 | Stop reason: HOSPADM

## 2023-02-15 RX ORDER — SODIUM CHLORIDE 0.9 % (FLUSH) 0.9 %
10 SYRINGE (ML) INJECTION
Status: DISCONTINUED | OUTPATIENT
Start: 2023-02-15 | End: 2023-02-17 | Stop reason: HOSPADM

## 2023-02-15 RX ORDER — CALCITONIN SALMON 200 [IU]/.09ML
1 SPRAY, METERED NASAL DAILY
Status: DISCONTINUED | OUTPATIENT
Start: 2023-02-16 | End: 2023-02-17 | Stop reason: HOSPADM

## 2023-02-15 RX ORDER — CLONAZEPAM 0.25 MG/1
0.5 TABLET, ORALLY DISINTEGRATING ORAL 2 TIMES DAILY
Status: DISCONTINUED | OUTPATIENT
Start: 2023-02-15 | End: 2023-02-15

## 2023-02-15 RX ORDER — HYDROMORPHONE HYDROCHLORIDE 1 MG/ML
1 INJECTION, SOLUTION INTRAMUSCULAR; INTRAVENOUS; SUBCUTANEOUS EVERY 4 HOURS PRN
Status: DISCONTINUED | OUTPATIENT
Start: 2023-02-15 | End: 2023-02-16

## 2023-02-15 RX ORDER — ONDANSETRON 2 MG/ML
4 INJECTION INTRAMUSCULAR; INTRAVENOUS
Status: COMPLETED | OUTPATIENT
Start: 2023-02-15 | End: 2023-02-15

## 2023-02-15 RX ORDER — HYDROCODONE BITARTRATE AND ACETAMINOPHEN 5; 325 MG/1; MG/1
1 TABLET ORAL EVERY 4 HOURS PRN
Status: DISCONTINUED | OUTPATIENT
Start: 2023-02-15 | End: 2023-02-17 | Stop reason: HOSPADM

## 2023-02-15 RX ORDER — CLONAZEPAM 0.5 MG/1
0.5 TABLET ORAL 2 TIMES DAILY
Status: DISCONTINUED | OUTPATIENT
Start: 2023-02-15 | End: 2023-02-16

## 2023-02-15 RX ORDER — HYDROMORPHONE HYDROCHLORIDE 1 MG/ML
0.5 INJECTION, SOLUTION INTRAMUSCULAR; INTRAVENOUS; SUBCUTANEOUS
Status: COMPLETED | OUTPATIENT
Start: 2023-02-15 | End: 2023-02-15

## 2023-02-15 RX ORDER — DILTIAZEM HYDROCHLORIDE 60 MG/1
120 TABLET, FILM COATED ORAL EVERY 12 HOURS
Status: DISCONTINUED | OUTPATIENT
Start: 2023-02-15 | End: 2023-02-17 | Stop reason: HOSPADM

## 2023-02-15 RX ORDER — TALC
6 POWDER (GRAM) TOPICAL NIGHTLY PRN
Status: DISCONTINUED | OUTPATIENT
Start: 2023-02-15 | End: 2023-02-17 | Stop reason: HOSPADM

## 2023-02-15 RX ORDER — CEPHALEXIN 500 MG/1
500 CAPSULE ORAL EVERY 8 HOURS
Status: ON HOLD | COMMUNITY
Start: 2023-02-07 | End: 2023-02-17 | Stop reason: HOSPADM

## 2023-02-15 RX ADMIN — HYDROMORPHONE HYDROCHLORIDE 0.5 MG: 1 INJECTION, SOLUTION INTRAMUSCULAR; INTRAVENOUS; SUBCUTANEOUS at 03:02

## 2023-02-15 RX ADMIN — DILTIAZEM HYDROCHLORIDE 120 MG: 60 TABLET, FILM COATED ORAL at 08:02

## 2023-02-15 RX ADMIN — MORPHINE SULFATE 2 MG: 2 INJECTION, SOLUTION INTRAMUSCULAR; INTRAVENOUS at 01:02

## 2023-02-15 RX ADMIN — HYDROCODONE BITARTRATE AND ACETAMINOPHEN 1 TABLET: 5; 325 TABLET ORAL at 10:02

## 2023-02-15 RX ADMIN — ONDANSETRON HYDROCHLORIDE 4 MG: 2 INJECTION, SOLUTION INTRAMUSCULAR; INTRAVENOUS at 03:02

## 2023-02-15 RX ADMIN — ONDANSETRON HYDROCHLORIDE 4 MG: 2 INJECTION, SOLUTION INTRAMUSCULAR; INTRAVENOUS at 01:02

## 2023-02-15 RX ADMIN — CLONAZEPAM 0.5 MG: 0.5 TABLET ORAL at 08:02

## 2023-02-15 NOTE — PLAN OF CARE
02/15/23 1608   ED Admissions Case Approval   ED Admissions Case Approval  Approved         Chart review complete. Admission criteria met at Observation level of care with Dx of pain due to Lumbar fracture.

## 2023-02-15 NOTE — ED NOTES
Patient resting in ED stretcher. States pain is fine as long as she is not moving.  Bed locked in lowest position. AAO x 3. Respirations even and unlabored at this time. Call bell within reach. Patient instructed to call with any needs or concerns, verbalized understanding. Will continue to monitor.

## 2023-02-15 NOTE — ED NOTES
Patient resting in ED stretcher. Bed locked in lowest position. AAO x 3. Respirations even and unlabored at this time. Call bell within reach. Patient instructed to call with any needs or concerns, verbalized understanding. Will continue to monitor.

## 2023-02-15 NOTE — TELEPHONE ENCOUNTER
----- Message from Rhina Butts sent at 2023  1:05 PM CST -----  Contact: self  Marva Perez  MRN: [id number]  : 1940  PCP: Kevin Clements  Home Phone      [phone]  Work Phone      Not on file.  Mobile          [phone]      MESSAGE:   Pt called checking to see if provider completed her referrals so she can make appointments b/c she is hurting so bad.       Phone:  [phone]

## 2023-02-15 NOTE — ED PROVIDER NOTES
Encounter Date: 2/15/2023       History     Chief Complaint   Patient presents with    Back Pain     Patient arrived by EMS with compliants of unresolved back pain x 2 weeks. FREDIS Perez is a 82 y.o. female that presents with intractable low back pain  Patient was initially seen in the ER on the February 5, 2023 with a UTI diagnosis  Patient was placed on Macrobid & soon returned on February 10th with the pain  Patient had a CT stone study on evaluation which showed no definitive findings  Patient denies any trauma fall but has increasingly intractable back pain today  Patient has no obvious step-off or deformity of the lumbar spine on clinical exam  Patient has no bladder or bowel incontinence with no cauda equina syndrome  Patient has no leg weakness with states it is too painful to walk at home PTA    Review of patient's allergies indicates:   Allergen Reactions    Pcn [penicillins] Hives and Itching    Tetracyclines     Bactrim [sulfamethoxazole-trimethoprim] Rash    Ilosone Rash    Iodine and iodide containing products Rash    Sulfa (sulfonamide antibiotics) Hives and Rash     Past Medical History:   Diagnosis Date    Abnormal Pap smear 7/17/13    LGSIL    Atrial fibrillation     COPD (chronic obstructive pulmonary disease)     Depression     GERD (gastroesophageal reflux disease)     Hyperlipidemia     Hypertension      Past Surgical History:   Procedure Laterality Date    APPENDECTOMY      BRONCHOSCOPY N/A 8/5/2019    Procedure: BRONCHOSCOPY;  Surgeon: Howard Matson MD;  Location: [facility] OR;  Service: Pulmonary;  Laterality: N/A;    CERVICAL BIOPSY  W/ LOOP ELECTRODE EXCISION      CHOLECYSTECTOMY      COLLATERAL LIGAMENT REPAIR, KNEE      left knee    COLONOSCOPY      DILATION AND CURETTAGE OF UTERUS      SINUS SURGERY       Family History   Problem Relation Age of Onset    Breast cancer Mother     Colon cancer Neg Hx     Ovarian cancer Neg Hx      Social History     Tobacco Use    Smoking status:  Former     Years: 30.00     Types: Cigarettes     Quit date: 10/30/2006     Years since quittin.3    Smokeless tobacco: Never   Substance Use Topics    Alcohol use: No     Comment: No    Drug use: No     Review of Systems   Constitutional: Negative.    HENT: Negative.     Eyes: Negative.    Respiratory: Negative.     Cardiovascular: Negative.    Gastrointestinal: Negative.    Genitourinary: Negative.    Musculoskeletal:  Positive for back pain.   Skin: Negative.    Neurological: Negative.    Psychiatric/Behavioral: Negative.       Physical Exam     Initial Vitals [02/15/23 1208]   BP Pulse Resp Temp SpO2   (!) 157/95 89 18 98.3 °F (36.8 °C) 96 %      MAP       --         Physical Exam    Nursing note and vitals reviewed.  Constitutional: Vital signs are normal. She appears well-developed and well-nourished. She is cooperative.   HENT:   Head: Normocephalic and atraumatic.   Right Ear: External ear normal.   Left Ear: External ear normal.   Nose: Nose normal.   Mouth/Throat: Oropharynx is clear and moist.   Eyes: Conjunctivae, EOM and lids are normal. Pupils are equal, round, and reactive to light.   Neck: Trachea normal and phonation normal. Neck supple. No JVD present.   Normal range of motion.   Full passive range of motion without pain.     Cardiovascular:  Normal rate, regular rhythm, S1 normal, S2 normal, normal heart sounds, intact distal pulses and normal pulses.           Pulmonary/Chest: Effort normal and breath sounds normal.   Abdominal: Abdomen is soft and flat. Bowel sounds are normal.   Musculoskeletal:         General: Normal range of motion.      Cervical back: Full passive range of motion without pain, normal range of motion and neck supple.     Neurological: She is alert and oriented to person, place, and time. She has normal strength.   Skin: Skin is warm, dry and intact. Capillary refill takes less than 2 seconds.       ED Course   Procedures  Labs Reviewed   COMPREHENSIVE METABOLIC PANEL -  Abnormal; Notable for the following components:       Result Value    Glucose 181 (*)     Alkaline Phosphatase 191 (*)     eGFR 50 (*)     All other components within normal limits   CBC W/ AUTO DIFFERENTIAL - Abnormal; Notable for the following components:    WBC 16.41 (*)     MCHC 31.9 (*)     Immature Granulocytes 1.3 (*)     Gran # (ANC) 14.1 (*)     Immature Grans (Abs) 0.21 (*)     Gran % 86.0 (*)     Lymph % 7.3 (*)     All other components within normal limits   URINALYSIS, REFLEX TO URINE CULTURE - Abnormal; Notable for the following components:    Occult Blood UA Trace (*)     Leukocytes, UA 2+ (*)     All other components within normal limits    Narrative:     Specimen Source->Urine   URINALYSIS MICROSCOPIC    Narrative:     Specimen Source->Urine          Imaging Results              MRI Lumbar Spine Without Contrast (Final result)  Result time 02/15/23 15:09:07      Final result by Gregorio Harris MD (02/15/23 15:09:07)                   Impression:      Mild inferior endplate compression fractures at L1 and L3 with approximately 30% height loss.  There is associated edema like signal suggesting these are either acute or subacute.    Mild multilevel lumbar spondylosis with specific details at each level discussed above.      Electronically signed by: Gregorio Harris MD  Date:    02/15/2023  Time:    15:09               Narrative:    EXAMINATION:  MRI LUMBAR SPINE WITHOUT CONTRAST    CLINICAL HISTORY:  Low back pain, symptoms persist with > 6wks conservative treatment;Lumbar radiculopathy, symptoms persist with conservative treatment;Low back pain, no red flags, no prior management;Lumbar radiculopathy, no red flags, no prior management;Low back pain, prior surgery, new symptoms;    TECHNIQUE:  Multiplanar, multisequence MR images were acquired from the thoracolumbar junction to the sacrum without the administration of contrast.    COMPARISON:  08/07/2020    FINDINGS:  There are inferior endplate  compression fractures involving the L1 and L3 vertebral bodies with approximately 30% central height loss.  There is associated STIR signal consistent with edema.  No significant retropulsion.  No other fractures are identified.  Sagittal alignment is maintained.  Conus terminates at L1.  Visualized cord signal within normal limits.  Visualized retroperitoneal structures show no acute abnormalities.  Small focus of bright T2 signal in the right kidney likely a small cyst.  There is a splenic calcification.    There is mild multilevel lumbar spondylosis.  Specific details at each level will be discussed below    T12-L1: No significant central canal stenosis or neural foraminal narrowing.    L1-L2: Mild facet arthropathy.  No significant central canal stenosis or neural foraminal narrowing.    L2-3: No significant central canal stenosis or neural foraminal narrowing.  Mild facet arthropathy and ligamentum flavum thickening.    L3-4: Mild broad-based disc bulge.  Mild facet arthropathy and ligamentum flavum hypertrophy.  No significant central canal stenosis or neural foraminal narrowing.    L4-5: Diffuse disc bulge with an annular fissure.  There is mild facet arthropathy and ligamentum flavum hypertrophy.  There is mild bilateral neural foraminal narrowing.  No significant central canal stenosis.    L5-S1: Mild disc bulge.  No significant central canal stenosis or neural foraminal narrowing.                                       Medications   morphine injection 2 mg (2 mg Intramuscular Given 2/15/23 1338)   ondansetron injection 4 mg (4 mg Intramuscular Given 2/15/23 1337)   HYDROmorphone injection 0.5 mg (0.5 mg Intravenous Given 2/15/23 1532)   ondansetron injection 4 mg (4 mg Intravenous Given 2/15/23 1531)     Medical Decision Makin-year-old female with intractable back pain over last 7 to 10 days  Denies any trauma fall, was treated for urinary tract infection on   CT stone study on the  shows no  obvious acute abnormalities  Lab work, urinalysis today & convincing for urinary tract infection  MRI lumbar spine shows acute L1 & L3 fractures, no cord involved  Patient's pain is intractable, will admit for pain control in the ER   Psychiatry consult for anxiety, case management consult as well  Family is requesting case management consult on observation                          Clinical Impression:   Final diagnoses:  [S32.010A] Compression fracture of L1 vertebra, initial encounter  [R52] Intractable pain (Primary)  [S32.030A] Closed compression fracture of L3 lumbar vertebra, initial encounter        ED Disposition Condition    Observation Stable                Jerson Padgett MD  02/15/23 2965

## 2023-02-16 PROBLEM — I48.0 PAF (PAROXYSMAL ATRIAL FIBRILLATION): Status: ACTIVE | Noted: 2023-02-16

## 2023-02-16 PROBLEM — R06.02 SOB (SHORTNESS OF BREATH): Status: RESOLVED | Noted: 2019-07-30 | Resolved: 2023-02-16

## 2023-02-16 PROBLEM — J31.0 RHINITIS: Status: RESOLVED | Noted: 2021-05-31 | Resolved: 2023-02-16

## 2023-02-16 PROBLEM — H61.23 BILATERAL IMPACTED CERUMEN: Status: RESOLVED | Noted: 2020-12-21 | Resolved: 2023-02-16

## 2023-02-16 PROBLEM — J02.9 SORE THROAT: Status: RESOLVED | Noted: 2018-08-27 | Resolved: 2023-02-16

## 2023-02-16 PROBLEM — B35.4 TINEA CORPORIS: Status: RESOLVED | Noted: 2020-03-11 | Resolved: 2023-02-16

## 2023-02-16 PROBLEM — L57.0 ACTINIC KERATOSIS: Status: RESOLVED | Noted: 2018-08-27 | Resolved: 2023-02-16

## 2023-02-16 PROBLEM — H61.20 IMPACTED CERUMEN: Status: RESOLVED | Noted: 2021-05-31 | Resolved: 2023-02-16

## 2023-02-16 PROBLEM — Z23 IMMUNIZATION DUE: Status: RESOLVED | Noted: 2017-03-16 | Resolved: 2023-02-16

## 2023-02-16 PROBLEM — J96.10 CHRONIC RESPIRATORY FAILURE: Status: ACTIVE | Noted: 2019-07-31

## 2023-02-16 PROBLEM — E87.6 HYPOKALEMIA: Status: RESOLVED | Noted: 2022-08-28 | Resolved: 2023-02-16

## 2023-02-16 PROBLEM — J10.1 INFLUENZA B: Status: RESOLVED | Noted: 2022-08-26 | Resolved: 2023-02-16

## 2023-02-16 PROBLEM — S32.030S COMPRESSION FRACTURE OF L3 LUMBAR VERTEBRA, SEQUELA: Status: ACTIVE | Noted: 2023-02-16

## 2023-02-16 PROBLEM — U07.1 COVID-19: Status: RESOLVED | Noted: 2022-08-22 | Resolved: 2023-02-16

## 2023-02-16 LAB
ALBUMIN SERPL BCP-MCNC: 2.9 G/DL (ref 3.5–5.2)
ALP SERPL-CCNC: 158 U/L (ref 55–135)
ALT SERPL W/O P-5'-P-CCNC: 21 U/L (ref 10–44)
ANION GAP SERPL CALC-SCNC: 10 MMOL/L (ref 8–16)
AST SERPL-CCNC: 19 U/L (ref 10–40)
BASOPHILS # BLD AUTO: 0.09 K/UL (ref 0–0.2)
BASOPHILS NFR BLD: 1 % (ref 0–1.9)
BILIRUB SERPL-MCNC: 0.7 MG/DL (ref 0.1–1)
BUN SERPL-MCNC: 17 MG/DL (ref 8–23)
CALCIUM SERPL-MCNC: 8.5 MG/DL (ref 8.7–10.5)
CHLORIDE SERPL-SCNC: 101 MMOL/L (ref 95–110)
CO2 SERPL-SCNC: 30 MMOL/L (ref 23–29)
CREAT SERPL-MCNC: 0.8 MG/DL (ref 0.5–1.4)
DIFFERENTIAL METHOD: ABNORMAL
EOSINOPHIL # BLD AUTO: 0.2 K/UL (ref 0–0.5)
EOSINOPHIL NFR BLD: 2.6 % (ref 0–8)
ERYTHROCYTE [DISTWIDTH] IN BLOOD BY AUTOMATED COUNT: 13.6 % (ref 11.5–14.5)
EST. GFR  (NO RACE VARIABLE): >60 ML/MIN/1.73 M^2
GLUCOSE SERPL-MCNC: 91 MG/DL (ref 70–110)
HCT VFR BLD AUTO: 35.5 % (ref 37–48.5)
HGB BLD-MCNC: 11.1 G/DL (ref 12–16)
IMM GRANULOCYTES # BLD AUTO: 0.11 K/UL (ref 0–0.04)
IMM GRANULOCYTES NFR BLD AUTO: 1.2 % (ref 0–0.5)
LYMPHOCYTES # BLD AUTO: 1.4 K/UL (ref 1–4.8)
LYMPHOCYTES NFR BLD: 15 % (ref 18–48)
MCH RBC QN AUTO: 30.1 PG (ref 27–31)
MCHC RBC AUTO-ENTMCNC: 31.3 G/DL (ref 32–36)
MCV RBC AUTO: 96 FL (ref 82–98)
MONOCYTES # BLD AUTO: 1 K/UL (ref 0.3–1)
MONOCYTES NFR BLD: 10.9 % (ref 4–15)
NEUTROPHILS # BLD AUTO: 6.3 K/UL (ref 1.8–7.7)
NEUTROPHILS NFR BLD: 69.3 % (ref 38–73)
NRBC BLD-RTO: 0 /100 WBC
PLATELET # BLD AUTO: 213 K/UL (ref 150–450)
PMV BLD AUTO: 10.1 FL (ref 9.2–12.9)
POCT GLUCOSE: 104 MG/DL (ref 70–110)
POTASSIUM SERPL-SCNC: 3.4 MMOL/L (ref 3.5–5.1)
PROT SERPL-MCNC: 5.3 G/DL (ref 6–8.4)
RBC # BLD AUTO: 3.69 M/UL (ref 4–5.4)
SODIUM SERPL-SCNC: 141 MMOL/L (ref 136–145)
WBC # BLD AUTO: 9.12 K/UL (ref 3.9–12.7)

## 2023-02-16 PROCEDURE — 99222 PR INITIAL HOSPITAL CARE,LEVL II: ICD-10-PCS | Mod: ,,, | Performed by: FAMILY MEDICINE

## 2023-02-16 PROCEDURE — 36415 COLL VENOUS BLD VENIPUNCTURE: CPT | Performed by: SURGERY

## 2023-02-16 PROCEDURE — 27000221 HC OXYGEN, UP TO 24 HOURS

## 2023-02-16 PROCEDURE — 94640 AIRWAY INHALATION TREATMENT: CPT

## 2023-02-16 PROCEDURE — 90833 PR PSYCHOTHERAPY W/PATIENT W/E&M, 30 MIN (ADD ON): ICD-10-PCS | Mod: ,,, | Performed by: PSYCHIATRY & NEUROLOGY

## 2023-02-16 PROCEDURE — 99221 PR INITIAL HOSPITAL CARE,LEVL I: ICD-10-PCS | Mod: ,,, | Performed by: PSYCHIATRY & NEUROLOGY

## 2023-02-16 PROCEDURE — G0378 HOSPITAL OBSERVATION PER HR: HCPCS

## 2023-02-16 PROCEDURE — 97162 PT EVAL MOD COMPLEX 30 MIN: CPT

## 2023-02-16 PROCEDURE — 25000242 PHARM REV CODE 250 ALT 637 W/ HCPCS: Performed by: NURSE PRACTITIONER

## 2023-02-16 PROCEDURE — 90833 PSYTX W PT W E/M 30 MIN: CPT | Mod: ,,, | Performed by: PSYCHIATRY & NEUROLOGY

## 2023-02-16 PROCEDURE — 99222 1ST HOSP IP/OBS MODERATE 55: CPT | Mod: ,,, | Performed by: FAMILY MEDICINE

## 2023-02-16 PROCEDURE — 25000003 PHARM REV CODE 250: Performed by: NURSE PRACTITIONER

## 2023-02-16 PROCEDURE — 99221 1ST HOSP IP/OBS SF/LOW 40: CPT | Mod: ,,, | Performed by: PSYCHIATRY & NEUROLOGY

## 2023-02-16 PROCEDURE — 25000003 PHARM REV CODE 250: Performed by: FAMILY MEDICINE

## 2023-02-16 PROCEDURE — 99222 1ST HOSP IP/OBS MODERATE 55: CPT | Mod: ,,, | Performed by: PHYSICIAN ASSISTANT

## 2023-02-16 PROCEDURE — 99222 PR INITIAL HOSPITAL CARE,LEVL II: ICD-10-PCS | Mod: ,,, | Performed by: PHYSICIAN ASSISTANT

## 2023-02-16 PROCEDURE — 94761 N-INVAS EAR/PLS OXIMETRY MLT: CPT

## 2023-02-16 PROCEDURE — 25000003 PHARM REV CODE 250: Performed by: SURGERY

## 2023-02-16 PROCEDURE — 97530 THERAPEUTIC ACTIVITIES: CPT

## 2023-02-16 PROCEDURE — 80053 COMPREHEN METABOLIC PANEL: CPT | Performed by: SURGERY

## 2023-02-16 PROCEDURE — 85025 COMPLETE CBC W/AUTO DIFF WBC: CPT | Performed by: SURGERY

## 2023-02-16 RX ORDER — METOCLOPRAMIDE 5 MG/1
5 TABLET ORAL EVERY 6 HOURS
Status: DISCONTINUED | OUTPATIENT
Start: 2023-02-16 | End: 2023-02-17 | Stop reason: HOSPADM

## 2023-02-16 RX ORDER — METOCLOPRAMIDE 5 MG/1
10 TABLET ORAL EVERY 6 HOURS
Status: DISCONTINUED | OUTPATIENT
Start: 2023-02-16 | End: 2023-02-16

## 2023-02-16 RX ORDER — POTASSIUM CHLORIDE 20 MEQ/1
40 TABLET, EXTENDED RELEASE ORAL ONCE
Status: COMPLETED | OUTPATIENT
Start: 2023-02-16 | End: 2023-02-16

## 2023-02-16 RX ORDER — ALPRAZOLAM 0.25 MG/1
0.25 TABLET ORAL 3 TIMES DAILY PRN
Status: DISCONTINUED | OUTPATIENT
Start: 2023-02-16 | End: 2023-02-17 | Stop reason: HOSPADM

## 2023-02-16 RX ORDER — LIDOCAINE 50 MG/G
1 PATCH TOPICAL DAILY
Status: DISCONTINUED | OUTPATIENT
Start: 2023-02-16 | End: 2023-02-17 | Stop reason: HOSPADM

## 2023-02-16 RX ORDER — ESCITALOPRAM OXALATE 5 MG/1
5 TABLET ORAL DAILY
Status: DISCONTINUED | OUTPATIENT
Start: 2023-02-16 | End: 2023-02-17 | Stop reason: HOSPADM

## 2023-02-16 RX ORDER — ASPIRIN 81 MG/1
81 TABLET ORAL DAILY
Status: DISCONTINUED | OUTPATIENT
Start: 2023-02-16 | End: 2023-02-17 | Stop reason: HOSPADM

## 2023-02-16 RX ORDER — IPRATROPIUM BROMIDE AND ALBUTEROL SULFATE 2.5; .5 MG/3ML; MG/3ML
3 SOLUTION RESPIRATORY (INHALATION) EVERY 12 HOURS
Status: DISCONTINUED | OUTPATIENT
Start: 2023-02-16 | End: 2023-02-17 | Stop reason: HOSPADM

## 2023-02-16 RX ORDER — ALBUTEROL SULFATE 90 UG/1
2 AEROSOL, METERED RESPIRATORY (INHALATION) EVERY 6 HOURS PRN
Status: DISCONTINUED | OUTPATIENT
Start: 2023-02-16 | End: 2023-02-17 | Stop reason: HOSPADM

## 2023-02-16 RX ORDER — FLECAINIDE ACETATE 50 MG/1
50 TABLET ORAL 2 TIMES DAILY
Status: DISCONTINUED | OUTPATIENT
Start: 2023-02-16 | End: 2023-02-17 | Stop reason: HOSPADM

## 2023-02-16 RX ADMIN — CLONAZEPAM 0.5 MG: 0.5 TABLET ORAL at 08:02

## 2023-02-16 RX ADMIN — DILTIAZEM HYDROCHLORIDE 120 MG: 60 TABLET, FILM COATED ORAL at 09:02

## 2023-02-16 RX ADMIN — FLECAINIDE ACETATE 50 MG: 50 TABLET ORAL at 11:02

## 2023-02-16 RX ADMIN — ESCITALOPRAM OXALATE 5 MG: 5 TABLET, FILM COATED ORAL at 11:02

## 2023-02-16 RX ADMIN — METOCLOPRAMIDE 5 MG: 5 TABLET ORAL at 09:02

## 2023-02-16 RX ADMIN — METOCLOPRAMIDE 10 MG: 5 TABLET ORAL at 12:02

## 2023-02-16 RX ADMIN — ALPRAZOLAM 0.25 MG: 0.25 TABLET ORAL at 09:02

## 2023-02-16 RX ADMIN — IPRATROPIUM BROMIDE AND ALBUTEROL SULFATE 3 ML: 2.5; .5 SOLUTION RESPIRATORY (INHALATION) at 07:02

## 2023-02-16 RX ADMIN — ASPIRIN 81 MG: 81 TABLET, COATED ORAL at 11:02

## 2023-02-16 RX ADMIN — POTASSIUM CHLORIDE 40 MEQ: 1500 TABLET, EXTENDED RELEASE ORAL at 11:02

## 2023-02-16 RX ADMIN — LIDOCAINE 1 PATCH: 50 PATCH TOPICAL at 11:02

## 2023-02-16 RX ADMIN — CALCITONIN SALMON 1 SPRAY: 200 SPRAY, METERED NASAL at 12:02

## 2023-02-16 RX ADMIN — HYDROCODONE BITARTRATE AND ACETAMINOPHEN 1 TABLET: 5; 325 TABLET ORAL at 08:02

## 2023-02-16 RX ADMIN — DILTIAZEM HYDROCHLORIDE 120 MG: 60 TABLET, FILM COATED ORAL at 08:02

## 2023-02-16 RX ADMIN — HYDROCODONE BITARTRATE AND ACETAMINOPHEN 1 TABLET: 5; 325 TABLET ORAL at 03:02

## 2023-02-16 RX ADMIN — FLECAINIDE ACETATE 50 MG: 50 TABLET ORAL at 09:02

## 2023-02-16 NOTE — HPI
Patient is an 82 year old female with medical history of depression, afib, former smoker, COPD ( on 3 L n/c during the day and 4 L N.C at night), HTN and HLD who presented to the ED with back pain.  Symptoms started 10 days ago and denies any trauma.  She describes the pain as spasms.  She has been to the ED and was given pain medication but did not relieve symptoms.     Found to have compression fracture L1-L3 with edema on MRI.  Admitted for therapy and pain control.

## 2023-02-16 NOTE — H&P
[facility] ([address] [place] [address] Medicine  History & Physical    Patient Name: Marva Perez  MRN: [id number]  Patient Class: OP- Observation  Admission Date: 2/15/2023  Attending Physician: Stewart Lucia MD   Primary Care Provider: Kevin Clements MD         Patient information was obtained from patient and ER records.     Subjective:     Principal Problem:Lumbar compression fracture, closed, initial encounter    Chief Complaint:   Chief Complaint   Patient presents with    Back Pain     Patient arrived by EMS with compliants of unresolved back pain x 2 weeks. NAD.        HPI: Patient is an 82 year old female with medical history of depression, afib, former smoker, COPD ( on 3 L n/c during the day and 4 L N.C at night), HTN and HLD who presented to the ED with back pain.  Symptoms started 10 days ago and denies any trauma.  She describes the pain as spasms.  She has been to the ED and was given pain medication but did not relieve symptoms.     Found to have compression fracture L1-L3 with edema on MRI.  Admitted for therapy and pain control.        Overview/Hospital Course:  No notes on file          Past Medical History:   Diagnosis Date    Abnormal Pap smear 07/17/2013     LGSIL    Anticoagulant long-term use      Atrial fibrillation      COPD (chronic obstructive pulmonary disease)      Depression      GERD (gastroesophageal reflux disease)      Hyperlipidemia      Hypertension                 Past Surgical History:   Procedure Laterality Date    APPENDECTOMY        BRONCHOSCOPY N/A 8/5/2019     Procedure: BRONCHOSCOPY;  Surgeon: Howard Matson MD;  Location: [facility] OR;  Service: Pulmonary;  Laterality: N/A;    CERVICAL BIOPSY  W/ LOOP ELECTRODE EXCISION        CHOLECYSTECTOMY        COLLATERAL LIGAMENT REPAIR, KNEE         left knee    COLONOSCOPY        DILATION AND CURETTAGE OF UTERUS        SINUS SURGERY                  Review of patient's allergies indicates:   Allergen  Reactions    Pcn [penicillins] Hives and Itching    Tetracyclines      Bactrim [sulfamethoxazole-trimethoprim] Rash    Ilosone Rash    Iodine and iodide containing products Rash    Sulfa (sulfonamide antibiotics) Hives and Rash         No current facility-administered medications on file prior to encounter.           Current Outpatient Medications on File Prior to Encounter   Medication Sig    albuterol (PROVENTIL/VENTOLIN HFA) 90 mcg/actuation inhaler Inhale 2 puffs into the lungs every 6 (six) hours as needed for Wheezing.    albuterol-ipratropium (DUO-NEB) 2.5 mg-0.5 mg/3 mL nebulizer solution SMARTSI Vial(s) Via Nebulizer Every 12 Hours    ALPRAZolam (XANAX) 0.25 MG tablet One po tid for anxiety    aspirin (ECOTRIN) 81 MG EC tablet Take 1 tablet orally once in the evening.    cephALEXin (KEFLEX) 500 MG capsule Take 500 mg by mouth every 8 (eight) hours.    clonazePAM (KLONOPIN) 0.5 MG tablet Take 1 tablet (0.5 mg total) by mouth 2 (two) times daily as needed for Anxiety.    diltiaZEM (CARDIZEM) 120 MG tablet Take 120 mg by mouth 2 (two) times daily.    EScitalopram oxalate (LEXAPRO) 5 MG Tab Take 1 tablet (5 mg total) by mouth once daily.    flecainide (TAMBOCOR) 50 MG Tab Take 50 mg by mouth 2 (two) times daily.    LIDOcaine (LIDODERM) 5 % Place 1 patch onto the skin once daily. Remove & Discard patch within 12 hours or as directed by MD    metoclopramide HCl (REGLAN) 10 MG tablet Take 1 tablet (10 mg total) by mouth every 6 (six) hours.    TRELEGY ELLIPTA 100-62.5-25 mcg DsDv TAKE 1 PUFF BY MOUTH EVERY DAY    levalbuterol (XOPENEX) 0.63 mg/3 mL nebulizer solution Take 3 mLs (0.63 mg total) by nebulization every 4 (four) hours as needed for Wheezing.    REPATHA SURECLICK 140 mg/mL PnIj Inject 140 mg into the skin every 14 (fourteen) days.      Family History         Problem Relation (Age of Onset)     Breast cancer Mother                   Tobacco Use    Smoking status: Former        Years: 30.00       Types: Cigarettes       Quit date: 10/30/2006       Years since quittin.3    Smokeless tobacco: Never   Substance and Sexual Activity    Alcohol use: No       Comment: No    Drug use: No    Sexual activity: Not Currently       Birth control/protection: Post-menopausal       Comment:       Review of Systems   Constitutional:  Positive for fatigue. Negative for chills and fever.   Respiratory:  Negative for cough and shortness of breath (stable).    Cardiovascular:  Negative for chest pain and leg swelling.   Gastrointestinal:  Negative for nausea and vomiting.   Genitourinary:  Negative for difficulty urinating and dysuria.   Musculoskeletal:  Positive for arthralgias and back pain.   Objective:      Vital Signs (Most Recent):  Temp: 97.4 °F (36.3 °C) (23 1118)  Pulse: 70 (23 1118)  Resp: 18 (23 1118)  BP: 123/60 (23 1118)  SpO2: 96 % (23 1118) Vital Signs (24h Range):  Temp:  [97.4 °F (36.3 °C)-98.7 °F (37.1 °C)] 97.4 °F (36.3 °C)  Pulse:  [61-89] 70  Resp:  [18-20] 18  SpO2:  [91 %-98 %] 96 %  BP: (123-207)/(60-85) 123/60      Weight: 55.9 kg (123 lb 3.8 oz)  Body mass index is 22.54 kg/m².     Physical Exam  Constitutional:       Appearance: Normal appearance.   Cardiovascular:      Rate and Rhythm: Normal rate and regular rhythm.   Pulmonary:      Effort: Pulmonary effort is normal.      Breath sounds: Normal breath sounds.   Abdominal:      General: Abdomen is flat.      Palpations: Abdomen is soft.   Neurological:      Mental Status: She is alert.   Psychiatric:      Comments: Does not answer questions and changes subject             Significant Labs: All pertinent labs within the past 24 hours have been reviewed.  BMP:       Recent Labs   Lab 23  0535   GLU 91      K 3.4*      CO2 30*   BUN 17   CREATININE 0.8   CALCIUM 8.5*      CBC:        Recent Labs   Lab 02/15/23  1223 23  0535   WBC 16.41* 9.12   HGB 13.1 11.1*   HCT  41.1 35.5*    213      CMP:        Recent Labs   Lab 02/15/23  1223 02/16/23  0535    141   K 3.7 3.4*    101   CO2 29 30*   * 91   BUN 17 17   CREATININE 1.1 0.8   CALCIUM 9.2 8.5*   PROT 6.5 5.3*   ALBUMIN 3.5 2.9*   BILITOT 0.7 0.7   ALKPHOS 191* 158*   AST 20 19   ALT 28 21   ANIONGAP 12 10      Lactic Acid: No results for input(s): LACTATE in the last 48 hours.  Magnesium: No results for input(s): MG in the last 48 hours.  Troponin: No results for input(s): TROPONINI, TROPONINIHS in the last 48 hours.  TSH: No results for input(s): TSH in the last 4320 hours.  Urine Culture: No results for input(s): LABURIN in the last 48 hours.  Urine Studies:       Recent Labs   Lab 02/15/23  1229   COLORU Yellow   APPEARANCEUA Clear   PHUR 6.0   SPECGRAV 1.025   PROTEINUA Negative   GLUCUA Negative   KETONESU Negative   BILIRUBINUA Negative   OCCULTUA Trace*   NITRITE Negative   UROBILINOGEN Negative   LEUKOCYTESUR 2+*   RBCUA 0   WBCUA 5   BACTERIA Rare   SQUAMEPITHEL 7         Significant Imaging:   MRI Lumbar Spine: Mild inferior endplate compression fractures at L1 and L3 with approximately 30% height loss.  There is associated edema like signal suggesting these are either acute or subacute.     Mild multilevel lumbar spondylosis with specific details at each level discussed above.     CT renal stone study  1.  No CT evidence of obstructive genitourinary pathology.     2.  Small calcific density about the inner cortical margin both kidneys at are vascular in etiology.     3.  Granulomatous focus throughout both liver and spleen.     4.  Advanced pulmonary of with evidence of prominent bullous relation of the pulmonary architecture bilaterally.         Assessment/Plan:     * Lumbar compression fracture, closed, initial encounter  L3-L4 lumbar spine compression fracture  Pain Control with IV pain medications and will transition to po medications.    PT ordered    Will continue pain control and  case management working on discharge planning     Compression fracture of L3 lumbar vertebra, sequela        PAF (paroxysmal atrial fibrillation)  Continue flecainide      Intractable pain    IV dilaudid and po norco prn     Chronic obstructive pulmonary disease  Patient without evidence of acute exacerbation.  Continue supportive care and home inhaler.        Chronic respiratory failure  Currently at baseline supplemental oxygen  3L during the day and 4L at night  Due to COPD     KATHIE (generalized anxiety disorder)  Continue clonazepam       VTE Risk Mitigation (From admission, onward)         Ordered     IP VTE HIGH RISK PATIENT  Once         02/15/23 1811     Place sequential compression device  Until discontinued         02/15/23 1811                   Consuelo Boykin PA-C  Department of Hospital Medicine   [place] - Med Surg (3rd Fl)

## 2023-02-16 NOTE — CONSULTS
[facility] - Med Surg (3rd Fl)  Initial Discharge Assessment       Primary Care Provider: Kevin Clements MD    Admission Diagnosis: Intractable pain [R52]  Closed compression fracture of L3 lumbar vertebra, initial encounter [S32.030A]  Compression fracture of L1 vertebra, initial encounter [S32.010A]    Admission Date: 2/15/2023  Expected Discharge Date:     Discharge Barriers Identified: None    Payor: [employer] MANAGED MEDICARE / Plan: [employer] MEDICARE PLAN PPO / Product Type: Medicare Advantage /     Extended Emergency Contact Information  Primary Emergency Contact: MarshaDeyaniraMckenna  Mobile Phone: [phone]  Relation: Daughter  Secondary Emergency Contact: Micha Berg  Address: [address] [place] [address]  Work Phone: [phone]  Mobile Phone: [phone]  Relation: Son    Discharge Plan A: Home with family, Home Health  Discharge Plan B: Home with family, Home Health      [id number] - [place] - [address]  [address]  [facility] [address]  Phone: [phone] Fax: [phone]    [facility] - [place], [place] - [address] [place] [address]  Phone: [phone] Fax: [phone]    [facility] - [place] [address]  Phone: [phone] Fax: [phone]      Initial Assessment (most recent)       Adult Discharge Assessment - 02/16/23 1454          Discharge Assessment    Assessment Type Discharge Planning Assessment     Confirmed/corrected address, phone number and insurance Yes     Confirmed Demographics Correct on Facesheet     Source of Information patient;family     Communicated ROSAURA with patient/caregiver Date not available/Unable to determine     Reason For Admission Pain control     People in Home alone     Facility Arrived From: Home     Do you expect to return to your current living situation? Yes     Do you have help  at home or someone to help you manage your care at home? Yes     Who are your caregiver(s) and their phone number(s)? Mckenna Larson (Daughter) [phone]     Prior to hospitilization cognitive status: Alert/Oriented     Current cognitive status: Alert/Oriented     Walking or Climbing Stairs ambulation difficulty, requires equipment     Dressing/Bathing bathing difficulty, requires equipment     Equipment Currently Used at Home bedside commode;walker, rolling;shower chair;oxygen;ventilator     Readmission within 30 days? No     Do you currently have service(s) that help you manage your care at home? No     Do you take prescription medications? Yes     Do you have prescription coverage? Yes     Coverage [employer]     Do you have any problems affording any of your prescribed medications? No     How do you get to doctors appointments? family or friend will provide     Are you on dialysis? No     Do you take coumadin? No     Discharge Plan A Home with family;Home Health     Discharge Plan B Home with family;Home Health     DME Needed Upon Discharge  none     Discharge Plan discussed with: Patient;Adult children     Discharge Barriers Identified None                        Discharge assessment completed with patient. Long discussion with patient about discharge planning. Patient is adamant about going home at this time. She is strongly refusing nursing home placement for long-term placement or SNF. Patient states that she has a sitter in the morning and a sitter at night to assist her with getting ready for the day and going to bed at night. States she also has supportive neighbors who can assist if needed. Patient requesting [facility] at discharge. Choice form signed and placed in patient's medical record. Patient states that she has all needed DME.     SW also spoke with patient's daughter by request. Explained that patient was refusing nursing home placement at this time. Daughter inquired about palliative  care/hospice care. SW encouraged discussion with PCP in regards to those options. Also discussed Assisted Living Facilities and nursing home placement. Also discussed [place] on Aging services that are available. MARRY emailed daughter information on local Assisted Living Facilities for her review. MARRY also included further information on the COA.     SW to remain available.

## 2023-02-16 NOTE — CONSULTS
PSYCHIATRY INPATIENT ADMISSION NOTE - H & P      2/16/2023 12:38 PM   Marva Perez   1940   [id number]         DATE OF ADMISSION: 2/15/2023 12:07 PM    SITE: [facility]    CURRENT LEGAL STATUS: PEC and/or CEC      HISTORY    CHIEF COMPLAINT   Marva Perez is a 82 y.o. female with a past psychiatric history of anxiety currently admitted to the inpatient unit with the following chief complaint: back pain    Psychiatry was consulted to evaluate and tx her anxiety    HPI   The patient was seen and examined. The chart was reviewed. Reviewed notes by RNs, MD, and PT.    The patient presented to the ER on 2/15/2023 . PEr staff notes:  -Patient complained of unresolved back pain for two weeks  -Marva Perez is a 82 y.o. female that presents with intractable low back pain  Patient was initially seen in the ER on the February 5, 2023 with a UTI diagnosis  Patient was placed on Macrobid & soon returned on February 10th with the pain  Patient had a CT stone study on evaluation which showed no definitive findings  Patient denies any trauma fall but has increasingly intractable back pain today  Patient has no obvious step-off or deformity of the lumbar spine on clinical exam  Patient has no bladder or bowel incontinence with no cauda equina syndrome  Patient has no leg weakness with states it is too painful to walk at home PTA   -82-year-old female with intractable back pain over last 7 to 10 days  Denies any trauma fall, was treated for urinary tract infection on 5th  CT stone study on the 10th shows no obvious acute abnormalities  Lab work, urinalysis today & convincing for urinary tract infection  MRI lumbar spine shows acute L1 & L3 fractures, no cord involved  Patient's pain is intractable, will admit for pain control in the ER   Psychiatry consult for anxiety, case management consult as well  Family is requesting case management consult on observation  -Per med student evaluation: Patient states she had  "anxiety prior to receiving her MRI results, because she was concerned that "it could be anything, like cancer". She denies any current feelings of anxiety or excessive worry. She is concerned about her daughter and "not being ready to take care of me". Patient is excessively worried about her daughter, whose  recently left her 3 months after marriage, and about her grandchildren. She states feeling anxious in the past when she has doctors appointments, and that she is anxious about getting sick so she avoids large social outings. However, she says she feels no anxiety about her current hospital stay, and that she feels relaxed today.   Patient says she has had "periods of feeling low" in the past when she is alone, but usually calls a friend during these periods. She endorses a good support system in friends and family. She says she is [employer], but does not attend [employer] because she believes the sound of her oxygen tank is too disruptive. 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 She has a past trauma of losing her son "about 18 years ago" to a car accident, and expresses that this still brings her grief.    Patient is oriented to person, place, date, and presidents x2. CAM - ICU negative. Denies SI/ HI/ or AVH    The patient reports a h/o anxiety which she reports is currently well controlled with Lexapro and Xanax (sometimes uses klonopin instead, but she feels xanax works better). She notes some anxiety over her medical issues, which is improved. She also notes anxiety over family stressors which persist.     She reports that her daughter wants her to move into a NH rather than stay home and pay for sitters/help. She feels her daughter wishes this secondary to it allowing her daughter to inherit more money after the patient dies as NH is viewed as less costly than the patient staying in the patient's own home. The patient is very upset with her family over their management of her " money.    She denied all psychiatric symptoms as documented below. She would like to continue her home medications without changes. She does not want psychiatric f/u; she would prefer to continue her tx with her PCP.     Pt declined permission for psychiatry to talk with/obtain collateral from her family      Symptoms of Depression: diminished mood - No, loss of interest/anhedonia - No;  recurrent - No, >14 days - No, diminished energy - No, change in sleep - No, change in appetite - No, diminished concentration or cognition or indecisiveness - No, PMA/R -  No, excessive guilt or hopelessness or worthlessness - No, suicidal ideations - No    Changes in Sleep: trouble with initiation- No, maintenance, - No early morning awakening with inability to return to sleep - No, hypersomnolence - No    Suicidal- active/passive ideations - No, organized plans, future intentions - No    Homicidal ideations: active/passive ideations - No, organized plans, future intentions - No    Symptoms of psychosis: hallucinations - No, delusions - No, disorganized speech - No, disorganized behavior or abnormal motor behavior - No, or negative symptoms (diminshed emotional expression, avolition, anhedonia, alogia, asociality) - No, active phase symptoms >1 month - No, continuous signs of illness > 6 months - No, since onset of illness decreased level of functioning present - No    Symptoms of marysol or hypomania: elevated, expansive, or irritable mood with increased energy or activity - No; > 4 days - No,  >7 days - No; with inflated self-esteem or grandiosity - No, decreased need for sleep - No, increased rate of speech - No, FOI or racing thoughts - No, distractibility - No, increased goal directed activity or PMA - No, risky/disinhibited behavior - No    Symptoms of KATHIE: excessive anxiety/worry/fear, more days than not, about numerous issues - No, ongoing for >6 months - No, difficult to control - No, with restlessness - No, fatigue - No,  "poor concentration - No, irritability - No, muscle tension - No, sleep disturbance - No; causes functionally impairing distress - No    Symptoms of Panic Disorder: recurrent panic attacks (palpitations/heart racing, sweating, shakiness, dyspnea, choking, chest pain/discomfort, Gi symptoms, dizzy/lightheadedness, hot/col flashes, paresthesias, derealization, fear of losing control or fear of dying or fear of "going crazy") - No, precipitated - No, un-precipitated - No, source of worry and/or behavioral changes secondary for 1 month or longer- No, agoraphobia - No    Symptoms of PTSD: h/o trauma exposure - No; re-experiencing/intrusive symptoms - No, avoidant behavior - No, 2 or more negative alterations in cognition or mood - No, 2 or more hyperarousal symptoms - No; with dissociative symptoms - No, ongoing for 1 or more  months - No    Symptoms of OCD: obsessions (recurrent thoughts/urges/images; intrusive and/or unwanted; uses other thoughts/actions to suppress) - No; compulsions (repetitive behaviors used to lower distress/anxiety/obsessions) - No, time-consuming (over 1 hour per day) or cause significant distress/impairment - - No    Symptoms of Anorexia: restriction of caloric intake leading to significantly low body weight - No, intense fear of gaining weight or persistent behavior that interferes with weight gain even thought at a significantly low weight - No, disturbance in the way in which one's body weight or shape is experienced, undue influence of body weight or shape on self evaluation, or persistent lack of recognition of the seriousness of the current low body weight - No    Symptoms of Bulimia: recurrent episodes of binge eating (definitely larger amount  than what others would eat and lack of a sense of control over eating during episode) - No, recurrent inappropriate compensatory behaviors in order to prevent weight gain (fasting, medications, exercise, vomiting) - No, binges and compensatory " behaviors both occur on average at least once a week for 3 months - No, self evaluations is unduly influenced by body shape/weight- - No    Symptoms of Binge eating: recurrent episodes of binge eating (definitely larger amount than what others would eat and lack of a sense of control over eating during episode) - No, 3 or more of following (eating much more rapidly, eating until uncomfortably full, large amounts when not hungry, eating alone because of embarrassed by how much,  feeling disgusted with oneself, depressed or very guilty afterward) - No, distress regarding binges - No, binges occur on average at least once a week for 3 months - No      Substance/s:  Taken in larger amounts or over longer periods than intended: No,  Persistent desire or unsuccessful attempts to cut down or stop: No,  Great deal of time spent seeking, using or recovering from: No,  Craving or strong desire to use: No,  Recurrent use despite failure to meet major role obligation: No,  Continued use despite persistent or recurrent social/interparsonal issues due to use: No,  Important social/work/recreational activities given up due to use: No,  Recurrent use in physically hazardous situations: No,  Continued use despite knowledge of persistent physical or psychological problem: No,  Tolerance (either increased need or diminished effect): No,      Psychotherapy:  Target symptoms: anxiety   Why chosen therapy is appropriate versus another modality: relevant to diagnosis, patient responds to this modality, evidence based practice  Outcome monitoring methods: self-report, observation  Therapeutic intervention type: insight oriented psychotherapy, behavior modifying psychotherapy, supportive psychotherapy, interactive psychotherapy  Topics discussed/themes: stress related to medical comorbidities, difficulty managing affect in interpersonal relationships, building skills sets for symptom management, symptom recognition  The patient's response  to the intervention is reluctant. The patient's progress toward treatment goals is limited.   Duration of intervention: 40 minutes.      PAST PSYCHIATRIC HISTORY  Previous Psychiatric Hospitalizations: No  Previous SI/HI: No,  Previous Suicide Attempts: No,   Previous Medication Trials: Yes,  Psychiatric Care (current & past): Yes, PCP only  History of Psychotherapy: No,  History of Violence: No,  History of sexual/physical abuse: No,    PAST MEDICAL & SURGICAL HISTORY   Past Medical History:   Diagnosis Date    Abnormal Pap smear 2013    LGSIL    Anticoagulant long-term use     Atrial fibrillation     COPD (chronic obstructive pulmonary disease)     Depression     GERD (gastroesophageal reflux disease)     Hyperlipidemia     Hypertension      Past Surgical History:   Procedure Laterality Date    APPENDECTOMY      BRONCHOSCOPY N/A 2019    Procedure: BRONCHOSCOPY;  Surgeon: Howard Matson MD;  Location: [facility] OR;  Service: Pulmonary;  Laterality: N/A;    CERVICAL BIOPSY  W/ LOOP ELECTRODE EXCISION      CHOLECYSTECTOMY      COLLATERAL LIGAMENT REPAIR, KNEE      left knee    COLONOSCOPY      DILATION AND CURETTAGE OF UTERUS      SINUS SURGERY           CURRENT PSYCH MEDICATION REGIMEN   Lexapro 5 mg poq day and xanax 0.25 mg po q q day prn  Current Medication side effects:  no  Current Medication compliance:  yes    Previous psych meds trials  As above and klonopin    Home Meds:   Prior to Admission medications    Medication Sig Start Date End Date Taking? Authorizing Provider   albuterol (PROVENTIL/VENTOLIN HFA) 90 mcg/actuation inhaler Inhale 2 puffs into the lungs every 6 (six) hours as needed for Wheezing. 3/21/19  Yes Kevin Clements MD   albuterol-ipratropium (DUO-NEB) 2.5 mg-0.5 mg/3 mL nebulizer solution SMARTSI Vial(s) Via Nebulizer Every 12 Hours 22  Yes Historical Provider   ALPRAZolam (XANAX) 0.25 MG tablet One po tid for anxiety 1/3/23  Yes Kevin Clements MD   aspirin (ECOTRIN) 81  MG EC tablet Take 1 tablet orally once in the evening. 9/19/22  Yes Historical Provider   cephALEXin (KEFLEX) 500 MG capsule Take 500 mg by mouth every 8 (eight) hours. 2/7/23  Yes Historical Provider   clonazePAM (KLONOPIN) 0.5 MG tablet Take 1 tablet (0.5 mg total) by mouth 2 (two) times daily as needed for Anxiety. 9/9/22 9/9/23 Yes Howard Matson MD   diltiaZEM (CARDIZEM) 120 MG tablet Take 120 mg by mouth 2 (two) times daily. 11/7/19  Yes Historical Provider   EScitalopram oxalate (LEXAPRO) 5 MG Tab Take 1 tablet (5 mg total) by mouth once daily. 1/3/23  Yes Kevin Clements MD   flecainide (TAMBOCOR) 50 MG Tab Take 50 mg by mouth 2 (two) times daily. 11/13/19  Yes Historical Provider   LIDOcaine (LIDODERM) 5 % Place 1 patch onto the skin once daily. Remove & Discard patch within 12 hours or as directed by MD 2/10/23  Yes Kyle Piedra MD   metoclopramide HCl (REGLAN) 10 MG tablet Take 1 tablet (10 mg total) by mouth every 6 (six) hours. 8/23/22  Yes DO RADHA Devi ELLIPTA 100-62.5-25 mcg DsDv TAKE 1 PUFF BY MOUTH EVERY DAY 11/1/19  Yes Historical Provider   levalbuterol (XOPENEX) 0.63 mg/3 mL nebulizer solution Take 3 mLs (0.63 mg total) by nebulization every 4 (four) hours as needed for Wheezing. 5/28/20 1/3/23  HAKEEM Argueta SURECLICK 140 mg/mL PnIj Inject 140 mg into the skin every 14 (fourteen) days. 6/4/21   Historical Provider         OTC Meds: none    Scheduled Meds:    albuterol-ipratropium  3 mL Nebulization Q12H    aspirin  81 mg Oral Daily    calcitonin (salmon)  1 spray Alternating Nostrils Daily    clonazePAM  0.5 mg Oral BID    diltiaZEM  120 mg Oral Q12H    EScitalopram oxalate  5 mg Oral Daily    flecainide  50 mg Oral BID    LIDOcaine  1 patch Transdermal Daily    metoclopramide HCl  10 mg Oral Q6H      PRN Meds: acetaminophen, albuterol, HYDROcodone-acetaminophen, HYDROmorphone, melatonin, sodium chloride 0.9%   Psychotherapeutics (From admission, onward)       Start     Stop Route Frequency Ordered    02/16/23 1000  EScitalopram oxalate tablet 5 mg         -- Oral Daily 02/16/23 0842            ALLERGIES   Review of patient's allergies indicates:   Allergen Reactions    Pcn [penicillins] Hives and Itching    Tetracyclines     Bactrim [sulfamethoxazole-trimethoprim] Rash    Ilosone Rash    Iodine and iodide containing products Rash    Sulfa (sulfonamide antibiotics) Hives and Rash       NEUROLOGIC HISTORY  Seizures: No  Head trauma: No    SOCIAL HISTORY:  Developmental/Childhood:Achieved all developmental milestones timely  Education:High School Diploma/12th grade  Employment Status/Finances:Retired   Relationship Status/Sexual Orientation: was -   Children: 3  Housing Status: Home    history:  NO   Access to Firearms: NO ;  Locked up? n/a  Taoism:Actively participates in organized [employer]  Recreational activities:Time with family    SUBSTANCE ABUSE HISTORY   Recreational Drugs:  denied    Use of Alcohol: denied  Rehab History:no   Tobacco Use:no    LEGAL HISTORY:   Past charges/incarcerations: NO  Pending charges:NO    FAMILY PSYCHIATRIC HISTORY   Family History   Problem Relation Age of Onset    Breast cancer Mother     Colon cancer Neg Hx     Ovarian cancer Neg Hx        denied      ROS   General ROS: negative  Ophthalmic ROS: negative  ENT ROS: negative  Allergy and Immunology ROS: negative  Hematological and Lymphatic ROS: negative  Endocrine ROS: negative  Respiratory ROS: no cough, shortness of breath, or wheezing  Cardiovascular ROS: no chest pain or dyspnea on exertion  Gastrointestinal ROS: no abdominal pain, change in bowel habits, or black or bloody stools  Genito-Urinary ROS: no dysuria, trouble voiding, or hematuria  Musculoskeletal ROS: negative  Neurological ROS: no TIA or stroke symptoms  Dermatological ROS: negative        EXAMINATION    PHYSICAL EXAM  Reviewed note/exam by Dr. Padgett from 2/15/23 at 3:34 PM    VITALS    Vitals:    02/16/23 1118   BP: 123/60   Pulse: 70   Resp: 18   Temp: 97.4 °F (36.3 °C)        Body mass index is 22.54 kg/m².        PAIN  4/10- back  Subjective report of pain matches objective signs and symptoms: Yes    LABORATORY DATA   Recent Results (from the past 72 hour(s))   Comprehensive Metabolic Panel    Collection Time: 02/15/23 12:23 PM   Result Value Ref Range    Sodium 142 136 - 145 mmol/L    Potassium 3.7 3.5 - 5.1 mmol/L    Chloride 101 95 - 110 mmol/L    CO2 29 23 - 29 mmol/L    Glucose 181 (H) 70 - 110 mg/dL    BUN 17 8 - 23 mg/dL    Creatinine 1.1 0.5 - 1.4 mg/dL    Calcium 9.2 8.7 - 10.5 mg/dL    Total Protein 6.5 6.0 - 8.4 g/dL    Albumin 3.5 3.5 - 5.2 g/dL    Total Bilirubin 0.7 0.1 - 1.0 mg/dL    Alkaline Phosphatase 191 (H) 55 - 135 U/L    AST 20 10 - 40 U/L    ALT 28 10 - 44 U/L    Anion Gap 12 8 - 16 mmol/L    eGFR 50 (A) >60 mL/min/1.73 m^2   CBC Auto Differential    Collection Time: 02/15/23 12:23 PM   Result Value Ref Range    WBC 16.41 (H) 3.90 - 12.70 K/uL    RBC 4.38 4.00 - 5.40 M/uL    Hemoglobin 13.1 12.0 - 16.0 g/dL    Hematocrit 41.1 37.0 - 48.5 %    MCV 94 82 - 98 fL    MCH 29.9 27.0 - 31.0 pg    MCHC 31.9 (L) 32.0 - 36.0 g/dL    RDW 13.6 11.5 - 14.5 %    Platelets 264 150 - 450 K/uL    MPV 9.7 9.2 - 12.9 fL    Immature Granulocytes 1.3 (H) 0.0 - 0.5 %    Gran # (ANC) 14.1 (H) 1.8 - 7.7 K/uL    Immature Grans (Abs) 0.21 (H) 0.00 - 0.04 K/uL    Lymph # 1.2 1.0 - 4.8 K/uL    Mono # 0.7 0.3 - 1.0 K/uL    Eos # 0.1 0.0 - 0.5 K/uL    Baso # 0.10 0.00 - 0.20 K/uL    nRBC 0 0 /100 WBC    Gran % 86.0 (H) 38.0 - 73.0 %    Lymph % 7.3 (L) 18.0 - 48.0 %    Mono % 4.3 4.0 - 15.0 %    Eosinophil % 0.5 0.0 - 8.0 %    Basophil % 0.6 0.0 - 1.9 %    Differential Method Automated    Urinalysis, Reflex to Urine Culture Urine, Clean Catch    Collection Time: 02/15/23 12:29 PM    Specimen: Urine   Result Value Ref Range    Specimen UA Urine, Clean Catch     Color, UA Yellow Yellow, Straw, Elizabeth     Appearance, UA Clear Clear    pH, UA 6.0 5.0 - 8.0    Specific Gravity, UA 1.025 1.005 - 1.030    Protein, UA Negative Negative    Glucose, UA Negative Negative    Ketones, UA Negative Negative    Bilirubin (UA) Negative Negative    Occult Blood UA Trace (A) Negative    Nitrite, UA Negative Negative    Urobilinogen, UA Negative <2.0 EU/dL    Leukocytes, UA 2+ (A) Negative   Urinalysis Microscopic    Collection Time: 02/15/23 12:29 PM   Result Value Ref Range    RBC, UA 0 0 - 4 /hpf    WBC, UA 5 0 - 5 /hpf    Bacteria Rare None-Occ /hpf    Squam Epithel, UA 7 /hpf    Microscopic Comment SEE COMMENT    CBC auto differential    Collection Time: 02/16/23  5:35 AM   Result Value Ref Range    WBC 9.12 3.90 - 12.70 K/uL    RBC 3.69 (L) 4.00 - 5.40 M/uL    Hemoglobin 11.1 (L) 12.0 - 16.0 g/dL    Hematocrit 35.5 (L) 37.0 - 48.5 %    MCV 96 82 - 98 fL    MCH 30.1 27.0 - 31.0 pg    MCHC 31.3 (L) 32.0 - 36.0 g/dL    RDW 13.6 11.5 - 14.5 %    Platelets 213 150 - 450 K/uL    MPV 10.1 9.2 - 12.9 fL    Immature Granulocytes 1.2 (H) 0.0 - 0.5 %    Gran # (ANC) 6.3 1.8 - 7.7 K/uL    Immature Grans (Abs) 0.11 (H) 0.00 - 0.04 K/uL    Lymph # 1.4 1.0 - 4.8 K/uL    Mono # 1.0 0.3 - 1.0 K/uL    Eos # 0.2 0.0 - 0.5 K/uL    Baso # 0.09 0.00 - 0.20 K/uL    nRBC 0 0 /100 WBC    Gran % 69.3 38.0 - 73.0 %    Lymph % 15.0 (L) 18.0 - 48.0 %    Mono % 10.9 4.0 - 15.0 %    Eosinophil % 2.6 0.0 - 8.0 %    Basophil % 1.0 0.0 - 1.9 %    Differential Method Automated    Comprehensive metabolic panel    Collection Time: 02/16/23  5:35 AM   Result Value Ref Range    Sodium 141 136 - 145 mmol/L    Potassium 3.4 (L) 3.5 - 5.1 mmol/L    Chloride 101 95 - 110 mmol/L    CO2 30 (H) 23 - 29 mmol/L    Glucose 91 70 - 110 mg/dL    BUN 17 8 - 23 mg/dL    Creatinine 0.8 0.5 - 1.4 mg/dL    Calcium 8.5 (L) 8.7 - 10.5 mg/dL    Total Protein 5.3 (L) 6.0 - 8.4 g/dL    Albumin 2.9 (L) 3.5 - 5.2 g/dL    Total Bilirubin 0.7 0.1 - 1.0 mg/dL    Alkaline Phosphatase 158 (H)  55 - 135 U/L    AST 19 10 - 40 U/L    ALT 21 10 - 44 U/L    Anion Gap 10 8 - 16 mmol/L    eGFR >60 >60 mL/min/1.73 m^2      No results found for: PHENYTOIN, PHENOBARB, VALPROATE, CBMZ        CONSTITUTIONAL  General Appearance: unremarkable, age appropriate    MUSCULOSKELETAL  Muscle Strength and Tone:no tremor, no tic  Abnormal Involuntary Movements: No  Gait and Station:  not assessed- pt sitting in chair    PSYCHIATRIC   Level of Consciousness: awake and alert   Orientation: person, place, time, and situation  Grooming: Hospital garb and Well groomed  Psychomotor Behavior: normal, cooperative, eye contact normal, no PMA/R  Speech: normal tone, normal rate, normal pitch, normal volume, spontaneous  Language: grossly intact, able to name, able to repeat  Mood: neutral  Affect: Appropriate, Consistent with mood, Congruent with thought, and Full  Thought Process: linear, logical  Associations: intact   Thought Content: denies SI, denies HI, and no delusions  Perceptions: denies AH and denies  VH  Memory: Able to recall past events, Remote intact, and Recent intact  Attention:Normal and Attends to interview without distraction  Fund of Knowledge: Aware of current events and Vocabulary appropriate   Estimate if Intelligence:  Average based on work/education history, vocabulary and mental status exam  Insight: intact, has awareness of illness- good  Judgment: behavior is adequate to circumstances- good      PSYCHOSOCIAL    PSYCHOSOCIAL STRESSORS   family and health    FUNCTIONING RELATIONSHIPS   good support system and poor relationship with children    STRENGTHS AND LIABILITIES   Strength: Patient accepts guidance/feedback, Strength: Patient is expressive/articulate., Liability: Patient has poor health., Liability: family stressors    Is the patient aware of the biomedical complications associated with substance abuse and mental illness? yes    Does the patient have an Advance Directive for Mental Health treatment?  no  (If yes, inform patient to bring copy.)        MEDICAL DECISION MAKING        ASSESSMENT     Unspecified Anxiety Disorder  Psychosocial stressors    Back pain    Medical issues      PROBLEM LIST AND MANAGEMENT PLANS    Anxiety: pt counseled  -continue home medication Lexapro at 5 mg po q day- consider titration to 10 mg if the patient wishes- she currently declined any changes  -continue klonopin as scheduled- ok to switch to xanax if the patient preferes. She had been counseled extensively on the risks given her age and feels the B>R; she is know to tolerate it benzos well without evidence of abuse or s/e  - reviewed; no suspicious activity noted   -psychotherapy provided     Psychosocial stressors: pt counseled  -SW/CM consulted to assist with resources  -psychotherapy provided     Back pain: pt counseled  -consider trial of gabapentin as this may also help wth anxiety and may help limit the need for benzos    Medical issues: pt counseled  -continue tx per primary team      PRESCRIPTION DRUG MANAGEMENT  Compliance: yes  Side Effects: no  Regimen Adjustments: see above    Discussed diagnosis, risks and benefits of proposed treatment vs alternative treatments vs no treatment, potential side effects of these treatments and the inherent unpredictability of treatment. The patient expresses understanding of the above and displays the capacity to agree with this treatment given said understanding. Patient also agrees that, currently, the benefits outweigh the risks and would like to pursue/continue treatment at this time.    Any medications being used off-label were discussed with the patient inclusive of the evidence base for the use of the medications and consent was obtained for the off-label use of the medication.         DIAGNOSTIC TESTING  Labs reviewed with patient; follow up pending labs    Disposition:  -Will attempt to obtain outside psychiatric records if available  -SW/CM to assist with aftercare  planning and collateral  -Once medically stable ,discharge home with outpatient follow up care  with her PCP per her request  -The patient does not currently meet criteria for inpatient psychiatric treatment under a PEC and/or CEC as she is not currently a  danger to self/ danger to others/grave disability.  -Please call for any further questions or concerns      Kevin Mcclendon MD  Psychiatry

## 2023-02-16 NOTE — PT/OT/SLP EVAL
"Physical Therapy Evaluation    Patient Name:  Marva Perez   MRN:  [id number]    Recommendations:     Discharge Recommendations: home with home health, home health PT   Discharge Equipment Recommendations: none   Barriers to discharge: None    Assessment:     Marva Perez is a 82 y.o. female admitted with a medical diagnosis of Lumbar compression fracture, closed, initial encounter.  She presents with the following impairments/functional limitations: weakness, pain, impaired self care skills, impaired functional mobility, gait instability, impaired balance.  Patient just had pain medications  30 mins ago. Patient tolerated up at bed side chair and performed gait functions using RW at room air x ~80 feet  with standby Assistance.  O2 SAT based line from 95% to 85% but immediately back to 90 % after a minute rest with back to  NC O2 and breathing ex.     Rehab Prognosis: Fair; patient would benefit from acute skilled PT services to address these deficits and reach maximum level of function.    Recent Surgery: * No surgery found *      Plan:     During this hospitalization, patient to be seen daily to address the identified rehab impairments via gait training, therapeutic activities, therapeutic exercises and progress toward the following goals:    Plan of Care Expires:  02/18/23    Subjective     Chief Complaint: Low Back Pain  Patient/Family Comments/goals: "to dec the pain and to return home with home health" - per pt  Pain/Comfort:  Pain Rating 1: 10/10  Location - Side 1: Left  Location - Orientation 1: lower  Location 1: back  Pain Addressed 1: Pre-medicate for activity, Reposition, Cessation of Activity  Pain Rating Post-Intervention 1: 5/10    Patients cultural, spiritual, Yazdanism conflicts given the current situation: no    Living Environment:  Patient lives alone in a [facility] with no [facility]. Patient has sitter at day time  Prior to admission, patients level of function was Independent with ADL's and gait " functions with no AD..  Equipment used at home: walker, rolling, wheelchair, oxygen, other (see comments), bedside commode, shower chair (trilogy).  DME owned (not currently used): none.  Upon discharge, patient will have assistance from sitter and family.    Objective:     Communicated with patient prior to session.  Patient found HOB elevated with oxygen, peripheral IV, bed alarm  upon PT entry to room.    General Precautions: Standard,    Orthopedic Precautions:    Braces:    Respiratory Status: Nasal cannula, flow 3 L/min    Exams:  Cognitive Exam:  Patient is oriented to Person, Place, Time, and Situation  Gross Motor Coordination:  WFL  Postural Exam:  Patient presented with the following abnormalities:    -       Rounded shoulders  -       Forward head  Skin Integrity/Edema:      -       Skin integrity: Visible skin intact  RUE ROM: WFL  RUE Strength: WFL  LUE ROM: WFL  LUE Strength: WFL  RLE ROM: WFL  RLE Strength: WFL  LLE ROM: WFL  LLE Strength: WFL    Functional Mobility:  Bed Mobility:     Rolling Left:  modified independence  Rolling Right: modified independence  Scooting: supervision  Supine to Sit: supervision  Sit to Supine: supervision  Transfers:     Sit to Stand:  supervision with rolling walker  Bed to Chair: stand by assistance with  rolling walker  using  Step Transfer  Gait: Standby Assistance x ~80 feet with RW      AM-PAC 6 CLICK MOBILITY  Total Score:19       Treatment & Education:  PT eval. Educated patient the role of PT and the proper body mechanics in out of bed activity; Breathing ex and Gait trng using RW x ~80 feet with SBA.    Patient left up in chair with all lines intact, call button in reach, and nursing notified.    GOALS:   Multidisciplinary Problems       Physical Therapy Goals          Problem: Physical Therapy    Goal Priority Disciplines Outcome Goal Variances Interventions   Physical Therapy Goal     PT, PT/OT      Description: Goals to be met by: 3 visits    Patient will  increase functional independence with mobility by performin. Supine to sit with Modified Independent  2. Sit <>Stand with Modified Independent with RW  3. Bed <> chair transfer with Modified Independentwith or without rolling walker using Step Transfer TECHNIQUE  4. Gait  x ~150  feet with Supervision or Set-up Assistance with or without rolling walker  5. Lower extremity exercise program x10 reps per handout, with assistance as needed                          History:     Past Medical History:   Diagnosis Date    Abnormal Pap smear 2013    LGSIL    Anticoagulant long-term use     Atrial fibrillation     COPD (chronic obstructive pulmonary disease)     Depression     GERD (gastroesophageal reflux disease)     Hyperlipidemia     Hypertension        Past Surgical History:   Procedure Laterality Date    APPENDECTOMY      BRONCHOSCOPY N/A 2019    Procedure: BRONCHOSCOPY;  Surgeon: Howard Matson MD;  Location: [facility] OR;  Service: Pulmonary;  Laterality: N/A;    CERVICAL BIOPSY  W/ LOOP ELECTRODE EXCISION      CHOLECYSTECTOMY      COLLATERAL LIGAMENT REPAIR, KNEE      left knee    COLONOSCOPY      DILATION AND CURETTAGE OF UTERUS      SINUS SURGERY         Time Tracking:     PT Received On: 23  PT Start Time: 0920     PT Stop Time: 0950  PT Total Time (min): 30 min     Billable Minutes: Evaluation 15 and Therapeutic Activity 15      2023

## 2023-02-16 NOTE — ASSESSMENT & PLAN NOTE
L3-L4 lumbar spine compression fracture  Pain Control with IV pain medications and will transition to po medications.    PT ordered    Will continue pain control and case management working on discharge planning

## 2023-02-16 NOTE — PT/OT/SLP PROGRESS
Physical Therapy      Patient Name:  Marva Perez   MRN:  [id number]    Patient not seen today secondary to Other (Comment).Attempted to see patient for PT eval and pt reported just had her pain medication and wants to wait later.Will follow-up later today.

## 2023-02-16 NOTE — SUBJECTIVE & OBJECTIVE
Past Medical History:   Diagnosis Date    Abnormal Pap smear 2013    LGSIL    Anticoagulant long-term use     Atrial fibrillation     COPD (chronic obstructive pulmonary disease)     Depression     GERD (gastroesophageal reflux disease)     Hyperlipidemia     Hypertension        Past Surgical History:   Procedure Laterality Date    APPENDECTOMY      BRONCHOSCOPY N/A 2019    Procedure: BRONCHOSCOPY;  Surgeon: Howard Matson MD;  Location: [facility];  Service: Pulmonary;  Laterality: N/A;    CERVICAL BIOPSY  W/ LOOP ELECTRODE EXCISION      CHOLECYSTECTOMY      COLLATERAL LIGAMENT REPAIR, KNEE      left knee    COLONOSCOPY      DILATION AND CURETTAGE OF UTERUS      SINUS SURGERY         Review of patient's allergies indicates:   Allergen Reactions    Pcn [penicillins] Hives and Itching    Tetracyclines     Bactrim [sulfamethoxazole-trimethoprim] Rash    Ilosone Rash    Iodine and iodide containing products Rash    Sulfa (sulfonamide antibiotics) Hives and Rash       No current facility-administered medications on file prior to encounter.     Current Outpatient Medications on File Prior to Encounter   Medication Sig    albuterol (PROVENTIL/VENTOLIN HFA) 90 mcg/actuation inhaler Inhale 2 puffs into the lungs every 6 (six) hours as needed for Wheezing.    albuterol-ipratropium (DUO-NEB) 2.5 mg-0.5 mg/3 mL nebulizer solution SMARTSI Vial(s) Via Nebulizer Every 12 Hours    ALPRAZolam (XANAX) 0.25 MG tablet One po tid for anxiety    aspirin (ECOTRIN) 81 MG EC tablet Take 1 tablet orally once in the evening.    cephALEXin (KEFLEX) 500 MG capsule Take 500 mg by mouth every 8 (eight) hours.    clonazePAM (KLONOPIN) 0.5 MG tablet Take 1 tablet (0.5 mg total) by mouth 2 (two) times daily as needed for Anxiety.    diltiaZEM (CARDIZEM) 120 MG tablet Take 120 mg by mouth 2 (two) times daily.    EScitalopram oxalate (LEXAPRO) 5 MG Tab Take 1 tablet (5 mg total) by mouth once daily.    flecainide (TAMBOCOR) 50 MG Tab Take 50  mg by mouth 2 (two) times daily.    LIDOcaine (LIDODERM) 5 % Place 1 patch onto the skin once daily. Remove & Discard patch within 12 hours or as directed by MD    metoclopramide HCl (REGLAN) 10 MG tablet Take 1 tablet (10 mg total) by mouth every 6 (six) hours.    TRELEGY ELLIPTA 100-62.5-25 mcg DsDv TAKE 1 PUFF BY MOUTH EVERY DAY    levalbuterol (XOPENEX) 0.63 mg/3 mL nebulizer solution Take 3 mLs (0.63 mg total) by nebulization every 4 (four) hours as needed for Wheezing.    REPATHA SURECLICK 140 mg/mL PnIj Inject 140 mg into the skin every 14 (fourteen) days.     Family History       Problem Relation (Age of Onset)    Breast cancer Mother          Tobacco Use    Smoking status: Former     Years: .00     Types: Cigarettes     Quit date: 10/30/2006     Years since quittin.3    Smokeless tobacco: Never   Substance and Sexual Activity    Alcohol use: No     Comment: No    Drug use: No    Sexual activity: Not Currently     Birth control/protection: Post-menopausal     Comment:      Review of Systems   Constitutional:  Positive for fatigue. Negative for chills and fever.   Respiratory:  Negative for cough and shortness of breath (stable).    Cardiovascular:  Negative for chest pain and leg swelling.   Gastrointestinal:  Negative for nausea and vomiting.   Genitourinary:  Negative for difficulty urinating and dysuria.   Musculoskeletal:  Positive for arthralgias and back pain.   Objective:     Vital Signs (Most Recent):  Temp: 97.4 °F (36.3 °C) (23 1118)  Pulse: 70 (23 1118)  Resp: 18 (23 1118)  BP: 123/60 (23 1118)  SpO2: 96 % (23 1118) Vital Signs (24h Range):  Temp:  [97.4 °F (36.3 °C)-98.7 °F (37.1 °C)] 97.4 °F (36.3 °C)  Pulse:  [61-89] 70  Resp:  [18-20] 18  SpO2:  [91 %-98 %] 96 %  BP: (123-207)/(60-85) 123/60     Weight: 55.9 kg (123 lb 3.8 oz)  Body mass index is 22.54 kg/m².    Physical Exam  Constitutional:       Appearance: Normal appearance.   Cardiovascular:       Rate and Rhythm: Normal rate and regular rhythm.   Pulmonary:      Effort: Pulmonary effort is normal.      Breath sounds: Normal breath sounds.   Abdominal:      General: Abdomen is flat.      Palpations: Abdomen is soft.   Neurological:      Mental Status: She is alert.   Psychiatric:      Comments: Does not answer questions and changes subject            Significant Labs: All pertinent labs within the past 24 hours have been reviewed.  BMP:   Recent Labs   Lab 02/16/23  0535   GLU 91      K 3.4*      CO2 30*   BUN 17   CREATININE 0.8   CALCIUM 8.5*     CBC:   Recent Labs   Lab 02/15/23  1223 02/16/23  0535   WBC 16.41* 9.12   HGB 13.1 11.1*   HCT 41.1 35.5*    213     CMP:   Recent Labs   Lab 02/15/23  1223 02/16/23  0535    141   K 3.7 3.4*    101   CO2 29 30*   * 91   BUN 17 17   CREATININE 1.1 0.8   CALCIUM 9.2 8.5*   PROT 6.5 5.3*   ALBUMIN 3.5 2.9*   BILITOT 0.7 0.7   ALKPHOS 191* 158*   AST 20 19   ALT 28 21   ANIONGAP 12 10     Lactic Acid: No results for input(s): LACTATE in the last 48 hours.  Magnesium: No results for input(s): MG in the last 48 hours.  Troponin: No results for input(s): TROPONINI, TROPONINIHS in the last 48 hours.  TSH: No results for input(s): TSH in the last 4320 hours.  Urine Culture: No results for input(s): LABURIN in the last 48 hours.  Urine Studies:   Recent Labs   Lab 02/15/23  1229   COLORU Yellow   APPEARANCEUA Clear   PHUR 6.0   SPECGRAV 1.025   PROTEINUA Negative   GLUCUA Negative   KETONESU Negative   BILIRUBINUA Negative   OCCULTUA Trace*   NITRITE Negative   UROBILINOGEN Negative   LEUKOCYTESUR 2+*   RBCUA 0   WBCUA 5   BACTERIA Rare   SQUAMEPITHEL 7       Significant Imaging:   MRI Lumbar Spine: Mild inferior endplate compression fractures at L1 and L3 with approximately 30% height loss.  There is associated edema like signal suggesting these are either acute or subacute.     Mild multilevel lumbar spondylosis with specific  details at each level discussed above.    CT renal stone study  1.  No CT evidence of obstructive genitourinary pathology.     2.  Small calcific density about the inner cortical margin both kidneys at are vascular in etiology.     3.  Granulomatous focus throughout both liver and spleen.     4.  Advanced pulmonary of with evidence of prominent bullous relation of the pulmonary architecture bilaterally.

## 2023-02-16 NOTE — PROGRESS NOTES
[facility] ([address] [place] [address] Medicine  Progress Note    Patient Name: Marva Perez  MRN: [id number]  Patient Class: OP- Observation   Admission Date: 2/15/2023  Length of Stay: 0 days  Attending Physician: Stewart Lucia MD  Primary Care Provider: Kevin Clements MD        Subjective:     Principal Problem:Lumbar compression fracture, closed, initial encounter        HPI:  Patient is an 82 year old female with medical history of depression, afib, former smoker, COPD ( on 3 L n/c during the day and 4 L N.C at night), HTN and HLD who presented to the ED with back pain.  Symptoms started 10 days ago and denies any trauma.  She describes the pain as spasms.  She has been to the ED and was given pain medication but did not relieve symptoms.     Found to have compression fracture L1-L3 with edema on MRI.  Admitted for therapy and pain control.        Overview/Hospital Course:  No notes on file    Past Medical History:   Diagnosis Date    Abnormal Pap smear 07/17/2013    LGSIL    Anticoagulant long-term use     Atrial fibrillation     COPD (chronic obstructive pulmonary disease)     Depression     GERD (gastroesophageal reflux disease)     Hyperlipidemia     Hypertension        Past Surgical History:   Procedure Laterality Date    APPENDECTOMY      BRONCHOSCOPY N/A 8/5/2019    Procedure: BRONCHOSCOPY;  Surgeon: Howard Matson MD;  Location: [facility] OR;  Service: Pulmonary;  Laterality: N/A;    CERVICAL BIOPSY  W/ LOOP ELECTRODE EXCISION      CHOLECYSTECTOMY      COLLATERAL LIGAMENT REPAIR, KNEE      left knee    COLONOSCOPY      DILATION AND CURETTAGE OF UTERUS      SINUS SURGERY         Review of patient's allergies indicates:   Allergen Reactions    Pcn [penicillins] Hives and Itching    Tetracyclines     Bactrim [sulfamethoxazole-trimethoprim] Rash    Ilosone Rash    Iodine and iodide containing products Rash    Sulfa (sulfonamide antibiotics) Hives and Rash       No current  facility-administered medications on file prior to encounter.     Current Outpatient Medications on File Prior to Encounter   Medication Sig    albuterol (PROVENTIL/VENTOLIN HFA) 90 mcg/actuation inhaler Inhale 2 puffs into the lungs every 6 (six) hours as needed for Wheezing.    albuterol-ipratropium (DUO-NEB) 2.5 mg-0.5 mg/3 mL nebulizer solution SMARTSI Vial(s) Via Nebulizer Every 12 Hours    ALPRAZolam (XANAX) 0.25 MG tablet One po tid for anxiety    aspirin (ECOTRIN) 81 MG EC tablet Take 1 tablet orally once in the evening.    cephALEXin (KEFLEX) 500 MG capsule Take 500 mg by mouth every 8 (eight) hours.    clonazePAM (KLONOPIN) 0.5 MG tablet Take 1 tablet (0.5 mg total) by mouth 2 (two) times daily as needed for Anxiety.    diltiaZEM (CARDIZEM) 120 MG tablet Take 120 mg by mouth 2 (two) times daily.    EScitalopram oxalate (LEXAPRO) 5 MG Tab Take 1 tablet (5 mg total) by mouth once daily.    flecainide (TAMBOCOR) 50 MG Tab Take 50 mg by mouth 2 (two) times daily.    LIDOcaine (LIDODERM) 5 % Place 1 patch onto the skin once daily. Remove & Discard patch within 12 hours or as directed by MD    metoclopramide HCl (REGLAN) 10 MG tablet Take 1 tablet (10 mg total) by mouth every 6 (six) hours.    TRELEGY ELLIPTA 100-62.5-25 mcg DsDv TAKE 1 PUFF BY MOUTH EVERY DAY    levalbuterol (XOPENEX) 0.63 mg/3 mL nebulizer solution Take 3 mLs (0.63 mg total) by nebulization every 4 (four) hours as needed for Wheezing.    REPATHA SURECLICK 140 mg/mL PnIj Inject 140 mg into the skin every 14 (fourteen) days.     Family History       Problem Relation (Age of Onset)    Breast cancer Mother          Tobacco Use    Smoking status: Former     Years: 30.00     Types: Cigarettes     Quit date: 10/30/2006     Years since quittin.3    Smokeless tobacco: Never   Substance and Sexual Activity    Alcohol use: No     Comment: No    Drug use: No    Sexual activity: Not Currently     Birth control/protection:  Post-menopausal     Comment:      Review of Systems   Constitutional:  Positive for fatigue. Negative for chills and fever.   Respiratory:  Negative for cough and shortness of breath (stable).    Cardiovascular:  Negative for chest pain and leg swelling.   Gastrointestinal:  Negative for nausea and vomiting.   Genitourinary:  Negative for difficulty urinating and dysuria.   Musculoskeletal:  Positive for arthralgias and back pain.   Objective:     Vital Signs (Most Recent):  Temp: 97.4 °F (36.3 °C) (02/16/23 1118)  Pulse: 70 (02/16/23 1118)  Resp: 18 (02/16/23 1118)  BP: 123/60 (02/16/23 1118)  SpO2: 96 % (02/16/23 1118) Vital Signs (24h Range):  Temp:  [97.4 °F (36.3 °C)-98.7 °F (37.1 °C)] 97.4 °F (36.3 °C)  Pulse:  [61-89] 70  Resp:  [18-20] 18  SpO2:  [91 %-98 %] 96 %  BP: (123-207)/(60-85) 123/60     Weight: 55.9 kg (123 lb 3.8 oz)  Body mass index is 22.54 kg/m².    Physical Exam  Constitutional:       Appearance: Normal appearance.   Cardiovascular:      Rate and Rhythm: Normal rate and regular rhythm.   Pulmonary:      Effort: Pulmonary effort is normal.      Breath sounds: Normal breath sounds.   Abdominal:      General: Abdomen is flat.      Palpations: Abdomen is soft.   Neurological:      Mental Status: She is alert.   Psychiatric:      Comments: Does not answer questions and changes subject            Significant Labs: All pertinent labs within the past 24 hours have been reviewed.  BMP:   Recent Labs   Lab 02/16/23  0535   GLU 91      K 3.4*      CO2 30*   BUN 17   CREATININE 0.8   CALCIUM 8.5*     CBC:   Recent Labs   Lab 02/15/23  1223 02/16/23  0535   WBC 16.41* 9.12   HGB 13.1 11.1*   HCT 41.1 35.5*    213     CMP:   Recent Labs   Lab 02/15/23  1223 02/16/23  0535    141   K 3.7 3.4*    101   CO2 29 30*   * 91   BUN 17 17   CREATININE 1.1 0.8   CALCIUM 9.2 8.5*   PROT 6.5 5.3*   ALBUMIN 3.5 2.9*   BILITOT 0.7 0.7   ALKPHOS 191* 158*   AST 20 19   ALT 28  21   ANIONGAP 12 10     Lactic Acid: No results for input(s): LACTATE in the last 48 hours.  Magnesium: No results for input(s): MG in the last 48 hours.  Troponin: No results for input(s): TROPONINI, TROPONINIHS in the last 48 hours.  TSH: No results for input(s): TSH in the last 4320 hours.  Urine Culture: No results for input(s): LABURIN in the last 48 hours.  Urine Studies:   Recent Labs   Lab 02/15/23  1229   COLORU Yellow   APPEARANCEUA Clear   PHUR 6.0   SPECGRAV 1.025   PROTEINUA Negative   GLUCUA Negative   KETONESU Negative   BILIRUBINUA Negative   OCCULTUA Trace*   NITRITE Negative   UROBILINOGEN Negative   LEUKOCYTESUR 2+*   RBCUA 0   WBCUA 5   BACTERIA Rare   SQUAMEPITHEL 7       Significant Imaging:   MRI Lumbar Spine: Mild inferior endplate compression fractures at L1 and L3 with approximately 30% height loss.  There is associated edema like signal suggesting these are either acute or subacute.     Mild multilevel lumbar spondylosis with specific details at each level discussed above.    CT renal stone study  1.  No CT evidence of obstructive genitourinary pathology.     2.  Small calcific density about the inner cortical margin both kidneys at are vascular in etiology.     3.  Granulomatous focus throughout both liver and spleen.     4.  Advanced pulmonary of with evidence of prominent bullous relation of the pulmonary architecture bilaterally.            Assessment/Plan:      * Lumbar compression fracture, closed, initial encounter  L3-L4 lumbar spine compression fracture  Pain Control with IV pain medications and will transition to po medications.    PT ordered    Will continue pain control and case management working on discharge planning     PAF (paroxysmal atrial fibrillation)  Continue flecainide      Chronic obstructive pulmonary disease  Patient without evidence of acute exacerbation.  Continue supportive care and home inhaler.        Chronic respiratory failure  Currently at baseline  supplemental oxygen  3L during the day and 4L at night  Due to COPD     KATHIE (generalized anxiety disorder)  Continue clonazepam       VTE Risk Mitigation (From admission, onward)         Ordered     IP VTE HIGH RISK PATIENT  Once         02/15/23 1811     Place sequential compression device  Until discontinued         02/15/23 1811                Discharge Planning   ROSAURA:      Code Status: Full Code   Is the patient medically ready for discharge?:     Reason for patient still in hospital (select all that apply): Treatment                     Consuelo Boykin PA-C  Department of Hospital Medicine   [place] [facility] ([facility])

## 2023-02-16 NOTE — PROGRESS NOTES
Pharmacist Renal Dose Adjustment Note    Marva Perez is a 82 y.o. female being treated with the medication METOCLOPRAMIDE    Patient Data:    Vital Signs (Most Recent):  Temp: 97.4 °F (36.3 °C) (02/16/23 1118)  Pulse: 70 (02/16/23 1118)  Resp: 18 (02/16/23 1118)  BP: 123/60 (02/16/23 1118)  SpO2: 96 % (02/16/23 1118) Vital Signs (72h Range):  Temp:  [97.4 °F (36.3 °C)-98.7 °F (37.1 °C)]   Pulse:  [61-89]   Resp:  [18-20]   BP: (123-207)/(60-95)   SpO2:  [91 %-98 %]      Recent Labs   Lab 02/10/23  0946 02/15/23  1223 02/16/23  0535   CREATININE 0.8 1.1 0.8     Serum creatinine: 0.8 mg/dL 02/16/23 0535  Estimated creatinine clearance: 43.6 mL/min    Medication: METOCLOPRAMIDE dose: 10MG  frequency Q6H  will be changed to medication: METOCLOPRAMIDE  dose: 5MG  frequency: Q6H     Pharmacist's Name: Ray LylesD   Pharmacist's Extension: [phone]

## 2023-02-16 NOTE — ASSESSMENT & PLAN NOTE
L1 and L3. On miacalcin. tlso ordered. Pt ordered.  Will plan for safe discharge. Patient wants to go home.

## 2023-02-16 NOTE — SUBJECTIVE & OBJECTIVE
Overview/Hospital Course:  No notes on file          Past Medical History:   Diagnosis Date    Abnormal Pap smear 2013     LGSIL    Anticoagulant long-term use      Atrial fibrillation      COPD (chronic obstructive pulmonary disease)      Depression      GERD (gastroesophageal reflux disease)      Hyperlipidemia      Hypertension                 Past Surgical History:   Procedure Laterality Date    APPENDECTOMY        BRONCHOSCOPY N/A 2019     Procedure: BRONCHOSCOPY;  Surgeon: Howard Matson MD;  Location: [facility] OR;  Service: Pulmonary;  Laterality: N/A;    CERVICAL BIOPSY  W/ LOOP ELECTRODE EXCISION        CHOLECYSTECTOMY        COLLATERAL LIGAMENT REPAIR, KNEE         left knee    COLONOSCOPY        DILATION AND CURETTAGE OF UTERUS        SINUS SURGERY                  Review of patient's allergies indicates:   Allergen Reactions    Pcn [penicillins] Hives and Itching    Tetracyclines      Bactrim [sulfamethoxazole-trimethoprim] Rash    Ilosone Rash    Iodine and iodide containing products Rash    Sulfa (sulfonamide antibiotics) Hives and Rash         No current facility-administered medications on file prior to encounter.           Current Outpatient Medications on File Prior to Encounter   Medication Sig    albuterol (PROVENTIL/VENTOLIN HFA) 90 mcg/actuation inhaler Inhale 2 puffs into the lungs every 6 (six) hours as needed for Wheezing.    albuterol-ipratropium (DUO-NEB) 2.5 mg-0.5 mg/3 mL nebulizer solution SMARTSI Vial(s) Via Nebulizer Every 12 Hours    ALPRAZolam (XANAX) 0.25 MG tablet One po tid for anxiety    aspirin (ECOTRIN) 81 MG EC tablet Take 1 tablet orally once in the evening.    cephALEXin (KEFLEX) 500 MG capsule Take 500 mg by mouth every 8 (eight) hours.    clonazePAM (KLONOPIN) 0.5 MG tablet Take 1 tablet (0.5 mg total) by mouth 2 (two) times daily as needed for Anxiety.    diltiaZEM (CARDIZEM) 120 MG tablet Take 120 mg by mouth 2 (two) times daily.    EScitalopram oxalate  (LEXAPRO) 5 MG Tab Take 1 tablet (5 mg total) by mouth once daily.    flecainide (TAMBOCOR) 50 MG Tab Take 50 mg by mouth 2 (two) times daily.    LIDOcaine (LIDODERM) 5 % Place 1 patch onto the skin once daily. Remove & Discard patch within 12 hours or as directed by MD    metoclopramide HCl (REGLAN) 10 MG tablet Take 1 tablet (10 mg total) by mouth every 6 (six) hours.    TRELEGY ELLIPTA 100-62.5-25 mcg DsDv TAKE 1 PUFF BY MOUTH EVERY DAY    levalbuterol (XOPENEX) 0.63 mg/3 mL nebulizer solution Take 3 mLs (0.63 mg total) by nebulization every 4 (four) hours as needed for Wheezing.    REPATHA SURECLICK 140 mg/mL PnIj Inject 140 mg into the skin every 14 (fourteen) days.      Family History         Problem Relation (Age of Onset)     Breast cancer Mother                   Tobacco Use    Smoking status: Former       Years: 30.00       Types: Cigarettes       Quit date: 10/30/2006       Years since quittin.3    Smokeless tobacco: Never   Substance and Sexual Activity    Alcohol use: No       Comment: No    Drug use: No    Sexual activity: Not Currently       Birth control/protection: Post-menopausal       Comment:       Review of Systems   Constitutional:  Positive for fatigue. Negative for chills and fever.   Respiratory:  Negative for cough and shortness of breath (stable).    Cardiovascular:  Negative for chest pain and leg swelling.   Gastrointestinal:  Negative for nausea and vomiting.   Genitourinary:  Negative for difficulty urinating and dysuria.   Musculoskeletal:  Positive for arthralgias and back pain.   Objective:      Vital Signs (Most Recent):  Temp: 97.4 °F (36.3 °C) (23 1118)  Pulse: 70 (23 1118)  Resp: 18 (23 1118)  BP: 123/60 (23 1118)  SpO2: 96 % (23 1118) Vital Signs (24h Range):  Temp:  [97.4 °F (36.3 °C)-98.7 °F (37.1 °C)] 97.4 °F (36.3 °C)  Pulse:  [61-89] 70  Resp:  [18-20] 18  SpO2:  [91 %-98 %] 96 %  BP: (123-207)/(60-85) 123/60      Weight: 55.9 kg  (123 lb 3.8 oz)  Body mass index is 22.54 kg/m².     Physical Exam  Constitutional:       Appearance: Normal appearance.   Cardiovascular:      Rate and Rhythm: Normal rate and regular rhythm.   Pulmonary:      Effort: Pulmonary effort is normal.      Breath sounds: Normal breath sounds.   Abdominal:      General: Abdomen is flat.      Palpations: Abdomen is soft.   Neurological:      Mental Status: She is alert.   Psychiatric:      Comments: Does not answer questions and changes subject             Significant Labs: All pertinent labs within the past 24 hours have been reviewed.  BMP:       Recent Labs   Lab 02/16/23  0535   GLU 91      K 3.4*      CO2 30*   BUN 17   CREATININE 0.8   CALCIUM 8.5*      CBC:        Recent Labs   Lab 02/15/23  1223 02/16/23  0535   WBC 16.41* 9.12   HGB 13.1 11.1*   HCT 41.1 35.5*    213      CMP:        Recent Labs   Lab 02/15/23  1223 02/16/23  0535    141   K 3.7 3.4*    101   CO2 29 30*   * 91   BUN 17 17   CREATININE 1.1 0.8   CALCIUM 9.2 8.5*   PROT 6.5 5.3*   ALBUMIN 3.5 2.9*   BILITOT 0.7 0.7   ALKPHOS 191* 158*   AST 20 19   ALT 28 21   ANIONGAP 12 10      Lactic Acid: No results for input(s): LACTATE in the last 48 hours.  Magnesium: No results for input(s): MG in the last 48 hours.  Troponin: No results for input(s): TROPONINI, TROPONINIHS in the last 48 hours.  TSH: No results for input(s): TSH in the last 4320 hours.  Urine Culture: No results for input(s): LABURIN in the last 48 hours.  Urine Studies:       Recent Labs   Lab 02/15/23  1229   COLORU Yellow   APPEARANCEUA Clear   PHUR 6.0   SPECGRAV 1.025   PROTEINUA Negative   GLUCUA Negative   KETONESU Negative   BILIRUBINUA Negative   OCCULTUA Trace*   NITRITE Negative   UROBILINOGEN Negative   LEUKOCYTESUR 2+*   RBCUA 0   WBCUA 5   BACTERIA Rare   SQUAMEPITHEL 7         Significant Imaging:   MRI Lumbar Spine: Mild inferior endplate compression fractures at L1 and L3 with  approximately 30% height loss.  There is associated edema like signal suggesting these are either acute or subacute.     Mild multilevel lumbar spondylosis with specific details at each level discussed above.     CT renal stone study  1.  No CT evidence of obstructive genitourinary pathology.     2.  Small calcific density about the inner cortical margin both kidneys at are vascular in etiology.     3.  Granulomatous focus throughout both liver and spleen.     4.  Advanced pulmonary of with evidence of prominent bullous relation of the pulmonary architecture bilaterally.

## 2023-02-16 NOTE — TELEPHONE ENCOUNTER
Called pt and found out she is in the hospital with a ruptured disc. Has been to ER several times m had a CT and an Mri to diagnose her ruptured disc. Getting the help she needs

## 2023-02-16 NOTE — SUBJECTIVE & OBJECTIVE
Past Medical History:   Diagnosis Date    Abnormal Pap smear 2013    LGSIL    Anticoagulant long-term use     Atrial fibrillation     COPD (chronic obstructive pulmonary disease)     Depression     GERD (gastroesophageal reflux disease)     Hyperlipidemia     Hypertension        Past Surgical History:   Procedure Laterality Date    APPENDECTOMY      BRONCHOSCOPY N/A 2019    Procedure: BRONCHOSCOPY;  Surgeon: Howard Matson MD;  Location: [facility];  Service: Pulmonary;  Laterality: N/A;    CERVICAL BIOPSY  W/ LOOP ELECTRODE EXCISION      CHOLECYSTECTOMY      COLLATERAL LIGAMENT REPAIR, KNEE      left knee    COLONOSCOPY      DILATION AND CURETTAGE OF UTERUS      SINUS SURGERY         Review of patient's allergies indicates:   Allergen Reactions    Pcn [penicillins] Hives and Itching    Tetracyclines     Bactrim [sulfamethoxazole-trimethoprim] Rash    Ilosone Rash    Iodine and iodide containing products Rash    Sulfa (sulfonamide antibiotics) Hives and Rash       No current facility-administered medications on file prior to encounter.     Current Outpatient Medications on File Prior to Encounter   Medication Sig    albuterol (PROVENTIL/VENTOLIN HFA) 90 mcg/actuation inhaler Inhale 2 puffs into the lungs every 6 (six) hours as needed for Wheezing.    albuterol-ipratropium (DUO-NEB) 2.5 mg-0.5 mg/3 mL nebulizer solution SMARTSI Vial(s) Via Nebulizer Every 12 Hours    ALPRAZolam (XANAX) 0.25 MG tablet One po tid for anxiety    aspirin (ECOTRIN) 81 MG EC tablet Take 1 tablet orally once in the evening.    cephALEXin (KEFLEX) 500 MG capsule Take 500 mg by mouth every 8 (eight) hours.    clonazePAM (KLONOPIN) 0.5 MG tablet Take 1 tablet (0.5 mg total) by mouth 2 (two) times daily as needed for Anxiety.    diltiaZEM (CARDIZEM) 120 MG tablet Take 120 mg by mouth 2 (two) times daily.    EScitalopram oxalate (LEXAPRO) 5 MG Tab Take 1 tablet (5 mg total) by mouth once daily.    flecainide (TAMBOCOR) 50 MG Tab Take 50  mg by mouth 2 (two) times daily.    LIDOcaine (LIDODERM) 5 % Place 1 patch onto the skin once daily. Remove & Discard patch within 12 hours or as directed by MD    metoclopramide HCl (REGLAN) 10 MG tablet Take 1 tablet (10 mg total) by mouth every 6 (six) hours.    TRELEGY ELLIPTA 100-62.5-25 mcg DsDv TAKE 1 PUFF BY MOUTH EVERY DAY    levalbuterol (XOPENEX) 0.63 mg/3 mL nebulizer solution Take 3 mLs (0.63 mg total) by nebulization every 4 (four) hours as needed for Wheezing.    REPATHA SURECLICK 140 mg/mL PnIj Inject 140 mg into the skin every 14 (fourteen) days.     Family History       Problem Relation (Age of Onset)    Breast cancer Mother          Tobacco Use    Smoking status: Former     Years: .00     Types: Cigarettes     Quit date: 10/30/2006     Years since quittin.3    Smokeless tobacco: Never   Substance and Sexual Activity    Alcohol use: No     Comment: No    Drug use: No    Sexual activity: Not Currently     Birth control/protection: Post-menopausal     Comment:      Review of Systems   Constitutional:  Negative for chills and fever.   HENT:  Negative for congestion, ear pain, postnasal drip, rhinorrhea, sore throat and trouble swallowing.    Eyes:  Negative for redness and itching.   Respiratory:  Negative for cough, shortness of breath and wheezing.    Cardiovascular:  Negative for chest pain and palpitations.   Gastrointestinal:  Negative for abdominal pain, diarrhea, nausea and vomiting.   Genitourinary:  Negative for dysuria and frequency.   Musculoskeletal:  Positive for back pain.   Skin:  Negative for rash.   Neurological:  Negative for weakness and headaches.   Psychiatric/Behavioral:  Positive for decreased concentration and dysphoric mood.    Objective:     Vital Signs (Most Recent):  Temp: 97.4 °F (36.3 °C) (23 111)  Pulse: 70 (23 1118)  Resp: 18 (23 111)  BP: 123/60 (23 111)  SpO2: 96 % (23) Vital Signs (24h Range):  Temp:  [97.4 °F (36.3  °C)-98.7 °F (37.1 °C)] 97.4 °F (36.3 °C)  Pulse:  [61-89] 70  Resp:  [18-20] 18  SpO2:  [91 %-98 %] 96 %  BP: (123-207)/(60-85) 123/60     Weight: 55.9 kg (123 lb 3.8 oz)  Body mass index is 22.54 kg/m².    Physical Exam  Vitals and nursing note reviewed.   Constitutional:       General: She is not in acute distress.     Appearance: She is well-developed.      Comments: Sitting up in chair   HENT:      Head: Normocephalic and atraumatic.   Eyes:      Conjunctiva/sclera: Conjunctivae normal.      Pupils: Pupils are equal, round, and reactive to light.   Neck:      Thyroid: No thyromegaly.   Cardiovascular:      Rate and Rhythm: Normal rate and regular rhythm.      Heart sounds: Normal heart sounds.   Pulmonary:      Effort: Pulmonary effort is normal. No respiratory distress.      Breath sounds: Normal breath sounds. No wheezing.   Abdominal:      General: Bowel sounds are normal.      Palpations: Abdomen is soft.      Tenderness: There is no abdominal tenderness.   Musculoskeletal:         General: Tenderness (lumbar spine) present. Normal range of motion.      Cervical back: Normal range of motion and neck supple.   Lymphadenopathy:      Cervical: No cervical adenopathy.   Skin:     General: Skin is warm and dry.      Findings: No rash.   Neurological:      Mental Status: She is alert and oriented to person, place, and time.   Psychiatric:         Behavior: Behavior normal.         CRANIAL NERVES     CN III, IV, VI   Pupils are equal, round, and reactive to light.     Significant Labs: All pertinent labs within the past 24 hours have been reviewed.  CBC:   Recent Labs   Lab 02/15/23  1223 02/16/23  0535   WBC 16.41* 9.12   HGB 13.1 11.1*   HCT 41.1 35.5*    213     CMP:   Recent Labs   Lab 02/15/23  1223 02/16/23  0535    141   K 3.7 3.4*    101   CO2 29 30*   * 91   BUN 17 17   CREATININE 1.1 0.8   CALCIUM 9.2 8.5*   PROT 6.5 5.3*   ALBUMIN 3.5 2.9*   BILITOT 0.7 0.7   ALKPHOS 191* 158*    AST 20 19   ALT 28 21   ANIONGAP 12 10       Significant Imaging: I have reviewed all pertinent imaging results/findings within the past 24 hours.    Mild inferior endplate compression fractures at L1 and L3 with approximately 30% height loss.  There is associated edema like signal suggesting these are either acute or subacute.     Mild multilevel lumbar spondylosis with specific details at each level discussed above.

## 2023-02-16 NOTE — MEDICAL/APP STUDENT
PSYCHIATRY CONSULT  NOTE       2/16/2023 10:54 AM   Marva Perez   1940   [id number]         DATE OF ADMISSION: 2/15/2023 12:07 PM    SITE: [facility]    The patient location is: [address] [place] [address] floor    Visit type: face to face    Time with patient: 15  50 minutes of total time spent on the encounter, which includes face to face time and non-face to face time preparing to see the patient (eg, review of tests), Obtaining and/or reviewing separately obtained history, Documenting clinical information in the electronic or other health record, Independently interpreting results (not separately reported) and communicating results to the patient/family/caregiver, or Care coordination (not separately reported).      Each patient to whom he or she provides medical services by telemedicine is:  (1) informed of the relationship between the physician and patient and the respective role of any other health care provider with respect to management of the patient; and (2) notified that he or she may decline to receive medical services by telemedicine and may withdraw from such care at any time.    HISTORY    HPI:   82yF with HTN, HLD, GERD, Depression, Anxiety, afib on eliquis, COPD/Emphysema, and chronic hypoxia on 2-3L home oxygen via NC presented to ED  on February 5, 2023 with chief complaint of UTI. Placed on Macrobid and returned on Feb 10th with pain. Patient complained of back pain and was admitted to hospital with an L1 and L3 compression fracture found on MRI.     Overview/Hospital Course:     Per MD ED note:   Marva Perez is a 82 y.o. female that presents with intractable low back pain  Patient was initially seen in the ER on the February 5, 2023 with a UTI diagnosis  Patient was placed on Macrobid & soon returned on February 10th with the pain  Patient had a CT stone study on evaluation which showed no definitive findings  Patient denies any trauma fall but has increasingly intractable back pain  "today  Patient has no obvious step-off or deformity of the lumbar spine on clinical exam  Patient has no bladder or bowel incontinence with no cauda equina syndrome  Patient has no leg weakness with states it is too painful to walk at home PTA    Psych Reason for Consult   Marva Perez is a 82 y.o. female with a past psychiatric history of depression, anxiety currently admitted to the inpatient unit with the following chief complaint:anxiety due to back pain 2/2 to L1 and L3 compression fracture.     Psychiatric Interview:      Psychiatry has been consulted for anxiety. Interview conducted in patient room. Patient states she had anxiety prior to receiving her MRI results, because she was concerned that "it could be anything, like cancer". She denies any current feelings of anxiety or excessive worry. She is concerned about her daughter and "not being ready to take care of me". Patient is excessively worried about her daughter, whose  recently left her 3 months after marriage, and about her grandchildren. She states feeling anxious in the past when she has doctors appointments, and that she is anxious about getting sick so she avoids large social outings. However, she says she feels no anxiety about her current hospital stay, and that she feels relaxed today.    Patient says she has had "periods of feeling low" in the past when she is alone, but usually calls a friend during these periods. She endorses a good support system in friends and family. She says she is Syn[employer], but does not attend Rastafarian because she believes the sound of her oxygen tank is too disruptive. 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 She has a past trauma of losing her son "about 18 years ago" to a car accident, and expresses that this still brings her grief.     Patient is oriented to person, place, date, and presidents x2. CAM - ICU negative. Denies SI/ HI/ or AVH.     Brief/limited Psychiatric ROS:     Symptoms of " Depression: diminished mood - No,    Change in Sleep: trouble with initiation - No,     Suicidal - active/passive ideations - No, organized plans, future intentions - No    Homicidal ideations: active/passive ideations - No, organized plans, future intentions - No    Symptoms of psychosis: hallucinations - No, delusions - No,     Symptoms of KATHIE: excessive anxiety/worry/fear, more days than not - denies but says she is concerned about her family    Symptoms of Panic Disorder: denies     PAST PSYCHIATRIC HISTORY  Previous Psychiatric Hospitalizations: No  Previous SI/HI: No,  Previous Suicide Attempts: No,   Previous Medication Trials: Yes,  Psychiatric Care (current & past): No,  History of Psychotherapy: No,    PAST MEDICAL & SURGICAL HISTORY   Past Medical History:   Diagnosis Date    Abnormal Pap smear 2013    LGSIL    Anticoagulant long-term use     Atrial fibrillation     COPD (chronic obstructive pulmonary disease)     Depression     GERD (gastroesophageal reflux disease)     Hyperlipidemia     Hypertension      Past Surgical History:   Procedure Laterality Date    APPENDECTOMY      BRONCHOSCOPY N/A 2019    Procedure: BRONCHOSCOPY;  Surgeon: Howard Matson MD;  Location: [facility] OR;  Service: Pulmonary;  Laterality: N/A;    CERVICAL BIOPSY  W/ LOOP ELECTRODE EXCISION      CHOLECYSTECTOMY      COLLATERAL LIGAMENT REPAIR, KNEE      left knee    COLONOSCOPY      DILATION AND CURETTAGE OF UTERUS      SINUS SURGERY           Home Meds:   Prior to Admission medications    Medication Sig Start Date End Date Taking? Authorizing Provider   albuterol (PROVENTIL/VENTOLIN HFA) 90 mcg/actuation inhaler Inhale 2 puffs into the lungs every 6 (six) hours as needed for Wheezing. 3/21/19  Yes Kevin Clements MD   albuterol-ipratropium (DUO-NEB) 2.5 mg-0.5 mg/3 mL nebulizer solution SMARTSI Vial(s) Via Nebulizer Every 12 Hours 22  Yes Historical Provider   ALPRAZolam (XANAX) 0.25 MG tablet One po tid for  anxiety 1/3/23  Yes Kevin Clements MD   aspirin (ECOTRIN) 81 MG EC tablet Take 1 tablet orally once in the evening. 9/19/22  Yes Historical Provider   cephALEXin (KEFLEX) 500 MG capsule Take 500 mg by mouth every 8 (eight) hours. 2/7/23  Yes Historical Provider   clonazePAM (KLONOPIN) 0.5 MG tablet Take 1 tablet (0.5 mg total) by mouth 2 (two) times daily as needed for Anxiety. 9/9/22 9/9/23 Yes Howard Matson MD   diltiaZEM (CARDIZEM) 120 MG tablet Take 120 mg by mouth 2 (two) times daily. 11/7/19  Yes Historical Provider   EScitalopram oxalate (LEXAPRO) 5 MG Tab Take 1 tablet (5 mg total) by mouth once daily. 1/3/23  Yes Kevin Clements MD   flecainide (TAMBOCOR) 50 MG Tab Take 50 mg by mouth 2 (two) times daily. 11/13/19  Yes Historical Provider   LIDOcaine (LIDODERM) 5 % Place 1 patch onto the skin once daily. Remove & Discard patch within 12 hours or as directed by MD 2/10/23  Yes Kyle Piedra MD   metoclopramide HCl (REGLAN) 10 MG tablet Take 1 tablet (10 mg total) by mouth every 6 (six) hours. 8/23/22  Yes DO RADHA Devi ELLIPTA 100-62.5-25 mcg DsDv TAKE 1 PUFF BY MOUTH EVERY DAY 11/1/19  Yes Historical Provider   levalbuterol (XOPENEX) 0.63 mg/3 mL nebulizer solution Take 3 mLs (0.63 mg total) by nebulization every 4 (four) hours as needed for Wheezing. 5/28/20 1/3/23  Nichelle Llanos NP   REPATHA SURECLICK 140 mg/mL PnIj Inject 140 mg into the skin every 14 (fourteen) days. 6/4/21   Historical Provider       Scheduled Meds:    albuterol-ipratropium  3 mL Nebulization Q12H    aspirin  81 mg Oral Daily    calcitonin (salmon)  1 spray Alternating Nostrils Daily    clonazePAM  0.5 mg Oral BID    diltiaZEM  120 mg Oral Q12H    EScitalopram oxalate  5 mg Oral Daily    flecainide  50 mg Oral BID    LIDOcaine  1 patch Transdermal Daily    metoclopramide HCl  10 mg Oral Q6H    potassium chloride  40 mEq Oral Once      PRN Meds: acetaminophen, albuterol, HYDROcodone-acetaminophen,  HYDROmorphone, melatonin, sodium chloride 0.9%   Psychotherapeutics (From admission, onward)      Start     Stop Route Frequency Ordered    02/16/23 1000  EScitalopram oxalate tablet 5 mg         -- Oral Daily 02/16/23 0851            ALLERGIES   Review of patient's allergies indicates:   Allergen Reactions    Pcn [penicillins] Hives and Itching    Tetracyclines     Bactrim [sulfamethoxazole-trimethoprim] Rash    Ilosone Rash    Iodine and iodide containing products Rash    Sulfa (sulfonamide antibiotics) Hives and Rash       NEUROLOGIC HISTORY  Seizures: No  Head trauma: No    SOCIAL HISTORY:  Education: 12th  Employment Status/Finances:Retired   Relationship Status/Sexual Orientation: ,   Children: 2, one passed away  Housing Status: Home    history:  NO   Access to Firearms: NO ;  Locked up? n/a  Sikh:Actively participates in organized Yarsani  Recreational activities:Time with family    SUBSTANCE ABUSE HISTORY   Recreational Drugs:  no    Use of Alcohol: denied  Rehab History:no   Tobacco Use:no      FAMILY PSYCHIATRIC HISTORY   Family History   Problem Relation Age of Onset    Breast cancer Mother     Colon cancer Neg Hx     Ovarian cancer Neg Hx            ROS  ROS      EXAMINATION    PHYSICAL EXAM  Reviewed note/exam by Dr. Jerson Padgett from 2/15/2023 at 15:34pm.    VITALS   Vitals:    02/16/23 0812   BP: (!) 157/73   Pulse: 83   Resp: 20   Temp: 98.7 °F (37.1 °C)        Body mass index is 22.54 kg/m².        LABORATORY DATA   Recent Results (from the past 72 hour(s))   Comprehensive Metabolic Panel    Collection Time: 02/15/23 12:23 PM   Result Value Ref Range    Sodium 142 136 - 145 mmol/L    Potassium 3.7 3.5 - 5.1 mmol/L    Chloride 101 95 - 110 mmol/L    CO2 29 23 - 29 mmol/L    Glucose 181 (H) 70 - 110 mg/dL    BUN 17 8 - 23 mg/dL    Creatinine 1.1 0.5 - 1.4 mg/dL    Calcium 9.2 8.7 - 10.5 mg/dL    Total Protein 6.5 6.0 - 8.4 g/dL    Albumin 3.5 3.5 - 5.2 g/dL     Total Bilirubin 0.7 0.1 - 1.0 mg/dL    Alkaline Phosphatase 191 (H) 55 - 135 U/L    AST 20 10 - 40 U/L    ALT 28 10 - 44 U/L    Anion Gap 12 8 - 16 mmol/L    eGFR 50 (A) >60 mL/min/1.73 m^2   CBC Auto Differential    Collection Time: 02/15/23 12:23 PM   Result Value Ref Range    WBC 16.41 (H) 3.90 - 12.70 K/uL    RBC 4.38 4.00 - 5.40 M/uL    Hemoglobin 13.1 12.0 - 16.0 g/dL    Hematocrit 41.1 37.0 - 48.5 %    MCV 94 82 - 98 fL    MCH 29.9 27.0 - 31.0 pg    MCHC 31.9 (L) 32.0 - 36.0 g/dL    RDW 13.6 11.5 - 14.5 %    Platelets 264 150 - 450 K/uL    MPV 9.7 9.2 - 12.9 fL    Immature Granulocytes 1.3 (H) 0.0 - 0.5 %    Gran # (ANC) 14.1 (H) 1.8 - 7.7 K/uL    Immature Grans (Abs) 0.21 (H) 0.00 - 0.04 K/uL    Lymph # 1.2 1.0 - 4.8 K/uL    Mono # 0.7 0.3 - 1.0 K/uL    Eos # 0.1 0.0 - 0.5 K/uL    Baso # 0.10 0.00 - 0.20 K/uL    nRBC 0 0 /100 WBC    Gran % 86.0 (H) 38.0 - 73.0 %    Lymph % 7.3 (L) 18.0 - 48.0 %    Mono % 4.3 4.0 - 15.0 %    Eosinophil % 0.5 0.0 - 8.0 %    Basophil % 0.6 0.0 - 1.9 %    Differential Method Automated    Urinalysis, Reflex to Urine Culture Urine, Clean Catch    Collection Time: 02/15/23 12:29 PM    Specimen: Urine   Result Value Ref Range    Specimen UA Urine, Clean Catch     Color, UA Yellow Yellow, Straw, Elizabeth    Appearance, UA Clear Clear    pH, UA 6.0 5.0 - 8.0    Specific Gravity, UA 1.025 1.005 - 1.030    Protein, UA Negative Negative    Glucose, UA Negative Negative    Ketones, UA Negative Negative    Bilirubin (UA) Negative Negative    Occult Blood UA Trace (A) Negative    Nitrite, UA Negative Negative    Urobilinogen, UA Negative <2.0 EU/dL    Leukocytes, UA 2+ (A) Negative   Urinalysis Microscopic    Collection Time: 02/15/23 12:29 PM   Result Value Ref Range    RBC, UA 0 0 - 4 /hpf    WBC, UA 5 0 - 5 /hpf    Bacteria Rare None-Occ /hpf    Squam Epithel, UA 7 /hpf    Microscopic Comment SEE COMMENT    CBC auto differential    Collection Time: 02/16/23  5:35 AM   Result Value Ref Range     WBC 9.12 3.90 - 12.70 K/uL    RBC 3.69 (L) 4.00 - 5.40 M/uL    Hemoglobin 11.1 (L) 12.0 - 16.0 g/dL    Hematocrit 35.5 (L) 37.0 - 48.5 %    MCV 96 82 - 98 fL    MCH 30.1 27.0 - 31.0 pg    MCHC 31.3 (L) 32.0 - 36.0 g/dL    RDW 13.6 11.5 - 14.5 %    Platelets 213 150 - 450 K/uL    MPV 10.1 9.2 - 12.9 fL    Immature Granulocytes 1.2 (H) 0.0 - 0.5 %    Gran # (ANC) 6.3 1.8 - 7.7 K/uL    Immature Grans (Abs) 0.11 (H) 0.00 - 0.04 K/uL    Lymph # 1.4 1.0 - 4.8 K/uL    Mono # 1.0 0.3 - 1.0 K/uL    Eos # 0.2 0.0 - 0.5 K/uL    Baso # 0.09 0.00 - 0.20 K/uL    nRBC 0 0 /100 WBC    Gran % 69.3 38.0 - 73.0 %    Lymph % 15.0 (L) 18.0 - 48.0 %    Mono % 10.9 4.0 - 15.0 %    Eosinophil % 2.6 0.0 - 8.0 %    Basophil % 1.0 0.0 - 1.9 %    Differential Method Automated    Comprehensive metabolic panel    Collection Time: 02/16/23  5:35 AM   Result Value Ref Range    Sodium 141 136 - 145 mmol/L    Potassium 3.4 (L) 3.5 - 5.1 mmol/L    Chloride 101 95 - 110 mmol/L    CO2 30 (H) 23 - 29 mmol/L    Glucose 91 70 - 110 mg/dL    BUN 17 8 - 23 mg/dL    Creatinine 0.8 0.5 - 1.4 mg/dL    Calcium 8.5 (L) 8.7 - 10.5 mg/dL    Total Protein 5.3 (L) 6.0 - 8.4 g/dL    Albumin 2.9 (L) 3.5 - 5.2 g/dL    Total Bilirubin 0.7 0.1 - 1.0 mg/dL    Alkaline Phosphatase 158 (H) 55 - 135 U/L    AST 19 10 - 40 U/L    ALT 21 10 - 44 U/L    Anion Gap 10 8 - 16 mmol/L    eGFR >60 >60 mL/min/1.73 m^2      No results found for: PHENYTOIN, PHENOBARB, VALPROATE, CBMZ        CONSTITUTIONAL  General Appearance: unremarkable, age appropriate, on oxygen    MUSCULOSKELETAL  Muscle Strength and Tone:no tremor, no tic  Abnormal Involuntary Movements: No  Gait and Station:  ELIZABETH, patient seated    PSYCHIATRIC   Level of Consciousness: awake and alert   Orientation: person, place, and situation  Grooming: Hospital garb and Well groomed  Psychomotor Behavior: cooperative, friendly and cooperative, eye contact normal  Speech: normal tone, normal rate, normal pitch, normal  "volume  Language: grossly intact  Mood: "good"  Affect: Appropriate and Consistent with mood  Thought Process: circumstantial  Associations: intact   Thought Content: denies SI, denies HI, and no delusions  Perceptions: denies AH and denies  VH  Memory: Able to recall past events, Remote intact, and Recent intact  Attention:Attends to interview without distraction  Fund of Knowledge: Aware of current events and Vocabulary appropriate   Estimate if Intelligence:  Average based on work/education history, vocabulary and mental status exam  Insight: has awareness of illness  Judgment: behavior is adequate to circumstances      PSYCHOSOCIAL    PSYCHOSOCIAL STRESSORS   family and health    FUNCTIONING RELATIONSHIPS   good support system and good relationship with children    STRENGTHS AND LIABILITIES   Strength: Patient has positive support network., Strength: Patient has reasonable judgment.    Is the patient aware of the biomedical complications associated with substance abuse and mental illness? yes    Does the patient have an Advance Directive for Mental Health treatment? no  (If yes, inform patient to bring copy.)          ASSESSMENT       Unspecified anxiety disorder      PROBLEM LIST AND MANAGEMENT PLANS    Anxiety  Continue home meds  - Currently on Lexapro 5 mg PO qhs  - Clonazepam 0.5mg PO BID    Back pain  Defer to primary team    Discussed diagnosis, risks and benefits of proposed treatment vs alternative treatments vs no treatment, potential side effects of these treatments and the inherent unpredictability of treatment. The patient expresses understanding of the above and displays the capacity to agree with this treatment given said understanding. Patient also agrees that, currently, the benefits outweigh the risks and would like to pursue/continue treatment at this time.    Any medications being used off-label were discussed with the patient inclusive of the evidence base for the use of the medications and " consent was obtained for the off-label use of the medication.         DIAGNOSTIC TESTING  Labs reviewed with patient; follow up pending labs    Disposition:  -Will attempt to obtain outside psychiatric records if available  -SW to assist with aftercare planning and collateral  -Once stable discharge home with outpatient follow up care and/or rehab  -Continue inpatient treatment under a PEC and/or CEC for danger to self/ danger to others/grave disability as evident by {PSY IP JUSTIFICATION FOR CONTINUED ACUTE CARE HOSPITALIZATION:62816}        Viviane Butts MD  Psychiatry

## 2023-02-16 NOTE — H&P
[facility] ([address] [place] [address] Medicine  History & Physical    Patient Name: Marva Perez  MRN: [id number]  Patient Class: OP- Observation  Admission Date: 2/15/2023  Attending Physician: Stewart Lucia MD   Primary Care Provider: Kevin Clements MD         Patient information was obtained from patient and ER records.     Subjective:     Principal Problem:Lumbar compression fracture, closed, initial encounter    Chief Complaint:   Chief Complaint   Patient presents with    Back Pain     Patient arrived by EMS with compliants of unresolved back pain x 2 weeks. NAD.        HPI: Patient is an 82 year old female with medical history of depression, afib, former smoker, COPD ( on 3 L n/c during the day and 4 L N.C at night), HTN and HLD who presented to the ED with back pain.  Symptoms started 10 days ago and denies any trauma.  She describes the pain as spasms.  She has been to the ED and was given pain medication but did not relieve symptoms.     Found to have compression fracture L1-L3 with edema on MRI.  Admitted for therapy and pain control.        Past Medical History:   Diagnosis Date    Abnormal Pap smear 07/17/2013    LGSIL    Anticoagulant long-term use     Atrial fibrillation     COPD (chronic obstructive pulmonary disease)     Depression     GERD (gastroesophageal reflux disease)     Hyperlipidemia     Hypertension        Past Surgical History:   Procedure Laterality Date    APPENDECTOMY      BRONCHOSCOPY N/A 8/5/2019    Procedure: BRONCHOSCOPY;  Surgeon: Howard Matson MD;  Location: [facility] OR;  Service: Pulmonary;  Laterality: N/A;    CERVICAL BIOPSY  W/ LOOP ELECTRODE EXCISION      CHOLECYSTECTOMY      COLLATERAL LIGAMENT REPAIR, KNEE      left knee    COLONOSCOPY      DILATION AND CURETTAGE OF UTERUS      SINUS SURGERY         Review of patient's allergies indicates:   Allergen Reactions    Pcn [penicillins] Hives and Itching    Tetracyclines     Bactrim  [sulfamethoxazole-trimethoprim] Rash    Ilosone Rash    Iodine and iodide containing products Rash    Sulfa (sulfonamide antibiotics) Hives and Rash       No current facility-administered medications on file prior to encounter.     Current Outpatient Medications on File Prior to Encounter   Medication Sig    albuterol (PROVENTIL/VENTOLIN HFA) 90 mcg/actuation inhaler Inhale 2 puffs into the lungs every 6 (six) hours as needed for Wheezing.    albuterol-ipratropium (DUO-NEB) 2.5 mg-0.5 mg/3 mL nebulizer solution SMARTSI Vial(s) Via Nebulizer Every 12 Hours    ALPRAZolam (XANAX) 0.25 MG tablet One po tid for anxiety    aspirin (ECOTRIN) 81 MG EC tablet Take 1 tablet orally once in the evening.    cephALEXin (KEFLEX) 500 MG capsule Take 500 mg by mouth every 8 (eight) hours.    clonazePAM (KLONOPIN) 0.5 MG tablet Take 1 tablet (0.5 mg total) by mouth 2 (two) times daily as needed for Anxiety.    diltiaZEM (CARDIZEM) 120 MG tablet Take 120 mg by mouth 2 (two) times daily.    EScitalopram oxalate (LEXAPRO) 5 MG Tab Take 1 tablet (5 mg total) by mouth once daily.    flecainide (TAMBOCOR) 50 MG Tab Take 50 mg by mouth 2 (two) times daily.    LIDOcaine (LIDODERM) 5 % Place 1 patch onto the skin once daily. Remove & Discard patch within 12 hours or as directed by MD    metoclopramide HCl (REGLAN) 10 MG tablet Take 1 tablet (10 mg total) by mouth every 6 (six) hours.    TRELEGY ELLIPTA 100-62.5-25 mcg DsDv TAKE 1 PUFF BY MOUTH EVERY DAY    levalbuterol (XOPENEX) 0.63 mg/3 mL nebulizer solution Take 3 mLs (0.63 mg total) by nebulization every 4 (four) hours as needed for Wheezing.    REPATHA SURECLICK 140 mg/mL PnIj Inject 140 mg into the skin every 14 (fourteen) days.     Family History       Problem Relation (Age of Onset)    Breast cancer Mother          Tobacco Use    Smoking status: Former     Years: 30.00     Types: Cigarettes     Quit date: 10/30/2006     Years since quittin.3    Smokeless  tobacco: Never   Substance and Sexual Activity    Alcohol use: No     Comment: No    Drug use: No    Sexual activity: Not Currently     Birth control/protection: Post-menopausal     Comment:      Review of Systems   Constitutional:  Negative for chills and fever.   HENT:  Negative for congestion, ear pain, postnasal drip, rhinorrhea, sore throat and trouble swallowing.    Eyes:  Negative for redness and itching.   Respiratory:  Negative for cough, shortness of breath and wheezing.    Cardiovascular:  Negative for chest pain and palpitations.   Gastrointestinal:  Negative for abdominal pain, diarrhea, nausea and vomiting.   Genitourinary:  Negative for dysuria and frequency.   Musculoskeletal:  Positive for back pain.   Skin:  Negative for rash.   Neurological:  Negative for weakness and headaches.   Psychiatric/Behavioral:  Positive for decreased concentration and dysphoric mood.    Objective:     Vital Signs (Most Recent):  Temp: 97.4 °F (36.3 °C) (02/16/23 1118)  Pulse: 70 (02/16/23 1118)  Resp: 18 (02/16/23 1118)  BP: 123/60 (02/16/23 1118)  SpO2: 96 % (02/16/23 1118) Vital Signs (24h Range):  Temp:  [97.4 °F (36.3 °C)-98.7 °F (37.1 °C)] 97.4 °F (36.3 °C)  Pulse:  [61-89] 70  Resp:  [18-20] 18  SpO2:  [91 %-98 %] 96 %  BP: (123-207)/(60-85) 123/60     Weight: 55.9 kg (123 lb 3.8 oz)  Body mass index is 22.54 kg/m².    Physical Exam  Vitals and nursing note reviewed.   Constitutional:       General: She is not in acute distress.     Appearance: She is well-developed.      Comments: Sitting up in chair   HENT:      Head: Normocephalic and atraumatic.   Eyes:      Conjunctiva/sclera: Conjunctivae normal.      Pupils: Pupils are equal, round, and reactive to light.   Neck:      Thyroid: No thyromegaly.   Cardiovascular:      Rate and Rhythm: Normal rate and regular rhythm.      Heart sounds: Normal heart sounds.   Pulmonary:      Effort: Pulmonary effort is normal. No respiratory distress.      Breath  sounds: Normal breath sounds. No wheezing.   Abdominal:      General: Bowel sounds are normal.      Palpations: Abdomen is soft.      Tenderness: There is no abdominal tenderness.   Musculoskeletal:         General: Tenderness (lumbar spine) present. Normal range of motion.      Cervical back: Normal range of motion and neck supple.   Lymphadenopathy:      Cervical: No cervical adenopathy.   Skin:     General: Skin is warm and dry.      Findings: No rash.   Neurological:      Mental Status: She is alert and oriented to person, place, and time.   Psychiatric:         Behavior: Behavior normal.         CRANIAL NERVES     CN III, IV, VI   Pupils are equal, round, and reactive to light.     Significant Labs: All pertinent labs within the past 24 hours have been reviewed.  CBC:   Recent Labs   Lab 02/15/23  1223 02/16/23  0535   WBC 16.41* 9.12   HGB 13.1 11.1*   HCT 41.1 35.5*    213     CMP:   Recent Labs   Lab 02/15/23  1223 02/16/23  0535    141   K 3.7 3.4*    101   CO2 29 30*   * 91   BUN 17 17   CREATININE 1.1 0.8   CALCIUM 9.2 8.5*   PROT 6.5 5.3*   ALBUMIN 3.5 2.9*   BILITOT 0.7 0.7   ALKPHOS 191* 158*   AST 20 19   ALT 28 21   ANIONGAP 12 10       Significant Imaging: I have reviewed all pertinent imaging results/findings within the past 24 hours.    Mild inferior endplate compression fractures at L1 and L3 with approximately 30% height loss.  There is associated edema like signal suggesting these are either acute or subacute.     Mild multilevel lumbar spondylosis with specific details at each level discussed above.    Assessment/Plan:     Pulmonary  Chronic obstructive pulmonary disease  Patient without evidence of acute exacerbation.  Continue supportive care and home inhaler.        Chronic respiratory failure  Currently at baseline supplemental oxygen  3L during the day and 4L at night  Due to COPD     Cardiac/Vascular  PAF (paroxysmal atrial fibrillation)  Continue  flecainide    Not on anticoagulation?    Other  * Lumbar compression fracture, closed, initial encounter  L3-L4 lumbar spine compression fracture  Pain Control with IV pain medications and will transition to po medications.    PT ordered    Will continue pain control and case management working on discharge planning     Compression fracture of L3 lumbar vertebra, sequela    L1 and L3. On miacalcin. tlso ordered. Pt ordered.  Will plan for safe discharge. Patient wants to go home.    Intractable pain  IV dilaudid and po norco prn.  Add tlso, miacalcin and PT.  Plan for d/c to safe environment once pain well controlled on orals.    KATHIE (generalized anxiety disorder)  On lexapro and xanax at home.  Consult to psyche completed.      VTE Risk Mitigation (From admission, onward)         Ordered     IP VTE HIGH RISK PATIENT  Once         02/15/23 1811     Place sequential compression device  Until discontinued         02/15/23 1811                   Pema Adams MD  Department of Hospital Medicine   [place] [facility]urg (3rd Fl)

## 2023-02-16 NOTE — PLAN OF CARE
02/16/23 1148   BURTON Message   Medicare Outpatient and Observation Notification regarding financial responsibility Given to patient/caregiver;Explained to patient/caregiver;Signed/date by patient/caregiver   Date BURTON was signed 02/16/23   Time BURTON was signed 0930

## 2023-02-16 NOTE — ASSESSMENT & PLAN NOTE
IV dilaudid and po norco prn.  Add tlso, miacalcin and PT.  Plan for d/c to safe environment once pain well controlled on orals.

## 2023-02-17 VITALS
DIASTOLIC BLOOD PRESSURE: 66 MMHG | OXYGEN SATURATION: 95 % | TEMPERATURE: 99 F | HEIGHT: 62 IN | WEIGHT: 123.25 LBS | HEART RATE: 76 BPM | RESPIRATION RATE: 18 BRPM | SYSTOLIC BLOOD PRESSURE: 148 MMHG | BODY MASS INDEX: 22.68 KG/M2

## 2023-02-17 LAB
ALBUMIN SERPL BCP-MCNC: 3.1 G/DL (ref 3.5–5.2)
ALP SERPL-CCNC: 198 U/L (ref 55–135)
ALT SERPL W/O P-5'-P-CCNC: 20 U/L (ref 10–44)
ANION GAP SERPL CALC-SCNC: 7 MMOL/L (ref 8–16)
AST SERPL-CCNC: 15 U/L (ref 10–40)
BASOPHILS # BLD AUTO: 0.08 K/UL (ref 0–0.2)
BASOPHILS NFR BLD: 0.9 % (ref 0–1.9)
BILIRUB SERPL-MCNC: 0.6 MG/DL (ref 0.1–1)
BUN SERPL-MCNC: 23 MG/DL (ref 8–23)
CALCIUM SERPL-MCNC: 8.7 MG/DL (ref 8.7–10.5)
CHLORIDE SERPL-SCNC: 104 MMOL/L (ref 95–110)
CO2 SERPL-SCNC: 30 MMOL/L (ref 23–29)
CREAT SERPL-MCNC: 0.9 MG/DL (ref 0.5–1.4)
DIFFERENTIAL METHOD: ABNORMAL
EOSINOPHIL # BLD AUTO: 0.2 K/UL (ref 0–0.5)
EOSINOPHIL NFR BLD: 2.7 % (ref 0–8)
ERYTHROCYTE [DISTWIDTH] IN BLOOD BY AUTOMATED COUNT: 13.4 % (ref 11.5–14.5)
EST. GFR  (NO RACE VARIABLE): >60 ML/MIN/1.73 M^2
GLUCOSE SERPL-MCNC: 93 MG/DL (ref 70–110)
HCT VFR BLD AUTO: 37.9 % (ref 37–48.5)
HGB BLD-MCNC: 11.9 G/DL (ref 12–16)
IMM GRANULOCYTES # BLD AUTO: 0.11 K/UL (ref 0–0.04)
IMM GRANULOCYTES NFR BLD AUTO: 1.2 % (ref 0–0.5)
LYMPHOCYTES # BLD AUTO: 1.3 K/UL (ref 1–4.8)
LYMPHOCYTES NFR BLD: 14.4 % (ref 18–48)
MCH RBC QN AUTO: 30.1 PG (ref 27–31)
MCHC RBC AUTO-ENTMCNC: 31.4 G/DL (ref 32–36)
MCV RBC AUTO: 96 FL (ref 82–98)
MONOCYTES # BLD AUTO: 0.9 K/UL (ref 0.3–1)
MONOCYTES NFR BLD: 10.1 % (ref 4–15)
NEUTROPHILS # BLD AUTO: 6.3 K/UL (ref 1.8–7.7)
NEUTROPHILS NFR BLD: 70.7 % (ref 38–73)
NRBC BLD-RTO: 0 /100 WBC
PLATELET # BLD AUTO: 231 K/UL (ref 150–450)
PMV BLD AUTO: 10.1 FL (ref 9.2–12.9)
POTASSIUM SERPL-SCNC: 4.5 MMOL/L (ref 3.5–5.1)
PROT SERPL-MCNC: 5.6 G/DL (ref 6–8.4)
RBC # BLD AUTO: 3.95 M/UL (ref 4–5.4)
SODIUM SERPL-SCNC: 141 MMOL/L (ref 136–145)
WBC # BLD AUTO: 8.95 K/UL (ref 3.9–12.7)

## 2023-02-17 PROCEDURE — 97530 THERAPEUTIC ACTIVITIES: CPT

## 2023-02-17 PROCEDURE — 85025 COMPLETE CBC W/AUTO DIFF WBC: CPT | Performed by: PHYSICIAN ASSISTANT

## 2023-02-17 PROCEDURE — 97116 GAIT TRAINING THERAPY: CPT

## 2023-02-17 PROCEDURE — 25000003 PHARM REV CODE 250: Performed by: NURSE PRACTITIONER

## 2023-02-17 PROCEDURE — 99238 PR HOSPITAL DISCHARGE DAY,<30 MIN: ICD-10-PCS | Mod: ,,, | Performed by: FAMILY MEDICINE

## 2023-02-17 PROCEDURE — 80053 COMPREHEN METABOLIC PANEL: CPT | Performed by: PHYSICIAN ASSISTANT

## 2023-02-17 PROCEDURE — 25000242 PHARM REV CODE 250 ALT 637 W/ HCPCS: Performed by: NURSE PRACTITIONER

## 2023-02-17 PROCEDURE — G0378 HOSPITAL OBSERVATION PER HR: HCPCS

## 2023-02-17 PROCEDURE — 94760 N-INVAS EAR/PLS OXIMETRY 1: CPT

## 2023-02-17 PROCEDURE — 25000003 PHARM REV CODE 250: Performed by: FAMILY MEDICINE

## 2023-02-17 PROCEDURE — 94640 AIRWAY INHALATION TREATMENT: CPT

## 2023-02-17 PROCEDURE — 25000003 PHARM REV CODE 250: Performed by: SURGERY

## 2023-02-17 PROCEDURE — 99238 HOSP IP/OBS DSCHRG MGMT 30/<: CPT | Mod: ,,, | Performed by: FAMILY MEDICINE

## 2023-02-17 PROCEDURE — 36415 COLL VENOUS BLD VENIPUNCTURE: CPT | Performed by: PHYSICIAN ASSISTANT

## 2023-02-17 PROCEDURE — 27000221 HC OXYGEN, UP TO 24 HOURS

## 2023-02-17 RX ORDER — CLOBETASOL PROPIONATE 0.5 MG/G
CREAM TOPICAL 2 TIMES DAILY
Qty: 45 G | Refills: 0 | Status: ON HOLD | OUTPATIENT
Start: 2023-02-17 | End: 2023-03-25

## 2023-02-17 RX ORDER — CALCITONIN SALMON 200 [IU]/.09ML
1 SPRAY, METERED NASAL DAILY
Qty: 3.7 ML | Refills: 0 | Status: ON HOLD | OUTPATIENT
Start: 2023-02-18 | End: 2023-03-25

## 2023-02-17 RX ORDER — GABAPENTIN 100 MG/1
100 CAPSULE ORAL 2 TIMES DAILY
Status: DISCONTINUED | OUTPATIENT
Start: 2023-02-17 | End: 2023-02-17 | Stop reason: HOSPADM

## 2023-02-17 RX ORDER — HYDROCODONE BITARTRATE AND ACETAMINOPHEN 5; 325 MG/1; MG/1
1 TABLET ORAL EVERY 8 HOURS PRN
Qty: 9 TABLET | Refills: 0 | Status: SHIPPED | OUTPATIENT
Start: 2023-02-17 | End: 2023-02-20 | Stop reason: SDUPTHER

## 2023-02-17 RX ORDER — GABAPENTIN 100 MG/1
100 CAPSULE ORAL 2 TIMES DAILY
Qty: 60 CAPSULE | Refills: 0
Start: 2023-02-17 | End: 2023-05-29 | Stop reason: SDUPTHER

## 2023-02-17 RX ADMIN — IPRATROPIUM BROMIDE AND ALBUTEROL SULFATE 3 ML: 2.5; .5 SOLUTION RESPIRATORY (INHALATION) at 07:02

## 2023-02-17 RX ADMIN — METOCLOPRAMIDE 5 MG: 5 TABLET ORAL at 05:02

## 2023-02-17 RX ADMIN — LIDOCAINE 1 PATCH: 50 PATCH TOPICAL at 09:02

## 2023-02-17 RX ADMIN — FLECAINIDE ACETATE 50 MG: 50 TABLET ORAL at 09:02

## 2023-02-17 RX ADMIN — HYDROCODONE BITARTRATE AND ACETAMINOPHEN 1 TABLET: 5; 325 TABLET ORAL at 05:02

## 2023-02-17 RX ADMIN — DILTIAZEM HYDROCHLORIDE 120 MG: 60 TABLET, FILM COATED ORAL at 09:02

## 2023-02-17 RX ADMIN — ASPIRIN 81 MG: 81 TABLET, COATED ORAL at 09:02

## 2023-02-17 RX ADMIN — CALCITONIN SALMON 1 SPRAY: 200 SPRAY, METERED NASAL at 09:02

## 2023-02-17 RX ADMIN — ESCITALOPRAM OXALATE 5 MG: 5 TABLET, FILM COATED ORAL at 09:02

## 2023-02-17 RX ADMIN — HYDROCODONE BITARTRATE AND ACETAMINOPHEN 1 TABLET: 5; 325 TABLET ORAL at 03:02

## 2023-02-17 RX ADMIN — METOCLOPRAMIDE 5 MG: 5 TABLET ORAL at 12:02

## 2023-02-17 NOTE — ASSESSMENT & PLAN NOTE
L3-L4 lumbar spine compression fracture  Pain Control with IV pain medications and will transition to po medications.    PT ordered

## 2023-02-17 NOTE — NURSING
Notified Dr. Burleson of daughter's concerns about her Mother going home. Dr. Burleson to call Son Elgin Berg and discuss plan of care for discharge.

## 2023-02-17 NOTE — PLAN OF CARE
Problem: Physical Therapy  Goal: Physical Therapy Goal  Description: Goals to be met by: 3 visits    Patient will increase functional independence with mobility by performin. Supine to sit with Modified Independent  2. Sit <>Stand with Modified Independent with RW  3. Bed <> chair transfer with Modified Independentwith or without rolling walker using Step Transfer TECHNIQUE  4. Gait  x ~150  feet with Supervision or Set-up Assistance with or without rolling walker  5. Lower extremity exercise program x10 reps per handout, with assistance as needed     Outcome: Ongoing, Progressing

## 2023-02-17 NOTE — SUBJECTIVE & OBJECTIVE
Past Medical History:   Diagnosis Date    Abnormal Pap smear 2013     LGSIL    Anticoagulant long-term use      Atrial fibrillation      COPD (chronic obstructive pulmonary disease)      Depression      GERD (gastroesophageal reflux disease)      Hyperlipidemia      Hypertension                 Past Surgical History:   Procedure Laterality Date    APPENDECTOMY        BRONCHOSCOPY N/A 2019     Procedure: BRONCHOSCOPY;  Surgeon: Howard Matson MD;  Location: [facility];  Service: Pulmonary;  Laterality: N/A;    CERVICAL BIOPSY  W/ LOOP ELECTRODE EXCISION        CHOLECYSTECTOMY        COLLATERAL LIGAMENT REPAIR, KNEE         left knee    COLONOSCOPY        DILATION AND CURETTAGE OF UTERUS        SINUS SURGERY                  Review of patient's allergies indicates:   Allergen Reactions    Pcn [penicillins] Hives and Itching    Tetracyclines      Bactrim [sulfamethoxazole-trimethoprim] Rash    Ilosone Rash    Iodine and iodide containing products Rash    Sulfa (sulfonamide antibiotics) Hives and Rash         No current facility-administered medications on file prior to encounter.           Current Outpatient Medications on File Prior to Encounter   Medication Sig    albuterol (PROVENTIL/VENTOLIN HFA) 90 mcg/actuation inhaler Inhale 2 puffs into the lungs every 6 (six) hours as needed for Wheezing.    albuterol-ipratropium (DUO-NEB) 2.5 mg-0.5 mg/3 mL nebulizer solution SMARTSI Vial(s) Via Nebulizer Every 12 Hours    ALPRAZolam (XANAX) 0.25 MG tablet One po tid for anxiety    aspirin (ECOTRIN) 81 MG EC tablet Take 1 tablet orally once in the evening.    cephALEXin (KEFLEX) 500 MG capsule Take 500 mg by mouth every 8 (eight) hours.    clonazePAM (KLONOPIN) 0.5 MG tablet Take 1 tablet (0.5 mg total) by mouth 2 (two) times daily as needed for Anxiety.    diltiaZEM (CARDIZEM) 120 MG tablet Take 120 mg by mouth 2 (two) times daily.    EScitalopram oxalate (LEXAPRO) 5 MG Tab Take 1 tablet (5 mg total) by  mouth once daily.    flecainide (TAMBOCOR) 50 MG Tab Take 50 mg by mouth 2 (two) times daily.    LIDOcaine (LIDODERM) 5 % Place 1 patch onto the skin once daily. Remove & Discard patch within 12 hours or as directed by MD    metoclopramide HCl (REGLAN) 10 MG tablet Take 1 tablet (10 mg total) by mouth every 6 (six) hours.    TRELEGY ELLIPTA 100-62.5-25 mcg DsDv TAKE 1 PUFF BY MOUTH EVERY DAY    levalbuterol (XOPENEX) 0.63 mg/3 mL nebulizer solution Take 3 mLs (0.63 mg total) by nebulization every 4 (four) hours as needed for Wheezing.    REPATHA SURECLICK 140 mg/mL PnIj Inject 140 mg into the skin every 14 (fourteen) days.      Family History         Problem Relation (Age of Onset)     Breast cancer Mother                   Tobacco Use    Smoking status: Former       Years: 30.00       Types: Cigarettes       Quit date: 10/30/2006       Years since quittin.3    Smokeless tobacco: Never   Substance and Sexual Activity    Alcohol use: No       Comment: No    Drug use: No    Sexual activity: Not Currently       Birth control/protection: Post-menopausal       Comment:       Review of Systems   Constitutional:  Positive for fatigue. Negative for chills and fever.   Respiratory:  Negative for cough and shortness of breath (stable).    Cardiovascular:  Negative for chest pain and leg swelling.   Gastrointestinal:  Negative for nausea and vomiting.   Genitourinary:  Negative for difficulty urinating and dysuria.   Musculoskeletal:  Positive for arthralgias and back pain.   Objective:      Vital Signs (Most Recent):  Temp: 97.4 °F (36.3 °C) (23 1118)  Pulse: 70 (23 1118)  Resp: 18 (23 1118)  BP: 123/60 (23 1118)  SpO2: 96 % (23 1118) Vital Signs (24h Range):  Temp:  [97.4 °F (36.3 °C)-98.7 °F (37.1 °C)] 97.4 °F (36.3 °C)  Pulse:  [61-89] 70  Resp:  [18-20] 18  SpO2:  [91 %-98 %] 96 %  BP: (123-207)/(60-85) 123/60      Weight: 55.9 kg (123 lb 3.8 oz)  Body mass index is 22.54 kg/m².      Physical Exam  Constitutional:       Appearance: Normal appearance.   Cardiovascular:      Rate and Rhythm: Normal rate and regular rhythm.   Pulmonary:      Effort: Pulmonary effort is normal.      Breath sounds: Normal breath sounds.   Abdominal:      General: Abdomen is flat.      Palpations: Abdomen is soft.   Neurological:      Mental Status: She is alert.   Psychiatric:      Comments: Does not answer questions and changes subject             Significant Labs: All pertinent labs within the past 24 hours have been reviewed.  BMP:       Recent Labs   Lab 02/16/23  0535   GLU 91      K 3.4*      CO2 30*   BUN 17   CREATININE 0.8   CALCIUM 8.5*      CBC:        Recent Labs   Lab 02/15/23  1223 02/16/23  0535   WBC 16.41* 9.12   HGB 13.1 11.1*   HCT 41.1 35.5*    213      CMP:        Recent Labs   Lab 02/15/23  1223 02/16/23  0535    141   K 3.7 3.4*    101   CO2 29 30*   * 91   BUN 17 17   CREATININE 1.1 0.8   CALCIUM 9.2 8.5*   PROT 6.5 5.3*   ALBUMIN 3.5 2.9*   BILITOT 0.7 0.7   ALKPHOS 191* 158*   AST 20 19   ALT 28 21   ANIONGAP 12 10      Lactic Acid: No results for input(s): LACTATE in the last 48 hours.  Magnesium: No results for input(s): MG in the last 48 hours.  Troponin: No results for input(s): TROPONINI, TROPONINIHS in the last 48 hours.  TSH: No results for input(s): TSH in the last 4320 hours.  Urine Culture: No results for input(s): LABURIN in the last 48 hours.  Urine Studies:       Recent Labs   Lab 02/15/23  1229   COLORU Yellow   APPEARANCEUA Clear   PHUR 6.0   SPECGRAV 1.025   PROTEINUA Negative   GLUCUA Negative   KETONESU Negative   BILIRUBINUA Negative   OCCULTUA Trace*   NITRITE Negative   UROBILINOGEN Negative   LEUKOCYTESUR 2+*   RBCUA 0   WBCUA 5   BACTERIA Rare   SQUAMEPITHEL 7         Significant Imaging:   MRI Lumbar Spine: Mild inferior endplate compression fractures at L1 and L3 with approximately 30% height loss.  There is associated edema  like signal suggesting these are either acute or subacute.     Mild multilevel lumbar spondylosis with specific details at each level discussed above.     CT renal stone study  1.  No CT evidence of obstructive genitourinary pathology.     2.  Small calcific density about the inner cortical margin both kidneys at are vascular in etiology.     3.  Granulomatous focus throughout both liver and spleen.     4.  Advanced pulmonary of with evidence of prominent bullous relation of the pulmonary architecture bilaterally.     I

## 2023-02-17 NOTE — ASSESSMENT & PLAN NOTE
IV dilaudid and po norco prn.  Add tlso, miacalcin and PT.    Patient controlled with po norco.  Plan for d/c today with [facility] home [facility] and norco.  D/c home clonazepam and educated patient not to take norco and xanax at the same time.

## 2023-02-17 NOTE — HOSPITAL COURSE
2/17 Patient off IV pain medications.  Discharge with po norco and follow up outpatient pcp.  Mild contact dermatitis from diaper.  Clobetasol prescribed.

## 2023-02-17 NOTE — NURSING
Return call to Mckenna patient's daughter with patient's permission to return call and discuss discharge. Daughter expressed concerns about safety and patient needs at home. Informed daughter that I would call Dr. Burleson to let him know her concerns. I told her the plan is to discharge her home and she will have ambulatory referral to home health and pain management. Mckenna reports that her Mother has sitters and certain times they are available to help her.

## 2023-02-17 NOTE — ASSESSMENT & PLAN NOTE
IV dilaudid and po norco prn.  Add tlso, miacalcin and PT.    Patient controlled with po norco.  Plan for d/c today with [facility] home [facility] and norco.

## 2023-02-17 NOTE — NURSING
Dr. Burleson called back to say that he talked to Micha and patient is going to be discharged after 6 PM today.

## 2023-02-17 NOTE — PROGRESS NOTES
[facility] Surg ([facility])  [facility] Medicine  Progress Note    Patient Name: Marva Perez  MRN: [id number]  Patient Class: OP- Observation   Admission Date: 2/15/2023  Length of Stay: 0 days  Attending Physician: Stewart Lucia MD  Primary Care Provider: Kevin Clements MD        Subjective:     Principal Problem:Lumbar compression fracture, closed, initial encounter        HPI:  Patient is an 82 year old female with medical history of depression, afib, former smoker, COPD ( on 3 L n/c during the day and 4 L N.C at night), HTN and HLD who presented to the ED with back pain.  Symptoms started 10 days ago and denies any trauma.  She describes the pain as spasms.  She has been to the ED and was given pain medication but did not relieve symptoms.     Found to have compression fracture L1-L3 with edema on MRI.  Admitted for therapy and pain control.        Overview/Hospital Course:  2/17 Patient off IV pain medications.  Plan to discharge with po norco and follow up outpatient pcp.                 Past Medical History:   Diagnosis Date    Abnormal Pap smear 07/17/2013     LGSIL    Anticoagulant long-term use      Atrial fibrillation      COPD (chronic obstructive pulmonary disease)      Depression      GERD (gastroesophageal reflux disease)      Hyperlipidemia      Hypertension                 Past Surgical History:   Procedure Laterality Date    APPENDECTOMY        BRONCHOSCOPY N/A 8/5/2019     Procedure: BRONCHOSCOPY;  Surgeon: Howard Matson MD;  Location: [facility] OR;  Service: Pulmonary;  Laterality: N/A;    CERVICAL BIOPSY  W/ LOOP ELECTRODE EXCISION        CHOLECYSTECTOMY        COLLATERAL LIGAMENT REPAIR, KNEE         left knee    COLONOSCOPY        DILATION AND CURETTAGE OF UTERUS        SINUS SURGERY                  Review of patient's allergies indicates:   Allergen Reactions    Pcn [penicillins] Hives and Itching    Tetracyclines      Bactrim [sulfamethoxazole-trimethoprim] Rash     Ilosone Rash    Iodine and iodide containing products Rash    Sulfa (sulfonamide antibiotics) Hives and Rash         No current facility-administered medications on file prior to encounter.           Current Outpatient Medications on File Prior to Encounter   Medication Sig    albuterol (PROVENTIL/VENTOLIN HFA) 90 mcg/actuation inhaler Inhale 2 puffs into the lungs every 6 (six) hours as needed for Wheezing.    albuterol-ipratropium (DUO-NEB) 2.5 mg-0.5 mg/3 mL nebulizer solution SMARTSI Vial(s) Via Nebulizer Every 12 Hours    ALPRAZolam (XANAX) 0.25 MG tablet One po tid for anxiety    aspirin (ECOTRIN) 81 MG EC tablet Take 1 tablet orally once in the evening.    cephALEXin (KEFLEX) 500 MG capsule Take 500 mg by mouth every 8 (eight) hours.    clonazePAM (KLONOPIN) 0.5 MG tablet Take 1 tablet (0.5 mg total) by mouth 2 (two) times daily as needed for Anxiety.    diltiaZEM (CARDIZEM) 120 MG tablet Take 120 mg by mouth 2 (two) times daily.    EScitalopram oxalate (LEXAPRO) 5 MG Tab Take 1 tablet (5 mg total) by mouth once daily.    flecainide (TAMBOCOR) 50 MG Tab Take 50 mg by mouth 2 (two) times daily.    LIDOcaine (LIDODERM) 5 % Place 1 patch onto the skin once daily. Remove & Discard patch within 12 hours or as directed by MD    metoclopramide HCl (REGLAN) 10 MG tablet Take 1 tablet (10 mg total) by mouth every 6 (six) hours.    TRELEGY ELLIPTA 100-62.5-25 mcg DsDv TAKE 1 PUFF BY MOUTH EVERY DAY    levalbuterol (XOPENEX) 0.63 mg/3 mL nebulizer solution Take 3 mLs (0.63 mg total) by nebulization every 4 (four) hours as needed for Wheezing.    REPATHA SURECLICK 140 mg/mL PnIj Inject 140 mg into the skin every 14 (fourteen) days.      Family History         Problem Relation (Age of Onset)     Breast cancer Mother                   Tobacco Use    Smoking status: Former       Years: 30.00       Types: Cigarettes       Quit date: 10/30/2006       Years since quittin.3    Smokeless tobacco:  Never   Substance and Sexual Activity    Alcohol use: No       Comment: No    Drug use: No    Sexual activity: Not Currently       Birth control/protection: Post-menopausal       Comment:       Review of Systems   Constitutional:  Positive for fatigue. Negative for chills and fever.   Respiratory:  Negative for cough and shortness of breath (stable).    Cardiovascular:  Negative for chest pain and leg swelling.   Gastrointestinal:  Negative for nausea and vomiting.   Genitourinary:  Negative for difficulty urinating and dysuria.   Musculoskeletal:  Positive for arthralgias and back pain.   Objective:      Vital Signs (Most Recent):  Temp: 97.4 °F (36.3 °C) (02/16/23 1118)  Pulse: 70 (02/16/23 1118)  Resp: 18 (02/16/23 1118)  BP: 123/60 (02/16/23 1118)  SpO2: 96 % (02/16/23 1118) Vital Signs (24h Range):  Temp:  [97.4 °F (36.3 °C)-98.7 °F (37.1 °C)] 97.4 °F (36.3 °C)  Pulse:  [61-89] 70  Resp:  [18-20] 18  SpO2:  [91 %-98 %] 96 %  BP: (123-207)/(60-85) 123/60      Weight: 55.9 kg (123 lb 3.8 oz)  Body mass index is 22.54 kg/m².     Physical Exam  Constitutional:       Appearance: Normal appearance.   Cardiovascular:      Rate and Rhythm: Normal rate and regular rhythm.   Pulmonary:      Effort: Pulmonary effort is normal.      Breath sounds: Normal breath sounds.   Abdominal:      General: Abdomen is flat.      Palpations: Abdomen is soft.   Neurological:      Mental Status: She is alert.   Psychiatric:      Comments: Does not answer questions and changes subject             Significant Labs: All pertinent labs within the past 24 hours have been reviewed.  BMP:       Recent Labs   Lab 02/16/23  0535   GLU 91      K 3.4*      CO2 30*   BUN 17   CREATININE 0.8   CALCIUM 8.5*      CBC:        Recent Labs   Lab 02/15/23  1223 02/16/23  0535   WBC 16.41* 9.12   HGB 13.1 11.1*   HCT 41.1 35.5*    213      CMP:        Recent Labs   Lab 02/15/23  1223 02/16/23  0535    141   K 3.7 3.4*   CL  101 101   CO2 29 30*   * 91   BUN 17 17   CREATININE 1.1 0.8   CALCIUM 9.2 8.5*   PROT 6.5 5.3*   ALBUMIN 3.5 2.9*   BILITOT 0.7 0.7   ALKPHOS 191* 158*   AST 20 19   ALT 28 21   ANIONGAP 12 10      Lactic Acid: No results for input(s): LACTATE in the last 48 hours.  Magnesium: No results for input(s): MG in the last 48 hours.  Troponin: No results for input(s): TROPONINI, TROPONINIHS in the last 48 hours.  TSH: No results for input(s): TSH in the last 4320 hours.  Urine Culture: No results for input(s): LABURIN in the last 48 hours.  Urine Studies:       Recent Labs   Lab 02/15/23  1229   COLORU Yellow   APPEARANCEUA Clear   PHUR 6.0   SPECGRAV 1.025   PROTEINUA Negative   GLUCUA Negative   KETONESU Negative   BILIRUBINUA Negative   OCCULTUA Trace*   NITRITE Negative   UROBILINOGEN Negative   LEUKOCYTESUR 2+*   RBCUA 0   WBCUA 5   BACTERIA Rare   SQUAMEPITHEL 7         Significant Imaging:   MRI Lumbar Spine: Mild inferior endplate compression fractures at L1 and L3 with approximately 30% height loss.  There is associated edema like signal suggesting these are either acute or subacute.     Mild multilevel lumbar spondylosis with specific details at each level discussed above.     CT renal stone study  1.  No CT evidence of obstructive genitourinary pathology.     2.  Small calcific density about the inner cortical margin both kidneys at are vascular in etiology.     3.  Granulomatous focus throughout both liver and spleen.     4.  Advanced pulmonary of with evidence of prominent bullous relation of the pulmonary architecture bilaterally.     I      Assessment/Plan:      * Lumbar compression fracture, closed, initial encounter  L3-L4 lumbar spine compression fracture  Pain Control with IV pain medications and will transition to po medications.    PT ordered        Compression fracture of L3 lumbar vertebra, sequela  L1 and L3. On miacalcin. tlso ordered. Pt ordered.  Will plan for safe discharge today.       PAF (paroxysmal atrial fibrillation)  Continue flecainide    Not on anticoagulation.      Defer to cardiology outpatient.      Intractable pain  IV dilaudid and po norco prn.  Add tlso, miacalcin and PT.    Patient controlled with po norco.  Plan for d/c today with [facility] and norco.      Chronic obstructive pulmonary disease  Patient without evidence of acute exacerbation.  Continue supportive care and home inhaler.        Chronic respiratory failure  Currently at baseline supplemental oxygen  3L during the day and 4L at night  Due to COPD     KATHIE (generalized anxiety disorder)  On lexapro and xanax at home.  Consult to psych completed.      VTE Risk Mitigation (From admission, onward)         Ordered     IP VTE HIGH RISK PATIENT  Once         02/15/23 1811     Place sequential compression device  Until discontinued         02/15/23 1811                Discharge Planning   ROSAURA:      Code Status: Full Code   Is the patient medically ready for discharge?:     Reason for patient still in hospital (select all that apply): Treatment  Discharge Plan A: Home with family, Home Health                  Arik Burleson MD  Department of Hospital Medicine   [place] - Med Surg (3rd Fl)

## 2023-02-17 NOTE — PT/OT/SLP PROGRESS
Physical Therapy Treatment    Patient Name:  Marva Perez   MRN:  [id number]    Recommendations:     Discharge Recommendations: home with home health, home health PT  Discharge Equipment Recommendations: none  Barriers to discharge: None    Assessment:     Marva Perez is a 82 y.o. female admitted with a medical diagnosis of Lumbar compression fracture, closed, initial encounter.  She presents with the following impairments/functional limitations: weakness, impaired endurance, impaired self care skills, impaired functional mobility, gait instability, pain, impaired cardiopulmonary response to activity. Patient tolerated gait functions usig RW and portable O2 x ~100 feet with Standby Assistance. Patient complain SOB. Noted O2 SAT based line from 92% to 87% and returned back to 91% after 1 minute rest with breathing ex.    Rehab Prognosis: Fair; patient would benefit from acute skilled PT services to address these deficits and reach maximum level of function.    Recent Surgery: * No surgery found *      Plan:     During this hospitalization, patient to be seen daily to address the identified rehab impairments via gait training, therapeutic activities, therapeutic exercises and progress toward the following goals:    Plan of Care Expires:  02/18/23    Subjective     Chief Complaint: LBP  Patient/Family Comments/goals: Decrease pain and to return home  Pain/Comfort:  Pain Rating 1: 7/10  Location - Orientation 1: lower  Location 1: back  Pain Addressed 1: Pre-medicate for activity, Reposition, Distraction  Pain Rating Post-Intervention 1: 5/10      Objective:     Communicated with patient prior to session.  Patient found HOB elevated with bed alarm, oxygen, peripheral IV upon PT entry to room.     General Precautions: Standard, fall  Orthopedic Precautions: N/A  Braces: N/A  Respiratory Status: Nasal cannula, flow 3 L/min     Functional Mobility:  Bed Mobility:     Rolling Left:  modified independence  Rolling Right:  modified independence  Scooting: modified independence  Supine to Sit: supervision  Sit to Supine: supervision  Transfers:     Sit to Stand:  stand by assistance with rolling walker  Bed to Chair: stand by assistance with  rolling walker  using  Step Transfer  Gait: Standby Assistance x ~100 feet with RW and portable O2      AM-PAC 6 CLICK MOBILITY  Turning over in bed (including adjusting bedclothes, sheets and blankets)?: 4  Sitting down on and standing up from a chair with arms (e.g., wheelchair, bedside commode, etc.): 3  Moving from lying on back to sitting on the side of the bed?: 4  Moving to and from a bed to a chair (including a wheelchair)?: 3  Need to walk in hospital room?: 3  Climbing 3-5 steps with a railing?: 3  Basic Mobility Total Score: 20       Treatment & Education:  Performed B LE strengthening ROM ex x 10 reps on each; rolling to sides x 5 reps on each side; sit<>stand x 3; Gait trng x ~100 feet using RW and portable O2 supplement  with SBA.    Patient left up in chair with all lines intact, call button in reach, and nursing notified..    GOALS:   Multidisciplinary Problems       Physical Therapy Goals          Problem: Physical Therapy    Goal Priority Disciplines Outcome Goal Variances Interventions   Physical Therapy Goal     PT, PT/OT Ongoing, Progressing     Description: Goals to be met by: 3 visits    Patient will increase functional independence with mobility by performin. Supine to sit with Modified Independent  2. Sit <>Stand with Modified Independent with RW  3. Bed <> chair transfer with Modified Independentwith or without rolling walker using Step Transfer TECHNIQUE  4. Gait  x ~150  feet with Supervision or Set-up Assistance with or without rolling walker  5. Lower extremity exercise program x10 reps per handout, with assistance as needed                          Time Tracking:     PT Received On: 23  PT Start Time: 820     PT Stop Time: 0850  PT Total Time (min): 30  min     Billable Minutes: Gait Training 15 and Therapeutic Activity 15    Treatment Type: Treatment  PT/PTA: PT           02/17/2023

## 2023-02-18 ENCOUNTER — NURSE TRIAGE (OUTPATIENT)
Dept: ADMINISTRATIVE | Facility: CLINIC | Age: 83
End: 2023-02-18
Payer: MEDICARE

## 2023-02-18 NOTE — TELEPHONE ENCOUNTER
Pt states she was recently discharged from the hospital and was instructed to apply a back brace once it arrived to her home. She has received the back brace, but her and her sitter do not know how to apply the device. There was a referral placed for home health, but the patient does not know what company the referral was sent to. Looked through the chart, and it appears it was sent to [facility]. Contacted the answering service and paged the nurse, Rosy Flor. She states that she will contact the patient regarding home health. Informed of need for back brace placement; states PT has to apply and it cannot be done without an order. Had previously instructed patient to contact PCP when the office is open for further instruction.     Reason for Disposition   [1] Caller requesting NON-URGENT health information AND [2] PCP's office is the best resource    Additional Information   Negative: RN needs further essential information from caller in order to complete triage   Negative: Requesting regular office appointment    Protocols used: Information Only Call - No Triage-A-

## 2023-02-18 NOTE — PLAN OF CARE
Problem: Adult Inpatient Plan of Care  Goal: Plan of Care Review  Outcome: Ongoing, Progressing  Goal: Patient-Specific Goal (Individualized)  Outcome: Ongoing, Progressing  Goal: Absence of Hospital-Acquired Illness or Injury  Outcome: Ongoing, Progressing  Goal: Optimal Comfort and Wellbeing  Outcome: Ongoing, Progressing  Goal: Readiness for Transition of Care  Outcome: Ongoing, Progressing     Problem: Fluid Imbalance (Pneumonia)  Goal: Fluid Balance  Outcome: Ongoing, Progressing     Problem: Infection (Pneumonia)  Goal: Resolution of Infection Signs and Symptoms  Outcome: Ongoing, Progressing     Problem: Respiratory Compromise (Pneumonia)  Goal: Effective Oxygenation and Ventilation  Outcome: Ongoing, Progressing     Problem: Skin Injury Risk Increased  Goal: Skin Health and Integrity  Outcome: Ongoing, Progressing     Problem: Fall Injury Risk  Goal: Absence of Fall and Fall-Related Injury  Outcome: Ongoing, Progressing     Problem: Pain Acute  Goal: Acceptable Pain Control and Functional Ability  Outcome: Ongoing, Progressing

## 2023-02-18 NOTE — NURSING
Patient discharged to home. Portable oxygen here for transport. Reviewed AVS with patient. Instructed patient that someone would call to set up home gisel and pain management  referral. Spoke with patient's son and daughter to let them know that she was discharged. Patient will have back brace delivered here or home; Micha is aware. Patient feels like she is not ready for home. I reassured her that MD is continuing with discharge and her family and caretakers are aware. In agreement with plan of care.

## 2023-02-19 PROCEDURE — G0180 PR HOME HEALTH MD CERTIFICATION: ICD-10-PCS | Mod: ,,, | Performed by: NURSE PRACTITIONER

## 2023-02-19 PROCEDURE — G0180 MD CERTIFICATION HHA PATIENT: HCPCS | Mod: ,,, | Performed by: NURSE PRACTITIONER

## 2023-02-19 NOTE — PLAN OF CARE
[facility] - Med Surg (3rd Fl)  Discharge Final Note    Primary Care Provider: Kevin Clements MD    Expected Discharge Date: 2/17/2023    Final Discharge Note (most recent)       Final Note - 02/19/23 1118          Final Note    Assessment Type Final Discharge Note     Anticipated Discharge Disposition Home-Health Care [facility]     Hospital Resources/Appts/Education Provided Appointments scheduled and added to AVS        Post-Acute Status    Post-Acute Authorization Home Health     Home Health Status Set-up Complete/Auth obtained     Discharge Delays None known at this time                     Important Message from Medicare             Contact Info       Pema Adams MD   Specialty: Family Medicine    [address] [place] [id number]   Phone: [phone]       Next Steps: Go on 2/28/2023    Instructions: Hospital Follow-up at 1pm-- A message has been left with clinic nurse for a sooner appointment if one becomes available.          Patient discharging home with [facility].

## 2023-02-20 ENCOUNTER — TELEPHONE (OUTPATIENT)
Dept: FAMILY MEDICINE | Facility: CLINIC | Age: 83
End: 2023-02-20

## 2023-02-20 ENCOUNTER — OFFICE VISIT (OUTPATIENT)
Dept: FAMILY MEDICINE | Facility: CLINIC | Age: 83
End: 2023-02-20
Payer: MEDICARE

## 2023-02-20 VITALS
OXYGEN SATURATION: 93 % | BODY MASS INDEX: 20.61 KG/M2 | RESPIRATION RATE: 12 BRPM | WEIGHT: 112 LBS | HEIGHT: 62 IN | DIASTOLIC BLOOD PRESSURE: 82 MMHG | HEART RATE: 74 BPM | SYSTOLIC BLOOD PRESSURE: 126 MMHG

## 2023-02-20 DIAGNOSIS — M54.50 LUMBAR PAIN: Primary | ICD-10-CM

## 2023-02-20 DIAGNOSIS — Z09 HOSPITAL DISCHARGE FOLLOW-UP: ICD-10-CM

## 2023-02-20 DIAGNOSIS — F41.1 ANXIETY STATE: ICD-10-CM

## 2023-02-20 DIAGNOSIS — M80.08XD FRACTURE OF VERTEBRA DUE TO OSTEOPOROSIS WITH ROUTINE HEALING, SUBSEQUENT ENCOUNTER: ICD-10-CM

## 2023-02-20 PROCEDURE — 3288F FALL RISK ASSESSMENT DOCD: CPT | Mod: CPTII,S$GLB,, | Performed by: FAMILY MEDICINE

## 2023-02-20 PROCEDURE — 99214 OFFICE O/P EST MOD 30 MIN: CPT | Mod: S$GLB,,, | Performed by: FAMILY MEDICINE

## 2023-02-20 PROCEDURE — 1160F PR REVIEW ALL MEDS BY PRESCRIBER/CLIN PHARMACIST DOCUMENTED: ICD-10-PCS | Mod: CPTII,S$GLB,, | Performed by: FAMILY MEDICINE

## 2023-02-20 PROCEDURE — 1159F PR MEDICATION LIST DOCUMENTED IN MEDICAL RECORD: ICD-10-PCS | Mod: CPTII,S$GLB,, | Performed by: FAMILY MEDICINE

## 2023-02-20 PROCEDURE — 3079F DIAST BP 80-89 MM HG: CPT | Mod: CPTII,S$GLB,, | Performed by: FAMILY MEDICINE

## 2023-02-20 PROCEDURE — 1159F MED LIST DOCD IN RCRD: CPT | Mod: CPTII,S$GLB,, | Performed by: FAMILY MEDICINE

## 2023-02-20 PROCEDURE — 3074F SYST BP LT 130 MM HG: CPT | Mod: CPTII,S$GLB,, | Performed by: FAMILY MEDICINE

## 2023-02-20 PROCEDURE — 3079F PR MOST RECENT DIASTOLIC BLOOD PRESSURE 80-89 MM HG: ICD-10-PCS | Mod: CPTII,S$GLB,, | Performed by: FAMILY MEDICINE

## 2023-02-20 PROCEDURE — 1160F RVW MEDS BY RX/DR IN RCRD: CPT | Mod: CPTII,S$GLB,, | Performed by: FAMILY MEDICINE

## 2023-02-20 PROCEDURE — 3074F PR MOST RECENT SYSTOLIC BLOOD PRESSURE < 130 MM HG: ICD-10-PCS | Mod: CPTII,S$GLB,, | Performed by: FAMILY MEDICINE

## 2023-02-20 PROCEDURE — 99999 PR PBB SHADOW E&M-EST. PATIENT-LVL III: CPT | Mod: PBBFAC,,, | Performed by: FAMILY MEDICINE

## 2023-02-20 PROCEDURE — 1101F PR PT FALLS ASSESS DOC 0-1 FALLS W/OUT INJ PAST YR: ICD-10-PCS | Mod: CPTII,S$GLB,, | Performed by: FAMILY MEDICINE

## 2023-02-20 PROCEDURE — 99214 PR OFFICE/OUTPT VISIT, EST, LEVL IV, 30-39 MIN: ICD-10-PCS | Mod: S$GLB,,, | Performed by: FAMILY MEDICINE

## 2023-02-20 PROCEDURE — 1125F PR PAIN SEVERITY QUANTIFIED, PAIN PRESENT: ICD-10-PCS | Mod: CPTII,S$GLB,, | Performed by: FAMILY MEDICINE

## 2023-02-20 PROCEDURE — 1125F AMNT PAIN NOTED PAIN PRSNT: CPT | Mod: CPTII,S$GLB,, | Performed by: FAMILY MEDICINE

## 2023-02-20 PROCEDURE — 3288F PR FALLS RISK ASSESSMENT DOCUMENTED: ICD-10-PCS | Mod: CPTII,S$GLB,, | Performed by: FAMILY MEDICINE

## 2023-02-20 PROCEDURE — 1101F PT FALLS ASSESS-DOCD LE1/YR: CPT | Mod: CPTII,S$GLB,, | Performed by: FAMILY MEDICINE

## 2023-02-20 PROCEDURE — 99999 PR PBB SHADOW E&M-EST. PATIENT-LVL III: ICD-10-PCS | Mod: PBBFAC,,, | Performed by: FAMILY MEDICINE

## 2023-02-20 RX ORDER — HYDROCODONE BITARTRATE AND ACETAMINOPHEN 5; 325 MG/1; MG/1
1 TABLET ORAL EVERY 8 HOURS PRN
Qty: 12 TABLET | Refills: 0 | Status: SHIPPED | OUTPATIENT
Start: 2023-02-20 | End: 2023-02-23

## 2023-02-20 RX ORDER — BUSPIRONE HYDROCHLORIDE 5 MG/1
5 TABLET ORAL 2 TIMES DAILY
Qty: 60 TABLET | Refills: 1 | Status: ON HOLD | OUTPATIENT
Start: 2023-02-20 | End: 2023-04-03

## 2023-02-20 NOTE — TELEPHONE ENCOUNTER
Received message from PAR:     loco from [facility] called to give information before patient comes in today--she is having increased anxiety and depression since being in the hospital, crying and being emotional and stating she is ready for god to take her. states she has had anxiety issues in the past but he feels they have gotten worse. He thought Dr. ORTIZ should know to discuss. phone: [phone]

## 2023-02-22 ENCOUNTER — TELEPHONE (OUTPATIENT)
Dept: FAMILY MEDICINE | Facility: CLINIC | Age: 83
End: 2023-02-22
Payer: MEDICARE

## 2023-02-22 NOTE — TELEPHONE ENCOUNTER
----- Message from Lennox Son sent at 2023  8:23 AM CST -----  Contact: Patient  Marva Perez  MRN: [id number]  : 1940  PCP: Kevin Clements  Home Phone      [phone]  Work Phone      Not on file.  Mobile          [phone]      MESSAGE:   Pt requesting refill or new Rx.   Is this a refill or new RX:  refill  RX name and strength: Hydrocodone 5-325 mg  Last office visit: 23  Is this a 30-day or 90-day RX:  30 day  Pharmacy name and location:  [facility]  Comments:  one pill left - will take that this morning -- needed ASAP - please forward to  in today    Phone:  [phone]    PCP: Tyree

## 2023-02-23 NOTE — DISCHARGE SUMMARY
[facility] Surg ([facility])  [facility] Medicine  Discharge Summary      Patient Name: Marva Perez  MRN: [id number]  PENELOPE: [id number]  Patient Class: OP- Observation  Admission Date: 2/15/2023  Hospital Length of Stay: 0 days  Discharge Date and Time:   Attending Physician: No att. providers found   Discharging Provider: Consuelo Pitt NP  Primary Care Provider: Kevin Clements MD    Primary Care Team: Networked reference to record PCT     HPI:   Patient is an 82 year old female with medical history of depression, afib, former smoker, COPD ( on 3 L n/c during the day and 4 L N.C at night), HTN and HLD who presented to the ED with back pain.  Symptoms started 10 days ago and denies any trauma.  She describes the pain as spasms.  She has been to the ED and was given pain medication but did not relieve symptoms.     Found to have compression fracture L1-L3 with edema on MRI.  Admitted for therapy and pain control.        * No surgery found *      Hospital Course:   2/17 Patient off IV pain medications.  Discharge with po norco and follow up outpatient pcp.  Mild contact dermatitis from diaper.  Clobetasol prescribed.         Goals of Care Treatment Preferences:  Code Status: Full Code      Consults:   Consults (From admission, onward)        Status Ordering Provider     Inpatient consult to Social Work  Once        Provider:  (Not yet assigned)    FERN Ruiz     Inpatient consult to Psychiatry  Once        Provider:  MD Conner Demarco RYAN M.          Pulmonary  Chronic obstructive pulmonary disease  Patient without evidence of acute exacerbation.  Continue supportive care and home inhaler.        Chronic respiratory failure  Currently at baseline supplemental oxygen  3L during the day and 4L at night  Due to COPD     Cardiac/Vascular  PAF (paroxysmal atrial fibrillation)  Continue flecainide    Not on anticoagulation.      Defer to cardiology outpatient.       Other  * Lumbar compression fracture, closed, initial encounter  L3-L4 lumbar spine compression fracture  Pain Control with IV pain medications and will transition to po medications.    PT ordered        Compression fracture of L3 lumbar vertebra, sequela  L1 and L3. On miacalcin. tlso ordered. Pt ordered.  Will plan for safe discharge today.      Intractable pain  IV dilaudid and po norco prn.  Add tlso, miacalcin and PT.    Patient controlled with po norco.  Plan for d/c today with [facility] and norco.  D/c home clonazepam and educated patient not to take norco and xanax at the same time.      KATHIE (generalized anxiety disorder)  On lexapro and xanax at home.  Discontinued home clonazepam.    Consult to psych completed.        Final Active Diagnoses:    Diagnosis Date Noted POA    PRINCIPAL PROBLEM:  Lumbar compression fracture, closed, initial encounter [S32.000A] 02/15/2023 Yes    PAF (paroxysmal atrial fibrillation) [I48.0] 02/16/2023 Yes    Compression fracture of L3 lumbar vertebra, sequela [S32.030S] 02/16/2023 Not Applicable    Intractable pain [R52] 02/15/2023 Yes    Chronic respiratory failure [J96.10] 07/31/2019 Yes    Chronic obstructive pulmonary disease [J44.9] 07/31/2019 Yes    KATHIE (generalized anxiety disorder) [F41.1] 10/30/2012 Yes      Problems Resolved During this Admission:       Discharged Condition: stable    [place] [facility]:   Follow-up Information     Pema Adams MD. Go on 2/28/2023.    Specialty: Family Medicine  Why: Hospital Follow-up at 1pm-- A message has been left with clinic nurse for a sooner appointment if one becomes available.  Contact information:  [address]  [place] [address]  [phone]             Kevin Clements MD. Schedule an appointment as soon as possible for a visit.    Specialty: Family Medicine  Contact information:  [place] [address]  [phone]                       Patient  Instructions:      Ambulatory referral/consult to Home Health   Standing Status: Future   Referral Priority: Routine Referral Type: Home Health   Referral Reason: Specialty Services Required   Requested Specialty: Home Health Services   Number of Visits Requested: 1     Ambulatory referral/consult to Pain Clinic   Standing Status: Future   Referral Priority: Routine Referral Type: Consultation   Referral Reason: Specialty Services Required   Referred to Provider: NIRMAL PACHECO Requested Specialty: Pain Medicine   Number of Visits Requested: 1       Significant Diagnostic Studies:  Recent Labs   Lab 02/16/23  0535   GLU 91      K 3.4*      CO2 30*   BUN 17   CREATININE 0.8   CALCIUM 8.5*      CBC:           Recent Labs   Lab 02/15/23  1223 02/16/23  0535   WBC 16.41* 9.12   HGB 13.1 11.1*   HCT 41.1 35.5*    213      CMP:           Recent Labs   Lab 02/15/23  1223 02/16/23  0535    141   K 3.7 3.4*    101   CO2 29 30*   * 91   BUN 17 17   CREATININE 1.1 0.8   CALCIUM 9.2 8.5*   PROT 6.5 5.3*   ALBUMIN 3.5 2.9*   BILITOT 0.7 0.7   ALKPHOS 191* 158*   AST 20 19   ALT 28 21   ANIONGAP 12 10      Lactic Acid: No results for input(s): LACTATE in the last 48 hours.  Magnesium: No results for input(s): MG in the last 48 hours.  Troponin: No results for input(s): TROPONINI, TROPONINIHS in the last 48 hours.  TSH: No results for input(s): TSH in the last 4320 hours.  Urine Culture: No results for input(s): LABURIN in the last 48 hours.  Urine Studies:         Recent Labs   Lab 02/15/23  1229   COLORU Yellow   APPEARANCEUA Clear   PHUR 6.0   SPECGRAV 1.025   PROTEINUA Negative   GLUCUA Negative   KETONESU Negative   BILIRUBINUA Negative   OCCULTUA Trace*   NITRITE Negative   UROBILINOGEN Negative   LEUKOCYTESUR 2+*   RBCUA 0   WBCUA 5   BACTERIA Rare   SQUAMEPITHEL 7         Significant Imaging:   MRI Lumbar Spine: Mild inferior endplate compression fractures at L1 and L3 with  approximately 30% height loss.  There is associated edema like signal suggesting these are either acute or subacute.     Mild multilevel lumbar spondylosis with specific details at each level discussed above.     CT renal stone study  1.  No CT evidence of obstructive genitourinary pathology.     2.  Small calcific density about the inner cortical margin both kidneys at are vascular in etiology.     3.  Granulomatous focus throughout both liver and spleen.     4.  Advanced pulmonary of with evidence of prominent bullous relation of the pulmonary architecture bilaterally.     I      Pending Diagnostic Studies:     None         Medications:  Reconciled Home Medications:      Medication List      START taking these medications    calcitonin (salmon) 200 unit/actuation nasal spray  Commonly known as: FORTICAL  Instill 1 spray by Nasal route once daily.     clobetasoL 0.05 % cream  Commonly known as: TEMOVATE  Apply topically 2 (two) times daily. Apply to pelvic crease, sacral area to rash until resolved     gabapentin 100 MG capsule  Commonly known as: NEURONTIN  Take 1 capsule (100 mg total) by mouth 2 (two) times daily.        CONTINUE taking these medications    albuterol 90 mcg/actuation inhaler  Commonly known as: PROVENTIL/VENTOLIN HFA  Inhale 2 puffs into the lungs every 6 (six) hours as needed for Wheezing.     ALPRAZolam 0.25 MG tablet  Commonly known as: XANAX  One po tid for anxiety     aspirin 81 MG EC tablet  Commonly known as: ECOTRIN  Take 1 tablet orally once in the evening.     diltiaZEM 120 MG tablet  Commonly known as: CARDIZEM  Take 120 mg by mouth 2 (two) times daily.     EScitalopram oxalate 5 MG Tab  Commonly known as: LEXAPRO  Take 1 tablet (5 mg total) by mouth once daily.     flecainide 50 MG Tab  Commonly known as: TAMBOCOR  Take 50 mg by mouth 2 (two) times daily.     levalbuterol 0.63 mg/3 mL nebulizer solution  Commonly known as: XOPENEX  Take 3 mLs (0.63 mg total) by nebulization every 4  (four) hours as needed for Wheezing.     LIDOcaine 5 %  Commonly known as: LIDODERM  Place 1 patch onto the skin once daily. Remove & Discard patch within 12 hours or as directed by MD     metoclopramide HCl 10 MG tablet  Commonly known as: REGLAN  Take 1 tablet (10 mg total) by mouth every 6 (six) hours.     REPATHA SURECLICK 140 mg/mL Pnij  Generic drug: evolocumab  Inject 140 mg into the skin every 14 (fourteen) days.     TRELEGY ELLIPTA 100-62.5-25 mcg Dsdv  Generic drug: fluticasone-umeclidin-vilanter  TAKE 1 PUFF BY MOUTH EVERY DAY        STOP taking these medications    albuterol-ipratropium 2.5 mg-0.5 mg/3 mL nebulizer solution  Commonly known as: DUO-NEB     cephALEXin 500 MG capsule  Commonly known as: KEFLEX     clonazePAM 0.5 MG tablet  Commonly known as: KlonoPIN            Indwelling Lines/Drains at time of discharge:   Lines/Drains/Airways     None                 Time spent on the discharge of patient: 25 minutes         Consuelo Pitt NP  Department of Hospital Medicine  [facility] (3rd Fl)

## 2023-03-03 ENCOUNTER — TELEPHONE (OUTPATIENT)
Dept: FAMILY MEDICINE | Facility: CLINIC | Age: 83
End: 2023-03-03
Payer: MEDICARE

## 2023-03-03 NOTE — TELEPHONE ENCOUNTER
Spoke to Nicole om anxiety meds and encouraged the daughter to make an appointment with Dr Clements to adjust medication.            ----- Message from Lennox Son sent at 3/2/2023  9:09 AM CST -----  Contact: Nicole @ [facility]  Marva Perez  MRN: [id number]  : 1940  PCP: Kevin Clements  Home Phone      [phone]  Work Phone      Not on file.  Mobile          [phone]      MESSAGE: [facility] would like to discuss anxiety & meds    Calll Nicole @ [phone]    PCP: Tyree

## 2023-03-05 NOTE — SUBJECTIVE & OBJECTIVE
70 Interval History: O2 SAT 97 % on 4 Liters NSR BP 96/51 HR 96    Objective:     Vital Signs (Most Recent):  Temp: 97.7 °F (36.5 °C) (09/06/22 0703)  Pulse: 90 (09/06/22 0800)  Resp: (!) 28 (09/06/22 0800)  BP: (!) 96/51 (09/06/22 0800)  SpO2: 97 % (09/06/22 0800)   Vital Signs (24h Range):  Temp:  [96.9 °F (36.1 °C)-98.8 °F (37.1 °C)] 97.7 °F (36.5 °C)  Pulse:  [] 90  Resp:  [18-45] 28  SpO2:  [89 %-100 %] 97 %  BP: ()/(48-95) 96/51     Weight: 54.8 kg (120 lb 13 oz)  Body mass index is 22.1 kg/m².      Intake/Output Summary (Last 24 hours) at 9/6/2022 0832  Last data filed at 9/6/2022 0600  Gross per 24 hour   Intake 60 ml   Output --   Net 60 ml       Physical Exam  Vitals and nursing note reviewed.   Constitutional:       General: She is not in acute distress.     Appearance: Normal appearance. She is well-developed. She is not ill-appearing, toxic-appearing or diaphoretic.   HENT:      Head: Normocephalic and atraumatic. No raccoon eyes or Gr's sign.      Jaw: No trismus.      Right Ear: Hearing, tympanic membrane, ear canal and external ear normal. Tympanic membrane is not injected.      Left Ear: Hearing, tympanic membrane, ear canal and external ear normal. Tympanic membrane is not injected.      Nose: Nose normal. No nasal deformity, mucosal edema or rhinorrhea.      Right Nostril: No epistaxis.      Left Nostril: No epistaxis.      Right Turbinates: Not enlarged or swollen.      Left Turbinates: Not enlarged or swollen.      Right Sinus: No maxillary sinus tenderness or frontal sinus tenderness.      Left Sinus: No maxillary sinus tenderness or frontal sinus tenderness.      Mouth/Throat:      Mouth: Mucous membranes are dry.      Dentition: Normal dentition.      Pharynx: Uvula midline. No pharyngeal swelling, oropharyngeal exudate, posterior oropharyngeal erythema or uvula swelling.   Eyes:      General: Lids are normal. No scleral icterus.     Conjunctiva/sclera: Conjunctivae normal.       Comments: Sclera clear bilat   Neck:      Trachea: Trachea and phonation normal.      Meningeal: Brudzinski's sign and Kernig's sign absent.   Cardiovascular:      Rate and Rhythm: Normal rate and regular rhythm.      Pulses:           Carotid pulses are 1+ on the right side and 1+ on the left side.       Radial pulses are 1+ on the right side and 1+ on the left side.        Femoral pulses are 1+ on the right side and 1+ on the left side.       Popliteal pulses are 1+ on the right side and 1+ on the left side.        Dorsalis pedis pulses are 1+ on the right side and 1+ on the left side.        Posterior tibial pulses are 1+ on the right side and 1+ on the left side.      Heart sounds: Normal heart sounds, S1 normal and S2 normal. No murmur heard.    No S3 or S4 sounds.   Pulmonary:      Effort: Respiratory distress (mild to moderate) present.      Breath sounds: Examination of the right-upper field reveals decreased breath sounds. Examination of the left-upper field reveals decreased breath sounds. Examination of the right-middle field reveals decreased breath sounds, wheezing and rhonchi. Examination of the left-middle field reveals decreased breath sounds, wheezing and rhonchi. Examination of the right-lower field reveals decreased breath sounds, wheezing and rhonchi. Examination of the left-lower field reveals decreased breath sounds, wheezing and rhonchi. Decreased breath sounds, wheezing and rhonchi present.   Abdominal:      General: Bowel sounds are normal. There is no distension.      Palpations: Abdomen is soft. There is no hepatomegaly or splenomegaly.      Tenderness: There is no abdominal tenderness. There is no guarding. Negative signs include Duckworth's sign, Rovsing's sign, psoas sign and obturator sign.   Musculoskeletal:         General: No deformity. Normal range of motion.      Cervical back: Full passive range of motion without pain and neck supple. No pain with movement.   Skin:     General: Skin  is warm and dry.      Coloration: Skin is not pale.   Neurological:      Mental Status: She is alert and oriented to person, place, and time.      Motor: No abnormal muscle tone.      Coordination: Coordination normal.   Psychiatric:         Speech: Speech normal.         Behavior: Behavior normal. Behavior is cooperative.         Thought Content: Thought content normal.         Judgment: Judgment normal.       Vents:  Oxygen Concentration (%): 35 (09/05/22 0320)    Lines/Drains/Airways       Peripheral Intravenous Line  Duration                  Peripheral IV - Single Lumen 09/02/22 1155 20 G Anterior;Left;Proximal Forearm 3 days                    Significant Labs:    CBC/Anemia Profile:  Recent Labs   Lab 09/05/22  1006 09/06/22  0348   WBC 16.05* 14.91*   HGB 11.1* 10.3*   HCT 34.6* 31.3*    162   MCV 93 94   RDW 14.1 14.4        Chemistries:  Recent Labs   Lab 09/05/22  1006 09/06/22  0348    144   K 4.8 4.6   CL 99 103   CO2 35* 34*   BUN 34* 39*   CREATININE 0.7 0.9   CALCIUM 8.5* 8.0*   ALBUMIN 2.9*  --    PROT 5.1*  --    BILITOT 0.9  --    ALKPHOS 66  --    ALT 26  --    AST 18  --        All pertinent labs within the past 24 hours have been reviewed.    Significant Imaging:  I have reviewed all pertinent imaging results/findings within the past 24 hours.

## 2023-03-21 ENCOUNTER — OFFICE VISIT (OUTPATIENT)
Dept: FAMILY MEDICINE | Facility: CLINIC | Age: 83
End: 2023-03-21
Payer: MEDICARE

## 2023-03-21 VITALS
SYSTOLIC BLOOD PRESSURE: 124 MMHG | OXYGEN SATURATION: 96 % | HEIGHT: 62 IN | BODY MASS INDEX: 20.49 KG/M2 | DIASTOLIC BLOOD PRESSURE: 60 MMHG | RESPIRATION RATE: 20 BRPM | HEART RATE: 89 BPM

## 2023-03-21 DIAGNOSIS — E78.5 HYPERLIPIDEMIA, UNSPECIFIED HYPERLIPIDEMIA TYPE: ICD-10-CM

## 2023-03-21 DIAGNOSIS — E55.9 VITAMIN D INSUFFICIENCY: ICD-10-CM

## 2023-03-21 DIAGNOSIS — G47.00 INSOMNIA, UNSPECIFIED TYPE: ICD-10-CM

## 2023-03-21 DIAGNOSIS — R73.9 ELEVATED BLOOD SUGAR: ICD-10-CM

## 2023-03-21 DIAGNOSIS — F41.1 GAD (GENERALIZED ANXIETY DISORDER): ICD-10-CM

## 2023-03-21 DIAGNOSIS — M80.08XD FRACTURE OF VERTEBRA DUE TO OSTEOPOROSIS WITH ROUTINE HEALING, SUBSEQUENT ENCOUNTER: Primary | ICD-10-CM

## 2023-03-21 DIAGNOSIS — S32.050S COMPRESSION FRACTURE OF L5 VERTEBRA, SEQUELA: ICD-10-CM

## 2023-03-21 DIAGNOSIS — D64.9 ANEMIA, UNSPECIFIED TYPE: ICD-10-CM

## 2023-03-21 PROBLEM — S32.050A COMPRESSION FRACTURE OF FIFTH LUMBAR VERTEBRA: Status: ACTIVE | Noted: 2023-03-21

## 2023-03-21 PROCEDURE — 99214 PR OFFICE/OUTPT VISIT, EST, LEVL IV, 30-39 MIN: ICD-10-PCS | Mod: S$GLB,,, | Performed by: FAMILY MEDICINE

## 2023-03-21 PROCEDURE — 99999 PR PBB SHADOW E&M-EST. PATIENT-LVL III: ICD-10-PCS | Mod: PBBFAC,,, | Performed by: FAMILY MEDICINE

## 2023-03-21 PROCEDURE — 3074F SYST BP LT 130 MM HG: CPT | Mod: CPTII,S$GLB,, | Performed by: FAMILY MEDICINE

## 2023-03-21 PROCEDURE — 3078F PR MOST RECENT DIASTOLIC BLOOD PRESSURE < 80 MM HG: ICD-10-PCS | Mod: CPTII,S$GLB,, | Performed by: FAMILY MEDICINE

## 2023-03-21 PROCEDURE — 3288F PR FALLS RISK ASSESSMENT DOCUMENTED: ICD-10-PCS | Mod: CPTII,S$GLB,, | Performed by: FAMILY MEDICINE

## 2023-03-21 PROCEDURE — 1126F PR PAIN SEVERITY QUANTIFIED, NO PAIN PRESENT: ICD-10-PCS | Mod: CPTII,S$GLB,, | Performed by: FAMILY MEDICINE

## 2023-03-21 PROCEDURE — 1160F RVW MEDS BY RX/DR IN RCRD: CPT | Mod: CPTII,S$GLB,, | Performed by: FAMILY MEDICINE

## 2023-03-21 PROCEDURE — 1159F PR MEDICATION LIST DOCUMENTED IN MEDICAL RECORD: ICD-10-PCS | Mod: CPTII,S$GLB,, | Performed by: FAMILY MEDICINE

## 2023-03-21 PROCEDURE — 3078F DIAST BP <80 MM HG: CPT | Mod: CPTII,S$GLB,, | Performed by: FAMILY MEDICINE

## 2023-03-21 PROCEDURE — 1160F PR REVIEW ALL MEDS BY PRESCRIBER/CLIN PHARMACIST DOCUMENTED: ICD-10-PCS | Mod: CPTII,S$GLB,, | Performed by: FAMILY MEDICINE

## 2023-03-21 PROCEDURE — 1159F MED LIST DOCD IN RCRD: CPT | Mod: CPTII,S$GLB,, | Performed by: FAMILY MEDICINE

## 2023-03-21 PROCEDURE — 99214 OFFICE O/P EST MOD 30 MIN: CPT | Mod: S$GLB,,, | Performed by: FAMILY MEDICINE

## 2023-03-21 PROCEDURE — 3288F FALL RISK ASSESSMENT DOCD: CPT | Mod: CPTII,S$GLB,, | Performed by: FAMILY MEDICINE

## 2023-03-21 PROCEDURE — 99999 PR PBB SHADOW E&M-EST. PATIENT-LVL III: CPT | Mod: PBBFAC,,, | Performed by: FAMILY MEDICINE

## 2023-03-21 PROCEDURE — 1101F PR PT FALLS ASSESS DOC 0-1 FALLS W/OUT INJ PAST YR: ICD-10-PCS | Mod: CPTII,S$GLB,, | Performed by: FAMILY MEDICINE

## 2023-03-21 PROCEDURE — 1126F AMNT PAIN NOTED NONE PRSNT: CPT | Mod: CPTII,S$GLB,, | Performed by: FAMILY MEDICINE

## 2023-03-21 PROCEDURE — 3074F PR MOST RECENT SYSTOLIC BLOOD PRESSURE < 130 MM HG: ICD-10-PCS | Mod: CPTII,S$GLB,, | Performed by: FAMILY MEDICINE

## 2023-03-21 PROCEDURE — 1101F PT FALLS ASSESS-DOCD LE1/YR: CPT | Mod: CPTII,S$GLB,, | Performed by: FAMILY MEDICINE

## 2023-03-21 RX ORDER — ALPRAZOLAM 0.25 MG/1
TABLET ORAL
Qty: 90 TABLET | Refills: 5 | Status: SHIPPED | OUTPATIENT
Start: 2023-03-21 | End: 2023-07-24 | Stop reason: SDUPTHER

## 2023-03-21 RX ORDER — ATENOLOL 25 MG/1
12.5 TABLET ORAL
Status: ON HOLD | COMMUNITY
Start: 2023-02-23 | End: 2023-04-03

## 2023-03-21 RX ORDER — TERIPARATIDE 250 UG/ML
INJECTION, SOLUTION SUBCUTANEOUS
Qty: 2.4 ML | Refills: 23 | Status: ON HOLD | OUTPATIENT
Start: 2023-03-21 | End: 2023-04-03

## 2023-03-21 RX ORDER — PREDNISONE 5 MG/1
5 TABLET ORAL DAILY
COMMUNITY
Start: 2023-03-05

## 2023-03-21 NOTE — PROGRESS NOTES
Subjective:       Patient ID: Marva Perez is a 82 y.o. female.    Chief Complaint: Follow-up (1 m f/u. Pt states she wants home health to come to her house and take her fasting labs. )    Pt is a 82 y.o. female who presents for check up for  F U for COPD and continuous 02. Doing well on current meds. Denies any side effects. Prevention is up to date.     Review of Systems   Respiratory:  Positive for shortness of breath.         Using continuous 02   Musculoskeletal:  Positive for back pain.        Lumbar compression fractures     Objective:      Physical Exam    Assessment:       Encounter Diagnoses   Name Primary?    Fracture of vertebra due to osteoporosis with routine healing, subsequent encounter Yes    Compression fracture of L5 vertebra, sequela     Anemia, unspecified type     Elevated blood sugar     Vitamin D insufficiency          Plan:   1. Fracture of vertebra due to osteoporosis with routine healing, subsequent encounter  -     CBC Auto Differential; Future; Expected date: 03/21/2023  -     Comprehensive Metabolic Panel; Future; Expected date: 03/21/2023    2. Compression fracture of L5 vertebra, sequela  -     CBC Auto Differential; Future; Expected date: 03/21/2023  -     Comprehensive Metabolic Panel; Future; Expected date: 03/21/2023    3. Anemia, unspecified type  -     CBC Auto Differential; Future; Expected date: 03/21/2023  -     Comprehensive Metabolic Panel; Future; Expected date: 03/21/2023    4. Elevated blood sugar  -     Hemoglobin A1C; Future; Expected date: 03/21/2023    5. Vitamin D insufficiency  -     Vitamin D; Future; Expected date: 03/21/2023  -     Vitamin D; Future; Expected date: 03/21/2023

## 2023-03-25 ENCOUNTER — HOSPITAL ENCOUNTER (INPATIENT)
Facility: HOSPITAL | Age: 83
LOS: 2 days | Discharge: HOME HEALTH | DRG: 191 | End: 2023-03-28
Attending: SURGERY | Admitting: INTERNAL MEDICINE
Payer: MEDICARE

## 2023-03-25 DIAGNOSIS — R05.9 COUGH: ICD-10-CM

## 2023-03-25 DIAGNOSIS — I25.10 CARDIOVASCULAR DISEASE: ICD-10-CM

## 2023-03-25 DIAGNOSIS — J44.1 COPD EXACERBATION: Primary | ICD-10-CM

## 2023-03-25 LAB
ALBUMIN SERPL BCP-MCNC: 3.1 G/DL (ref 3.5–5.2)
ALLENS TEST: ABNORMAL
ALP SERPL-CCNC: 93 U/L (ref 55–135)
ALT SERPL W/O P-5'-P-CCNC: 22 U/L (ref 10–44)
ANION GAP SERPL CALC-SCNC: 12 MMOL/L (ref 8–16)
AST SERPL-CCNC: 16 U/L (ref 10–40)
BASOPHILS # BLD AUTO: 0.08 K/UL (ref 0–0.2)
BASOPHILS NFR BLD: 0.7 % (ref 0–1.9)
BILIRUB SERPL-MCNC: 1 MG/DL (ref 0.1–1)
BNP SERPL-MCNC: 48 PG/ML (ref 0–99)
BUN SERPL-MCNC: 19 MG/DL (ref 8–23)
CALCIUM SERPL-MCNC: 9 MG/DL (ref 8.7–10.5)
CHLORIDE SERPL-SCNC: 100 MMOL/L (ref 95–110)
CK SERPL-CCNC: 40 U/L (ref 20–180)
CO2 SERPL-SCNC: 26 MMOL/L (ref 23–29)
CREAT SERPL-MCNC: 0.9 MG/DL (ref 0.5–1.4)
DELSYS: ABNORMAL
DIFFERENTIAL METHOD: ABNORMAL
EOSINOPHIL # BLD AUTO: 0.1 K/UL (ref 0–0.5)
EOSINOPHIL NFR BLD: 0.7 % (ref 0–8)
ERYTHROCYTE [DISTWIDTH] IN BLOOD BY AUTOMATED COUNT: 13.5 % (ref 11.5–14.5)
EST. GFR  (NO RACE VARIABLE): >60 ML/MIN/1.73 M^2
FIO2: 30 (ref 21–100)
GLUCOSE SERPL-MCNC: 164 MG/DL (ref 70–110)
HCO3 UR-SCNC: 26.6 MMOL/L
HCT VFR BLD AUTO: 39.9 % (ref 37–48.5)
HGB BLD-MCNC: 12.9 G/DL (ref 12–16)
IMM GRANULOCYTES # BLD AUTO: 0.11 K/UL (ref 0–0.04)
IMM GRANULOCYTES NFR BLD AUTO: 0.9 % (ref 0–0.5)
INFLUENZA A, MOLECULAR: NEGATIVE
INFLUENZA B, MOLECULAR: NEGATIVE
LACTATE SERPL-SCNC: 1.1 MMOL/L (ref 0.5–2.2)
LYMPHOCYTES # BLD AUTO: 1.3 K/UL (ref 1–4.8)
LYMPHOCYTES NFR BLD: 10.7 % (ref 18–48)
MCH RBC QN AUTO: 30.9 PG (ref 27–31)
MCHC RBC AUTO-ENTMCNC: 32.3 G/DL (ref 32–36)
MCV RBC AUTO: 96 FL (ref 82–98)
MONOCYTES # BLD AUTO: 1.2 K/UL (ref 0.3–1)
MONOCYTES NFR BLD: 9.5 % (ref 4–15)
NEUTROPHILS # BLD AUTO: 9.4 K/UL (ref 1.8–7.7)
NEUTROPHILS NFR BLD: 77.5 % (ref 38–73)
NRBC BLD-RTO: 0 /100 WBC
PCO2 BLDA: 47 MMHG (ref 35–45)
PH SMN: 7.36 [PH] (ref 7.35–7.45)
PLATELET # BLD AUTO: 204 K/UL (ref 150–450)
PMV BLD AUTO: 9.8 FL (ref 9.2–12.9)
PO2 BLDA: 88 MMHG (ref 75–100)
POC BE: 0.7 MMOL/L (ref -2–2)
POC COHB: 1.8 % (ref 0–3)
POC METHB: 1.3 % (ref 0–1.5)
POC O2HB ARTERIAL: 95 % (ref 94–100)
POC SATURATED O2: 98 % (ref 90–100)
POC TCO2: 28 MMOL/L
POC THB: 12.1 G/DL (ref 12–18)
POTASSIUM SERPL-SCNC: 4.3 MMOL/L (ref 3.5–5.1)
PROCALCITONIN SERPL IA-MCNC: 0.16 NG/ML
PROT SERPL-MCNC: 6.6 G/DL (ref 6–8.4)
RBC # BLD AUTO: 4.18 M/UL (ref 4–5.4)
SARS-COV-2 RDRP RESP QL NAA+PROBE: NEGATIVE
SITE: ABNORMAL
SODIUM SERPL-SCNC: 138 MMOL/L (ref 136–145)
SPECIMEN SOURCE: NORMAL
TROPONIN I SERPL DL<=0.01 NG/ML-MCNC: <0.006 NG/ML (ref 0–0.03)
WBC # BLD AUTO: 12.06 K/UL (ref 3.9–12.7)

## 2023-03-25 PROCEDURE — 85025 COMPLETE CBC W/AUTO DIFF WBC: CPT | Performed by: SURGERY

## 2023-03-25 PROCEDURE — 93005 ELECTROCARDIOGRAM TRACING: CPT

## 2023-03-25 PROCEDURE — 94760 N-INVAS EAR/PLS OXIMETRY 1: CPT

## 2023-03-25 PROCEDURE — 36600 WITHDRAWAL OF ARTERIAL BLOOD: CPT

## 2023-03-25 PROCEDURE — 87502 INFLUENZA DNA AMP PROBE: CPT | Performed by: SURGERY

## 2023-03-25 PROCEDURE — 96375 TX/PRO/DX INJ NEW DRUG ADDON: CPT

## 2023-03-25 PROCEDURE — 96366 THER/PROPH/DIAG IV INF ADDON: CPT

## 2023-03-25 PROCEDURE — 93010 EKG 12-LEAD: ICD-10-PCS | Mod: ,,, | Performed by: INTERNAL MEDICINE

## 2023-03-25 PROCEDURE — 96365 THER/PROPH/DIAG IV INF INIT: CPT

## 2023-03-25 PROCEDURE — 96367 TX/PROPH/DG ADDL SEQ IV INF: CPT

## 2023-03-25 PROCEDURE — 82803 BLOOD GASES ANY COMBINATION: CPT | Performed by: INTERNAL MEDICINE

## 2023-03-25 PROCEDURE — U0002 COVID-19 LAB TEST NON-CDC: HCPCS | Performed by: SURGERY

## 2023-03-25 PROCEDURE — 25000003 PHARM REV CODE 250: Performed by: SURGERY

## 2023-03-25 PROCEDURE — G0378 HOSPITAL OBSERVATION PER HR: HCPCS

## 2023-03-25 PROCEDURE — 84484 ASSAY OF TROPONIN QUANT: CPT | Performed by: SURGERY

## 2023-03-25 PROCEDURE — 80053 COMPREHEN METABOLIC PANEL: CPT | Performed by: SURGERY

## 2023-03-25 PROCEDURE — 93010 ELECTROCARDIOGRAM REPORT: CPT | Mod: ,,, | Performed by: INTERNAL MEDICINE

## 2023-03-25 PROCEDURE — 99900031 HC PATIENT EDUCATION (STAT)

## 2023-03-25 PROCEDURE — 25000242 PHARM REV CODE 250 ALT 637 W/ HCPCS: Performed by: SURGERY

## 2023-03-25 PROCEDURE — 82550 ASSAY OF CK (CPK): CPT | Performed by: SURGERY

## 2023-03-25 PROCEDURE — 25000003 PHARM REV CODE 250: Performed by: INTERNAL MEDICINE

## 2023-03-25 PROCEDURE — 94640 AIRWAY INHALATION TREATMENT: CPT

## 2023-03-25 PROCEDURE — 87040 BLOOD CULTURE FOR BACTERIA: CPT | Mod: 59 | Performed by: SURGERY

## 2023-03-25 PROCEDURE — 27000221 HC OXYGEN, UP TO 24 HOURS

## 2023-03-25 PROCEDURE — 84145 PROCALCITONIN (PCT): CPT | Performed by: SURGERY

## 2023-03-25 PROCEDURE — 99900035 HC TECH TIME PER 15 MIN (STAT)

## 2023-03-25 PROCEDURE — 27000190 HC CPAP FULL FACE MASK W/VALVE

## 2023-03-25 PROCEDURE — 83880 ASSAY OF NATRIURETIC PEPTIDE: CPT | Performed by: SURGERY

## 2023-03-25 PROCEDURE — 96376 TX/PRO/DX INJ SAME DRUG ADON: CPT

## 2023-03-25 PROCEDURE — 94660 CPAP INITIATION&MGMT: CPT

## 2023-03-25 PROCEDURE — 83605 ASSAY OF LACTIC ACID: CPT | Performed by: SURGERY

## 2023-03-25 PROCEDURE — 63600175 PHARM REV CODE 636 W HCPCS: Performed by: SURGERY

## 2023-03-25 PROCEDURE — 99285 EMERGENCY DEPT VISIT HI MDM: CPT | Mod: 25

## 2023-03-25 RX ORDER — ONDANSETRON 2 MG/ML
4 INJECTION INTRAMUSCULAR; INTRAVENOUS EVERY 8 HOURS PRN
Status: DISCONTINUED | OUTPATIENT
Start: 2023-03-25 | End: 2023-03-28 | Stop reason: HOSPADM

## 2023-03-25 RX ORDER — FLECAINIDE ACETATE 50 MG/1
50 TABLET ORAL 2 TIMES DAILY
Status: DISCONTINUED | OUTPATIENT
Start: 2023-03-25 | End: 2023-03-28 | Stop reason: HOSPADM

## 2023-03-25 RX ORDER — METHYLPREDNISOLONE SOD SUCC 125 MG
80 VIAL (EA) INJECTION EVERY 8 HOURS
Status: DISCONTINUED | OUTPATIENT
Start: 2023-03-26 | End: 2023-03-28 | Stop reason: HOSPADM

## 2023-03-25 RX ORDER — ALBUTEROL SULFATE 0.83 MG/ML
10 SOLUTION RESPIRATORY (INHALATION)
Status: COMPLETED | OUTPATIENT
Start: 2023-03-25 | End: 2023-03-25

## 2023-03-25 RX ORDER — SODIUM CHLORIDE 0.9 % (FLUSH) 0.9 %
10 SYRINGE (ML) INJECTION
Status: DISCONTINUED | OUTPATIENT
Start: 2023-03-25 | End: 2023-03-28 | Stop reason: HOSPADM

## 2023-03-25 RX ORDER — IPRATROPIUM BROMIDE AND ALBUTEROL SULFATE 2.5; .5 MG/3ML; MG/3ML
3 SOLUTION RESPIRATORY (INHALATION) EVERY 4 HOURS
Status: DISCONTINUED | OUTPATIENT
Start: 2023-03-25 | End: 2023-03-28 | Stop reason: HOSPADM

## 2023-03-25 RX ORDER — METHYLPREDNISOLONE SOD SUCC 125 MG
125 VIAL (EA) INJECTION
Status: COMPLETED | OUTPATIENT
Start: 2023-03-25 | End: 2023-03-25

## 2023-03-25 RX ORDER — ALBUTEROL SULFATE 0.83 MG/ML
10 SOLUTION RESPIRATORY (INHALATION)
Status: DISCONTINUED | OUTPATIENT
Start: 2023-03-25 | End: 2023-03-25

## 2023-03-25 RX ORDER — MAGNESIUM SULFATE HEPTAHYDRATE 40 MG/ML
2 INJECTION, SOLUTION INTRAVENOUS
Status: COMPLETED | OUTPATIENT
Start: 2023-03-25 | End: 2023-03-25

## 2023-03-25 RX ORDER — ASPIRIN 81 MG/1
81 TABLET ORAL ONCE
Status: DISCONTINUED | OUTPATIENT
Start: 2023-03-25 | End: 2023-03-26

## 2023-03-25 RX ORDER — BUSPIRONE HYDROCHLORIDE 5 MG/1
5 TABLET ORAL 2 TIMES DAILY
Status: DISCONTINUED | OUTPATIENT
Start: 2023-03-25 | End: 2023-03-28 | Stop reason: HOSPADM

## 2023-03-25 RX ORDER — LEVOFLOXACIN 5 MG/ML
500 INJECTION, SOLUTION INTRAVENOUS
Status: COMPLETED | OUTPATIENT
Start: 2023-03-25 | End: 2023-03-25

## 2023-03-25 RX ORDER — LORAZEPAM 2 MG/ML
0.5 INJECTION INTRAMUSCULAR
Status: COMPLETED | OUTPATIENT
Start: 2023-03-25 | End: 2023-03-25

## 2023-03-25 RX ORDER — ALPRAZOLAM 0.5 MG/1
0.5 TABLET ORAL EVERY 8 HOURS PRN
Status: DISCONTINUED | OUTPATIENT
Start: 2023-03-25 | End: 2023-03-28 | Stop reason: HOSPADM

## 2023-03-25 RX ORDER — TALC
6 POWDER (GRAM) TOPICAL NIGHTLY PRN
Status: DISCONTINUED | OUTPATIENT
Start: 2023-03-25 | End: 2023-03-28 | Stop reason: HOSPADM

## 2023-03-25 RX ORDER — ACETAMINOPHEN 325 MG/1
650 TABLET ORAL EVERY 8 HOURS PRN
Status: DISCONTINUED | OUTPATIENT
Start: 2023-03-25 | End: 2023-03-28 | Stop reason: HOSPADM

## 2023-03-25 RX ORDER — MONTELUKAST SODIUM 10 MG/1
10 TABLET ORAL DAILY
Status: DISCONTINUED | OUTPATIENT
Start: 2023-03-26 | End: 2023-03-28 | Stop reason: HOSPADM

## 2023-03-25 RX ADMIN — LORAZEPAM 0.5 MG: 2 INJECTION INTRAMUSCULAR; INTRAVENOUS at 02:03

## 2023-03-25 RX ADMIN — LORAZEPAM 0.5 MG: 2 INJECTION INTRAMUSCULAR; INTRAVENOUS at 03:03

## 2023-03-25 RX ADMIN — LEVOFLOXACIN 500 MG: 500 INJECTION, SOLUTION INTRAVENOUS at 04:03

## 2023-03-25 RX ADMIN — METHYLPREDNISOLONE SODIUM SUCCINATE 125 MG: 125 INJECTION, POWDER, FOR SOLUTION INTRAMUSCULAR; INTRAVENOUS at 02:03

## 2023-03-25 RX ADMIN — ALBUTEROL SULFATE 10 MG: 2.5 SOLUTION RESPIRATORY (INHALATION) at 02:03

## 2023-03-25 RX ADMIN — MAGNESIUM SULFATE HEPTAHYDRATE 2 G: 40 INJECTION, SOLUTION INTRAVENOUS at 02:03

## 2023-03-25 RX ADMIN — ALPRAZOLAM 0.5 MG: 0.5 TABLET ORAL at 08:03

## 2023-03-25 RX ADMIN — APIXABAN 2.5 MG: 2.5 TABLET, FILM COATED ORAL at 09:03

## 2023-03-25 NOTE — ED TRIAGE NOTES
Patient to ED per [facility] with c/o SOB and headache today. Patient with oxygen saturations 94 % on home oxygen 4 l per nc. Patient received a Duoneb tx, Albuterol  tx, and Solumedrol 125 mg IVP per EMS pta.

## 2023-03-25 NOTE — ED PROVIDER NOTES
Encounter Date: 3/25/2023       History     Chief Complaint   Patient presents with    Shortness of Breath     Marva Perez is a 82 y.o. 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   Has needed increased oxygen over last week, SOB with minor movements  Denies any fever sputum production, patient with active wheezing on arrival  EMS states a oxygen of 88% on arrival, breathing treatment & steroids ASAP    Review of patient's allergies indicates:   Allergen Reactions    Pcn [penicillins] Hives and Itching    Tetracyclines     Bactrim [sulfamethoxazole-trimethoprim] Rash    Ilosone Rash    Iodine and iodide containing products Rash    Sulfa (sulfonamide antibiotics) Hives and Rash     Past Medical History:   Diagnosis Date    Abnormal Pap smear 2013    LGSIL    Anticoagulant long-term use     Atrial fibrillation     COPD (chronic obstructive pulmonary disease)     Depression     GERD (gastroesophageal reflux disease)     Hyperlipidemia     Hypertension      Past Surgical History:   Procedure Laterality Date    APPENDECTOMY      BRONCHOSCOPY N/A 2019    Procedure: BRONCHOSCOPY;  Surgeon: Howard Matson MD;  Location: [facility] OR;  Service: Pulmonary;  Laterality: N/A;    CERVICAL BIOPSY  W/ LOOP ELECTRODE EXCISION      CHOLECYSTECTOMY      COLLATERAL LIGAMENT REPAIR, KNEE      left knee    COLONOSCOPY      DILATION AND CURETTAGE OF UTERUS      SINUS SURGERY       Family History   Problem Relation Age of Onset    Breast cancer Mother     Colon cancer Neg Hx     Ovarian cancer Neg Hx      Social History     Tobacco Use    Smoking status: Former     Years: 30.00     Types: Cigarettes     Quit date: 10/30/2006     Years since quittin.4    Smokeless tobacco: Never   Substance Use Topics    Alcohol use: No     Comment: No    Drug use: No     Review of Systems   Constitutional: Negative.    HENT: Negative.     Eyes: Negative.     Respiratory:  Positive for shortness of breath.    Cardiovascular: Negative.    Gastrointestinal: Negative.    Genitourinary: Negative.    Musculoskeletal: Negative.    Skin: Negative.    Neurological: Negative.    Psychiatric/Behavioral: Negative.       Physical Exam     Initial Vitals   BP Pulse Resp Temp SpO2   03/25/23 1352 03/25/23 1352 03/25/23 1357 03/25/23 1347 03/25/23 1352   137/63 105 (!) 37 99.1 °F (37.3 °C) 98 %      MAP       --                Physical Exam    Nursing note and vitals reviewed.  Constitutional: Vital signs are normal. She appears well-developed and well-nourished. She is cooperative.   HENT:   Head: Normocephalic and atraumatic.   Right Ear: External ear normal.   Left Ear: External ear normal.   Nose: Nose normal.   Mouth/Throat: Oropharynx is clear and moist.   Eyes: Conjunctivae, EOM and lids are normal. Pupils are equal, round, and reactive to light.   Neck: Trachea normal and phonation normal. Neck supple. No JVD present.   Normal range of motion.   Full passive range of motion without pain.     Cardiovascular:  Normal rate, regular rhythm, S1 normal, S2 normal, normal heart sounds, intact distal pulses and normal pulses.           Pulmonary/Chest: Effort normal.   (+) rhonchi at the bilateral bases with active wheezing in all fields   Abdominal: Abdomen is soft and flat. Bowel sounds are normal.   Musculoskeletal:         General: Normal range of motion.      Cervical back: Full passive range of motion without pain, normal range of motion and neck supple.     Neurological: She is alert and oriented to person, place, and time. She has normal strength.   Skin: Skin is warm, dry and intact. Capillary refill takes less than 2 seconds.       ED Course   Procedures  Labs Reviewed   COMPREHENSIVE METABOLIC PANEL - Abnormal; Notable for the following components:       Result Value    Glucose 164 (*)     Albumin 3.1 (*)     All other components within normal limits   CBC W/ AUTO  DIFFERENTIAL - Abnormal; Notable for the following components:    Immature Granulocytes 0.9 (*)     Gran # (ANC) 9.4 (*)     Immature Grans (Abs) 0.11 (*)     Mono # 1.2 (*)     Gran % 77.5 (*)     Lymph % 10.7 (*)     All other components within normal limits   INFLUENZA A & B BY MOLECULAR   CULTURE, BLOOD   CULTURE, BLOOD   CK   TROPONIN I   B-TYPE NATRIURETIC PEPTIDE   LACTIC ACID, PLASMA   PROCALCITONIN   SARS-COV-2 RNA AMPLIFICATION, QUAL     EKG Readings: (Independently Interpreted)   No STEMI  Normal sinus rhythm  No ectopy  Normal conduction  Normal ST segments  Normal T-wave  Normal axis  Heart rate in the 80s     Imaging Results              CT Chest Without Contrast (Final result)  Result time 03/25/23 16:33:16   Procedure changed from CTA Chest Non-Coronary (PE Studies)     Final result by Chevy Glover Jr., MD (03/25/23 16:33:16)                   Impression:      Severe tracheomalacia with collapse of the trachea into a crescent from the thoracic inlet to the bifurcation.  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  Marked hyperlucency and hyperexpansion of the right lung secondary to the left-sided volume loss and emphysema.      Electronically signed by: Chevy Glover MD  Date:    03/25/2023  Time:    16:33               Narrative:    EXAMINATION:  CT CHEST WITHOUT CONTRAST    CLINICAL HISTORY:  Pulmonary embolism (PE) suspected, high prob;    TECHNIQUE:  Low dose axial images, sagittal and coronal reformations were obtained from the thoracic inlet to the lung bases. Contrast was not administered.    COMPARISON:  CT chest of August 27, 2022.    FINDINGS:  The heart and mediastinum are shifted to the left due to volume loss of the left hemithorax.  The heart and vessels are of normal size and contour without enlargement or aneurysm.  Coronary artery calcification is noted.  Adenopathy or soft tissue masses are  not seen.    The patient however has severe constriction of the trachea forming a crescent from the thoracic inlet to the bifurcation.  There is also constriction of the main pulmonary bronchi as well.  This has progressed since the prior study of August 27, 2022.    There is severe interstitial fibrosis of the left lung.  Emphysema is noted at the left upper lobe.  There is marked hyperlucency and hyperexpansion of the right lung felt secondary to emphysema and the volume loss on the left.  No pneumothorax or pleural effusion is noted.  An intrapulmonary mass is not seen.                                       X-Ray Chest 1 View (Final result)  Result time 03/25/23 14:35:27      Final result by Chevy Glover Jr., MD (03/25/23 14:35:27)                   Impression:      Irregular primarily interstitial infiltrate of the left lower lobe with volume loss and shift of the heart to the left and hyperexpansion of the right lung.      Electronically signed by: Chevy Glover MD  Date:    03/25/2023  Time:    14:35               Narrative:    EXAMINATION:  XR CHEST 1 VIEW    CLINICAL HISTORY:  cough;    TECHNIQUE:  Single frontal view of the chest was performed.    COMPARISON:  Chest x-ray of September 7 2022    FINDINGS:  there is volume loss of the left lung and mild shift of the heart to the left. There is irregular infiltrate primarily interstitial in the left lower lobe as well as 2 calcified granulomas. A small patchy of possible consolidation is also seen. There is hyperlucency and hyperexpansion of the right lung. A pneumothorax is not identified.                                       Medications   methylPREDNISolone sodium succinate injection 125 mg (125 mg Intravenous Given 3/25/23 1415)   LORazepam injection 0.5 mg (0.5 mg Intravenous Given 3/25/23 1407)   magnesium sulfate 2g in water 50mL IVPB (premix) (0 g Intravenous Stopped 3/25/23 1607)   albuterol nebulizer solution 10 mg (10 mg Nebulization Given  3/25/23 1428)   LORazepam injection 0.5 mg (0.5 mg Intravenous Given 3/25/23 1510)   levoFLOXacin 500 mg/100 mL IVPB 500 mg (0 mg Intravenous Stopped 3/25/23 1718)     Medical Decision Makin-year-old female with end-stage COPD with shortness of breath today  88% oxygenation home with active wheezing, EMS brought to the ER  Caregiver states patient can not ambulate to bedside toilet without SOB  No fever, obvious hypoxia, on 4 liters at home 24 hours a day per history  Differential includes COPD, CHF, pneumonia, bronchitis, hypoxia NOS    EKG is normal sinus rhythm, chest x-ray shows scarring in the left lung area  CT PE study shows tracheal collapse with scarring in the left parenchymal  I discussed this tracheal collapse with on-call thoracic surgeon Dr. Butler  This is likely a chronic condition that does not eat any immediate intervention  The treatment is usually (+) pressure CPAP which the patient is already on  This tracheal collapses usually due to the end-stage COPD process/issue    Discussed at length with pulmonologist Dr. Howard Matson this evening  IV steroids, breathing treatments, Singulair & BiPAP with observation  This patient will be on his service going forward, stable for admission  This patient is an end-stage COPD, discussed care with daughter & patient  They will discuss DNR status & end of life plans during this hospitalization    Critical Care ED Physician Time (minutes):  -- Performed by: Jerson Padgett M.D.  -- Date/Time: 6:53 PM 3/25/2023   -- Direct Patient Care (Face Time): 5  -- Additional History from Records or Taking Additional History: 5  -- Ordering, Reviewing, and Interpreting Diagnostic Studies: 5  -- Total Time in Documentation: 11  -- Consultation with Other Physicians: 5  -- Consultation with Family Related to Condition: 0  -- Total Critical Care Time: 31  -- Critical care was necessary to treat COPD/hypoxia  -- Critical care was time spent personally by me on the  following activities:   -- examination of patient, ordering and performing treatments   -- review of radiographic studies, re-evaluation of pt's condition  -- review of labs and evaluation of response to treatment                           Clinical Impression:   Final diagnoses:  [R05.9] Cough  [J44.1] COPD exacerbation (Primary)        ED Disposition Condition    Observation Stable                Jerson Padgett MD  03/25/23 7119

## 2023-03-26 PROBLEM — J13 PNEUMONIA OF LEFT LOWER LOBE DUE TO STREPTOCOCCUS PNEUMONIAE: Status: ACTIVE | Noted: 2023-03-26

## 2023-03-26 LAB
ALBUMIN SERPL BCP-MCNC: 2.8 G/DL (ref 3.5–5.2)
ALP SERPL-CCNC: 78 U/L (ref 55–135)
ALT SERPL W/O P-5'-P-CCNC: 20 U/L (ref 10–44)
ANION GAP SERPL CALC-SCNC: 13 MMOL/L (ref 8–16)
AST SERPL-CCNC: 13 U/L (ref 10–40)
BASOPHILS # BLD AUTO: 0.02 K/UL (ref 0–0.2)
BASOPHILS NFR BLD: 0.3 % (ref 0–1.9)
BILIRUB SERPL-MCNC: 0.6 MG/DL (ref 0.1–1)
BUN SERPL-MCNC: 35 MG/DL (ref 8–23)
CALCIUM SERPL-MCNC: 8.9 MG/DL (ref 8.7–10.5)
CHLORIDE SERPL-SCNC: 98 MMOL/L (ref 95–110)
CO2 SERPL-SCNC: 24 MMOL/L (ref 23–29)
CREAT SERPL-MCNC: 1.9 MG/DL (ref 0.5–1.4)
DIFFERENTIAL METHOD: ABNORMAL
EOSINOPHIL # BLD AUTO: 0 K/UL (ref 0–0.5)
EOSINOPHIL NFR BLD: 0 % (ref 0–8)
ERYTHROCYTE [DISTWIDTH] IN BLOOD BY AUTOMATED COUNT: 13.3 % (ref 11.5–14.5)
EST. GFR  (NO RACE VARIABLE): 26 ML/MIN/1.73 M^2
GLUCOSE SERPL-MCNC: 196 MG/DL (ref 70–110)
HCT VFR BLD AUTO: 35.9 % (ref 37–48.5)
HGB BLD-MCNC: 11.8 G/DL (ref 12–16)
IMM GRANULOCYTES # BLD AUTO: 0.04 K/UL (ref 0–0.04)
IMM GRANULOCYTES NFR BLD AUTO: 0.5 % (ref 0–0.5)
LYMPHOCYTES # BLD AUTO: 0.3 K/UL (ref 1–4.8)
LYMPHOCYTES NFR BLD: 3.3 % (ref 18–48)
MCH RBC QN AUTO: 30.8 PG (ref 27–31)
MCHC RBC AUTO-ENTMCNC: 32.9 G/DL (ref 32–36)
MCV RBC AUTO: 94 FL (ref 82–98)
MONOCYTES # BLD AUTO: 0.2 K/UL (ref 0.3–1)
MONOCYTES NFR BLD: 2.2 % (ref 4–15)
NEUTROPHILS # BLD AUTO: 7.2 K/UL (ref 1.8–7.7)
NEUTROPHILS NFR BLD: 93.7 % (ref 38–73)
NRBC BLD-RTO: 0 /100 WBC
PLATELET # BLD AUTO: 187 K/UL (ref 150–450)
PMV BLD AUTO: 10 FL (ref 9.2–12.9)
POTASSIUM SERPL-SCNC: 4.2 MMOL/L (ref 3.5–5.1)
PROT SERPL-MCNC: 6.3 G/DL (ref 6–8.4)
RBC # BLD AUTO: 3.83 M/UL (ref 4–5.4)
SODIUM SERPL-SCNC: 135 MMOL/L (ref 136–145)
WBC # BLD AUTO: 7.69 K/UL (ref 3.9–12.7)

## 2023-03-26 PROCEDURE — 94761 N-INVAS EAR/PLS OXIMETRY MLT: CPT

## 2023-03-26 PROCEDURE — 96376 TX/PRO/DX INJ SAME DRUG ADON: CPT

## 2023-03-26 PROCEDURE — 94660 CPAP INITIATION&MGMT: CPT

## 2023-03-26 PROCEDURE — 94640 AIRWAY INHALATION TREATMENT: CPT | Mod: XB

## 2023-03-26 PROCEDURE — 96361 HYDRATE IV INFUSION ADD-ON: CPT

## 2023-03-26 PROCEDURE — 36415 COLL VENOUS BLD VENIPUNCTURE: CPT | Performed by: SURGERY

## 2023-03-26 PROCEDURE — 94760 N-INVAS EAR/PLS OXIMETRY 1: CPT

## 2023-03-26 PROCEDURE — 25000003 PHARM REV CODE 250: Performed by: SURGERY

## 2023-03-26 PROCEDURE — 25000003 PHARM REV CODE 250: Performed by: INTERNAL MEDICINE

## 2023-03-26 PROCEDURE — 11000001 HC ACUTE MED/SURG PRIVATE ROOM

## 2023-03-26 PROCEDURE — 63600175 PHARM REV CODE 636 W HCPCS: Performed by: SURGERY

## 2023-03-26 PROCEDURE — 25000242 PHARM REV CODE 250 ALT 637 W/ HCPCS: Performed by: SURGERY

## 2023-03-26 PROCEDURE — 27000646 HC AEROBIKA DEVICE

## 2023-03-26 PROCEDURE — 27000190 HC CPAP FULL FACE MASK W/VALVE

## 2023-03-26 PROCEDURE — 99900035 HC TECH TIME PER 15 MIN (STAT)

## 2023-03-26 PROCEDURE — 85025 COMPLETE CBC W/AUTO DIFF WBC: CPT | Performed by: SURGERY

## 2023-03-26 PROCEDURE — 80053 COMPREHEN METABOLIC PANEL: CPT | Performed by: SURGERY

## 2023-03-26 PROCEDURE — 27000221 HC OXYGEN, UP TO 24 HOURS

## 2023-03-26 PROCEDURE — 94664 DEMO&/EVAL PT USE INHALER: CPT | Mod: XB

## 2023-03-26 RX ORDER — SODIUM CHLORIDE 9 MG/ML
INJECTION, SOLUTION INTRAVENOUS CONTINUOUS
Status: DISCONTINUED | OUTPATIENT
Start: 2023-03-26 | End: 2023-03-28 | Stop reason: HOSPADM

## 2023-03-26 RX ADMIN — Medication 6 MG: at 10:03

## 2023-03-26 RX ADMIN — IPRATROPIUM BROMIDE AND ALBUTEROL SULFATE 3 ML: 2.5; .5 SOLUTION RESPIRATORY (INHALATION) at 03:03

## 2023-03-26 RX ADMIN — MONTELUKAST 10 MG: 10 TABLET, FILM COATED ORAL at 08:03

## 2023-03-26 RX ADMIN — IPRATROPIUM BROMIDE AND ALBUTEROL SULFATE 3 ML: 2.5; .5 SOLUTION RESPIRATORY (INHALATION) at 07:03

## 2023-03-26 RX ADMIN — APIXABAN 2.5 MG: 2.5 TABLET, FILM COATED ORAL at 08:03

## 2023-03-26 RX ADMIN — FLECAINIDE ACETATE 50 MG: 50 TABLET ORAL at 08:03

## 2023-03-26 RX ADMIN — BUSPIRONE HYDROCHLORIDE 5 MG: 5 TABLET ORAL at 08:03

## 2023-03-26 RX ADMIN — METHYLPREDNISOLONE SODIUM SUCCINATE 80 MG: 125 INJECTION, POWDER, FOR SOLUTION INTRAMUSCULAR; INTRAVENOUS at 02:03

## 2023-03-26 RX ADMIN — SODIUM CHLORIDE: 9 INJECTION, SOLUTION INTRAVENOUS at 12:03

## 2023-03-26 RX ADMIN — METHYLPREDNISOLONE SODIUM SUCCINATE 80 MG: 125 INJECTION, POWDER, FOR SOLUTION INTRAMUSCULAR; INTRAVENOUS at 12:03

## 2023-03-26 RX ADMIN — IPRATROPIUM BROMIDE AND ALBUTEROL SULFATE 3 ML: 2.5; .5 SOLUTION RESPIRATORY (INHALATION) at 12:03

## 2023-03-26 RX ADMIN — METHYLPREDNISOLONE SODIUM SUCCINATE 80 MG: 125 INJECTION, POWDER, FOR SOLUTION INTRAMUSCULAR; INTRAVENOUS at 09:03

## 2023-03-26 RX ADMIN — ACETAMINOPHEN 650 MG: 325 TABLET ORAL at 03:03

## 2023-03-26 RX ADMIN — IPRATROPIUM BROMIDE AND ALBUTEROL SULFATE 3 ML: 2.5; .5 SOLUTION RESPIRATORY (INHALATION) at 11:03

## 2023-03-26 RX ADMIN — METHYLPREDNISOLONE SODIUM SUCCINATE 80 MG: 125 INJECTION, POWDER, FOR SOLUTION INTRAMUSCULAR; INTRAVENOUS at 06:03

## 2023-03-26 RX ADMIN — ALPRAZOLAM 0.5 MG: 0.5 TABLET ORAL at 09:03

## 2023-03-26 RX ADMIN — BUSPIRONE HYDROCHLORIDE 5 MG: 5 TABLET ORAL at 09:03

## 2023-03-26 RX ADMIN — ALPRAZOLAM 0.5 MG: 0.5 TABLET ORAL at 02:03

## 2023-03-26 NOTE — PROGRESS NOTES
[facility] - Med Surg (3rd Fl)  Pulmonology  Progress Note    Patient Name: Marva Perez  MRN: [id number]  Admission Date: 3/25/2023  Hospital Length of Stay: 0 days  Code Status: Full Code  Attending Provider: Howard Matson MD  Primary Care Provider: Kevin Clements MD   Principal Problem: <principal problem not specified>    Subjective:     Interval History: Stable non tender Dehydrated will give iv fluids       Objective:     Vital Signs (Most Recent):  Temp: 98.5 °F (36.9 °C) (03/26/23 1116)  Pulse: 102 (03/26/23 1116)  Resp: 18 (03/26/23 1116)  BP: (!) 155/72 (03/26/23 1116)  SpO2: (!) 94 % (03/26/23 1116)   Vital Signs (24h Range):  Temp:  [97.3 °F (36.3 °C)-99.1 °F (37.3 °C)] 98.5 °F (36.9 °C)  Pulse:  [] 102  Resp:  [18-39] 18  SpO2:  [93 %-99 %] 94 %  BP: ()/(49-72) 155/72     Weight: 51.7 kg (114 lb)  Body mass index is 20.85 kg/m².      Intake/Output Summary (Last 24 hours) at 3/26/2023 1137  Last data filed at 3/26/2023 0600  Gross per 24 hour   Intake 270 ml   Output 200 ml   Net 70 ml       Physical Exam  Review of Systems   Constitutional:  Positive for fatigue.   HENT:  Positive for postnasal drip. Negative for sinus pressure.    Respiratory:  Positive for cough, shortness of breath and wheezing.    Endocrine: Negative for polydipsia.   Genitourinary:  Negative for urgency.   Neurological:  Negative for dizziness and tremors.   Psychiatric/Behavioral:  Negative for behavioral problems, confusion and hallucinations.      Vents:  Oxygen Concentration (%): 30 (03/26/23 1100)    Lines/Drains/Airways       Peripheral Intravenous Line  Duration                  Peripheral IV - Single Lumen 03/26/23 1114 22 G Anterior;Distal;Left Forearm <1 day                    Significant Labs:    CBC/Anemia Profile:  Recent Labs   Lab 03/25/23  1356 03/26/23  0621   WBC 12.06 7.69   HGB 12.9 11.8*   HCT 39.9 35.9*    187   MCV 96 94   RDW 13.5 13.3        Chemistries:  Recent Labs   Lab  03/25/23  1356 03/26/23  0621    135*   K 4.3 4.2    98   CO2 26 24   BUN 19 35*   CREATININE 0.9 1.9*   CALCIUM 9.0 8.9   ALBUMIN 3.1* 2.8*   PROT 6.6 6.3   BILITOT 1.0 0.6   ALKPHOS 93 78   ALT 22 20   AST 16 13     EXAMINATION:  CT CHEST WITHOUT CONTRAST     CLINICAL HISTORY:  Pneumonia, unresolved;     TECHNIQUE:  Low dose axial images, sagittal and coronal reformations were obtained from the thoracic inlet to the lung bases. Contrast was not administered.     COMPARISON:  CT yesterday, and CT of 08/27/2022     FINDINGS:  There is no mediastinal nor axillary adenopathy.  Calcified left hilar lymph nodes are present.  There is no pleural effusion.  Once again, collapse of the trachea is noted consistent with tracheomalacia.     The aorta is normal in caliber with extensive calcific plaque.  There are coronary artery calcifications.     Advanced emphysema is present, affecting the right lung more than the left.  There is relative volume loss in the left lung.  There is an azygous lobe, normal variant.  There are calcified granulomata in the lungs as well as multiple small noncalcified nodules measuring up to 6 mm in the right middle lobe series 4, image 288, unchanged.  There is a 7 mm pleural based nodular density or nodular focus of pleural thickening in the anterior left hemithorax series 4, image 301, not present in 2022.  There is mild dependent atelectasis in the left lung, but no convincing evidence of pneumonia.  No significant interval changes are present.     No significant findings are noted in the visualized portions of the upper abdomen.  There are incidental calcified granulomata in the liver and spleen.     Impression:     No significant interval changes compared to yesterday's exam.  Marked tracheomalacia, severe emphysema.  Evidence of prior granulomatous infection.  Stable lung nodules with the exception of an anterior left upper lobe pleural based nodule or nodular focus of pleural  thickening not identified in 2022.  Recommend re-evaluation with chest CT in 6 months.        Electronically signed by: Pennie Thompson Ko  Date:                                            03/26/2023  Time:                                           11:07  All pertinent labs within the past 24 hours have been reviewed.    Significant Imaging:  I have reviewed all pertinent imaging results/findings within the past 24 hours.    Assessment/Plan:     Psychiatric  Anxiety  Over breathing     Pulmonary  Pneumonia of left lower lobe due to Streptococcus pneumoniae  Ct does not confirm a pneumonia however does have a bronchitis     Lung mass  Stable from prior     COPD exacerbation  Emphysema     Centrilobular emphysema  Severe on 24/7 o2 and a home ventilator    Cardiac/Vascular  Chronic atrial fibrillation  Stable in nsr 93 vent rate      Critical care time spent on the evaluation and treatment of severe organ dysfunction, review of pertinent labs and imaging studies, discussions with consulting providers and discussions with patient/family: 31 minutes.    Iv bolus 250 ns   Howard Matson MD  Pulmonology  [place] - Med Surg (3rd Fl)

## 2023-03-26 NOTE — H&P
Marva Perez is a 82 y.o. 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 Admitted to evaluate Respiratory status.    Past Medical History:   Diagnosis Date    Abnormal Pap smear 07/17/2013    LGSIL    Anticoagulant long-term use     Atrial fibrillation     COPD (chronic obstructive pulmonary disease)     Depression     GERD (gastroesophageal reflux disease)     Hyperlipidemia     Hypertension         Past Surgical History:   Procedure Laterality Date    APPENDECTOMY      BRONCHOSCOPY N/A 8/5/2019    Procedure: BRONCHOSCOPY;  Surgeon: Howard Matson MD;  Location: [facility] OR;  Service: Pulmonary;  Laterality: N/A;    CERVICAL BIOPSY  W/ LOOP ELECTRODE EXCISION      CHOLECYSTECTOMY      COLLATERAL LIGAMENT REPAIR, KNEE      left knee    COLONOSCOPY      DILATION AND CURETTAGE OF UTERUS      SINUS SURGERY         Prior to Admission medications    Medication Sig Start Date End Date Taking? Authorizing Provider   ALPRAZolam (XANAX) 0.25 MG tablet One po tid for anxiety 3/21/23  Yes Kevin Clements MD   apixaban (ELIQUIS) 2.5 mg Tab Take 2.5 mg by mouth 2 (two) times daily.   Yes Historical Provider   atenoloL (TENORMIN) 25 MG tablet Take 12.5 mg by mouth. 2/23/23  Yes Historical Provider   busPIRone (BUSPAR) 5 MG Tab Take 1 tablet (5 mg total) by mouth 2 (two) times daily. 2/20/23 2/20/24 Yes Arik Burleson MD   diltiaZEM (CARDIZEM) 120 MG tablet Take 120 mg by mouth 2 (two) times daily. 11/7/19  Yes Historical Provider   EScitalopram oxalate (LEXAPRO) 5 MG Tab Take 1 tablet (5 mg total) by mouth once daily. 1/3/23  Yes Kevin Clements  MD   flecainide (TAMBOCOR) 50 MG Tab Take 50 mg by mouth 2 (two) times daily. 11/13/19  Yes Historical Provider   gabapentin (NEURONTIN) 100 MG capsule Take 1 capsule (100 mg total) by mouth 2 (two) times daily. 2/17/23 2/17/24 Yes Consuelo Pitt NP   metoclopramide HCl (REGLAN) 10 MG tablet Take 1 tablet (10 mg total) by mouth every 6 (six) hours. 8/23/22  Yes Drew Estevez DO   predniSONE (DELTASONE) 5 MG tablet Take 5 mg by mouth. 3/5/23  Yes Historical Provider   teriparatide (FORTEO) 20 mcg/dose (600mcg/2.4mL) PnIj One daily injection for 2 years 3/21/23  Yes Kevin Clements MD   TRELEGY ELLIPTA 100-62.5-25 mcg DsDv TAKE 1 PUFF BY MOUTH EVERY DAY 11/1/19  Yes Historical Provider   albuterol (PROVENTIL/VENTOLIN HFA) 90 mcg/actuation inhaler Inhale 2 puffs into the lungs every 6 (six) hours as needed for Wheezing. 3/21/19   Kevin Clements MD   levalbuterol (XOPENEX) 0.63 mg/3 mL nebulizer solution Take 3 mLs (0.63 mg total) by nebulization every 4 (four) hours as needed for Wheezing. 5/28/20 3/21/23  Nichelle Llanos NP   LIDOcaine (LIDODERM) 5 % Place 1 patch onto the skin once daily. Remove & Discard patch within 12 hours or as directed by MD 2/10/23   MD LEONOR Pérez SURECLICK 140 mg/mL PnIj Inject 140 mg into the skin every 14 (fourteen) days. 6/4/21   Historical Provider   aspirin (ECOTRIN) 81 MG EC tablet Take 1 tablet orally once in the evening. 9/19/22 3/25/23  Historical Provider   calcitonin, salmon, (FORTICAL) 200 unit/actuation nasal spray Instill 1 spray by Nasal route once daily. 2/18/23 3/25/23  Consuelo Pitt NP   clobetasoL (TEMOVATE) 0.05 % cream Apply topically 2 (two) times daily. Apply to pelvic crease, sacral area to rash until resolved 2/17/23 3/25/23  Consuelo Pitt NP       Social History     Socioeconomic History    Marital status:    Tobacco Use    Smoking status: Former     Years: 30.00     Types: Cigarettes     Quit date:  10/30/2006     Years since quittin.4    Smokeless tobacco: Never   Substance and Sexual Activity    Alcohol use: No     Comment: No    Drug use: No    Sexual activity: Not Currently     Birth control/protection: Post-menopausal     Comment:      Social Determinants of Health     Financial Resource Strain: Low Risk     Difficulty of Paying Living Expenses: Not hard at all   Food Insecurity: No Food Insecurity    Worried About Running Out of Food in the Last Year: Never true    Ran Out of Food in the Last Year: Never true   Transportation Needs: No Transportation Needs    Lack of Transportation (Medical): No    Lack of Transportation (Non-Medical): No   Housing Stability: Low Risk     Unable to Pay for Housing in the Last Year: No    Number of Places Lived in the Last Year: 1    Unstable Housing in the Last Year: No       Family History   Problem Relation Age of Onset    Breast cancer Mother     Colon cancer Neg Hx     Ovarian cancer Neg Hx        Review of patient's allergies indicates:   Allergen Reactions    Pcn [penicillins] Hives and Itching    Tetracyclines     Bactrim [sulfamethoxazole-trimethoprim] Rash    Ilosone Rash    Iodine and iodide containing products Rash    Sulfa (sulfonamide antibiotics) Hives and Rash    Allergies have been reviewed.     ROS: ROS    PE:   Vitals:    23 1830 23 1911 23 1933 23   BP: (!) 100/58 (!) 120/53     BP Location:  Left arm     Patient Position: Sitting Lying     Pulse: 88 93  81   Resp: (!) 25 (!) 24     Temp:  98.1 °F (36.7 °C)     TempSrc:  Oral     SpO2: 99% (!) 94% 95%    Weight:       Height:        Physical Exam    Alert and orientated X 3   HEENT: Head: Normocephalic no trauma                Ears : Normal Pinna No Drainage no Battles sign                Eyes: Vision Unchanged, No conjunctivitis,No drainage                Neck: Supple, No JVD,No Abnormal Carotid Pulsations                Throat: No Erythema, No pus,No  Swelling,Mallampati score= 3    Chest: Wheezing and bilateral rhonchi pt snoring and unable to awaken she was given ativan and xanax.  Cardiac: RRR S1+ S2 with a -S3: +M = 2/6, No R/H/G  Abdomen: Bowel Sounds are Normal.Soft Abdomen. No organomegaly of Liver,Spleen,or Kidneys   CNS: Non focal and intact. Cranial nerves 2, 346,8,9,10 and 12 are normal.Norrmal gait.Normal posture.  Extremities: No Clubbing,No Cyanosis with oxygen,Positive mild edema of lower extremities Bilateral  Skin: No Rash, No Ulcerative sores,and No cellulitis of the IV site.   Latest Reference Range & Units 03/25/23 13:56   BNP 0 - 99 pg/mL 48   CPK 20 - 180 U/L 40     Lab Results   Component Value Date    WBC 12.06 03/25/2023    HGB 12.9 03/25/2023    HCT 39.9 03/25/2023     03/25/2023    CHOL 348 (H) 11/04/2022    TRIG 125 11/04/2022    HDL 90 (H) 11/04/2022    ALT 22 03/25/2023    AST 16 03/25/2023     03/25/2023    K 4.3 03/25/2023     03/25/2023    CREATININE 0.9 03/25/2023    BUN 19 03/25/2023    CO2 26 03/25/2023    TSH 3.253 10/21/2021    INR 1.2 08/23/2022     1) COPD Exacerbation   2) Emphysema  3) Chronic Respiratory Failure(on O2 and Home Ventilator)  4) History of Atrial Fibrillation   5) Hypertension  6) Left Lower Lobe Pneumonia on CXR    Admit bronchodilators and NIV as well as anxiety control will follow CXR.    Critical care time spent on the evaluation and treatment of severe organ dysfunction, review of pertinent labs and imaging studies, discussions with consulting providers and discussions with patient/family: 61 minutes.     Dr Howard Matson   Pulmonary

## 2023-03-26 NOTE — PLAN OF CARE
03/26/23 1247   BURTON Message   Medicare Outpatient and Observation Notification regarding financial responsibility Given to patient/caregiver;Explained to patient/caregiver;Other (comments)   Date BURTON was signed 03/26/23   Time BURTON was signed 1247     Explained BURTON via phone to daughter, Mckenna (working remotely).  Secure chat message sent to VIVIAN Cheng CM to bring copy to patient tomorrow.

## 2023-03-26 NOTE — PLAN OF CARE
03/26/23 1342   Medicare Message   Important Message from Medicare regarding Discharge Appeal Rights Given to patient/caregiver;Explained to patient/caregiver;Other (comments)   Date IMM was signed 03/26/23   Time IMM was signed 0144         Explained IMM to Mckenna, patient's daughter, via phone (working remotely).    Copy of IMM sent to email at [email or web address] as requested.

## 2023-03-26 NOTE — SUBJECTIVE & OBJECTIVE
Interval History: Stable non tender Dehydrated will give iv fluids       Objective:     Vital Signs (Most Recent):  Temp: 98.5 °F (36.9 °C) (03/26/23 1116)  Pulse: 102 (03/26/23 1116)  Resp: 18 (03/26/23 1116)  BP: (!) 155/72 (03/26/23 1116)  SpO2: (!) 94 % (03/26/23 1116)   Vital Signs (24h Range):  Temp:  [97.3 °F (36.3 °C)-99.1 °F (37.3 °C)] 98.5 °F (36.9 °C)  Pulse:  [] 102  Resp:  [18-39] 18  SpO2:  [93 %-99 %] 94 %  BP: ()/(49-72) 155/72     Weight: 51.7 kg (114 lb)  Body mass index is 20.85 kg/m².      Intake/Output Summary (Last 24 hours) at 3/26/2023 1137  Last data filed at 3/26/2023 0600  Gross per 24 hour   Intake 270 ml   Output 200 ml   Net 70 ml       Physical Exam  Review of Systems   Constitutional:  Positive for fatigue.   HENT:  Positive for postnasal drip. Negative for sinus pressure.    Respiratory:  Positive for cough, shortness of breath and wheezing.    Endocrine: Negative for polydipsia.   Genitourinary:  Negative for urgency.   Neurological:  Negative for dizziness and tremors.   Psychiatric/Behavioral:  Negative for behavioral problems, confusion and hallucinations.      Vents:  Oxygen Concentration (%): 30 (03/26/23 1100)    Lines/Drains/Airways       Peripheral Intravenous Line  Duration                  Peripheral IV - Single Lumen 03/26/23 1114 22 G Anterior;Distal;Left Forearm <1 day                    Significant Labs:    CBC/Anemia Profile:  Recent Labs   Lab 03/25/23  1356 03/26/23  0621   WBC 12.06 7.69   HGB 12.9 11.8*   HCT 39.9 35.9*    187   MCV 96 94   RDW 13.5 13.3        Chemistries:  Recent Labs   Lab 03/25/23  1356 03/26/23  0621    135*   K 4.3 4.2    98   CO2 26 24   BUN 19 35*   CREATININE 0.9 1.9*   CALCIUM 9.0 8.9   ALBUMIN 3.1* 2.8*   PROT 6.6 6.3   BILITOT 1.0 0.6   ALKPHOS 93 78   ALT 22 20   AST 16 13     EXAMINATION:  CT CHEST WITHOUT CONTRAST     CLINICAL HISTORY:  Pneumonia, unresolved;     TECHNIQUE:  Low dose axial images,  sagittal and coronal reformations were obtained from the thoracic inlet to the lung bases. Contrast was not administered.     COMPARISON:  CT yesterday, and CT of 08/27/2022     FINDINGS:  There is no mediastinal nor axillary adenopathy.  Calcified left hilar lymph nodes are present.  There is no pleural effusion.  Once again, collapse of the trachea is noted consistent with tracheomalacia.     The aorta is normal in caliber with extensive calcific plaque.  There are coronary artery calcifications.     Advanced emphysema is present, affecting the right lung more than the left.  There is relative volume loss in the left lung.  There is an azygous lobe, normal variant.  There are calcified granulomata in the lungs as well as multiple small noncalcified nodules measuring up to 6 mm in the right middle lobe series 4, image 288, unchanged.  There is a 7 mm pleural based nodular density or nodular focus of pleural thickening in the anterior left hemithorax series 4, image 301, not present in 2022.  There is mild dependent atelectasis in the left lung, but no convincing evidence of pneumonia.  No significant interval changes are present.     No significant findings are noted in the visualized portions of the upper abdomen.  There are incidental calcified granulomata in the liver and spleen.     Impression:     No significant interval changes compared to yesterday's exam.  Marked tracheomalacia, severe emphysema.  Evidence of prior granulomatous infection.  Stable lung nodules with the exception of an anterior left upper lobe pleural based nodule or nodular focus of pleural thickening not identified in 2022.  Recommend re-evaluation with chest CT in 6 months.        Electronically signed by: Pennie Connell  Date:                                            03/26/2023  Time:                                           11:07  All pertinent labs within the past 24 hours have been reviewed.    Significant Imaging:  I have  reviewed all pertinent imaging results/findings within the past 24 hours.

## 2023-03-26 NOTE — NURSING
Pt up from ed via stretcher report from ed nurse oriented pt to room and surroundings call light in reach o2 4l nc in use plan of care reviewed with pt.

## 2023-03-26 NOTE — PLAN OF CARE
Headache treated with tylenol. Alprazolam administered for anxiety. BiPAP utilized.       Problem: Fall Injury Risk  Goal: Absence of Fall and Fall-Related Injury  Outcome: Ongoing, Progressing     Problem: Respiratory Compromise (Pneumonia)  Goal: Effective Oxygenation and Ventilation  Outcome: Ongoing, Progressing     Problem: Infection (Pneumonia)  Goal: Resolution of Infection Signs and Symptoms  Outcome: Ongoing, Progressing

## 2023-03-27 PROCEDURE — 94640 AIRWAY INHALATION TREATMENT: CPT

## 2023-03-27 PROCEDURE — 25000003 PHARM REV CODE 250: Performed by: SURGERY

## 2023-03-27 PROCEDURE — 25000242 PHARM REV CODE 250 ALT 637 W/ HCPCS: Performed by: SURGERY

## 2023-03-27 PROCEDURE — 63600175 PHARM REV CODE 636 W HCPCS: Performed by: SURGERY

## 2023-03-27 PROCEDURE — 27000221 HC OXYGEN, UP TO 24 HOURS

## 2023-03-27 PROCEDURE — 94660 CPAP INITIATION&MGMT: CPT

## 2023-03-27 PROCEDURE — 25000003 PHARM REV CODE 250: Performed by: INTERNAL MEDICINE

## 2023-03-27 PROCEDURE — 94761 N-INVAS EAR/PLS OXIMETRY MLT: CPT

## 2023-03-27 PROCEDURE — 11000001 HC ACUTE MED/SURG PRIVATE ROOM

## 2023-03-27 PROCEDURE — 99900035 HC TECH TIME PER 15 MIN (STAT)

## 2023-03-27 PROCEDURE — 27000190 HC CPAP FULL FACE MASK W/VALVE

## 2023-03-27 PROCEDURE — 94664 DEMO&/EVAL PT USE INHALER: CPT

## 2023-03-27 RX ADMIN — APIXABAN 2.5 MG: 2.5 TABLET, FILM COATED ORAL at 08:03

## 2023-03-27 RX ADMIN — METHYLPREDNISOLONE SODIUM SUCCINATE 80 MG: 125 INJECTION, POWDER, FOR SOLUTION INTRAMUSCULAR; INTRAVENOUS at 09:03

## 2023-03-27 RX ADMIN — Medication 6 MG: at 09:03

## 2023-03-27 RX ADMIN — IPRATROPIUM BROMIDE AND ALBUTEROL SULFATE 3 ML: 2.5; .5 SOLUTION RESPIRATORY (INHALATION) at 07:03

## 2023-03-27 RX ADMIN — IPRATROPIUM BROMIDE AND ALBUTEROL SULFATE 3 ML: 2.5; .5 SOLUTION RESPIRATORY (INHALATION) at 03:03

## 2023-03-27 RX ADMIN — BUSPIRONE HYDROCHLORIDE 5 MG: 5 TABLET ORAL at 08:03

## 2023-03-27 RX ADMIN — BUSPIRONE HYDROCHLORIDE 5 MG: 5 TABLET ORAL at 09:03

## 2023-03-27 RX ADMIN — APIXABAN 2.5 MG: 2.5 TABLET, FILM COATED ORAL at 09:03

## 2023-03-27 RX ADMIN — ALPRAZOLAM 0.5 MG: 0.5 TABLET ORAL at 08:03

## 2023-03-27 RX ADMIN — ALPRAZOLAM 0.5 MG: 0.5 TABLET ORAL at 04:03

## 2023-03-27 RX ADMIN — IPRATROPIUM BROMIDE AND ALBUTEROL SULFATE 3 ML: 2.5; .5 SOLUTION RESPIRATORY (INHALATION) at 11:03

## 2023-03-27 RX ADMIN — METHYLPREDNISOLONE SODIUM SUCCINATE 80 MG: 125 INJECTION, POWDER, FOR SOLUTION INTRAMUSCULAR; INTRAVENOUS at 01:03

## 2023-03-27 RX ADMIN — FLECAINIDE ACETATE 50 MG: 50 TABLET ORAL at 08:03

## 2023-03-27 RX ADMIN — MONTELUKAST 10 MG: 10 TABLET, FILM COATED ORAL at 08:03

## 2023-03-27 RX ADMIN — FLECAINIDE ACETATE 50 MG: 50 TABLET ORAL at 09:03

## 2023-03-27 RX ADMIN — METHYLPREDNISOLONE SODIUM SUCCINATE 80 MG: 125 INJECTION, POWDER, FOR SOLUTION INTRAMUSCULAR; INTRAVENOUS at 05:03

## 2023-03-27 NOTE — NURSING
"Updated daughter via phone.    Daughter concerned about patient discharging home.    Daughter concerned patient is too weak to be discharged and concerned that she won't be able to switch between the nasal cannula and the ventilator. She is also concerned about the weather and that she does not have a generator and she would die without oxygen but daughter does not know how long that would be.    Daughter questions if patient has ambulated on her own yet. Daughter states she is alone at night and is not strong enough to do those things herself, (Change from oxygen to home ventilator or get up and down alone).    Daughter is also concerned that patient has not had a discussion about how advanced her disease is and the options open to her for end of life care and what exactly end of life care is. Daughter states that patient says she has a "will" but daughter believes she does not understand what a living will is.    Daughter said that ER doctor brought up discussing making a patient a DNR but it was not pursued daughter is concerned that patient does not fully understand what this means for her in the terms of her care. Daughter is aware that patient is end stage of her disease but is unsure if patient understands this.  "

## 2023-03-27 NOTE — SUBJECTIVE & OBJECTIVE
Interval History: asleep no issues home today       Objective:     Vital Signs (Most Recent):  Temp: 98.3 °F (36.8 °C) (03/27/23 0805)  Pulse: 102 (03/27/23 0805)  Resp: 20 (03/27/23 0805)  BP: (!) 177/83 (03/27/23 0805)  SpO2: 95 % (03/27/23 0805)   Vital Signs (24h Range):  Temp:  [97.5 °F (36.4 °C)-98.5 °F (36.9 °C)] 98.3 °F (36.8 °C)  Pulse:  [] 102  Resp:  [18-24] 20  SpO2:  [94 %-100 %] 95 %  BP: (138-177)/(68-83) 177/83     Weight: 51.7 kg (114 lb)  Body mass index is 20.85 kg/m².      Intake/Output Summary (Last 24 hours) at 3/27/2023 0808  Last data filed at 3/27/2023 0544  Gross per 24 hour   Intake 730.02 ml   Output 600 ml   Net 130.02 ml       Physical Exam  Vitals and nursing note reviewed.   Constitutional:       General: She is not in acute distress.     Appearance: Normal appearance. She is well-developed. She is not ill-appearing, toxic-appearing or diaphoretic.   HENT:      Head: Normocephalic and atraumatic.      Jaw: No trismus.      Right Ear: Hearing, tympanic membrane, ear canal and external ear normal.      Left Ear: Hearing, tympanic membrane, ear canal and external ear normal.      Nose: Nose normal. No nasal deformity, mucosal edema or rhinorrhea.      Right Sinus: No maxillary sinus tenderness or frontal sinus tenderness.      Left Sinus: No maxillary sinus tenderness or frontal sinus tenderness.      Mouth/Throat:      Dentition: Normal dentition.      Pharynx: Uvula midline. No posterior oropharyngeal erythema or uvula swelling.   Eyes:      General: Lids are normal. No scleral icterus.     Conjunctiva/sclera: Conjunctivae normal.      Comments: Sclera clear bilat   Neck:      Trachea: Trachea and phonation normal.   Cardiovascular:      Rate and Rhythm: Normal rate and regular rhythm.      Pulses: Normal pulses.      Heart sounds: Normal heart sounds.   Pulmonary:      Effort: Prolonged expiration and respiratory distress (mild to moderate) present.      Breath sounds: Decreased  air movement present. Examination of the right-upper field reveals decreased breath sounds. Examination of the left-upper field reveals decreased breath sounds. Examination of the right-middle field reveals decreased breath sounds. Examination of the left-middle field reveals decreased breath sounds. Examination of the right-lower field reveals decreased breath sounds, wheezing and rhonchi. Examination of the left-lower field reveals decreased breath sounds, wheezing and rhonchi. Decreased breath sounds, wheezing and rhonchi present.   Abdominal:      General: Bowel sounds are normal. There is no distension.      Palpations: Abdomen is soft.      Tenderness: There is no abdominal tenderness.   Musculoskeletal:         General: No deformity. Normal range of motion.      Cervical back: Full passive range of motion without pain and neck supple.   Skin:     General: Skin is warm and dry.      Coloration: Skin is not pale.   Neurological:      Mental Status: She is alert and oriented to person, place, and time.      Motor: No abnormal muscle tone.      Coordination: Coordination normal.   Psychiatric:         Speech: Speech normal.         Behavior: Behavior normal. Behavior is cooperative.         Thought Content: Thought content normal.         Judgment: Judgment normal.     Review of Systems    Vents:  Oxygen Concentration (%): 30 (03/27/23 0725)    Lines/Drains/Airways       Peripheral Intravenous Line  Duration                  Peripheral IV - Single Lumen 03/26/23 1114 22 G Anterior;Distal;Left Forearm <1 day                    Significant Labs:    CBC/Anemia Profile:  Recent Labs   Lab 03/25/23  1356 03/26/23  0621   WBC 12.06 7.69   HGB 12.9 11.8*   HCT 39.9 35.9*    187   MCV 96 94   RDW 13.5 13.3        Chemistries:  Recent Labs   Lab 03/25/23  1356 03/26/23  0621    135*   K 4.3 4.2    98   CO2 26 24   BUN 19 35*   CREATININE 0.9 1.9*   CALCIUM 9.0 8.9   ALBUMIN 3.1* 2.8*   PROT 6.6 6.3    BILITOT 1.0 0.6   ALKPHOS 93 78   ALT 22 20   AST 16 13       All pertinent labs within the past 24 hours have been reviewed.    Significant Imaging:  I have reviewed all pertinent imaging results/findings within the past 24 hours.

## 2023-03-27 NOTE — PROGRESS NOTES
[facility] ([facility])  Pulmonology  Progress Note    Patient Name: Marva Perez  MRN: [id number]  Admission Date: 3/25/2023  Hospital Length of Stay: 1 days  Code Status: Full Code  Attending Provider: Howard Matson MD  Primary Care Provider: Kevin Clements MD   Principal Problem: COPD exacerbation    Subjective:     Interval History: asleep no issues home today       Objective:     Vital Signs (Most Recent):  Temp: 98.3 °F (36.8 °C) (03/27/23 0805)  Pulse: 102 (03/27/23 0805)  Resp: 20 (03/27/23 0805)  BP: (!) 177/83 (03/27/23 0805)  SpO2: 95 % (03/27/23 0805)   Vital Signs (24h Range):  Temp:  [97.5 °F (36.4 °C)-98.5 °F (36.9 °C)] 98.3 °F (36.8 °C)  Pulse:  [] 102  Resp:  [18-24] 20  SpO2:  [94 %-100 %] 95 %  BP: (138-177)/(68-83) 177/83     Weight: 51.7 kg (114 lb)  Body mass index is 20.85 kg/m².      Intake/Output Summary (Last 24 hours) at 3/27/2023 0808  Last data filed at 3/27/2023 0544  Gross per 24 hour   Intake 730.02 ml   Output 600 ml   Net 130.02 ml       Physical Exam  Vitals and nursing note reviewed.   Constitutional:       General: She is not in acute distress.     Appearance: Normal appearance. She is well-developed. She is not ill-appearing, toxic-appearing or diaphoretic.   HENT:      Head: Normocephalic and atraumatic.      Jaw: No trismus.      Right Ear: Hearing, tympanic membrane, ear canal and external ear normal.      Left Ear: Hearing, tympanic membrane, ear canal and external ear normal.      Nose: Nose normal. No nasal deformity, mucosal edema or rhinorrhea.      Right Sinus: No maxillary sinus tenderness or frontal sinus tenderness.      Left Sinus: No maxillary sinus tenderness or frontal sinus tenderness.      Mouth/Throat:      Dentition: Normal dentition.      Pharynx: Uvula midline. No posterior oropharyngeal erythema or uvula swelling.   Eyes:      General: Lids are normal. No scleral icterus.     Conjunctiva/sclera: Conjunctivae normal.      Comments: Sclera  clear bilat   Neck:      Trachea: Trachea and phonation normal.   Cardiovascular:      Rate and Rhythm: Normal rate and regular rhythm.      Pulses: Normal pulses.      Heart sounds: Normal heart sounds.   Pulmonary:      Effort: Prolonged expiration and respiratory distress (mild to moderate) present.      Breath sounds: Decreased air movement present. Examination of the right-upper field reveals decreased breath sounds. Examination of the left-upper field reveals decreased breath sounds. Examination of the right-middle field reveals decreased breath sounds. Examination of the left-middle field reveals decreased breath sounds. Examination of the right-lower field reveals decreased breath sounds, wheezing and rhonchi. Examination of the left-lower field reveals decreased breath sounds, wheezing and rhonchi. Decreased breath sounds, wheezing and rhonchi present.   Abdominal:      General: Bowel sounds are normal. There is no distension.      Palpations: Abdomen is soft.      Tenderness: There is no abdominal tenderness.   Musculoskeletal:         General: No deformity. Normal range of motion.      Cervical back: Full passive range of motion without pain and neck supple.   Skin:     General: Skin is warm and dry.      Coloration: Skin is not pale.   Neurological:      Mental Status: She is alert and oriented to person, place, and time.      Motor: No abnormal muscle tone.      Coordination: Coordination normal.   Psychiatric:         Speech: Speech normal.         Behavior: Behavior normal. Behavior is cooperative.         Thought Content: Thought content normal.         Judgment: Judgment normal.     Review of Systems    Vents:  Oxygen Concentration (%): 30 (03/27/23 0725)    Lines/Drains/Airways       Peripheral Intravenous Line  Duration                  Peripheral IV - Single Lumen 03/26/23 1114 22 G Anterior;Distal;Left Forearm <1 day                    Significant Labs:    CBC/Anemia Profile:  Recent Labs   Lab  03/25/23  1356 03/26/23  0621   WBC 12.06 7.69   HGB 12.9 11.8*   HCT 39.9 35.9*    187   MCV 96 94   RDW 13.5 13.3        Chemistries:  Recent Labs   Lab 03/25/23  1356 03/26/23  0621    135*   K 4.3 4.2    98   CO2 26 24   BUN 19 35*   CREATININE 0.9 1.9*   CALCIUM 9.0 8.9   ALBUMIN 3.1* 2.8*   PROT 6.6 6.3   BILITOT 1.0 0.6   ALKPHOS 93 78   ALT 22 20   AST 16 13       All pertinent labs within the past 24 hours have been reviewed.    Significant Imaging:  I have reviewed all pertinent imaging results/findings within the past 24 hours.    Assessment/Plan:     Psychiatric  Anxiety  Over breathing     Pulmonary  * COPD exacerbation  Emphysema     Pneumonia of left lower lobe due to Streptococcus pneumoniae  Ct does not confirm a pneumonia however does have a bronchitis     Lung mass  Stable from prior     Centrilobular emphysema  Severe on 24/7 o2 and a home ventilator    Cardiac/Vascular  Chronic atrial fibrillation  Stable in nsr 93 vent rate                 Howard Matson MD  Pulmonology  [facility] - Med Surg (3rd Fl)

## 2023-03-27 NOTE — PLAN OF CARE
Problem: Adult Inpatient Plan of Care  Goal: Plan of Care Review  Outcome: Ongoing, Progressing  Goal: Optimal Comfort and Wellbeing  Outcome: Ongoing, Progressing     Problem: Fluid Imbalance (Pneumonia)  Goal: Fluid Balance  Outcome: Ongoing, Progressing     Problem: Infection (Pneumonia)  Goal: Resolution of Infection Signs and Symptoms  Outcome: Ongoing, Progressing     Problem: Fall Injury Risk  Goal: Absence of Fall and Fall-Related Injury  Outcome: Ongoing, Progressing     Problem: Skin Injury Risk Increased  Goal: Skin Health and Integrity  Outcome: Ongoing, Progressing

## 2023-03-27 NOTE — NURSING
"Extensive conversation had with patient and caregiver at the bedside concerning hospice and end of life.    Patient keeps stating "I want gods will to be done" But when questioned if she would Not want lifesaving measures she says she does not know.    Patient has several questions about hospice answered them to the best of my ability and can ask / to see her tomorrow regarding it.    Patient has many concerns about her family and advised her that a conversation with them as a group or as individuals would be highly advised.    Recommended that patient consider making this decision while she is still able to so that her wishes can be carried out and that family knows her wishes and has a direction to go.   "

## 2023-03-27 NOTE — PLAN OF CARE
[facility] - Med Surg (3rd Fl)  Initial Discharge Assessment       Primary Care Provider: Kevin Clements MD    Admission Diagnosis: Cough [R05.9]  COPD exacerbation [J44.1]    Admission Date: 3/25/2023  Expected Discharge Date:     Discharge Barriers Identified: None    Payor: [employer] MANAGED MEDICARE / Plan: [employer] MEDICARE PLAN PPO / Product Type: Medicare Advantage /     Extended Emergency Contact Information  Primary Emergency Contact: Mckenna Larson  Mobile Phone: [phone]  Relation: Daughter  Secondary Emergency Contact: Micha Berg  Address: [address] [place] [address] [place]  Work Phone: [phone]  Mobile Phone: [phone]  Relation: Son    Discharge Plan A: Home, Home Health  Discharge Plan B: Home, Home Health      [id number] - [place], [place] - [address]  [address]  [facility] [address]  Phone: [phone] Fax: [phone]    [facility] [place] [address]  Phone: [phone] Fax: [phone]    [facility] - [place] [address]  Phone: [phone] Fax: [phone]      Initial Assessment (most recent)       Adult Discharge Assessment - 03/27/23 1119          Discharge Assessment    Assessment Type Discharge Planning Assessment     Confirmed/corrected address, phone number and insurance Yes     Confirmed Demographics Correct on Facesheet     Source of Information health record;patient     Communicated ROSAURA with patient/caregiver Date not available/Unable to determine     Reason For Admission COPD Exacerbation     People in Home alone     Facility Arrived From: Home     Do you expect to return to your current living situation? Yes     Do you have help at home or someone to help you manage your care at home? Yes     Who are your caregiver(s) and their phone number(s)? Mckenna Larson  (Daughter) [phone]     Prior to hospitilization cognitive status: Alert/Oriented     Current cognitive status: Alert/Oriented     Walking or Climbing Stairs ambulation difficulty, requires equipment     Mobility Management Patient utilizes a rolling walker and a wheelchair for ambulation.     Equipment Currently Used at Home bedside commode;walker, rolling;shower chair;ventilator;oxygen;wheelchair     Readmission within 30 days? No     Patient currently being followed by outpatient case management? No     Do you currently have service(s) that help you manage your care at home? Yes     Name and Contact number of agency [facility]     Is the pt/caregiver preference to resume services with current agency Yes     Do you take prescription medications? Yes     Do you have prescription coverage? Yes     Coverage [employer] Medicare     Do you have any problems affording any of your prescribed medications? No     How do you get to doctors appointments? family or friend will provide     Are you on dialysis? No     Do you take coumadin? No     Discharge Plan A Home;Home Health     Discharge Plan B Home;Home Health     DME Needed Upon Discharge  none     Discharge Plan discussed with: Caregiver     Discharge Barriers Identified None                      Discharge assessment completed with patient's caregiver and via the medical record as patient sleeping during attempt to complete discharge assessment. Caregiver confirms that patient is current with [facility] with hopes to resume at discharge. Per nursing staff, patient very vocal about declining hospice at this time. SW to remain available.

## 2023-03-28 ENCOUNTER — TELEPHONE (OUTPATIENT)
Dept: FAMILY MEDICINE | Facility: CLINIC | Age: 83
End: 2023-03-28
Payer: MEDICARE

## 2023-03-28 VITALS
DIASTOLIC BLOOD PRESSURE: 94 MMHG | HEART RATE: 98 BPM | BODY MASS INDEX: 20.98 KG/M2 | SYSTOLIC BLOOD PRESSURE: 139 MMHG | OXYGEN SATURATION: 96 % | RESPIRATION RATE: 20 BRPM | TEMPERATURE: 99 F | WEIGHT: 114 LBS | HEIGHT: 62 IN

## 2023-03-28 PROBLEM — I47.10 PAROXYSMAL SVT (SUPRAVENTRICULAR TACHYCARDIA): Status: ACTIVE | Noted: 2022-05-31

## 2023-03-28 PROBLEM — Z99.81 OXYGEN DEPENDENT: Status: ACTIVE | Noted: 2019-09-04

## 2023-03-28 PROBLEM — I70.213 INTERMITTENT CLAUDICATION OF BOTH LOWER EXTREMITIES DUE TO ATHEROSCLEROSIS: Status: ACTIVE | Noted: 2022-07-25

## 2023-03-28 PROBLEM — I49.1 PREMATURE ATRIAL CONTRACTION: Status: ACTIVE | Noted: 2020-05-28

## 2023-03-28 PROCEDURE — 27000221 HC OXYGEN, UP TO 24 HOURS

## 2023-03-28 PROCEDURE — 25000003 PHARM REV CODE 250: Performed by: SURGERY

## 2023-03-28 PROCEDURE — 99900035 HC TECH TIME PER 15 MIN (STAT)

## 2023-03-28 PROCEDURE — 94640 AIRWAY INHALATION TREATMENT: CPT

## 2023-03-28 PROCEDURE — 94660 CPAP INITIATION&MGMT: CPT

## 2023-03-28 PROCEDURE — 25000242 PHARM REV CODE 250 ALT 637 W/ HCPCS: Performed by: SURGERY

## 2023-03-28 PROCEDURE — 63600175 PHARM REV CODE 636 W HCPCS: Performed by: SURGERY

## 2023-03-28 PROCEDURE — 94761 N-INVAS EAR/PLS OXIMETRY MLT: CPT

## 2023-03-28 PROCEDURE — 25000003 PHARM REV CODE 250: Performed by: INTERNAL MEDICINE

## 2023-03-28 RX ORDER — ALPRAZOLAM 1 MG/1
1 TABLET ORAL ONCE
Status: COMPLETED | OUTPATIENT
Start: 2023-03-28 | End: 2023-03-28

## 2023-03-28 RX ADMIN — IPRATROPIUM BROMIDE AND ALBUTEROL SULFATE 3 ML: 2.5; .5 SOLUTION RESPIRATORY (INHALATION) at 07:03

## 2023-03-28 RX ADMIN — METHYLPREDNISOLONE SODIUM SUCCINATE 80 MG: 125 INJECTION, POWDER, FOR SOLUTION INTRAMUSCULAR; INTRAVENOUS at 01:03

## 2023-03-28 RX ADMIN — FLECAINIDE ACETATE 50 MG: 50 TABLET ORAL at 09:03

## 2023-03-28 RX ADMIN — BUSPIRONE HYDROCHLORIDE 5 MG: 5 TABLET ORAL at 09:03

## 2023-03-28 RX ADMIN — IPRATROPIUM BROMIDE AND ALBUTEROL SULFATE 3 ML: 2.5; .5 SOLUTION RESPIRATORY (INHALATION) at 03:03

## 2023-03-28 RX ADMIN — ALPRAZOLAM 1 MG: 1 TABLET ORAL at 01:03

## 2023-03-28 RX ADMIN — METHYLPREDNISOLONE SODIUM SUCCINATE 80 MG: 125 INJECTION, POWDER, FOR SOLUTION INTRAMUSCULAR; INTRAVENOUS at 05:03

## 2023-03-28 RX ADMIN — IPRATROPIUM BROMIDE AND ALBUTEROL SULFATE 3 ML: 2.5; .5 SOLUTION RESPIRATORY (INHALATION) at 11:03

## 2023-03-28 RX ADMIN — MONTELUKAST 10 MG: 10 TABLET, FILM COATED ORAL at 09:03

## 2023-03-28 RX ADMIN — ALPRAZOLAM 0.5 MG: 0.5 TABLET ORAL at 12:03

## 2023-03-28 RX ADMIN — ALPRAZOLAM 0.5 MG: 0.5 TABLET ORAL at 09:03

## 2023-03-28 RX ADMIN — APIXABAN 2.5 MG: 2.5 TABLET, FILM COATED ORAL at 09:03

## 2023-03-28 NOTE — DISCHARGE SUMMARY
[place] - [facility] (3rd Fl)  Pulmonology  Discharge Summary      Patient Name: Marva Perez  MRN: [id number]  Admission Date: 3/25/2023  Hospital Length of Stay: 2 days  Discharge Date and Time:  03/28/2023 1:18 PM  Attending Physician: Radha Rincon MD   Discharging Provider: Radha Rincon MD  Primary Care Provider: Kevin Clements MD    HPI:  Marva Perez is a 82 y.o. 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 Admitted to evaluate Respiratory status.  * No surgery found *    Indwelling Lines/Drains at Time of Discharge:   Lines/Drains/Airways     None                 Hospital Course:  No notes on file    Goals of Care Treatment Preferences:  Code Status: Full Code      Consults (From admission, onward)        Status Ordering Provider     Inpatient consult to Social History/Group Therapy  Once        Provider:  (Not yet assigned)    Completed RADHA RINCON     Inpatient consult to Social Work  Once        Provider:  (Not yet assigned)    Completed RADHA RINCON          Significant Labs:  None    Significant Imaging:  I have reviewed all pertinent imaging results/findings within the past 24 hours.    Pending Diagnostic Studies:     None        Final Active Diagnoses:    Diagnosis Date Noted POA    PRINCIPAL PROBLEM:  COPD exacerbation [J44.1] 12/21/2020 Yes    Pneumonia of left lower lobe due to Streptococcus pneumoniae [J13] 03/26/2023 Yes    Anxiety [F41.9] 08/27/2022 Yes    Lung mass [R91.8] 08/26/2022 Yes    Chronic atrial fibrillation [I48.20] 08/26/2022 Yes    Centrilobular emphysema [J43.2] 06/06/2016 Yes      Problems  Resolved During this Admission:       Discharged Condition: fair    Disposition:     Follow Up:    Patient Instructions:      Ambulatory referral/consult to Home Health   Standing Status: Future   Referral Priority: Routine Referral Type: Home Health   Referral Reason: Specialty Services Required   Requested Specialty: Home Health Services   Number of Visits Requested: 1     Medications:  Reconciled Home Medications:      Medication List      CONTINUE taking these medications    albuterol 90 mcg/actuation inhaler  Commonly known as: PROVENTIL/VENTOLIN HFA  Inhale 2 puffs into the lungs every 6 (six) hours as needed for Wheezing.     ALPRAZolam 0.25 MG tablet  Commonly known as: XANAX  One po tid for anxiety     atenoloL 25 MG tablet  Commonly known as: TENORMIN  Take 12.5 mg by mouth.     busPIRone 5 MG Tab  Commonly known as: BUSPAR  Take 1 tablet (5 mg total) by mouth 2 (two) times daily.     diltiaZEM 120 MG tablet  Commonly known as: CARDIZEM  Take 120 mg by mouth 2 (two) times daily.     ELIQUIS 2.5 mg Tab  Generic drug: apixaban  Take 2.5 mg by mouth 2 (two) times daily.     EScitalopram oxalate 5 MG Tab  Commonly known as: LEXAPRO  Take 1 tablet (5 mg total) by mouth once daily.     flecainide 50 MG Tab  Commonly known as: TAMBOCOR  Take 50 mg by mouth 2 (two) times daily.     FORTEO 20 mcg/dose (600mcg/2.4mL) Pnij  Generic drug: teriparatide  One daily injection for 2 years     gabapentin 100 MG capsule  Commonly known as: NEURONTIN  Take 1 capsule (100 mg total) by mouth 2 (two) times daily.     levalbuterol 0.63 mg/3 mL nebulizer solution  Commonly known as: XOPENEX  Take 3 mLs (0.63 mg total) by nebulization every 4 (four) hours as needed for Wheezing.     LIDOcaine 5 %  Commonly known as: LIDODERM  Place 1 patch onto the skin once daily. Remove & Discard patch within 12 hours or as directed by MD     metoclopramide HCl 10 MG tablet  Commonly known as: REGLAN  Take 1 tablet (10 mg total) by mouth every 6  (six) hours.     predniSONE 5 MG tablet  Commonly known as: DELTASONE  Take 5 mg by mouth.     REPATHA SURECLICK 140 mg/mL Pnij  Generic drug: evolocumab  Inject 140 mg into the skin every 14 (fourteen) days.     TRELEGY ELLIPTA 100-62.5-25 mcg Dsdv  Generic drug: fluticasone-umeclidin-vilanter  TAKE 1 PUFF BY MOUTH EVERY DAY            Howard Matson MD  Pulmonology  [place] - Med Surg (3rd Fl)

## 2023-03-28 NOTE — PROGRESS NOTES
"   03/28/23 0020   Cognitive   Orientation disoriented to;time;situation     Pt called to desk because the BiPAP mask is still not fitting her face. Pt states Dr. Matson wants her to wear the mask but RT has not came in the room to fix the mask, which RT went in room and gave her a smaller mask. Pt states if she isn't sick now, she will be sick later from it blowing cold air on her. Pt states please put me on the NC and call RT to get them to come see about this machine. Pt started reading the board, and saying well I don't even know why they have this "Suma and Brooklynn" on the board. Pt was advised TN's name was Suma and the tech's name was Brooklynn. Pt confused at this time, not even remembering TN from earlier in the night.   "

## 2023-03-28 NOTE — PLAN OF CARE
03/28/23 1138   Medicare Message   Important Message from Medicare regarding Discharge Appeal Rights Given to patient/caregiver;Explained to patient/caregiver;Signed/date by patient/caregiver   Date IMM was signed 03/28/23   Time IMM was signed 113

## 2023-03-28 NOTE — PROGRESS NOTES
[place] - [facility] ([facility])  Pulmonology  Progress Note    Patient Name: Marva Perez  MRN: [id number]  Admission Date: 3/25/2023  Hospital Length of Stay: 2 days  Code Status: Full Code  Attending Provider: Howard Matson MD  Primary Care Provider: Kevin Clements MD   Principal Problem: COPD exacerbation    Subjective:     Interval History: Home today       Objective:     Vital Signs (Most Recent):  Temp: 98.5 °F (36.9 °C) (03/28/23 1108)  Pulse: 98 (03/28/23 1200)  Resp: 20 (03/28/23 1120)  BP: (!) 139/94 (03/28/23 1143)  SpO2: 96 % (03/28/23 1120)   Vital Signs (24h Range):  Temp:  [96.8 °F (36 °C)-98.5 °F (36.9 °C)] 98.5 °F (36.9 °C)  Pulse:  [] 98  Resp:  [16-22] 20  SpO2:  [95 %-99 %] 96 %  BP: (135-195)/(75-97) 139/94     Weight: 51.7 kg (114 lb)  Body mass index is 20.85 kg/m².      Intake/Output Summary (Last 24 hours) at 3/28/2023 1332  Last data filed at 3/28/2023 1030  Gross per 24 hour   Intake 560 ml   Output 200 ml   Net 360 ml       Physical Exam  Vitals and nursing note reviewed.   Constitutional:       General: She is not in acute distress.     Appearance: Normal appearance. She is well-developed. She is not ill-appearing, toxic-appearing or diaphoretic.   HENT:      Head: Normocephalic and atraumatic.      Jaw: No trismus.      Right Ear: Hearing, tympanic membrane, ear canal and external ear normal.      Left Ear: Hearing, tympanic membrane, ear canal and external ear normal.      Nose: Nose normal. No nasal deformity, mucosal edema or rhinorrhea.      Right Sinus: No maxillary sinus tenderness or frontal sinus tenderness.      Left Sinus: No maxillary sinus tenderness or frontal sinus tenderness.      Mouth/Throat:      Dentition: Normal dentition.      Pharynx: Uvula midline. No posterior oropharyngeal erythema or uvula swelling.   Eyes:      General: Lids are normal. No scleral icterus.     Conjunctiva/sclera: Conjunctivae normal.      Comments: Sclera clear bilat   Neck:       Trachea: Trachea and phonation normal.   Cardiovascular:      Rate and Rhythm: Normal rate and regular rhythm.      Pulses: Normal pulses.      Heart sounds: Normal heart sounds.   Pulmonary:      Effort: Prolonged expiration and respiratory distress (mild) present.      Breath sounds: Decreased air movement present. No stridor. Examination of the right-upper field reveals decreased breath sounds. Examination of the left-upper field reveals decreased breath sounds. Examination of the right-middle field reveals decreased breath sounds. Examination of the left-middle field reveals decreased breath sounds. Examination of the right-lower field reveals decreased breath sounds. Examination of the left-lower field reveals decreased breath sounds. Decreased breath sounds present. No wheezing or rhonchi.   Abdominal:      General: Bowel sounds are normal. There is no distension.      Palpations: Abdomen is soft.      Tenderness: There is no abdominal tenderness.   Musculoskeletal:         General: No deformity. Normal range of motion.      Cervical back: Full passive range of motion without pain and neck supple.   Skin:     General: Skin is warm and dry.      Coloration: Skin is not pale.   Neurological:      Mental Status: She is alert and oriented to person, place, and time.      Motor: No abnormal muscle tone.      Coordination: Coordination normal.   Psychiatric:         Speech: Speech normal.         Behavior: Behavior normal. Behavior is cooperative.         Thought Content: Thought content normal.         Judgment: Judgment normal.     Review of Systems    Vents:  Oxygen Concentration (%): 30 (03/28/23 0801)    Lines/Drains/Airways       Peripheral Intravenous Line  Duration                  Peripheral IV - Single Lumen 03/26/23 1114 22 G Anterior;Distal;Left Forearm 2 days                    Significant Labs:    CBC/Anemia Profile:  No results for input(s): WBC, HGB, HCT, PLT, MCV, RDW, IRON, FERRITIN, RETIC, FOLATE,  [id number], OCCULTBLOOD in the last 48 hours.     Chemistries:  No results for input(s): NA, K, CL, CO2, BUN, CREATININE, CALCIUM, ALBUMIN, PROT, BILITOT, ALKPHOS, ALT, AST, GLUCOSE, MG, PHOS in the last 48 hours.    All pertinent labs within the past 24 hours have been reviewed.    Significant Imaging:  I have reviewed all pertinent imaging results/findings within the past 24 hours.    Assessment/Plan:     Psychiatric  Anxiety  Over breathing     Pulmonary  * COPD exacerbation  Emphysema     Pneumonia of left lower lobe due to Streptococcus pneumoniae  Ct does not confirm a pneumonia however does have a bronchitis     Lung mass  Stable from prior     Centrilobular emphysema  Severe on 24/7 o2 and a home ventilator    Cardiac/Vascular  Chronic atrial fibrillation  Stable in nsr 93 vent rate                 Howard Matson MD  Pulmonology  [place] - Med Surg (3rd Fl)

## 2023-03-28 NOTE — SUBJECTIVE & OBJECTIVE
Interval History: Home today       Objective:     Vital Signs (Most Recent):  Temp: 98.5 °F (36.9 °C) (03/28/23 1108)  Pulse: 98 (03/28/23 1200)  Resp: 20 (03/28/23 1120)  BP: (!) 139/94 (03/28/23 1143)  SpO2: 96 % (03/28/23 1120)   Vital Signs (24h Range):  Temp:  [96.8 °F (36 °C)-98.5 °F (36.9 °C)] 98.5 °F (36.9 °C)  Pulse:  [] 98  Resp:  [16-22] 20  SpO2:  [95 %-99 %] 96 %  BP: (135-195)/(75-97) 139/94     Weight: 51.7 kg (114 lb)  Body mass index is 20.85 kg/m².      Intake/Output Summary (Last 24 hours) at 3/28/2023 1332  Last data filed at 3/28/2023 1030  Gross per 24 hour   Intake 560 ml   Output 200 ml   Net 360 ml       Physical Exam  Vitals and nursing note reviewed.   Constitutional:       General: She is not in acute distress.     Appearance: Normal appearance. She is well-developed. She is not ill-appearing, toxic-appearing or diaphoretic.   HENT:      Head: Normocephalic and atraumatic.      Jaw: No trismus.      Right Ear: Hearing, tympanic membrane, ear canal and external ear normal.      Left Ear: Hearing, tympanic membrane, ear canal and external ear normal.      Nose: Nose normal. No nasal deformity, mucosal edema or rhinorrhea.      Right Sinus: No maxillary sinus tenderness or frontal sinus tenderness.      Left Sinus: No maxillary sinus tenderness or frontal sinus tenderness.      Mouth/Throat:      Dentition: Normal dentition.      Pharynx: Uvula midline. No posterior oropharyngeal erythema or uvula swelling.   Eyes:      General: Lids are normal. No scleral icterus.     Conjunctiva/sclera: Conjunctivae normal.      Comments: Sclera clear bilat   Neck:      Trachea: Trachea and phonation normal.   Cardiovascular:      Rate and Rhythm: Normal rate and regular rhythm.      Pulses: Normal pulses.      Heart sounds: Normal heart sounds.   Pulmonary:      Effort: Prolonged expiration and respiratory distress (mild) present.      Breath sounds: Decreased air movement present. No stridor.  Examination of the right-upper field reveals decreased breath sounds. Examination of the left-upper field reveals decreased breath sounds. Examination of the right-middle field reveals decreased breath sounds. Examination of the left-middle field reveals decreased breath sounds. Examination of the right-lower field reveals decreased breath sounds. Examination of the left-lower field reveals decreased breath sounds. Decreased breath sounds present. No wheezing or rhonchi.   Abdominal:      General: Bowel sounds are normal. There is no distension.      Palpations: Abdomen is soft.      Tenderness: There is no abdominal tenderness.   Musculoskeletal:         General: No deformity. Normal range of motion.      Cervical back: Full passive range of motion without pain and neck supple.   Skin:     General: Skin is warm and dry.      Coloration: Skin is not pale.   Neurological:      Mental Status: She is alert and oriented to person, place, and time.      Motor: No abnormal muscle tone.      Coordination: Coordination normal.   Psychiatric:         Speech: Speech normal.         Behavior: Behavior normal. Behavior is cooperative.         Thought Content: Thought content normal.         Judgment: Judgment normal.     Review of Systems    Vents:  Oxygen Concentration (%): 30 (03/28/23 0801)    Lines/Drains/Airways       Peripheral Intravenous Line  Duration                  Peripheral IV - Single Lumen 03/26/23 1114 22 G Anterior;Distal;Left Forearm 2 days                    Significant Labs:    CBC/Anemia Profile:  No results for input(s): WBC, HGB, HCT, PLT, MCV, RDW, IRON, FERRITIN, RETIC, FOLATE, [id number], OCCULTBLOOD in the last 48 hours.     Chemistries:  No results for input(s): NA, K, CL, CO2, BUN, CREATININE, CALCIUM, ALBUMIN, PROT, BILITOT, ALKPHOS, ALT, AST, GLUCOSE, MG, PHOS in the last 48 hours.    All pertinent labs within the past 24 hours have been reviewed.    Significant Imaging:  I have reviewed all  pertinent imaging results/findings within the past 24 hours.

## 2023-03-28 NOTE — TELEPHONE ENCOUNTER
----- Message from Rhina Butts sent at 3/28/2023  1:56 PM CDT -----  Marva Perez  MRN: [id number]  : 1940  PCP: Kevin Clements  Home Phone      [phone]  Work Phone      Not on file.  Mobile          [phone]      MESSAGE:   Hospital called for follow up for pt but PAR couldn't schedule anything until  in [facility] and after May at [facility].  Please call pt to schedule.       [id number]:

## 2023-03-28 NOTE — PHYSICIAN QUERY
PT Name: Marva Perez  MR #: [id number]     DOCUMENTATION CLARIFICATION      CDS: Nasim Chandler RN CCDS               Contact information: [email or web address]      This form is a permanent document in the medical record.     Query Date: March 28, 2023    Dear Provider,  By submitting this query, we are merely seeking further clarification of documentation.  Please utilize your independent clinical judgment when addressing the question(s) below.     The Medical Record contains the following:    Supporting Clinical Findings Location in Medical Record   Final Active Diagnoses :  Pneumonia of left lower lobe due to Streptococcus pneumoniae    3/28/2023 DC summary   Pneumonia of left lower lobe due to Streptococcus pneumoniae  Ct does not confirm a pneumonia however does have a bronchitis    3/28/2023 Pulmonology progress notes   Chronic Respiratory Failure(on O2 and Home Ventilator)    82 y.o. 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 Admitted to evaluate Respiratory status. 3/25/2023 H&P   T 97.8-99.1, HR , RR 22-37, /63, O2 sat 93% on 4L 3/25/2023 Vitals    03/25/23 13:56 03/26/23 06:21   WBC 12.06 7.69      03/25/23 13:56   Procalcitonin 0.16       levoFLOXacin 500 mg IVPB x 1 3/25/2023 Medication   Severe tracheomalacia with collapse of the trachea into a crescent from the thoracic inlet to the bifurcation.  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  Marked hyperlucency and hyperexpansion of the right lung secondary to the left-sided  volume loss and emphysema.    No significant interval changes compared to yesterday's exam.  Marked tracheomalacia, severe emphysema.  Evidence of prior granulomatous infection.  Stable lung nodules with the exception of an anterior left upper lobe pleural based nodule or nodular focus of pleural thickening not identified in 2022.  Recommend re-evaluation with chest CT in 6 months. 3/25/2023 CT chest          3/26/2023 CT chest     Please clarify if the Pneumonia of left lower lobe due to Streptococcus pneumoniae diagnosis has been:    [    ] Ruled In   [    ] Ruled Out   [    ] Other/Clarification of findings (please specify): _______________    [ xx  ] Clinically undetermined         Please document in your progress notes daily for the duration of treatment, until resolved, and include in your discharge summary.    Form No. [id number]

## 2023-03-28 NOTE — TELEPHONE ENCOUNTER
Was able to schedule an appointment for 4/3. pt family member stated if anything came up they would call to cancel.

## 2023-03-28 NOTE — NURSING
Primiary care Physician appointment made after patient discharged.    Attempted to call the patient on her cell phone and it went straight to voicemail without ringing.  Attempted a second call and was forwarded to voicemail. Was able to leave voicemail with appointment date, time, and place.    Attempted to call son at work number and he was unaviable. Called personal cell phone and went to voicemail was able to leave a voicemail at this number with the appointment date time and place.    Called daughter to notify her of patient appointment. Rang through to voicemail was able to leave a voicemail with the appointment date time and place.    Left unit phone number with all voicemail's if there were any further questions.

## 2023-03-28 NOTE — CONSULTS
SW consulted due to patient having many questions over options for hospice care. SW attempted to meet with patient this morning with nursing staff present to take patient off BiPAP and place on nasal cannula. When nursing explained that SW was present due to many questions about hospice care, patient states that she does not want to talk with SW at this time. Nursing did express that patient was having a difficult morning. SW to remain available should patient again want to discuss hospice care further.

## 2023-03-28 NOTE — PLAN OF CARE
Plan of care reviewed with pt. Pt voiced understanding but needs constant reinforcements. Pt denies any c/o during the shift other than the mask for the BiPAP. No apparent distress noted. Fall precautions maintained. Pt remains free of fall or injury. Bed in lowest position, locked, call light within reach, and bed alarm on. Side rails up x's 3 with slip resistant socks on.     Problem: Adult Inpatient Plan of Care  Goal: Plan of Care Review  Outcome: Ongoing, Progressing  Flowsheets (Taken 3/28/2023 6786)  Plan of Care Reviewed With: patient  Goal: Absence of Hospital-Acquired Illness or Injury  Outcome: Ongoing, Progressing  Goal: Optimal Comfort and Wellbeing  Outcome: Ongoing, Progressing     Problem: Fluid Imbalance (Pneumonia)  Goal: Fluid Balance  Outcome: Ongoing, Progressing     Problem: Infection (Pneumonia)  Goal: Resolution of Infection Signs and Symptoms  Outcome: Ongoing, Progressing     Problem: Respiratory Compromise (Pneumonia)  Goal: Effective Oxygenation and Ventilation  Outcome: Ongoing, Progressing     Problem: Fall Injury Risk  Goal: Absence of Fall and Fall-Related Injury  Outcome: Ongoing, Progressing     Problem: Skin Injury Risk Increased  Goal: Skin Health and Integrity  Outcome: Ongoing, Progressing

## 2023-03-28 NOTE — TELEPHONE ENCOUNTER
----- Message from Nolan Lacey sent at 2017 11:01 AM CDT -----  Contact: SELF  Marva Perez  MRN: [id number]  : 1940  PCP: Kevin Clements  Home Phone      [phone]  Work Phone      Not on file.  Mobile          [phone]      MESSAGE: NEEDS TO DISCUSS BENICAR. SAYS SHE JUST FOUND OUT THEY HAVE A CLASS ACTION SUIT AGAINST THE MAKERS OF [employer] AND SHE IS HAVING ALL THE SYMPTOMS THAT ARE BEING BROUGHT UP IN THE CLASS ACTION SUIT. SHE WANTS TO KNOW WHY SHE WAS PUT ON THIS MEDICATION. IT'S MAKING HER SICK. AND ALSO NEEDS AN ALTERNATIVE CALLED IN ASAP.     PHONE: [phone]    PHARMACY: [facility] IN [place]    Oral Minoxidil Pregnancy And Lactation Text: This medication should only be used when clearly needed if you are pregnant, attempting to become pregnant or breast feeding.

## 2023-03-28 NOTE — PHYSICIAN QUERY
PT Name: Marva Perez  MR #: [id number]     DOCUMENTATION CLARIFICATION     CDS: Nasim Chandler RN CCDS               Contact information: [email or web address]    This form is a permanent document in the medical record.    Query Date: March 28, 2023    By submitting this query, we are merely seeking further clarification of documentation.  Please utilize your independent clinical judgment when addressing the question(s) below.    The Medical Record contains the following:   Indicator Supporting Clinical Findings Location in Medical Record    Kidney (Renal) Insufficiency      Kidney (Renal) Failure/Injury       x Nephrotoxic Agents levoFLOXacin 500 mg IVPB x 1 3/25/2023 Medication     x BUN/Creatinine           GFR  03/25/23 13:56 03/26/23 06:21   BUN 19 35 (H)   Creatinine 0.9 1.9 (H)   eGFR >60 26     Lab values    Urine: Casts         Eosinophils       x Dehydration Dehydrated 3/26/2023 Pulmonology progress notes    Nausea/Vomiting      Dialysis/CRRT       x Treatment 0.9%  NaCl infusion @ 250 mL/hr, After 1 hour slow to 50 cc /hr 3/26/2023 Medication    Other         [facility] approved diagnostic criteria for acute kidney injury is based on KDIGO criteria:    An increase in serum creatinine > 0.3mg/dl within 48 hours  OR  Increase in serum creatinine to > 1.5x baseline, which is known or presumed to have occurred within the prior 7 days  OR  Urine volume <0.5 ml/kg/hr for 6 hours       The clinical guidelines noted above are only a system guideline. It does not replace the providers clinical judgment.     Provider, please specify the diagnosis or diagnoses associated with above clinical findings.     [    ] Unspecified Acute Kidney Failure/Injury     [    ] Other Acute Kidney Failure/Injury (please specify): ____________     [    ] Other (please specify): _______________________________   [ x ] Clinically Undetermined         Please document in your progress notes daily for the duration of treatment  until resolved and include in your discharge summary.    References:   [employer] Clinical Practice Guideline for Acute Kidney Injury. (2012, March). Retrieved October 21, 2020, from https://kdigo.org/wp-content/uploads/2016/10/CBMHR-4516-TOZ-Guideline-English.pdf    GOPAL Mai MD, FACP. (2015, Selena 15). Acute kidney injury revisited. Retrieved October 21, 2020, from https://acphospitalist.org/archives/2015/[email or web address]/coding-acute-kidney-injury.htm      Form No. [id number]

## 2023-03-29 NOTE — PLAN OF CARE
[place] - Med Surg (3rd Fl)  Discharge Final Note    Primary Care Provider: Kevin Clements MD    Expected Discharge Date: 3/28/2023    Final Discharge Note (most recent)       Final Note - 03/29/23 1108          Final Note    Assessment Type Final Discharge Note     Anticipated Discharge Disposition Home-Health Care [facility]     Hospital Resources/Appts/Education Provided Appointments scheduled and added to AVS        Post-Acute Status    Post-Acute Authorization Home Health     Home Health Status Set-up Complete/Auth obtained     Discharge Delays None known at this time                     Important Message from Medicare  Important Message from Medicare regarding Discharge Appeal Rights: Given to patient/caregiver, Explained to patient/caregiver, Signed/date by patient/caregiver     Date IMM was signed: 03/28/23  Time IMM was signed: 1135    Contact Info       Kevin Clements MD   Specialty: Family Medicine   Relationship: PCP - General    [address] [place] [address]   Phone: [phone]       Next Steps: Follow up on 3/31/2023    Instructions: Hospital Follow-up at 9:45am  **[facility]**  [facility] [place] [address]  [phone]    Howard Matson MD   Specialty: Pulmonary Disease    [address] [place] [address]   Phone: [phone]       Next Steps: Follow up on 4/4/2023    Instructions: at 11:00 am          Patient discharging home with caregivers and returning to the care of [facility].

## 2023-03-30 ENCOUNTER — TELEPHONE (OUTPATIENT)
Dept: ADMINISTRATIVE | Facility: CLINIC | Age: 83
End: 2023-03-30
Payer: MEDICARE

## 2023-03-30 LAB
BACTERIA BLD CULT: NORMAL
BACTERIA BLD CULT: NORMAL

## 2023-03-30 NOTE — PROGRESS NOTES
"Phoned patient in response to reply of "2" to post-discharge texting tracker. Ms. Perez states that she is in need of a refill on prednisone. It appears the medication was last filled on 3/5. Patient states she uses [facility] in [place]. The caregiver went to the pharmacy to  the prednisone on yesterday but was told a refill request was faxed to Dr. Matson's office with no reply so far. Sent a message to Dr. Howard Matson's office on patient's behalf requesting a refill approval be sent to [facility]. Patient verbalized understanding.       "

## 2023-03-31 ENCOUNTER — PATIENT MESSAGE (OUTPATIENT)
Dept: PULMONOLOGY | Facility: CLINIC | Age: 83
End: 2023-03-31
Payer: MEDICARE

## 2023-04-02 ENCOUNTER — TELEPHONE (OUTPATIENT)
Dept: ADMINISTRATIVE | Facility: CLINIC | Age: 83
End: 2023-04-02
Payer: MEDICARE

## 2023-04-02 NOTE — TELEPHONE ENCOUNTER
Pt calling stating that she needs home health to come and do an eval. States that someone came the other day, but never came back. Pt states that she saw [facility] Thursday. She said that she has their phone # so I instructed her to call them first thing tomorrow and follow up.

## 2023-04-03 ENCOUNTER — PATIENT MESSAGE (OUTPATIENT)
Dept: PULMONOLOGY | Facility: CLINIC | Age: 83
End: 2023-04-03
Payer: MEDICARE

## 2023-04-03 ENCOUNTER — HOSPITAL ENCOUNTER (INPATIENT)
Facility: HOSPITAL | Age: 83
LOS: 3 days | Discharge: HOME HEALTH | DRG: 189 | End: 2023-04-07
Attending: STUDENT IN AN ORGANIZED HEALTH CARE EDUCATION/TRAINING PROGRAM | Admitting: INTERNAL MEDICINE
Payer: MEDICARE

## 2023-04-03 DIAGNOSIS — J96.10 CHRONIC RESPIRATORY FAILURE: ICD-10-CM

## 2023-04-03 DIAGNOSIS — R06.02 SHORTNESS OF BREATH: ICD-10-CM

## 2023-04-03 DIAGNOSIS — J44.1 COPD WITH ACUTE EXACERBATION: Primary | ICD-10-CM

## 2023-04-03 LAB
ALBUMIN SERPL BCP-MCNC: 2.7 G/DL (ref 3.5–5.2)
ALP SERPL-CCNC: 74 U/L (ref 55–135)
ALT SERPL W/O P-5'-P-CCNC: 17 U/L (ref 10–44)
ANION GAP SERPL CALC-SCNC: 12 MMOL/L (ref 8–16)
AST SERPL-CCNC: 14 U/L (ref 10–40)
BASOPHILS # BLD AUTO: ABNORMAL K/UL (ref 0–0.2)
BASOPHILS NFR BLD: 0 % (ref 0–1.9)
BILIRUB SERPL-MCNC: 0.6 MG/DL (ref 0.1–1)
BNP SERPL-MCNC: 52 PG/ML (ref 0–99)
BUN SERPL-MCNC: 19 MG/DL (ref 8–23)
CALCIUM SERPL-MCNC: 9.2 MG/DL (ref 8.7–10.5)
CHLORIDE SERPL-SCNC: 98 MMOL/L (ref 95–110)
CO2 SERPL-SCNC: 30 MMOL/L (ref 23–29)
CREAT SERPL-MCNC: 0.9 MG/DL (ref 0.5–1.4)
DACRYOCYTES BLD QL SMEAR: ABNORMAL
DIFFERENTIAL METHOD: ABNORMAL
EOSINOPHIL # BLD AUTO: ABNORMAL K/UL (ref 0–0.5)
EOSINOPHIL NFR BLD: 2 % (ref 0–8)
ERYTHROCYTE [DISTWIDTH] IN BLOOD BY AUTOMATED COUNT: 13.2 % (ref 11.5–14.5)
EST. GFR  (NO RACE VARIABLE): >60 ML/MIN/1.73 M^2
GLUCOSE SERPL-MCNC: 186 MG/DL (ref 70–110)
HCT VFR BLD AUTO: 39.8 % (ref 37–48.5)
HGB BLD-MCNC: 12.5 G/DL (ref 12–16)
IMM GRANULOCYTES # BLD AUTO: ABNORMAL K/UL (ref 0–0.04)
IMM GRANULOCYTES NFR BLD AUTO: ABNORMAL % (ref 0–0.5)
INFLUENZA A, MOLECULAR: NEGATIVE
INFLUENZA B, MOLECULAR: NEGATIVE
LYMPHOCYTES # BLD AUTO: ABNORMAL K/UL (ref 1–4.8)
LYMPHOCYTES NFR BLD: 17 % (ref 18–48)
MCH RBC QN AUTO: 30.5 PG (ref 27–31)
MCHC RBC AUTO-ENTMCNC: 31.4 G/DL (ref 32–36)
MCV RBC AUTO: 97 FL (ref 82–98)
METAMYELOCYTES NFR BLD MANUAL: 1 %
MONOCYTES # BLD AUTO: ABNORMAL K/UL (ref 0.3–1)
MONOCYTES NFR BLD: 4 % (ref 4–15)
MYELOCYTES NFR BLD MANUAL: 2 %
NEUTROPHILS NFR BLD: 70 % (ref 38–73)
NEUTS BAND NFR BLD MANUAL: 4 %
NRBC BLD-RTO: 0 /100 WBC
PLATELET # BLD AUTO: 271 K/UL (ref 150–450)
PLATELET BLD QL SMEAR: ABNORMAL
PMV BLD AUTO: 9.5 FL (ref 9.2–12.9)
POTASSIUM SERPL-SCNC: 3.7 MMOL/L (ref 3.5–5.1)
PROT SERPL-MCNC: 6.3 G/DL (ref 6–8.4)
RBC # BLD AUTO: 4.1 M/UL (ref 4–5.4)
SARS-COV-2 RDRP RESP QL NAA+PROBE: NEGATIVE
SODIUM SERPL-SCNC: 140 MMOL/L (ref 136–145)
SPECIMEN SOURCE: NORMAL
SPHEROCYTES BLD QL SMEAR: ABNORMAL
TROPONIN I SERPL DL<=0.01 NG/ML-MCNC: 0.01 NG/ML (ref 0–0.03)
WBC # BLD AUTO: 11.63 K/UL (ref 3.9–12.7)

## 2023-04-03 PROCEDURE — 27000190 HC CPAP FULL FACE MASK W/VALVE

## 2023-04-03 PROCEDURE — 94660 CPAP INITIATION&MGMT: CPT

## 2023-04-03 PROCEDURE — 63600175 PHARM REV CODE 636 W HCPCS: Performed by: STUDENT IN AN ORGANIZED HEALTH CARE EDUCATION/TRAINING PROGRAM

## 2023-04-03 PROCEDURE — 96365 THER/PROPH/DIAG IV INF INIT: CPT

## 2023-04-03 PROCEDURE — 96375 TX/PRO/DX INJ NEW DRUG ADDON: CPT

## 2023-04-03 PROCEDURE — 99900035 HC TECH TIME PER 15 MIN (STAT)

## 2023-04-03 PROCEDURE — 99285 EMERGENCY DEPT VISIT HI MDM: CPT | Mod: 25

## 2023-04-03 PROCEDURE — 94761 N-INVAS EAR/PLS OXIMETRY MLT: CPT

## 2023-04-03 PROCEDURE — 25000003 PHARM REV CODE 250: Performed by: STUDENT IN AN ORGANIZED HEALTH CARE EDUCATION/TRAINING PROGRAM

## 2023-04-03 PROCEDURE — 80053 COMPREHEN METABOLIC PANEL: CPT | Performed by: STUDENT IN AN ORGANIZED HEALTH CARE EDUCATION/TRAINING PROGRAM

## 2023-04-03 PROCEDURE — 25000003 PHARM REV CODE 250: Performed by: INTERNAL MEDICINE

## 2023-04-03 PROCEDURE — 84484 ASSAY OF TROPONIN QUANT: CPT | Performed by: STUDENT IN AN ORGANIZED HEALTH CARE EDUCATION/TRAINING PROGRAM

## 2023-04-03 PROCEDURE — 94640 AIRWAY INHALATION TREATMENT: CPT | Mod: XB

## 2023-04-03 PROCEDURE — 25000242 PHARM REV CODE 250 ALT 637 W/ HCPCS: Performed by: STUDENT IN AN ORGANIZED HEALTH CARE EDUCATION/TRAINING PROGRAM

## 2023-04-03 PROCEDURE — 93005 ELECTROCARDIOGRAM TRACING: CPT

## 2023-04-03 PROCEDURE — G0378 HOSPITAL OBSERVATION PER HR: HCPCS

## 2023-04-03 PROCEDURE — 93010 EKG 12-LEAD: ICD-10-PCS | Mod: ,,, | Performed by: INTERNAL MEDICINE

## 2023-04-03 PROCEDURE — 87502 INFLUENZA DNA AMP PROBE: CPT | Performed by: STUDENT IN AN ORGANIZED HEALTH CARE EDUCATION/TRAINING PROGRAM

## 2023-04-03 PROCEDURE — 27000221 HC OXYGEN, UP TO 24 HOURS

## 2023-04-03 PROCEDURE — U0002 COVID-19 LAB TEST NON-CDC: HCPCS | Performed by: STUDENT IN AN ORGANIZED HEALTH CARE EDUCATION/TRAINING PROGRAM

## 2023-04-03 PROCEDURE — 83880 ASSAY OF NATRIURETIC PEPTIDE: CPT | Performed by: STUDENT IN AN ORGANIZED HEALTH CARE EDUCATION/TRAINING PROGRAM

## 2023-04-03 PROCEDURE — 93010 ELECTROCARDIOGRAM REPORT: CPT | Mod: ,,, | Performed by: INTERNAL MEDICINE

## 2023-04-03 PROCEDURE — 85007 BL SMEAR W/DIFF WBC COUNT: CPT | Performed by: STUDENT IN AN ORGANIZED HEALTH CARE EDUCATION/TRAINING PROGRAM

## 2023-04-03 PROCEDURE — 36415 COLL VENOUS BLD VENIPUNCTURE: CPT | Performed by: STUDENT IN AN ORGANIZED HEALTH CARE EDUCATION/TRAINING PROGRAM

## 2023-04-03 PROCEDURE — 25000242 PHARM REV CODE 250 ALT 637 W/ HCPCS: Performed by: INTERNAL MEDICINE

## 2023-04-03 PROCEDURE — 85027 COMPLETE CBC AUTOMATED: CPT | Performed by: STUDENT IN AN ORGANIZED HEALTH CARE EDUCATION/TRAINING PROGRAM

## 2023-04-03 RX ORDER — IPRATROPIUM BROMIDE AND ALBUTEROL SULFATE 2.5; .5 MG/3ML; MG/3ML
3 SOLUTION RESPIRATORY (INHALATION) EVERY 6 HOURS
Status: DISCONTINUED | OUTPATIENT
Start: 2023-04-03 | End: 2023-04-07 | Stop reason: HOSPADM

## 2023-04-03 RX ORDER — PREDNISONE 10 MG/1
10 TABLET ORAL DAILY
Status: DISCONTINUED | OUTPATIENT
Start: 2023-04-04 | End: 2023-04-07 | Stop reason: HOSPADM

## 2023-04-03 RX ORDER — SODIUM CHLORIDE 0.9 % (FLUSH) 0.9 %
10 SYRINGE (ML) INJECTION
Status: DISCONTINUED | OUTPATIENT
Start: 2023-04-03 | End: 2023-04-07 | Stop reason: HOSPADM

## 2023-04-03 RX ORDER — ONDANSETRON 8 MG/1
8 TABLET, ORALLY DISINTEGRATING ORAL EVERY 8 HOURS PRN
Status: DISCONTINUED | OUTPATIENT
Start: 2023-04-03 | End: 2023-04-07 | Stop reason: HOSPADM

## 2023-04-03 RX ORDER — ACETAMINOPHEN 325 MG/1
650 TABLET ORAL EVERY 8 HOURS PRN
Status: DISCONTINUED | OUTPATIENT
Start: 2023-04-03 | End: 2023-04-07 | Stop reason: HOSPADM

## 2023-04-03 RX ORDER — VALSARTAN 40 MG/1
40 TABLET ORAL 2 TIMES DAILY
Status: DISCONTINUED | OUTPATIENT
Start: 2023-04-03 | End: 2023-04-03

## 2023-04-03 RX ORDER — ESCITALOPRAM OXALATE 5 MG/1
5 TABLET ORAL DAILY
Status: DISCONTINUED | OUTPATIENT
Start: 2023-04-04 | End: 2023-04-07 | Stop reason: HOSPADM

## 2023-04-03 RX ORDER — IPRATROPIUM BROMIDE AND ALBUTEROL SULFATE 2.5; .5 MG/3ML; MG/3ML
3 SOLUTION RESPIRATORY (INHALATION)
Status: COMPLETED | OUTPATIENT
Start: 2023-04-03 | End: 2023-04-03

## 2023-04-03 RX ORDER — GUAIFENESIN 600 MG/1
600 TABLET, EXTENDED RELEASE ORAL 2 TIMES DAILY
Status: DISCONTINUED | OUTPATIENT
Start: 2023-04-03 | End: 2023-04-07 | Stop reason: HOSPADM

## 2023-04-03 RX ORDER — GABAPENTIN 100 MG/1
100 CAPSULE ORAL NIGHTLY
Status: DISCONTINUED | OUTPATIENT
Start: 2023-04-03 | End: 2023-04-07 | Stop reason: HOSPADM

## 2023-04-03 RX ORDER — ALPRAZOLAM 0.25 MG/1
0.25 TABLET ORAL 3 TIMES DAILY PRN
Status: DISCONTINUED | OUTPATIENT
Start: 2023-04-03 | End: 2023-04-06

## 2023-04-03 RX ORDER — ERGOCALCIFEROL 1.25 MG/1
50000 CAPSULE ORAL
COMMUNITY
End: 2023-06-28 | Stop reason: SDUPTHER

## 2023-04-03 RX ORDER — MONTELUKAST SODIUM 10 MG/1
10 TABLET ORAL DAILY
Status: DISCONTINUED | OUTPATIENT
Start: 2023-04-03 | End: 2023-04-07 | Stop reason: HOSPADM

## 2023-04-03 RX ORDER — VALSARTAN 40 MG/1
40 TABLET ORAL DAILY
Status: DISCONTINUED | OUTPATIENT
Start: 2023-04-04 | End: 2023-04-07 | Stop reason: HOSPADM

## 2023-04-03 RX ORDER — LEVOFLOXACIN 5 MG/ML
750 INJECTION, SOLUTION INTRAVENOUS
Status: DISCONTINUED | OUTPATIENT
Start: 2023-04-03 | End: 2023-04-07 | Stop reason: HOSPADM

## 2023-04-03 RX ORDER — TALC
6 POWDER (GRAM) TOPICAL NIGHTLY PRN
Status: DISCONTINUED | OUTPATIENT
Start: 2023-04-03 | End: 2023-04-07 | Stop reason: HOSPADM

## 2023-04-03 RX ORDER — MAGNESIUM SULFATE HEPTAHYDRATE 40 MG/ML
2 INJECTION, SOLUTION INTRAVENOUS
Status: COMPLETED | OUTPATIENT
Start: 2023-04-03 | End: 2023-04-03

## 2023-04-03 RX ORDER — VALSARTAN 80 MG/1
80 TABLET ORAL
COMMUNITY
Start: 2023-03-27

## 2023-04-03 RX ADMIN — DILTIAZEM HYDROCHLORIDE 90 MG: 60 TABLET, FILM COATED ORAL at 09:04

## 2023-04-03 RX ADMIN — IPRATROPIUM BROMIDE AND ALBUTEROL SULFATE 3 ML: 2.5; .5 SOLUTION RESPIRATORY (INHALATION) at 11:04

## 2023-04-03 RX ADMIN — MONTELUKAST SODIUM 10 MG: 10 TABLET, FILM COATED ORAL at 05:04

## 2023-04-03 RX ADMIN — Medication 6 MG: at 11:04

## 2023-04-03 RX ADMIN — ALPRAZOLAM 0.25 MG: 0.25 TABLET ORAL at 09:04

## 2023-04-03 RX ADMIN — APIXABAN 2.5 MG: 2.5 TABLET, FILM COATED ORAL at 09:04

## 2023-04-03 RX ADMIN — IPRATROPIUM BROMIDE AND ALBUTEROL SULFATE 3 ML: 2.5; .5 SOLUTION RESPIRATORY (INHALATION) at 06:04

## 2023-04-03 RX ADMIN — MAGNESIUM SULFATE HEPTAHYDRATE 2 G: 40 INJECTION, SOLUTION INTRAVENOUS at 11:04

## 2023-04-03 RX ADMIN — GABAPENTIN 100 MG: 100 CAPSULE ORAL at 09:04

## 2023-04-03 RX ADMIN — LEVOFLOXACIN 750 MG: 750 INJECTION, SOLUTION INTRAVENOUS at 01:04

## 2023-04-03 RX ADMIN — GUAIFENESIN 600 MG: 600 TABLET, EXTENDED RELEASE ORAL at 09:04

## 2023-04-03 NOTE — ED TRIAGE NOTES
To ED per [facility] with c/o SOB. Patient received a Albuterol at home and Solumedrol 125 mg IVP per EMS. Patient on her home CPAP on arrival.

## 2023-04-03 NOTE — PLAN OF CARE
VS stable. Pt free and safe from falls.     Problem: Respiratory Compromise (Pneumonia)  Goal: Effective Oxygenation and Ventilation  Outcome: Ongoing, Progressing     Problem: Infection (Pneumonia)  Goal: Resolution of Infection Signs and Symptoms  Outcome: Ongoing, Progressing     Problem: Fluid Imbalance (Pneumonia)  Goal: Fluid Balance  Outcome: Ongoing, Progressing

## 2023-04-03 NOTE — ED PROVIDER NOTES
Encounter Date: 4/3/2023       History     Chief Complaint   Patient presents with    Shortness of Breath     82 year old female with a PMHx of COPD on home 3-4L of home O2, Afib on eliquis, HTN, HLD presents to the ED via EMS with respiratory distress. She was admitted 3/25/23 - 3/28/23. She reports shortness of breath worsened since last night. No fevers, no cough, no chest pain. EMS reports home O2 sat was in the low 80s by home health nurse. Home health nurse gave a home breathing treatment. EMS placed the patient on CPAP when she was breathing in the 40s per minute with sats in the 80s.     She is DNR/DNI.      Review of patient's allergies indicates:   Allergen Reactions    Pcn [penicillins] Hives and Itching    Tetracyclines     Bactrim [sulfamethoxazole-trimethoprim] Rash    Ilosone Rash    Iodine and iodide containing products Rash    Sulfa (sulfonamide antibiotics) Hives and Rash     Past Medical History:   Diagnosis Date    Abnormal Pap smear 2013    LGSIL    Anticoagulant long-term use     Atrial fibrillation     COPD (chronic obstructive pulmonary disease)     Depression     GERD (gastroesophageal reflux disease)     Hyperlipidemia     Hypertension      Past Surgical History:   Procedure Laterality Date    APPENDECTOMY      BRONCHOSCOPY N/A 2019    Procedure: BRONCHOSCOPY;  Surgeon: Howard Matson MD;  Location: [facility] OR;  Service: Pulmonary;  Laterality: N/A;    CERVICAL BIOPSY  W/ LOOP ELECTRODE EXCISION      CHOLECYSTECTOMY      COLLATERAL LIGAMENT REPAIR, KNEE      left knee    COLONOSCOPY      DILATION AND CURETTAGE OF UTERUS      SINUS SURGERY       Family History   Problem Relation Age of Onset    Breast cancer Mother     Colon cancer Neg Hx     Ovarian cancer Neg Hx      Social History     Tobacco Use    Smoking status: Former     Years: 30.00     Types: Cigarettes     Quit date: 10/30/2006     Years since quittin.4    Smokeless tobacco: Never   Substance Use Topics    Alcohol use:  No     Comment: No    Drug use: No     Review of Systems   Constitutional:  Negative for chills and fever.   HENT:  Negative for congestion, rhinorrhea and sneezing.    Eyes:  Negative for discharge and redness.   Respiratory:  Positive for shortness of breath. Negative for cough.    Cardiovascular:  Negative for chest pain and palpitations.   Gastrointestinal:  Negative for abdominal pain, diarrhea, nausea and vomiting.   Genitourinary:  Negative for dysuria, frequency, vaginal bleeding and vaginal discharge.   Musculoskeletal:  Negative for back pain and neck pain.   Skin:  Negative for rash and wound.   Neurological:  Negative for weakness, numbness and headaches.     Physical Exam     Initial Vitals [04/03/23 1047]   BP Pulse Resp Temp SpO2   104/78 110 (!) 40 99.2 °F (37.3 °C) 100 %      MAP       --         Physical Exam    Nursing note and vitals reviewed.  Constitutional: She appears well-developed. She is not diaphoretic. No distress.   HENT:   Head: Normocephalic and atraumatic.   Right Ear: External ear normal.   Left Ear: External ear normal.   Eyes: Right eye exhibits no discharge. Left eye exhibits no discharge. No scleral icterus.   Neck: Neck supple.   Cardiovascular:  Regular rhythm.   Tachycardia present.         Pulmonary/Chest: Accessory muscle usage present. No stridor. Tachypnea noted. She is in respiratory distress. She has decreased breath sounds. She has no wheezes. She has no rhonchi. She has no rales.   Abdominal: Abdomen is soft. There is no abdominal tenderness. There is no guarding.   Musculoskeletal:         General: No edema.      Cervical back: Neck supple.     Neurological: She is alert and oriented to person, place, and time.   Skin: Skin is warm and dry. Capillary refill takes less than 2 seconds.   Psychiatric: She has a normal mood and affect.       ED Course   Critical Care    Date/Time: 4/3/2023 1:06 PM  Performed by: Drew Estevez DO  Authorized by: Drew Estevez DO   Direct  patient critical care time: 22 minutes  Additional history critical care time: 4 minutes  Ordering / reviewing critical care time: 3 minutes  Documentation critical care time: 3 minutes  Consulting other physicians critical care time: 3 minutes  Consult with family critical care time: 4 minutes  Total critical care time (exclusive of procedural time) : 39 minutes  Critical care time was exclusive of separately billable procedures and treating other patients and teaching time.  Critical care was necessary to treat or prevent imminent or life-threatening deterioration of the following conditions: respiratory failure.  Critical care was time spent personally by me on the following activities: development of treatment plan with patient or surrogate, discussions with consultants, evaluation of patient's response to treatment, examination of patient, obtaining history from patient or surrogate, ordering and performing treatments and interventions, ordering and review of laboratory studies, ordering and review of radiographic studies, re-evaluation of patient's condition, pulse oximetry, review of old charts and ventilator management.      Labs Reviewed   CBC W/ AUTO DIFFERENTIAL - Abnormal; Notable for the following components:       Result Value    MCHC 31.4 (*)     Lymph % 17.0 (*)     All other components within normal limits   COMPREHENSIVE METABOLIC PANEL - Abnormal; Notable for the following components:    CO2 30 (*)     Glucose 186 (*)     Albumin 2.7 (*)     All other components within normal limits   INFLUENZA A & B BY MOLECULAR   SARS-COV-2 RNA AMPLIFICATION, QUAL   TROPONIN I   B-TYPE NATRIURETIC PEPTIDE          Imaging Results              X-Ray Chest 1 View (Final result)  Result time 04/03/23 12:42:11      Final result by Geraldo Chandler MD (04/03/23 12:42:11)                   Impression:      As above.      Electronically signed by: Geraldo Chandler MD  Date:    04/03/2023  Time:    12:42               Narrative:     EXAMINATION:  XR CHEST 1 VIEW    CLINICAL HISTORY:  Shortness of breath    COMPARISON:  03/25/2023    FINDINGS:  Cardiac silhouette does not appear enlarged.  There is improved aeration at the left lower lung zone when compared to the previous exam with persistent prominent interstitial opacities.  Right lung is clear.  No obvious pleural effusion.                                    X-Rays:   Independently Interpreted Readings:   Other Readings:  Sinus tachycardia at 109 bpm. Normal axis. Normal intervals. No STEMI.  Medications   levoFLOXacin 750 mg/150 mL IVPB 750 mg (has no administration in time range)   albuterol-ipratropium 2.5 mg-0.5 mg/3 mL nebulizer solution 3 mL (3 mLs Nebulization Given 4/3/23 1121)   magnesium sulfate 2g in water 50mL IVPB (premix) (2 g Intravenous New Bag 4/3/23 1103)     Medical Decision Making:   Differential Diagnosis:   Differential considerations include (in no particular order): ACS, PE, CHF, COPD, Pneumothorax, Asthma, Pneumonia, Anemia, COVID-19    ED Management:  Based on the patient's evaluation, patient will need admission for likely COPD exacerbation. She is having increased work of breathing, tachypnea. Improved tremendously with back-to-back-to-back neb treatments, Mg, levaquin (for COPD exacerbation, no PNA on CXR). Had already received 125mg solumedrol by EMS.    However, she is not safe for discharge. Still having some tachypnea in the 20s-30s with mild increased work of breathing. Lung exam repeated still with decreased breath sounds.    I spoke with Dr. Matson who was happy to assist. Agrees to admit. Patient and daughter updated. She is DNR/DNI and verbally discussed this with me with paperwork to be signed.                         Clinical Impression:   Final diagnoses:  [R06.02] Shortness of breath  [J44.1] COPD with acute exacerbation (Primary)        ED Disposition Condition    Observation Stable                Drew Estevez DO  04/03/23 1310

## 2023-04-03 NOTE — H&P
Marva Perez is a 82 y.o. year old female that's presents with  COPD on home 3-4L of home O2, Afib on eliquis, HTN, HLD presents to the ER with respiratory distress. She was admitted 3/25/23 - 3/28/23. She states her shortness of breath worsened since last night. No fevers, no cough, no chest pain.Home O2 saturations were in the low 80's according to a home health nurse. Home health nurse gave a home breathing treatments.Ambulance placed her on CPAP when she was breathing in the 40's with sats in the 80's comes to er with a chief complaint of SOB and Wheezing for 2 days. History and physical exam to evaluate Respiratory status in house.    Past Medical History:   Diagnosis Date    Abnormal Pap smear 07/17/2013    LGSIL    Anticoagulant long-term use     Atrial fibrillation     COPD (chronic obstructive pulmonary disease)     Depression     GERD (gastroesophageal reflux disease)     Hyperlipidemia     Hypertension         Past Surgical History:   Procedure Laterality Date    APPENDECTOMY      BRONCHOSCOPY N/A 8/5/2019    Procedure: BRONCHOSCOPY;  Surgeon: Howard Matson MD;  Location: [facility] OR;  Service: Pulmonary;  Laterality: N/A;    CERVICAL BIOPSY  W/ LOOP ELECTRODE EXCISION      CHOLECYSTECTOMY      COLLATERAL LIGAMENT REPAIR, KNEE      left knee    COLONOSCOPY      DILATION AND CURETTAGE OF UTERUS      SINUS SURGERY         Prior to Admission medications    Medication Sig Start Date End Date Taking? Authorizing Provider   albuterol (PROVENTIL/VENTOLIN HFA) 90 mcg/actuation inhaler Inhale 2 puffs into the lungs every 6 (six) hours as needed for Wheezing. 3/21/19  Yes Kevin Clements MD   ALPRAZolam (XANAX) 0.25 MG tablet One po tid for anxiety 3/21/23  Yes Kevin Clements MD   apixaban (ELIQUIS) 2.5 mg Tab Take 2.5 mg by mouth 2 (two) times daily.   Yes Historical Provider   atenoloL (TENORMIN) 25 MG tablet Take 12.5 mg by mouth. 2/23/23  Yes Historical Provider   diltiaZEM (CARDIZEM) 120 MG tablet  Take 120 mg by mouth 2 (two) times daily. Take half tablet by mouth two times a day 11/7/19  Yes Historical Provider   ergocalciferol (VITAMIN D2) 50,000 unit Cap Take 50,000 Units by mouth every 7 days.   Yes Historical Provider   EScitalopram oxalate (LEXAPRO) 5 MG Tab Take 1 tablet (5 mg total) by mouth once daily. 1/3/23  Yes Kevin Clements MD   flecainide (TAMBOCOR) 50 MG Tab Take 50 mg by mouth 2 (two) times daily. 11/13/19  Yes Historical Provider   gabapentin (NEURONTIN) 100 MG capsule Take 1 capsule (100 mg total) by mouth 2 (two) times daily.  Patient taking differently: Take 100 mg by mouth 2 (two) times daily. nightly 2/17/23 2/17/24 Yes Consuelo Pitt NP   metoclopramide HCl (REGLAN) 10 MG tablet Take 1 tablet (10 mg total) by mouth every 6 (six) hours.  Patient taking differently: Take 10 mg by mouth every 6 (six) hours. PRN 8/23/22  Yes Drew Estevez DO   predniSONE (DELTASONE) 5 MG tablet Take 5 mg by mouth. 3/5/23  Yes Historical Provider   TRELEGY ELLIPTA 100-62.5-25 mcg DsDv TAKE 1 PUFF BY MOUTH EVERY DAY 11/1/19  Yes Historical Provider   valsartan (DIOVAN) 80 MG tablet Take 80 mg by mouth. 3/27/23  Yes Historical Provider   levalbuterol (XOPENEX) 0.63 mg/3 mL nebulizer solution Take 3 mLs (0.63 mg total) by nebulization every 4 (four) hours as needed for Wheezing. 5/28/20 3/21/23  Nichelle Llanos NP   teriparatide (FORTEO) 20 mcg/dose (600mcg/2.4mL) PnIj One daily injection for 2 years 3/21/23   Kevin Clements MD   busPIRone (BUSPAR) 5 MG Tab Take 1 tablet (5 mg total) by mouth 2 (two) times daily. 2/20/23 4/3/23  Arik Burleson MD   LIDOcaine (LIDODERM) 5 % Place 1 patch onto the skin once daily. Remove & Discard patch within 12 hours or as directed by MD 2/10/23 4/3/23  Kyle Piedra MD   REPATHA SURECLICK 140 mg/mL PnIj Inject 140 mg into the skin every 14 (fourteen) days. 6/4/21 4/3/23  Historical Provider       Social History     Socioeconomic History     "Marital status:    Tobacco Use    Smoking status: Former     Years: 00     Types: Cigarettes     Quit date: 10/30/2006     Years since quittin.4    Smokeless tobacco: Never   Substance and Sexual Activity    Alcohol use: No     Comment: No    Drug use: No    Sexual activity: Not Currently     Birth control/protection: Post-menopausal     Comment:      Social Determinants of Health     Financial Resource Strain: Low Risk     Difficulty of Paying Living Expenses: Not hard at all   Food Insecurity: No Food Insecurity    Worried About Running Out of Food in the Last Year: Never true    Ran Out of Food in the Last Year: Never true   Transportation Needs: No Transportation Needs    Lack of Transportation (Medical): No    Lack of Transportation (Non-Medical): No   Housing Stability: Low Risk     Unable to Pay for Housing in the Last Year: No    Number of Places Lived in the Last Year: 1    Unstable Housing in the Last Year: No       Family History   Problem Relation Age of Onset    Breast cancer Mother     Colon cancer Neg Hx     Ovarian cancer Neg Hx        Review of patient's allergies indicates:   Allergen Reactions    Pcn [penicillins] Hives and Itching    Tetracyclines     Bactrim [sulfamethoxazole-trimethoprim] Rash    Ilosone Rash    Iodine and iodide containing products Rash    Sulfa (sulfonamide antibiotics) Hives and Rash    Allergies have been reviewed.     ROS: ROS    PE:   Vitals:    23 1200 23 1245 23 1331 23 1339   BP: 124/60 (!) 109/55 (!) 123/58    BP Location:  Right arm Right arm    Patient Position:  Lying Lying    Pulse: 97 87 88    Resp: (!) 22 (!) 26 (!) 24    Temp:   97.7 °F (36.5 °C)    TempSrc:   Oral    SpO2: 99% 97% 95%    Weight:    51.3 kg (113 lb)   Height:    5' 2" (1.575 m)    Physical Exam    Alert and orientated X 3   HEENT: Head: Normocephalic no trauma                Ears : Normal Pinna No Drainage no Battles sign                Eyes: Vision " Unchanged, No conjunctivitis,No drainage                Neck: Supple, No JVD,No Abnormal Carotid Pulsations                Throat: No Erythema, No pus,No Swelling,Mallampati score= 3    Chest: Decreased BS throughout with poor air entry bilateral   Cardiac: RRR S1+ S2 with a -S3: +M = 2/6, No R/H/G  Abdomen: Bowel Sounds are Normal.Soft Abdomen. No organomegaly of Liver,Spleen,or Kidneys   CNS: Non focal and intact. Cranial nerves 2, 346,8,9,10 and 12 are normal.Norrmal gait.Normal posture.  Extremities: No Clubbing,No Cyanosis with oxygen,Positive mild edema of lower extremities Bilateral  Skin: No Rash, No Ulcerative sores,and No cellulitis of the IV site.    Lab Results   Component Value Date    WBC 11.63 04/03/2023    HGB 12.5 04/03/2023    HCT 39.8 04/03/2023     04/03/2023    CHOL 348 (H) 11/04/2022    TRIG 125 11/04/2022    HDL 90 (H) 11/04/2022    ALT 17 04/03/2023    AST 14 04/03/2023     04/03/2023    K 3.7 04/03/2023    CL 98 04/03/2023    CREATININE 0.9 04/03/2023    BUN 19 04/03/2023    CO2 30 (H) 04/03/2023    TSH 3.253 10/21/2021    INR 1.2 08/23/2022       1) Acute Respiratory Failure   2) Chronic Respiratory Failure   3) Emphysema   4) Acute Respiratory Distress   5) History of Smoking     Admit and follow o2 sats and cxr her oxygen on avaps ae on floor is 95% on 35% will follow     [facility]   Pulmonary

## 2023-04-04 PROBLEM — J20.8 ACUTE BRONCHITIS DUE TO OTHER SPECIFIED ORGANISMS: Status: ACTIVE | Noted: 2023-04-04

## 2023-04-04 LAB
ALBUMIN SERPL BCP-MCNC: 2.4 G/DL (ref 3.5–5.2)
ALP SERPL-CCNC: 66 U/L (ref 55–135)
ALT SERPL W/O P-5'-P-CCNC: 17 U/L (ref 10–44)
ANION GAP SERPL CALC-SCNC: 14 MMOL/L (ref 8–16)
AST SERPL-CCNC: 12 U/L (ref 10–40)
BASOPHILS # BLD AUTO: ABNORMAL K/UL (ref 0–0.2)
BASOPHILS NFR BLD: 0 % (ref 0–1.9)
BILIRUB SERPL-MCNC: 0.3 MG/DL (ref 0.1–1)
BUN SERPL-MCNC: 26 MG/DL (ref 8–23)
CALCIUM SERPL-MCNC: 9.2 MG/DL (ref 8.7–10.5)
CHLORIDE SERPL-SCNC: 99 MMOL/L (ref 95–110)
CO2 SERPL-SCNC: 28 MMOL/L (ref 23–29)
CREAT SERPL-MCNC: 1 MG/DL (ref 0.5–1.4)
DACRYOCYTES BLD QL SMEAR: ABNORMAL
DIFFERENTIAL METHOD: ABNORMAL
EOSINOPHIL # BLD AUTO: ABNORMAL K/UL (ref 0–0.5)
EOSINOPHIL NFR BLD: 0 % (ref 0–8)
ERYTHROCYTE [DISTWIDTH] IN BLOOD BY AUTOMATED COUNT: 12.9 % (ref 11.5–14.5)
EST. GFR  (NO RACE VARIABLE): 57 ML/MIN/1.73 M^2
GLUCOSE SERPL-MCNC: 173 MG/DL (ref 70–110)
HCT VFR BLD AUTO: 34.3 % (ref 37–48.5)
HGB BLD-MCNC: 10.8 G/DL (ref 12–16)
IMM GRANULOCYTES # BLD AUTO: ABNORMAL K/UL (ref 0–0.04)
IMM GRANULOCYTES NFR BLD AUTO: ABNORMAL % (ref 0–0.5)
LYMPHOCYTES # BLD AUTO: ABNORMAL K/UL (ref 1–4.8)
LYMPHOCYTES NFR BLD: 4 % (ref 18–48)
MCH RBC QN AUTO: 30 PG (ref 27–31)
MCHC RBC AUTO-ENTMCNC: 31.5 G/DL (ref 32–36)
MCV RBC AUTO: 95 FL (ref 82–98)
METAMYELOCYTES NFR BLD MANUAL: 1 %
MONOCYTES # BLD AUTO: ABNORMAL K/UL (ref 0.3–1)
MONOCYTES NFR BLD: 2 % (ref 4–15)
NEUTROPHILS NFR BLD: 86 % (ref 38–73)
NEUTS BAND NFR BLD MANUAL: 7 %
NRBC BLD-RTO: 0 /100 WBC
PLATELET # BLD AUTO: 208 K/UL (ref 150–450)
PLATELET BLD QL SMEAR: ABNORMAL
PMV BLD AUTO: 9.6 FL (ref 9.2–12.9)
POTASSIUM SERPL-SCNC: 4.8 MMOL/L (ref 3.5–5.1)
PROT SERPL-MCNC: 5.7 G/DL (ref 6–8.4)
RBC # BLD AUTO: 3.6 M/UL (ref 4–5.4)
SODIUM SERPL-SCNC: 141 MMOL/L (ref 136–145)
SPHEROCYTES BLD QL SMEAR: ABNORMAL
WBC # BLD AUTO: 7.61 K/UL (ref 3.9–12.7)

## 2023-04-04 PROCEDURE — 63600175 PHARM REV CODE 636 W HCPCS: Performed by: INTERNAL MEDICINE

## 2023-04-04 PROCEDURE — 80053 COMPREHEN METABOLIC PANEL: CPT | Performed by: STUDENT IN AN ORGANIZED HEALTH CARE EDUCATION/TRAINING PROGRAM

## 2023-04-04 PROCEDURE — 85027 COMPLETE CBC AUTOMATED: CPT | Performed by: STUDENT IN AN ORGANIZED HEALTH CARE EDUCATION/TRAINING PROGRAM

## 2023-04-04 PROCEDURE — 94660 CPAP INITIATION&MGMT: CPT

## 2023-04-04 PROCEDURE — 11000001 HC ACUTE MED/SURG PRIVATE ROOM

## 2023-04-04 PROCEDURE — 25000242 PHARM REV CODE 250 ALT 637 W/ HCPCS: Performed by: INTERNAL MEDICINE

## 2023-04-04 PROCEDURE — 99900031 HC PATIENT EDUCATION (STAT)

## 2023-04-04 PROCEDURE — 27000221 HC OXYGEN, UP TO 24 HOURS

## 2023-04-04 PROCEDURE — 25000003 PHARM REV CODE 250: Performed by: STUDENT IN AN ORGANIZED HEALTH CARE EDUCATION/TRAINING PROGRAM

## 2023-04-04 PROCEDURE — 25000003 PHARM REV CODE 250: Performed by: INTERNAL MEDICINE

## 2023-04-04 PROCEDURE — 94640 AIRWAY INHALATION TREATMENT: CPT

## 2023-04-04 PROCEDURE — 97530 THERAPEUTIC ACTIVITIES: CPT

## 2023-04-04 PROCEDURE — 94761 N-INVAS EAR/PLS OXIMETRY MLT: CPT

## 2023-04-04 PROCEDURE — 85007 BL SMEAR W/DIFF WBC COUNT: CPT | Performed by: STUDENT IN AN ORGANIZED HEALTH CARE EDUCATION/TRAINING PROGRAM

## 2023-04-04 PROCEDURE — 36415 COLL VENOUS BLD VENIPUNCTURE: CPT | Performed by: STUDENT IN AN ORGANIZED HEALTH CARE EDUCATION/TRAINING PROGRAM

## 2023-04-04 PROCEDURE — 97161 PT EVAL LOW COMPLEX 20 MIN: CPT

## 2023-04-04 PROCEDURE — 99900035 HC TECH TIME PER 15 MIN (STAT)

## 2023-04-04 RX ADMIN — IPRATROPIUM BROMIDE AND ALBUTEROL SULFATE 3 ML: 2.5; .5 SOLUTION RESPIRATORY (INHALATION) at 01:04

## 2023-04-04 RX ADMIN — Medication 6 MG: at 09:04

## 2023-04-04 RX ADMIN — GABAPENTIN 100 MG: 100 CAPSULE ORAL at 09:04

## 2023-04-04 RX ADMIN — PREDNISONE 10 MG: 10 TABLET ORAL at 08:04

## 2023-04-04 RX ADMIN — APIXABAN 2.5 MG: 2.5 TABLET, FILM COATED ORAL at 08:04

## 2023-04-04 RX ADMIN — GUAIFENESIN 600 MG: 600 TABLET, EXTENDED RELEASE ORAL at 09:04

## 2023-04-04 RX ADMIN — IPRATROPIUM BROMIDE AND ALBUTEROL SULFATE 3 ML: 2.5; .5 SOLUTION RESPIRATORY (INHALATION) at 12:04

## 2023-04-04 RX ADMIN — IPRATROPIUM BROMIDE AND ALBUTEROL SULFATE 3 ML: 2.5; .5 SOLUTION RESPIRATORY (INHALATION) at 06:04

## 2023-04-04 RX ADMIN — VALSARTAN 40 MG: 40 TABLET, FILM COATED ORAL at 08:04

## 2023-04-04 RX ADMIN — MONTELUKAST SODIUM 10 MG: 10 TABLET, FILM COATED ORAL at 08:04

## 2023-04-04 RX ADMIN — DILTIAZEM HYDROCHLORIDE 90 MG: 60 TABLET, FILM COATED ORAL at 08:04

## 2023-04-04 RX ADMIN — GUAIFENESIN 600 MG: 600 TABLET, EXTENDED RELEASE ORAL at 08:04

## 2023-04-04 RX ADMIN — ESCITALOPRAM OXALATE 5 MG: 5 TABLET, FILM COATED ORAL at 08:04

## 2023-04-04 RX ADMIN — DILTIAZEM HYDROCHLORIDE 90 MG: 60 TABLET, FILM COATED ORAL at 09:04

## 2023-04-04 RX ADMIN — ALPRAZOLAM 0.25 MG: 0.25 TABLET ORAL at 06:04

## 2023-04-04 RX ADMIN — APIXABAN 2.5 MG: 2.5 TABLET, FILM COATED ORAL at 09:04

## 2023-04-04 NOTE — PROGRESS NOTES
[place] [facility] ([facility])  Pulmonology  Progress Note    Patient Name: Marva Perez  MRN: [id number]  Admission Date: 4/3/2023  Hospital Length of Stay: 0 days  Code Status: DNR  Attending Provider: Howard Matson MD  Primary Care Provider: Kevin Clements MD   Principal Problem: <principal problem not specified>    Subjective:     Interval History:Doing better up in a chair o2 sat 95% on 3 Liters o2       Objective:     Vital Signs (Most Recent):  Temp: 97.2 °F (36.2 °C) (04/04/23 0712)  Pulse: 78 (04/04/23 0712)  Resp: 20 (04/04/23 0712)  BP: 121/62 (04/04/23 0712)  SpO2: 97 % (04/04/23 0712) Vital Signs (24h Range):  Temp:  [97.2 °F (36.2 °C)-97.7 °F (36.5 °C)] 97.2 °F (36.2 °C)  Pulse:  [] 78  Resp:  [14-34] 20  SpO2:  [93 %-100 %] 97 %  BP: (109-156)/(55-67) 121/62     Weight: 51.3 kg (113 lb)  Body mass index is 20.67 kg/m².      Intake/Output Summary (Last 24 hours) at 4/4/2023 1120  Last data filed at 4/4/2023 0900  Gross per 24 hour   Intake 120 ml   Output --   Net 120 ml       Physical Exam  Vitals and nursing note reviewed.   Constitutional:       General: She is not in acute distress.     Appearance: Normal appearance. She is well-developed. She is not ill-appearing, toxic-appearing or diaphoretic.   HENT:      Head: Normocephalic and atraumatic.      Jaw: No trismus.      Right Ear: Hearing, tympanic membrane, ear canal and external ear normal.      Left Ear: Hearing, tympanic membrane, ear canal and external ear normal.      Nose: Nose normal. No nasal deformity, mucosal edema or rhinorrhea.      Right Sinus: No maxillary sinus tenderness or frontal sinus tenderness.      Left Sinus: No maxillary sinus tenderness or frontal sinus tenderness.      Mouth/Throat:      Dentition: Normal dentition.      Pharynx: Uvula midline. No posterior oropharyngeal erythema or uvula swelling.   Eyes:      General: Lids are normal. No scleral icterus.     Conjunctiva/sclera: Conjunctivae normal.       Comments: Sclera clear bilat   Neck:      Trachea: Trachea and phonation normal.   Cardiovascular:      Rate and Rhythm: Normal rate and regular rhythm.      Pulses: Normal pulses.      Heart sounds: Normal heart sounds.   Pulmonary:      Effort: Prolonged expiration and respiratory distress (mild to moderate) present.      Breath sounds: Decreased air movement present. Examination of the right-upper field reveals decreased breath sounds. Examination of the left-upper field reveals decreased breath sounds. Examination of the right-middle field reveals decreased breath sounds, wheezing and rhonchi. Examination of the left-middle field reveals decreased breath sounds, wheezing and rhonchi. Examination of the right-lower field reveals decreased breath sounds, wheezing and rhonchi. Examination of the left-lower field reveals decreased breath sounds, wheezing and rhonchi. Decreased breath sounds, wheezing and rhonchi present.       Abdominal:      General: Bowel sounds are normal. There is no distension.      Palpations: Abdomen is soft.      Tenderness: There is no abdominal tenderness.   Musculoskeletal:         General: No deformity. Normal range of motion.      Cervical back: Full passive range of motion without pain and neck supple.   Skin:     General: Skin is warm and dry.      Capillary Refill: Capillary refill takes less than 2 seconds.      Coloration: Skin is not pale.   Neurological:      Mental Status: She is alert and oriented to person, place, and time.      Motor: No abnormal muscle tone.      Coordination: Coordination normal.   Psychiatric:         Speech: Speech normal.         Behavior: Behavior normal. Behavior is cooperative.         Thought Content: Thought content normal.         Judgment: Judgment normal.     Review of Systems    Vents:  Oxygen Concentration (%): 35 (04/04/23 0656)    Lines/Drains/Airways       Peripheral Intravenous Line  Duration                  Peripheral IV - Single Lumen  04/03/23 1533 22 G Distal;Left;Posterior Forearm <1 day                    Significant Labs:    CBC/Anemia Profile:  Recent Labs   Lab 04/03/23  1059 04/04/23  0617   WBC 11.63 7.61   HGB 12.5 10.8*   HCT 39.8 34.3*    208   MCV 97 95   RDW 13.2 12.9        Chemistries:  Recent Labs   Lab 04/03/23  1059 04/04/23  0617    141   K 3.7 4.8   CL 98 99   CO2 30* 28   BUN 19 26*   CREATININE 0.9 1.0   CALCIUM 9.2 9.2   ALBUMIN 2.7* 2.4*   PROT 6.3 5.7*   BILITOT 0.6 0.3   ALKPHOS 74 66   ALT 17 17   AST 14 12       All pertinent labs within the past 24 hours have been reviewed.    Significant Imaging:  I have reviewed all pertinent imaging results/findings within the past 24 hours.    Assessment/Plan:     Pulmonary  Acute bronchitis due to other specified organisms  Now coughing up phlegm     Chronic respiratory failure  Patient with chronic hypoxic Respiratory failure which is chronic respiratory failure.  she is normal. Supplemental oxygen was provided and noted- Oxygen Concentration (%):  [35] 35    .   Signs/symptoms of respiratory failure include- copd work of breathing. Contributing diagnoses includes - COPD Labs and images were reviewed. Patient Has not had a recent ABG. Will treat underlying causes and adjust management of respiratory failure as follows- 4    Emphysema/COPD       Severe poor air entry but her usual     Bronchitis  Acute     Cardiac/Vascular  Chronic atrial fibrillation  Has  afib controlled ventricle rate     Other  Insomnia  Due to copd       Critical care time spent on the evaluation and treatment of severe organ dysfunction, review of pertinent labs and imaging studies, discussions with consulting providers and discussions with patient/family: 61 minutes.    Howard Matson MD  Pulmonology  [facility] - Med Surg (3rd Fl)

## 2023-04-04 NOTE — PLAN OF CARE
Patient was on bipap most of the night. V/S stable. Patient is safe and free from falls.   Problem: Adult Inpatient Plan of Care  Goal: Optimal Comfort and Wellbeing  Outcome: Ongoing, Progressing     Problem: Respiratory Compromise (Pneumonia)  Goal: Effective Oxygenation and Ventilation  Outcome: Ongoing, Progressing     Problem: Skin Injury Risk Increased  Goal: Skin Health and Integrity  Outcome: Ongoing, Progressing     Problem: Fall Injury Risk  Goal: Absence of Fall and Fall-Related Injury  Outcome: Ongoing, Progressing

## 2023-04-04 NOTE — PLAN OF CARE
VS stable.   Pt free and safe falls. AVAPS-AE ventilation utilized.   Problem: Fall Injury Risk  Goal: Absence of Fall and Fall-Related Injury  Outcome: Ongoing, Progressing     Problem: Skin Injury Risk Increased  Goal: Skin Health and Integrity  Outcome: Ongoing, Progressing     Problem: Respiratory Compromise (Pneumonia)  Goal: Effective Oxygenation and Ventilation  Outcome: Ongoing, Progressing

## 2023-04-04 NOTE — PLAN OF CARE
[place] - Med Surg ([facility])  Initial Discharge Assessment       Primary Care Provider: Kevin Clements MD    Admission Diagnosis: Shortness of breath [R06.02]  COPD with acute exacerbation [J44.1]    Admission Date: 4/3/2023  Expected Discharge Date:     Discharge Barriers Identified: None    Payor: [employer] MANAGED MEDICARE / Plan: [employer] MEDICARE PLAN PPO / Product Type: Medicare Advantage /     Extended Emergency Contact Information  Primary Emergency Contact: Mckenna Larson  Mobile Phone: [phone]  Relation: Daughter  Secondary Emergency Contact: Micha Berg  Address: [address] [place] [address] [place]  Work Phone: [phone]  Mobile Phone: [phone]  Relation: Son    Discharge Plan A: Home, Home Health  Discharge Plan B: Home, Home Health      [id number] - [place] - [address]  [address]  [facility] [address]  Phone: [phone] Fax: [phone]    [facility]  for [place] [address]  Phone: [phone] Fax: [phone]    [facility] - [place] [address]  Phone: [phone] Fax: [phone]      Initial Assessment (most recent)       Adult Discharge Assessment - 04/04/23 1305          Discharge Assessment    Assessment Type Discharge Planning Assessment     Confirmed/corrected address, phone number and insurance Yes     Confirmed Demographics Correct on Facesheet     Source of Information patient     Communicated ROSAURA with patient/caregiver Date not available/Unable to determine     Reason For Admission COPD     People in Home alone     Facility Arrived From: Home     Do you expect to return to your current living situation? Yes     Do you have help at home or someone to help you manage your care at home? Yes     Who are your caregiver(s) and their phone number(s)? Mckenna Larson (Daughter)  [phone]     Prior to hospitilization cognitive status: Alert/Oriented     Current cognitive status: Alert/Oriented     Walking or Climbing Stairs ambulation difficulty, requires equipment     Dressing/Bathing bathing difficulty, requires equipment     Equipment Currently Used at Home bedside commode;ventilator;shower chair;oxygen;wheelchair;walker, rolling     Readmission within 30 days? Yes     Do you currently have service(s) that help you manage your care at home? Yes     Name and Contact number of agency [facility]     Is the pt/caregiver preference to resume services with current agency Yes     Do you take prescription medications? Yes     Do you have prescription coverage? Yes     Coverage [employer] Medicare     Do you have any problems affording any of your prescribed medications? No     How do you get to doctors appointments? family or friend will provide     Are you on dialysis? No     Do you take coumadin? No     Discharge Plan A Home;Home Health     Discharge Plan B Home;Home Health     DME Needed Upon Discharge  none     Discharge Plan discussed with: Patient     Discharge Barriers Identified None                      Discharge assessment completed with patient. Denies any post-acute care needs at this time. Patient with plans to return home at discharge with daily sitters and [facility]. SW to remain available.

## 2023-04-04 NOTE — PROGRESS NOTES
"Food & Nutrition  Education    Diet Education: COPD Nutrition Therapy  Time Spent: 20 mins  Learners: Pt; caregiver      Nutrition Education provided with handouts:     1) Gave pt "Chronic Obstructive Pulmonary Disease (COPD) Nutrition Therapy" handout from the [facility]    2) Mealtime strategies    3) Choosing food & beverages high in nutrients and calories    4) Importance of hydration    5) Foods to choose    6) COPD sample 1-day menu    Comments:    81 yo F pt on reg diet with COPD requested education on COPD nutrition. Visited pt and provided materials. Pt seemed to adequately understand the education. Pt was concerned about getting the information to her daughter, who does all of the pt's grocery shopping and food prep, but only visits on Sundays. Left 2 copies of the handout, one for the caregiver and one to be given to the pt's daughter.       All questions and concerns answered. Directed pt to kitchen phone number listed in menu if any further questions arise.     Nutrition Problem  Food and nutrition related knowledge deficit    Related to (etiology):   No prior COPD nutrition education    Signs and Symptoms (as evidenced by):   Pt requested COPD nutrition education; states that she does not know what foods to eat    Interventions/Recommendations (treatment strategy):    1) Gave pt education on COPD nutrition therapy    2) RD to follow for any nutrition related changes    Nutrition Diagnosis Status:   New      Follow-Up:  N/A  Please consult as needed        Thanks!    "

## 2023-04-04 NOTE — SUBJECTIVE & OBJECTIVE
Interval History:Doing better up in a chair o2 sat 95% on 3 Liters o2       Objective:     Vital Signs (Most Recent):  Temp: 97.2 °F (36.2 °C) (04/04/23 0712)  Pulse: 78 (04/04/23 0712)  Resp: 20 (04/04/23 0712)  BP: 121/62 (04/04/23 0712)  SpO2: 97 % (04/04/23 0712) Vital Signs (24h Range):  Temp:  [97.2 °F (36.2 °C)-97.7 °F (36.5 °C)] 97.2 °F (36.2 °C)  Pulse:  [] 78  Resp:  [14-34] 20  SpO2:  [93 %-100 %] 97 %  BP: (109-156)/(55-67) 121/62     Weight: 51.3 kg (113 lb)  Body mass index is 20.67 kg/m².      Intake/Output Summary (Last 24 hours) at 4/4/2023 1120  Last data filed at 4/4/2023 0900  Gross per 24 hour   Intake 120 ml   Output --   Net 120 ml       Physical Exam  Vitals and nursing note reviewed.   Constitutional:       General: She is not in acute distress.     Appearance: Normal appearance. She is well-developed. She is not ill-appearing, toxic-appearing or diaphoretic.   HENT:      Head: Normocephalic and atraumatic.      Jaw: No trismus.      Right Ear: Hearing, tympanic membrane, ear canal and external ear normal.      Left Ear: Hearing, tympanic membrane, ear canal and external ear normal.      Nose: Nose normal. No nasal deformity, mucosal edema or rhinorrhea.      Right Sinus: No maxillary sinus tenderness or frontal sinus tenderness.      Left Sinus: No maxillary sinus tenderness or frontal sinus tenderness.      Mouth/Throat:      Dentition: Normal dentition.      Pharynx: Uvula midline. No posterior oropharyngeal erythema or uvula swelling.   Eyes:      General: Lids are normal. No scleral icterus.     Conjunctiva/sclera: Conjunctivae normal.      Comments: Sclera clear bilat   Neck:      Trachea: Trachea and phonation normal.   Cardiovascular:      Rate and Rhythm: Normal rate and regular rhythm.      Pulses: Normal pulses.      Heart sounds: Normal heart sounds.   Pulmonary:      Effort: Prolonged expiration and respiratory distress (mild to moderate) present.      Breath sounds:  Decreased air movement present. Examination of the right-upper field reveals decreased breath sounds. Examination of the left-upper field reveals decreased breath sounds. Examination of the right-middle field reveals decreased breath sounds, wheezing and rhonchi. Examination of the left-middle field reveals decreased breath sounds, wheezing and rhonchi. Examination of the right-lower field reveals decreased breath sounds, wheezing and rhonchi. Examination of the left-lower field reveals decreased breath sounds, wheezing and rhonchi. Decreased breath sounds, wheezing and rhonchi present.       Abdominal:      General: Bowel sounds are normal. There is no distension.      Palpations: Abdomen is soft.      Tenderness: There is no abdominal tenderness.   Musculoskeletal:         General: No deformity. Normal range of motion.      Cervical back: Full passive range of motion without pain and neck supple.   Skin:     General: Skin is warm and dry.      Capillary Refill: Capillary refill takes less than 2 seconds.      Coloration: Skin is not pale.   Neurological:      Mental Status: She is alert and oriented to person, place, and time.      Motor: No abnormal muscle tone.      Coordination: Coordination normal.   Psychiatric:         Speech: Speech normal.         Behavior: Behavior normal. Behavior is cooperative.         Thought Content: Thought content normal.         Judgment: Judgment normal.     Review of Systems    Vents:  Oxygen Concentration (%): 35 (04/04/23 0656)    Lines/Drains/Airways       Peripheral Intravenous Line  Duration                  Peripheral IV - Single Lumen 04/03/23 1533 22 G Distal;Left;Posterior Forearm <1 day                    Significant Labs:    CBC/Anemia Profile:  Recent Labs   Lab 04/03/23  1059 04/04/23  0617   WBC 11.63 7.61   HGB 12.5 10.8*   HCT 39.8 34.3*    208   MCV 97 95   RDW 13.2 12.9        Chemistries:  Recent Labs   Lab 04/03/23  1059 04/04/23  0617    141   K  3.7 4.8   CL 98 99   CO2 30* 28   BUN 19 26*   CREATININE 0.9 1.0   CALCIUM 9.2 9.2   ALBUMIN 2.7* 2.4*   PROT 6.3 5.7*   BILITOT 0.6 0.3   ALKPHOS 74 66   ALT 17 17   AST 14 12       All pertinent labs within the past 24 hours have been reviewed.    Significant Imaging:  I have reviewed all pertinent imaging results/findings within the past 24 hours.

## 2023-04-04 NOTE — ASSESSMENT & PLAN NOTE
Patient with chronic hypoxic Respiratory failure which is chronic respiratory failure.  she is normal. Supplemental oxygen was provided and noted- Oxygen Concentration (%):  [35] 35    .   Signs/symptoms of respiratory failure include- copd work of breathing. Contributing diagnoses includes - COPD Labs and images were reviewed. Patient Has not had a recent ABG. Will treat underlying causes and adjust management of respiratory failure as follows- 4

## 2023-04-04 NOTE — NURSING
Dr. Matson was notified of patient's concerns about her night meds. Most were reordered with the exception of flecainide due to interactions with diltiazem. Gabapentin was only ordered for 100mg at night. Diltiazem was decreased to 90mg.

## 2023-04-04 NOTE — PT/OT/SLP EVAL
"Physical Therapy Evaluation and Discharge Note    Patient Name:  Marva Perez   MRN:  [id number]    Recommendations:     Discharge Recommendations: home with home health, home health PT  Discharge Equipment Recommendations: none   Barriers to discharge: None and Pt has day sitter at home.    Assessment:     Marva Perez is a 82 y.o. female admitted with a medical diagnosis of SOB; COPD. .  At this time, patient is functioning at their prior level of function and does not require further acute PT services.     Recent Surgery: * No surgery found *      Plan:     During this hospitalization, patient does not require further acute PT services.  Please re-consult if situation changes.      Subjective     Chief Complaint: SOB upon exertion.  Patient/Family Comments/goals: To return home today".  Pain/Comfort:  Pain Rating 1: 0/10  Pain Rating Post-Intervention 1: 0/10    Patients cultural, spiritual, Caodaism conflicts given the current situation:      Living Environment:  Patient lives alone in a [facility]. Pt has day sitter at home.  Prior to admission, patients level of function was Independent/Mod Ind with basic ADLs and gait functions at home environment.  Equipment used at home: bedside commode, shower chair, ventilator, oxygen, walker, rolling, wheelchair.  DME owned (not currently used): none.  Upon discharge, patient will have assistance from sitter.    Objective:     Communicated with patient and caregiver prior to session.  Patient found HOB elevated with oxygen upon PT entry to room.    General Precautions: Standard, other (see comments) (standard)    Orthopedic Precautions:N/A   Braces: N/A  Respiratory Status: Nasal cannula, flow 4 L/min    Exams:  Cognitive Exam:  Patient is oriented to Person, Place, Time, and Situation  Fine Motor Coordination:    -       Intact  Gross Motor Coordination:  WFL  Postural Exam:  Patient presented with the following abnormalities:    -       Rounded shoulders  -       " Forward head  Skin Integrity/Edema:      -       Skin integrity: Visible skin intact  RUE ROM: WNL  RUE Strength: WFL  LUE ROM: WNL  LUE Strength: WFL  RLE ROM: WNL  RLE Strength: WFL  LLE ROM: WNL  LLE Strength: WFL    Functional Mobility:  Bed Mobility:     Rolling Left:  independence  Rolling Right: independence  Scooting: independence  Supine to Sit: independence  Sit to Supine: independence  Transfers:     Sit to Stand:  independence with no AD  Bed to Chair: independence with  no AD  using  Step Transfer  Gait: Modified Independent/Independent  x ~150 feet( hallway) using RW and portable O2. Noted SOB; Maintained O2 SAT at 94 - 96%.     AM-PAC 6 CLICK MOBILITY  Total Score:24       Treatment and Education:  PT eval. Educated patient  with caregiver present the safety measures in out of bed activity and proper breathing ex for SOB mgt. Initiated out of bed activity and gait functions using RW.     AM-PAC 6 CLICK MOBILITY  Total Score:24     Patient left up in chair with all lines intact, call button in reach, nursing  notified, and caregiver present.    GOALS:   Multidisciplinary Problems       Physical Therapy Goals       Not on file                    History:     Past Medical History:   Diagnosis Date    Abnormal Pap smear 07/17/2013    LGSIL    Anticoagulant long-term use     Atrial fibrillation     COPD (chronic obstructive pulmonary disease)     Depression     GERD (gastroesophageal reflux disease)     Hyperlipidemia     Hypertension        Past Surgical History:   Procedure Laterality Date    APPENDECTOMY      BRONCHOSCOPY N/A 8/5/2019    Procedure: BRONCHOSCOPY;  Surgeon: Howard Matson MD;  Location: [facility] OR;  Service: Pulmonary;  Laterality: N/A;    CERVICAL BIOPSY  W/ LOOP ELECTRODE EXCISION      CHOLECYSTECTOMY      COLLATERAL LIGAMENT REPAIR, KNEE      left knee    COLONOSCOPY      DILATION AND CURETTAGE OF UTERUS      SINUS SURGERY         Time Tracking:     PT Received On: 04/04/23  PT Start Time:  0855     PT Stop Time: 0925  PT Total Time (min): 30 min     Billable Minutes: Evaluation 15 and Therapeutic Activity 15      04/04/2023

## 2023-04-05 PROCEDURE — 25000242 PHARM REV CODE 250 ALT 637 W/ HCPCS: Performed by: INTERNAL MEDICINE

## 2023-04-05 PROCEDURE — 27000221 HC OXYGEN, UP TO 24 HOURS

## 2023-04-05 PROCEDURE — 63600175 PHARM REV CODE 636 W HCPCS: Performed by: STUDENT IN AN ORGANIZED HEALTH CARE EDUCATION/TRAINING PROGRAM

## 2023-04-05 PROCEDURE — 94761 N-INVAS EAR/PLS OXIMETRY MLT: CPT

## 2023-04-05 PROCEDURE — 99900035 HC TECH TIME PER 15 MIN (STAT)

## 2023-04-05 PROCEDURE — 25000003 PHARM REV CODE 250: Performed by: INTERNAL MEDICINE

## 2023-04-05 PROCEDURE — 94640 AIRWAY INHALATION TREATMENT: CPT

## 2023-04-05 PROCEDURE — 11000001 HC ACUTE MED/SURG PRIVATE ROOM

## 2023-04-05 PROCEDURE — 25000003 PHARM REV CODE 250: Performed by: STUDENT IN AN ORGANIZED HEALTH CARE EDUCATION/TRAINING PROGRAM

## 2023-04-05 PROCEDURE — 63600175 PHARM REV CODE 636 W HCPCS: Performed by: INTERNAL MEDICINE

## 2023-04-05 RX ADMIN — LEVOFLOXACIN 750 MG: 750 INJECTION, SOLUTION INTRAVENOUS at 01:04

## 2023-04-05 RX ADMIN — GUAIFENESIN 600 MG: 600 TABLET, EXTENDED RELEASE ORAL at 08:04

## 2023-04-05 RX ADMIN — IPRATROPIUM BROMIDE AND ALBUTEROL SULFATE 3 ML: 2.5; .5 SOLUTION RESPIRATORY (INHALATION) at 12:04

## 2023-04-05 RX ADMIN — MONTELUKAST SODIUM 10 MG: 10 TABLET, FILM COATED ORAL at 08:04

## 2023-04-05 RX ADMIN — PREDNISONE 10 MG: 10 TABLET ORAL at 08:04

## 2023-04-05 RX ADMIN — APIXABAN 2.5 MG: 2.5 TABLET, FILM COATED ORAL at 08:04

## 2023-04-05 RX ADMIN — GABAPENTIN 100 MG: 100 CAPSULE ORAL at 08:04

## 2023-04-05 RX ADMIN — ESCITALOPRAM OXALATE 5 MG: 5 TABLET, FILM COATED ORAL at 08:04

## 2023-04-05 RX ADMIN — IPRATROPIUM BROMIDE AND ALBUTEROL SULFATE 3 ML: 2.5; .5 SOLUTION RESPIRATORY (INHALATION) at 07:04

## 2023-04-05 RX ADMIN — DILTIAZEM HYDROCHLORIDE 90 MG: 60 TABLET, FILM COATED ORAL at 08:04

## 2023-04-05 RX ADMIN — IPRATROPIUM BROMIDE AND ALBUTEROL SULFATE 3 ML: 2.5; .5 SOLUTION RESPIRATORY (INHALATION) at 01:04

## 2023-04-05 RX ADMIN — ALPRAZOLAM 0.25 MG: 0.25 TABLET ORAL at 08:04

## 2023-04-05 RX ADMIN — VALSARTAN 40 MG: 40 TABLET, FILM COATED ORAL at 08:04

## 2023-04-05 RX ADMIN — Medication 6 MG: at 08:04

## 2023-04-05 NOTE — SUBJECTIVE & OBJECTIVE
Interval History: pt much better she says after spitting up a lot of mucous      Objective:     Vital Signs (Most Recent):  Temp: 97.8 °F (36.6 °C) (04/05/23 0805)  Pulse: 80 (04/05/23 0805)  Resp: 20 (04/05/23 0805)  BP: (!) 182/80 (04/05/23 0805)  SpO2: 98 % (04/05/23 0805)   Vital Signs (24h Range):  Temp:  [96.5 °F (35.8 °C)-98.8 °F (37.1 °C)] 97.8 °F (36.6 °C)  Pulse:  [64-94] 80  Resp:  [14-20] 20  SpO2:  [95 %-100 %] 98 %  BP: (120-182)/(56-81) 182/80     Weight: 51.3 kg (113 lb)  Body mass index is 20.67 kg/m².      Intake/Output Summary (Last 24 hours) at 4/5/2023 0956  Last data filed at 4/4/2023 1331  Gross per 24 hour   Intake 240 ml   Output --   Net 240 ml       Physical Exam  Vitals and nursing note reviewed.   Constitutional:       General: She is not in acute distress.     Appearance: Normal appearance. She is well-developed. She is not ill-appearing, toxic-appearing or diaphoretic.   HENT:      Head: Normocephalic and atraumatic.      Jaw: No trismus.      Right Ear: Hearing, tympanic membrane, ear canal and external ear normal.      Left Ear: Hearing, tympanic membrane, ear canal and external ear normal.      Nose: Nose normal. No nasal deformity, mucosal edema or rhinorrhea.      Right Sinus: No maxillary sinus tenderness or frontal sinus tenderness.      Left Sinus: No maxillary sinus tenderness or frontal sinus tenderness.      Mouth/Throat:      Dentition: Normal dentition.      Pharynx: Uvula midline. No posterior oropharyngeal erythema or uvula swelling.   Eyes:      General: Lids are normal. No scleral icterus.     Conjunctiva/sclera: Conjunctivae normal.      Comments: Sclera clear bilat   Neck:      Trachea: Trachea and phonation normal.   Cardiovascular:      Rate and Rhythm: Normal rate and regular rhythm.      Pulses: Normal pulses.      Heart sounds: Normal heart sounds.   Pulmonary:      Effort: Prolonged expiration present. No respiratory distress.      Breath sounds: Decreased air  movement present. No stridor. Examination of the right-lower field reveals wheezing and rhonchi. Examination of the left-lower field reveals wheezing and rhonchi. Decreased breath sounds, wheezing and rhonchi present.   Abdominal:      General: Bowel sounds are normal. There is no distension.      Palpations: Abdomen is soft.      Tenderness: There is no abdominal tenderness.   Musculoskeletal:         General: No deformity. Normal range of motion.      Cervical back: Full passive range of motion without pain and neck supple.   Skin:     General: Skin is warm and dry.      Coloration: Skin is not pale.   Neurological:      Mental Status: She is alert and oriented to person, place, and time.      Motor: No abnormal muscle tone.      Coordination: Coordination normal.   Psychiatric:         Speech: Speech normal.         Behavior: Behavior normal. Behavior is cooperative.         Thought Content: Thought content normal.         Judgment: Judgment normal.     Review of Systems    Vents:  Oxygen Concentration (%): 35 (04/04/23 0656)    Lines/Drains/Airways       Peripheral Intravenous Line  Duration                  Peripheral IV - Single Lumen 04/03/23 1533 22 G Distal;Left;Posterior Forearm 1 day                    Significant Labs:    CBC/Anemia Profile:  Recent Labs   Lab 04/03/23  1059 04/04/23  0617   WBC 11.63 7.61   HGB 12.5 10.8*   HCT 39.8 34.3*    208   MCV 97 95   RDW 13.2 12.9        Chemistries:  Recent Labs   Lab 04/03/23  1059 04/04/23  0617    141   K 3.7 4.8   CL 98 99   CO2 30* 28   BUN 19 26*   CREATININE 0.9 1.0   CALCIUM 9.2 9.2   ALBUMIN 2.7* 2.4*   PROT 6.3 5.7*   BILITOT 0.6 0.3   ALKPHOS 74 66   ALT 17 17   AST 14 12       All pertinent labs within the past 24 hours have been reviewed.    Significant Imaging:  I have reviewed all pertinent imaging results/findings within the past 24 hours.

## 2023-04-05 NOTE — ASSESSMENT & PLAN NOTE
Patient with chronic hypoxic Respiratory failure which is chronic respiratory failure.  she is normal. Supplemental oxygen was provided and noted-      .   Signs/symptoms of respiratory failure include- copd work of breathing. Contributing diagnoses includes - COPD Labs and images were reviewed. Patient Has not had a recent ABG. Will treat underlying causes and adjust management of respiratory failure as follows- 4

## 2023-04-05 NOTE — PLAN OF CARE
Patient has rested quietly this shift. Patient has worn home treligy intermittently today. Complaints of anxiety managed with PRN medications.  Problem: Adult Inpatient Plan of Care  Goal: Plan of Care Review  Outcome: Ongoing, Progressing  Goal: Optimal Comfort and Wellbeing  Outcome: Ongoing, Progressing     Problem: Infection (Pneumonia)  Goal: Resolution of Infection Signs and Symptoms  Outcome: Ongoing, Progressing     Problem: Skin Injury Risk Increased  Goal: Skin Health and Integrity  Outcome: Ongoing, Progressing     Problem: Fall Injury Risk  Goal: Absence of Fall and Fall-Related Injury  Outcome: Ongoing, Progressing

## 2023-04-05 NOTE — PROGRESS NOTES
[place] [facility] ([facility])  Pulmonology  Progress Note    Patient Name: Marva Perez  MRN: [id number]  Admission Date: 4/3/2023  Hospital Length of Stay: 1 days  Code Status: DNR  Attending Provider: Howard Matson MD  Primary Care Provider: Kevin Clements MD   Principal Problem: <principal problem not specified>    Subjective:     Interval History: pt much better she says after spitting up a lot of mucous      Objective:     Vital Signs (Most Recent):  Temp: 97.8 °F (36.6 °C) (04/05/23 0805)  Pulse: 80 (04/05/23 0805)  Resp: 20 (04/05/23 0805)  BP: (!) 182/80 (04/05/23 0805)  SpO2: 98 % (04/05/23 0805)   Vital Signs (24h Range):  Temp:  [96.5 °F (35.8 °C)-98.8 °F (37.1 °C)] 97.8 °F (36.6 °C)  Pulse:  [64-94] 80  Resp:  [14-20] 20  SpO2:  [95 %-100 %] 98 %  BP: (120-182)/(56-81) 182/80     Weight: 51.3 kg (113 lb)  Body mass index is 20.67 kg/m².      Intake/Output Summary (Last 24 hours) at 4/5/2023 0956  Last data filed at 4/4/2023 1331  Gross per 24 hour   Intake 240 ml   Output --   Net 240 ml       Physical Exam  Vitals and nursing note reviewed.   Constitutional:       General: She is not in acute distress.     Appearance: Normal appearance. She is well-developed. She is not ill-appearing, toxic-appearing or diaphoretic.   HENT:      Head: Normocephalic and atraumatic.      Jaw: No trismus.      Right Ear: Hearing, tympanic membrane, ear canal and external ear normal.      Left Ear: Hearing, tympanic membrane, ear canal and external ear normal.      Nose: Nose normal. No nasal deformity, mucosal edema or rhinorrhea.      Right Sinus: No maxillary sinus tenderness or frontal sinus tenderness.      Left Sinus: No maxillary sinus tenderness or frontal sinus tenderness.      Mouth/Throat:      Dentition: Normal dentition.      Pharynx: Uvula midline. No posterior oropharyngeal erythema or uvula swelling.   Eyes:      General: Lids are normal. No scleral icterus.     Conjunctiva/sclera: Conjunctivae normal.       Comments: Sclera clear bilat   Neck:      Trachea: Trachea and phonation normal.   Cardiovascular:      Rate and Rhythm: Normal rate and regular rhythm.      Pulses: Normal pulses.      Heart sounds: Normal heart sounds.   Pulmonary:      Effort: Prolonged expiration present. No respiratory distress.      Breath sounds: Decreased air movement present. No stridor. Examination of the right-lower field reveals wheezing and rhonchi. Examination of the left-lower field reveals wheezing and rhonchi. Decreased breath sounds, wheezing and rhonchi present.   Abdominal:      General: Bowel sounds are normal. There is no distension.      Palpations: Abdomen is soft.      Tenderness: There is no abdominal tenderness.   Musculoskeletal:         General: No deformity. Normal range of motion.      Cervical back: Full passive range of motion without pain and neck supple.   Skin:     General: Skin is warm and dry.      Coloration: Skin is not pale.   Neurological:      Mental Status: She is alert and oriented to person, place, and time.      Motor: No abnormal muscle tone.      Coordination: Coordination normal.   Psychiatric:         Speech: Speech normal.         Behavior: Behavior normal. Behavior is cooperative.         Thought Content: Thought content normal.         Judgment: Judgment normal.     Review of Systems    Vents:  Oxygen Concentration (%): 35 (04/04/23 0656)    Lines/Drains/Airways       Peripheral Intravenous Line  Duration                  Peripheral IV - Single Lumen 04/03/23 1533 22 G Distal;Left;Posterior Forearm 1 day                    Significant Labs:    CBC/Anemia Profile:  Recent Labs   Lab 04/03/23  1059 04/04/23  0617   WBC 11.63 7.61   HGB 12.5 10.8*   HCT 39.8 34.3*    208   MCV 97 95   RDW 13.2 12.9        Chemistries:  Recent Labs   Lab 04/03/23  1059 04/04/23  0617    141   K 3.7 4.8   CL 98 99   CO2 30* 28   BUN 19 26*   CREATININE 0.9 1.0   CALCIUM 9.2 9.2   ALBUMIN 2.7* 2.4*    PROT 6.3 5.7*   BILITOT 0.6 0.3   ALKPHOS 74 66   ALT 17 17   AST 14 12       All pertinent labs within the past 24 hours have been reviewed.    Significant Imaging:  I have reviewed all pertinent imaging results/findings within the past 24 hours.    Assessment/Plan:     Pulmonary  Acute bronchitis due to other specified organisms  Now coughing up phlegm     Chronic respiratory failure  Patient with chronic hypoxic Respiratory failure which is chronic respiratory failure.  she is normal. Supplemental oxygen was provided and noted-      .   Signs/symptoms of respiratory failure include- copd work of breathing. Contributing diagnoses includes - COPD Labs and images were reviewed. Patient Has not had a recent ABG. Will treat underlying causes and adjust management of respiratory failure as follows- 4    Emphysema/COPD  End stage lung disease   Severe poor air entry but her usual     Cardiac/Vascular  Chronic atrial fibrillation  Has afib controlled ventricle rate is NSR on exam    Other  Insomnia  Due to copd                  Howard Matson MD  Pulmonology  [place] - Med Surg (3rd Fl)

## 2023-04-06 ENCOUNTER — TELEPHONE (OUTPATIENT)
Dept: FAMILY MEDICINE | Facility: CLINIC | Age: 83
End: 2023-04-06
Payer: MEDICARE

## 2023-04-06 PROCEDURE — 25000242 PHARM REV CODE 250 ALT 637 W/ HCPCS: Performed by: INTERNAL MEDICINE

## 2023-04-06 PROCEDURE — 27000221 HC OXYGEN, UP TO 24 HOURS

## 2023-04-06 PROCEDURE — 25000003 PHARM REV CODE 250: Performed by: INTERNAL MEDICINE

## 2023-04-06 PROCEDURE — 11000001 HC ACUTE MED/SURG PRIVATE ROOM

## 2023-04-06 PROCEDURE — 63600175 PHARM REV CODE 636 W HCPCS: Performed by: INTERNAL MEDICINE

## 2023-04-06 PROCEDURE — 94761 N-INVAS EAR/PLS OXIMETRY MLT: CPT

## 2023-04-06 PROCEDURE — 94640 AIRWAY INHALATION TREATMENT: CPT

## 2023-04-06 PROCEDURE — 25000003 PHARM REV CODE 250: Performed by: STUDENT IN AN ORGANIZED HEALTH CARE EDUCATION/TRAINING PROGRAM

## 2023-04-06 PROCEDURE — 99900035 HC TECH TIME PER 15 MIN (STAT)

## 2023-04-06 RX ORDER — CLONAZEPAM 1 MG/1
1 TABLET ORAL 2 TIMES DAILY
Status: DISCONTINUED | OUTPATIENT
Start: 2023-04-06 | End: 2023-04-07 | Stop reason: HOSPADM

## 2023-04-06 RX ADMIN — ESCITALOPRAM OXALATE 5 MG: 5 TABLET, FILM COATED ORAL at 09:04

## 2023-04-06 RX ADMIN — GUAIFENESIN 600 MG: 600 TABLET, EXTENDED RELEASE ORAL at 09:04

## 2023-04-06 RX ADMIN — VALSARTAN 40 MG: 40 TABLET, FILM COATED ORAL at 09:04

## 2023-04-06 RX ADMIN — PREDNISONE 10 MG: 10 TABLET ORAL at 09:04

## 2023-04-06 RX ADMIN — IPRATROPIUM BROMIDE AND ALBUTEROL SULFATE 3 ML: 2.5; .5 SOLUTION RESPIRATORY (INHALATION) at 12:04

## 2023-04-06 RX ADMIN — CLONAZEPAM 1 MG: 1 TABLET ORAL at 08:04

## 2023-04-06 RX ADMIN — IPRATROPIUM BROMIDE AND ALBUTEROL SULFATE 3 ML: 2.5; .5 SOLUTION RESPIRATORY (INHALATION) at 07:04

## 2023-04-06 RX ADMIN — GABAPENTIN 100 MG: 100 CAPSULE ORAL at 08:04

## 2023-04-06 RX ADMIN — DILTIAZEM HYDROCHLORIDE 90 MG: 60 TABLET, FILM COATED ORAL at 09:04

## 2023-04-06 RX ADMIN — APIXABAN 2.5 MG: 2.5 TABLET, FILM COATED ORAL at 08:04

## 2023-04-06 RX ADMIN — APIXABAN 2.5 MG: 2.5 TABLET, FILM COATED ORAL at 09:04

## 2023-04-06 RX ADMIN — DILTIAZEM HYDROCHLORIDE 90 MG: 60 TABLET, FILM COATED ORAL at 08:04

## 2023-04-06 RX ADMIN — MONTELUKAST SODIUM 10 MG: 10 TABLET, FILM COATED ORAL at 09:04

## 2023-04-06 RX ADMIN — CLONAZEPAM 1 MG: 1 TABLET ORAL at 03:04

## 2023-04-06 RX ADMIN — IPRATROPIUM BROMIDE AND ALBUTEROL SULFATE 3 ML: 2.5; .5 SOLUTION RESPIRATORY (INHALATION) at 01:04

## 2023-04-06 RX ADMIN — GUAIFENESIN 600 MG: 600 TABLET, EXTENDED RELEASE ORAL at 08:04

## 2023-04-06 RX ADMIN — Medication 6 MG: at 08:04

## 2023-04-06 RX ADMIN — ALPRAZOLAM 0.25 MG: 0.25 TABLET ORAL at 09:04

## 2023-04-06 NOTE — PROGRESS NOTES
[place] [facility] ([facility])  Pulmonology  Progress Note    Patient Name: Marva Perez  MRN: [id number]  Admission Date: 4/3/2023  Hospital Length of Stay: 2 days  Code Status: DNR  Attending Provider: Howard Matson MD  Primary Care Provider: Kevin Clements MD   Principal Problem: <principal problem not specified>    Subjective:     Interval History: Stable improving slowly  ventilation anxiety improved how tomorrow      Objective:     Vital Signs (Most Recent):  Temp: 97.6 °F (36.4 °C) (04/06/23 0904)  Pulse: 95 (04/06/23 0904)  Resp: 18 (04/06/23 0904)  BP: 137/65 (04/06/23 0904)  SpO2: 96 % (04/06/23 0904) Vital Signs (24h Range):  Temp:  [97.5 °F (36.4 °C)-98.9 °F (37.2 °C)] 97.6 °F (36.4 °C)  Pulse:  [78-99] 95  Resp:  [18-22] 18  SpO2:  [93 %-98 %] 96 %  BP: (130-185)/(60-82) 137/65     Weight: 51.3 kg (113 lb)  Body mass index is 20.67 kg/m².      Intake/Output Summary (Last 24 hours) at 4/6/2023 1034  Last data filed at 4/5/2023 1453  Gross per 24 hour   Intake 455.02 ml   Output --   Net 455.02 ml       Physical Exam  Vitals and nursing note reviewed.   Constitutional:       General: She is not in acute distress.     Appearance: Normal appearance. She is well-developed. She is not ill-appearing, toxic-appearing or diaphoretic.   HENT:      Head: Normocephalic and atraumatic.      Jaw: No trismus.      Right Ear: Hearing, tympanic membrane, ear canal and external ear normal.      Left Ear: Hearing, tympanic membrane, ear canal and external ear normal.      Nose: Nose normal. No nasal deformity, mucosal edema or rhinorrhea.      Right Sinus: No maxillary sinus tenderness or frontal sinus tenderness.      Left Sinus: No maxillary sinus tenderness or frontal sinus tenderness.      Mouth/Throat:      Dentition: Normal dentition.      Pharynx: Uvula midline. No posterior oropharyngeal erythema or uvula swelling.   Eyes:      General: Lids are normal. No scleral icterus.     Conjunctiva/sclera: Conjunctivae  normal.      Comments: Sclera clear bilat   Neck:      Trachea: Trachea and phonation normal.   Cardiovascular:      Rate and Rhythm: Normal rate and regular rhythm.      Pulses: Normal pulses.      Heart sounds: Normal heart sounds.   Pulmonary:      Effort: Respiratory distress (mild to moderate) present.      Breath sounds: Examination of the right-upper field reveals decreased breath sounds. Examination of the left-upper field reveals decreased breath sounds. Examination of the right-middle field reveals decreased breath sounds. Examination of the left-middle field reveals decreased breath sounds. Examination of the right-lower field reveals decreased breath sounds and rhonchi. Examination of the left-lower field reveals decreased breath sounds and rhonchi. Decreased breath sounds and rhonchi present. No wheezing.   Abdominal:      General: Bowel sounds are normal. There is no distension.      Palpations: Abdomen is soft.      Tenderness: There is no abdominal tenderness.   Musculoskeletal:         General: No deformity. Normal range of motion.      Cervical back: Full passive range of motion without pain and neck supple.   Skin:     General: Skin is warm and dry.      Coloration: Skin is not pale.   Neurological:      Mental Status: She is alert and oriented to person, place, and time.      Motor: No abnormal muscle tone.      Coordination: Coordination normal.   Psychiatric:         Speech: Speech normal.         Behavior: Behavior normal. Behavior is cooperative.         Thought Content: Thought content normal.         Judgment: Judgment normal.     Review of Systems    Vents:  Oxygen Concentration (%): 35 (04/04/23 0656)    Lines/Drains/Airways       Peripheral Intravenous Line  Duration                  Peripheral IV - Single Lumen 04/03/23 1533 22 G Distal;Left;Posterior Forearm 2 days                    Significant Labs:    CBC/Anemia Profile:  No results for input(s): WBC, HGB, HCT, PLT, MCV, RDW, IRON,  FERRITIN, RETIC, FOLATE, [id number], OCCULTBLOOD in the last 48 hours.     Chemistries:  No results for input(s): NA, K, CL, CO2, BUN, CREATININE, CALCIUM, ALBUMIN, PROT, BILITOT, ALKPHOS, ALT, AST, GLUCOSE, MG, PHOS in the last 48 hours.    All pertinent labs within the past 24 hours have been reviewed.    Significant Imaging:  I have reviewed all pertinent imaging results/findings within the past 24 hours.    Assessment/Plan:     Pulmonary  Chronic respiratory failure  Only slowly improving   Patient with chronic hypoxic Respiratory failure which is chronic respiratory failure.  she is normal. Supplemental oxygen was provided and noted-      .   Signs/symptoms of respiratory failure include- copd work of breathing. Contributing diagnoses includes - COPD Labs and images were reviewed. Patient Has not had a recent ABG. Will treat underlying causes and adjust management of respiratory failure as follows- 4    Emphysema/COPD  End stage lung disease   Severe poor air entry but her usual     Other  Insomnia  Due to copd     Critical care time spent on the evaluation and treatment of severe organ dysfunction, review of pertinent labs and imaging studies, discussions with consulting providers and discussions with patient/family: 61 minutes.  Howard Matson MD  Pulmonology  [place] - [facility] S[facility] (3rd Fl)

## 2023-04-06 NOTE — TELEPHONE ENCOUNTER
Dr Tyree TAYLOR:  I was just doing the confirmations for you for Monday and received word from her caretaker that Marva Perez is in the hospital for exacerbation of her COPD    Not sure how long she will be there but she cx'd her appt for Monday because her caretaker said she was very weak.    Just wanted to let you know.

## 2023-04-06 NOTE — SUBJECTIVE & OBJECTIVE
Interval History: Stable improved ventilation anxiety improved       Objective:     Vital Signs (Most Recent):  Temp: 97.6 °F (36.4 °C) (04/06/23 0904)  Pulse: 95 (04/06/23 0904)  Resp: 18 (04/06/23 0904)  BP: 137/65 (04/06/23 0904)  SpO2: 96 % (04/06/23 0904) Vital Signs (24h Range):  Temp:  [97.5 °F (36.4 °C)-98.9 °F (37.2 °C)] 97.6 °F (36.4 °C)  Pulse:  [78-99] 95  Resp:  [18-22] 18  SpO2:  [93 %-98 %] 96 %  BP: (130-185)/(60-82) 137/65     Weight: 51.3 kg (113 lb)  Body mass index is 20.67 kg/m².      Intake/Output Summary (Last 24 hours) at 4/6/2023 1034  Last data filed at 4/5/2023 1453  Gross per 24 hour   Intake 455.02 ml   Output --   Net 455.02 ml       Physical Exam  Vitals and nursing note reviewed.   Constitutional:       General: She is not in acute distress.     Appearance: Normal appearance. She is well-developed. She is not ill-appearing, toxic-appearing or diaphoretic.   HENT:      Head: Normocephalic and atraumatic.      Jaw: No trismus.      Right Ear: Hearing, tympanic membrane, ear canal and external ear normal.      Left Ear: Hearing, tympanic membrane, ear canal and external ear normal.      Nose: Nose normal. No nasal deformity, mucosal edema or rhinorrhea.      Right Sinus: No maxillary sinus tenderness or frontal sinus tenderness.      Left Sinus: No maxillary sinus tenderness or frontal sinus tenderness.      Mouth/Throat:      Dentition: Normal dentition.      Pharynx: Uvula midline. No posterior oropharyngeal erythema or uvula swelling.   Eyes:      General: Lids are normal. No scleral icterus.     Conjunctiva/sclera: Conjunctivae normal.      Comments: Sclera clear bilat   Neck:      Trachea: Trachea and phonation normal.   Cardiovascular:      Rate and Rhythm: Normal rate and regular rhythm.      Pulses: Normal pulses.      Heart sounds: Normal heart sounds.   Pulmonary:      Effort: Respiratory distress (mild to moderate) present.      Breath sounds: Examination of the right-upper  field reveals decreased breath sounds. Examination of the left-upper field reveals decreased breath sounds. Examination of the right-middle field reveals decreased breath sounds. Examination of the left-middle field reveals decreased breath sounds. Examination of the right-lower field reveals decreased breath sounds and rhonchi. Examination of the left-lower field reveals decreased breath sounds and rhonchi. Decreased breath sounds and rhonchi present. No wheezing.   Abdominal:      General: Bowel sounds are normal. There is no distension.      Palpations: Abdomen is soft.      Tenderness: There is no abdominal tenderness.   Musculoskeletal:         General: No deformity. Normal range of motion.      Cervical back: Full passive range of motion without pain and neck supple.   Skin:     General: Skin is warm and dry.      Coloration: Skin is not pale.   Neurological:      Mental Status: She is alert and oriented to person, place, and time.      Motor: No abnormal muscle tone.      Coordination: Coordination normal.   Psychiatric:         Speech: Speech normal.         Behavior: Behavior normal. Behavior is cooperative.         Thought Content: Thought content normal.         Judgment: Judgment normal.     Review of Systems    Vents:  Oxygen Concentration (%): 35 (04/04/23 0656)    Lines/Drains/Airways       Peripheral Intravenous Line  Duration                  Peripheral IV - Single Lumen 04/03/23 1533 22 G Distal;Left;Posterior Forearm 2 days                    Significant Labs:    CBC/Anemia Profile:  No results for input(s): WBC, HGB, HCT, PLT, MCV, RDW, IRON, FERRITIN, RETIC, FOLATE, [id number], OCCULTBLOOD in the last 48 hours.     Chemistries:  No results for input(s): NA, K, CL, CO2, BUN, CREATININE, CALCIUM, ALBUMIN, PROT, BILITOT, ALKPHOS, ALT, AST, GLUCOSE, MG, PHOS in the last 48 hours.    All pertinent labs within the past 24 hours have been reviewed.    Significant Imaging:  I have reviewed all pertinent  imaging results/findings within the past 24 hours.

## 2023-04-06 NOTE — ASSESSMENT & PLAN NOTE
Only slowly improving   Patient with chronic hypoxic Respiratory failure which is chronic respiratory failure.  she is normal. Supplemental oxygen was provided and noted-      .   Signs/symptoms of respiratory failure include- copd work of breathing. Contributing diagnoses includes - COPD Labs and images were reviewed. Patient Has not had a recent ABG. Will treat underlying causes and adjust management of respiratory failure as follows- 4

## 2023-04-07 VITALS
DIASTOLIC BLOOD PRESSURE: 68 MMHG | HEART RATE: 80 BPM | BODY MASS INDEX: 20.8 KG/M2 | TEMPERATURE: 98 F | RESPIRATION RATE: 22 BRPM | SYSTOLIC BLOOD PRESSURE: 118 MMHG | WEIGHT: 113 LBS | OXYGEN SATURATION: 96 % | HEIGHT: 62 IN

## 2023-04-07 LAB
ALLENS TEST: ABNORMAL
DELSYS: ABNORMAL
FIO2: 36 (ref 21–100)
HCO3 UR-SCNC: 35.8 MMOL/L
PCO2 BLDA: 47 MMHG (ref 35–45)
PH SMN: 7.49 [PH] (ref 7.35–7.45)
PO2 BLDA: 75 MMHG (ref 75–100)
POC BE: 10.9 MMOL/L (ref -2–2)
POC COHB: 1.7 % (ref 0–3)
POC METHB: 1.1 % (ref 0–1.5)
POC O2HB ARTERIAL: 93.5 % (ref 94–100)
POC SATURATED O2: 96.2 % (ref 90–100)
POC TCO2: 37.2 MMOL/L
POC THB: 13.2 G/DL (ref 12–18)
SITE: ABNORMAL

## 2023-04-07 PROCEDURE — 94761 N-INVAS EAR/PLS OXIMETRY MLT: CPT

## 2023-04-07 PROCEDURE — 94640 AIRWAY INHALATION TREATMENT: CPT

## 2023-04-07 PROCEDURE — 27000221 HC OXYGEN, UP TO 24 HOURS

## 2023-04-07 PROCEDURE — 82803 BLOOD GASES ANY COMBINATION: CPT | Performed by: INTERNAL MEDICINE

## 2023-04-07 PROCEDURE — 63600175 PHARM REV CODE 636 W HCPCS: Performed by: INTERNAL MEDICINE

## 2023-04-07 PROCEDURE — 36600 WITHDRAWAL OF ARTERIAL BLOOD: CPT

## 2023-04-07 PROCEDURE — 25000003 PHARM REV CODE 250: Performed by: INTERNAL MEDICINE

## 2023-04-07 PROCEDURE — 25000003 PHARM REV CODE 250: Performed by: STUDENT IN AN ORGANIZED HEALTH CARE EDUCATION/TRAINING PROGRAM

## 2023-04-07 PROCEDURE — 25000242 PHARM REV CODE 250 ALT 637 W/ HCPCS: Performed by: INTERNAL MEDICINE

## 2023-04-07 RX ADMIN — DILTIAZEM HYDROCHLORIDE 90 MG: 60 TABLET, FILM COATED ORAL at 10:04

## 2023-04-07 RX ADMIN — IPRATROPIUM BROMIDE AND ALBUTEROL SULFATE 3 ML: 2.5; .5 SOLUTION RESPIRATORY (INHALATION) at 01:04

## 2023-04-07 RX ADMIN — APIXABAN 2.5 MG: 2.5 TABLET, FILM COATED ORAL at 10:04

## 2023-04-07 RX ADMIN — IPRATROPIUM BROMIDE AND ALBUTEROL SULFATE 3 ML: 2.5; .5 SOLUTION RESPIRATORY (INHALATION) at 12:04

## 2023-04-07 RX ADMIN — IPRATROPIUM BROMIDE AND ALBUTEROL SULFATE 3 ML: 2.5; .5 SOLUTION RESPIRATORY (INHALATION) at 09:04

## 2023-04-07 RX ADMIN — ESCITALOPRAM OXALATE 5 MG: 5 TABLET, FILM COATED ORAL at 10:04

## 2023-04-07 RX ADMIN — CLONAZEPAM 1 MG: 1 TABLET ORAL at 10:04

## 2023-04-07 RX ADMIN — GUAIFENESIN 600 MG: 600 TABLET, EXTENDED RELEASE ORAL at 10:04

## 2023-04-07 RX ADMIN — MONTELUKAST SODIUM 10 MG: 10 TABLET, FILM COATED ORAL at 10:04

## 2023-04-07 RX ADMIN — PREDNISONE 10 MG: 10 TABLET ORAL at 10:04

## 2023-04-07 RX ADMIN — VALSARTAN 40 MG: 40 TABLET, FILM COATED ORAL at 10:04

## 2023-04-07 NOTE — SUBJECTIVE & OBJECTIVE
Interval History: pt off o2 for 15 min o2 sat was 76% hr 118 no chest pain sitter had taken her off bipa and placed her on nasal oxygen       Objective:     Vital Signs (Most Recent):  Temp: 98 °F (36.7 °C) (04/07/23 1101)  Pulse: 108 (04/07/23 1101)  Resp: 20 (04/07/23 1101)  BP: 118/68 (04/07/23 1101)  SpO2: 95 % (04/07/23 1101) Vital Signs (24h Range):  Temp:  [98 °F (36.7 °C)-99 °F (37.2 °C)] 98 °F (36.7 °C)  Pulse:  [] 108  Resp:  [18-26] 20  SpO2:  [95 %-99 %] 95 %  BP: (111-139)/(58-71) 118/68     Weight: 51.3 kg (113 lb)  Body mass index is 20.67 kg/m².    No intake or output data in the 24 hours ending 04/07/23 1201    Physical Exam  Vitals and nursing note reviewed.   Constitutional:       General: She is not in acute distress.     Appearance: Normal appearance. She is well-developed. She is not ill-appearing, toxic-appearing or diaphoretic.   HENT:      Head: Normocephalic and atraumatic.      Jaw: No trismus.      Right Ear: Hearing, tympanic membrane, ear canal and external ear normal.      Left Ear: Hearing, tympanic membrane, ear canal and external ear normal.      Nose: Nose normal. No nasal deformity, mucosal edema or rhinorrhea.      Right Sinus: No maxillary sinus tenderness or frontal sinus tenderness.      Left Sinus: No maxillary sinus tenderness or frontal sinus tenderness.      Mouth/Throat:      Dentition: Normal dentition.      Pharynx: Uvula midline. No posterior oropharyngeal erythema or uvula swelling.   Eyes:      General: Lids are normal. No scleral icterus.     Conjunctiva/sclera: Conjunctivae normal.      Comments: Sclera clear bilat   Neck:      Trachea: Trachea and phonation normal.   Cardiovascular:      Rate and Rhythm: Normal rate and regular rhythm.      Pulses: Normal pulses.      Heart sounds: Normal heart sounds.   Pulmonary:      Effort: Respiratory distress (mild to moderate) present.      Breath sounds: Examination of the right-upper field reveals decreased breath  sounds. Examination of the left-upper field reveals decreased breath sounds. Examination of the right-middle field reveals decreased breath sounds, wheezing and rhonchi. Examination of the left-middle field reveals decreased breath sounds, wheezing and rhonchi. Examination of the right-lower field reveals decreased breath sounds, wheezing and rhonchi. Examination of the left-lower field reveals decreased breath sounds, wheezing and rhonchi. Decreased breath sounds, wheezing and rhonchi present.   Abdominal:      General: Bowel sounds are normal. There is no distension.      Palpations: Abdomen is soft.      Tenderness: There is no abdominal tenderness.   Musculoskeletal:         General: No deformity. Normal range of motion.      Cervical back: Full passive range of motion without pain and neck supple.   Skin:     General: Skin is warm and dry.      Coloration: Skin is not pale.   Neurological:      Mental Status: She is alert and oriented to person, place, and time.      Motor: No abnormal muscle tone.      Coordination: Coordination normal.   Psychiatric:         Speech: Speech normal.         Behavior: Behavior normal. Behavior is cooperative.         Thought Content: Thought content normal.         Judgment: Judgment normal.     Review of Systems    Vents:  Oxygen Concentration (%): 35 (04/04/23 0656)    Lines/Drains/Airways       Peripheral Intravenous Line  Duration                  Peripheral IV - Single Lumen 04/03/23 1533 22 G Distal;Left;Posterior Forearm 3 days                    Significant Labs:    CBC/Anemia Profile:  No results for input(s): WBC, HGB, HCT, PLT, MCV, RDW, IRON, FERRITIN, RETIC, FOLATE, [id number], OCCULTBLOOD in the last 48 hours.     Chemistries:  No results for input(s): NA, K, CL, CO2, BUN, CREATININE, CALCIUM, ALBUMIN, PROT, BILITOT, ALKPHOS, ALT, AST, GLUCOSE, MG, PHOS in the last 48 hours.    All pertinent labs within the past 24 hours have been reviewed.    Significant  Imaging:  I have reviewed all pertinent imaging results/findings within the past 24 hours.

## 2023-04-07 NOTE — PROGRESS NOTES
[place] [facility] Surg ([facility])  Pulmonology  Progress Note    Patient Name: Marva Perez  MRN: [id number]  Admission Date: 4/3/2023  Hospital Length of Stay: 3 days  Code Status: DNR  Attending Provider: Howard Matson MD  Primary Care Provider: Kevin Clements MD   Principal Problem: <principal problem not specified>    Subjective:     Interval History: pt off o2 for 15 min o2 sat was 76% hr 118 no chest pain sitter had taken her off bipa and placed her on nasal oxygen       Objective:     Vital Signs (Most Recent):  Temp: 98 °F (36.7 °C) (04/07/23 1101)  Pulse: 108 (04/07/23 1101)  Resp: 20 (04/07/23 1101)  BP: 118/68 (04/07/23 1101)  SpO2: 95 % (04/07/23 1101) Vital Signs (24h Range):  Temp:  [98 °F (36.7 °C)-99 °F (37.2 °C)] 98 °F (36.7 °C)  Pulse:  [] 108  Resp:  [18-26] 20  SpO2:  [95 %-99 %] 95 %  BP: (111-139)/(58-71) 118/68     Weight: 51.3 kg (113 lb)  Body mass index is 20.67 kg/m².    No intake or output data in the 24 hours ending 04/07/23 1201    Physical Exam  Vitals and nursing note reviewed.   Constitutional:       General: She is not in acute distress.     Appearance: Normal appearance. She is well-developed. She is not ill-appearing, toxic-appearing or diaphoretic.   HENT:      Head: Normocephalic and atraumatic.      Jaw: No trismus.      Right Ear: Hearing, tympanic membrane, ear canal and external ear normal.      Left Ear: Hearing, tympanic membrane, ear canal and external ear normal.      Nose: Nose normal. No nasal deformity, mucosal edema or rhinorrhea.      Right Sinus: No maxillary sinus tenderness or frontal sinus tenderness.      Left Sinus: No maxillary sinus tenderness or frontal sinus tenderness.      Mouth/Throat:      Dentition: Normal dentition.      Pharynx: Uvula midline. No posterior oropharyngeal erythema or uvula swelling.   Eyes:      General: Lids are normal. No scleral icterus.     Conjunctiva/sclera: Conjunctivae normal.      Comments: Sclera clear bilat   Neck:       Trachea: Trachea and phonation normal.   Cardiovascular:      Rate and Rhythm: Normal rate and regular rhythm.      Pulses: Normal pulses.      Heart sounds: Normal heart sounds.   Pulmonary:      Effort: Respiratory distress (mild to moderate) present.      Breath sounds: Examination of the right-upper field reveals decreased breath sounds. Examination of the left-upper field reveals decreased breath sounds. Examination of the right-middle field reveals decreased breath sounds, wheezing and rhonchi. Examination of the left-middle field reveals decreased breath sounds, wheezing and rhonchi. Examination of the right-lower field reveals decreased breath sounds, wheezing and rhonchi. Examination of the left-lower field reveals decreased breath sounds, wheezing and rhonchi. Decreased breath sounds, wheezing and rhonchi present.   Abdominal:      General: Bowel sounds are normal. There is no distension.      Palpations: Abdomen is soft.      Tenderness: There is no abdominal tenderness.   Musculoskeletal:         General: No deformity. Normal range of motion.      Cervical back: Full passive range of motion without pain and neck supple.   Skin:     General: Skin is warm and dry.      Coloration: Skin is not pale.   Neurological:      Mental Status: She is alert and oriented to person, place, and time.      Motor: No abnormal muscle tone.      Coordination: Coordination normal.   Psychiatric:         Speech: Speech normal.         Behavior: Behavior normal. Behavior is cooperative.         Thought Content: Thought content normal.         Judgment: Judgment normal.     Review of Systems    Vents:  Oxygen Concentration (%): 35 (04/04/23 0656)    Lines/Drains/Airways       Peripheral Intravenous Line  Duration                  Peripheral IV - Single Lumen 04/03/23 1533 22 G Distal;Left;Posterior Forearm 3 days                    Significant Labs:    CBC/Anemia Profile:  No results for input(s): WBC, HGB, HCT, PLT, MCV,  RDW, IRON, FERRITIN, RETIC, FOLATE, [id number], OCCULTBLOOD in the last 48 hours.     Chemistries:  No results for input(s): NA, K, CL, CO2, BUN, CREATININE, CALCIUM, ALBUMIN, PROT, BILITOT, ALKPHOS, ALT, AST, GLUCOSE, MG, PHOS in the last 48 hours.    All pertinent labs within the past 24 hours have been reviewed.    Significant Imaging:  I have reviewed all pertinent imaging results/findings within the past 24 hours.    Assessment/Plan:     Pulmonary  Acute bronchitis due to other specified organisms  Now coughing up phlegm     Chronic respiratory failure  Only slowly improving   Patient with chronic hypoxic Respiratory failure which is chronic respiratory failure.  she is normal. Supplemental oxygen was provided and noted-      .   Signs/symptoms of respiratory failure include- copd work of breathing. Contributing diagnoses includes - COPD Labs and images were reviewed. Patient Has not had a recent ABG. Will treat underlying causes and adjust management of respiratory failure as follows- 4    Emphysema/COPD  End stage lung disease   Severe poor air entry but her usual     Bronchitis  Acute bronchitis     Cardiac/Vascular  Chronic atrial fibrillation  Has afib controlled ventricle rate is NSR on exam    Other  Insomnia  Due to copd     Critical care time spent on the evaluation and treatment of severe organ dysfunction, review of pertinent labs and imaging studies, discussions with consulting providers and discussions with patient/family: 61 minutes.  Will discharge today It was discussed with her daughter that Home ventilator and o2 are going to allow her to stay at home and no further treatment was administered different here that cant be done at home so if readmits continue will need to revisit nursing home or hospice  Howard Matson MD  Pulmonology  [place] - Med Surg (3rd Fl)

## 2023-04-07 NOTE — NURSING
Pt complained of weakness and shortness of breath. SpO2 76 and heart rate of 118. Upon assessment, nasal cannula not plugged into wall outlet. Sitter at bedside had removed ventilator and applied the nasal cannula to the pt herself. Respiratory therapist applied ventilator to pt. SpO2 increased to 96 and heart rate 104. Dr. Matson notified. Mckenna, pt's daughter, notified. Sitter reeducated to ask for assistance when switching pt to oxygen and ventilator.

## 2023-04-07 NOTE — DISCHARGE SUMMARY
[place] - [facility] Surg (3rd Fl)  Pulmonology  Discharge Summary      Patient Name: Marva Perez  MRN: [id number]  Admission Date: 4/3/2023  Hospital Length of Stay: 3 days  Discharge Date and Time;discharge date for patient 4/7/23 encounter.  Attending Physician: Radha Rincon MD   Discharging Provider: Radha Rincon MD  Primary Care Provider: Kevin Clements MD    HPI:Pt was admitted 2 days ago placed a regimented schedule of medication and initially PT(1 day).she resulted back to baseline very quickly. Home on same medications.    surgeries in the past Minor   Indwelling Lines/Drains at Time of Discharge:   Lines/Drains/Airways     None; pull all lines in order to allow to go home                  Hospital Course:  Admitted placed on all things she has at home and was acessed by PT only to say she needed no further PT  Goals of Care Treatment Preferences:take anxiety medication prior to anxiety. Continue all respiratory support.   Code Status: DNR      Consults (From admission, onward)none        Status Ordering Provider     IP consult to case management  Once        Provider:  (dr Radha Rincon)    Completed RADHA RINCON          Significant Labs:  All pertinent labs within the past 24 hours have been reviewed.    Significant Imaging:  I have reviewed all pertinent imaging results/findings within the past 24 hours.    Pending Diagnostic Studies:     None        Final Active Diagnoses:    Diagnosis Date Noted POA    PRINCIPAL PROBLEM:  Chronic respiratory failure [J96.10] 07/31/2019 Yes    Acute bronchitis due to other specified organisms [J20.8] 04/04/2023 Yes    Chronic atrial fibrillation [I48.20] 08/26/2022 Yes    Atrial fibrillation with RVR [I48.91] 08/03/2019 Yes    Insomnia [G47.00] 02/24/2014 Yes    Emphysema/COPD [J43.9] 12/26/2013 Yes    Bronchitis [J40] 10/30/2012 Yes      Problems Resolved During this Admission:       Discharged Condition: stable    Disposition:     Follow Up:   Follow-up  Information     Kevin Clements MD. Go on 4/10/2023.    Specialty: Family Medicine  Why: Hospital Follow-up at 9:45am  Contact information:  [address] [place] [address]  [phone]             Howard Matson MD Follow up in 4 day(s).    Specialty: Pulmonary Disease  Why: 4/11/23 11 am  Contact information:  [address]  [place] [address]  [phone]                       Patient Instructions:   No discharge procedures on file.  Medications:as prior to admission   None    Howard Matson MD  Pulmonology  [place] - Med Surg (3rd Fl)

## 2023-04-07 NOTE — PLAN OF CARE
VS stable. Discharge education provided.     Problem: Fall Injury Risk  Goal: Absence of Fall and Fall-Related Injury  Outcome: Met     Problem: Skin Injury Risk Increased  Goal: Skin Health and Integrity  Outcome: Met     Problem: Respiratory Compromise (Pneumonia)  Goal: Effective Oxygenation and Ventilation  Outcome: Met

## 2023-04-10 ENCOUNTER — EXTERNAL HOME HEALTH (OUTPATIENT)
Dept: HOME HEALTH SERVICES | Facility: HOSPITAL | Age: 83
End: 2023-04-10
Payer: MEDICARE

## 2023-04-10 NOTE — PLAN OF CARE
[place] - Med Surg (3rd Fl)  Discharge Final Note    Primary Care Provider: Kevin Clements MD    Expected Discharge Date: 4/7/2023    Final Discharge Note (most recent)       Final Note - 04/10/23 0930          Final Note    Assessment Type Final Discharge Note     Anticipated Discharge Disposition Home-Health Care [facility]     Hospital Resources/Appts/Education Provided Appointments scheduled and added to AVS        Post-Acute Status    Post-Acute Authorization Home Health     Discharge Delays None known at this time                     Important Message from Medicare             Contact Info       Kevin Clements MD   Specialty: Family Medicine   Relationship: PCP - General    [address] [place] [address]   Phone: [phone]       Next Steps: Go on 4/10/2023    Instructions: Hospital Follow-up at 9:45am    Howard Matson MD   Specialty: Pulmonary Disease    [address] [place] [address]   Phone: [phone]       Next Steps: Follow up in 4 day(s)    Instructions: 4/11/23 11 am          Patient discharging home on 4/7/23 which was a designated holiday for case management staff. Home health not ordered at the time of discharge. [facility] contacted case management this morning and provided with the phone number to Dr. Matson's clinic for resumption of home health orders.

## 2023-04-12 ENCOUNTER — TELEPHONE (OUTPATIENT)
Dept: FAMILY MEDICINE | Facility: CLINIC | Age: 83
End: 2023-04-12
Payer: MEDICARE

## 2023-04-12 ENCOUNTER — PATIENT MESSAGE (OUTPATIENT)
Dept: PULMONOLOGY | Facility: CLINIC | Age: 83
End: 2023-04-12
Payer: MEDICARE

## 2023-04-12 NOTE — TELEPHONE ENCOUNTER
Pt states she was D/C from hosp and is needing orders for Home Health and Physical Therapy.      Please advise

## 2023-04-12 NOTE — TELEPHONE ENCOUNTER
----- Message from Rhina Butts sent at 2023  9:06 AM CDT -----  Contact: self  Marva Perez  MRN: [id number]  : 1940  PCP: Kevin Clements  Home Phone      [phone]  Work Phone      Not on file.  Mobile          [phone]      MESSAGE:   Pt called stating she was D/C from hosp and is needing orders for Home Health and Physical Therapy.  Please call pt when done.     Phone:  [phone]

## 2023-04-13 DIAGNOSIS — R53.1 WEAKNESS: Primary | ICD-10-CM

## 2023-04-13 DIAGNOSIS — J44.9 CHRONIC OBSTRUCTIVE PULMONARY DISEASE, UNSPECIFIED COPD TYPE: ICD-10-CM

## 2023-04-14 ENCOUNTER — PATIENT OUTREACH (OUTPATIENT)
Dept: ADMINISTRATIVE | Facility: OTHER | Age: 83
End: 2023-04-14
Payer: MEDICARE

## 2023-04-14 ENCOUNTER — TELEPHONE (OUTPATIENT)
Dept: ADMINISTRATIVE | Facility: CLINIC | Age: 83
End: 2023-04-14
Payer: MEDICARE

## 2023-04-19 ENCOUNTER — DOCUMENT SCAN (OUTPATIENT)
Dept: HOME HEALTH SERVICES | Facility: HOSPITAL | Age: 83
End: 2023-04-19
Payer: MEDICARE

## 2023-04-20 ENCOUNTER — EXTERNAL HOME HEALTH (OUTPATIENT)
Dept: HOME HEALTH SERVICES | Facility: HOSPITAL | Age: 83
End: 2023-04-20
Payer: MEDICARE

## 2023-04-20 PROCEDURE — G0179 MD RECERTIFICATION HHA PT: HCPCS | Mod: ,,, | Performed by: FAMILY MEDICINE

## 2023-04-20 PROCEDURE — G0179 PR HOME HEALTH MD RECERTIFICATION: ICD-10-PCS | Mod: ,,, | Performed by: FAMILY MEDICINE

## 2023-05-01 ENCOUNTER — TELEPHONE (OUTPATIENT)
Dept: FAMILY MEDICINE | Facility: CLINIC | Age: 83
End: 2023-05-01
Payer: MEDICARE

## 2023-05-01 ENCOUNTER — LAB VISIT (OUTPATIENT)
Dept: LAB | Facility: HOSPITAL | Age: 83
End: 2023-05-01
Attending: FAMILY MEDICINE
Payer: MEDICARE

## 2023-05-01 DIAGNOSIS — R30.0 DYSURIA: Primary | ICD-10-CM

## 2023-05-01 LAB
BACTERIA #/AREA URNS HPF: ABNORMAL /HPF
BILIRUB UR QL STRIP: NEGATIVE
CLARITY UR: CLEAR
COLOR UR: YELLOW
GLUCOSE UR QL STRIP: NEGATIVE
HGB UR QL STRIP: ABNORMAL
KETONES UR QL STRIP: NEGATIVE
LEUKOCYTE ESTERASE UR QL STRIP: ABNORMAL
MICROSCOPIC COMMENT: ABNORMAL
NITRITE UR QL STRIP: NEGATIVE
PH UR STRIP: 5 [PH] (ref 5–8)
PROT UR QL STRIP: NEGATIVE
RBC #/AREA URNS HPF: 3 /HPF (ref 0–4)
SP GR UR STRIP: >=1.03 (ref 1–1.03)
URN SPEC COLLECT METH UR: ABNORMAL
UROBILINOGEN UR STRIP-ACNC: NEGATIVE EU/DL
WBC #/AREA URNS HPF: >100 /HPF (ref 0–5)

## 2023-05-01 PROCEDURE — 87086 URINE CULTURE/COLONY COUNT: CPT

## 2023-05-01 PROCEDURE — 87077 CULTURE AEROBIC IDENTIFY: CPT

## 2023-05-01 PROCEDURE — 87186 SC STD MICRODIL/AGAR DIL: CPT

## 2023-05-01 PROCEDURE — 87088 URINE BACTERIA CULTURE: CPT

## 2023-05-01 PROCEDURE — 81000 URINALYSIS NONAUTO W/SCOPE: CPT

## 2023-05-02 ENCOUNTER — DOCUMENT SCAN (OUTPATIENT)
Dept: HOME HEALTH SERVICES | Facility: HOSPITAL | Age: 83
End: 2023-05-02
Payer: MEDICARE

## 2023-05-02 ENCOUNTER — TELEPHONE (OUTPATIENT)
Dept: FAMILY MEDICINE | Facility: CLINIC | Age: 83
End: 2023-05-02
Payer: MEDICARE

## 2023-05-02 NOTE — TELEPHONE ENCOUNTER
----- Message from Rhina Butts sent at 2023  9:02 AM CDT -----  Contact: self  Marva Perez  MRN: [id number]  : 1940  PCP: Kevin Clements  Home Phone      [phone]  Work Phone      Not on file.  Mobile          [phone]      MESSAGE:   Pt called asking about her urine results.       Phone:  [phone]

## 2023-05-03 LAB — BACTERIA UR CULT: ABNORMAL

## 2023-05-03 NOTE — TELEPHONE ENCOUNTER
LVM. Wanted to tell pt that Dr. Clements states U/A was ok but we are still waiting on Urine culture results.

## 2023-05-04 DIAGNOSIS — N30.01 ACUTE CYSTITIS WITH HEMATURIA: Primary | ICD-10-CM

## 2023-05-04 RX ORDER — CEFPROZIL 250 MG/1
250 TABLET, FILM COATED ORAL EVERY 12 HOURS
Qty: 14 TABLET | Refills: 0 | Status: SHIPPED | OUTPATIENT
Start: 2023-05-04

## 2023-05-04 NOTE — PROGRESS NOTES
The following lab results were abnormal, growing a positive culture in her urine of Ebteri b, so will send in a Rx for cefzil 250 bid for 10 days then another urine wou9ld be best

## 2023-05-05 ENCOUNTER — TELEPHONE (OUTPATIENT)
Dept: FAMILY MEDICINE | Facility: CLINIC | Age: 83
End: 2023-05-05
Payer: MEDICARE

## 2023-05-05 DIAGNOSIS — N39.0 URINARY TRACT INFECTION WITHOUT HEMATURIA, SITE UNSPECIFIED: Primary | ICD-10-CM

## 2023-05-05 DIAGNOSIS — R19.7 DIARRHEA, UNSPECIFIED TYPE: ICD-10-CM

## 2023-05-05 RX ORDER — CIPROFLOXACIN 500 MG/1
500 TABLET ORAL 2 TIMES DAILY
Qty: 14 TABLET | Refills: 0 | Status: SHIPPED | OUTPATIENT
Start: 2023-05-05 | End: 2023-05-15

## 2023-05-05 RX ORDER — LOPERAMIDE HYDROCHLORIDE 2 MG/1
CAPSULE ORAL
Qty: 40 CAPSULE | Refills: 2 | Status: SHIPPED | OUTPATIENT
Start: 2023-05-05

## 2023-05-05 RX ORDER — ONDANSETRON HYDROCHLORIDE 8 MG/1
8 TABLET, FILM COATED ORAL EVERY 8 HOURS PRN
Qty: 10 TABLET | Refills: 2 | Status: SHIPPED | OUTPATIENT
Start: 2023-05-05

## 2023-05-05 RX ORDER — CLINDAMYCIN HYDROCHLORIDE 150 MG/1
150 CAPSULE ORAL 3 TIMES DAILY
Qty: 30 CAPSULE | Refills: 0 | Status: SHIPPED | OUTPATIENT
Start: 2023-05-05 | End: 2023-05-15

## 2023-05-05 NOTE — TELEPHONE ENCOUNTER
Pts caregiver, Cherry, called stating pt took her first does of cefPROZIL (CEFZIL) 250 MG tablet and it had her up at since 430 with sever diarrhea & vomiting.  Pt asking for a replacement medication for that and possibly something for nausea & diarrhea.     Please advise

## 2023-05-05 NOTE — TELEPHONE ENCOUNTER
----- Message from Lennox Pepe sent at 2023  3:56 PM CDT -----  Contact: Mckenna  Marva Perez  MRN: [id number]  : 1940  PCP: Kevin Clements  Home Phone      [phone]  Work Phone      Not on file.  Mobile          [phone]      MESSAGE:     Pt daughter requesting a call back to discuss  Medical issues.      [phone] (Mckenna)

## 2023-05-05 NOTE — TELEPHONE ENCOUNTER
Called Mrs. Carey to let her know 2 rxs were sent in for Mrs. Perez. Pt caretaker stated understanding.

## 2023-05-05 NOTE — TELEPHONE ENCOUNTER
Talked to daughter. Pt is very anxious about positive urine culture. Also dealing with N&V from new antibiotic. Requesting advise about antibiotic and options and also a possible script for zofran. Will consult with Dr. Clements.

## 2023-05-05 NOTE — TELEPHONE ENCOUNTER
----- Message from Rhina Butts sent at 2023  8:10 AM CDT -----  Contact: Cherry/caregiver  Marva Perez  MRN: [id number]  : 1940  PCP: Kevin Clements  Home Phone      [phone]  Work Phone      Not on file.  Mobile          [phone]      MESSAGE: Pts caregiver, Cherry, called stating pt took her first does of cefPROZIL (CEFZIL) 250 MG tablet and it had her up at since 430 with sever diarrhea & vomiting.  Pt asking for a replacement medication for that and possibly something for nausea & diarrhea.     Pharmacy:  [facility]    Phone:  [phone]/Cherry

## 2023-05-05 NOTE — TELEPHONE ENCOUNTER
Contacted pt's daughter(Mckenna) about new antibiotic sent in, as well as zofran. Instructed to take antibiotics as directed and we will follow up with urine once medications are completed. Verbalized understanding.

## 2023-05-10 ENCOUNTER — DOCUMENT SCAN (OUTPATIENT)
Dept: HOME HEALTH SERVICES | Facility: HOSPITAL | Age: 83
End: 2023-05-10
Payer: MEDICARE

## 2023-05-13 ENCOUNTER — DOCUMENT SCAN (OUTPATIENT)
Dept: HOME HEALTH SERVICES | Facility: HOSPITAL | Age: 83
End: 2023-05-13
Payer: MEDICARE

## 2023-05-15 ENCOUNTER — DOCUMENT SCAN (OUTPATIENT)
Dept: HOME HEALTH SERVICES | Facility: HOSPITAL | Age: 83
End: 2023-05-15
Payer: MEDICARE

## 2023-05-16 ENCOUNTER — TELEPHONE (OUTPATIENT)
Dept: FAMILY MEDICINE | Facility: CLINIC | Age: 83
End: 2023-05-16
Payer: MEDICARE

## 2023-05-16 ENCOUNTER — LAB VISIT (OUTPATIENT)
Dept: LAB | Facility: HOSPITAL | Age: 83
End: 2023-05-16
Attending: FAMILY MEDICINE
Payer: MEDICARE

## 2023-05-16 DIAGNOSIS — N39.0 URINARY TRACT INFECTION WITHOUT HEMATURIA, SITE UNSPECIFIED: Primary | ICD-10-CM

## 2023-05-16 DIAGNOSIS — N39.0 URINARY TRACT INFECTION WITHOUT HEMATURIA, SITE UNSPECIFIED: ICD-10-CM

## 2023-05-16 LAB
BILIRUB UR QL STRIP: NEGATIVE
CLARITY UR: CLEAR
COLOR UR: YELLOW
GLUCOSE UR QL STRIP: NEGATIVE
HGB UR QL STRIP: ABNORMAL
KETONES UR QL STRIP: NEGATIVE
LEUKOCYTE ESTERASE UR QL STRIP: NEGATIVE
NITRITE UR QL STRIP: NEGATIVE
PH UR STRIP: 6 [PH] (ref 5–8)
PROT UR QL STRIP: NEGATIVE
SP GR UR STRIP: 1.01 (ref 1–1.03)
URN SPEC COLLECT METH UR: ABNORMAL
UROBILINOGEN UR STRIP-ACNC: NEGATIVE EU/DL

## 2023-05-16 PROCEDURE — 81003 URINALYSIS AUTO W/O SCOPE: CPT | Performed by: FAMILY MEDICINE

## 2023-05-16 NOTE — TELEPHONE ENCOUNTER
----- Message from Lennox Son sent at 2023  8:51 AM CDT -----  Contact: Patient  Marva Perez  MRN: [id number]  : 1940  PCP: Kevin Clements  Home Phone      [phone]  Work Phone      Not on file.  Mobile          [phone]      MESSAGE: UTI - takes last antibiotic today -- informed home health that repeat U/A is needed - has it been ordered -- please advise    Call [phone]    PCP: Tyree

## 2023-05-16 NOTE — TELEPHONE ENCOUNTER
Told pt orders for U/A are in and she can come collect a cup to repeat a U/A. Pt states she is going to come  a cup from office.

## 2023-05-16 NOTE — TELEPHONE ENCOUNTER
----- Message from Tania Chino sent at 2023  4:06 PM CDT -----  Contact: loco/PT  Marva Perez  MRN: [id number]  : 1940  PCP: Kevin Clements  Home Phone      [phone]  Work Phone      Not on file.  Mobile          [phone]      MESSAGE:   O[facility] MALINA needs order to extend home PT    [phone]

## 2023-05-17 ENCOUNTER — DOCUMENT SCAN (OUTPATIENT)
Dept: HOME HEALTH SERVICES | Facility: HOSPITAL | Age: 83
End: 2023-05-17
Payer: MEDICARE

## 2023-05-23 NOTE — TELEPHONE ENCOUNTER
Daja with Home health called stating pt is SOB & Wheezing, on 2L of oxygen    Advised to go to ER   Cosentyx Pregnancy And Lactation Text: This medication is Pregnancy Category B and is considered safe during pregnancy. It is unknown if this medication is excreted in breast milk.

## 2023-05-24 ENCOUNTER — DOCUMENT SCAN (OUTPATIENT)
Dept: HOME HEALTH SERVICES | Facility: HOSPITAL | Age: 83
End: 2023-05-24
Payer: MEDICARE

## 2023-05-25 ENCOUNTER — TELEPHONE (OUTPATIENT)
Dept: FAMILY MEDICINE | Facility: CLINIC | Age: 83
End: 2023-05-25

## 2023-05-25 ENCOUNTER — PATIENT MESSAGE (OUTPATIENT)
Dept: FAMILY MEDICINE | Facility: CLINIC | Age: 83
End: 2023-05-25
Payer: MEDICARE

## 2023-05-25 DIAGNOSIS — N30.01 ACUTE CYSTITIS WITH HEMATURIA: ICD-10-CM

## 2023-05-25 DIAGNOSIS — N39.0 URINARY TRACT INFECTION WITHOUT HEMATURIA, SITE UNSPECIFIED: Primary | ICD-10-CM

## 2023-05-25 NOTE — TELEPHONE ENCOUNTER
----- Message from Lennox Son sent at 2023 10:16 AM CDT -----  Contact: Patient  Marva Perez  MRN: [id number]  : 1940  PCP: Kevin Clements  Home Phone      [phone]  Work Phone      Not on file.  Mobile          [phone]      MESSAGE: states 2nd urine culture was done -- got a call from pharmacy stating 7 more days of antibiotics ws needed -- please advise    Call [phone]    PCP: Tyree

## 2023-05-25 NOTE — TELEPHONE ENCOUNTER
states 2nd urine culture was done -- got a call from pharmacy stating 7 more days of antibiotics was needed      please advise

## 2023-05-26 ENCOUNTER — TELEPHONE (OUTPATIENT)
Dept: FAMILY MEDICINE | Facility: CLINIC | Age: 83
End: 2023-05-26
Payer: MEDICARE

## 2023-05-26 NOTE — TELEPHONE ENCOUNTER
Talked to pt, Dr. Clements states no indication for antibiotics. Notified daughter(Mckenna) per pt's request.

## 2023-05-26 NOTE — TELEPHONE ENCOUNTER
----- Message from Lennox Pepe sent at 2023  8:32 AM CDT -----  Contact: self  Marva Perez  MRN: [id number]  : 1940  PCP: Kevin Clements  Home Phone      [phone]  Work Phone      Not on file.  Mobile          [phone]      MESSAGE:     Pt requesting a call back to discuss medication that was sent in to pharmacy.          [phone]

## 2023-05-29 RX ORDER — GABAPENTIN 100 MG/1
100 CAPSULE ORAL 2 TIMES DAILY
Qty: 60 CAPSULE | Refills: 5 | Status: CANCELLED | OUTPATIENT
Start: 2023-05-29 | End: 2024-05-28

## 2023-05-29 RX ORDER — GABAPENTIN 100 MG/1
100 CAPSULE ORAL 2 TIMES DAILY
Qty: 60 CAPSULE | Refills: 5 | Status: SHIPPED | OUTPATIENT
Start: 2023-05-29 | End: 2024-01-08

## 2023-05-29 RX ORDER — GABAPENTIN 100 MG/1
100 CAPSULE ORAL 2 TIMES DAILY
Qty: 60 CAPSULE | Refills: 5
Start: 2023-05-29 | End: 2023-05-29 | Stop reason: SDUPTHER

## 2023-05-29 NOTE — TELEPHONE ENCOUNTER
No care due was identified.  [facility] Embedded Care Due Messages. Reference number: [id number].   5/29/2023 3:56:43 PM CDT

## 2023-05-29 NOTE — TELEPHONE ENCOUNTER
----- Message from Lennox Son sent at 2023 10:20 AM CDT -----  Contact: Patient  Marva Perez  MRN: [id number]  : 1940  PCP: Kevin Clements  Home Phone      [phone]  Work Phone      Not on file.  Mobile          [phone]      MESSAGE:   Pt requesting refill or new Rx.   Is this a refill or new RX:  New Rx  RX name and strength: Gabapentin 100 mg  Last office visit: 3/21/23  Is this a 30-day or 90-day RX:  30 day  [facility] [place]  Comments:      Phone:  [phone]    PCP: Tyree

## 2023-05-29 NOTE — TELEPHONE ENCOUNTER
LOV 03/21/2023    Patient requesting refill for gabapentin (NEURONTIN) 100 MG capsule    Requested Prescriptions     Pending Prescriptions Disp Refills    gabapentin (NEURONTIN) 100 MG capsule 60 capsule 0     Sig: Take 1 capsule (100 mg total) by mouth 2 (two) times daily.       Medication pended/pharmacy confirmed, please advise.

## 2023-05-29 NOTE — TELEPHONE ENCOUNTER
No care due was identified.  [facility] Embedded Care Due Messages. Reference number: [id number].   5/29/2023 10:31:20 AM CDT

## 2023-05-29 NOTE — TELEPHONE ENCOUNTER
**PLEASE SEND AGAIN!!!**      LOV 03/21/2023    Patient requesting refill for gabapentin (NEURONTIN) 100 MG capsule    Requested Prescriptions     Pending Prescriptions Disp Refills    gabapentin (NEURONTIN) 100 MG capsule 60 capsule 5     Sig: Take 1 capsule (100 mg total) by mouth 2 (two) times daily.       Medication pended/pharmacy confirmed, please advise.

## 2023-06-05 ENCOUNTER — TELEPHONE (OUTPATIENT)
Dept: FAMILY MEDICINE | Facility: CLINIC | Age: 83
End: 2023-06-05
Payer: MEDICARE

## 2023-06-05 NOTE — TELEPHONE ENCOUNTER
----- Message from Rhina Butts sent at 2023 12:28 PM CDT -----  Contact: [facility]  Marva Perez  MRN: [id number]  : 1940  PCP: Kevin Clements  Home Phone      [phone]  Work Phone      Not on file.  Mobile          [phone]      MESSAGE:   nurse Pily with [facility] called asking for pt's results from urine re-check done mid-May, they had not received any results yet.  Also, nurse asking when next cholesterol test is due. Please send any orders for labs to Home Health office so they can draw next time at pts house.     Phone:  [phone]

## 2023-06-05 NOTE — TELEPHONE ENCOUNTER
Spoke to Pily, home health nurse, verbal orders placed for bloodwork and repeat urine during next home health visit.

## 2023-06-06 ENCOUNTER — TELEPHONE (OUTPATIENT)
Dept: FAMILY MEDICINE | Facility: CLINIC | Age: 83
End: 2023-06-06
Payer: MEDICARE

## 2023-06-06 NOTE — TELEPHONE ENCOUNTER
----- Message from Lennox Pepe sent at 2023  9:47 AM CDT -----  Contact: Yosef Durham Ana  MRN: [id number]  : 1940  PCP: Kevin Clements  Home Phone      [phone]  Work Phone      Not on file.  [employer]          [phone]      MESSAGE:     [facility] requesting a call back.          [phone] (Yosef)

## 2023-06-08 ENCOUNTER — DOCUMENT SCAN (OUTPATIENT)
Dept: HOME HEALTH SERVICES | Facility: HOSPITAL | Age: 83
End: 2023-06-08
Payer: MEDICARE

## 2023-06-14 ENCOUNTER — LAB VISIT (OUTPATIENT)
Dept: LAB | Facility: HOSPITAL | Age: 83
End: 2023-06-14
Attending: FAMILY MEDICINE
Payer: MEDICARE

## 2023-06-14 ENCOUNTER — DOCUMENT SCAN (OUTPATIENT)
Dept: HOME HEALTH SERVICES | Facility: HOSPITAL | Age: 83
End: 2023-06-14
Payer: MEDICARE

## 2023-06-14 ENCOUNTER — TELEPHONE (OUTPATIENT)
Dept: FAMILY MEDICINE | Facility: CLINIC | Age: 83
End: 2023-06-14

## 2023-06-14 DIAGNOSIS — Z13.220 SCREENING CHOLESTEROL LEVEL: Primary | ICD-10-CM

## 2023-06-14 DIAGNOSIS — Z13.228 SCREENING FOR METABOLIC DISORDER: ICD-10-CM

## 2023-06-14 DIAGNOSIS — R82.90 URINE FINDINGS ABNORMAL: ICD-10-CM

## 2023-06-14 LAB
ALBUMIN SERPL BCP-MCNC: 3.7 G/DL (ref 3.5–5.2)
ALP SERPL-CCNC: 83 U/L (ref 55–135)
ALT SERPL W/O P-5'-P-CCNC: 17 U/L (ref 10–44)
ANION GAP SERPL CALC-SCNC: 15 MMOL/L (ref 8–16)
AST SERPL-CCNC: 18 U/L (ref 10–40)
BACTERIA #/AREA URNS HPF: ABNORMAL /HPF
BILIRUB SERPL-MCNC: 0.8 MG/DL (ref 0.1–1)
BILIRUB UR QL STRIP: NEGATIVE
BUN SERPL-MCNC: 17 MG/DL (ref 8–23)
CALCIUM SERPL-MCNC: 9.3 MG/DL (ref 8.7–10.5)
CHLORIDE SERPL-SCNC: 107 MMOL/L (ref 95–110)
CHOLEST SERPL-MCNC: 209 MG/DL (ref 120–199)
CHOLEST/HDLC SERPL: 2.6 {RATIO} (ref 2–5)
CLARITY UR: ABNORMAL
CO2 SERPL-SCNC: 26 MMOL/L (ref 23–29)
COLOR UR: YELLOW
CREAT SERPL-MCNC: 0.9 MG/DL (ref 0.5–1.4)
ERYTHROCYTE [DISTWIDTH] IN BLOOD BY AUTOMATED COUNT: 13.4 % (ref 11.5–14.5)
EST. GFR  (NO RACE VARIABLE): >60 ML/MIN/1.73 M^2
ESTIMATED AVG GLUCOSE: 103 MG/DL (ref 68–131)
GLUCOSE SERPL-MCNC: 80 MG/DL (ref 70–110)
GLUCOSE UR QL STRIP: NEGATIVE
HBA1C MFR BLD: 5.2 % (ref 4–5.6)
HCT VFR BLD AUTO: 38.9 % (ref 37–48.5)
HDLC SERPL-MCNC: 81 MG/DL (ref 40–75)
HDLC SERPL: 38.8 % (ref 20–50)
HGB BLD-MCNC: 12.3 G/DL (ref 12–16)
HGB UR QL STRIP: ABNORMAL
KETONES UR QL STRIP: NEGATIVE
LDLC SERPL CALC-MCNC: 115.8 MG/DL (ref 63–159)
LEUKOCYTE ESTERASE UR QL STRIP: ABNORMAL
MCH RBC QN AUTO: 29.6 PG (ref 27–31)
MCHC RBC AUTO-ENTMCNC: 31.6 G/DL (ref 32–36)
MCV RBC AUTO: 94 FL (ref 82–98)
MICROSCOPIC COMMENT: ABNORMAL
NITRITE UR QL STRIP: POSITIVE
NONHDLC SERPL-MCNC: 128 MG/DL
PH UR STRIP: 6 [PH] (ref 5–8)
PLATELET # BLD AUTO: 189 K/UL (ref 150–450)
PMV BLD AUTO: 10 FL (ref 9.2–12.9)
POTASSIUM SERPL-SCNC: 3.8 MMOL/L (ref 3.5–5.1)
PROT SERPL-MCNC: 6.7 G/DL (ref 6–8.4)
PROT UR QL STRIP: NEGATIVE
RBC # BLD AUTO: 4.16 M/UL (ref 4–5.4)
RBC #/AREA URNS HPF: 1 /HPF (ref 0–4)
SODIUM SERPL-SCNC: 148 MMOL/L (ref 136–145)
SP GR UR STRIP: >=1.03 (ref 1–1.03)
TRIGL SERPL-MCNC: 61 MG/DL (ref 30–150)
URN SPEC COLLECT METH UR: ABNORMAL
UROBILINOGEN UR STRIP-ACNC: NEGATIVE EU/DL
WBC # BLD AUTO: 9.56 K/UL (ref 3.9–12.7)
WBC #/AREA URNS HPF: 1 /HPF (ref 0–5)

## 2023-06-14 PROCEDURE — 83036 HEMOGLOBIN GLYCOSYLATED A1C: CPT

## 2023-06-14 PROCEDURE — 80061 LIPID PANEL: CPT

## 2023-06-14 PROCEDURE — 87086 URINE CULTURE/COLONY COUNT: CPT

## 2023-06-14 PROCEDURE — 87186 SC STD MICRODIL/AGAR DIL: CPT

## 2023-06-14 PROCEDURE — 85027 COMPLETE CBC AUTOMATED: CPT

## 2023-06-14 PROCEDURE — 87088 URINE BACTERIA CULTURE: CPT

## 2023-06-14 PROCEDURE — 87077 CULTURE AEROBIC IDENTIFY: CPT

## 2023-06-14 PROCEDURE — 80053 COMPREHEN METABOLIC PANEL: CPT

## 2023-06-14 PROCEDURE — 81000 URINALYSIS NONAUTO W/SCOPE: CPT

## 2023-06-15 ENCOUNTER — TELEPHONE (OUTPATIENT)
Dept: INTERNAL MEDICINE | Facility: CLINIC | Age: 83
End: 2023-06-15

## 2023-06-15 DIAGNOSIS — J44.9 CHRONIC OBSTRUCTIVE PULMONARY DISEASE, UNSPECIFIED COPD TYPE: Primary | ICD-10-CM

## 2023-06-15 NOTE — TELEPHONE ENCOUNTER
Ms. Durham is requesting a new nebulizer and tubing sent to [facility] in [place]. States her nebulizer is older and starting to leak.    Please advise.

## 2023-06-16 LAB — BACTERIA UR CULT: ABNORMAL

## 2023-06-19 DIAGNOSIS — N39.0 E. COLI UTI: Primary | ICD-10-CM

## 2023-06-19 DIAGNOSIS — B96.20 E. COLI UTI: Primary | ICD-10-CM

## 2023-06-19 PROCEDURE — G0179 PR HOME HEALTH MD RECERTIFICATION: ICD-10-PCS | Mod: ,,, | Performed by: FAMILY MEDICINE

## 2023-06-19 PROCEDURE — G0179 MD RECERTIFICATION HHA PT: HCPCS | Mod: ,,, | Performed by: FAMILY MEDICINE

## 2023-06-19 RX ORDER — CIPROFLOXACIN 500 MG/1
TABLET ORAL
Qty: 20 TABLET | Refills: 0 | Status: SHIPPED | OUTPATIENT
Start: 2023-06-19 | End: 2023-06-21 | Stop reason: ALTCHOICE

## 2023-06-20 ENCOUNTER — EXTERNAL HOME HEALTH (OUTPATIENT)
Dept: HOME HEALTH SERVICES | Facility: HOSPITAL | Age: 83
End: 2023-06-20
Payer: MEDICARE

## 2023-06-20 ENCOUNTER — DOCUMENT SCAN (OUTPATIENT)
Dept: HOME HEALTH SERVICES | Facility: HOSPITAL | Age: 83
End: 2023-06-20
Payer: MEDICARE

## 2023-06-21 ENCOUNTER — HOSPITAL ENCOUNTER (EMERGENCY)
Facility: HOSPITAL | Age: 83
Discharge: HOME | End: 2023-06-21
Attending: STUDENT IN AN ORGANIZED HEALTH CARE EDUCATION/TRAINING PROGRAM
Payer: MEDICARE

## 2023-06-21 VITALS
BODY MASS INDEX: 22.08 KG/M2 | OXYGEN SATURATION: 96 % | HEIGHT: 62 IN | TEMPERATURE: 98 F | WEIGHT: 120 LBS | DIASTOLIC BLOOD PRESSURE: 60 MMHG | HEART RATE: 80 BPM | SYSTOLIC BLOOD PRESSURE: 145 MMHG | RESPIRATION RATE: 20 BRPM

## 2023-06-21 DIAGNOSIS — J44.1 COPD EXACERBATION: ICD-10-CM

## 2023-06-21 DIAGNOSIS — J18.9 PNEUMONIA OF LEFT LOWER LOBE DUE TO INFECTIOUS ORGANISM: Primary | ICD-10-CM

## 2023-06-21 DIAGNOSIS — M54.9 BACK PAIN: ICD-10-CM

## 2023-06-21 LAB
ALBUMIN SERPL BCP-MCNC: 3.7 G/DL (ref 3.5–5.2)
ALP SERPL-CCNC: 84 U/L (ref 55–135)
ALT SERPL W/O P-5'-P-CCNC: 15 U/L (ref 10–44)
ANION GAP SERPL CALC-SCNC: 10 MMOL/L (ref 8–16)
AST SERPL-CCNC: 18 U/L (ref 10–40)
BASOPHILS # BLD AUTO: 0.07 K/UL (ref 0–0.2)
BASOPHILS NFR BLD: 0.6 % (ref 0–1.9)
BILIRUB SERPL-MCNC: 0.4 MG/DL (ref 0.1–1)
BILIRUB UR QL STRIP: NEGATIVE
BUN SERPL-MCNC: 17 MG/DL (ref 8–23)
CALCIUM SERPL-MCNC: 9.7 MG/DL (ref 8.7–10.5)
CHLORIDE SERPL-SCNC: 105 MMOL/L (ref 95–110)
CLARITY UR: CLEAR
CO2 SERPL-SCNC: 26 MMOL/L (ref 23–29)
COLOR UR: YELLOW
CREAT SERPL-MCNC: 1.1 MG/DL (ref 0.5–1.4)
DIFFERENTIAL METHOD: ABNORMAL
EOSINOPHIL # BLD AUTO: 0.1 K/UL (ref 0–0.5)
EOSINOPHIL NFR BLD: 0.6 % (ref 0–8)
ERYTHROCYTE [DISTWIDTH] IN BLOOD BY AUTOMATED COUNT: 13.1 % (ref 11.5–14.5)
EST. GFR  (NO RACE VARIABLE): 50 ML/MIN/1.73 M^2
GLUCOSE SERPL-MCNC: 81 MG/DL (ref 70–110)
GLUCOSE UR QL STRIP: NEGATIVE
HCT VFR BLD AUTO: 37.7 % (ref 37–48.5)
HGB BLD-MCNC: 12.2 G/DL (ref 12–16)
HGB UR QL STRIP: ABNORMAL
IMM GRANULOCYTES # BLD AUTO: 0.08 K/UL (ref 0–0.04)
IMM GRANULOCYTES NFR BLD AUTO: 0.7 % (ref 0–0.5)
KETONES UR QL STRIP: NEGATIVE
LEUKOCYTE ESTERASE UR QL STRIP: NEGATIVE
LYMPHOCYTES # BLD AUTO: 1.1 K/UL (ref 1–4.8)
LYMPHOCYTES NFR BLD: 10.1 % (ref 18–48)
MCH RBC QN AUTO: 29.7 PG (ref 27–31)
MCHC RBC AUTO-ENTMCNC: 32.4 G/DL (ref 32–36)
MCV RBC AUTO: 92 FL (ref 82–98)
MONOCYTES # BLD AUTO: 1.3 K/UL (ref 0.3–1)
MONOCYTES NFR BLD: 11.3 % (ref 4–15)
NEUTROPHILS # BLD AUTO: 8.6 K/UL (ref 1.8–7.7)
NEUTROPHILS NFR BLD: 76.7 % (ref 38–73)
NITRITE UR QL STRIP: NEGATIVE
NRBC BLD-RTO: 0 /100 WBC
PH UR STRIP: 5 [PH] (ref 5–8)
PLATELET # BLD AUTO: 174 K/UL (ref 150–450)
PMV BLD AUTO: 9.9 FL (ref 9.2–12.9)
POTASSIUM SERPL-SCNC: 4.1 MMOL/L (ref 3.5–5.1)
PROT SERPL-MCNC: 6.4 G/DL (ref 6–8.4)
PROT UR QL STRIP: NEGATIVE
RBC # BLD AUTO: 4.11 M/UL (ref 4–5.4)
SODIUM SERPL-SCNC: 141 MMOL/L (ref 136–145)
SP GR UR STRIP: 1.01 (ref 1–1.03)
URN SPEC COLLECT METH UR: ABNORMAL
UROBILINOGEN UR STRIP-ACNC: NEGATIVE EU/DL
WBC # BLD AUTO: 11.27 K/UL (ref 3.9–12.7)

## 2023-06-21 PROCEDURE — 94640 AIRWAY INHALATION TREATMENT: CPT

## 2023-06-21 PROCEDURE — 96365 THER/PROPH/DIAG IV INF INIT: CPT

## 2023-06-21 PROCEDURE — 99285 EMERGENCY DEPT VISIT HI MDM: CPT | Mod: 25

## 2023-06-21 PROCEDURE — 85025 COMPLETE CBC W/AUTO DIFF WBC: CPT | Performed by: STUDENT IN AN ORGANIZED HEALTH CARE EDUCATION/TRAINING PROGRAM

## 2023-06-21 PROCEDURE — 93010 ELECTROCARDIOGRAM REPORT: CPT | Mod: ,,, | Performed by: INTERNAL MEDICINE

## 2023-06-21 PROCEDURE — 25000242 PHARM REV CODE 250 ALT 637 W/ HCPCS: Performed by: STUDENT IN AN ORGANIZED HEALTH CARE EDUCATION/TRAINING PROGRAM

## 2023-06-21 PROCEDURE — 63600175 PHARM REV CODE 636 W HCPCS: Performed by: STUDENT IN AN ORGANIZED HEALTH CARE EDUCATION/TRAINING PROGRAM

## 2023-06-21 PROCEDURE — 96375 TX/PRO/DX INJ NEW DRUG ADDON: CPT

## 2023-06-21 PROCEDURE — 81003 URINALYSIS AUTO W/O SCOPE: CPT | Performed by: STUDENT IN AN ORGANIZED HEALTH CARE EDUCATION/TRAINING PROGRAM

## 2023-06-21 PROCEDURE — 25000003 PHARM REV CODE 250: Performed by: STUDENT IN AN ORGANIZED HEALTH CARE EDUCATION/TRAINING PROGRAM

## 2023-06-21 PROCEDURE — 36415 COLL VENOUS BLD VENIPUNCTURE: CPT | Performed by: STUDENT IN AN ORGANIZED HEALTH CARE EDUCATION/TRAINING PROGRAM

## 2023-06-21 PROCEDURE — 27000221 HC OXYGEN, UP TO 24 HOURS

## 2023-06-21 PROCEDURE — 93010 EKG 12-LEAD: ICD-10-PCS | Mod: ,,, | Performed by: INTERNAL MEDICINE

## 2023-06-21 PROCEDURE — 93005 ELECTROCARDIOGRAM TRACING: CPT

## 2023-06-21 PROCEDURE — 80053 COMPREHEN METABOLIC PANEL: CPT | Performed by: STUDENT IN AN ORGANIZED HEALTH CARE EDUCATION/TRAINING PROGRAM

## 2023-06-21 RX ORDER — LEVOFLOXACIN 5 MG/ML
750 INJECTION, SOLUTION INTRAVENOUS
Status: DISCONTINUED | OUTPATIENT
Start: 2023-06-21 | End: 2023-06-22 | Stop reason: HOSPADM

## 2023-06-21 RX ORDER — ACETAMINOPHEN 325 MG/1
650 TABLET ORAL
Status: COMPLETED | OUTPATIENT
Start: 2023-06-21 | End: 2023-06-21

## 2023-06-21 RX ORDER — METHYLPREDNISOLONE SOD SUCC 125 MG
125 VIAL (EA) INJECTION
Status: COMPLETED | OUTPATIENT
Start: 2023-06-21 | End: 2023-06-21

## 2023-06-21 RX ORDER — ALPRAZOLAM 0.25 MG/1
0.25 TABLET ORAL
Status: COMPLETED | OUTPATIENT
Start: 2023-06-21 | End: 2023-06-21

## 2023-06-21 RX ORDER — LEVOFLOXACIN 500 MG/1
500 TABLET, FILM COATED ORAL DAILY
Qty: 5 TABLET | Refills: 0 | Status: SHIPPED | OUTPATIENT
Start: 2023-06-21 | End: 2023-06-26

## 2023-06-21 RX ORDER — DILTIAZEM HYDROCHLORIDE 60 MG/1
60 TABLET, FILM COATED ORAL
Status: COMPLETED | OUTPATIENT
Start: 2023-06-21 | End: 2023-06-21

## 2023-06-21 RX ORDER — IPRATROPIUM BROMIDE AND ALBUTEROL SULFATE 2.5; .5 MG/3ML; MG/3ML
3 SOLUTION RESPIRATORY (INHALATION)
Status: COMPLETED | OUTPATIENT
Start: 2023-06-21 | End: 2023-06-21

## 2023-06-21 RX ADMIN — IPRATROPIUM BROMIDE AND ALBUTEROL SULFATE 3 ML: .5; 3 SOLUTION RESPIRATORY (INHALATION) at 08:06

## 2023-06-21 RX ADMIN — DILTIAZEM HYDROCHLORIDE 60 MG: 60 TABLET, FILM COATED ORAL at 09:06

## 2023-06-21 RX ADMIN — ALPRAZOLAM 0.25 MG: 0.25 TABLET ORAL at 09:06

## 2023-06-21 RX ADMIN — METHYLPREDNISOLONE SODIUM SUCCINATE 125 MG: 125 INJECTION, POWDER, FOR SOLUTION INTRAMUSCULAR; INTRAVENOUS at 08:06

## 2023-06-21 RX ADMIN — LEVOFLOXACIN 750 MG: 750 INJECTION, SOLUTION INTRAVENOUS at 09:06

## 2023-06-21 RX ADMIN — ACETAMINOPHEN 650 MG: 325 TABLET ORAL at 09:06

## 2023-06-22 ENCOUNTER — TELEPHONE (OUTPATIENT)
Dept: FAMILY MEDICINE | Facility: CLINIC | Age: 83
End: 2023-06-22
Payer: MEDICARE

## 2023-06-22 NOTE — PROGRESS NOTES
Pharmacist Renal Dose Adjustment Note    Marva Perez is a 82 y.o. female being treated with the medication levaquin    Patient Data:    Vital Signs (Most Recent):  Temp: 97.7 °F (36.5 °C) (06/21/23 1952)  Pulse: 93 (06/21/23 2105)  Resp: 20 (06/21/23 2026)  BP: (!) 173/75 (06/21/23 2019)  SpO2: (!) 94 % (06/21/23 2105) Vital Signs (72h Range):  Temp:  [97.7 °F (36.5 °C)]   Pulse:  [69-93]   Resp:  [20-22]   BP: (173-201)/(75-82)   SpO2:  [94 %-100 %]      Recent Labs   Lab 06/21/23 2014   CREATININE 1.1     Serum creatinine: 1.1 mg/dL 06/21/23 2014  Estimated creatinine clearance: 31.7 mL/min    Medication:levaquin dose: 500 mg frequency every 24 hours will be changed to medication:levaquin dose:750 mg frequency:every 48 hours    Pharmacist's Name: Nakita Romo  Pharmacist's Extension: [phone]

## 2023-06-22 NOTE — DISCHARGE INSTRUCTIONS
Please follow up with your primary care physician within 2 days. Ensure that you review all lab work results and/or imaging results. If you have any questions about your discharge paperwork please call the Emergency Department.     Return to the Emergency Department for any fevers, cough or congestion, shortness of breath, chest pain, nausea, vomiting, diarrhea, if symptoms do not improve, or for any new or worsening symptoms, unless otherwise told.     If you were prescribed antibiotics, please take them to completion. If you were prescribed a narcotic or any sedating medication, do not drive or operate heavy equipment or machinery while taking these medications.    Thank you for visiting [facility], Department of Emergency Medicine. Please see the entirety of the educational materials provided. Please note that a visit to the emergency department does not substitute ongoing care from a primary medical provider or specialist. Please ensure to follow up as recommended. However, please return to the emergency department immediately if symptoms do not improve as discussed, symptoms worsen, new symptoms develop, difficulty in following up or for any of your concerns or issues. Please note on discharge you are acknowledging understanding and agreement on medical evaluation, management recommendations and follow up recommendations.

## 2023-06-22 NOTE — ED PROVIDER NOTES
Encounter Date: 2023       History     Chief Complaint   Patient presents with    Back Pain     Pt to ED via [facility] with c/o intermittent low to mid back pain. Pt currently being treated for kidney infection by Dr. Clements.      82-year-old female with history of significant COPD, atrial fibrillation, presenting with pleuritic back pain that began today.  Patient reports that she was recently diagnosed with a urinary tract infection, and has been taking ciprofloxacin for the last couple of days.  Denies fever, nausea, vomiting.  Denies flank pain.  No chest pain or abdominal pain.  No other complaints.    Review of patient's allergies indicates:   Allergen Reactions    Pcn [penicillins] Hives and Itching    Tetracyclines     Bactrim [sulfamethoxazole-trimethoprim] Rash    Ilosone Rash    Iodine and iodide containing products Rash    Sulfa (sulfonamide antibiotics) Hives and Rash     Past Medical History:   Diagnosis Date    Abnormal Pap smear 2013    LGSIL    Anticoagulant long-term use     Atrial fibrillation     COPD (chronic obstructive pulmonary disease)     Depression     GERD (gastroesophageal reflux disease)     Hyperlipidemia     Hypertension      Past Surgical History:   Procedure Laterality Date    APPENDECTOMY      BRONCHOSCOPY N/A 2019    Procedure: BRONCHOSCOPY;  Surgeon: Howard Matson MD;  Location: [facility] OR;  Service: Pulmonary;  Laterality: N/A;    CERVICAL BIOPSY  W/ LOOP ELECTRODE EXCISION      CHOLECYSTECTOMY      COLLATERAL LIGAMENT REPAIR, KNEE      left knee    COLONOSCOPY      DILATION AND CURETTAGE OF UTERUS      SINUS SURGERY       Family History   Problem Relation Age of Onset    Breast cancer Mother     Colon cancer Neg Hx     Ovarian cancer Neg Hx      Social History     Tobacco Use    Smoking status: Former     Years: 30.00     Types: Cigarettes     Quit date: 10/30/2006     Years since quittin.6    Smokeless tobacco: Never   Substance Use Topics    Alcohol use: No      Comment: No    Drug use: No     Review of Systems   Constitutional:  Negative for fever.   HENT:  Negative for sore throat.    Respiratory:  Positive for wheezing. Negative for shortness of breath.    Cardiovascular:  Negative for chest pain.   Gastrointestinal:  Negative for abdominal pain, diarrhea, nausea and vomiting.   Genitourinary:  Negative for dysuria.   Musculoskeletal:  Positive for back pain.   Skin:  Negative for rash.   Neurological:  Negative for weakness.   Hematological:  Does not bruise/bleed easily.     Physical Exam     Initial Vitals   BP Pulse Resp Temp SpO2   06/21/23 1949 06/21/23 1949 06/21/23 1949 06/21/23 1952 06/21/23 1949   (!) 201/82 73 (!) 22 97.7 °F (36.5 °C) 97 %      MAP       --                Physical Exam    Nursing note and vitals reviewed.  Constitutional: She appears well-developed.   HENT:   Head: Normocephalic.   Eyes: Pupils are equal, round, and reactive to light.   Neck:   Normal range of motion.  Cardiovascular:            No murmur heard.  Pulmonary/Chest: No respiratory distress.   Significant wheezing bilaterally with poor air movement.   Abdominal: Abdomen is soft.   No TTP diffusely. No guarding, rebound, or masses. Negative Duckworth's sign. No TTP at McBurney's point. No CVAT bilaterally.     Musculoskeletal:         General: No edema.      Cervical back: Normal range of motion.     Neurological: She is alert.   Skin: Skin is warm.   Psychiatric: She has a normal mood and affect.       ED Course   Procedures  Labs Reviewed   CBC W/ AUTO DIFFERENTIAL - Abnormal; Notable for the following components:       Result Value    Immature Granulocytes 0.7 (*)     Gran # (ANC) 8.6 (*)     Immature Grans (Abs) 0.08 (*)     Mono # 1.3 (*)     Gran % 76.7 (*)     Lymph % 10.1 (*)     All other components within normal limits   COMPREHENSIVE METABOLIC PANEL - Abnormal; Notable for the following components:    eGFR 50 (*)     All other components within normal limits   URINALYSIS,  REFLEX TO URINE CULTURE - Abnormal; Notable for the following components:    Occult Blood UA Trace (*)     All other components within normal limits    Narrative:     Specimen Source->Urine     EKG Readings: (Independently Interpreted)   Initial Reading: No STEMI. Rhythm: Normal Sinus Rhythm. Heart Rate: 68. Ectopy: No Ectopy. Conduction: Normal.     Imaging Results              X-Ray Chest AP Portable (Final result)  Result time 06/21/23 21:01:14      Final result by Vidal Griffin MD (06/21/23 21:01:14)                   Impression:      Left lower lung airspace disease may be infectious or inflammatory.  Emphysema.      Electronically signed by: Vidal Griffin  Date:    06/21/2023  Time:    21:01               Narrative:    EXAMINATION:  XR CHEST AP PORTABLE    CLINICAL HISTORY:  wheezing;    TECHNIQUE:  Single frontal view of the chest was performed.    COMPARISON:  04/04/2023    FINDINGS:  Emphysematous changes of the lungs.  Left lower lung airspace disease may be infectious or inflammatory.  No obvious pleural fluid.  No pneumothorax.  Normal heart size.  Atherosclerosis of the aortic arch.                                       Medications   methylPREDNISolone sodium succinate injection 125 mg (125 mg Intravenous Given 6/21/23 2007)   albuterol-ipratropium 2.5 mg-0.5 mg/3 mL nebulizer solution 3 mL (3 mLs Nebulization Given 6/21/23 2026)   ALPRAZolam tablet 0.25 mg (0.25 mg Oral Given 6/21/23 2103)   diltiaZEM tablet 60 mg (60 mg Oral Given 6/21/23 2103)   acetaminophen tablet 650 mg (650 mg Oral Given 6/21/23 2148)     Medical Decision Making:   Differential Diagnosis:   DDX: Likely COPD exacerbation given history, exam. R/o PNA. Unlikely PE given history, physical, risk factor analysis, +other more likely diagnosis. Low suspicion ACS but will screen given risk factors.  Given lack of CVA tenderness, low suspicion for pyelonephritis.  DX: CXR. BMP, CBC, UA.  EKG.   TX: Analgesia PRN.  Albuterol/atrovent nebs PRN. Steroids. Antibiotics if indicated by CXR or will be admitted. Treatment/consult as indicated by studies.  Dispo: Pending studies. If symptoms controlled, studies WNL or stable for outpatient management, discharge to follow up with PMD within 3-5 days with steroid course and albuterol PRN, precautions for return, and recommendations for supportive care. If no improvement in respiratory distress with appropriate observation in ED, consider observation vs. admission for acute asthma exacerbation.               ED Course as of 06/22/23 0231   Wed Jun 21, 2023   2140 X-ray findings consistent with left lower lobe pneumonia.  Patient has no fever.  Satting normally on her 4 L. denies chest pain or shortness breath.  Spoke at length with patient and family about option of inpatient versus outpatient treatment.  I believe patient is stable for outpatient treatment, however I will message patient's PCP regarding very close follow-up, and patient and family understand that she must return immediately if patient's condition worsens. [NB]      ED Course User Index  [NB] Kyle Piedra MD                 Clinical Impression:   Final diagnoses:  [M54.9] Back pain  [J44.1] COPD exacerbation  [J18.9] Pneumonia of left lower lobe due to infectious organism (Primary)        ED Disposition Condition    Discharge Stable          ED Prescriptions       Medication Sig Dispense Start Date End Date Auth. Provider    levoFLOXacin (LEVAQUIN) 500 MG tablet Take 1 tablet (500 mg total) by mouth once daily. for 5 days 5 tablet 6/21/2023 6/26/2023 Kyle Piedra MD          Follow-up Information       Follow up With Specialties Details Why Contact Info    Kevin Clements MD Family Medicine Schedule an appointment as soon as possible for a visit in 2 days  [address] [place] [address] [address] [place] [address]  [phone]      [facility] - Emergency Dept Emergency Medicine  If symptoms worsen [facility] [facility]  [place]  [place] [address]  [phone]             Kyle Piedra MD  06/21/23 2003       Kyle Piedra MD  06/21/23 2021       Kyle Piedra MD  06/22/23 7629

## 2023-06-23 ENCOUNTER — DOCUMENT SCAN (OUTPATIENT)
Dept: HOME HEALTH SERVICES | Facility: HOSPITAL | Age: 83
End: 2023-06-23
Payer: MEDICARE

## 2023-06-26 ENCOUNTER — DOCUMENT SCAN (OUTPATIENT)
Dept: HOME HEALTH SERVICES | Facility: HOSPITAL | Age: 83
End: 2023-06-26
Payer: MEDICARE

## 2023-06-26 PROBLEM — J13 PNEUMONIA OF LEFT LOWER LOBE DUE TO STREPTOCOCCUS PNEUMONIAE: Status: RESOLVED | Noted: 2023-03-26 | Resolved: 2023-06-26

## 2023-06-27 ENCOUNTER — OFFICE VISIT (OUTPATIENT)
Dept: FAMILY MEDICINE | Facility: CLINIC | Age: 83
End: 2023-06-27
Payer: MEDICARE

## 2023-06-27 ENCOUNTER — PATIENT MESSAGE (OUTPATIENT)
Dept: FAMILY MEDICINE | Facility: CLINIC | Age: 83
End: 2023-06-27

## 2023-06-27 VITALS
HEART RATE: 72 BPM | RESPIRATION RATE: 16 BRPM | SYSTOLIC BLOOD PRESSURE: 132 MMHG | WEIGHT: 117.5 LBS | BODY MASS INDEX: 21.62 KG/M2 | HEIGHT: 62 IN | OXYGEN SATURATION: 98 % | DIASTOLIC BLOOD PRESSURE: 60 MMHG

## 2023-06-27 DIAGNOSIS — R53.1 WEAKNESS: Primary | ICD-10-CM

## 2023-06-27 DIAGNOSIS — J43.2 CENTRILOBULAR EMPHYSEMA: ICD-10-CM

## 2023-06-27 PROCEDURE — 1159F MED LIST DOCD IN RCRD: CPT | Mod: CPTII,S$GLB,, | Performed by: FAMILY MEDICINE

## 2023-06-27 PROCEDURE — 1126F AMNT PAIN NOTED NONE PRSNT: CPT | Mod: CPTII,S$GLB,, | Performed by: FAMILY MEDICINE

## 2023-06-27 PROCEDURE — 99213 OFFICE O/P EST LOW 20 MIN: CPT | Mod: S$GLB,,, | Performed by: FAMILY MEDICINE

## 2023-06-27 PROCEDURE — 3078F PR MOST RECENT DIASTOLIC BLOOD PRESSURE < 80 MM HG: ICD-10-PCS | Mod: CPTII,S$GLB,, | Performed by: FAMILY MEDICINE

## 2023-06-27 PROCEDURE — 1101F PT FALLS ASSESS-DOCD LE1/YR: CPT | Mod: CPTII,S$GLB,, | Performed by: FAMILY MEDICINE

## 2023-06-27 PROCEDURE — 3075F PR MOST RECENT SYSTOLIC BLOOD PRESS GE 130-139MM HG: ICD-10-PCS | Mod: CPTII,S$GLB,, | Performed by: FAMILY MEDICINE

## 2023-06-27 PROCEDURE — 3288F PR FALLS RISK ASSESSMENT DOCUMENTED: ICD-10-PCS | Mod: CPTII,S$GLB,, | Performed by: FAMILY MEDICINE

## 2023-06-27 PROCEDURE — 1159F PR MEDICATION LIST DOCUMENTED IN MEDICAL RECORD: ICD-10-PCS | Mod: CPTII,S$GLB,, | Performed by: FAMILY MEDICINE

## 2023-06-27 PROCEDURE — 99999 PR PBB SHADOW E&M-EST. PATIENT-LVL III: ICD-10-PCS | Mod: PBBFAC,,, | Performed by: FAMILY MEDICINE

## 2023-06-27 PROCEDURE — 99999 PR PBB SHADOW E&M-EST. PATIENT-LVL III: CPT | Mod: PBBFAC,,, | Performed by: FAMILY MEDICINE

## 2023-06-27 PROCEDURE — 3288F FALL RISK ASSESSMENT DOCD: CPT | Mod: CPTII,S$GLB,, | Performed by: FAMILY MEDICINE

## 2023-06-27 PROCEDURE — 3078F DIAST BP <80 MM HG: CPT | Mod: CPTII,S$GLB,, | Performed by: FAMILY MEDICINE

## 2023-06-27 PROCEDURE — 99213 PR OFFICE/OUTPT VISIT, EST, LEVL III, 20-29 MIN: ICD-10-PCS | Mod: S$GLB,,, | Performed by: FAMILY MEDICINE

## 2023-06-27 PROCEDURE — 3075F SYST BP GE 130 - 139MM HG: CPT | Mod: CPTII,S$GLB,, | Performed by: FAMILY MEDICINE

## 2023-06-27 PROCEDURE — 1126F PR PAIN SEVERITY QUANTIFIED, NO PAIN PRESENT: ICD-10-PCS | Mod: CPTII,S$GLB,, | Performed by: FAMILY MEDICINE

## 2023-06-27 PROCEDURE — 1101F PR PT FALLS ASSESS DOC 0-1 FALLS W/OUT INJ PAST YR: ICD-10-PCS | Mod: CPTII,S$GLB,, | Performed by: FAMILY MEDICINE

## 2023-06-27 RX ORDER — LEVOFLOXACIN 500 MG/1
500 TABLET, FILM COATED ORAL DAILY
Qty: 5 TABLET | Refills: 0 | Status: SHIPPED | OUTPATIENT
Start: 2023-06-27

## 2023-06-27 NOTE — PROGRESS NOTES
Subjective:       Patient ID: Marva Perez is a 82 y.o. female.    Chief Complaint: Follow-up (ER f/u Pt states she is still feeling weak. Pt states it hurts when she is breathing. )    Pt is a 82 y.o. female who presents for check up for F U for pulmonary congestion and less SOB.    Review of Systems   Constitutional:  Positive for fatigue.   Respiratory:  Positive for shortness of breath. Negative for cough.         Pain is sore upon deep breathing     Objective:      Physical Exam  Constitutional:       Appearance: She is well-developed.      Comments: Nasal 02   HENT:      Head: Normocephalic.   Eyes:      Pupils: Pupils are equal, round, and reactive to light.   Neck:      Thyroid: No thyromegaly.   Cardiovascular:      Rate and Rhythm: Normal rate and regular rhythm.   Pulmonary:      Effort: Respiratory distress present.      Breath sounds: No wheezing or rales.   Chest:      Chest wall: No tenderness.   Abdominal:      General: There is no distension.      Tenderness: There is no abdominal tenderness. There is no guarding or rebound.   Musculoskeletal:         General: No tenderness. Normal range of motion.      Cervical back: Normal range of motion and neck supple.   Lymphadenopathy:      Cervical: No cervical adenopathy.   Skin:     General: Skin is warm and dry.      Coloration: Skin is not pale.      Findings: No rash.   Neurological:      Mental Status: She is alert and oriented to person, place, and time.      Cranial Nerves: No cranial nerve deficit.      Motor: No abnormal muscle tone.      Coordination: Coordination normal.      Deep Tendon Reflexes: Reflexes are normal and symmetric. Reflexes normal.   Psychiatric:         Thought Content: Thought content normal.         Judgment: Judgment normal.       Assessment:       Encounter Diagnoses   Name Primary?    Weakness Yes    Centrilobular emphysema          Plan:   1. Weakness    2. Centrilobular emphysema    Other orders  -     levoFLOXacin  (LEVAQUIN) 500 MG tablet; Take 1 tablet (500 mg total) by mouth once daily.  Dispense: 5 tablet; Refill: 0

## 2023-06-28 RX ORDER — ERGOCALCIFEROL 1.25 MG/1
50000 CAPSULE ORAL
Qty: 24 CAPSULE | Refills: 1 | Status: SHIPPED | OUTPATIENT
Start: 2023-06-28

## 2023-06-28 NOTE — TELEPHONE ENCOUNTER
LOV: 6/27/2023    Pt requesting refill on: VITAMIN D2 1.25MG(50,000 UNIT) CAPSULE    Medication pended    Please advise

## 2023-07-10 ENCOUNTER — OFFICE VISIT (OUTPATIENT)
Dept: FAMILY MEDICINE | Facility: CLINIC | Age: 83
End: 2023-07-10
Payer: MEDICARE

## 2023-07-10 VITALS
SYSTOLIC BLOOD PRESSURE: 128 MMHG | BODY MASS INDEX: 21.49 KG/M2 | HEART RATE: 76 BPM | RESPIRATION RATE: 20 BRPM | HEIGHT: 62 IN | DIASTOLIC BLOOD PRESSURE: 44 MMHG

## 2023-07-10 DIAGNOSIS — J34.89 RHINORRHEA: Primary | ICD-10-CM

## 2023-07-10 DIAGNOSIS — J44.1 COPD EXACERBATION: ICD-10-CM

## 2023-07-10 PROBLEM — J96.10 CHRONIC RESPIRATORY FAILURE: Status: RESOLVED | Noted: 2019-07-31 | Resolved: 2023-07-10

## 2023-07-10 PROCEDURE — 1125F AMNT PAIN NOTED PAIN PRSNT: CPT | Mod: CPTII,S$GLB,, | Performed by: FAMILY MEDICINE

## 2023-07-10 PROCEDURE — 3078F DIAST BP <80 MM HG: CPT | Mod: CPTII,S$GLB,, | Performed by: FAMILY MEDICINE

## 2023-07-10 PROCEDURE — 1159F PR MEDICATION LIST DOCUMENTED IN MEDICAL RECORD: ICD-10-PCS | Mod: CPTII,S$GLB,, | Performed by: FAMILY MEDICINE

## 2023-07-10 PROCEDURE — 99213 PR OFFICE/OUTPT VISIT, EST, LEVL III, 20-29 MIN: ICD-10-PCS | Mod: S$GLB,,, | Performed by: FAMILY MEDICINE

## 2023-07-10 PROCEDURE — 3078F PR MOST RECENT DIASTOLIC BLOOD PRESSURE < 80 MM HG: ICD-10-PCS | Mod: CPTII,S$GLB,, | Performed by: FAMILY MEDICINE

## 2023-07-10 PROCEDURE — 3074F PR MOST RECENT SYSTOLIC BLOOD PRESSURE < 130 MM HG: ICD-10-PCS | Mod: CPTII,S$GLB,, | Performed by: FAMILY MEDICINE

## 2023-07-10 PROCEDURE — 99213 OFFICE O/P EST LOW 20 MIN: CPT | Mod: S$GLB,,, | Performed by: FAMILY MEDICINE

## 2023-07-10 PROCEDURE — 99999 PR PBB SHADOW E&M-EST. PATIENT-LVL III: CPT | Mod: PBBFAC,,, | Performed by: FAMILY MEDICINE

## 2023-07-10 PROCEDURE — 1125F PR PAIN SEVERITY QUANTIFIED, PAIN PRESENT: ICD-10-PCS | Mod: CPTII,S$GLB,, | Performed by: FAMILY MEDICINE

## 2023-07-10 PROCEDURE — 1101F PT FALLS ASSESS-DOCD LE1/YR: CPT | Mod: CPTII,S$GLB,, | Performed by: FAMILY MEDICINE

## 2023-07-10 PROCEDURE — 3288F PR FALLS RISK ASSESSMENT DOCUMENTED: ICD-10-PCS | Mod: CPTII,S$GLB,, | Performed by: FAMILY MEDICINE

## 2023-07-10 PROCEDURE — 3074F SYST BP LT 130 MM HG: CPT | Mod: CPTII,S$GLB,, | Performed by: FAMILY MEDICINE

## 2023-07-10 PROCEDURE — 1101F PR PT FALLS ASSESS DOC 0-1 FALLS W/OUT INJ PAST YR: ICD-10-PCS | Mod: CPTII,S$GLB,, | Performed by: FAMILY MEDICINE

## 2023-07-10 PROCEDURE — 99999 PR PBB SHADOW E&M-EST. PATIENT-LVL III: ICD-10-PCS | Mod: PBBFAC,,, | Performed by: FAMILY MEDICINE

## 2023-07-10 PROCEDURE — 3288F FALL RISK ASSESSMENT DOCD: CPT | Mod: CPTII,S$GLB,, | Performed by: FAMILY MEDICINE

## 2023-07-10 PROCEDURE — 1159F MED LIST DOCD IN RCRD: CPT | Mod: CPTII,S$GLB,, | Performed by: FAMILY MEDICINE

## 2023-07-10 RX ORDER — ROSUVASTATIN CALCIUM 40 MG/1
40 TABLET, COATED ORAL
COMMUNITY
Start: 2023-05-08

## 2023-07-10 RX ORDER — ALBUTEROL SULFATE 0.83 MG/ML
SOLUTION RESPIRATORY (INHALATION)
COMMUNITY
Start: 2023-07-02

## 2023-07-10 RX ORDER — AZELASTINE 1 MG/ML
1 SPRAY, METERED NASAL 2 TIMES DAILY
Qty: 30 ML | Refills: 5 | Status: SHIPPED | OUTPATIENT
Start: 2023-07-10 | End: 2024-07-09

## 2023-07-10 RX ORDER — FLECAINIDE ACETATE 50 MG/1
50 TABLET ORAL 2 TIMES DAILY
COMMUNITY
Start: 2023-04-23 | End: 2023-08-22 | Stop reason: SDUPTHER

## 2023-07-10 RX ORDER — FLUTICASONE PROPIONATE 50 MCG
1 SPRAY, SUSPENSION (ML) NASAL DAILY
Qty: 18 ML | Refills: 5 | Status: SHIPPED | OUTPATIENT
Start: 2023-07-10

## 2023-07-10 NOTE — PROGRESS NOTES
Subjective:       Patient ID: Marva Perez is a 82 y.o. female.    Chief Complaint: Follow-up (2 week follow up)    Pt is a 82 y.o. female who presents for check up for Rhinorrhea & chest congestion (primary encounter diagnosis). Doing well on current meds. Denies any side effects. Prevention is up to date.   Review of Systems   Respiratory:  Positive for shortness of breath.      Objective:      Physical Exam  Constitutional:       Appearance: She is well-developed.   HENT:      Head: Normocephalic.   Eyes:      Pupils: Pupils are equal, round, and reactive to light.   Neck:      Thyroid: No thyromegaly.   Cardiovascular:      Rate and Rhythm: Normal rate and regular rhythm.   Pulmonary:      Effort: No respiratory distress.      Breath sounds: Rales present. No wheezing.      Comments: Fine rales with good breathe sounds  Chest:      Chest wall: No tenderness.   Abdominal:      General: There is no distension.      Tenderness: There is no abdominal tenderness. There is no guarding or rebound.   Musculoskeletal:         General: No tenderness. Normal range of motion.      Cervical back: Normal range of motion and neck supple.   Lymphadenopathy:      Cervical: No cervical adenopathy.   Skin:     General: Skin is warm and dry.      Coloration: Skin is not pale.      Findings: No rash.   Neurological:      Mental Status: She is alert and oriented to person, place, and time.      Cranial Nerves: No cranial nerve deficit.      Motor: No abnormal muscle tone.      Coordination: Coordination normal.      Deep Tendon Reflexes: Reflexes are normal and symmetric. Reflexes normal.   Psychiatric:         Thought Content: Thought content normal.         Judgment: Judgment normal.       Assessment:       Encounter Diagnoses   Name Primary?    Rhinorrhea Yes    COPD exacerbation          Plan:   1. Rhinorrhea  -     azelastine (ASTELIN) 137 mcg (0.1 %) nasal spray; 1 spray (137 mcg total) by Nasal route 2 (two) times daily.   Dispense: 30 mL; Refill: 5    2. COPD exacerbation    Other orders  -     fluticasone propionate (FLONASE) 50 mcg/actuation nasal spray; 1 spray (50 mcg total) by Each Nostril route once daily.  Dispense: 18 mL; Refill: 5

## 2023-07-14 ENCOUNTER — EXTERNAL HOME HEALTH (OUTPATIENT)
Dept: HOME HEALTH SERVICES | Facility: HOSPITAL | Age: 83
End: 2023-07-14
Payer: MEDICARE

## 2023-07-19 ENCOUNTER — LAB VISIT (OUTPATIENT)
Dept: LAB | Facility: HOSPITAL | Age: 83
End: 2023-07-19
Attending: FAMILY MEDICINE
Payer: MEDICARE

## 2023-07-19 ENCOUNTER — TELEPHONE (OUTPATIENT)
Dept: FAMILY MEDICINE | Facility: CLINIC | Age: 83
End: 2023-07-19
Payer: MEDICARE

## 2023-07-19 ENCOUNTER — DOCUMENT SCAN (OUTPATIENT)
Dept: HOME HEALTH SERVICES | Facility: HOSPITAL | Age: 83
End: 2023-07-19
Payer: MEDICARE

## 2023-07-19 DIAGNOSIS — R30.0 DYSURIA: Primary | ICD-10-CM

## 2023-07-19 DIAGNOSIS — B96.20 E. COLI UTI: ICD-10-CM

## 2023-07-19 DIAGNOSIS — N39.0 E. COLI UTI: ICD-10-CM

## 2023-07-19 LAB
BILIRUB UR QL STRIP: NEGATIVE
CLARITY UR: CLEAR
COLOR UR: YELLOW
GLUCOSE UR QL STRIP: NEGATIVE
HGB UR QL STRIP: ABNORMAL
KETONES UR QL STRIP: NEGATIVE
LEUKOCYTE ESTERASE UR QL STRIP: NEGATIVE
NITRITE UR QL STRIP: NEGATIVE
PH UR STRIP: 6 [PH] (ref 5–8)
PROT UR QL STRIP: NEGATIVE
SP GR UR STRIP: 1.02 (ref 1–1.03)
URN SPEC COLLECT METH UR: ABNORMAL
UROBILINOGEN UR STRIP-ACNC: NEGATIVE EU/DL

## 2023-07-19 PROCEDURE — 81003 URINALYSIS AUTO W/O SCOPE: CPT

## 2023-07-19 PROCEDURE — 87086 URINE CULTURE/COLONY COUNT: CPT

## 2023-07-19 NOTE — TELEPHONE ENCOUNTER
----- Message from Lennox Son sent at 2023  8:56 AM CDT -----  Marva Perez  MRN: [id number]  : 1940  PCP: Kevin Clements  Home Phone      [phone]  Work Phone      Not on file.  Mobile          [phone]      MESSAGE: urine dark with strong odor -- requesting order for home health to collect & culture - states she has the chills, feels bad -- please advise    Call [phone]    PCP: Tyree

## 2023-07-19 NOTE — TELEPHONE ENCOUNTER
Contacted [facility]  to give verbal orders. States they will try to get nurse out today or tomorrow to collect urine. Pt notified and verbalized understanding.

## 2023-07-21 LAB
BACTERIA UR CULT: NORMAL
BACTERIA UR CULT: NORMAL

## 2023-07-24 DIAGNOSIS — S32.050S COMPRESSION FRACTURE OF L5 VERTEBRA, SEQUELA: ICD-10-CM

## 2023-07-24 DIAGNOSIS — F41.1 GAD (GENERALIZED ANXIETY DISORDER): ICD-10-CM

## 2023-07-24 DIAGNOSIS — M80.08XD FRACTURE OF VERTEBRA DUE TO OSTEOPOROSIS WITH ROUTINE HEALING, SUBSEQUENT ENCOUNTER: ICD-10-CM

## 2023-07-24 DIAGNOSIS — G47.00 INSOMNIA, UNSPECIFIED TYPE: ICD-10-CM

## 2023-07-24 RX ORDER — ALPRAZOLAM 0.25 MG/1
TABLET ORAL
Qty: 90 TABLET | Refills: 5 | Status: SHIPPED | OUTPATIENT
Start: 2023-07-24 | End: 2023-09-06 | Stop reason: SDUPTHER

## 2023-07-24 NOTE — TELEPHONE ENCOUNTER
LOV 07/10/2023    Patient requesting refill for ALPRAZolam (XANAX) 0.25 MG tablet    Requested Prescriptions     Pending Prescriptions Disp Refills    ALPRAZolam (XANAX) 0.25 MG tablet 90 tablet 5     Sig: One po tid for anxiety       Medication pended/pharmacy confirmed, please advise.

## 2023-07-24 NOTE — TELEPHONE ENCOUNTER
No care due was identified.  [facility] Embedded Care Due Messages. Reference number: [id number].   7/24/2023 10:55:50 AM CDT

## 2023-08-16 ENCOUNTER — TELEPHONE (OUTPATIENT)
Dept: FAMILY MEDICINE | Facility: CLINIC | Age: 83
End: 2023-08-16
Payer: MEDICARE

## 2023-08-16 NOTE — TELEPHONE ENCOUNTER
----- Message from Lennox Son sent at 2023 10:57 AM CDT -----  Contact: Maricruz @ [facility]  Marva Perez  MRN: [id number]  : 1940  PCP: Kevin Clements  Home Phone      [phone]  Work Phone      Not on file.  Mobile          [phone]      MESSAGE: [facility] -- requesting orders for physical therapy & urine C&S    Call Maricruz @ [phone]    PCP: Tyree

## 2023-08-17 DIAGNOSIS — Z74.09 IMPAIRED MOBILITY AND PERSONAL CARE: ICD-10-CM

## 2023-08-17 DIAGNOSIS — N39.0 URINARY TRACT INFECTION WITHOUT HEMATURIA, SITE UNSPECIFIED: Primary | ICD-10-CM

## 2023-08-17 DIAGNOSIS — Z74.1 IMPAIRED MOBILITY AND PERSONAL CARE: ICD-10-CM

## 2023-08-18 ENCOUNTER — TELEPHONE (OUTPATIENT)
Dept: FAMILY MEDICINE | Facility: CLINIC | Age: 83
End: 2023-08-18
Payer: MEDICARE

## 2023-08-18 PROCEDURE — G0179 MD RECERTIFICATION HHA PT: HCPCS | Mod: ,,, | Performed by: FAMILY MEDICINE

## 2023-08-18 PROCEDURE — G0179 PR HOME HEALTH MD RECERTIFICATION: ICD-10-PCS | Mod: ,,, | Performed by: FAMILY MEDICINE

## 2023-08-22 ENCOUNTER — PATIENT MESSAGE (OUTPATIENT)
Dept: FAMILY MEDICINE | Facility: CLINIC | Age: 83
End: 2023-08-22
Payer: MEDICARE

## 2023-08-22 DIAGNOSIS — F41.1 GAD (GENERALIZED ANXIETY DISORDER): ICD-10-CM

## 2023-08-22 RX ORDER — ESCITALOPRAM OXALATE 5 MG/1
5 TABLET ORAL DAILY
Qty: 90 TABLET | Refills: 1 | Status: SHIPPED | OUTPATIENT
Start: 2023-08-22

## 2023-08-22 RX ORDER — FLECAINIDE ACETATE 50 MG/1
50 TABLET ORAL 2 TIMES DAILY
Qty: 180 TABLET | Refills: 1 | Status: SHIPPED | OUTPATIENT
Start: 2023-08-22

## 2023-08-22 NOTE — TELEPHONE ENCOUNTER
No care due was identified.  [facility] Embedded Care Due Messages. Reference number: [id number].   8/22/2023 9:25:32 AM CDT

## 2023-08-22 NOTE — TELEPHONE ENCOUNTER
LOV 07/10/2023    Patient requesting refill for EScitalopram oxalate (LEXAPRO) 5 MG Tab    flecainide (TAMBOCOR) 50 MG Tab    Requested Prescriptions     Pending Prescriptions Disp Refills    EScitalopram oxalate (LEXAPRO) 5 MG Tab 90 tablet 1     Sig: Take 1 tablet (5 mg total) by mouth once daily.    flecainide (TAMBOCOR) 50 MG Tab       Sig: Take 1 tablet (50 mg total) by mouth 2 (two) times daily.       Medication pended/pharmacy confirmed, please advise.

## 2023-08-24 ENCOUNTER — DOCUMENT SCAN (OUTPATIENT)
Dept: HOME HEALTH SERVICES | Facility: HOSPITAL | Age: 83
End: 2023-08-24
Payer: MEDICARE

## 2023-08-24 ENCOUNTER — LAB VISIT (OUTPATIENT)
Dept: LAB | Facility: HOSPITAL | Age: 83
End: 2023-08-24
Attending: FAMILY MEDICINE
Payer: MEDICARE

## 2023-08-24 DIAGNOSIS — R30.0 DYSURIA: Primary | ICD-10-CM

## 2023-08-24 LAB
BILIRUB UR QL STRIP: NEGATIVE
CLARITY UR: CLEAR
COLOR UR: YELLOW
GLUCOSE UR QL STRIP: NEGATIVE
HGB UR QL STRIP: NEGATIVE
KETONES UR QL STRIP: NEGATIVE
LEUKOCYTE ESTERASE UR QL STRIP: NEGATIVE
NITRITE UR QL STRIP: NEGATIVE
PH UR STRIP: 7 [PH] (ref 5–8)
PROT UR QL STRIP: ABNORMAL
SP GR UR STRIP: 1.02 (ref 1–1.03)
URN SPEC COLLECT METH UR: ABNORMAL
UROBILINOGEN UR STRIP-ACNC: NEGATIVE EU/DL

## 2023-08-24 PROCEDURE — 81003 URINALYSIS AUTO W/O SCOPE: CPT

## 2023-08-24 PROCEDURE — 87086 URINE CULTURE/COLONY COUNT: CPT

## 2023-08-25 LAB — BACTERIA UR CULT: NO GROWTH

## 2023-09-06 DIAGNOSIS — M80.08XD FRACTURE OF VERTEBRA DUE TO OSTEOPOROSIS WITH ROUTINE HEALING, SUBSEQUENT ENCOUNTER: ICD-10-CM

## 2023-09-06 DIAGNOSIS — S32.050S COMPRESSION FRACTURE OF L5 VERTEBRA, SEQUELA: ICD-10-CM

## 2023-09-06 DIAGNOSIS — G47.00 INSOMNIA, UNSPECIFIED TYPE: ICD-10-CM

## 2023-09-06 DIAGNOSIS — F41.1 GAD (GENERALIZED ANXIETY DISORDER): ICD-10-CM

## 2023-09-06 NOTE — TELEPHONE ENCOUNTER
----- Message from Lennox Son sent at 2023  8:22 AM CDT -----  Contact: patient  Marva Perez  MRN: [id number]  : 1940  PCP: Kevin Clements  Home Phone      [phone]  Work Phone      Not on file.  Mobile          [phone]      MESSAGE:   Pt requesting refill or new Rx.   Is this a refill or new RX:  refill  RX name and strength: Alprazolam 0.25 mg  Last office visit: 07/10/23  Is this a 30-day or 90-day RX:  30 day  Pharmacy name and location:  [facility] [place]  Comments:      Phone:  [phone]    PCP: Tyree

## 2023-09-06 NOTE — TELEPHONE ENCOUNTER
No care due was identified.  [facility] Embedded Care Due Messages. Reference number: [id number].   9/06/2023 8:48:35 AM CDT

## 2023-09-07 RX ORDER — ALPRAZOLAM 0.25 MG/1
TABLET ORAL
Qty: 90 TABLET | Refills: 5 | Status: SHIPPED | OUTPATIENT
Start: 2023-09-07

## 2023-09-13 DIAGNOSIS — Z78.0 MENOPAUSE: ICD-10-CM

## 2023-09-16 ENCOUNTER — HOSPITAL ENCOUNTER (EMERGENCY)
Facility: HOSPITAL | Age: 83
Discharge: ANOTHER HOSPITAL | End: 2023-09-17
Attending: EMERGENCY MEDICINE
Payer: MEDICARE

## 2023-09-16 DIAGNOSIS — S32.000A COMPRESSION FRACTURE OF LUMBAR VERTEBRA, UNSPECIFIED LUMBAR VERTEBRAL LEVEL, INITIAL ENCOUNTER: Primary | ICD-10-CM

## 2023-09-16 PROCEDURE — 96374 THER/PROPH/DIAG INJ IV PUSH: CPT

## 2023-09-16 PROCEDURE — 96375 TX/PRO/DX INJ NEW DRUG ADDON: CPT

## 2023-09-16 PROCEDURE — 99285 EMERGENCY DEPT VISIT HI MDM: CPT | Mod: 25

## 2023-09-16 PROCEDURE — 96372 THER/PROPH/DIAG INJ SC/IM: CPT | Mod: 59

## 2023-09-16 PROCEDURE — 63600175 PHARM REV CODE 636 W HCPCS

## 2023-09-16 RX ORDER — KETOROLAC TROMETHAMINE 30 MG/ML
30 INJECTION, SOLUTION INTRAMUSCULAR; INTRAVENOUS
Status: COMPLETED | OUTPATIENT
Start: 2023-09-16 | End: 2023-09-16

## 2023-09-16 RX ORDER — NALOXONE HCL 0.4 MG/ML
1 VIAL (ML) INJECTION
Status: DISCONTINUED | OUTPATIENT
Start: 2023-09-16 | End: 2023-09-17 | Stop reason: HOSPADM

## 2023-09-16 RX ORDER — ONDANSETRON 2 MG/ML
4 INJECTION INTRAMUSCULAR; INTRAVENOUS
Status: COMPLETED | OUTPATIENT
Start: 2023-09-16 | End: 2023-09-16

## 2023-09-16 RX ORDER — HYDROMORPHONE HYDROCHLORIDE 1 MG/ML
1 INJECTION, SOLUTION INTRAMUSCULAR; INTRAVENOUS; SUBCUTANEOUS
Status: COMPLETED | OUTPATIENT
Start: 2023-09-16 | End: 2023-09-16

## 2023-09-16 RX ADMIN — HYDROMORPHONE HYDROCHLORIDE 1 MG: 1 INJECTION, SOLUTION INTRAMUSCULAR; INTRAVENOUS; SUBCUTANEOUS at 08:09

## 2023-09-16 RX ADMIN — ONDANSETRON 4 MG: 2 INJECTION INTRAMUSCULAR; INTRAVENOUS at 08:09

## 2023-09-16 RX ADMIN — KETOROLAC TROMETHAMINE 30 MG: 30 INJECTION, SOLUTION INTRAMUSCULAR; INTRAVENOUS at 08:09

## 2023-09-17 VITALS
WEIGHT: 117.5 LBS | OXYGEN SATURATION: 100 % | RESPIRATION RATE: 16 BRPM | BODY MASS INDEX: 21.49 KG/M2 | HEART RATE: 69 BPM | TEMPERATURE: 98 F | SYSTOLIC BLOOD PRESSURE: 177 MMHG | DIASTOLIC BLOOD PRESSURE: 81 MMHG

## 2023-09-17 PROCEDURE — 25000003 PHARM REV CODE 250: Performed by: EMERGENCY MEDICINE

## 2023-09-17 RX ORDER — SODIUM CHLORIDE 9 MG/ML
1000 INJECTION, SOLUTION INTRAVENOUS
Status: COMPLETED | OUTPATIENT
Start: 2023-09-17 | End: 2023-09-17

## 2023-09-17 RX ADMIN — SODIUM CHLORIDE 1000 ML: 9 INJECTION, SOLUTION INTRAVENOUS at 12:09

## 2023-09-17 NOTE — ED PROVIDER NOTES
"Encounter Date: 9/16/2023       History     Chief Complaint   Patient presents with    Back Pain     TO ED WITH C/O OF LOW BACK PAIN X 2 DAYS AFTER LIFTING WATER. WAS SEEN BY PCP WHO GAVE HER PAIN PILLS (HYDROCODONE 7.5 MG)  AND CONSULT TO PAIN MANAGEMENT. THAT APPT IS 4 WEEKS AWAY. PT STATES "THAT IS TOO LONG"     This note is dictated on M*Modal word recognition program.  There are word recognition mistakes and grammatical errors that are occasionally missed on review.     Marva Perez is a 82 y.o. female presents to ER today with complaints of 10/10 lumbar back pain.  Patient reports approximately 3 days ago she lifted on a heavy package of water and injured her lower back.  Patient reports she saw pain management doctor and was placed on hydrocodone 7.5 mg however this medication is not helping her back pain.  Patient denies bowel or bladder dysfunction currently.  Patient is currently tearful during interview due to pain.  Patient reports it is very hard for her to move at all due to severe lower back pain.      The history is provided by the patient.     Review of patient's allergies indicates:   Allergen Reactions    Pcn [penicillins] Hives and Itching    Tetracyclines     Bactrim [sulfamethoxazole-trimethoprim] Rash    Ilosone Rash    Iodine and iodide containing products Rash    Sulfa (sulfonamide antibiotics) Hives and Rash     Past Medical History:   Diagnosis Date    Abnormal Pap smear 07/17/2013    LGSIL    Anticoagulant long-term use     Atrial fibrillation     COPD (chronic obstructive pulmonary disease)     Depression     GERD (gastroesophageal reflux disease)     Hyperlipidemia     Hypertension      Past Surgical History:   Procedure Laterality Date    APPENDECTOMY      BRONCHOSCOPY N/A 8/5/2019    Procedure: BRONCHOSCOPY;  Surgeon: Howard Matson MD;  Location: [facility] OR;  Service: Pulmonary;  Laterality: N/A;    CERVICAL BIOPSY  W/ LOOP ELECTRODE EXCISION      CHOLECYSTECTOMY      COLLATERAL " LIGAMENT REPAIR, KNEE      left knee    COLONOSCOPY      DILATION AND CURETTAGE OF UTERUS      SINUS SURGERY       Family History   Problem Relation Age of Onset    Breast cancer Mother     Colon cancer Neg Hx     Ovarian cancer Neg Hx      Social History     Tobacco Use    Smoking status: Former     Current packs/day: 0.00     Types: Cigarettes     Start date: 10/30/1976     Quit date: 10/30/2006     Years since quittin.8    Smokeless tobacco: Never   Substance Use Topics    Alcohol use: No     Comment: No    Drug use: No     Review of Systems   Constitutional: Negative.    HENT: Negative.     Eyes: Negative.    Respiratory: Negative.     Cardiovascular: Negative.    Gastrointestinal: Negative.    Endocrine: Negative.    Genitourinary: Negative.    Musculoskeletal:  Positive for back pain.   Skin: Negative.    Neurological: Negative.    Hematological: Negative.    Psychiatric/Behavioral: Negative.         Physical Exam     Initial Vitals   BP Pulse Resp Temp SpO2   23   (!) 169/75 83 16 98.2 °F (36.8 °C) 97 %      MAP       --                Physical Exam    Constitutional: She appears well-developed. She appears distressed (patient appears to be in a significant amount of pain during interview.).   HENT:   Head: Normocephalic.   Eyes: Pupils are equal, round, and reactive to light.   Neck: Neck supple.   Cardiovascular:  Normal rate, regular rhythm and normal heart sounds.           Pulmonary/Chest: Breath sounds normal. No respiratory distress. She has no wheezes.   Abdominal: Abdomen is soft. Bowel sounds are normal.   Musculoskeletal:         General: Tenderness (patient has tenderness to lumbar region of back.) present.      Cervical back: Neck supple.     Neurological: She is alert and oriented to person, place, and time. GCS score is 15. GCS eye subscore is 4. GCS verbal subscore is 5. GCS motor subscore is 6.   Skin: Skin is warm. No  rash noted. No erythema.   Psychiatric: She has a normal mood and affect. Her behavior is normal. Judgment and thought content normal.         ED Course   Procedures  Labs Reviewed - No data to display       Imaging Results              CT Lumbar Spine Without Contrast (Final result)  Result time 09/16/23 21:01:21      Final result by Vidal Griffin MD (09/16/23 21:01:21)                   Impression:      Anterior wedge compression fracture of T12 with up to 50% height loss is age indeterminate but new since the comparison studies.  2 mm retropulsion of the posterosuperior endplate of T12 the spinal canal.  Recommend MR lumbar spine to evaluate for osseous edema.      Electronically signed by: Vidal Griffin  Date:    09/16/2023  Time:    21:01               Narrative:    EXAMINATION:  CT LUMBAR SPINE WITHOUT CONTRAST    CLINICAL HISTORY:  Low back pain, increased fracture risk;    TECHNIQUE:  Low-dose axial, sagittal and coronal reformations are obtained through the lumbar spine.  Contrast was not administered.    COMPARISON:  CT chest 03/26/2023 and MRI lumbar spine 02/15/2023    FINDINGS:  Osteopenia.    Anterior wedge compression fracture of T12 with up to 50% height loss is age indeterminate but new since the comparison studies.  2 mm retropulsion of the posterosuperior endplate of T12 the spinal canal.    Chronic L1 and L3 compression fracture deformities.    Mild multilevel spondylosis.    No high-grade central canal or foraminal stenosis.                                       Medications   naloxone 0.4 mg/mL injection 1 mg (has no administration in time range)   HYDROmorphone injection 1 mg (1 mg Intramuscular Given 9/16/23 2012)   ondansetron injection 4 mg (4 mg Intramuscular Given 9/16/23 2011)   ketorolac injection 30 mg (30 mg Intramuscular Given 9/16/23 2012)   0.9%  NaCl infusion (1,000 mLs Intravenous New Bag 9/17/23 0053)     Medical Decision Making  Differential diagnosis include  lumbar strain, muscle spasms, compression fracture, sciatica  21:00 9/16/23.  Care handout given to Dr arash Canales.   Currently awaiting CT lumbar results.  Patient reports pain has improved since pain injection was given today in ER.      Patient's CT was reviewed she has no neuro symptoms however there is a T12 fracture compression fracture of greater 50% with 2 mm retropulsion recommend she go to be evaluated by an orthopedic or neurosurgeon for clearance however she is intractable pain and lives alone can not be admitted to our hospital she wants to go to [facility].  I spoke to the transfer center and they contact me with the ER doctor and I had a discussion with him he informed me that there is usually a brace and pain control and she should be aware of her prognosis that she may end up in a nursing home and I told her      She has no neurological findings at she does have ample pain and lives alone    Amount and/or Complexity of Data Reviewed  Radiology: ordered.    Risk  Prescription drug management.               ED Course as of 09/17/23 0453   Sun Sep 17, 2023   0033 Patient with lumbar compression fracture of 50% and no orthopedic our facility also patient unable to ambulate secondary to pain and she lives alone will need transfer for evaluation and management of her fracture so that she can ambulate she has no neuro symptoms no numbness or weakness yet she will also probably need ancillary testing including MRI [AB]      ED Course User Index  [AB] Arash Canales MD                    Clinical Impression:   Final diagnoses:  [S32.000A] Compression fracture of lumbar vertebra, unspecified lumbar vertebral level, initial encounter (Primary)        ED Disposition Condition    Transfer to Another Facility Stable                Arash Canales MD  09/17/23 5469

## 2023-09-18 ENCOUNTER — TELEPHONE (OUTPATIENT)
Dept: FAMILY MEDICINE | Facility: CLINIC | Age: 83
End: 2023-09-18
Payer: MEDICARE

## 2023-09-18 DIAGNOSIS — S22.080G COMPRESSION FRACTURE OF T12 VERTEBRA WITH DELAYED HEALING, SUBSEQUENT ENCOUNTER: Primary | ICD-10-CM

## 2023-09-18 DIAGNOSIS — S22.080G COMPRESSION FRACTURE OF T12 VERTEBRA WITH DELAYED HEALING, SUBSEQUENT ENCOUNTER: ICD-10-CM

## 2023-09-18 RX ORDER — HYDROCODONE BITARTRATE AND ACETAMINOPHEN 5; 325 MG/1; MG/1
1 TABLET ORAL EVERY 8 HOURS PRN
Qty: 90 TABLET | Refills: 0 | Status: SHIPPED | OUTPATIENT
Start: 2023-09-18 | End: 2023-09-28

## 2023-09-18 NOTE — TELEPHONE ENCOUNTER
----- Message from Mo Esteban sent at 2023  9:00 AM CDT -----  Contact: Self  Marva Perez  MRN: [id number]  : 1940  PCP: Kevin Clements  Home Phone      [phone]  Work Phone      Not on file.  Mobile          [phone]      MESSAGE:   Patient says she wants to know if someone can bring the back shot to her house, as well as the pain medicine. Patient says she cant move.     [phone]

## 2023-09-18 NOTE — PROGRESS NOTES
Subjective:       Patient ID: Marva Perez is a 82 y.o. female.    Chief Complaint: No chief complaint on file.    HPI  Review of Systems    Objective:      Physical Exam    Assessment:       No diagnosis found.      Plan:   {There are no diagnoses linked to this encounter. (Refresh or delete this SmartLink)}

## 2023-09-18 NOTE — PLAN OF CARE
Patient resting with no complaints at this time. Tolerating BiPAP while resting. 2L NC while awake. VS stable. A/Ox4. Family would like SW or CM to call regarding discharge. Patient told family they were discharging today and family has questions regarding where patient is being discharged to whether home or placed elsewhere. Family would also like to speake to Dr. Matson regarding the need for BiPAP when discharged.     Problem: Adult Inpatient Plan of Care  Goal: Plan of Care Review  9/8/2022 0444 by Deven Pulido RN  Outcome: Ongoing, Progressing  9/8/2022 0027 by Deven Pulido RN  Outcome: Ongoing, Progressing  Goal: Absence of Hospital-Acquired Illness or Injury  9/8/2022 0444 by Deven Pulido RN  Outcome: Ongoing, Progressing  9/8/2022 0027 by Deven Pulido RN  Outcome: Ongoing, Progressing  Goal: Optimal Comfort and Wellbeing  9/8/2022 0444 by Deven Pulido RN  Outcome: Ongoing, Progressing  9/8/2022 0027 by Deven Pulido RN  Outcome: Ongoing, Progressing  Goal: Readiness for Transition of Care  9/8/2022 0444 by Deven Pulido RN  Outcome: Ongoing, Progressing  9/8/2022 0027 by Deven Pulido RN  Outcome: Ongoing, Progressing     Problem: Fluid Imbalance (Pneumonia)  Goal: Fluid Balance  Outcome: Ongoing, Progressing     Problem: Infection (Pneumonia)  Goal: Resolution of Infection Signs and Symptoms  9/8/2022 0444 by Deven Pulido RN  Outcome: Ongoing, Progressing  9/8/2022 0027 by Deven Pulido RN  Outcome: Ongoing, Progressing     Problem: Respiratory Compromise (Pneumonia)  Goal: Effective Oxygenation and Ventilation  Outcome: Ongoing, Progressing     Problem: Skin Injury Risk Increased  Goal: Skin Health and Integrity  Outcome: Ongoing, Progressing     Problem: Fall Injury Risk  Goal: Absence of Fall and Fall-Related Injury  Outcome: Ongoing, Progressing      Detail Level: Simple Render Risk Assessment In Note?: no Additional Notes: Advised patient to monitor. Contact office if lesion has not resolved or for any changes.

## 2023-09-18 NOTE — TELEPHONE ENCOUNTER
Pt was prescribed HYDROcodone-acetaminophen (NORCO) 5-325 mg per tablet by Dr. Nik Burleson. Verified with pharmacy, pt picked up medication 9/13/2023, contacted pt to instruct she needed to take norco medication as prescribed for pain. Verbalized understanding.

## 2023-09-19 ENCOUNTER — TELEPHONE (OUTPATIENT)
Dept: FAMILY MEDICINE | Facility: CLINIC | Age: 83
End: 2023-09-19
Payer: MEDICARE

## 2023-09-21 ENCOUNTER — EXTERNAL HOME HEALTH (OUTPATIENT)
Dept: HOME HEALTH SERVICES | Facility: HOSPITAL | Age: 83
End: 2023-09-21
Payer: MEDICARE

## 2023-10-11 ENCOUNTER — TELEPHONE (OUTPATIENT)
Dept: FAMILY MEDICINE | Facility: CLINIC | Age: 83
End: 2023-10-11

## 2023-10-11 NOTE — TELEPHONE ENCOUNTER
Pt recently discharged from hospital and was discharged with no orders to resume home health. Requesting resumption orders.    Please advise.

## 2023-10-12 DIAGNOSIS — R09.02 HYPOXIA: Primary | ICD-10-CM

## 2023-10-12 DIAGNOSIS — R53.1 WEAKNESS GENERALIZED: ICD-10-CM

## 2023-10-24 NOTE — ASSESSMENT & PLAN NOTE
++ covid and flu B   PROGRESS  NOTE       HISTORY    Jesus Coffman is a 73 year old male who presents with medical history significant for essential hypertension surgical history significant appendicectomy hip surgery [facility], decreased exercise tolerance and shortness of breath     Patient has declined any bleeding.     HEMATOLOGY HISTORY  -patient has previous labs dated 09/13/2021 wherein was noted hemoglobin of 14 normocytic normochromic RBC normal WBC count of 5.6 and platelet count 270.    -09/05/2023 patient presented with white cell count of 1 point hemoglobin of 8.1 and platelet count of 83 patient had further workup in form of iron profile indicative of elevated ferritin 845 serum iron of 149 and percentage iron saturation of 58% TIBC of 273..    -09/06/2023 labs indicative of white cell count 1.2 hemoglobin of 8 and platelet count of 80  -peripheral smear review 09/06/2023: Erythrocytes with mild anisopoikilocytosis with few microcytes, macrocytes, ovalocytes and teardrop cells.  Few polychromatic erythrocytes seen.  Leukopenia with neutropenia.  No definite leukoblast seen.  Thrombocytopenia.    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  These findings meet the minimal criteria for diagnosis of acute myeloid leukemia.  -9/12/23:Currently patient does not have hyper leukocytosis and patient  currently elderly above 70 years of age and would not be a candidate for high-intensity therapy.  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        INTERVAL HISTORY:    Patient seen in clinic after obtaining labs and planning to initiate cycle 2 day 1 of azacitidine with venetoclax labs indicative of CMP with normal electrolytes with mild hyponatremia of 134 normal renal function normal hepatic function and LDH normal at 151 and CBC indicative of white cell count of 0.7 hemoglobin of 8.2 and platelet count of 179 patient's absolute neutrophil count of 0.1 however will continue treatment currently in absence of remission and planned on supportive transfusion.      All questions concerns answered to the patient as per his satisfaction patient agreeable to proceed with follow-up did discuss with the patient the risk of cytopenias and low cell count and infection.    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         ALLERGIES:   Allergen Reactions   • Adhesive   (Environmental) Other (See Comments)       redness         Current medications          Current Outpatient Medications   Medication Sig Dispense Refill   • allopurinol (ZYLOPRIM) 300 MG tablet TAKE 1 TABLET BY MOUTH DAILY 30 tablet 0   • [START ON 10/25/2023] venetoclax (VENCLEXTA) 100 MG tablet Take 2 tablets by mouth daily. Take daily dose with a meal and water. Do not start before October 25, 2023. 60 tablet 0   • sulfamethoxazole-trimethoprim (BACTRIM DS) 800-160 MG per tablet Take 1 tablet by mouth 3 days a week. 12 tablet 1   • levoFLOXacin (LEVAQUIN) 500 MG tablet Take 1 tablet by mouth daily. 30 tablet 0   • Pramoxine-Menthol (GOLD BOND MAXIMUM RELIEF EX)         • acyclovir (ZOVIRAX) 400 MG tablet Take 1 tablet by mouth in the morning and 1 tablet in the evening. 60 tablet 11   • fluconazole (DIFLUCAN) 200 MG tablet Take 1 tablet by mouth daily. Do not start before September 27, 2023. 30 tablet 1   • hydroCHLOROthiazide (HYDRODIURIL) 25 MG tablet Take 12.5 mg by mouth daily.       • prochlorperazine (COMPAZINE) 10 MG tablet Take 1 tablet by mouth every 6 hours as needed for Nausea or Vomiting. 30 tablet 5   • loperamide (IMODIUM A-D) 2 MG tablet Take 2 tablets by mouth at the first sign of diarrhea, followed by 1 tablet after each loose stool. mx 4 tabs/day 24 tablet 0   • cholecalciferol (VITAMIN D) 25 mcg (1,000 units) tablet Take 1 tablet by mouth daily.       • sildenafil (VIAGRA) 50 MG tablet TAKE ONE-HALF TABLET BY MOUTH AS NEEDED 1 HOUR PRIOR TO SEX; NOT TO EXCEED 1 DOSE IN 24 HOURS; ** 18 DOSES PER 90 DAYS - NO EARLY REFILLS ** 1 HOUR PRIOR TO SEX; NOT TO EXCEED 1 DOSE IN 24 HOURS;   ** 18 DOSES PER 90 DAYS  - NO EARLY REFILLS **          No current facility-administered medications for this visit.                REVIEW OF SYSTEMS    GENERAL:  Patient denies headache, fevers, chills, night sweats, excessive fatigue, change in appetite, dizziness, but complains of: weight loss  ALLERGIC/IMMUNOLOGIC: Verified allergies: Yes  EYES:  Patient denies significant visual difficulties, double vision, blurred vision  ENT/MOUTH: Patient denies problems with hearing, sore throat, sinus drainage, mouth sores  ENDOCRINE:  Patient denies diabetes, thyroid disease, hormone replacement, hot flashes  HEMATOLOGIC/LYMPHATIC: Patient denies easy bruising, bleeding, tender lymph nodes, swollen lymph nodes  BREASTS: Patient denies abnormal masses of breast, nipple discharge, pain  RESPIRATORY:  Patient denies lung pain with breathing, cough, coughing up blood, shortness of breath  CARDIOVASCULAR:  Patient denies anginal chest pain, palpitations, shortness of breath when lying flat, peripheral edema  GASTROINTESTINAL: Patient denies abdominal pain , nausea, vomiting, diarrhea, GI bleeding, change in bowel habits, heartburn, sensation of feeling full, difficulty swallowing, but complains of: constipation  Is on going. This morning a little blood that passed.   : Patient denies blood in the urine, burning with urination, frequency, urgency, hesitancy, incontinence  MUSCULOSKELETAL:  Patient denies joint pain, bone pain, joint swelling, redness, decreased range of motion  SKIN:  Patient denies chronic rashes, inflammation, ulcerations, skin changes, itching Patient reports the rash is better since having the injection   NEUROLOGIC:  Patient denies loss of balance, areas of focal weakness, abnormal gait, sensory problems, numbness, tingling  PSYCHIATRIC: Patient denies insomnia, depression, anxiety    PHYSICAL EXAM    There were no vitals taken for this visit.  ECO - No physically strenuous activity, but ambulatory and able to carry out  light or sedentary work.    CONSTITUTIONAL:  Alert, cooperative, oriented.  Mood and affect appropriate.    Physical Exam  Constitutional:       Appearance: He is obese.   HENT:      Nose: Nose normal.      Mouth/Throat:      Mouth: Mucous membranes are moist.      Neck: Normal range of motion.   Eyes:      Extraocular Movements: Extraocular movements intact.      Pupils: Pupils are equal, round, and reactive to light.   Cardiovascular:      Rate and Rhythm: Regular rhythm.      Heart sounds: No murmur heard.  Pulmonary:      Effort: Pulmonary effort is normal.   Abdominal:      Palpations: Abdomen is soft.      Comments: Well-healing bruises on the anterior abdominal wall   Musculoskeletal:         General: Normal range of motion.   Neurological:      Mental Status: He is alert and oriented to person, place, and time.      Comments: Difficulty hearing at baseline   Psychiatric:         Mood and Affect: Mood normal.         Behavior: Behavior normal.            Labs:  Recent results   No results found for this or any previous visit (from the past 24 hour(s)).            Recent Labs   Lab 10/20/23  1143 10/12/23  1027 10/09/23  1139 10/06/23  1333   WBC 0.6* 0.5* 0.6* 0.8*   RBC 2.31* 2.30* 2.00* 1.82*   HGB 8.1* 8.0* 7.1* 6.5*   HCT 23.9* 23.4* 20.2* 19.3*   .5* 101.7* 101.0* 106.0*    33* 30* 22*   Absolute Neutrophils 0.0*  --  0.1* 0.1*   Absolute Lymphocytes 0.5*  --  0.5* 0.6*   Absolute Monocytes 0.0*  --  0.0* 0.0*   Absolute Eosinophils  0.0  --  0.0 0.0   Absolute Basophils 0.0  --  0.0 0.0                 Recent Labs   Lab 10/20/23  1143 10/12/23  1027 10/06/23  1333 10/03/23  0829 10/02/23  0730 09/12/23  1431 09/07/23  0306 09/06/23  0326   Glucose 103* 133* 110*   < > 107*   < > 93 97   Sodium 134* 134* 135   < > 133*   < > 137 137   Potassium 4.2 4.0 4.1   < > 4.3   < > 4.1 4.1   Chloride 104 105 104   < > 102   < > 106 107   BUN 13 10 16   < > 16   < > 13 15   Creatinine 0.99 0.90 0.88    < > 0.86   < > 0.78 0.73   Calcium 9.1 8.7 8.4   < > 8.5   < > 8.1* 8.2*   Albumin 3.5* 3.4* 3.3*   < > 3.4*   < > 3.0* 2.9*   GOT/AST 37 45* 86*   < > 62*   < > 62* 57*   Alkaline Phosphatase 48 47 41*   < > 40*   < > 36* 36*   GPT 40 57 80*   < > 56   < > 61 59   Uric Acid  --  4.8 5.0  --  5.0   < >  --   --    Magnesium  --   --   --   --  2.0  --  2.0 2.0    < > = values in this interval not displayed.            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    -09/05/2023 elevated ferritin noted withf iron profile indicative of elevated ferritin 845 serum iron of 149 and percentage iron saturation of 58% TIBC of 273.  -basic anemia workup including B12, folate, serum copper, SPEP, Brenda he and fecal occult currently pending  -peripheral smear review 09/06/2023: Erythrocytes with mild anisopoikilocytosis with few microcytes, macrocytes, ovalocytes and teardrop cells.  Few polychromatic erythrocytes seen.  Leukopenia with neutropenia.  No definite leukoblast seen.  Thrombocytopenia.    -serum electrophoresis is pending at time of signing this document.    Low threshold for obtaining bone marrow biopsy in presence of teardrop cells in order to rule out myelodysplastic syndrome  -peripheral smear review 09/06/2023: Erythrocytes with mild anisopoikilocytosis with few microcytes, macrocytes, ovalocytes and teardrop cells.  Few polychromatic erythrocytes seen.  Leukopenia with neutropenia.  No definite leukoblast seen.  Thrombocytopenia.    -09/06/2023 ultrasound of liver consistent with diffuse fatty infiltration of the liver without  any evidence of mass noted to have but noted to have gallbladder wall polyp.  -09/07/2023 patient planned on bone marrow biopsy  -peripheral smear review 09/06/2023: Erythrocytes with mild anisopoikilocytosis with few microcytes, macrocytes, ovalocytes and teardrop cells.  Few polychromatic erythrocytes seen.  Leukopenia with neutropenia.  No definite leukoblast seen.  Thrombocytopenia.    -09/06/2023 ultrasound of liver consistent with diffuse fatty infiltration of the liver without any evidence of mass noted to have but noted to have gallbladder wall polyp.  If counts continue to remain stable without any further decline then plan outpatient follow-up for bone marrow biopsy results within 1 week of discharge with MD   -BONE MARROW BIOPSY:  09/07/2023 INDICATIVE OF acute myeloid leukemia with 20-22% blast and background trilineage dysplasia with expanded myeloid blast population and bone marrow flow cytometry analysis consistent with expanded population of myeloid leukocytic blast identified at 9.3%Bone marrow evaluation demonstrates a hypercellular bone marrow for age with trilineage dysplasia and increased blasts accounting for 20 to 22% of the marrow cellularity by CD34 staining.  These findings meet the minimal criteria for diagnosis of acute myeloid leukemia  -9/12/23:Currently patient does not have hyper leukocytosis and patient currently elderly above 70 years of age and would not be a candidate for high-intensity therapy.  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  -In previously untreated patients who were ineligible for intensive chemotherapy, overall survival was longer and the  incidence of remission was higher among patients who received azacitidine plus venetoclax than among those who received azacitidine alone. The incidence of febrile neutropenia was higher in the venetoclax-azacitidine group than in the control group.    -10/24/2023 patient initiated cycle 2 day 1 of azacitidine with venetoclax     2) ALCOHOL CONSUMPTION  reports 2-5 mixed whiskey drinks per day  -rarely goes for an extended period without drinking  -encourage cessation from alcohol.    -ultrasound of liver requested for ruling out hepatic pathology contributing to cytopenias  -ultrasound negative for any nodule or mass and consistent with fatty changes in the liver  -09/06/2023 ultrasound of liver consistent with diffuse fatty infiltration of the liver without any evidence of mass noted to have but noted to have gallbladder wall polyp.    - 10/12/2023 patient reports discontinuation of alcohol        3) CYTOPENIAS  - 10/12/2023 cytopenia secondary to treatment and AML however plan to currently continue with treatment in absence of remission with ongoing supportive transfusion        4) SKIN RASH  -improving when seen patient on 10/12/2023         5) PSYCHOSOCIAL  - spouse with malignancy getting care at [facility] in [place].    - patient is a resident of [place] and is agreeable to continue follow-up at [facility]              PLAN:   -continue venetoclax 200 mg po q day with levofloxacin,bactrim acylovir and fluconazole  -please refill for next cycle of venetoclax 200 mg po q day   -follow up every Tuesday with cbc,cmp for possible transfusion for hb ',7 and platlet count <10 or ,20 with active bleeding.  -please schedule for day 2,day3,day4,day5,day6 and day 7 of treatment        -fThere are no diagnoses linked to this encounter.         Yvan Daly MD 10/23/2023

## 2023-11-03 ENCOUNTER — TELEPHONE (OUTPATIENT)
Dept: FAMILY MEDICINE | Facility: CLINIC | Age: 83
End: 2023-11-03
Payer: MEDICARE

## 2023-11-03 NOTE — TELEPHONE ENCOUNTER
----- Message from Mo Esteban sent at 11/3/2023  9:54 AM CDT -----  Contact: [facility]/home health  Marva Perez  MRN: [id number]  : 1940  PCP: Kevin Clements  Home Phone      [phone]  Work Phone      Not on file.  Mobile          [phone]      MESSAGE:   Patient had a fall, and had a skin tear. Home health would like to start wound care.    [phone]

## 2023-11-12 ENCOUNTER — DOCUMENT SCAN (OUTPATIENT)
Dept: HOME HEALTH SERVICES | Facility: HOSPITAL | Age: 83
End: 2023-11-12
Payer: MEDICARE

## 2023-12-05 ENCOUNTER — DOCUMENT SCAN (OUTPATIENT)
Dept: HOME HEALTH SERVICES | Facility: HOSPITAL | Age: 83
End: 2023-12-05
Payer: MEDICARE

## 2024-01-08 RX ORDER — GABAPENTIN 100 MG/1
100 CAPSULE ORAL 2 TIMES DAILY
Qty: 60 CAPSULE | Refills: 5 | Status: SHIPPED | OUTPATIENT
Start: 2024-01-08

## 2024-01-08 NOTE — TELEPHONE ENCOUNTER
LOV:7/10/2023    Pt requesting refill of: gabapentin (NEURONTIN) 100 MG capsule     Medication pended    Please advise

## 2024-01-17 NOTE — TELEPHONE ENCOUNTER
----- Message from Walt Birch sent at 2024 12:56 PM CST -----  Contact: pt  Marva Perez  MRN: [id number]  : 1940  PCP: Kevin Clements  Home Phone      [phone]  Work Phone      Not on file.  Mobile          [phone]      MESSAGE:     Pt is requesting refill of medication flecainide (TAMBOCOR) 50 MG Tab. Pt would like medication to be sent to [facility]ACY #[id number] - [place], [place] - [address]        Please advise  [phone]

## 2024-01-17 NOTE — TELEPHONE ENCOUNTER
LOV:7/10/2023    Pt requesting refill of: flecainide (TAMBOCOR) 50 MG Tab     Medication pended    Please advise

## 2024-01-18 RX ORDER — FLECAINIDE ACETATE 50 MG/1
50 TABLET ORAL 2 TIMES DAILY
Qty: 180 TABLET | Refills: 1 | OUTPATIENT
Start: 2024-01-18

## 2024-02-01 NOTE — TELEPHONE ENCOUNTER
----- Message from Mo Esteban sent at 2024 11:03 AM CST -----  Contact: self  Marva Perez  MRN: [id number]  : 1940  PCP: Kevin Clements  Home Phone      [phone]  Work Phone      Not on file.  Mobile          [phone]      MESSAGE:   Pt requesting refill or new Rx.   Is this a refill or new RX:  refill  RX name and strength: apixaban (ELIQUIS) 2.5 mg Tab  Last office visit: 23  Is this a 30-day or 90-day RX:  90  Pharmacy name and location:  [facility]/PHARMACY #[id number] - [place] [id number] [facility] [address]    Comments:      Phone:  [phone]

## 2024-08-12 RX ORDER — ERGOCALCIFEROL 1.25 MG/1
50000 CAPSULE ORAL
Qty: 12 CAPSULE | Refills: 3 | Status: SHIPPED | OUTPATIENT
Start: 2024-08-12

## 2024-08-12 RX ORDER — GABAPENTIN 100 MG/1
100 CAPSULE ORAL 2 TIMES DAILY
Qty: 60 CAPSULE | Refills: 5 | Status: SHIPPED | OUTPATIENT
Start: 2024-08-12

## 2024-08-12 NOTE — TELEPHONE ENCOUNTER
LOV: 07/10/2023    Patient requesting refill of: ergocalciferol (ERGOCALCIFEROL) 50,000 unit Cap   gabapentin (NEURONTIN) 100 MG capsule     Medication pended    Please advise

## 2024-09-25 ENCOUNTER — HOSPITAL ENCOUNTER (EMERGENCY)
Facility: HOSPITAL | Age: 84
Discharge: ANOTHER HOSPITAL | End: 2024-09-25
Attending: EMERGENCY MEDICINE
Payer: MEDICARE

## 2024-09-25 VITALS
BODY MASS INDEX: 21.62 KG/M2 | OXYGEN SATURATION: 99 % | RESPIRATION RATE: 25 BRPM | HEIGHT: 62 IN | TEMPERATURE: 99 F | DIASTOLIC BLOOD PRESSURE: 81 MMHG | HEART RATE: 85 BPM | WEIGHT: 117.5 LBS | SYSTOLIC BLOOD PRESSURE: 181 MMHG

## 2024-09-25 DIAGNOSIS — J44.1 COPD EXACERBATION: Primary | ICD-10-CM

## 2024-09-25 DIAGNOSIS — J18.9 PNEUMONIA OF LEFT LUNG DUE TO INFECTIOUS ORGANISM, UNSPECIFIED PART OF LUNG: ICD-10-CM

## 2024-09-25 DIAGNOSIS — R06.02 SHORTNESS OF BREATH: ICD-10-CM

## 2024-09-25 DIAGNOSIS — Z78.0 MENOPAUSE: ICD-10-CM

## 2024-09-25 LAB
ALBUMIN SERPL BCP-MCNC: 3.1 G/DL (ref 3.5–5.2)
ALLENS TEST: ABNORMAL
ALP SERPL-CCNC: 86 U/L (ref 55–135)
ALT SERPL W/O P-5'-P-CCNC: 36 U/L (ref 10–44)
ANION GAP SERPL CALC-SCNC: 13 MMOL/L (ref 8–16)
AST SERPL-CCNC: 33 U/L (ref 10–40)
BASOPHILS # BLD AUTO: 0.1 K/UL (ref 0–0.2)
BASOPHILS NFR BLD: 0.3 % (ref 0–1.9)
BILIRUB SERPL-MCNC: 0.6 MG/DL (ref 0.1–1)
BNP SERPL-MCNC: 89 PG/ML (ref 0–99)
BUN SERPL-MCNC: 16 MG/DL (ref 8–23)
CALCIUM SERPL-MCNC: 8.9 MG/DL (ref 8.7–10.5)
CHLORIDE SERPL-SCNC: 99 MMOL/L (ref 95–110)
CO2 SERPL-SCNC: 32 MMOL/L (ref 23–29)
CREAT SERPL-MCNC: 1 MG/DL (ref 0.5–1.4)
D DIMER PPP IA.FEU-MCNC: 0.32 MG/L FEU
DELSYS: ABNORMAL
DIFFERENTIAL METHOD BLD: ABNORMAL
EOSINOPHIL # BLD AUTO: 0.1 K/UL (ref 0–0.5)
EOSINOPHIL NFR BLD: 0.4 % (ref 0–8)
ERYTHROCYTE [DISTWIDTH] IN BLOOD BY AUTOMATED COUNT: 14.4 % (ref 11.5–14.5)
EST. GFR  (NO RACE VARIABLE): 56 ML/MIN/1.73 M^2
FIO2: ABNORMAL
GLUCOSE SERPL-MCNC: 177 MG/DL (ref 70–110)
HCO3 UR-SCNC: 45.4 MMOL/L (ref 22–26)
HCT VFR BLD AUTO: 37.9 % (ref 37–48.5)
HGB BLD-MCNC: 11.8 G/DL (ref 12–16)
IMM GRANULOCYTES # BLD AUTO: 0.43 K/UL (ref 0–0.04)
IMM GRANULOCYTES NFR BLD AUTO: 1.4 % (ref 0–0.5)
INFLUENZA A, MOLECULAR: NEGATIVE
INFLUENZA B, MOLECULAR: NEGATIVE
LACTATE SERPL-SCNC: 2 MMOL/L (ref 0.5–2.2)
LYMPHOCYTES # BLD AUTO: 2.1 K/UL (ref 1–4.8)
LYMPHOCYTES NFR BLD: 6.9 % (ref 18–48)
MCH RBC QN AUTO: 27.4 PG (ref 27–31)
MCHC RBC AUTO-ENTMCNC: 31.1 G/DL (ref 32–36)
MCV RBC AUTO: 88 FL (ref 82–98)
MONOCYTES # BLD AUTO: 2.1 K/UL (ref 0.3–1)
MONOCYTES NFR BLD: 6.6 % (ref 4–15)
NEUTROPHILS # BLD AUTO: 26.2 K/UL (ref 1.8–7.7)
NEUTROPHILS NFR BLD: 84.4 % (ref 38–73)
NRBC BLD-RTO: 0 /100 WBC
OHS QRS DURATION: 90 MS
OHS QTC CALCULATION: 506 MS
PCO2 BLDA: 75 MMHG (ref 35–45)
PH SMN: 7.39 [PH] (ref 7.35–7.45)
PLATELET # BLD AUTO: 234 K/UL (ref 150–450)
PMV BLD AUTO: 10.1 FL (ref 9.2–12.9)
PO2 BLDA: 63 MMHG (ref 75–100)
POC BE: 17.5 MMOL/L (ref -2–2)
POC COHB: 2.5 % (ref 0–3)
POC METHB: 1.2 % (ref 0–1.5)
POC O2HB ARTERIAL: 90.2 % (ref 94–100)
POC SATURATED O2: 93.6 % (ref 90–100)
POC TCO2: 47.7 MMOL/L
POC THB: 10.2 G/DL (ref 12–18)
POTASSIUM SERPL-SCNC: 3.2 MMOL/L (ref 3.5–5.1)
PROCALCITONIN SERPL IA-MCNC: 0.24 NG/ML
PROT SERPL-MCNC: 6 G/DL (ref 6–8.4)
RBC # BLD AUTO: 4.31 M/UL (ref 4–5.4)
RSV AG SPEC QL IA: NEGATIVE
SARS-COV-2 RDRP RESP QL NAA+PROBE: NEGATIVE
SITE: ABNORMAL
SODIUM SERPL-SCNC: 144 MMOL/L (ref 136–145)
SPECIMEN SOURCE: NORMAL
SPECIMEN SOURCE: NORMAL
TROPONIN I SERPL DL<=0.01 NG/ML-MCNC: 0.03 NG/ML (ref 0–0.03)
TROPONIN I SERPL DL<=0.01 NG/ML-MCNC: 0.03 NG/ML (ref 0–0.03)
WBC # BLD AUTO: 31.08 K/UL (ref 3.9–12.7)

## 2024-09-25 PROCEDURE — 87040 BLOOD CULTURE FOR BACTERIA: CPT | Mod: 59 | Performed by: EMERGENCY MEDICINE

## 2024-09-25 PROCEDURE — 85379 FIBRIN DEGRADATION QUANT: CPT | Performed by: EMERGENCY MEDICINE

## 2024-09-25 PROCEDURE — 87502 INFLUENZA DNA AMP PROBE: CPT | Performed by: EMERGENCY MEDICINE

## 2024-09-25 PROCEDURE — 93005 ELECTROCARDIOGRAM TRACING: CPT

## 2024-09-25 PROCEDURE — 99900035 HC TECH TIME PER 15 MIN (STAT)

## 2024-09-25 PROCEDURE — 80053 COMPREHEN METABOLIC PANEL: CPT | Performed by: EMERGENCY MEDICINE

## 2024-09-25 PROCEDURE — 94660 CPAP INITIATION&MGMT: CPT

## 2024-09-25 PROCEDURE — 36600 WITHDRAWAL OF ARTERIAL BLOOD: CPT

## 2024-09-25 PROCEDURE — 63600175 PHARM REV CODE 636 W HCPCS: Performed by: EMERGENCY MEDICINE

## 2024-09-25 PROCEDURE — 94761 N-INVAS EAR/PLS OXIMETRY MLT: CPT | Mod: XB

## 2024-09-25 PROCEDURE — 87634 RSV DNA/RNA AMP PROBE: CPT | Performed by: EMERGENCY MEDICINE

## 2024-09-25 PROCEDURE — 85025 COMPLETE CBC W/AUTO DIFF WBC: CPT | Performed by: EMERGENCY MEDICINE

## 2024-09-25 PROCEDURE — 83605 ASSAY OF LACTIC ACID: CPT | Performed by: EMERGENCY MEDICINE

## 2024-09-25 PROCEDURE — 25000003 PHARM REV CODE 250: Performed by: EMERGENCY MEDICINE

## 2024-09-25 PROCEDURE — 94640 AIRWAY INHALATION TREATMENT: CPT

## 2024-09-25 PROCEDURE — 84145 PROCALCITONIN (PCT): CPT | Performed by: EMERGENCY MEDICINE

## 2024-09-25 PROCEDURE — 93010 ELECTROCARDIOGRAM REPORT: CPT | Mod: ,,, | Performed by: INTERNAL MEDICINE

## 2024-09-25 PROCEDURE — 96365 THER/PROPH/DIAG IV INF INIT: CPT

## 2024-09-25 PROCEDURE — 27000190 HC CPAP FULL FACE MASK W/VALVE

## 2024-09-25 PROCEDURE — 25000242 PHARM REV CODE 250 ALT 637 W/ HCPCS: Performed by: EMERGENCY MEDICINE

## 2024-09-25 PROCEDURE — U0002 COVID-19 LAB TEST NON-CDC: HCPCS | Performed by: EMERGENCY MEDICINE

## 2024-09-25 PROCEDURE — 99285 EMERGENCY DEPT VISIT HI MDM: CPT | Mod: 25

## 2024-09-25 PROCEDURE — 96374 THER/PROPH/DIAG INJ IV PUSH: CPT | Mod: 59

## 2024-09-25 PROCEDURE — 37799 UNLISTED PX VASCULAR SURGERY: CPT

## 2024-09-25 PROCEDURE — 27100171 HC OXYGEN HIGH FLOW UP TO 24 HOURS

## 2024-09-25 PROCEDURE — 63600175 PHARM REV CODE 636 W HCPCS

## 2024-09-25 PROCEDURE — 83880 ASSAY OF NATRIURETIC PEPTIDE: CPT | Performed by: EMERGENCY MEDICINE

## 2024-09-25 PROCEDURE — 84484 ASSAY OF TROPONIN QUANT: CPT | Mod: 91 | Performed by: EMERGENCY MEDICINE

## 2024-09-25 PROCEDURE — 82803 BLOOD GASES ANY COMBINATION: CPT | Performed by: EMERGENCY MEDICINE

## 2024-09-25 RX ORDER — CIPROFLOXACIN 2 MG/ML
400 INJECTION, SOLUTION INTRAVENOUS
Status: COMPLETED | OUTPATIENT
Start: 2024-09-25 | End: 2024-09-25

## 2024-09-25 RX ORDER — DEXAMETHASONE SODIUM PHOSPHATE 4 MG/ML
INJECTION, SOLUTION INTRA-ARTICULAR; INTRALESIONAL; INTRAMUSCULAR; INTRAVENOUS; SOFT TISSUE
Status: COMPLETED
Start: 2024-09-25 | End: 2024-09-25

## 2024-09-25 RX ORDER — SODIUM CHLORIDE 9 MG/ML
1000 INJECTION, SOLUTION INTRAVENOUS
Status: DISCONTINUED | OUTPATIENT
Start: 2024-09-25 | End: 2024-09-25

## 2024-09-25 RX ORDER — ASPIRIN 325 MG
325 TABLET ORAL
Status: COMPLETED | OUTPATIENT
Start: 2024-09-25 | End: 2024-09-25

## 2024-09-25 RX ORDER — DEXAMETHASONE SODIUM PHOSPHATE 4 MG/ML
8 INJECTION, SOLUTION INTRA-ARTICULAR; INTRALESIONAL; INTRAMUSCULAR; INTRAVENOUS; SOFT TISSUE
Status: COMPLETED | OUTPATIENT
Start: 2024-09-25 | End: 2024-09-25

## 2024-09-25 RX ORDER — IPRATROPIUM BROMIDE AND ALBUTEROL SULFATE 2.5; .5 MG/3ML; MG/3ML
3 SOLUTION RESPIRATORY (INHALATION)
Status: COMPLETED | OUTPATIENT
Start: 2024-09-25 | End: 2024-09-25

## 2024-09-25 RX ADMIN — IPRATROPIUM BROMIDE AND ALBUTEROL SULFATE 3 ML: 2.5; .5 SOLUTION RESPIRATORY (INHALATION) at 06:09

## 2024-09-25 RX ADMIN — CIPROFLOXACIN 400 MG: 2 INJECTION, SOLUTION INTRAVENOUS at 06:09

## 2024-09-25 RX ADMIN — SODIUM CHLORIDE 1000 ML: 9 INJECTION, SOLUTION INTRAVENOUS at 06:09

## 2024-09-25 RX ADMIN — ASPIRIN 325 MG ORAL TABLET 325 MG: 325 PILL ORAL at 08:09

## 2024-09-25 RX ADMIN — DEXAMETHASONE SODIUM PHOSPHATE 8 MG: 4 INJECTION, SOLUTION INTRA-ARTICULAR; INTRALESIONAL; INTRAMUSCULAR; INTRAVENOUS; SOFT TISSUE at 06:09

## 2024-09-25 NOTE — ED PROVIDER NOTES
Encounter Date: 2024       History     Chief Complaint   Patient presents with    Shortness of Breath     Patient to ER via [facility] on CPAP.  EMS reports patient found on floor at home with sat of 68 on 3L.       HPI    Patient is a 83y WF hx COPD, GERD, Afib BIB EMS with hypoxia.  Patient activated EMS for shortness of breath and was saturating 65%  She is on home oxygen at 4.5L, started on Levaquin and steroids yesterday. Anticoagulated on Eliquis.  Followed by Dr.Ted Plascencia at [facility]    Patient endorsed wanting intubation if needed however prior record  shows her status DNR/DNI    Review of patient's allergies indicates:   Allergen Reactions    Pcn [penicillins] Hives and Itching    Tetracyclines     Bactrim [sulfamethoxazole-trimethoprim] Rash    Ilosone Rash    Iodine and iodide containing products Rash    Sulfa (sulfonamide antibiotics) Hives and Rash     Past Medical History:   Diagnosis Date    Abnormal Pap smear 2013    LGSIL    Anticoagulant long-term use     Atrial fibrillation     COPD (chronic obstructive pulmonary disease)     Depression     GERD (gastroesophageal reflux disease)     Hyperlipidemia     Hypertension      Past Surgical History:   Procedure Laterality Date    APPENDECTOMY      BRONCHOSCOPY N/A 2019    Procedure: BRONCHOSCOPY;  Surgeon: Howard Matson MD;  Location: [facility];  Service: Pulmonary;  Laterality: N/A;    CERVICAL BIOPSY  W/ LOOP ELECTRODE EXCISION      CHOLECYSTECTOMY      COLLATERAL LIGAMENT REPAIR, KNEE      left knee    COLONOSCOPY      DILATION AND CURETTAGE OF UTERUS      SINUS SURGERY       Family History   Problem Relation Name Age of Onset    Breast cancer Mother      Colon cancer Neg Hx      Ovarian cancer Neg Hx       Social History     Tobacco Use    Smoking status: Former     Current packs/day: 0.00     Types: Cigarettes     Start date: 10/30/1976     Quit date: 10/30/2006     Years since quittin.9    Smokeless tobacco: Never   Substance Use  Topics    Alcohol use: No     Comment: No    Drug use: No     Review of Systems   Unable to perform ROS: Acuity of condition     Social Determinants of Health     Tobacco Use: Medium Risk (9/25/2024)    Patient History     Smoking Tobacco Use: Former     Smokeless Tobacco Use: Never     Passive Exposure: Not on file   Alcohol Use: Not on file   Financial Resource Strain: Low Risk  (10/28/2022)    Overall Financial Resource Strain (CARDIA)     Difficulty of Paying Living Expenses: Not hard at all   Food Insecurity: No Food Insecurity (10/28/2022)    Hunger Vital Sign     Worried About Running Out of Food in the Last Year: Never true     Ran Out of Food in the Last Year: Never true   Transportation Needs: No Transportation Needs (10/28/2022)    PRAPARE - Transportation     Lack of Transportation (Medical): No     Lack of Transportation (Non-Medical): No   Physical Activity: Not on file   Stress: Stress Concern Present (12/9/2019)    [employer] [place] [employer] - Occupational Stress Questionnaire     Feeling of Stress : To some extent   Housing Stability: Low Risk  (10/28/2022)    Housing Stability Vital Sign     Unable to Pay for Housing in the Last Year: No     Number of Places Lived in the Last Year: 1     Unstable Housing in the Last Year: No   Depression: Low Risk  (1/3/2023)    Depression     Last PHQ-4: Flowsheet Data: 0   Utilities: Not on file   Health Literacy: Not on file   Social Isolation: Not on file       Physical Exam     Initial Vitals   BP Pulse Resp Temp SpO2   09/25/24 0634 09/25/24 0634 09/25/24 0634 09/25/24 0634 09/25/24 0638   (!) 155/74 106 (!) 38 99.6 °F (37.6 °C) 97 %      MAP       --                Physical Exam    Nursing note and vitals reviewed.  Constitutional: She appears well-developed and well-nourished.   Guppy breathing  Speaking in 1 word sentences   HENT:   Head: Normocephalic and atraumatic.   Mouth/Throat: Oropharynx is clear and moist.   Eyes: EOM are normal.  Pupils are equal, round, and reactive to light.   Neck: No JVD present.   Normal range of motion.  Cardiovascular:  Normal heart sounds.   Tachycardia present.         Pulmonary/Chest: Breath sounds normal. No stridor. Tachypnea noted. She is in respiratory distress.   Decreased breath sounds   Abdominal: Abdomen is soft. There is no abdominal tenderness.   Musculoskeletal:         General: Normal range of motion.      Cervical back: Normal range of motion.     Neurological: She is alert.   Skin: Skin is warm. Capillary refill takes less than 2 seconds.         ED Course   Critical Care    Date/Time: 9/25/2024 6:39 AM    Performed by: Bridget Zavala MD  Authorized by: Bridget Zavala MD  Direct patient critical care time: 55 minutes  Total critical care time (exclusive of procedural time) : 55 minutes  Critical care was time spent personally by me on the following activities: re-evaluation of patient's condition, pulse oximetry and review of old charts.        Labs Reviewed   CBC W/ AUTO DIFFERENTIAL - Abnormal       Result Value    WBC 31.08 (*)     RBC 4.31      Hemoglobin 11.8 (*)     Hematocrit 37.9      MCV 88      MCH 27.4      MCHC 31.1 (*)     RDW 14.4      Platelets 234      MPV 10.1      Immature Granulocytes 1.4 (*)     Gran # (ANC) 26.2 (*)     Immature Grans (Abs) 0.43 (*)     Lymph # 2.1      Mono # 2.1 (*)     Eos # 0.1      Baso # 0.10      nRBC 0      Gran % 84.4 (*)     Lymph % 6.9 (*)     Mono % 6.6      Eosinophil % 0.4      Basophil % 0.3      Differential Method Automated     COMPREHENSIVE METABOLIC PANEL - Abnormal    Sodium 144      Potassium 3.2 (*)     Chloride 99      CO2 32 (*)     Glucose 177 (*)     BUN 16      Creatinine 1.0      Calcium 8.9      Total Protein 6.0      Albumin 3.1 (*)     Total Bilirubin 0.6      Alkaline Phosphatase 86      AST 33      ALT 36      eGFR 56 (*)     Anion Gap 13     TROPONIN I - Abnormal    Troponin I 0.029 (*)    TROPONIN I - Abnormal    Troponin I  0.028 (*)    INFLUENZA A & B BY MOLECULAR    Influenza A, Molecular Negative      Influenza B, Molecular Negative      Flu A & B Source Nasal swab     CULTURE, BLOOD    Blood Culture, Routine No Growth to date     CULTURE, BLOOD    Blood Culture, Routine No Growth to date     SARS-COV-2 RNA AMPLIFICATION, QUAL    SARS-CoV-2 RNA, Amplification, Qual Negative     D DIMER, QUANTITATIVE    D-Dimer 0.32     B-TYPE NATRIURETIC PEPTIDE    BNP 89     RSV ANTIGEN DETECTION    RSV Source Nasopharyngeal Swab      RSV Ag by Molecular Method Negative     LACTIC ACID, PLASMA    Lactate (Lactic Acid) 2.0     PROCALCITONIN    Procalcitonin 0.24       EKG Readings: (Independently Interpreted)   Initial Reading: No STEMI. Rhythm: Sinus Tachycardia. Heart Rate: 108. Ectopy: No Ectopy. ST Segments: Non-Specific ST Segment Depression. Axis: Normal.     ECG Results              EKG 12-lead (Final result)        Collection Time Result Time QRS Duration OHS QTC Calculation    09/25/24 06:33:26 09/25/24 09:07:24 90 506                     Final result by Interface, Lab In [facility] (09/25/24 09:07:30)                   Narrative:    Test Reason : R06.02,    Vent. Rate : 108 BPM     Atrial Rate : 108 BPM     P-R Int : 128 ms          QRS Dur : 090 ms      QT Int : 378 ms       P-R-T Axes : 071 055 037 degrees     QTc Int : 506 ms    Sinus tachycardia  ST and/or T wave abnormalities suggesting myocardial ischemia  Abnormal ECG  When compared with ECG of 21-JUN-2023 20:10,  Vent. rate has increased BY  40 BPM  ST more depressed in Inferior leads  QT has lengthened  Confirmed by Alpesh MURRELL, El ARRIETA ([id number]) on 9/25/2024 9:07:21 AM    Referred By:  MIKE           Confirmed By:El Betts MD                                  Imaging Results              X-Ray Chest 1 View (Final result)  Result time 09/25/24 07:55:11      Final result by Caitlyn Whitmore MD (09/25/24 07:55:11)                   Impression:      As above.      Electronically  signed by: Caitlyn Whitmore MD  Date:    09/25/2024  Time:    07:55               Narrative:    EXAMINATION:  XR CHEST 1 VIEW    CLINICAL HISTORY:  shortness of breath;    TECHNIQUE:  Single frontal view of the chest was performed.    COMPARISON:  06/21/2023    FINDINGS:  Coarse interstitial markings are seen throughout the lungs suggesting COPD change with more focal coarse interstitial opacities throughout the left lung which could reflect asymmetric edema, aspiration or pneumonia.  The heart is mildly enlarged.  Calcified atheromatous disease affects the aorta.  Age-appropriate degenerative changes affect the skeleton.                                       Medications   dexAMETHasone injection 8 mg (8 mg Intravenous Given 9/25/24 0639)   albuterol-ipratropium 2.5 mg-0.5 mg/3 mL nebulizer solution 3 mL (3 mLs Nebulization Given 9/25/24 0638)   ciprofloxacin (CIPRO)400mg/200ml D5W IVPB 400 mg (0 mg Intravenous Stopped 9/25/24 0752)   sodium chloride 0.9% bolus 1,000 mL 1,000 mL (0 mLs Intravenous Stopped 9/25/24 0747)   aspirin tablet 325 mg (325 mg Oral Given 9/25/24 0843)     Medical Decision Making    This is emergent evaluation of a 83-year-old white female with past medical history AFib, hypertension, COPD brought in by EMS with complaint of shortness of breath.  Physical exam she is tachypneic, tachycardic, and extremis speaking in one-word sentences with CPAP in place.  Patient is not moving much air, no noted wheezing.  She was emergently switched to BiPAP, given neb and Decadron.      ECHO from 2019 shows EF 65%  After initial conversation patient voiced being afraid to die at home and wishes to die in a hospital.  She is more stable now, trial off bipap with O2 sats 99% on 4.5 L  Consult placed to hospice for planning.     Labs remarkable for leukocytosis 31 which is like 2/2 steroid usage recently.  Lactic acid and pro calcitonin normal.  Patient is not septic. CXR with Left lung opacities, will treat for  pneumonia in setting of COPD exacerbation.  Cipro given already.  Blood gas shows PO2 63, pCO2 75, SaO2 93.6 on 5L NC.    Patient intermittently on bipap.  Will transfer to [facility] for pulmonology services.     Amount and/or Complexity of Data Reviewed  Labs: ordered.  Radiology: ordered.    Risk  OTC drugs.  Prescription drug management.                                      Clinical Impression:  Final diagnoses:  [R06.02] Shortness of breath  [J44.1] COPD exacerbation (Primary)  [J18.9] Pneumonia of left lung due to infectious organism, unspecified part of lung          ED Disposition Condition    Transfer to Another Facility Stable                Bridget Zavala MD  09/25/24 [id number]

## 2024-09-25 NOTE — ED NOTES
Social service/Case management at bedside. Hospice Nurse Noni also at bedside to speak with pt family.

## 2024-09-25 NOTE — CONSULTS
Case management director/palliative care team member went down to see patient in the ED on hospice consult. Private sitter present at bedside.     Discussion regarding hospice care had at bedside - patient refuses and reports that she wants full treatment.     During the meeting, [facility] came to give patient information on services - patient was upset and again adamantly refused hospice care. Wants full treatment.     Dr. Zavala notified via secure chat. Case management to remain available for any additional needs.

## 2024-09-30 LAB
BACTERIA BLD CULT: NORMAL
BACTERIA BLD CULT: NORMAL

## 2024-10-01 ENCOUNTER — PATIENT OUTREACH (OUTPATIENT)
Dept: FAMILY MEDICINE | Facility: CLINIC | Age: 84
End: 2024-10-01
Payer: MEDICARE

## (undated) DEVICE — VALVE OLYMPUS SCOPE BIOPSY

## (undated) DEVICE — VALVE OLYMPUS SCOPE SUCTION